# Patient Record
Sex: FEMALE | Race: WHITE | NOT HISPANIC OR LATINO | Employment: OTHER | URBAN - METROPOLITAN AREA
[De-identification: names, ages, dates, MRNs, and addresses within clinical notes are randomized per-mention and may not be internally consistent; named-entity substitution may affect disease eponyms.]

---

## 2018-01-29 ENCOUNTER — OFFICE VISIT CONVERTED (OUTPATIENT)
Dept: UROLOGY | Facility: CLINIC | Age: 78
End: 2018-01-29
Attending: NURSE PRACTITIONER

## 2018-02-02 ENCOUNTER — HOSPITAL ENCOUNTER (OUTPATIENT)
Dept: GENERAL RADIOLOGY | Facility: HOSPITAL | Age: 78
Discharge: HOME | End: 2018-02-02
Admitting: ORTHOPAEDIC SURGERY

## 2018-02-02 ENCOUNTER — HOSPITAL ENCOUNTER (OUTPATIENT)
Dept: GENERAL RADIOLOGY | Facility: HOSPITAL | Age: 78
Discharge: HOME | End: 2018-02-02

## 2018-02-02 ENCOUNTER — APPOINTMENT (OUTPATIENT)
Dept: PREADMISSION TESTING | Facility: HOSPITAL | Age: 78
End: 2018-02-02

## 2018-02-02 VITALS
BODY MASS INDEX: 34.99 KG/M2 | TEMPERATURE: 98.2 F | SYSTOLIC BLOOD PRESSURE: 121 MMHG | OXYGEN SATURATION: 95 % | RESPIRATION RATE: 24 BRPM | HEIGHT: 65 IN | HEART RATE: 69 BPM | DIASTOLIC BLOOD PRESSURE: 79 MMHG | WEIGHT: 210 LBS

## 2018-02-02 LAB
ALBUMIN SERPL-MCNC: 4.4 G/DL (ref 3.5–5.2)
ALBUMIN/GLOB SERPL: 1.2 G/DL
ALP SERPL-CCNC: 97 U/L (ref 39–117)
ALT SERPL W P-5'-P-CCNC: 13 U/L (ref 1–33)
ANION GAP SERPL CALCULATED.3IONS-SCNC: 14.5 MMOL/L
APTT PPP: 27.6 SECONDS (ref 22.7–35.4)
AST SERPL-CCNC: 13 U/L (ref 1–32)
BASOPHILS # BLD AUTO: 0.02 10*3/MM3 (ref 0–0.2)
BASOPHILS NFR BLD AUTO: 0.2 % (ref 0–1.5)
BILIRUB SERPL-MCNC: 0.3 MG/DL (ref 0.1–1.2)
BILIRUB UR QL STRIP: NEGATIVE
BUN BLD-MCNC: 30 MG/DL (ref 8–23)
BUN/CREAT SERPL: 23.3 (ref 7–25)
CALCIUM SPEC-SCNC: 10.3 MG/DL (ref 8.6–10.5)
CHLORIDE SERPL-SCNC: 100 MMOL/L (ref 98–107)
CLARITY UR: CLEAR
CO2 SERPL-SCNC: 27.5 MMOL/L (ref 22–29)
COLOR UR: YELLOW
CREAT BLD-MCNC: 1.29 MG/DL (ref 0.57–1)
DEPRECATED RDW RBC AUTO: 46.7 FL (ref 37–54)
EOSINOPHIL # BLD AUTO: 0.13 10*3/MM3 (ref 0–0.7)
EOSINOPHIL NFR BLD AUTO: 1.2 % (ref 0.3–6.2)
ERYTHROCYTE [DISTWIDTH] IN BLOOD BY AUTOMATED COUNT: 13.8 % (ref 11.7–13)
GFR SERPL CREATININE-BSD FRML MDRD: 40 ML/MIN/1.73
GLOBULIN UR ELPH-MCNC: 3.6 GM/DL
GLUCOSE BLD-MCNC: 118 MG/DL (ref 65–99)
GLUCOSE UR STRIP-MCNC: NEGATIVE MG/DL
HBA1C MFR BLD: 6.17 % (ref 4.8–5.6)
HCT VFR BLD AUTO: 37.5 % (ref 35.6–45.5)
HGB BLD-MCNC: 12 G/DL (ref 11.9–15.5)
HGB UR QL STRIP.AUTO: NEGATIVE
IMM GRANULOCYTES # BLD: 0.04 10*3/MM3 (ref 0–0.03)
IMM GRANULOCYTES NFR BLD: 0.4 % (ref 0–0.5)
INR PPP: 1.07 (ref 0.9–1.1)
KETONES UR QL STRIP: NEGATIVE
LEUKOCYTE ESTERASE UR QL STRIP.AUTO: NEGATIVE
LYMPHOCYTES # BLD AUTO: 2.93 10*3/MM3 (ref 0.9–4.8)
LYMPHOCYTES NFR BLD AUTO: 26.8 % (ref 19.6–45.3)
MCH RBC QN AUTO: 29.8 PG (ref 26.9–32)
MCHC RBC AUTO-ENTMCNC: 32 G/DL (ref 32.4–36.3)
MCV RBC AUTO: 93.1 FL (ref 80.5–98.2)
MONOCYTES # BLD AUTO: 0.46 10*3/MM3 (ref 0.2–1.2)
MONOCYTES NFR BLD AUTO: 4.2 % (ref 5–12)
NEUTROPHILS # BLD AUTO: 7.37 10*3/MM3 (ref 1.9–8.1)
NEUTROPHILS NFR BLD AUTO: 67.2 % (ref 42.7–76)
NITRITE UR QL STRIP: NEGATIVE
PH UR STRIP.AUTO: 5.5 [PH] (ref 5–8)
PLATELET # BLD AUTO: 299 10*3/MM3 (ref 140–500)
PMV BLD AUTO: 10.5 FL (ref 6–12)
POTASSIUM BLD-SCNC: 4.1 MMOL/L (ref 3.5–5.2)
PROT SERPL-MCNC: 8 G/DL (ref 6–8.5)
PROT UR QL STRIP: NEGATIVE
PROTHROMBIN TIME: 13.5 SECONDS (ref 11.7–14.2)
RBC # BLD AUTO: 4.03 10*6/MM3 (ref 3.9–5.2)
SODIUM BLD-SCNC: 142 MMOL/L (ref 136–145)
SP GR UR STRIP: 1.02 (ref 1–1.03)
UROBILINOGEN UR QL STRIP: NORMAL
WBC NRBC COR # BLD: 10.95 10*3/MM3 (ref 4.5–10.7)

## 2018-02-02 PROCEDURE — 80053 COMPREHEN METABOLIC PANEL: CPT | Performed by: ORTHOPAEDIC SURGERY

## 2018-02-02 PROCEDURE — 81003 URINALYSIS AUTO W/O SCOPE: CPT | Performed by: ORTHOPAEDIC SURGERY

## 2018-02-02 PROCEDURE — 85025 COMPLETE CBC W/AUTO DIFF WBC: CPT | Performed by: ORTHOPAEDIC SURGERY

## 2018-02-02 PROCEDURE — 93005 ELECTROCARDIOGRAM TRACING: CPT

## 2018-02-02 PROCEDURE — 71046 X-RAY EXAM CHEST 2 VIEWS: CPT

## 2018-02-02 PROCEDURE — 85730 THROMBOPLASTIN TIME PARTIAL: CPT | Performed by: ORTHOPAEDIC SURGERY

## 2018-02-02 PROCEDURE — 83036 HEMOGLOBIN GLYCOSYLATED A1C: CPT | Performed by: ORTHOPAEDIC SURGERY

## 2018-02-02 PROCEDURE — 36415 COLL VENOUS BLD VENIPUNCTURE: CPT

## 2018-02-02 PROCEDURE — 73560 X-RAY EXAM OF KNEE 1 OR 2: CPT

## 2018-02-02 PROCEDURE — 85610 PROTHROMBIN TIME: CPT | Performed by: ORTHOPAEDIC SURGERY

## 2018-02-02 PROCEDURE — 93010 ELECTROCARDIOGRAM REPORT: CPT | Performed by: INTERNAL MEDICINE

## 2018-02-02 RX ORDER — HYDROCODONE BITARTRATE AND ACETAMINOPHEN 5; 325 MG/1; MG/1
1 TABLET ORAL EVERY 6 HOURS PRN
COMMUNITY
End: 2018-02-02

## 2018-02-02 RX ORDER — OMEPRAZOLE 20 MG/1
20 CAPSULE, DELAYED RELEASE ORAL DAILY
COMMUNITY

## 2018-02-02 RX ORDER — LUBIPROSTONE 8 UG/1
8 CAPSULE ORAL 2 TIMES DAILY WITH MEALS
COMMUNITY
End: 2018-12-13

## 2018-02-02 RX ORDER — ASCORBIC ACID 500 MG
500 TABLET ORAL DAILY
COMMUNITY

## 2018-02-02 RX ORDER — LOSARTAN POTASSIUM 25 MG/1
25 TABLET ORAL DAILY
COMMUNITY
End: 2021-06-21 | Stop reason: SDUPTHER

## 2018-02-02 RX ORDER — PRAMIPEXOLE DIHYDROCHLORIDE 1.5 MG/1
1.5 TABLET ORAL NIGHTLY
COMMUNITY

## 2018-02-02 RX ORDER — CHLORHEXIDINE GLUCONATE 500 MG/1
1 CLOTH TOPICAL
Status: ON HOLD | COMMUNITY
End: 2018-02-13

## 2018-02-02 RX ORDER — OXYCODONE HYDROCHLORIDE AND ACETAMINOPHEN 5; 325 MG/1; MG/1
1 TABLET ORAL EVERY 6 HOURS PRN
COMMUNITY
End: 2018-12-13

## 2018-02-02 RX ORDER — DOCUSATE SODIUM 100 MG/1
100 CAPSULE, LIQUID FILLED ORAL NIGHTLY PRN
COMMUNITY
End: 2018-12-13

## 2018-02-02 RX ORDER — LATANOPROST 50 UG/ML
2 SOLUTION/ DROPS OPHTHALMIC 2 TIMES DAILY
Status: ON HOLD | COMMUNITY
End: 2018-12-21

## 2018-02-02 RX ORDER — LEVOTHYROXINE SODIUM 0.03 MG/1
25 TABLET ORAL DAILY
COMMUNITY

## 2018-02-02 RX ORDER — SPIRONOLACTONE AND HYDROCHLOROTHIAZIDE 25; 25 MG/1; MG/1
1 TABLET ORAL DAILY
COMMUNITY
End: 2018-12-13

## 2018-02-02 RX ORDER — ATORVASTATIN CALCIUM 10 MG/1
10 TABLET, FILM COATED ORAL NIGHTLY
COMMUNITY
End: 2021-06-21 | Stop reason: SDUPTHER

## 2018-02-02 RX ORDER — METOPROLOL SUCCINATE 25 MG/1
25 TABLET, EXTENDED RELEASE ORAL NIGHTLY
COMMUNITY
End: 2021-09-23 | Stop reason: SDUPTHER

## 2018-02-02 RX ORDER — LISINOPRIL 10 MG/1
10 TABLET ORAL DAILY
COMMUNITY
End: 2018-12-13

## 2018-02-02 RX ORDER — SENNOSIDES 8.6 MG
650 CAPSULE ORAL EVERY 8 HOURS PRN
COMMUNITY
End: 2019-03-05

## 2018-02-02 RX ORDER — SOLIFENACIN SUCCINATE 5 MG/1
5 TABLET, FILM COATED ORAL NIGHTLY
COMMUNITY
End: 2021-08-03 | Stop reason: CLARIF

## 2018-02-02 ASSESSMENT — KOOS JR
KOOS JR SCORE: 36.931
KOOS JR SCORE: 20

## 2018-02-02 NOTE — DISCHARGE INSTRUCTIONS

## 2018-02-10 RX ORDER — CHLORHEXIDINE GLUCONATE 0.12 MG/ML
15 RINSE ORAL EVERY 12 HOURS SCHEDULED
Status: CANCELLED | OUTPATIENT
Start: 2018-02-10

## 2018-02-10 NOTE — H&P
HPI  Mrs. Becerra is a 76 y/o female who has a history of L TKA revision on 2/4/14 for instability and infection. 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 She has been following with us for a painful left total knee and bone scan has ruled out infection/losening. She is here today because she twisted her left knee while going into a closet 1 week ago. She went to urgent care and her left knee was x-rayed. She is here today for follow-up.   She ambulates with a walker but this is her baseline. She states that she has medial sides knee pain which is worse then she puts weight on it.   She has a history of back surgery.  She denies any history of MRSA, DVT. She has a history of diabetes mellitus type 2.  Review of Systems:  Positive for: Depression, Eyes or Vision Problems, Shortness of Breath and Urinary Retention.    Patient denies: Abdominal Pain, Bleeding, Chest Pain, Convulsions/Seizure, Decreased Motion, Difficulty Swallowing, Easy Bruisability, Emotional Disturbances, Fecal Incontinence, Fever/Chills, Headaches, Increased Thirst, Increased Hunger, Insomnia, Joint Pain, Nausea/Vomiting, Night Sweats, Poor Balance, Persistent Cough, Rash, Shortness of Breath While Lying down, Skin Problems and Weakness.  Allergies:  Flu vaccine (critical)  * tetanus (critical)  * zoloft (critical)  * eggs (critical)  * metal - negative  Medications:  aspirin 81 mg oral tablet delayed release (aspirin) once daily  vitamin c 500 mg oral tablet (ascorbic acid) once daily  atorvastatin calcium 10 mg oral tablet (atorvastatin calcium) once daily  venlafaxine hcl er 150 mg oral tablet extended release 24 hour (venlafaxine hcl) once daily  pramipexole dihydrochloride 1.5 mg oral tablet (pramipexole dihydrochloride) once daily  spironolactone 25 mg oral tablet (spironolactone) once daily  multivitamins oral capsule (multiple vitamin) once daily  calcium 600 + d tablet (calcium carb-cholecalciferol tabs) two  daily  metformin hcl 500 mg oral tablet (metformin hcl) 2x day  lisinopril 40 mg oral tablet (lisinopril) once daily  levothyroxine sodium 25 mcg oral tablet (levothyroxine sodium) once daily  vesicare 5 mg oral tablet (solifenacin succinate) once daily  furosemide 20 mg oral tablet (furosemide) once daily  omeprazole 20 mg oral capsule delayed release (omeprazole) once daily  Patient History of:  SHORTNESS OF BREATH  [place]  DEPRESSION  BLOOD CLOTS/EMBOLISM - NEGATIVE  HYPERTENSION  DIABETES - 2  Surgical History:  BREAST BIOPSY-   BLADDER SX-   Hysterectomy-Total-[CPT-13468]   Lumbar Discectomy-[CPT-39383]   bilateral Total Knee Arthroplasty-[CPT-10386]   Known Family History of:  lung disease-father  cancer-mother  diabetes-father  Past medical, social, family histories and ROS reviewed today with the patient and changes documented in the chart (12/29/2017).  PCP Dr. MARY PEREAM, ANDR[facility]S Z    Physical Exam  Height:  66 in.    Weight:  221 lbs.     BMI:  35.80  Pulse:  70  Blood pressure:  128 / 70 mm Hg    Gait: normal                     General  Physical Exam is unchanged from the last visit.    Mental/HEENT/Cardio/Skin  The patient's general appearance is well developed, well nourished, no acute distress.  Orientation is alert and oriented x 3.  The patient's mood is normal.  A head exam reveals normocephalic/atraumatic.  An eye exam reveals pupils equal.  Pulmonary exam shows normal air exchange, no labored breathing, or shortness of breath.    Left Knee  There is a mature midline anterior scar. incision:  Clean, dry, and intact.  No signs of infection.  Neutral alignment.  There is no atrophy.  There is no effusion.  No warmth.  No erythema.  Range of motion of the knee is 0 to 120 degrees of flexion.  There is no tenderness in the knee.  Patellar tracking is normal.  Patellar crepitation is not present.  Crepitus is not present.  Posterior drawer grade 0.  The patient opens to varus and valgus stress.        Imaging/Diagnostic Studies    X-rays of the left knee were reviewed.  She has a long stemed L TKA without evidence of cement mantle fracture or component fracture.   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 The patient has mediolateral instability.  She possibly has a medial collateral ligament injury versus tear with the most recent fall.  The patient has reached a point of disability and has failed nonoperative management.  The patient is indicated for a Revision, LEFT total knee arthroplasty. At the time of surgery.  I will visualize the medial collateral ligament.  If I am able to salvage the implants, I will do an isolated liner change.  However, based on the examination and findings.  I feel that she will be a candidate for a hinged knee replacement.  Likely,  Risks and benefits of the procedure including but not limited to infection, DVT, pulmonary embolism, future loosening of the implants, possibility of injury to nerves vessels and tendons, periprosthetic fractures have been discussed in detail.  Despite the risks involved,  The patient would like to proceed.  The patient  is being scheduled for a   Revision LEFT total knee arthroplasty at [facility] on February 13, 2018.     I will request for medical clearance from her family physician Dr Leo Lomas DPM,  And cardiac clearance from her cardiologist, Dr. Tae M.D., [facility].  Patient does not use tobacco.      We discussed the benefits of surgical intervention, as well as alternative treatments.  Potential surgical risks and complications include but are not limited to DVT, infection, neurovascular injury, fracture, implant wear,  failure, possible need for revision surgery, loss of motion, dislocation, limb length changes.  Sufficient opportunity was given to discuss the condition and treatment plan with the doctor, and all questions were answered for the patient.  Nonsurgical measures such as injections, medications, or physical therapy may not offer significant relief to this patient.  The discussion lasted 30 minutes.      Addendum:    I have personally examined this patient. I agree with the above findings and treatment  plan.     Jair Fajardo MD  2/11/2018

## 2018-02-13 ENCOUNTER — ANESTHESIA (OUTPATIENT)
Dept: PERIOP | Facility: HOSPITAL | Age: 78
End: 2018-02-13

## 2018-02-13 ENCOUNTER — ANESTHESIA EVENT (OUTPATIENT)
Dept: PERIOP | Facility: HOSPITAL | Age: 78
End: 2018-02-13

## 2018-02-13 ENCOUNTER — APPOINTMENT (OUTPATIENT)
Dept: GENERAL RADIOLOGY | Facility: HOSPITAL | Age: 78
End: 2018-02-13

## 2018-02-13 ENCOUNTER — HOSPITAL ENCOUNTER (INPATIENT)
Facility: HOSPITAL | Age: 78
LOS: 3 days | Discharge: NURSING, REHAB OR LONG-TERM CARE | End: 2018-02-16
Attending: ORTHOPAEDIC SURGERY | Admitting: ORTHOPAEDIC SURGERY

## 2018-02-13 DIAGNOSIS — T84.093A FAILED TOTAL LEFT KNEE REPLACEMENT (HCC): ICD-10-CM

## 2018-02-13 DIAGNOSIS — Z74.09 IMPAIRED FUNCTIONAL MOBILITY, BALANCE, AND ENDURANCE: Primary | ICD-10-CM

## 2018-02-13 PROBLEM — M17.9 OSTEOARTHRITIS, KNEE: Status: ACTIVE | Noted: 2018-02-13

## 2018-02-13 LAB — GLUCOSE BLDC GLUCOMTR-MCNC: 115 MG/DL (ref 70–130)

## 2018-02-13 PROCEDURE — 0SUW09Z SUPPLEMENT LEFT KNEE JOINT, TIBIAL SURFACE WITH LINER, OPEN APPROACH: ICD-10-PCS | Performed by: ORTHOPAEDIC SURGERY

## 2018-02-13 PROCEDURE — 0SRD0J9 REPLACEMENT OF LEFT KNEE JOINT WITH SYNTHETIC SUBSTITUTE, CEMENTED, OPEN APPROACH: ICD-10-PCS | Performed by: ORTHOPAEDIC SURGERY

## 2018-02-13 PROCEDURE — C1776 JOINT DEVICE (IMPLANTABLE): HCPCS | Performed by: ORTHOPAEDIC SURGERY

## 2018-02-13 PROCEDURE — 73560 X-RAY EXAM OF KNEE 1 OR 2: CPT

## 2018-02-13 PROCEDURE — 25010000002 FENTANYL CITRATE (PF) 100 MCG/2ML SOLUTION: Performed by: ANESTHESIOLOGY

## 2018-02-13 PROCEDURE — 25010000002 ROPIVACAINE PER 1 MG: Performed by: ANESTHESIOLOGY

## 2018-02-13 PROCEDURE — 25010000003 CEFAZOLIN IN DEXTROSE 2-4 GM/100ML-% SOLUTION: Performed by: ORTHOPAEDIC SURGERY

## 2018-02-13 PROCEDURE — L1830 KO IMMOB CANVAS LONG PRE OTS: HCPCS | Performed by: ORTHOPAEDIC SURGERY

## 2018-02-13 PROCEDURE — 0SPD09Z REMOVAL OF LINER FROM LEFT KNEE JOINT, OPEN APPROACH: ICD-10-PCS | Performed by: ORTHOPAEDIC SURGERY

## 2018-02-13 PROCEDURE — 25010000002 DEXAMETHASONE PER 1 MG: Performed by: NURSE ANESTHETIST, CERTIFIED REGISTERED

## 2018-02-13 PROCEDURE — 25010000002 MEPIVACAINE PF 2 % SOLUTION 20 ML VIAL: Performed by: ANESTHESIOLOGY

## 2018-02-13 PROCEDURE — 25010000002 METHYLPREDNISOLONE PER 125 MG: Performed by: ANESTHESIOLOGY

## 2018-02-13 PROCEDURE — C1713 ANCHOR/SCREW BN/BN,TIS/BN: HCPCS | Performed by: ORTHOPAEDIC SURGERY

## 2018-02-13 PROCEDURE — 88305 TISSUE EXAM BY PATHOLOGIST: CPT | Performed by: ORTHOPAEDIC SURGERY

## 2018-02-13 PROCEDURE — 25010000002 PROPOFOL 10 MG/ML EMULSION: Performed by: NURSE ANESTHETIST, CERTIFIED REGISTERED

## 2018-02-13 PROCEDURE — 88331 PATH CONSLTJ SURG 1 BLK 1SPC: CPT | Performed by: ORTHOPAEDIC SURGERY

## 2018-02-13 PROCEDURE — 0SPD0JZ REMOVAL OF SYNTHETIC SUBSTITUTE FROM LEFT KNEE JOINT, OPEN APPROACH: ICD-10-PCS | Performed by: ORTHOPAEDIC SURGERY

## 2018-02-13 PROCEDURE — 82962 GLUCOSE BLOOD TEST: CPT

## 2018-02-13 PROCEDURE — 25010000002 VANCOMYCIN PER 500 MG: Performed by: ORTHOPAEDIC SURGERY

## 2018-02-13 PROCEDURE — 25010000002 MIDAZOLAM PER 1 MG: Performed by: ANESTHESIOLOGY

## 2018-02-13 DEVICE — AXLE KN DURATION MRH
Type: IMPLANTABLE DEVICE | Site: KNEE | Status: NON-FUNCTIONAL
Removed: 2019-06-13

## 2018-02-13 DEVICE — IMPLANTABLE DEVICE
Type: IMPLANTABLE DEVICE | Site: KNEE | Status: NON-FUNCTIONAL
Removed: 2019-06-13

## 2018-02-13 DEVICE — CMT BONE SIMPLEX/P 1/2DOSE 10PK: Type: IMPLANTABLE DEVICE | Site: KNEE | Status: FUNCTIONAL

## 2018-02-13 DEVICE — BUMPER TIB DURATION MRH NUTRL
Type: IMPLANTABLE DEVICE | Site: KNEE | Status: NON-FUNCTIONAL
Removed: 2019-06-13

## 2018-02-13 DEVICE — CMT BONE SIMPLEX/P TMYCIN FDOS SGL: Type: IMPLANTABLE DEVICE | Site: KNEE | Status: FUNCTIONAL

## 2018-02-13 DEVICE — COMP TIB ROTAT MRH
Type: IMPLANTABLE DEVICE | Site: KNEE | Status: NON-FUNCTIONAL
Removed: 2019-06-13

## 2018-02-13 DEVICE — BUSHING FEM DURATION MRH STD
Type: IMPLANTABLE DEVICE | Site: KNEE | Status: NON-FUNCTIONAL
Removed: 2019-06-13

## 2018-02-13 DEVICE — BASEPLT TIB DURTN MRH 2 SM
Type: IMPLANTABLE DEVICE | Site: KNEE | Status: NON-FUNCTIONAL
Removed: 2019-06-13

## 2018-02-13 DEVICE — IMPLANTABLE DEVICE: Type: IMPLANTABLE DEVICE | Site: KNEE | Status: FUNCTIONAL

## 2018-02-13 RX ORDER — HYDROMORPHONE HCL 110MG/55ML
0.5 PATIENT CONTROLLED ANALGESIA SYRINGE INTRAVENOUS
Status: DISCONTINUED | OUTPATIENT
Start: 2018-02-13 | End: 2018-02-13 | Stop reason: HOSPADM

## 2018-02-13 RX ORDER — ONDANSETRON 4 MG/1
4 TABLET, ORALLY DISINTEGRATING ORAL EVERY 6 HOURS PRN
Status: DISCONTINUED | OUTPATIENT
Start: 2018-02-13 | End: 2018-02-16 | Stop reason: HOSPADM

## 2018-02-13 RX ORDER — ROPIVACAINE HYDROCHLORIDE 2 MG/ML
INJECTION, SOLUTION EPIDURAL; INFILTRATION; PERINEURAL AS NEEDED
Status: DISCONTINUED | OUTPATIENT
Start: 2018-02-13 | End: 2018-02-13 | Stop reason: SURG

## 2018-02-13 RX ORDER — VENLAFAXINE HYDROCHLORIDE 150 MG/1
150 CAPSULE, EXTENDED RELEASE ORAL DAILY
Status: DISCONTINUED | OUTPATIENT
Start: 2018-02-13 | End: 2018-02-16 | Stop reason: HOSPADM

## 2018-02-13 RX ORDER — DOCUSATE SODIUM 100 MG/1
100 CAPSULE, LIQUID FILLED ORAL 2 TIMES DAILY
Status: DISCONTINUED | OUTPATIENT
Start: 2018-02-13 | End: 2018-02-16 | Stop reason: HOSPADM

## 2018-02-13 RX ORDER — HYDROCODONE BITARTRATE AND ACETAMINOPHEN 7.5; 325 MG/1; MG/1
1 TABLET ORAL ONCE AS NEEDED
Status: DISCONTINUED | OUTPATIENT
Start: 2018-02-13 | End: 2018-02-13 | Stop reason: HOSPADM

## 2018-02-13 RX ORDER — FLUMAZENIL 0.1 MG/ML
0.2 INJECTION INTRAVENOUS AS NEEDED
Status: DISCONTINUED | OUTPATIENT
Start: 2018-02-13 | End: 2018-02-13 | Stop reason: HOSPADM

## 2018-02-13 RX ORDER — NALOXONE HCL 0.4 MG/ML
0.2 VIAL (ML) INJECTION AS NEEDED
Status: DISCONTINUED | OUTPATIENT
Start: 2018-02-13 | End: 2018-02-13 | Stop reason: HOSPADM

## 2018-02-13 RX ORDER — ASPIRIN 325 MG
325 TABLET, DELAYED RELEASE (ENTERIC COATED) ORAL EVERY 12 HOURS SCHEDULED
Status: DISCONTINUED | OUTPATIENT
Start: 2018-02-14 | End: 2018-02-16 | Stop reason: HOSPADM

## 2018-02-13 RX ORDER — PROPOFOL 10 MG/ML
VIAL (ML) INTRAVENOUS AS NEEDED
Status: DISCONTINUED | OUTPATIENT
Start: 2018-02-13 | End: 2018-02-13 | Stop reason: SURG

## 2018-02-13 RX ORDER — SODIUM CHLORIDE 0.9 % (FLUSH) 0.9 %
1-10 SYRINGE (ML) INJECTION AS NEEDED
Status: DISCONTINUED | OUTPATIENT
Start: 2018-02-13 | End: 2018-02-13 | Stop reason: HOSPADM

## 2018-02-13 RX ORDER — ONDANSETRON 2 MG/ML
4 INJECTION INTRAMUSCULAR; INTRAVENOUS ONCE AS NEEDED
Status: DISCONTINUED | OUTPATIENT
Start: 2018-02-13 | End: 2018-02-13 | Stop reason: HOSPADM

## 2018-02-13 RX ORDER — ACETAMINOPHEN 500 MG
1000 TABLET ORAL ONCE
Status: COMPLETED | OUTPATIENT
Start: 2018-02-13 | End: 2018-02-13

## 2018-02-13 RX ORDER — PROMETHAZINE HYDROCHLORIDE 25 MG/ML
12.5 INJECTION, SOLUTION INTRAMUSCULAR; INTRAVENOUS ONCE AS NEEDED
Status: DISCONTINUED | OUTPATIENT
Start: 2018-02-13 | End: 2018-02-13 | Stop reason: HOSPADM

## 2018-02-13 RX ORDER — TRANEXAMIC ACID 100 MG/ML
INJECTION, SOLUTION INTRAVENOUS AS NEEDED
Status: DISCONTINUED | OUTPATIENT
Start: 2018-02-13 | End: 2018-02-13 | Stop reason: SURG

## 2018-02-13 RX ORDER — PANTOPRAZOLE SODIUM 40 MG/1
40 TABLET, DELAYED RELEASE ORAL EVERY MORNING
Status: DISCONTINUED | OUTPATIENT
Start: 2018-02-14 | End: 2018-02-16 | Stop reason: HOSPADM

## 2018-02-13 RX ORDER — NICOTINE POLACRILEX 4 MG
15 LOZENGE BUCCAL
Status: DISCONTINUED | OUTPATIENT
Start: 2018-02-13 | End: 2018-02-16 | Stop reason: HOSPADM

## 2018-02-13 RX ORDER — METHYLPREDNISOLONE SODIUM SUCCINATE 125 MG/2ML
INJECTION, POWDER, LYOPHILIZED, FOR SOLUTION INTRAMUSCULAR; INTRAVENOUS AS NEEDED
Status: DISCONTINUED | OUTPATIENT
Start: 2018-02-13 | End: 2018-02-13 | Stop reason: SURG

## 2018-02-13 RX ORDER — LABETALOL HYDROCHLORIDE 5 MG/ML
5 INJECTION, SOLUTION INTRAVENOUS
Status: DISCONTINUED | OUTPATIENT
Start: 2018-02-13 | End: 2018-02-13 | Stop reason: HOSPADM

## 2018-02-13 RX ORDER — LATANOPROST 50 UG/ML
1 SOLUTION/ DROPS OPHTHALMIC NIGHTLY
Status: DISCONTINUED | OUTPATIENT
Start: 2018-02-13 | End: 2018-02-16 | Stop reason: HOSPADM

## 2018-02-13 RX ORDER — PRAMIPEXOLE DIHYDROCHLORIDE 1.5 MG/1
1.5 TABLET ORAL NIGHTLY
Status: DISCONTINUED | OUTPATIENT
Start: 2018-02-13 | End: 2018-02-16 | Stop reason: HOSPADM

## 2018-02-13 RX ORDER — SODIUM CHLORIDE, SODIUM LACTATE, POTASSIUM CHLORIDE, CALCIUM CHLORIDE 600; 310; 30; 20 MG/100ML; MG/100ML; MG/100ML; MG/100ML
INJECTION, SOLUTION INTRAVENOUS CONTINUOUS PRN
Status: DISCONTINUED | OUTPATIENT
Start: 2018-02-13 | End: 2018-02-13 | Stop reason: SURG

## 2018-02-13 RX ORDER — BISACODYL 10 MG
10 SUPPOSITORY, RECTAL RECTAL DAILY PRN
Status: DISCONTINUED | OUTPATIENT
Start: 2018-02-13 | End: 2018-02-16 | Stop reason: HOSPADM

## 2018-02-13 RX ORDER — UREA 10 %
1 LOTION (ML) TOPICAL NIGHTLY PRN
Status: DISCONTINUED | OUTPATIENT
Start: 2018-02-13 | End: 2018-02-16 | Stop reason: HOSPADM

## 2018-02-13 RX ORDER — VANCOMYCIN HYDROCHLORIDE 1 G/200ML
1000 INJECTION, SOLUTION INTRAVENOUS EVERY 24 HOURS
Status: COMPLETED | OUTPATIENT
Start: 2018-02-13 | End: 2018-02-14

## 2018-02-13 RX ORDER — NALOXONE HCL 0.4 MG/ML
0.1 VIAL (ML) INJECTION
Status: DISCONTINUED | OUTPATIENT
Start: 2018-02-13 | End: 2018-02-16 | Stop reason: HOSPADM

## 2018-02-13 RX ORDER — DEXTROSE MONOHYDRATE 25 G/50ML
25 INJECTION, SOLUTION INTRAVENOUS
Status: DISCONTINUED | OUTPATIENT
Start: 2018-02-13 | End: 2018-02-16 | Stop reason: HOSPADM

## 2018-02-13 RX ORDER — FENTANYL CITRATE 50 UG/ML
50 INJECTION, SOLUTION INTRAMUSCULAR; INTRAVENOUS
Status: DISCONTINUED | OUTPATIENT
Start: 2018-02-13 | End: 2018-02-13 | Stop reason: HOSPADM

## 2018-02-13 RX ORDER — LIDOCAINE HYDROCHLORIDE 10 MG/ML
0.5 INJECTION, SOLUTION EPIDURAL; INFILTRATION; INTRACAUDAL; PERINEURAL ONCE AS NEEDED
Status: DISCONTINUED | OUTPATIENT
Start: 2018-02-13 | End: 2018-02-13 | Stop reason: HOSPADM

## 2018-02-13 RX ORDER — MIDAZOLAM HYDROCHLORIDE 1 MG/ML
2 INJECTION INTRAMUSCULAR; INTRAVENOUS
Status: DISCONTINUED | OUTPATIENT
Start: 2018-02-13 | End: 2018-02-13 | Stop reason: HOSPADM

## 2018-02-13 RX ORDER — BISACODYL 5 MG/1
10 TABLET, DELAYED RELEASE ORAL DAILY PRN
Status: DISCONTINUED | OUTPATIENT
Start: 2018-02-13 | End: 2018-02-16 | Stop reason: HOSPADM

## 2018-02-13 RX ORDER — ROPIVACAINE HYDROCHLORIDE 5 MG/ML
INJECTION, SOLUTION EPIDURAL; INFILTRATION; PERINEURAL AS NEEDED
Status: DISCONTINUED | OUTPATIENT
Start: 2018-02-13 | End: 2018-02-13 | Stop reason: SURG

## 2018-02-13 RX ORDER — ACETAMINOPHEN 325 MG/1
325 TABLET ORAL EVERY 4 HOURS PRN
Status: DISCONTINUED | OUTPATIENT
Start: 2018-02-13 | End: 2018-02-16 | Stop reason: HOSPADM

## 2018-02-13 RX ORDER — LOSARTAN POTASSIUM 25 MG/1
12.5 TABLET ORAL DAILY
Status: DISCONTINUED | OUTPATIENT
Start: 2018-02-13 | End: 2018-02-13

## 2018-02-13 RX ORDER — CEFAZOLIN SODIUM 2 G/100ML
2 INJECTION, SOLUTION INTRAVENOUS EVERY 8 HOURS
Status: COMPLETED | OUTPATIENT
Start: 2018-02-13 | End: 2018-02-14

## 2018-02-13 RX ORDER — PROMETHAZINE HYDROCHLORIDE 25 MG/1
25 TABLET ORAL ONCE AS NEEDED
Status: DISCONTINUED | OUTPATIENT
Start: 2018-02-13 | End: 2018-02-13 | Stop reason: HOSPADM

## 2018-02-13 RX ORDER — FAMOTIDINE 10 MG/ML
20 INJECTION, SOLUTION INTRAVENOUS ONCE
Status: COMPLETED | OUTPATIENT
Start: 2018-02-13 | End: 2018-02-13

## 2018-02-13 RX ORDER — ONDANSETRON 2 MG/ML
4 INJECTION INTRAMUSCULAR; INTRAVENOUS EVERY 6 HOURS PRN
Status: DISCONTINUED | OUTPATIENT
Start: 2018-02-13 | End: 2018-02-16 | Stop reason: HOSPADM

## 2018-02-13 RX ORDER — SODIUM CHLORIDE, SODIUM LACTATE, POTASSIUM CHLORIDE, CALCIUM CHLORIDE 600; 310; 30; 20 MG/100ML; MG/100ML; MG/100ML; MG/100ML
9 INJECTION, SOLUTION INTRAVENOUS CONTINUOUS
Status: DISCONTINUED | OUTPATIENT
Start: 2018-02-13 | End: 2018-02-13 | Stop reason: HOSPADM

## 2018-02-13 RX ORDER — DIPHENHYDRAMINE HYDROCHLORIDE 50 MG/ML
12.5 INJECTION INTRAMUSCULAR; INTRAVENOUS
Status: DISCONTINUED | OUTPATIENT
Start: 2018-02-13 | End: 2018-02-13 | Stop reason: HOSPADM

## 2018-02-13 RX ORDER — EPHEDRINE SULFATE 50 MG/ML
INJECTION, SOLUTION INTRAVENOUS AS NEEDED
Status: DISCONTINUED | OUTPATIENT
Start: 2018-02-13 | End: 2018-02-13 | Stop reason: SURG

## 2018-02-13 RX ORDER — DIPHENHYDRAMINE HYDROCHLORIDE 50 MG/ML
25 INJECTION INTRAMUSCULAR; INTRAVENOUS EVERY 6 HOURS PRN
Status: DISCONTINUED | OUTPATIENT
Start: 2018-02-13 | End: 2018-02-16 | Stop reason: HOSPADM

## 2018-02-13 RX ORDER — LISINOPRIL 10 MG/1
10 TABLET ORAL DAILY
Status: DISCONTINUED | OUTPATIENT
Start: 2018-02-13 | End: 2018-02-13

## 2018-02-13 RX ORDER — PROMETHAZINE HYDROCHLORIDE 25 MG/1
12.5 TABLET ORAL ONCE AS NEEDED
Status: DISCONTINUED | OUTPATIENT
Start: 2018-02-13 | End: 2018-02-13 | Stop reason: HOSPADM

## 2018-02-13 RX ORDER — OXYCODONE AND ACETAMINOPHEN 7.5; 325 MG/1; MG/1
1 TABLET ORAL ONCE AS NEEDED
Status: DISCONTINUED | OUTPATIENT
Start: 2018-02-13 | End: 2018-02-13 | Stop reason: HOSPADM

## 2018-02-13 RX ORDER — DEXAMETHASONE SODIUM PHOSPHATE 10 MG/ML
INJECTION INTRAMUSCULAR; INTRAVENOUS AS NEEDED
Status: DISCONTINUED | OUTPATIENT
Start: 2018-02-13 | End: 2018-02-13 | Stop reason: SURG

## 2018-02-13 RX ORDER — HYDROMORPHONE HYDROCHLORIDE 1 MG/ML
0.5 INJECTION, SOLUTION INTRAMUSCULAR; INTRAVENOUS; SUBCUTANEOUS
Status: DISCONTINUED | OUTPATIENT
Start: 2018-02-13 | End: 2018-02-16 | Stop reason: HOSPADM

## 2018-02-13 RX ORDER — PROMETHAZINE HYDROCHLORIDE 25 MG/1
25 SUPPOSITORY RECTAL ONCE AS NEEDED
Status: DISCONTINUED | OUTPATIENT
Start: 2018-02-13 | End: 2018-02-13 | Stop reason: HOSPADM

## 2018-02-13 RX ORDER — PREGABALIN 75 MG/1
75 CAPSULE ORAL ONCE
Status: COMPLETED | OUTPATIENT
Start: 2018-02-13 | End: 2018-02-13

## 2018-02-13 RX ORDER — LEVOTHYROXINE SODIUM 0.03 MG/1
25 TABLET ORAL DAILY
Status: DISCONTINUED | OUTPATIENT
Start: 2018-02-13 | End: 2018-02-16 | Stop reason: HOSPADM

## 2018-02-13 RX ORDER — HYDROCODONE BITARTRATE AND ACETAMINOPHEN 7.5; 325 MG/1; MG/1
1 TABLET ORAL EVERY 4 HOURS PRN
Status: DISCONTINUED | OUTPATIENT
Start: 2018-02-13 | End: 2018-02-16 | Stop reason: HOSPADM

## 2018-02-13 RX ORDER — LUBIPROSTONE 8 UG/1
8 CAPSULE ORAL 2 TIMES DAILY WITH MEALS
Status: DISCONTINUED | OUTPATIENT
Start: 2018-02-13 | End: 2018-02-16 | Stop reason: HOSPADM

## 2018-02-13 RX ORDER — HYDRALAZINE HYDROCHLORIDE 20 MG/ML
5 INJECTION INTRAMUSCULAR; INTRAVENOUS
Status: DISCONTINUED | OUTPATIENT
Start: 2018-02-13 | End: 2018-02-13 | Stop reason: HOSPADM

## 2018-02-13 RX ORDER — METOPROLOL SUCCINATE 50 MG/1
50 TABLET, EXTENDED RELEASE ORAL DAILY
Status: DISCONTINUED | OUTPATIENT
Start: 2018-02-13 | End: 2018-02-16 | Stop reason: HOSPADM

## 2018-02-13 RX ORDER — SODIUM CHLORIDE 9 MG/ML
100 INJECTION, SOLUTION INTRAVENOUS CONTINUOUS
Status: ACTIVE | OUTPATIENT
Start: 2018-02-13 | End: 2018-02-14

## 2018-02-13 RX ORDER — ONDANSETRON 4 MG/1
4 TABLET, FILM COATED ORAL EVERY 6 HOURS PRN
Status: DISCONTINUED | OUTPATIENT
Start: 2018-02-13 | End: 2018-02-16 | Stop reason: HOSPADM

## 2018-02-13 RX ORDER — HYDROCODONE BITARTRATE AND ACETAMINOPHEN 7.5; 325 MG/1; MG/1
2 TABLET ORAL EVERY 4 HOURS PRN
Status: DISCONTINUED | OUTPATIENT
Start: 2018-02-13 | End: 2018-02-16 | Stop reason: HOSPADM

## 2018-02-13 RX ORDER — EPHEDRINE SULFATE 50 MG/ML
5 INJECTION, SOLUTION INTRAVENOUS ONCE AS NEEDED
Status: DISCONTINUED | OUTPATIENT
Start: 2018-02-13 | End: 2018-02-13 | Stop reason: HOSPADM

## 2018-02-13 RX ORDER — MAGNESIUM HYDROXIDE 1200 MG/15ML
LIQUID ORAL AS NEEDED
Status: DISCONTINUED | OUTPATIENT
Start: 2018-02-13 | End: 2018-02-13 | Stop reason: HOSPADM

## 2018-02-13 RX ORDER — DIPHENHYDRAMINE HCL 25 MG
25 CAPSULE ORAL EVERY 6 HOURS PRN
Status: DISCONTINUED | OUTPATIENT
Start: 2018-02-13 | End: 2018-02-16 | Stop reason: HOSPADM

## 2018-02-13 RX ORDER — LIDOCAINE HYDROCHLORIDE 20 MG/ML
INJECTION, SOLUTION INFILTRATION; PERINEURAL AS NEEDED
Status: DISCONTINUED | OUTPATIENT
Start: 2018-02-13 | End: 2018-02-13 | Stop reason: SURG

## 2018-02-13 RX ORDER — CEFAZOLIN SODIUM 2 G/100ML
2 INJECTION, SOLUTION INTRAVENOUS ONCE
Status: DISCONTINUED | OUTPATIENT
Start: 2018-02-13 | End: 2018-02-13 | Stop reason: HOSPADM

## 2018-02-13 RX ORDER — MIDAZOLAM HYDROCHLORIDE 1 MG/ML
1 INJECTION INTRAMUSCULAR; INTRAVENOUS
Status: DISCONTINUED | OUTPATIENT
Start: 2018-02-13 | End: 2018-02-13 | Stop reason: HOSPADM

## 2018-02-13 RX ORDER — GLYCOPYRROLATE 0.2 MG/ML
INJECTION INTRAMUSCULAR; INTRAVENOUS AS NEEDED
Status: DISCONTINUED | OUTPATIENT
Start: 2018-02-13 | End: 2018-02-13 | Stop reason: SURG

## 2018-02-13 RX ORDER — CEFAZOLIN SODIUM 2 G/100ML
2 INJECTION, SOLUTION INTRAVENOUS ONCE
Status: COMPLETED | OUTPATIENT
Start: 2018-02-13 | End: 2018-02-13

## 2018-02-13 RX ADMIN — METHYLPREDNISOLONE SODIUM SUCCINATE 40 MG: 125 INJECTION, POWDER, FOR SOLUTION INTRAMUSCULAR; INTRAVENOUS at 10:58

## 2018-02-13 RX ADMIN — PROPOFOL 160 MG: 10 INJECTION, EMULSION INTRAVENOUS at 12:31

## 2018-02-13 RX ADMIN — ROPIVACAINE HYDROCHLORIDE 15 ML: 2 INJECTION, SOLUTION EPIDURAL; INFILTRATION at 10:59

## 2018-02-13 RX ADMIN — HYDROCODONE BITARTRATE AND ACETAMINOPHEN 1 TABLET: 7.5; 325 TABLET ORAL at 23:05

## 2018-02-13 RX ADMIN — MIDAZOLAM 1 MG: 1 INJECTION INTRAMUSCULAR; INTRAVENOUS at 11:15

## 2018-02-13 RX ADMIN — EPHEDRINE SULFATE 10 MG: 50 INJECTION INTRAMUSCULAR; INTRAVENOUS; SUBCUTANEOUS at 13:55

## 2018-02-13 RX ADMIN — HYDROCODONE BITARTRATE AND ACETAMINOPHEN 1 TABLET: 7.5; 325 TABLET ORAL at 18:19

## 2018-02-13 RX ADMIN — FENTANYL CITRATE 25 MCG: 50 INJECTION INTRAMUSCULAR; INTRAVENOUS at 14:44

## 2018-02-13 RX ADMIN — CEFAZOLIN SODIUM 2 G: 2 INJECTION, SOLUTION INTRAVENOUS at 12:40

## 2018-02-13 RX ADMIN — FENTANYL CITRATE 25 MCG: 50 INJECTION INTRAMUSCULAR; INTRAVENOUS at 13:15

## 2018-02-13 RX ADMIN — TRANEXAMIC ACID 1000 MG: 100 INJECTION, SOLUTION INTRAVENOUS at 14:58

## 2018-02-13 RX ADMIN — FENTANYL CITRATE 25 MCG: 50 INJECTION INTRAMUSCULAR; INTRAVENOUS at 13:48

## 2018-02-13 RX ADMIN — PREGABALIN 75 MG: 75 CAPSULE ORAL at 10:06

## 2018-02-13 RX ADMIN — FENTANYL CITRATE 25 MCG: 50 INJECTION INTRAMUSCULAR; INTRAVENOUS at 14:33

## 2018-02-13 RX ADMIN — SODIUM CHLORIDE 100 ML/HR: 9 INJECTION, SOLUTION INTRAVENOUS at 20:55

## 2018-02-13 RX ADMIN — DOCUSATE SODIUM 100 MG: 100 CAPSULE, LIQUID FILLED ORAL at 20:50

## 2018-02-13 RX ADMIN — SODIUM CHLORIDE, POTASSIUM CHLORIDE, SODIUM LACTATE AND CALCIUM CHLORIDE: 600; 310; 30; 20 INJECTION, SOLUTION INTRAVENOUS at 13:04

## 2018-02-13 RX ADMIN — EPHEDRINE SULFATE 10 MG: 50 INJECTION INTRAMUSCULAR; INTRAVENOUS; SUBCUTANEOUS at 15:30

## 2018-02-13 RX ADMIN — EPHEDRINE SULFATE 10 MG: 50 INJECTION INTRAMUSCULAR; INTRAVENOUS; SUBCUTANEOUS at 13:42

## 2018-02-13 RX ADMIN — DEXAMETHASONE SODIUM PHOSPHATE 6 MG: 10 INJECTION INTRAMUSCULAR; INTRAVENOUS at 12:53

## 2018-02-13 RX ADMIN — ACETAMINOPHEN 1000 MG: 500 TABLET ORAL at 10:06

## 2018-02-13 RX ADMIN — MEPIVACAINE HYDROCHLORIDE 15 ML: 20 INJECTION, SOLUTION EPIDURAL; INFILTRATION at 10:59

## 2018-02-13 RX ADMIN — FENTANYL CITRATE 25 MCG: 50 INJECTION INTRAMUSCULAR; INTRAVENOUS at 11:26

## 2018-02-13 RX ADMIN — ROPIVACAINE HYDROCHLORIDE 15 ML: 5 INJECTION, SOLUTION EPIDURAL; INFILTRATION; PERINEURAL at 10:59

## 2018-02-13 RX ADMIN — EPHEDRINE SULFATE 10 MG: 50 INJECTION INTRAMUSCULAR; INTRAVENOUS; SUBCUTANEOUS at 14:11

## 2018-02-13 RX ADMIN — GLYCOPYRROLATE 0.2 MG: 0.2 INJECTION INTRAMUSCULAR; INTRAVENOUS at 12:50

## 2018-02-13 RX ADMIN — VANCOMYCIN HYDROCHLORIDE 1000 MG: 1 INJECTION, SOLUTION INTRAVENOUS at 19:49

## 2018-02-13 RX ADMIN — FENTANYL CITRATE 50 MCG: 50 INJECTION INTRAMUSCULAR; INTRAVENOUS at 11:14

## 2018-02-13 RX ADMIN — CEFAZOLIN SODIUM 2 G: 2 INJECTION, SOLUTION INTRAVENOUS at 22:38

## 2018-02-13 RX ADMIN — MIDAZOLAM 1 MG: 1 INJECTION INTRAMUSCULAR; INTRAVENOUS at 11:25

## 2018-02-13 RX ADMIN — LIDOCAINE HYDROCHLORIDE 60 MG: 20 INJECTION, SOLUTION INFILTRATION; PERINEURAL at 12:31

## 2018-02-13 RX ADMIN — SODIUM CHLORIDE, POTASSIUM CHLORIDE, SODIUM LACTATE AND CALCIUM CHLORIDE: 600; 310; 30; 20 INJECTION, SOLUTION INTRAVENOUS at 11:50

## 2018-02-13 RX ADMIN — EPHEDRINE SULFATE 10 MG: 50 INJECTION INTRAMUSCULAR; INTRAVENOUS; SUBCUTANEOUS at 15:15

## 2018-02-13 RX ADMIN — FAMOTIDINE 20 MG: 10 INJECTION, SOLUTION INTRAVENOUS at 11:37

## 2018-02-13 NOTE — BRIEF OP NOTE
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]     * Instability of internal left knee prosthesis, initial encounter [T84.023A]    Procedure/CPT® Codes:      Procedure(s):  LEFT TOTAL KNEE ARTHROPLASTY REVISION    Surgeon(s):  MD Star Vergara MD    Anesthesia: General    Staff:   Circulator: Muna Bright RN; Brunilda Ward RN  Scrub Person: Jemal Ann  Vendor Representative: Chip Comer  Assistant: Eva Dela Cruz    Estimated Blood Loss: 425 mL    Urine Voided: 255 mL    Specimens:                  ID Type Source Tests Collected by Time Destination   A : left knee synovium Synovium Knee, Left TISSUE EXAM Jair Fajardo MD 2/13/2018 1324          Drains:   Drain/Device Site 02/13/18 1537 Left knee collapsible closed device (Active)       Urethral Catheter 02/13/18 1240 100% silicone 16 10 (Active)           Findings: see dict     Complications: nil      Jair Fajardo MD     Date: 2/13/2018  Time: 3:44 PM

## 2018-02-13 NOTE — ANESTHESIA PREPROCEDURE EVALUATION
Anesthesia Evaluation     Patient summary reviewed and Nursing notes reviewed   history of anesthetic complications: PONV               Airway   Mallampati: II  Neck ROM: limited  no difficulty expected  Dental      Pulmonary    (+) sleep apnea on CPAP,   Cardiovascular     ECG reviewed  Rhythm: regular  Rate: normal    (+) dysrhythmias Atrial Fib, hyperlipidemia      Neuro/Psych- negative ROS  GI/Hepatic/Renal/Endo    (+)  hiatal hernia, GERD, diabetes mellitus type 2,     Musculoskeletal     (+) back pain,   Abdominal    Substance History - negative use     OB/GYN negative ob/gyn ROS         Other   (+) arthritis                     Anesthesia Plan    ASA 3     general and regional   (ACBx/pop left PSR)  intravenous induction   Anesthetic plan and risks discussed with patient.

## 2018-02-13 NOTE — ANESTHESIA PROCEDURE NOTES
Airway  Urgency: elective    Date/Time: 2/13/2018 12:35 PM  Airway not difficult    General Information and Staff    Patient location during procedure: OR  Anesthesiologist: DIANE BECERRA  CRNA: MARINA HODGE    Indications and Patient Condition  Indications for airway management: airway protection    Preoxygenated: yes  MILS not maintained throughout  Mask difficulty assessment: 1 - vent by mask    Final Airway Details  Final airway type: supraglottic airway      Successful airway: classic  Size 4    Number of attempts at approach: 1    Additional Comments  Pre O2, SIAI

## 2018-02-13 NOTE — PLAN OF CARE
Problem: Patient Care Overview (Adult)  Goal: Plan of Care Review  Outcome: Ongoing (interventions implemented as appropriate)   02/13/18 1003   Coping/Psychosocial Response Interventions   Plan Of Care Reviewed With patient   Patient Care Overview   Progress no change     Goal: Adult Individualization and Mutuality  Outcome: Ongoing (interventions implemented as appropriate)   02/13/18 1003   Individualization   Patient Specific Preferences None     Goal: Discharge Needs Assessment  Outcome: Ongoing (interventions implemented as appropriate)   02/13/18 1003   Discharge Needs Assessment   Concerns To Be Addressed no discharge needs identified       Problem: Perioperative Period (Adult)  Goal: Signs and Symptoms of Listed Potential Problems Will be Absent or Manageable (Perioperative Period)  Outcome: Ongoing (interventions implemented as appropriate)   02/13/18 1003   Perioperative Period   Problems Assessed (Perioperative Period) pain;infection   Problems Present (Perioperative Period) none

## 2018-02-13 NOTE — PLAN OF CARE
Problem: Perioperative Period (Adult)  Goal: Signs and Symptoms of Listed Potential Problems Will be Absent or Manageable (Perioperative Period)  Outcome: Ongoing (interventions implemented as appropriate)   02/13/18 7768   Perioperative Period   Problems Assessed (Perioperative Period) all   Problems Present (Perioperative Period) pain

## 2018-02-13 NOTE — DISCHARGE INSTRUCTIONS
[facility] Total Knee Replacement Discharge Instructions     I. ACTIVITIES:  1. Exercises:  Weight bearing for transfers only on LLE  ? Complete exercise program as taught by the hospital physical therapist 2 times per day  ? Exercise program will be advanced by your home health physical therapist  ? During the day be up ambulating every 2 hours (while awake) for short distances  ? Complete the ankle pump exercises at least 10 times per hour (while awake)  ? Elevate legs most of the day the first week post operatively and thereafter elevate legs when in bed and for at least 30 minutes during the day.   ? Caution must be taken to avoid pillow placement under the bend of the knee as this can led to flexion contractures of the knee. Pillow placement under the heel is encouraged.  ? Use cold packs 20-30 minutes approximately 5 times per day. This should be done before and after completing your exercises and at any time you are experiencing pain/ stiffness in your operative extremity.      2. Activities of Daily Living:  ? No tub baths, hot tubs, or swimming pools for 4 weeks  ? May shower and let water run over the incision on post-operative day #5 if no drainage. Do not scrub or rub the incision. Simply let the water run over the incision and pat dry.    II. Restrictions  ? Do not cross legs or kneel  ? Your surgeon will discuss with you when you will be able to drive again. Usual guidelines are you are to be off pain medications prior to driving.  ? Weight bearing is as tolerated  ? First week stay inside on even terrain. May go up and down stairs one stair at a time utilizing the hand rail.  ? After one week, you may venture outside.    III. Precautions:  ? Everyone that comes near you should wash their hands  ? No elective dental, genital-urinary, or colon procedures or surgical procedures for 12 weeks after surgery unless absolutely necessary.  ?  If dental work or surgical procedure is deemed absolutely necessary,  you will need to contact your surgeon as you will need to take antibiotics 1 hour prior to any dental work (including teeth cleanings).  ? Please discuss with your surgeon prophylactic antibiotics as the length of time this intervention will be necessary for you varies with each patient’s health history and situation.  ? Avoid sick people. If you must be around someone who is ill, they should wear a mask.  ? Avoid visits to the Emergency Room or Urgent Care. If you feel you need to go to the emergency room, please notify your surgeon.    ? Stockings are to be worn for one week after surgery and are to be placed on in the morning and removed at night. Observe your skin when stocking is removed for any problems. Monitor the stockings to ensure that any swelling is not causing the stockings to become too tight. In this case, remove stockings immediately.    IV. INCISION CARE:  ? Wash your hands prior to dressing changes  ? Change the dressing as needed to keep incision clean and dry. Utilize dry gauze and paper tape. Avoid touching the side of the gauze that goes against the incision with your hands.  ? No creams or ointments to the incision  ? May remove dressing once the incision is free of drainage  ? Do not touch or pick at the incision  ? Check incision every day and notify surgeon immediately if any of the following signs or symptoms are noted:  o Increase in redness  o Increase in swelling around the incision and of the entire extremity  o Increase in pain  o Drainage oozing from the incision  o Pulling apart of the edges of the incision  o Increase in overall body temperature (greater than 100.5 degrees)  ? Your  Staples will be removed between 10-14 days postoperation.  This may be done by either the home health nurse, rehabilitation nurse or during your return visit to Dr. Fajardo's office.  You will then be instructed on how to care for the incision.  (Please call the office if your staples have not been  removed within 14 days after surgery).    V. Medications:   1. Anticoagulants: You will be discharged on an anticoagulant. This is a prophylactic medication that helps prevent blood clots during your post-operative period.  You will be on  Aspirin 325 mg Enteric Coated every 12 hours orally for  21 days. If you were on Aspirin 81 mg prior to surgery hold it and restart once your Aspirin 325 mg is completed.     ? While taking the anticoagulant, you should avoid taking any additional aspirin, ibuprofen (Advil or Motrin), Aleve (Naprosyn) or other non-steroidal anti-inflammatory medications.   ? Notify surgeon immediately if any cha bleeding is noted in the urine, stool, emesis, or from the nose or the incision. Blood in the stool will often appear as black rather than red. Blood in urine may appear as pink. Blood in emesis may appear as brown/black like coffee grounds.  ? You will need to apply pressure for longer periods of time to any cuts or abrasions to stop bleeding  ? Avoid alcohol while taking anticoagulants    2. Stool Softeners: You will be at greater risk of constipation after surgery due to being less mobile and the pain medications.   ? Take stool softeners as instructed by your surgeon while on pain medications. Over the counter Colace 100 mg 1-2 capsules twice daily.   ? If stools become too loose or too frequent, please decreases the dosage or stop the stool softener.  ? If constipation occurs despite use of stool softeners, you are to continue the stool softeners and add a laxative (Milk of Magnesia 1 ounce daily as needed).  ? Dulcolax oral tabs or suppository, or a fleets enema can also be utilized for constipation and can be obtained over the counter.   ? If above interventions are unsuccessful in inducing bowel movements, please contact your surgeon's office / family physician's office.  ? Drink plenty of fluids, and eat fruits and vegetables during your recovery time    3. Pain Medications  utilized after surgery are narcotics and the law requires that the following information be given to all patients that are prescribed narcotics:  ? CLASSIFICATION: Pain medications are called Opioids and are narcotics  ? LEGALITIES: It is illegal to share narcotics with others and to drive within 24 hours of taking narcotics  ? POTENTIAL SIDE EFFECTS: Potential side effects of opioids include: nausea, vomiting, itching, dizziness, drowsiness, dry mouth, constipation, and difficulty urinating.  ? POTENTIAL ADVERSE EFFECTS:   o Opioid tolerance can develop with use of pain medications and this simply means that it requires more and more of the medication to control pain; however, this is seen more in patients that use opioids for longer periods of time.  o Opioid dependence can develop with use of Opioids and this simply means that to stop the medication can cause withdrawal symptoms; however, this is seen with patients that use Opioids for longer periods of time.  o Opioid addiction can develop with use of Opioids and the incidence of this is very unlikely in patients who take the medications as ordered and stop the medications as instructed.  o Opioid overdose can be dangerous, but is unlikely when the medication is taken as ordered and stopped when ordered. It is important not to mix opioids with alcohol or with and type of sedative such as Benadryl as this can lead to over sedation and respiratory difficulty.  ? DOSAGE:   o Pain medications will need to be taken consistently for the first week to decrease pain and promote adequate pain relief and participation in physical therapy.  o After the initial surgical pain begins to resolve, you may begin to decrease the pain medication. By the end of 6 weeks, you should be off of pain medications.  o Refills will not be given by the office during evening hours, on weekends, or after 6 weeks post-op.  o To seek refills on pain medications during the initial 6 week  post-operative period, you must call the office 48 hours in advance to request the refill. The office will then notify you when to  the prescription. DO NOT wait until you are out of the medication to request a refill.    V. FOLLOW-UP VISITS:  ? You will need to follow up in the office with your surgeon in 3/7/18. Please call this number [phone] to schedule this appointment.  ? If you have any concerns or suspected complications prior to your follow up visit, please call your surgeons office. Do not wait until your appointment time if you suspect complications. These will need to be addressed in the office promptly.

## 2018-02-13 NOTE — ANESTHESIA PROCEDURE NOTES
Peripheral Block    Patient location during procedure: pre-op  Start time: 2/13/2018 11:06 AM  Stop time: 2/13/2018 11:14 AM  Reason for block: at surgeon's request and post-op pain management  Performed by  Anesthesiologist: MAKAYLA LOGAN  Preanesthetic Checklist  Completed: patient identified, site marked, surgical consent, pre-op evaluation, timeout performed, IV checked, risks and benefits discussed and monitors and equipment checked  Prep:  Sterile barriers:cap, gloves, gown, mask and sterile barriers  Prep: ChloraPrep  Patient monitoring: blood pressure monitoring, continuous pulse oximetry and EKG  Procedure  Sedation:yes    Guidance:ultrasound guided  ULTRASOUND INTERPRETATION.  Using ultrasound guidance a 21 G gauge needle was placed in close proximity to the sciatic nerve, at which point, under ultrasound guidance anesthetic was injected in the area of the nerve and spread of the anesthesia was seen on ultrasound in close proximity thereto.  There were no abnormalities seen on ultrasound; a digital image was taken; and the patient tolerated the procedure with no complications. Images:still images obtained    Laterality:left  Block Type:popliteal  Injection Technique:single-shot  Needle Type:echogenic  Needle Gauge:21 G    Medications  Local Injected:ropivacaine 0.5% and 2% Mepivacaine  Post Assessment  Injection Assessment: negative aspiration for heme, no paresthesia on injection and incremental injection  Patient Tolerance:comfortable throughout block  Complications:no

## 2018-02-13 NOTE — ANESTHESIA PROCEDURE NOTES
Peripheral Block    Patient location during procedure: pre-op  Start time: 2/13/2018 10:56 AM  Stop time: 2/13/2018 11:06 AM  Reason for block: at surgeon's request and post-op pain management  Performed by  Anesthesiologist: MAKAYLA LOGAN  Preanesthetic Checklist  Completed: patient identified, site marked, surgical consent, pre-op evaluation, timeout performed, IV checked, risks and benefits discussed and monitors and equipment checked  Prep:  Sterile barriers:cap, gloves, gown, mask and sterile barriers  Prep: ChloraPrep  Patient monitoring: blood pressure monitoring, continuous pulse oximetry and EKG  Procedure  Sedation:yes    Guidance:ultrasound guided  ULTRASOUND INTERPRETATION.  Using ultrasound guidance a 21 G gauge needle was placed in close proximity to the femoral nerve, at which point, under ultrasound guidance anesthetic was injected in the area of the nerve and spread of the anesthesia was seen on ultrasound in close proximity thereto.  There were no abnormalities seen on ultrasound; a digital image was taken; and the patient tolerated the procedure with no complications. Images:still images obtained    Laterality:left  Block Type:adductor canal block  Injection Technique:single-shot  Needle Type:echogenic  Needle Gauge:21 G    Medications  Depomedrol:40 mg  Local Injected:ropivacaine 0.2% and 2% Mepivacaine  Post Assessment  Injection Assessment: negative aspiration for heme, no paresthesia on injection and incremental injection  Patient Tolerance:comfortable throughout block  Complications:no

## 2018-02-13 NOTE — PLAN OF CARE
Problem: Patient Care Overview (Adult)  Goal: Plan of Care Review  Outcome: Ongoing (interventions implemented as appropriate)   02/13/18 0912   Coping/Psychosocial Response Interventions   Plan Of Care Reviewed With patient

## 2018-02-13 NOTE — ANESTHESIA POSTPROCEDURE EVALUATION
"Patient: Lilia Becerra    Procedure Summary     Date Anesthesia Start Anesthesia Stop Room / Location    02/13/18 1228 1622  [facility] / [facility]       Procedure Diagnosis Surgeon Provider    LEFT TOTAL KNEE ARTHROPLASTY REVISION (Left Knee) Failed total knee, left; Instability of internal left knee prosthesis, initial encounter  (failed total knee arthroplasty left ) MD Doreen Vergara MD          Anesthesia Type: general, regional  Last vitals  BP   125/68 (02/13/18 1700)   Temp   36.6 °C (97.9 °F) (02/13/18 1618)   Pulse   63 (02/13/18 1700)   Resp   16 (02/13/18 1700)     SpO2   96 % (02/13/18 1700)     Post Anesthesia Care and Evaluation    Patient location during evaluation: bedside  Patient participation: complete - patient participated  Level of consciousness: awake and alert  Pain management: adequate  Airway patency: patent  Anesthetic complications: No anesthetic complications    Cardiovascular status: acceptable  Respiratory status: acceptable  Hydration status: acceptable    Comments: /68  Pulse 63  Temp 36.6 °C (97.9 °F) (Oral)   Resp 16  Ht 165.1 cm (65\")  SpO2 96%      "

## 2018-02-13 NOTE — DISCHARGE SUMMARY
Orthopedic Discharge Summary      Patient: Lilia Becerra   YOB: 1940    Medical Record Number: [id number]    Attending Physician: Jair Fajardo, *  Consulting Physician(s):   Date of Admission: 2/13/2018  8:54 AM  Date of Discharge:        LT TOTAL KNEE ARTHROPLASTY REVISION    Patient Active Problem List   Diagnosis   • Osteoarthritis, knee          Allergies   Allergen Reactions   • Tetanus Toxoids Swelling        Lilia Becerra   Home Medication Instructions DELISA:[id number]    Printed on:02/15/18 0029   Medication Information                      acetaminophen (TYLENOL) 650 MG 8 hr tablet  Take 650 mg by mouth Every 8 (Eight) Hours As Needed for Mild Pain .             aspirin  MG EC tablet  Take 1 tablet by mouth Every 12 (Twelve) Hours for 40 doses.             atorvastatin (LIPITOR) 10 MG tablet  Take 10 mg by mouth Every Night.             Calcium Carb-Cholecalciferol (CALCIUM 600 + D PO)  Take 1 tablet by mouth 2 (Two) Times a Day.             docusate sodium (COLACE) 100 MG capsule  Take 100 mg by mouth At Night As Needed for Constipation.             HYDROcodone-acetaminophen (NORCO) 7.5-325 MG per tablet  Take 1 tablet by mouth Every 4 (Four) Hours As Needed for Moderate Pain .             latanoprost (XALATAN) 0.005 % ophthalmic solution  Administer 1 drop into the left eye Every Night.             levothyroxine (SYNTHROID, LEVOTHROID) 25 MCG tablet  Take 25 mcg by mouth Daily.             lisinopril (PRINIVIL,ZESTRIL) 10 MG tablet  Take 10 mg by mouth Daily.             losartan (COZAAR) 25 MG tablet  Take 12.5 mg by mouth Daily.             lubiprostone (AMITIZA) 8 MCG capsule  Take 8 mcg by mouth 2 (Two) Times a Day With Meals.             metFORMIN (GLUCOPHAGE) 500 MG tablet  Take 500 mg by mouth 2 (Two) Times a Day With Meals.             metoprolol succinate XL (TOPROL-XL) 50 MG 24 hr tablet  Take 50 mg by mouth Daily.             omeprazole (priLOSEC) 20 MG  capsule  Take 20 mg by mouth Daily.             oxyCODONE-acetaminophen (PERCOCET) 5-325 MG per tablet  Take 1 tablet by mouth Every 6 (Six) Hours As Needed.             pramipexole (MIRAPEX) 1.5 MG tablet  Take 1.5 mg by mouth Every Night.             solifenacin (VESICARE) 5 MG tablet  Take 5 mg by mouth Daily.             spironolactone-hydrochlorothiazide (ALDACTAZIDE) 25-25 MG tablet  Take 1 tablet by mouth Daily.             VENLAFAXINE HCL ER PO  Take 150 mg by mouth Daily.             vitamin C (ASCORBIC ACID) 500 MG tablet  Take 500 mg by mouth Daily.                    Past Medical History:   Diagnosis Date   • Acid reflux disease    • Arthritis    • Atrial fibrillation    • Depression    • Diabetes mellitus    • Disease of thyroid gland    • Dyslipidemia    • Glaucoma     left eye   • Hearing impaired     hearing aides   • Hiatal hernia    • High cholesterol    • Knee pain    • Osteoporosis    • PONV (postoperative nausea and vomiting)    • RLS (restless legs syndrome)    • Sleep apnea     machine    • Tailbone injury     fracture        Past Surgical History:   Procedure Laterality Date   • CATARACT EXTRACTION Bilateral    • HEMORRHOIDECTOMY     • HYSTERECTOMY     • INCISION AND DRAINAGE OF WOUND      on back    • KNEE ARTHROPLASTY Left    • LUMBAR FUSION     • TOTAL KNEE ARTHROPLASTY REVISION Left     x2   • VERTEBROPLASTY          Social History     Occupational History   • Not on file.     Social History Main Topics   • Smoking status: Never Smoker   • Smokeless tobacco: Never Used   • Alcohol use No   • Drug use: No   • Sexual activity: Not on file      Social History     Social History Narrative        Family History   Problem Relation Age of Onset   • Malig Hyperthermia Neg Hx        Physical Exam: 77 y.o. female  General Appearance:    Alert, cooperative, in no acute distress                      Vitals:    02/14/18 1052 02/14/18 1540 02/14/18 2005 02/14/18 2249   BP: 106/68 103/58 118/69 111/55    BP Location: Right arm Right arm Right arm Right arm   Patient Position: Sitting Lying Lying Lying   Pulse: 73 63 71 71   Resp: 18 18 16 16   Temp: 97.1 °F (36.2 °C) 97.6 °F (36.4 °C) 98.9 °F (37.2 °C) 99.2 °F (37.3 °C)   TempSrc: Oral Oral Oral Oral   SpO2: 97% 98% 94% 97%   Weight:       Height:            Hospital Course:  77 y.o. female admitted to [facility] to services of Salome Vergara with painful L TKA knee  on 2/13/2018 and underwent a revision left total knee arthroplasty Per Star Pack MD. Antibiotic and VTE prophylaxis were per [facility]IP protocols and included  Kefzol  every 8 hours and Aspirin 325 mg twice daily . Post-operatively the patient transferred to the post-operative floor where the patient underwent mobilization therapy that included active as well as passive ROM exercises. Opioids were titrated to achieve appropriate pain management to allow for participation in mobilization exercises. Vital signs are now stable. The incision is intact without signs or symptoms of infection. Operative extremity neurovascular status remains intact.   Appropriate education re: incision care, activity levels, medications, and follow up visits was completed and all questions were answered. The patient is now deemed stable for discharge.      DIAGNOSTIC TESTS:     Admission on 02/13/2018   Component Date Value Ref Range Status   • Glucose 02/13/2018 115  70 - 130 mg/dL Final   • Case Report 02/13/2018    Final                    Value:Surgical Pathology Report                         Case: [id number]                                  Authorizing Provider:  Jair Fajardo MD Collected:           02/13/2018 01:24 PM          Ordering Location:     [facility]  Received:            02/13/2018 01:35 PM                                 MAIN OR                                                                      Pathologist:           Delon Oseguera,                                                                             MD                                                                           Specimen:    Knee, Left, left knee synovium                                                            • Final Diagnosis 02/13/2018    Final                    Value:This result contains rich text formatting which cannot be displayed here.   • Intraoperative Consultation 02/13/2018    Final                    Value:This result contains rich text formatting which cannot be displayed here.   • Gross Description 02/13/2018    Final                    Value:This result contains rich text formatting which cannot be displayed here.   • Glucose 02/14/2018 185* 65 - 99 mg/dL Final   • BUN 02/14/2018 38* 8 - 23 mg/dL Final   • Creatinine 02/14/2018 1.37* 0.57 - 1.00 mg/dL Final   • Sodium 02/14/2018 139  136 - 145 mmol/L Final   • Potassium 02/14/2018 5.4* 3.5 - 5.2 mmol/L Final   • Chloride 02/14/2018 101  98 - 107 mmol/L Final   • CO2 02/14/2018 23.0  22.0 - 29.0 mmol/L Final   • Calcium 02/14/2018 9.1  8.6 - 10.5 mg/dL Final   • eGFR Non African Amer 02/14/2018 37* >60 mL/min/1.73 Final   • BUN/Creatinine Ratio 02/14/2018 27.7* 7.0 - 25.0 Final   • Anion Gap 02/14/2018 15.0  mmol/L Final   • WBC 02/14/2018 24.03* 4.50 - 10.70 10*3/mm3 Final   • RBC 02/14/2018 3.26* 3.90 - 5.20 10*6/mm3 Final   • Hemoglobin 02/14/2018 9.7* 11.9 - 15.5 g/dL Final   • Hematocrit 02/14/2018 31.0* 35.6 - 45.5 % Final   • MCV 02/14/2018 95.1  80.5 - 98.2 fL Final   • MCH 02/14/2018 29.8  26.9 - 32.0 pg Final   • MCHC 02/14/2018 31.3* 32.4 - 36.3 g/dL Final   • RDW 02/14/2018 14.2* 11.7 - 13.0 % Final   • RDW-SD 02/14/2018 49.2  37.0 - 54.0 fl Final   • MPV 02/14/2018 11.0  6.0 - 12.0 fL Final   • Platelets 02/14/2018 251  140 - 500 10*3/mm3 Final   • Neutrophil % 02/14/2018 87.7* 42.7 - 76.0 % Final   • Lymphocyte % 02/14/2018 6.0* 19.6 - 45.3 % Final   • Monocyte % 02/14/2018 5.9  5.0 - 12.0 % Final   • Eosinophil  % 02/14/2018 0.0* 0.3 - 6.2 % Final   • Basophil % 02/14/2018 0.0  0.0 - 1.5 % Final   • Immature Grans % 02/14/2018 0.4  0.0 - 0.5 % Final   • Neutrophils, Absolute 02/14/2018 21.06* 1.90 - 8.10 10*3/mm3 Final   • Lymphocytes, Absolute 02/14/2018 1.45  0.90 - 4.80 10*3/mm3 Final   • Monocytes, Absolute 02/14/2018 1.41* 0.20 - 1.20 10*3/mm3 Final   • Eosinophils, Absolute 02/14/2018 0.00  0.00 - 0.70 10*3/mm3 Final   • Basophils, Absolute 02/14/2018 0.01  0.00 - 0.20 10*3/mm3 Final   • Immature Grans, Absolute 02/14/2018 0.10* 0.00 - 0.03 10*3/mm3 Final   • Glucose 02/14/2018 162* 70 - 130 mg/dL Final   • Glucose 02/14/2018 185* 70 - 130 mg/dL Final   • Color, UA 02/14/2018 Yellow  Yellow, Straw Final   • Appearance, UA 02/14/2018 Hazy* Clear Final   • pH, UA 02/14/2018 5.5  5.0 - 8.0 Final   • Specific Gravity, UA 02/14/2018 1.010  1.005 - 1.030 Final   • Glucose, UA 02/14/2018 Negative  Negative Final   • Ketones, UA 02/14/2018 Negative  Negative Final   • Bilirubin, UA 02/14/2018 Negative  Negative Final   • Blood, UA 02/14/2018 Negative  Negative Final   • Protein, UA 02/14/2018 Negative  Negative Final   • Leuk Esterase, UA 02/14/2018 Trace* Negative Final   • Nitrite, UA 02/14/2018 Negative  Negative Final   • Urobilinogen, UA 02/14/2018 0.2 E.U./dL  0.2 - 1.0 E.U./dL Final   • Glucose 02/14/2018 190* 70 - 130 mg/dL Final   • RBC, UA 02/14/2018 0-2  None Seen, 0-2 /HPF Final   • WBC, UA 02/14/2018 0-2  None Seen, 0-2 /HPF Final   • Bacteria, UA 02/14/2018 None Seen  None Seen /HPF Final   • Squamous Epithelial Cells, UA 02/14/2018 0-2  None Seen, 0-2 /HPF Final   • Hyaline Casts, UA 02/14/2018 0-2  None Seen /LPF Final   • Methodology 02/14/2018 Automated Microscopy   Final   • Glucose 02/14/2018 164* 70 - 130 mg/dL Final       Xr Knee 1 Or 2 View Left    Result Date: 2/13/2018  Narrative: PORTABLE VIEWS OF THE LEFT KNEE  CLINICAL HISTORY: Postop knee arthroplasty  AP and crosstable lateral views demonstrate a  total knee arthroplasty that appears in satisfactory position. The arthroplasty has long femoral and tibial stem components.  This report was finalized on 2/13/2018 4:41 PM by Dr. Eric Gaviria MD.      Xr Knee 1 Or 2 View Left    Result Date: 2/3/2018  Narrative: XR KNEE 1 OR 2 VW LEFT-  INDICATIONS:     Preoperative evaluation  TECHNIQUE: 4 VIEWS OF LEFT KNEE  COMPARISON: None available  FINDINGS:   Left knee arthroplasty hardware is present. Lucency adjacent to the medial aspect of the proximal end of the tibial component could be evidence of loosening or infection. No acute fracture is noted. Minimal knee effusion is apparent.         Impression:  As described.  This report was finalized on 2/3/2018 5:45 AM by Dr. Rico Haywood MD.      Xr Chest Pa & Lateral    Result Date: 2/3/2018  Narrative: XR CHEST PA AND LATERAL-  HISTORY: Female who is 77 years-old,  preoperative evaluation  TECHNIQUE: Frontal and lateral views of the chest  COMPARISON: 02/24/2015  FINDINGS: Heart size is normal. Aorta is tortuous.. Linear likely atelectasis or scar is apparent in both lungs. No focal pulmonary consolidation, pleural effusion, or pneumothorax. Thoracic levoscoliosis is noted. Spinal stabilization hardware is partly included. No acute osseous process.      Impression: No focal pulmonary consolidation. Tortuous aorta. Follow-up as clinically indicated.  This report was finalized on 2/3/2018 5:42 AM by Dr. Rico Haywood MD.        Discharge and Follow up Instructions:     Total Knee Joint Replacement Discharge Instructions:    I. ACTIVITIES:  1. Exercises:  Weight bearing for transfers only   ? Complete exercise program as taught by the hospital physical therapist 2 times per day  ? Exercise program will be advanced by your home health physical therapist  ? During the day be up ambulating every 2 hours (while awake) for short distances  ? Complete the ankle pump exercises at least 10 times per hour (while  awake)  ? Elevate legs most of the day the first week post operatively and thereafter elevate legs when in bed and for at least 30 minutes during the day.   ? Caution must be taken to avoid pillow placement under the bend of the knee as this can led to flexion contractures of the knee. Pillow placement under the heel is encouraged.  ? Use cold packs 20-30 minutes approximately 5 times per day. This should be done before and after completing your exercises and at any time you are experiencing pain/ stiffness in your operative extremity.      2. Activities of Daily Living:  ? No tub baths, hot tubs, or swimming pools for 4 weeks  ? May shower and let water run over the incision on post-operative day #5 if no drainage. Do not scrub or rub the incision. Simply let the water run over the incision and pat dry.    II. Restrictions  ? Do not cross legs or kneel  ? Your surgeon will discuss with you when you will be able to drive again. Usual guidelines are you are to be off pain medications prior to driving.  ? Weight bearing is as tolerated  ? First week stay inside on even terrain. May go up and down stairs one stair at a time utilizing the hand rail.  ? After one week, you may venture outside.    III. Precautions:  ? Everyone that comes near you should wash their hands  ? No elective dental, genital-urinary, or colon procedures or surgical procedures for 12 weeks after surgery unless absolutely necessary.  ?  If dental work or surgical procedure is deemed absolutely necessary, you will need to contact your surgeon as you will need to take antibiotics 1 hour prior to any dental work (including teeth cleanings).  ? Please discuss with your surgeon prophylactic antibiotics as the length of time this intervention will be necessary for you varies with each patient’s health history and situation.  ? Avoid sick people. If you must be around someone who is ill, they should wear a mask.  ? Avoid visits to the Emergency Room or  Urgent Care. If you feel you need to go to the emergency room, please notify your surgeon.    ? Stockings are to be worn for one week after surgery and are to be placed on in the morning and removed at night. Observe your skin when stocking is removed for any problems. Monitor the stockings to ensure that any swelling is not causing the stockings to become too tight. In this case, remove stockings immediately.    IV. INCISION CARE:  ? Wash your hands prior to dressing changes  ? Change the dressing as needed to keep incision clean and dry. Utilize dry gauze and paper tape. Avoid touching the side of the gauze that goes against the incision with your hands.  ? No creams or ointments to the incision  ? May remove dressing once the incision is free of drainage  ? Do not touch or pick at the incision  ? Check incision every day and notify surgeon immediately if any of the following signs or symptoms are noted:  o Increase in redness  o Increase in swelling around the incision and of the entire extremity  o Increase in pain  o Drainage oozing from the incision  o Pulling apart of the edges of the incision  o Increase in overall body temperature (greater than 100.5 degrees)  ? Your  Staples will be removed between 10-14 days postoperation.  This may be done by either the home health nurse, rehabilitation nurse or during your return visit to Dr. Fajardo's office.  You will then be instructed on how to care for the incision.  (Please call the office if your staples have not been removed within 14 days after surgery).    V. Medications:   1. Anticoagulants: You will be discharged on an anticoagulant. This is a prophylactic medication that helps prevent blood clots during your post-operative period.  You will be on Aspirin 325 mg twice daily for 21 days. If you were on Aspirin 81 mg prior to surgery hold it and restart once your Aspirin 325 mg is completed.     ? While taking the anticoagulant, you should avoid taking any  additional aspirin, ibuprofen (Advil or Motrin), Aleve (Naprosyn) or other non-steroidal anti-inflammatory medications.   ? Notify surgeon immediately if any cha bleeding is noted in the urine, stool, emesis, or from the nose or the incision. Blood in the stool will often appear as black rather than red. Blood in urine may appear as pink. Blood in emesis may appear as brown/black like coffee grounds.  ? You will need to apply pressure for longer periods of time to any cuts or abrasions to stop bleeding  ? Avoid alcohol while taking anticoagulants    2. Stool Softeners: You will be at greater risk of constipation after surgery due to being less mobile and the pain medications.   ? Take stool softeners as instructed by your surgeon while on pain medications. Over the counter Colace 100 mg 1-2 capsules twice daily.   ? If stools become too loose or too frequent, please decreases the dosage or stop the stool softener.  ? If constipation occurs despite use of stool softeners, you are to continue the stool softeners and add a laxative (Milk of Magnesia 1 ounce daily as needed).  ? Dulcolax oral tabs or suppository, or a fleets enema can also be utilized for constipation and can be obtained over the counter.   ? If above interventions are unsuccessful in inducing bowel movements, please contact your surgeon's office / family physician's office.  ? Drink plenty of fluids, and eat fruits and vegetables during your recovery time    3. Pain Medications utilized after surgery are narcotics and the law requires that the following information be given to all patients that are prescribed narcotics:  ? CLASSIFICATION: Pain medications are called Opioids and are narcotics  ? LEGALITIES: It is illegal to share narcotics with others and to drive within 24 hours of taking narcotics  ? POTENTIAL SIDE EFFECTS: Potential side effects of opioids include: nausea, vomiting, itching, dizziness, drowsiness, dry mouth, constipation, and  difficulty urinating.  ? POTENTIAL ADVERSE EFFECTS:   o Opioid tolerance can develop with use of pain medications and this simply means that it requires more and more of the medication to control pain; however, this is seen more in patients that use opioids for longer periods of time.  o Opioid dependence can develop with use of Opioids and this simply means that to stop the medication can cause withdrawal symptoms; however, this is seen with patients that use Opioids for longer periods of time.  o Opioid addiction can develop with use of Opioids and the incidence of this is very unlikely in patients who take the medications as ordered and stop the medications as instructed.  o Opioid overdose can be dangerous, but is unlikely when the medication is taken as ordered and stopped when ordered. It is important not to mix opioids with alcohol or with and type of sedative such as Benadryl as this can lead to over sedation and respiratory difficulty.  ? DOSAGE:   o Pain medications will need to be taken consistently for the first week to decrease pain and promote adequate pain relief and participation in physical therapy.  o After the initial surgical pain begins to resolve, you may begin to decrease the pain medication. By the end of 6 weeks, you should be off of pain medications.  o Refills will not be given by the office during evening hours, on weekends, or after 6 weeks post-op.  o To seek refills on pain medications during the initial 6 week post-operative period, you must call the office 48 hours in advance to request the refill. The office will then notify you when to  the prescription. DO NOT wait until you are out of the medication to request a refill.    V. FOLLOW-UP VISITS:  ? You will need to follow up in the office with your surgeon in 3/7/18. Please call this number [phone] to schedule this appointment.  ? If you have any concerns or suspected complications prior to your follow up visit, please  call your surgeons office. Do not wait until your appointment time if you suspect complications. These will need to be addressed in the office promptly.      Date:     Star Pack MD    CC: Leo Lomas MD; MD Jair Will MD         Addendum:    I have personally examined this patient. I agree with the above findings and treatment  plan.     Jair Fajardo MD  2/15/2018

## 2018-02-14 PROBLEM — I48.91 ATRIAL FIBRILLATION: Chronic | Status: ACTIVE | Noted: 2018-02-14

## 2018-02-14 PROBLEM — D72.829 LEUKOCYTOSIS: Status: ACTIVE | Noted: 2018-02-14

## 2018-02-14 PROBLEM — T84.013A BROKEN INTERNAL LEFT KNEE PROSTHESIS: Chronic | Status: RESOLVED | Noted: 2018-02-13 | Resolved: 2018-02-14

## 2018-02-14 PROBLEM — M17.9 OSTEOARTHRITIS, KNEE: Status: RESOLVED | Noted: 2018-02-13 | Resolved: 2018-02-14

## 2018-02-14 PROBLEM — E87.5 HYPERKALEMIA: Status: ACTIVE | Noted: 2018-02-14

## 2018-02-14 PROBLEM — E11.9 DIABETES MELLITUS (HCC): Chronic | Status: ACTIVE | Noted: 2018-02-14

## 2018-02-14 PROBLEM — G47.30 SLEEP APNEA: Chronic | Status: ACTIVE | Noted: 2018-02-14

## 2018-02-14 PROBLEM — D62 POSTOPERATIVE ANEMIA DUE TO ACUTE BLOOD LOSS: Status: ACTIVE | Noted: 2018-02-14

## 2018-02-14 PROBLEM — K21.9 ACID REFLUX DISEASE: Chronic | Status: ACTIVE | Noted: 2018-02-14

## 2018-02-14 PROBLEM — N18.30 STAGE 3 CHRONIC KIDNEY DISEASE: Chronic | Status: ACTIVE | Noted: 2018-02-14

## 2018-02-14 PROBLEM — E07.9 DISEASE OF THYROID GLAND: Chronic | Status: ACTIVE | Noted: 2018-02-14

## 2018-02-14 PROBLEM — I10 ESSENTIAL HYPERTENSION: Chronic | Status: ACTIVE | Noted: 2018-02-14

## 2018-02-14 LAB
ANION GAP SERPL CALCULATED.3IONS-SCNC: 15 MMOL/L
BACTERIA UR QL AUTO: NORMAL /HPF
BASOPHILS # BLD AUTO: 0.01 10*3/MM3 (ref 0–0.2)
BASOPHILS NFR BLD AUTO: 0 % (ref 0–1.5)
BILIRUB UR QL STRIP: NEGATIVE
BUN BLD-MCNC: 38 MG/DL (ref 8–23)
BUN/CREAT SERPL: 27.7 (ref 7–25)
CALCIUM SPEC-SCNC: 9.1 MG/DL (ref 8.6–10.5)
CHLORIDE SERPL-SCNC: 101 MMOL/L (ref 98–107)
CLARITY UR: ABNORMAL
CO2 SERPL-SCNC: 23 MMOL/L (ref 22–29)
COLOR UR: YELLOW
CREAT BLD-MCNC: 1.37 MG/DL (ref 0.57–1)
DEPRECATED RDW RBC AUTO: 49.2 FL (ref 37–54)
EOSINOPHIL # BLD AUTO: 0 10*3/MM3 (ref 0–0.7)
EOSINOPHIL NFR BLD AUTO: 0 % (ref 0.3–6.2)
ERYTHROCYTE [DISTWIDTH] IN BLOOD BY AUTOMATED COUNT: 14.2 % (ref 11.7–13)
GFR SERPL CREATININE-BSD FRML MDRD: 37 ML/MIN/1.73
GLUCOSE BLD-MCNC: 185 MG/DL (ref 65–99)
GLUCOSE BLDC GLUCOMTR-MCNC: 162 MG/DL (ref 70–130)
GLUCOSE BLDC GLUCOMTR-MCNC: 164 MG/DL (ref 70–130)
GLUCOSE BLDC GLUCOMTR-MCNC: 185 MG/DL (ref 70–130)
GLUCOSE BLDC GLUCOMTR-MCNC: 190 MG/DL (ref 70–130)
GLUCOSE UR STRIP-MCNC: NEGATIVE MG/DL
HCT VFR BLD AUTO: 31 % (ref 35.6–45.5)
HGB BLD-MCNC: 9.7 G/DL (ref 11.9–15.5)
HGB UR QL STRIP.AUTO: NEGATIVE
HYALINE CASTS UR QL AUTO: NORMAL /LPF
IMM GRANULOCYTES # BLD: 0.1 10*3/MM3 (ref 0–0.03)
IMM GRANULOCYTES NFR BLD: 0.4 % (ref 0–0.5)
KETONES UR QL STRIP: NEGATIVE
LAB AP CASE REPORT: NORMAL
LEUKOCYTE ESTERASE UR QL STRIP.AUTO: ABNORMAL
LYMPHOCYTES # BLD AUTO: 1.45 10*3/MM3 (ref 0.9–4.8)
LYMPHOCYTES NFR BLD AUTO: 6 % (ref 19.6–45.3)
Lab: NORMAL
Lab: NORMAL
MCH RBC QN AUTO: 29.8 PG (ref 26.9–32)
MCHC RBC AUTO-ENTMCNC: 31.3 G/DL (ref 32.4–36.3)
MCV RBC AUTO: 95.1 FL (ref 80.5–98.2)
MONOCYTES # BLD AUTO: 1.41 10*3/MM3 (ref 0.2–1.2)
MONOCYTES NFR BLD AUTO: 5.9 % (ref 5–12)
NEUTROPHILS # BLD AUTO: 21.06 10*3/MM3 (ref 1.9–8.1)
NEUTROPHILS NFR BLD AUTO: 87.7 % (ref 42.7–76)
NITRITE UR QL STRIP: NEGATIVE
PATH REPORT.FINAL DX SPEC: NORMAL
PATH REPORT.GROSS SPEC: NORMAL
PH UR STRIP.AUTO: 5.5 [PH] (ref 5–8)
PLATELET # BLD AUTO: 251 10*3/MM3 (ref 140–500)
PMV BLD AUTO: 11 FL (ref 6–12)
POTASSIUM BLD-SCNC: 5.4 MMOL/L (ref 3.5–5.2)
PROT UR QL STRIP: NEGATIVE
RBC # BLD AUTO: 3.26 10*6/MM3 (ref 3.9–5.2)
RBC # UR: NORMAL /HPF
REF LAB TEST METHOD: NORMAL
SODIUM BLD-SCNC: 139 MMOL/L (ref 136–145)
SP GR UR STRIP: 1.01 (ref 1–1.03)
SQUAMOUS #/AREA URNS HPF: NORMAL /HPF
UROBILINOGEN UR QL STRIP: ABNORMAL
WBC NRBC COR # BLD: 24.03 10*3/MM3 (ref 4.5–10.7)
WBC UR QL AUTO: NORMAL /HPF

## 2018-02-14 PROCEDURE — 94799 UNLISTED PULMONARY SVC/PX: CPT

## 2018-02-14 PROCEDURE — 63710000001 INSULIN ASPART PER 5 UNITS: Performed by: INTERNAL MEDICINE

## 2018-02-14 PROCEDURE — 25010000003 CEFAZOLIN IN DEXTROSE 2-4 GM/100ML-% SOLUTION: Performed by: ORTHOPAEDIC SURGERY

## 2018-02-14 PROCEDURE — 81001 URINALYSIS AUTO W/SCOPE: CPT | Performed by: HOSPITALIST

## 2018-02-14 PROCEDURE — 82962 GLUCOSE BLOOD TEST: CPT

## 2018-02-14 PROCEDURE — 97110 THERAPEUTIC EXERCISES: CPT

## 2018-02-14 PROCEDURE — 80048 BASIC METABOLIC PNL TOTAL CA: CPT | Performed by: ORTHOPAEDIC SURGERY

## 2018-02-14 PROCEDURE — 85025 COMPLETE CBC W/AUTO DIFF WBC: CPT | Performed by: ORTHOPAEDIC SURGERY

## 2018-02-14 PROCEDURE — 97162 PT EVAL MOD COMPLEX 30 MIN: CPT

## 2018-02-14 PROCEDURE — 25010000002 VANCOMYCIN PER 500 MG: Performed by: ORTHOPAEDIC SURGERY

## 2018-02-14 RX ORDER — HYDROCODONE BITARTRATE AND ACETAMINOPHEN 7.5; 325 MG/1; MG/1
1 TABLET ORAL EVERY 4 HOURS PRN
Qty: 60 TABLET | Refills: 0 | Status: SHIPPED | OUTPATIENT
Start: 2018-02-14 | End: 2018-12-13

## 2018-02-14 RX ADMIN — INSULIN ASPART 2 UNITS: 100 INJECTION, SOLUTION INTRAVENOUS; SUBCUTANEOUS at 18:04

## 2018-02-14 RX ADMIN — PANTOPRAZOLE SODIUM 40 MG: 40 TABLET, DELAYED RELEASE ORAL at 04:57

## 2018-02-14 RX ADMIN — LUBIPROSTONE 8 MCG: 8 CAPSULE, GELATIN COATED ORAL at 09:05

## 2018-02-14 RX ADMIN — CEFAZOLIN SODIUM 2 G: 2 INJECTION, SOLUTION INTRAVENOUS at 04:57

## 2018-02-14 RX ADMIN — HYDROCODONE BITARTRATE AND ACETAMINOPHEN 1 TABLET: 7.5; 325 TABLET ORAL at 07:35

## 2018-02-14 RX ADMIN — PRAMIPEXOLE DIHYDROCHLORIDE 1.5 MG: 1.5 TABLET ORAL at 22:04

## 2018-02-14 RX ADMIN — HYDROCODONE BITARTRATE AND ACETAMINOPHEN 1 TABLET: 7.5; 325 TABLET ORAL at 03:12

## 2018-02-14 RX ADMIN — ASPIRIN 325 MG: 325 TABLET, COATED ORAL at 09:05

## 2018-02-14 RX ADMIN — LATANOPROST 1 DROP: 50 SOLUTION OPHTHALMIC at 22:05

## 2018-02-14 RX ADMIN — INSULIN ASPART 2 UNITS: 100 INJECTION, SOLUTION INTRAVENOUS; SUBCUTANEOUS at 22:04

## 2018-02-14 RX ADMIN — DOCUSATE SODIUM 100 MG: 100 CAPSULE, LIQUID FILLED ORAL at 09:05

## 2018-02-14 RX ADMIN — LATANOPROST 1 DROP: 50 SOLUTION OPHTHALMIC at 01:45

## 2018-02-14 RX ADMIN — INSULIN ASPART 2 UNITS: 100 INJECTION, SOLUTION INTRAVENOUS; SUBCUTANEOUS at 09:05

## 2018-02-14 RX ADMIN — VANCOMYCIN HYDROCHLORIDE 1000 MG: 1 INJECTION, SOLUTION INTRAVENOUS at 18:04

## 2018-02-14 RX ADMIN — INSULIN ASPART 2 UNITS: 100 INJECTION, SOLUTION INTRAVENOUS; SUBCUTANEOUS at 12:05

## 2018-02-14 RX ADMIN — DOCUSATE SODIUM 100 MG: 100 CAPSULE, LIQUID FILLED ORAL at 22:04

## 2018-02-14 RX ADMIN — LEVOTHYROXINE SODIUM 25 MCG: 25 TABLET ORAL at 09:05

## 2018-02-14 RX ADMIN — VENLAFAXINE HYDROCHLORIDE 150 MG: 150 CAPSULE, EXTENDED RELEASE ORAL at 09:05

## 2018-02-14 RX ADMIN — HYDROCODONE BITARTRATE AND ACETAMINOPHEN 1 TABLET: 7.5; 325 TABLET ORAL at 22:04

## 2018-02-14 RX ADMIN — ASPIRIN 325 MG: 325 TABLET, COATED ORAL at 22:03

## 2018-02-14 RX ADMIN — HYDROCODONE BITARTRATE AND ACETAMINOPHEN 1 TABLET: 7.5; 325 TABLET ORAL at 18:04

## 2018-02-14 RX ADMIN — PRAMIPEXOLE DIHYDROCHLORIDE 1.5 MG: 1.5 TABLET ORAL at 01:45

## 2018-02-14 RX ADMIN — HYDROCODONE BITARTRATE AND ACETAMINOPHEN 1 TABLET: 7.5; 325 TABLET ORAL at 12:05

## 2018-02-14 RX ADMIN — LUBIPROSTONE 8 MCG: 8 CAPSULE, GELATIN COATED ORAL at 18:04

## 2018-02-14 RX ADMIN — METOPROLOL SUCCINATE 50 MG: 50 TABLET, FILM COATED, EXTENDED RELEASE ORAL at 09:05

## 2018-02-14 NOTE — THERAPY EVALUATION
Acute Care - Physical Therapy Initial Evaluation  [facility]     Patient Name: Lilia Becerra  : 1940  MRN: [id number]  Today's Date: 2018   Onset of Illness/Injury or Date of Surgery Date: 18 (s/p L knee revision)  Date of Referral to PT: 18  Referring Physician: Tana      Admit Date: 2018     Visit Dx:    ICD-10-CM ICD-9-CM   1. Impaired functional mobility, balance, and endurance Z74.09 V49.89   2. 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 996.47     V43.65     Patient Active Problem List   Diagnosis   (none) - all problems resolved or deleted     Past Medical History:   Diagnosis Date   • Acid reflux disease    • Arthritis    • Atrial fibrillation    • Depression    • Diabetes mellitus    • Disease of thyroid gland    • Dyslipidemia    • Glaucoma     left eye   • Hearing impaired     hearing aides   • Hiatal hernia    • High cholesterol    • Knee pain    • Osteoporosis    • PONV (postoperative nausea and vomiting)    • RLS (restless legs syndrome)    • Sleep apnea     machine    • Tailbone injury     fracture     Past Surgical History:   Procedure Laterality Date   • CATARACT EXTRACTION Bilateral    • HEMORRHOIDECTOMY     • HYSTERECTOMY     • INCISION AND DRAINAGE OF WOUND      on back    • KNEE ARTHROPLASTY Left    • LUMBAR FUSION     • TOTAL KNEE ARTHROPLASTY REVISION Left     x2   • VERTEBROPLASTY            PT ASSESSMENT (last 72 hours)      PT Evaluation       18 1035 18 1000    Rehab Evaluation    Document Type  evaluation  -MA    Subjective Information  no complaints;agree to therapy  -MA    Evaluation Not Performed other (see comments)   Spoke with RN, pt planning to d/c to SNF. Will defer OT eval and sign off, please f/u with any changes.  -HC     Patient Effort, Rehab Treatment  good  -MA    Symptoms Noted During/After Treatment  dizziness  -MA    General Information    Patient Profile Review  yes  -MA    Onset of Illness/Injury or Date of Surgery  Date  02/13/18   s/p L knee revision  -MA    Referring Physician  Tana  -MA    General Observations  supine in bed with HOB elevated, ice pack applied, no acute distress noted at rest, KI on  -MA    Pertinent History Of Current Problem  Twisted her knee while entering a closet, POD1 L knee revision  -MA    Precautions/Limitations  fall precautions;brace on when up   WBAT, NO ACTIVE QUAD EXTENSION, PASSIVE EXTENSION ONLY  -MA    Prior Level of Function  independent:;all household mobility  -MA    Plans/Goals Discussed With  patient  -MA    Risks Reviewed  patient:  -MA    Benefits Reviewed  patient:  -MA    Barriers to Rehab  medically complex  -MA    Clinical Impression    Date of Referral to PT  02/14/18  -MA    PT Diagnosis  impaired funct mobility 2' to post op  -MA    Criteria for Skilled Therapeutic Interventions Met  yes;treatment indicated  -MA    Pathology/Pathophysiology Noted (Describe Specifically for Each System)  musculoskeletal  -MA    Impairments Found (describe specific impairments)  aerobic capacity/endurance;gait, locomotion, and balance;ROM  -MA    Rehab Potential  good, to achieve stated therapy goals  -MA    Vital Signs    Pre SpO2 (%)  93  -MA    O2 Delivery Pre Treatment  room air  -MA    Post SpO2 (%)  90  -MA    O2 Delivery Post Treatment  room air  -MA    Pain Assessment    Pain Assessment  0-10  -MA    Pain Score  5  -MA    Pain Type  Surgical pain  -MA    Pain Location  Knee  -MA    Pain Orientation  Left  -MA    Pain Intervention(s)  Cold applied;Repositioned;Ambulation/increased activity;Rest  -MA    Response to Interventions  tolerated  -MA    Vision Assessment/Intervention    Visual Impairment  WFL with corrective lenses  -MA    Cognitive Assessment/Intervention    Current Cognitive/Communication Assessment  functional  -MA    Orientation Status  oriented x 4  -MA    Follows Commands/Answers Questions  100% of the time;able to follow multi-step instructions  -MA    Personal Safety   WNL/WFL;fully aware of deficits  -MA    Personal Safety Interventions  fall prevention program maintained;gait belt;nonskid shoes/slippers when out of bed  -MA    ROM (Range of Motion)    General ROM Detail  L LE limited 2' to soreness  -MA    MMT (Manual Muscle Testing)    General MMT Assessment Detail  L LE post op weakness  -MA    Mobility Assessment/Training    Extremity Weight-Bearing Status  left lower extremity  -MA    Left Lower Extremity Weight-Bearing  weight-bearing as tolerated   w knee immobilizer on  -MA    Bed Mobility, Assessment/Treatment    Bed Mobility, Assistive Device  bed rails;head of bed elevated  -MA    Bed Mob, Supine to Sit, [place]  minimum assist (75% patient effort)  -MA    Bed Mob, Sit to Supine, [place]  not tested  -MA    Bed Mobility, Impairments  pain;ROM decreased  -MA    Bed Mobility, Comment  Assist with L LE when sitting EOB, cues for hand placement to promote independence  -MA    Transfer Assessment/Treatment    Transfers, Bed-Chair [place]  minimum assist (75% patient effort);moderate assist (50% patient effort)  -MA    Transfers, Chair-Bed [place]  minimum assist (75% patient effort);moderate assist (50% patient effort)  -MA    Transfers, Bed-Chair-Bed, Assist Device  rolling walker  -MA    Transfer, Safety Issues  weight-shifting ability decreased;sequencing ability decreased  -MA    Transfer, Impairments  pain;strength decreased;ROM decreased  -MA    Transfer, Comment  Bed<>chair transfer performed with RW for UE support. KI on during transfer. Sequencing cues.  -MA    Therapy Exercises    Right Lower Extremity  AROM:;supine;ankle pumps/circles;hip abduction/adduction;heel slides  -MA    Positioning and Restraints    Pre-Treatment Position  in bed  -MA    Post Treatment Position  chair  -MA    In Chair  notified nsg;sitting;call light within reach;encouraged to call for assist;legs elevated   ice pack applied, notified nsg regarding restrictions  -MA       02/13/18 0943       General Information    Equipment Currently Used at Home walker, rolling;cane, straight;crutches;glucometer;bipap/ cpap;power chair, (recliner lift);raised toilet;shower chair  -     Living Environment    Lives With other relative(s) (specify)   Granddaughter  -     Living Arrangements house  -     Home Accessibility ramps present at home;no concerns  -     Stair Railings at Home inside, present on right side;outside, present at both sides  -     Type of Financial/Environmental Concern none  -     Transportation Available car;family or friend will provide  -       User Key  (r) = Recorded By, (t) = Taken By, (c) = Cosigned By    Initials Name Provider Type     Preethi Thao, RN Registered Nurse    JONAH Peter, PT Physical Therapist    HC Jessica Lou, OTR Occupational Therapist          Physical Therapy Education     Title: PT OT SLP Therapies (Done)     Topic: Physical Therapy (Done)     Point: Mobility training (Done)    Learning Progress Summary    Learner Readiness Method Response Comment Documented by Status   Patient Acceptance E VU Mobility restrictions, transfers only, no active quad extension MA 02/14/18 1048 Done               Point: Home exercise program (Done)    Learning Progress Summary    Learner Readiness Method Response Comment Documented by Status   Patient Acceptance E VU Mobility restrictions, transfers only, no active quad extension MA 02/14/18 1048 Done               Point: Body mechanics (Done)    Learning Progress Summary    Learner Readiness Method Response Comment Documented by Status   Patient Acceptance E VU Mobility restrictions, transfers only, no active quad extension MA 02/14/18 1048 Done               Point: Precautions (Done)    Learning Progress Summary    Learner Readiness Method Response Comment Documented by Status   Patient Acceptance E VU Mobility restrictions, transfers only, no active quad extension MA 02/14/18 1048 Done                       User Key     Initials Effective Dates Name Provider Type Discipline    MA 12/13/16 -  Gillian Peter, PT Physical Therapist PT                PT Recommendation and Plan  Anticipated Discharge Disposition: skilled nursing facility  Planned Therapy Interventions: balance training, bed mobility training, home exercise program, strengthening, transfer training, ROM (Range of Motion), patient/family education, postural re-education  PT Frequency: 2 times/day  Plan of Care Review  Plan Of Care Reviewed With: patient  Outcome Summary/Follow up Plan: Patient is a pleasant 77 y.o. female who presents with impaired functional mobility and endurance 2' to POD1 L knee revision. Per Dr. Fajardo, WBAT with knee immobilizer on, transfers only, DO NOT DO ACTIVE QUAD EXTENSION, PASSIVE EXTENSION ONLY, ADVANCE ROM AS TOLERATED. All bed mobility and transfers performed with assist x 1.  Patient will benefit from skilled PT Services acutely to address deficits as able and improve level of independence. Would recommend SNU at this time for continued rehabilitation.          IP PT Goals       02/14/18 1043          Bed Mobility PT LTG    Bed Mobility PT LTG, Date Established 02/14/18  -MA      Bed Mobility PT LTG, Time to Achieve 1 wk  -MA      Bed Mobility PT LTG, Activity Type all bed mobility  -MA      Bed Mobility PT LTG, [place] Level supervision required  -MA      Transfer Training PT LTG    Transfer Training PT LTG, Date Established 02/14/18  -MA      Transfer Training PT LTG, Time to Achieve 1 wk  -MA      Transfer Training PT LTG, Activity Type all transfers  -MA      Transfer Training PT LTG, [place] supervision required  -MA      Transfer Training PT LTG, Assist Device walker, rolling  -MA      Range of Motion PT LTG    Range of Motion Goal PT LTG, Date Established 02/14/18  -MA      Range of Motion Goal PT LTG, Time to Achieve 1 wk  -MA      Range fo Motion Goal PT LTG, Joint L knee  -MA       Range of Motion Goal PT LTG, AROM Measure 5-90'   extension measured passively per MD WILSON        User Key  (r) = Recorded By, (t) = Taken By, (c) = Cosigned By    Initials Name Provider Type    JONAH Peter PT Physical Therapist                Outcome Measures       02/14/18 1000          How much help from another person do you currently need...    Turning from your back to your side while in flat bed without using bedrails? 3  -MA      Moving from lying on back to sitting on the side of a flat bed without bedrails? 3  -MA      Moving to and from a bed to a chair (including a wheelchair)? 2  -MA      Standing up from a chair using your arms (e.g., wheelchair, bedside chair)? 2  -MA      Climbing 3-5 steps with a railing? 1  -MA      To walk in hospital room? 1  -MA      AM-PAC 6 Clicks Score 12  -MA      Functional Assessment    Outcome Measure Options AM-PAC 6 Clicks Basic Mobility (PT)  -MA        User Key  (r) = Recorded By, (t) = Taken By, (c) = Cosigned By    Initials Name Provider Type    JONAH Peter PT Physical Therapist           Time Calculation:         PT Charges       02/14/18 1049          Time Calculation    Start Time 1020  -MA      Stop Time 1042  -MA      Time Calculation (min) 22 min  -MA      PT Received On 02/14/18  -MA      PT - Next Appointment 02/14/18  -MA      PT Goal Re-Cert Due Date 02/21/18  -MA        User Key  (r) = Recorded By, (t) = Taken By, (c) = Cosigned By    Initials Name Provider Type    JONAH Peter PT Physical Therapist          Therapy Charges for Today     Code Description Service Date Service Provider Modifiers Qty    18779328191  PT EVAL MOD COMPLEXITY 2 2/14/2018 Gillian Peter, PT GP 1    70355803602 HC PT THER PROC EA 15 MIN 2/14/2018 Gillian Peter, PT GP 1          PT G-Codes  Outcome Measure Options: AM-PAC 6 Clicks Basic Mobility (PT)      Gillian Peter PT  2/14/2018

## 2018-02-14 NOTE — THERAPY TREATMENT NOTE
Acute Care - Physical Therapy Treatment Note  [facility]     Patient Name: Lilia Becerra  : 1940  MRN: [id number]  Today's Date: 2018  Onset of Illness/Injury or Date of Surgery Date: 18 (s/p L knee revision)  Date of Referral to PT: 18  Referring Physician: Tana    Admit Date: 2018    Visit Dx:    ICD-10-CM ICD-9-CM   1. Impaired functional mobility, balance, and endurance Z74.09 V49.89   2. 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 996.47     V43.65     Patient Active Problem List   Diagnosis   • Acid reflux disease   • Atrial fibrillation   • Diabetes mellitus   • Disease of thyroid gland   • Sleep apnea   • Essential hypertension   • Postoperative anemia due to acute blood loss (expected)   • Stage 3 chronic kidney disease   • Leukocytosis   • Hyperkalemia               Adult Rehabilitation Note       18 1600          Rehab Assessment/Intervention    Discipline physical therapy assistant  -CW      Document Type therapy note (daily note)  -CW      Subjective Information agree to therapy;complains of;pain  -CW      Patient Effort, Rehab Treatment good  -CW      Precautions/Limitations fall precautions;brace on when up  -CW      Recorded by [CW] Jhon Hernandez PTA      Vital Signs    O2 Delivery Pre Treatment room air  -CW      Recorded by [CW] Jhon Hernandez PTA      Pain Assessment    Pain Assessment 0-10  -CW      Pain Score 9  -CW      Post Pain Score 9  -CW      Pain Type Surgical pain  -CW      Pain Location Knee  -CW      Pain Orientation Left  -CW      Pain Intervention(s) Repositioned;Ambulation/increased activity  -CW      Response to Interventions mariela  -CW      Recorded by [CW] Jhon Hernandez PTA      Cognitive Assessment/Intervention    Current Cognitive/Communication Assessment functional  -CW      Orientation Status oriented x 4  -CW      Follows Commands/Answers Questions 100% of the time  -CW      Personal Safety WNL/WFL  -CW       Personal Safety Interventions fall prevention program maintained;gait belt;muscle strengthening facilitated;nonskid shoes/slippers when out of bed  -CW      Recorded by [CW] Jhon Hernandez PTA      Bed Mobility, Assessment/Treatment    Bed Mob, Supine to Sit, [employer] minimum assist (75% patient effort)  -CW      Recorded by [CW] Jhon Hernandez PTA      Transfer Assessment/Treatment    Transfers, Sit-Stand [employer] not tested  -CW      Recorded by [CW] Jhon Hernandez PTA      Therapy Exercises    Right Lower Extremity AROM:;supine;ankle pumps/circles;hip abduction/adduction;heel slides  -CW      Left Lower Extremity AROM:;10 reps;ankle pumps/circles;hip abduction/adduction  -CW      Recorded by [CW] Jhon Hernandez PTA      Positioning and Restraints    Pre-Treatment Position in bed  -CW      Post Treatment Position bed  -CW      In Bed notified nsg;fowlers;call light within reach;encouraged to call for assist  -CW      Recorded by [CW] Jhon Hernandez PTA        User Key  (r) = Recorded By, (t) = Taken By, (c) = Cosigned By    Initials Name Effective Dates     Jhon Hernandez PTA 12/13/16 -                 IP PT Goals       02/14/18 1043          Bed Mobility PT LTG    Bed Mobility PT LTG, Date Established 02/14/18  -MA      Bed Mobility PT LTG, Time to Achieve 1 wk  -MA      Bed Mobility PT LTG, Activity Type all bed mobility  -MA      Bed Mobility PT LTG, [employer] Level supervision required  -MA      Transfer Training PT LTG    Transfer Training PT LTG, Date Established 02/14/18  -MA      Transfer Training PT LTG, Time to Achieve 1 wk  -MA      Transfer Training PT LTG, Activity Type all transfers  -MA      Transfer Training PT LTG, [employer] Level supervision required  -MA      Transfer Training PT LTG, Assist Device walker, rolling  -MA      Range of Motion PT LTG    Range of Motion Goal PT LTG, Date Established 02/14/18  -MA      Range of Motion Goal PT LTG, Time to  Achieve 1 wk  -MA      Range fo Motion Goal PT LTG, Joint L knee  -MA      Range of Motion Goal PT LTG, AROM Measure 5-90'   extension measured passively per MD WILSON        User Key  (r) = Recorded By, (t) = Taken By, (c) = Cosigned By    Initials Name Provider Type    JONAH Peter PT Physical Therapist          Physical Therapy Education     Title: PT OT SLP Therapies (Done)     Topic: Physical Therapy (Done)     Point: Mobility training (Done)    Learning Progress Summary    Learner Readiness Method Response Comment Documented by Status   Patient Acceptance E VU Mobility restrictions, transfers only, no active quad extension MA 02/14/18 1048 Done               Point: Home exercise program (Done)    Learning Progress Summary    Learner Readiness Method Response Comment Documented by Status   Patient Acceptance E VU Mobility restrictions, transfers only, no active quad extension MA 02/14/18 1048 Done               Point: Body mechanics (Done)    Learning Progress Summary    Learner Readiness Method Response Comment Documented by Status   Patient Acceptance E VU Mobility restrictions, transfers only, no active quad extension MA 02/14/18 1048 Done               Point: Precautions (Done)    Learning Progress Summary    Learner Readiness Method Response Comment Documented by Status   Patient Acceptance E VU Mobility restrictions, transfers only, no active quad extension MA 02/14/18 1048 Done                      User Key     Initials Effective Dates Name Provider Type Discipline    MA 12/13/16 -  Gillian Peter PT Physical Therapist PT                    PT Recommendation and Plan  Anticipated Discharge Disposition: skilled nursing facility  Planned Therapy Interventions: balance training, bed mobility training, home exercise program, strengthening, transfer training, ROM (Range of Motion), patient/family education, postural re-education  PT Frequency: 2 times/day             Outcome Measures        02/14/18 1000          How much help from another person do you currently need...    Turning from your back to your side while in flat bed without using bedrails? 3  -MA      Moving from lying on back to sitting on the side of a flat bed without bedrails? 3  -MA      Moving to and from a bed to a chair (including a wheelchair)? 2  -MA      Standing up from a chair using your arms (e.g., wheelchair, bedside chair)? 2  -MA      Climbing 3-5 steps with a railing? 1  -MA      To walk in hospital room? 1  -MA      AM-PAC 6 Clicks Score 12  -MA      Functional Assessment    Outcome Measure Options AM-PAC 6 Clicks Basic Mobility (PT)  -MA        User Key  (r) = Recorded By, (t) = Taken By, (c) = Cosigned By    Initials Name Provider Type    JONAH Peter, PT Physical Therapist           Time Calculation:         PT Charges       02/14/18 1647 02/14/18 1049       Time Calculation    Start Time 1633  -CW 1020  -MA     Stop Time 1647  -CW 1042  -MA     Time Calculation (min) 14 min  -CW 22 min  -MA     PT Received On 02/14/18  -CW 02/14/18  -MA     PT - Next Appointment 02/15/18  -CW 02/14/18  -MA     PT Goal Re-Cert Due Date  02/21/18  -MA       User Key  (r) = Recorded By, (t) = Taken By, (c) = Cosigned By    Initials Name Provider Type    JONAH Peter, PT Physical Therapist    CW Jhon Hernandez, ELEUTER[employer] Physical Therapy Assistant          Therapy Charges for Today     Code Description Service Date Service Provider Modifiers Qty    10697490710 HC PT THER PROC EA 15 MIN 2/14/2018 Jhon Hernandez PTA GP 1    83873983472 HC PT THER SUPP EA 15 MIN 2/14/2018 Jhon Hernandez PTA GP 1          PT G-Codes  Outcome Measure Options: AM-PAC 6 Clicks Basic Mobility (PT)    Jhon Hernandez PTA  2/14/2018

## 2018-02-14 NOTE — PROGRESS NOTES
Orthopedic Progress Note      Patient: Lilia Becerra  YOB: 1940     Date of Admission: 2/13/2018  8:54 AM Medical Record Number: [id number]     Attending Physician: Jair Fajardo, *    Status Post:  LT TOTAL KNEE ARTHROPLASTY REVISION Post Operative Day Number: 1    Subjective : No new orthopaedic complaints     Pain Relief: some relief with present medication.     Systemic Complaints: No Complaints  Vitals:    02/13/18 1915 02/13/18 2231 02/14/18 0000 02/14/18 0400   BP: 138/62 101/49  113/67   BP Location: Left arm Left arm  Right arm   Patient Position: Lying Lying  Lying   Pulse: 62 63  62   Resp: 16 16  15   Temp: 96.9 °F (36.1 °C) 97.9 °F (36.6 °C)  97.3 °F (36.3 °C)   TempSrc: Oral Oral  Oral   SpO2: 100% 99% 94% 98%   Weight:       Height:           Physical Exam: 77 y.o. female    General Appearance:       Alert, cooperative, in no acute distress                  Extremities:    Dressing Clean, Dry and Intact, drain working 75 cc         Incision healthy without signs or symptoms of infections         No clinical sign of DVT        Able to do good movements of digits    Pulses:   Pulses palpable and equal bilaterally           Diagnostic Tests:       Results from last 7 days  Lab Units 02/14/18  0340   WBC 10*3/mm3 24.03*   HEMOGLOBIN g/dL 9.7*   HEMATOCRIT % 31.0*   PLATELETS 10*3/mm3 251       Results from last 7 days  Lab Units 02/14/18  0340   SODIUM mmol/L 139   POTASSIUM mmol/L 5.4*   CHLORIDE mmol/L 101   CO2 mmol/L 23.0   BUN mg/dL 38*   CREATININE mg/dL 1.37*   GLUCOSE mg/dL 185*   CALCIUM mg/dL 9.1         No results found for: CRYSTAL]  No results found for: CULTURE]  No results found for: URICACID]  Xr Knee 1 Or 2 View Left    Result Date: 2/13/2018  Narrative: PORTABLE VIEWS OF THE LEFT KNEE  CLINICAL HISTORY: Postop knee arthroplasty  AP and crosstable lateral views demonstrate a total knee arthroplasty that appears in satisfactory position. The arthroplasty has long  femoral and tibial stem components.  This report was finalized on 2/13/2018 4:41 PM by Dr. Eric Gaviria MD.      Xr Knee 1 Or 2 View Left    Result Date: 2/3/2018  Narrative: XR KNEE 1 OR 2 VW LEFT-  INDICATIONS:     Preoperative evaluation  TECHNIQUE: 4 VIEWS OF LEFT KNEE  COMPARISON: None available  FINDINGS:   Left knee arthroplasty hardware is present. Lucency adjacent to the medial aspect of the proximal end of the tibial component could be evidence of loosening or infection. No acute fracture is noted. Minimal knee effusion is apparent.         Impression:  As described.  This report was finalized on 2/3/2018 5:45 AM by Dr. Rico Haywood MD.      Xr Chest Pa & Lateral    Result Date: 2/3/2018  Narrative: XR CHEST PA AND LATERAL-  HISTORY: Female who is 77 years-old,  preoperative evaluation  TECHNIQUE: Frontal and lateral views of the chest  COMPARISON: 02/24/2015  FINDINGS: Heart size is normal. Aorta is tortuous.. Linear likely atelectasis or scar is apparent in both lungs. No focal pulmonary consolidation, pleural effusion, or pneumothorax. Thoracic levoscoliosis is noted. Spinal stabilization hardware is partly included. No acute osseous process.      Impression: No focal pulmonary consolidation. Tortuous aorta. Follow-up as clinically indicated.  This report was finalized on 2/3/2018 5:42 AM by Dr. Rico Haywood MD.          Current Medications:  Scheduled Meds:  aspirin 325 mg Oral Q12H   docusate sodium 100 mg Oral BID   insulin aspart 0-7 Units Subcutaneous 4x Daily With Meals & Nightly   latanoprost 1 drop Left Eye Nightly   levothyroxine 25 mcg Oral Daily   lubiprostone 8 mcg Oral BID With Meals   metoprolol succinate XL 50 mg Oral Daily   pantoprazole 40 mg Oral QAM   pramipexole 1.5 mg Oral Nightly   vancomycin 1,000 mg Intravenous Q24H   venlafaxine  mg Oral Daily     Continuous Infusions:  sodium chloride 100 mL/hr Last Rate: 100 mL/hr (02/13/18 2055)     PRN Meds:.•   acetaminophen  •  bisacodyl  •  bisacodyl  •  dextrose  •  dextrose  •  diphenhydrAMINE **OR** diphenhydrAMINE  •  glucagon (human recombinant)  •  HYDROcodone-acetaminophen  •  HYDROcodone-acetaminophen  •  HYDROmorphone **AND** naloxone  •  melatonin  •  ondansetron **OR** ondansetron ODT **OR** ondansetron    Assessment: Status post  LT TOTAL KNEE ARTHROPLASTY REVISION    Patient Active Problem List   Diagnosis   • Osteoarthritis, knee       PLAN:   Continues current post-op course  Anticoagulation: Aspirin started   Hemovac Drain to be removed today  Dressing Change in am  Mobilize with PT as tolerated per protocol  Creatinine elevated, will continue IV fluids and watch,     Weight Bearing: WBAT for transfers only, do not gait train  Discharge Plan: OK to plan for discharge in  tomorrow to home / SNF  from orthopadic perspective.    Jair Fajardo MD    Date: 2/14/2018    Time: 7:12 AM

## 2018-02-14 NOTE — PROGRESS NOTES
"   LOS: 1 day   Patient Care Team:  Leo Lomas MD as PCP - General  Leo Lomas MD as PCP - Family Medicine    Chief Complaint: urinary frequency    Subjective     HPI Comments: Only complaint is of urinary frequency      Subjective:  Symptoms:  Stable.  No shortness of breath, malaise, cough, chest pain, weakness, headache, chest pressure, anorexia, diarrhea or anxiety.    Diet:  Adequate intake.  No nausea or vomiting.    Activity level: Impaired due to pain.    Pain:  She complains of pain that is moderate.  She reports pain is improving.  Pain is well controlled.        History taken from: patient chart    Objective     Vital Signs  Temp:  [96.4 °F (35.8 °C)-98.1 °F (36.7 °C)] 97.1 °F (36.2 °C)  Heart Rate:  [62-74] 73  Resp:  [15-20] 18  BP: (101-143)/(49-68) 106/68    Objective:  General Appearance:  Comfortable and in no acute distress.    Vital signs: (most recent): Blood pressure 106/68, pulse 73, temperature 97.1 °F (36.2 °C), temperature source Oral, resp. rate 18, height 165.1 cm (65\"), weight 95.3 kg (210 lb 1.6 oz), SpO2 97 %.  Vital signs are normal.  No fever.    Output: Producing urine.    HEENT: Normal HEENT exam.    Lungs:  Normal respiratory rate and normal effort.  Breath sounds clear to auscultation.    Heart: Normal rate.  Irregular rhythm.  Positive for murmur.    Abdomen: Abdomen is soft.  Bowel sounds are normal.   There is no abdominal tenderness.     Extremities: There is dependent edema (chronic, doughy).  (Dressing to LLE)  Pulses: Distal pulses are intact.    Neurological: Patient is alert and oriented to person, place and time.    Pupils:  Pupils are equal, round, and reactive to light.    Skin:  Warm and dry.              Results Review:     I reviewed the patient's new clinical results.  I reviewed the patient's other test results and agree with the interpretation  Discussed with patient    Medication Review: reviewed    Assessment/Plan     Active Problems:    " Acid reflux disease    Atrial fibrillation    Diabetes mellitus    Disease of thyroid gland    Sleep apnea    Essential hypertension    Postoperative anemia due to acute blood loss (expected)    Stage 3 chronic kidney disease    Leukocytosis    Hyperkalemia          Plan:   (POD#1 left TKA revision  Not sure why WBC has jumped up so quickly  No fever or other sign/symptom of infection other than urinary freqency  Will check UA  Will monitor closely  Monitor Cr as well  Monitor Hgb  BPs still a bit on the low side  ACE and ARB both held, would not restart given pt's hyperK+ today  Sugars acceptable  Metformin on hold, SSI in place  ).       Franky Jeffery MD  02/14/18  2:30 PM    Time: 20min

## 2018-02-14 NOTE — PLAN OF CARE
Problem: Patient Care Overview (Adult)  Goal: Plan of Care Review   02/14/18 1043   Coping/Psychosocial Response Interventions   Plan Of Care Reviewed With patient   Outcome Evaluation   Outcome Summary/Follow up Plan Patient is a pleasant 77 y.o. female who presents with impaired functional mobility and endurance 2' to POD1 L knee revision. Per Dr. Fajardo, WBAT with knee immobilizer on, transfers only, DO NOT DO ACTIVE QUAD EXTENSION, PASSIVE EXTENSION ONLY, and ADVANCE ROM AS TOLERATED. All bed mobility and transfers performed with assist x 1. Patient will benefit from skilled PT services acutely to address deficits as able and improve level of independence. Would recommend SNU at this time for continued rehabilitation.       Problem: Inpatient Physical Therapy  Goal: Bed Mobility Goal LTG- PT   02/14/18 1043   Bed Mobility PT LTG   Bed Mobility PT LTG, Date Established 02/14/18   Bed Mobility PT LTG, Time to Achieve 1 wk   Bed Mobility PT LTG, Activity Type all bed mobility   Bed Mobility PT LTG, [place] Level supervision required     Goal: Transfer Training Goal 1 LTG- PT   02/14/18 1043   Transfer Training PT LTG   Transfer Training PT LTG, Date Established 02/14/18   Transfer Training PT LTG, Time to Achieve 1 wk   Transfer Training PT LTG, Activity Type all transfers   Transfer Training PT LTG, [place] Level supervision required   Transfer Training PT LTG, Assist Device walker, rolling     Goal: Range of Motion Goal LTG- PT   02/14/18 1043   Range of Motion PT LTG   Range of Motion Goal PT LTG, Date Established 02/14/18   Range of Motion Goal PT LTG, Time to Achieve 1 wk   Range fo Motion Goal PT LTG, Joint L knee   Range of Motion Goal PT LTG, AROM Measure 5-90'  (extension measured passively per MD)

## 2018-02-14 NOTE — CONSULTS
Patient Identification:  Name: Lilia Becerra  Age/Sex: 77 y.o. female  :  1940  MRN: [id number]         Primary Care Physician: Leo Lomas MD  Room:  P894/1              Date of Consult: 18    Requesting Physician: Dr. Fajardo    Reason for Consultation: Management of hypertension and diabetes postoperatively    HPI: Mrs. Becerra is a 77-year-old woman who is unknown to our service.  She was admitted earlier today following left knee replacement.  She has known diabetes and hypertension.  She does not check her sugars at home.  No polyuria or polydipsia.  No chest pain or palpitations.  No shortness of breath.  No dizziness or lightheadedness.  No other associated symptoms or exacerbating or alleviating factors    Past medical history  Hypertension  Diabetes mellitus type 2  Hypothyroidism  Sleep apnea, on CPAP  Glaucoma  Chronic atrial fibrillation  Chronic kidney disease versus acute kidney injury    Past Surgical History:   Procedure Laterality Date   • CATARACT EXTRACTION Bilateral    • HEMORRHOIDECTOMY     • HYSTERECTOMY     • INCISION AND DRAINAGE OF WOUND      on back    • KNEE ARTHROPLASTY Left    • LUMBAR FUSION     • TOTAL KNEE ARTHROPLASTY REVISION Left     x2   • VERTEBROPLASTY         Prescriptions Prior to Admission   Medication Sig Dispense Refill Last Dose   • acetaminophen (TYLENOL) 650 MG 8 hr tablet Take 650 mg by mouth Every 8 (Eight) Hours As Needed for Mild Pain .   2018 at 2100   • aspirin 81 MG tablet Take 81 mg by mouth Daily.   2018   • atorvastatin (LIPITOR) 10 MG tablet Take 10 mg by mouth Every Night.   2018 at 2100   • Calcium Carb-Cholecalciferol (CALCIUM 600 + D PO) Take 1 tablet by mouth 2 (Two) Times a Day.   2018 at 2100   • docusate sodium (COLACE) 100 MG capsule Take 100 mg by mouth At Night As Needed for Constipation.   2018 at 2100   • latanoprost (XALATAN) 0.005 % ophthalmic solution Administer 1 drop into the left eye Every  Night.   2/12/2018 at 2100   • levothyroxine (SYNTHROID, LEVOTHROID) 25 MCG tablet Take 25 mcg by mouth Daily.   2/12/2018 at 0800   • lisinopril (PRINIVIL,ZESTRIL) 10 MG tablet Take 10 mg by mouth Daily.   2/12/2018 at 0900   • losartan (COZAAR) 25 MG tablet Take 12.5 mg by mouth Daily.   2/13/2018 at 0630   • lubiprostone (AMITIZA) 8 MCG capsule Take 8 mcg by mouth 2 (Two) Times a Day With Meals.   2/12/2018 at 0900   • metFORMIN (GLUCOPHAGE) 500 MG tablet Take 500 mg by mouth 2 (Two) Times a Day With Meals.   2/12/2018 at 2000   • metoprolol succinate XL (TOPROL-XL) 50 MG 24 hr tablet Take 50 mg by mouth Daily.   2/13/2018 at 0630   • omeprazole (priLOSEC) 20 MG capsule Take 20 mg by mouth Daily.   2/13/2018 at 0630   • oxyCODONE-acetaminophen (PERCOCET) 5-325 MG per tablet Take 1 tablet by mouth Every 6 (Six) Hours As Needed.   Past Month at Unknown time   • pramipexole (MIRAPEX) 1.5 MG tablet Take 1.5 mg by mouth Every Night.   2/12/2018 at 2100   • solifenacin (VESICARE) 5 MG tablet Take 5 mg by mouth Daily.   2/12/2018 at 0900   • spironolactone-hydrochlorothiazide (ALDACTAZIDE) 25-25 MG tablet Take 1 tablet by mouth Daily.   2/12/2018 at 0900   • VENLAFAXINE HCL ER PO Take 150 mg by mouth Daily.   2/12/2018 at 2100   • vitamin C (ASCORBIC ACID) 500 MG tablet Take 500 mg by mouth Daily.   2/12/2018 at 2100     Allergies:  Tetanus toxoids    Social History   Substance Use Topics   • Smoking status: Never Smoker   • Smokeless tobacco: Never Used   • Alcohol use No   .  Lives with and raising her 13-year-old granddaughter.  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  No heart disease.  No diabetes mellitus.  No stroke history    Review of Systems   Constitutional: Positive for activity change. Negative for appetite change, chills, fatigue, fever and unexpected weight change.        Left knee pain has limited activity    HENT: Positive for hearing loss. Negative for congestion, ear pain, sinus pain, sinus pressure and sore throat.    Eyes: Positive for visual disturbance.        Vision not as good secondary to glaucoma   Respiratory: Negative for cough, chest tightness, shortness of breath and wheezing.    Cardiovascular: Positive for leg swelling. Negative for chest pain.        Has minimal leg swelling.  It's no worse today than normal   Gastrointestinal: Positive for constipation. Negative for abdominal distention, abdominal pain, diarrhea, nausea and vomiting.        Takes something for her bowels every 3 days or so   Endocrine: Negative for polydipsia, polyphagia and polyuria.        Does not check her sugars at home   Genitourinary: Negative for difficulty urinating and dysuria.   Musculoskeletal: Positive for arthralgias. Negative for neck pain.        Fingers are achy and stiff.  Right knee is usually okay.   Allergic/Immunologic: Negative for environmental allergies.   Neurological: Negative for dizziness, numbness and headaches.   Hematological: Bruises/bleeds easily.   Psychiatric/Behavioral: Negative for agitation and behavioral problems.          Vital Signs  Temp:  [96.4 °F (35.8 °C)-98.2 °F (36.8 °C)] 97.9 °F (36.6 °C)  Heart Rate:  [51-74] 63  Resp:  [16-20] 16  BP: (101-148)/(49-79) 101/49  Body mass index is 34.96 kg/(m^2).    Physical Exam   Constitutional: She appears well-developed and well-nourished. No distress.   She looks younger than her age.  Fairly hard of hearing   HENT:   Head: Normocephalic and atraumatic.   Right Ear: External ear normal.   Left Ear: External ear normal.   Nose: Nose normal.   Mouth/Throat: Oropharynx is clear and moist. No oropharyngeal exudate.   Eyes: Conjunctivae and EOM are normal. Pupils are equal, round, and reactive to light. Right eye exhibits no discharge. Left eye exhibits no discharge. No scleral icterus.   Neck: Normal range of motion. Neck supple. No JVD present. No  tracheal deviation present. No thyromegaly present.   Cardiovascular: Normal rate and intact distal pulses.    Murmur heard.  Irregularly irregular.  Grade 3/6 systolic murmur best heard left upper sternal border   Pulmonary/Chest: No stridor. No respiratory distress. She has no wheezes. She has no rales. She exhibits no tenderness.   Clear.  Breath sounds equal.  Symmetric   Abdominal: Soft. Bowel sounds are normal. She exhibits no distension. There is no tenderness.   Obese.  No hepatosplenomegaly   Musculoskeletal: She exhibits edema.   Trace to 1+ edema right ankle.  Left leg is bandaged.  Thickening of the PIP and DIP joints   Lymphadenopathy:     She has no cervical adenopathy.   Neurological: She is alert. A cranial nerve deficit is present.   Hard of hearing.  Muscle strength intact to gravity.  Decreased to examiner   Skin: She is not diaphoretic.   Psychiatric: She has a normal mood and affect. Her behavior is normal.   Nursing note reviewed.      Results Review:    I reviewed the patient's new clinical results.    Assessment/Plan  1.  Hypertension.  She is on ACE inhibitor and an ARB.  Creatinine is elevated.  I stop both.  Blood pressure is running on the low side.  If her blood pressure goes up, just one of the agents can be used.  Continue metoprolol with parameters written for which to hold.  2.  Chronic atrial fibrillation.  Rate is controlled.  Continue metoprolol as noted.  She was not on anticoagulation prior to admission for unclear reasons.  Additional information will be needed through PCP or her cardiologist  3.  Diabetes mellitus type 2.  She was on metformin.  We'll use sliding scale for now.  Monitor renal function.  4.  Chronic kidney disease stage III versus acute kidney injury.  Creatinine was 1.3 with GFR of 40 preop.  Her family doctor told her her kidneys were not normal just recently.  One of her medicines was stopped (possibly a diuretic?)  5.  Sleep apnea, on CPAP.  I ordered.  6.   Glaucoma, continue drops  7.  Hypothyroidism, continue replacement dosage  8.  Restless leg syndrome.  I ordered her Mirapex    Thank you very much for asking A to be involved in this patient's care.  We will follow along with you.      Estelle Curran MD  [facility]  02/13/18  11:28 PM

## 2018-02-14 NOTE — PLAN OF CARE
Problem: Patient Care Overview (Adult)  Goal: Plan of Care Review  Outcome: Ongoing (interventions implemented as appropriate)   02/14/18 0039   Coping/Psychosocial Response Interventions   Plan Of Care Reviewed With patient   Outcome Evaluation   Outcome Summary/Follow up Plan adequate pain control with po meds, KI on, unsteady with ambulation, c-pap in use, , educated on importance of monitoring blood sugar at home.     Goal: Adult Individualization and Mutuality  Outcome: Ongoing (interventions implemented as appropriate)    Goal: Discharge Needs Assessment  Outcome: Ongoing (interventions implemented as appropriate)      Problem: Pain, Acute (Adult)  Goal: Identify Related Risk Factors and Signs and Symptoms  Outcome: Ongoing (interventions implemented as appropriate)    Goal: Acceptable Pain Control/Comfort Level  Outcome: Ongoing (interventions implemented as appropriate)      Problem: Fall Risk (Adult)  Goal: Identify Related Risk Factors and Signs and Symptoms  Outcome: Ongoing (interventions implemented as appropriate)    Goal: Absence of Falls  Outcome: Ongoing (interventions implemented as appropriate)

## 2018-02-14 NOTE — SIGNIFICANT NOTE
02/14/18 1035   Rehab Evaluation   Evaluation Not Performed other (see comments)  (Spoke with RN, pt planning to d/c to SNF. Will defer OT eval and sign off, please f/u with any changes.)

## 2018-02-15 PROBLEM — E87.5 HYPERKALEMIA: Status: RESOLVED | Noted: 2018-02-14 | Resolved: 2018-02-15

## 2018-02-15 PROBLEM — D72.829 LEUKOCYTOSIS: Status: RESOLVED | Noted: 2018-02-14 | Resolved: 2018-02-15

## 2018-02-15 LAB
ANION GAP SERPL CALCULATED.3IONS-SCNC: 12.9 MMOL/L
BASOPHILS # BLD AUTO: 0.02 10*3/MM3 (ref 0–0.2)
BASOPHILS NFR BLD AUTO: 0.2 % (ref 0–1.5)
BUN BLD-MCNC: 36 MG/DL (ref 8–23)
BUN/CREAT SERPL: 31 (ref 7–25)
CALCIUM SPEC-SCNC: 8.6 MG/DL (ref 8.6–10.5)
CHLORIDE SERPL-SCNC: 103 MMOL/L (ref 98–107)
CO2 SERPL-SCNC: 23.1 MMOL/L (ref 22–29)
CREAT BLD-MCNC: 1.16 MG/DL (ref 0.57–1)
DEPRECATED RDW RBC AUTO: 50.2 FL (ref 37–54)
EOSINOPHIL # BLD AUTO: 0.07 10*3/MM3 (ref 0–0.7)
EOSINOPHIL NFR BLD AUTO: 0.6 % (ref 0.3–6.2)
ERYTHROCYTE [DISTWIDTH] IN BLOOD BY AUTOMATED COUNT: 14.4 % (ref 11.7–13)
GFR SERPL CREATININE-BSD FRML MDRD: 45 ML/MIN/1.73
GLUCOSE BLD-MCNC: 123 MG/DL (ref 65–99)
GLUCOSE BLDC GLUCOMTR-MCNC: 118 MG/DL (ref 70–130)
GLUCOSE BLDC GLUCOMTR-MCNC: 122 MG/DL (ref 70–130)
GLUCOSE BLDC GLUCOMTR-MCNC: 164 MG/DL (ref 70–130)
GLUCOSE BLDC GLUCOMTR-MCNC: 194 MG/DL (ref 70–130)
HCT VFR BLD AUTO: 25.8 % (ref 35.6–45.5)
HGB BLD-MCNC: 8 G/DL (ref 11.9–15.5)
IMM GRANULOCYTES # BLD: 0.05 10*3/MM3 (ref 0–0.03)
IMM GRANULOCYTES NFR BLD: 0.4 % (ref 0–0.5)
LYMPHOCYTES # BLD AUTO: 2.26 10*3/MM3 (ref 0.9–4.8)
LYMPHOCYTES NFR BLD AUTO: 18.4 % (ref 19.6–45.3)
MCH RBC QN AUTO: 29.6 PG (ref 26.9–32)
MCHC RBC AUTO-ENTMCNC: 31 G/DL (ref 32.4–36.3)
MCV RBC AUTO: 95.6 FL (ref 80.5–98.2)
MONOCYTES # BLD AUTO: 1.1 10*3/MM3 (ref 0.2–1.2)
MONOCYTES NFR BLD AUTO: 8.9 % (ref 5–12)
NEUTROPHILS # BLD AUTO: 8.81 10*3/MM3 (ref 1.9–8.1)
NEUTROPHILS NFR BLD AUTO: 71.5 % (ref 42.7–76)
PLATELET # BLD AUTO: 174 10*3/MM3 (ref 140–500)
PMV BLD AUTO: 10.7 FL (ref 6–12)
POTASSIUM BLD-SCNC: 4.3 MMOL/L (ref 3.5–5.2)
RBC # BLD AUTO: 2.7 10*6/MM3 (ref 3.9–5.2)
SODIUM BLD-SCNC: 139 MMOL/L (ref 136–145)
WBC NRBC COR # BLD: 12.31 10*3/MM3 (ref 4.5–10.7)

## 2018-02-15 PROCEDURE — 63710000001 INSULIN ASPART PER 5 UNITS: Performed by: INTERNAL MEDICINE

## 2018-02-15 PROCEDURE — 82962 GLUCOSE BLOOD TEST: CPT

## 2018-02-15 PROCEDURE — 97110 THERAPEUTIC EXERCISES: CPT | Performed by: PHYSICAL THERAPIST

## 2018-02-15 PROCEDURE — 80048 BASIC METABOLIC PNL TOTAL CA: CPT | Performed by: ORTHOPAEDIC SURGERY

## 2018-02-15 PROCEDURE — 85025 COMPLETE CBC W/AUTO DIFF WBC: CPT | Performed by: ORTHOPAEDIC SURGERY

## 2018-02-15 RX ADMIN — PANTOPRAZOLE SODIUM 40 MG: 40 TABLET, DELAYED RELEASE ORAL at 06:22

## 2018-02-15 RX ADMIN — HYDROCODONE BITARTRATE AND ACETAMINOPHEN 1 TABLET: 7.5; 325 TABLET ORAL at 02:43

## 2018-02-15 RX ADMIN — PRAMIPEXOLE DIHYDROCHLORIDE 1.5 MG: 1.5 TABLET ORAL at 20:52

## 2018-02-15 RX ADMIN — LUBIPROSTONE 8 MCG: 8 CAPSULE, GELATIN COATED ORAL at 08:49

## 2018-02-15 RX ADMIN — HYDROCODONE BITARTRATE AND ACETAMINOPHEN 1 TABLET: 7.5; 325 TABLET ORAL at 20:52

## 2018-02-15 RX ADMIN — HYDROCODONE BITARTRATE AND ACETAMINOPHEN 1 TABLET: 7.5; 325 TABLET ORAL at 06:49

## 2018-02-15 RX ADMIN — BISACODYL 10 MG: 10 SUPPOSITORY RECTAL at 09:38

## 2018-02-15 RX ADMIN — METOPROLOL SUCCINATE 50 MG: 50 TABLET, FILM COATED, EXTENDED RELEASE ORAL at 08:49

## 2018-02-15 RX ADMIN — INSULIN ASPART 2 UNITS: 100 INJECTION, SOLUTION INTRAVENOUS; SUBCUTANEOUS at 11:42

## 2018-02-15 RX ADMIN — DOCUSATE SODIUM 100 MG: 100 CAPSULE, LIQUID FILLED ORAL at 20:52

## 2018-02-15 RX ADMIN — HYDROCODONE BITARTRATE AND ACETAMINOPHEN 2 TABLET: 7.5; 325 TABLET ORAL at 11:42

## 2018-02-15 RX ADMIN — INSULIN ASPART 2 UNITS: 100 INJECTION, SOLUTION INTRAVENOUS; SUBCUTANEOUS at 17:47

## 2018-02-15 RX ADMIN — ASPIRIN 325 MG: 325 TABLET, COATED ORAL at 08:49

## 2018-02-15 RX ADMIN — LATANOPROST 1 DROP: 50 SOLUTION OPHTHALMIC at 20:52

## 2018-02-15 RX ADMIN — DOCUSATE SODIUM 100 MG: 100 CAPSULE, LIQUID FILLED ORAL at 08:49

## 2018-02-15 RX ADMIN — ASPIRIN 325 MG: 325 TABLET, COATED ORAL at 20:52

## 2018-02-15 RX ADMIN — VENLAFAXINE HYDROCHLORIDE 150 MG: 150 CAPSULE, EXTENDED RELEASE ORAL at 08:49

## 2018-02-15 RX ADMIN — LEVOTHYROXINE SODIUM 25 MCG: 25 TABLET ORAL at 08:49

## 2018-02-15 RX ADMIN — LUBIPROSTONE 8 MCG: 8 CAPSULE, GELATIN COATED ORAL at 17:47

## 2018-02-15 NOTE — PROGRESS NOTES
Discharge Planning Assessment  [facility]     Patient Name: Lilia Becerra  MRN: [id number]  Today's Date: 2/15/2018    Admit Date: 2/13/2018          Discharge Needs Assessment       02/15/18 1013    Living Environment    Lives With other (see comments)   granddaughter    Living Arrangements house    Home Accessibility ramps present at home;no concerns    Stair Railings at Home inside, present on right side;outside, present at both sides    Type of Financial/Environmental Concern none    Transportation Available car;family or friend will provide    Living Environment    Quality Of Family Relationships supportive    Able to Return to Prior Living Arrangements yes    Discharge Needs Assessment    Concerns To Be Addressed discharge planning concerns    Equipment Currently Used at Home walker, rolling;cane, straight;glucometer;bipap/ cpap;power chair, (recliner lift);shower chair    Discharge Planning Comments Kecia Torres ( [phone])            Discharge Plan       02/15/18 1017    Case Management/Social Work Plan    Plan [facility]- anticipate dc tomorrow    Patient/Family In Agreement With Plan yes    Additional Comments Spoke with Kirti and  [facility] will be able to accept tomorrow. Dayana Black RN      02/15/18 1005    Case Management/Social Work Plan    Plan Kirti to evaluate for [place]    Patient/Family In Agreement With Plan yes    Additional Comments IMM letter signed. Facesheet verified.  Spoke with patient in room.  Introduced self and explained role.  Patient lives alone and [facility].  Spoke with patient and she would like referral sent to [facility].  Spoke with Kirti and she will evaluate.  Transfer packet started and in CCP office. Dayana Black RN        Discharge Placement     Facility/Agency Request Status Selected? Address Phone Number Fax Number    [facility] Accepted    Yes [address], [place]  [phone]        Expected Discharge Date and Time     Expected Discharge Date Expected Discharge Time    Feb 16, 2018               Demographic Summary       02/15/18 1012    Referral Information    Admission Type inpatient    Arrived From admitted as an inpatient    Referral Source admission list    Reason For Consult discharge planning    Primary Care Physician Information    Name Dr Musejalen Lomas            Functional Status       02/15/18 1013    Functional Status Current    Ambulation 3-->assistive equipment and person    Transferring 3-->assistive equipment and person    Toileting 3-->assistive equipment and person    Bathing 2-->assistive person    Dressing 2-->assistive person    Eating 0-->independent    Communication 0-->understands/communicates without difficulty    Swallowing (if score 2 or more for any item, consult Rehab Services) 0-->swallows foods/liquids without difficulty    Change in Functional Status Since Onset of Current Illness/Injury yes    Functional Status Prior    Ambulation 1-->assistive equipment    Transferring 1-->assistive equipment    Toileting 0-->independent    Bathing 0-->independent    Dressing 0-->independent    Eating 0-->independent    Communication 0-->understands/communicates without difficulty    Swallowing 0-->swallows foods/liquids without difficulty    Cognitive/Perceptual/Developmental    Current Mental Status/Cognitive Functioning no deficits noted            Psychosocial     None            Abuse/Neglect     None            Legal     None            Substance Abuse     None            Patient Forms     None          Dayana Black RN

## 2018-02-15 NOTE — PLAN OF CARE
Problem: Patient Care Overview (Adult)  Goal: Plan of Care Review   02/15/18 2134   Outcome Evaluation   Outcome Summary/Follow up Plan Agreeable to therapy. Aware of WB restrictions and able to maintain TTWB on LLE while pivoting to chiar., Tolerated BLE exercises With KI on LLE.

## 2018-02-15 NOTE — PROGRESS NOTES
Name: Lilia Becerra ADMIT: 2018   : 1940  PCP: Leo Lomas MD    MRN: [id number] LOS: 2 days   AGE/SEX: 77 y.o. female  ROOM: [facility]/   Subjective   Subjective    No CP SOA NVD overnight. Pain well controlled.    Objective   Vital Signs  Temp:  [97.1 °F (36.2 °C)-99.2 °F (37.3 °C)] 98.1 °F (36.7 °C)  Heart Rate:  [63-73] 68  Resp:  [16-18] 18  BP: (103-118)/(55-69) 107/62  SpO2:  [93 %-98 %] 97 %  on  Flow (L/min):  [1] 1;   O2 Device: room air  Body mass index is 34.96 kg/(m^2).    Physical Exam   Constitutional: She appears well-developed. No distress.   HENT:   Head: Normocephalic and atraumatic.   Eyes: EOM are normal. Pupils are equal, round, and reactive to light.   Neck: Normal range of motion. Neck supple.   Cardiovascular: Normal rate and intact distal pulses.  An irregularly irregular rhythm present.   Pulmonary/Chest: Effort normal and breath sounds normal. She has no wheezes.   Abdominal: Soft. Bowel sounds are normal. She exhibits no distension. There is no tenderness.   Musculoskeletal: She exhibits no edema.   LLE dressed   Neurological: She is alert. No cranial nerve deficit.   Skin: Skin is warm and dry. She is not diaphoretic.   Psychiatric: She has a normal mood and affect. Her behavior is normal.   Nursing note and vitals reviewed.      Results Review:       I reviewed the patient's new clinical results.     Results from last 7 days  Lab Units 02/15/18  0333 18  0340   WBC 10*3/mm3 12.31* 24.03*   HEMOGLOBIN g/dL 8.0* 9.7*   PLATELETS 10*3/mm3 174 251     Results from last 7 days  Lab Units 02/15/18  0333 18  0340   SODIUM mmol/L 139 139   POTASSIUM mmol/L 4.3 5.4*   CHLORIDE mmol/L 103 101   CO2 mmol/L 23.1 23.0   BUN mg/dL 36* 38*   CREATININE mg/dL 1.16* 1.37*   GLUCOSE mg/dL 123* 185*   Estimated Creatinine Clearance: 44.9 mL/min (by C-G formula based on Cr of 1.16).  Results from last 7 days  Lab Units 02/15/18  0333 18  0340   CALCIUM mg/dL 8.6  9.1         aspirin 325 mg Oral Q12H   docusate sodium 100 mg Oral BID   insulin aspart 0-7 Units Subcutaneous 4x Daily With Meals & Nightly   latanoprost 1 drop Left Eye Nightly   levothyroxine 25 mcg Oral Daily   lubiprostone 8 mcg Oral BID With Meals   metoprolol succinate XL 50 mg Oral Daily   pantoprazole 40 mg Oral QAM   pramipexole 1.5 mg Oral Nightly   venlafaxine  mg Oral Daily      Diet Regular      Assessment/Plan   Active Hospital Problems (** Indicates Principal Problem)    Diagnosis Date Noted   • Acid reflux disease [K21.9] 02/14/2018   • Atrial fibrillation [I48.91] 02/14/2018   • Diabetes mellitus [E11.9] 02/14/2018   • Disease of thyroid gland [E07.9] 02/14/2018   • Sleep apnea [G47.30] 02/14/2018   • Essential hypertension [I10] 02/14/2018   • Postoperative anemia due to acute blood loss (expected) [D62] 02/14/2018   • Stage 3 chronic kidney disease [N18.3] 02/14/2018      Resolved Hospital Problems    Diagnosis Date Noted Date Resolved   • Leukocytosis [D72.829] 02/14/2018 02/15/2018   • Hyperkalemia [E87.5] 02/14/2018 02/15/2018     - Leukocytosis: Post op. No sign of infection. Improving.  - Hyperkalemia: Resolved  - HTN: BP still on low side but stable. Holding HCTZ, Spironolactone, Losartan, Lisinopril. Metoprolol with hold parameters.  - GERD: PPI  - CKD3: Stable.  - DM2: A1c 6.1. SSI. Home regimen at dispo.    Will continue to follow. Please call with any questions or concerns.    Saqib Bah MD  [facility]  02/15/18  9:06 AM

## 2018-02-15 NOTE — PROGRESS NOTES
Continued Stay Note  [facility]     Patient Name: Lilia Becerra  MRN: [id number]  Today's Date: 2/15/2018    Admit Date: 2/13/2018          Discharge Plan       02/15/18 1017    Case Management/Social Work Plan    Plan [facility]- anticipate dc tomorrow    Patient/Family In Agreement With Plan yes    Additional Comments Spoke with Kirti and  [facility] will be able to accept tomorrow. Dayana Black RN      02/15/18 1005    Case Management/Social Work Plan    Plan Kirti to evaluate for [place]    Patient/Family In Agreement With Plan yes    Additional Comments IMM letter signed. Facesheet verified.  Spoke with patient in room.  Introduced self and explained role.  Patient lives alone and PT has recommended SNU at [place].  Spoke with patient and she would like referral sent to [facility].  Spoke with Kriti and she will evaluate.  Transfer packet started and in CCP office. Dayana Black RN              Discharge Codes     None        Expected Discharge Date and Time     Expected Discharge Date Expected Discharge Time    Feb 16, 2018             Dayana Black RN

## 2018-02-15 NOTE — PROGRESS NOTES
Continued Stay Note  [facility]     Patient Name: Lilia Becerra  MRN: [id number]  Today's Date: 2/15/2018    Admit Date: 2/13/2018          Discharge Plan       02/15/18 1100    Case Management/Social Work Plan    Additional Comments Patient updated and agreeable to [facility] [place]. Dayana Black RN      02/15/18 1017    Case Management/Social Work Plan    Plan [facility]- anticipate dc tomorrow    Patient/Family In Agreement With Plan yes    Additional Comments Spoke with Kirti and  [facility] will be able to accept tomorrow. Dayana Black RN      02/15/18 1005    Case Management/Social Work Plan    Plan Kirti to evaluate for [place]    Patient/Family In Agreement With Plan yes    Additional Comments IMM letter signed. Facesheet verified.  Spoke with patient in room.  Introduced self and explained role.  Patient lives alone and PT has recommended SNU at [place].  Spoke with patient and she would like referral sent to [facility].  Spoke with Kirti and she will evaluate.  Transfer packet started and in CCP office. Dayana Black RN              Discharge Codes     None        Expected Discharge Date and Time     Expected Discharge Date Expected Discharge Time    Feb 16, 2018             Dayana Black RN

## 2018-02-15 NOTE — PROGRESS NOTES
"Ortho POD 2    Patient Name:  Lilia Becerra  YOB: 1940  Medical Records Number:  [id number]    No complaints except pain    /65 (BP Location: Right arm, Patient Position: Lying)  Pulse 63  Temp 98.1 °F (36.7 °C) (Oral)   Resp 16  Ht 165.1 cm (65\")  Wt 95.3 kg (210 lb 1.6 oz)  SpO2 93%  BMI 34.96 kg/m2    LLE:  NVI, calf nontender, sensation intact  No signs of DVT    Incision: clean, no infection    Lab Results (last 24 hours)     Procedure Component Value Units Date/Time    POC Glucose Once [[id number]]  (Abnormal) Collected:  02/14/18 1028    Specimen:  Blood Updated:  02/14/18 1032     Glucose 185 (H) mg/dL     Narrative:       Meter: [id number] : [id number] Thomas Mcqueen CNA    Tissue Pathology Exam - Synovium, Knee, Left [[id number]] Collected:  02/13/18 1324    Specimen:  Synovium from Knee, Left Updated:  02/14/18 1453     Case Report --     Surgical Pathology Report                         Case: [id number]                                  Authorizing Provider:  Jair Fajardo MD Collected:           02/13/2018 01:24 PM          Ordering Location:     [facility]  Received:            02/13/2018 01:35 PM                                 MAIN OR                                                                      Pathologist:           Delon Oseguera MD                                                                           Specimen:    Knee, Left, left knee synovium                                                              Final Diagnosis --     LEFT KNEE SYNOVIUM:   FIBROFATTY TISSUE WITH EXTENSIVE FIBROSIS AND MINIMAL CHRONIC INFLAMMATION.   NO SIGNIFICANT ACUTE INFLAMMATION IDENTIFIED.     CMK/brb     CPT CODES:  1.  82006, 06448       Intraoperative Consultation --     #1FS DX: LEFT KNEE: NO SIGNIFICANT ACUTE INFLAMMATION IDENTIFIED.  CMK/brb   Called to  " "Tana at 1:48 pm on February 13, 2018       Gross Description --     Received in formalin labeled \"left knee synovium\" is a 3.3 x 1.4 x 0.2 cm white to pink tan membranous fragment of tissue that is serially sectioned and submitted all in block 1FSA for frozen section.    CC/USO/CMK/jse        Embedded Images --    POC Glucose Once [[id number]]  (Abnormal) Collected:  02/14/18 1641    Specimen:  Blood Updated:  02/14/18 1644     Glucose 190 (H) mg/dL     Narrative:       Meter: [id number] : [id number] Thomas Mcqueen CNA    Urinalysis With / Culture If Indicated - Urine, Clean Catch [[id number]]  (Abnormal) Collected:  02/14/18 2056    Specimen:  Urine from Urine, Clean Catch Updated:  02/14/18 2134     Color, UA Yellow     Appearance, UA Hazy (A)     pH, UA 5.5     Specific Gravity, UA 1.010     Glucose, UA Negative     Ketones, UA Negative     Bilirubin, UA Negative     Blood, UA Negative     Protein, UA Negative     Leuk Esterase, UA Trace (A)     Nitrite, UA Negative     Urobilinogen, UA 0.2 E.U./dL    Urinalysis, Microscopic Only - Urine, Clean Catch [[id number]] Collected:  02/14/18 2056    Specimen:  Urine from Urine, Clean Catch Updated:  02/14/18 2134     RBC, UA 0-2 /HPF      WBC, UA 0-2 /HPF      Bacteria, UA None Seen /HPF      Squamous Epithelial Cells, UA 0-2 /HPF      Hyaline Casts, UA 0-2 /LPF      Methodology Automated Microscopy    POC Glucose Once [[id number]]  (Abnormal) Collected:  02/14/18 2140    Specimen:  Blood Updated:  02/14/18 2142     Glucose 164 (H) mg/dL     Narrative:       Meter: [id number] : [id number] Brian OLIVA    CBC & Differential [[id number]] Collected:  02/15/18 0333    Specimen:  Blood Updated:  02/15/18 0408    Narrative:       The following orders were created for panel order CBC & Differential.  Procedure                               Abnormality         Status                     ---------                               -----------         ------                   "   CBC Auto Differential[[id number]]        Abnormal            Final result                 Please view results for these tests on the individual orders.    CBC Auto Differential [[id number]]  (Abnormal) Collected:  02/15/18 0333    Specimen:  Blood Updated:  02/15/18 0408     WBC 12.31 (H) 10*3/mm3      RBC 2.70 (L) 10*6/mm3      Hemoglobin 8.0 (L) g/dL      Hematocrit 25.8 (L) %      MCV 95.6 fL      MCH 29.6 pg      MCHC 31.0 (L) g/dL      RDW 14.4 (H) %      RDW-SD 50.2 fl      MPV 10.7 fL      Platelets 174 10*3/mm3      Neutrophil % 71.5 %      Lymphocyte % 18.4 (L) %      Monocyte % 8.9 %      Eosinophil % 0.6 %      Basophil % 0.2 %      Immature Grans % 0.4 %      Neutrophils, Absolute 8.81 (H) 10*3/mm3      Lymphocytes, Absolute 2.26 10*3/mm3      Monocytes, Absolute 1.10 10*3/mm3      Eosinophils, Absolute 0.07 10*3/mm3      Basophils, Absolute 0.02 10*3/mm3      Immature Grans, Absolute 0.05 (H) 10*3/mm3     Basic Metabolic Panel [[id number]]  (Abnormal) Collected:  02/15/18 0333    Specimen:  Blood Updated:  02/15/18 0426     Glucose 123 (H) mg/dL      BUN 36 (H) mg/dL      Creatinine 1.16 (H) mg/dL      Sodium 139 mmol/L      Potassium 4.3 mmol/L      Chloride 103 mmol/L      CO2 23.1 mmol/L      Calcium 8.6 mg/dL      eGFR Non African Amer 45 (L) mL/min/1.73      BUN/Creatinine Ratio 31.0 (H)     Anion Gap 12.9 mmol/L     Narrative:       The MDRD GFR formula is only valid for adults with stable renal function between ages 18 and 70.    POC Glucose Once [[id number]]  (Normal) Collected:  02/15/18 0625    Specimen:  Blood Updated:  02/15/18 0634     Glucose 118 mg/dL     Narrative:       Meter: [id number] : [id number] Brian OLIVA          S/p Left TKA Revision  TTWB with walker  ASA for DVT prophylaxis  Rehab tomorrow

## 2018-02-15 NOTE — THERAPY TREATMENT NOTE
Acute Care - Physical Therapy Treatment Note  [facility]     Patient Name: Lilia Becerra  : 1940  MRN: [id number]  Today's Date: 2/15/2018  Onset of Illness/Injury or Date of Surgery Date: 18 (s/p L knee revision)  Date of Referral to PT: 18  Referring Physician: Tana    Admit Date: 2018    Visit Dx:    ICD-10-CM ICD-9-CM   1. Impaired functional mobility, balance, and endurance Z74.09 V49.89   2. 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 996.47     V43.65     Patient Active Problem List   Diagnosis   • Acid reflux disease   • Atrial fibrillation   • Diabetes mellitus   • Disease of thyroid gland   • Sleep apnea   • Essential hypertension   • Postoperative anemia due to acute blood loss (expected)   • Stage 3 chronic kidney disease               Adult Rehabilitation Note       02/15/18 1646 18 1600       Rehab Assessment/Intervention    Discipline physical therapist  - physical therapy assistant  -     Document Type therapy note (daily note)  - therapy note (daily note)  -CW     Subjective Information agree to therapy;complains of;pain  -KH agree to therapy;complains of;pain  -CW     Patient Effort, Rehab Treatment  good  -CW     Symptoms Noted During/After Treatment none  -KH      Precautions/Limitations fall precautions   TTWB, KI On LLE  -KH fall precautions;brace on when up  -CW     Recorded by [KH] Kristina Ecnarnacion, PT [CW] Jhon Hernandez, PTA     Vital Signs    O2 Delivery Pre Treatment  room air  -CW     Recorded by  [CW] Jhon Hernandez PTA     Pain Assessment    Pain Assessment  0-10  -CW     Pain Score  9  -CW     Post Pain Score  9  -CW     Pain Type  Surgical pain  -CW     Pain Location  Knee  -CW     Pain Orientation  Left  -CW     Pain Intervention(s)  Repositioned;Ambulation/increased activity  -CW     Response to Interventions  mariela  -CW     Recorded by  [CW] Jhon Hernandez PTA     Cognitive Assessment/Intervention    Current  Cognitive/Communication Assessment  functional  -CW     Orientation Status  oriented x 4  -CW     Follows Commands/Answers Questions  100% of the time  -CW     Personal Safety  WNL/WFL  -CW     Personal Safety Interventions  fall prevention program maintained;gait belt;muscle strengthening facilitated;nonskid shoes/slippers when out of bed  -CW     Recorded by  [CW] Jhon Hernandez PTA     Bed Mobility, Assessment/Treatment    Bed Mob, Supine to Sit, Temple minimum assist (75% patient effort)  - minimum assist (75% patient effort)  -CW     Bed Mob, Sit to Supine, Temple not tested  -KH      Recorded by [KH] Kristina Encarnacion, PT [CW] Jhno Hernandez PTA     Transfer Assessment/Treatment    Transfers, Bed-Chair Temple minimum assist (75% patient effort)  -KH      Transfers, Bed-Chair-Bed, Assist Device rolling walker  -KH      Transfers, Sit-Stand Temple  not tested  -CW     Transfer, Maintain Weight Bearing Status able to maintain weight bearing status  -KH      Transfer, Safety Issues weight-shifting ability decreased  -KH      Recorded by [KH] Kristina Encarnacion, PT [CW] Jhon Hernandez PTA     Therapy Exercises    Right Lower Extremity  AROM:;supine;ankle pumps/circles;hip abduction/adduction;heel slides  -CW     Left Lower Extremity  AROM:;10 reps;ankle pumps/circles;hip abduction/adduction  -CW     Bilateral Lower Extremities AROM:;10 reps;ankle pumps/circles;hip abduction/adduction;SLR;sitting   assist with SLR  -KH      Recorded by [KH] Kristina Encarnacion, PT [CW] Jhon Hernandez PTA     Positioning and Restraints    Pre-Treatment Position in bed  -KH in bed  -CW     Post Treatment Position chair  - bed  -CW     In Bed  notified nsg;fowlers;call light within reach;encouraged to call for assist  -CW     In Chair reclined;call light within reach;encouraged to call for assist;notified nsg;legs elevated   ice reapplied  -KH      Recorded by [KH] Kristina Chacon  Shashank, PT [CW] Jhon Hernandez, PTA       User Key  (r) = Recorded By, (t) = Taken By, (c) = Cosigned By    Initials Name Effective Dates    PRINCESS Kristina Chacon Shashank, PT 05/18/15 -     CW Jhon Hernandez, PTA 12/13/16 -                 IP PT Goals       02/14/18 1043          Bed Mobility PT LTG    Bed Mobility PT LTG, Date Established 02/14/18  -MA      Bed Mobility PT LTG, Time to Achieve 1 wk  -MA      Bed Mobility PT LTG, Activity Type all bed mobility  -MA      Bed Mobility PT LTG, Sully Level supervision required  -MA      Transfer Training PT LTG    Transfer Training PT LTG, Date Established 02/14/18  -MA      Transfer Training PT LTG, Time to Achieve 1 wk  -MA      Transfer Training PT LTG, Activity Type all transfers  -MA      Transfer Training PT LTG, Sully Level supervision required  -MA      Transfer Training PT LTG, Assist Device walker, rolling  -MA      Range of Motion PT LTG    Range of Motion Goal PT LTG, Date Established 02/14/18  -MA      Range of Motion Goal PT LTG, Time to Achieve 1 wk  -MA      Range fo Motion Goal PT LTG, Joint L knee  -MA      Range of Motion Goal PT LTG, AROM Measure 5-90'   extension measured passively per MD  -MA        User Key  (r) = Recorded By, (t) = Taken By, (c) = Cosigned By    Initials Name Provider Type    JONAH Peter, PT Physical Therapist          Physical Therapy Education     Title: PT OT SLP Therapies (Done)     Topic: Physical Therapy (Done)     Point: Mobility training (Done)    Learning Progress Summary    Learner Readiness Method Response Comment Documented by Status   Patient Acceptance E ELIZABETH,NR   02/15/18 1646 Done    Acceptance E VU Mobility restrictions, transfers only, no active quad extension MA 02/14/18 1048 Done               Point: Home exercise program (Done)    Learning Progress Summary    Learner Readiness Method Response Comment Documented by Status   Patient Acceptance E ELIZABETH,DORA   02/15/18 0532 Done     Acceptance E VU Mobility restrictions, transfers only, no active quad extension MA 02/14/18 1048 Done               Point: Body mechanics (Done)    Learning Progress Summary    Learner Readiness Method Response Comment Documented by Status   Patient Acceptance E VU,NR   02/15/18 1649 Done    Acceptance E VU Mobility restrictions, transfers only, no active quad extension MA 02/14/18 1048 Done               Point: Precautions (Done)    Learning Progress Summary    Learner Readiness Method Response Comment Documented by Status   Patient Acceptance E VU,NR   02/15/18 1649 Done    Acceptance E VU Mobility restrictions, transfers only, no active quad extension MA 02/14/18 1048 Done                      User Key     Initials Effective Dates Name Provider Type Discipline     05/18/15 -  Kristina Encarnacion, PT Physical Therapist PT    MA 12/13/16 -  Gillian Peter, PT Physical Therapist PT                    PT Recommendation and Plan  Anticipated Discharge Disposition: skilled nursing facility  Planned Therapy Interventions: balance training, bed mobility training, home exercise program, strengthening, transfer training, ROM (Range of Motion), patient/family education, postural re-education  PT Frequency: 2 times/day  Plan of Care Review  Outcome Summary/Follow up Plan: Agreeable to therapy. Aware of WB restrictions and able to maintain TTWB on LLE while pivoting to chiar., Tolerated BLE exercises With KI on LLE.          Outcome Measures       02/15/18 1650 02/14/18 1000       How much help from another person do you currently need...    Turning from your back to your side while in flat bed without using bedrails? 3  -KH 3  -MA     Moving from lying on back to sitting on the side of a flat bed without bedrails? 3  -KH 3  -MA     Moving to and from a bed to a chair (including a wheelchair)? 3  -KH 2  -MA     Standing up from a chair using your arms (e.g., wheelchair, bedside chair)? 3  -KH 2  -MA     Climbing  3-5 steps with a railing? 1  -KH 1  -MA     To walk in hospital room? 1  -KH 1  -MA     AM-PAC 6 Clicks Score 14  -KH 12  -MA     Functional Assessment    Outcome Measure Options AM-PAC 6 Clicks Basic Mobility (PT)  -KH AM-PAC 6 Clicks Basic Mobility (PT)  -MA       User Key  (r) = Recorded By, (t) = Taken By, (c) = Cosigned By    Initials Name Provider Type     Kristina Encarnacion, PT Physical Therapist    JONAH Peter, PT Physical Therapist           Time Calculation:         PT Charges       02/15/18 1650 02/15/18 1131       Time Calculation    Start Time 1638  -      Stop Time 1650  -      Time Calculation (min) 12 min  -      PT Received On 02/15/18  -      PT - Next Appointment 02/16/18  - 02/15/18  -MA       User Key  (r) = Recorded By, (t) = Taken By, (c) = Cosigned By    Initials Name Provider Type     Kristina Encarnacion, PT Physical Therapist    JONAH Peter, PT Physical Therapist          Therapy Charges for Today     Code Description Service Date Service Provider Modifiers Qty    49624322390 HC PT THER PROC EA 15 MIN 2/15/2018 Kristina Encarnacion, PT GP 1          PT G-Codes  Outcome Measure Options: AM-PAC 6 Clicks Basic Mobility (PT)    Kristina Encarnacion, PT  2/15/2018

## 2018-02-15 NOTE — PLAN OF CARE
Problem: Patient Care Overview (Adult)  Goal: Plan of Care Review  Outcome: Ongoing (interventions implemented as appropriate)   02/15/18 0426   Outcome Evaluation   Outcome Summary/Follow up Plan VSS, only bed to chair transfers, no bending of left knee,KI to left leg, anticipating discharge Saturday., educated on monitoring blood sugars at home.     Goal: Adult Individualization and Mutuality  Outcome: Ongoing (interventions implemented as appropriate)    Goal: Discharge Needs Assessment  Outcome: Ongoing (interventions implemented as appropriate)      Problem: Perioperative Period (Adult)  Goal: Signs and Symptoms of Listed Potential Problems Will be Absent or Manageable (Perioperative Period)  Outcome: Ongoing (interventions implemented as appropriate)      Problem: Pain, Acute (Adult)  Goal: Acceptable Pain Control/Comfort Level  Outcome: Ongoing (interventions implemented as appropriate)      Problem: Fall Risk (Adult)  Goal: Absence of Falls  Outcome: Ongoing (interventions implemented as appropriate)      Problem: Knee Replacement, Total (Adult)  Goal: Signs and Symptoms of Listed Potential Problems Will be Absent or Manageable (Knee Replacement, Total)  Outcome: Ongoing (interventions implemented as appropriate)

## 2018-02-15 NOTE — PLAN OF CARE
Problem: Patient Care Overview (Adult)  Goal: Plan of Care Review  Outcome: Ongoing (interventions implemented as appropriate)   02/14/18 2025   Coping/Psychosocial Response Interventions   Plan Of Care Reviewed With patient   Patient Care Overview   Progress improving   Outcome Evaluation   Outcome Summary/Follow up Plan VSS, pt A & O, pt today was to only transfer w/PT-NO Gait training, knee immobilizer on all shift, pt up in chair, per Dr. Fajardo-pt is NOT to bend or lift leg, gave 2 units of insulin am, afternoon, pm, poss d/c tomorrow or Fri, unable to administer abx on time bc IV unable to flush-waiting on IV team to start new IV, educated pt on importance of monitoring blood sugars      Goal: Adult Individualization and Mutuality  Outcome: Ongoing (interventions implemented as appropriate)      Problem: Perioperative Period (Adult)  Goal: Signs and Symptoms of Listed Potential Problems Will be Absent or Manageable (Perioperative Period)  Outcome: Ongoing (interventions implemented as appropriate)      Problem: Pain, Acute (Adult)  Goal: Identify Related Risk Factors and Signs and Symptoms  Outcome: Outcome(s) achieved Date Met: 02/14/18    Goal: Acceptable Pain Control/Comfort Level  Outcome: Ongoing (interventions implemented as appropriate)      Problem: Fall Risk (Adult)  Goal: Identify Related Risk Factors and Signs and Symptoms  Outcome: Outcome(s) achieved Date Met: 02/14/18    Goal: Absence of Falls  Outcome: Ongoing (interventions implemented as appropriate)      Problem: Knee Replacement, Total (Adult)  Goal: Signs and Symptoms of Listed Potential Problems Will be Absent or Manageable (Knee Replacement, Total)  Outcome: Ongoing (interventions implemented as appropriate)

## 2018-02-15 NOTE — SIGNIFICANT NOTE
02/15/18 1131   Rehab Treatment   Discipline physical therapist   Treatment Not Performed other (see comments)  (Patient requested us to check back this PM due to undergoing a suppository. Clarified WBing status with pt as Dr. Ward's progress note stated TTWB instead of WBAT (this status was stated under PT orders). Will now plan for TTWB. RN and nsg notified.)   Recommendation   PT - Next Appointment 02/15/18

## 2018-02-16 VITALS
SYSTOLIC BLOOD PRESSURE: 100 MMHG | TEMPERATURE: 98.8 F | BODY MASS INDEX: 35 KG/M2 | HEIGHT: 65 IN | WEIGHT: 210.1 LBS | HEART RATE: 77 BPM | RESPIRATION RATE: 16 BRPM | OXYGEN SATURATION: 97 % | DIASTOLIC BLOOD PRESSURE: 57 MMHG

## 2018-02-16 LAB
ANION GAP SERPL CALCULATED.3IONS-SCNC: 11.3 MMOL/L
BUN BLD-MCNC: 32 MG/DL (ref 8–23)
BUN/CREAT SERPL: 24.8 (ref 7–25)
CALCIUM SPEC-SCNC: 8.6 MG/DL (ref 8.6–10.5)
CHLORIDE SERPL-SCNC: 101 MMOL/L (ref 98–107)
CO2 SERPL-SCNC: 23.7 MMOL/L (ref 22–29)
CREAT BLD-MCNC: 1.29 MG/DL (ref 0.57–1)
GFR SERPL CREATININE-BSD FRML MDRD: 40 ML/MIN/1.73
GLUCOSE BLD-MCNC: 134 MG/DL (ref 65–99)
GLUCOSE BLDC GLUCOMTR-MCNC: 118 MG/DL (ref 70–130)
GLUCOSE BLDC GLUCOMTR-MCNC: 154 MG/DL (ref 70–130)
POTASSIUM BLD-SCNC: 4.4 MMOL/L (ref 3.5–5.2)
SODIUM BLD-SCNC: 136 MMOL/L (ref 136–145)

## 2018-02-16 PROCEDURE — 82962 GLUCOSE BLOOD TEST: CPT

## 2018-02-16 PROCEDURE — 63710000001 INSULIN ASPART PER 5 UNITS: Performed by: INTERNAL MEDICINE

## 2018-02-16 PROCEDURE — 80048 BASIC METABOLIC PNL TOTAL CA: CPT | Performed by: INTERNAL MEDICINE

## 2018-02-16 RX ADMIN — INSULIN ASPART 2 UNITS: 100 INJECTION, SOLUTION INTRAVENOUS; SUBCUTANEOUS at 09:11

## 2018-02-16 RX ADMIN — VENLAFAXINE HYDROCHLORIDE 150 MG: 150 CAPSULE, EXTENDED RELEASE ORAL at 09:11

## 2018-02-16 RX ADMIN — ASPIRIN 325 MG: 325 TABLET, COATED ORAL at 09:11

## 2018-02-16 RX ADMIN — HYDROCODONE BITARTRATE AND ACETAMINOPHEN 1 TABLET: 7.5; 325 TABLET ORAL at 04:02

## 2018-02-16 RX ADMIN — LEVOTHYROXINE SODIUM 25 MCG: 25 TABLET ORAL at 09:11

## 2018-02-16 RX ADMIN — HYDROCODONE BITARTRATE AND ACETAMINOPHEN 2 TABLET: 7.5; 325 TABLET ORAL at 13:02

## 2018-02-16 RX ADMIN — HYDROCODONE BITARTRATE AND ACETAMINOPHEN 2 TABLET: 7.5; 325 TABLET ORAL at 09:24

## 2018-02-16 RX ADMIN — PANTOPRAZOLE SODIUM 40 MG: 40 TABLET, DELAYED RELEASE ORAL at 06:58

## 2018-02-16 RX ADMIN — LUBIPROSTONE 8 MCG: 8 CAPSULE, GELATIN COATED ORAL at 09:11

## 2018-02-16 RX ADMIN — METOPROLOL SUCCINATE 50 MG: 50 TABLET, FILM COATED, EXTENDED RELEASE ORAL at 10:50

## 2018-02-16 NOTE — PLAN OF CARE
Problem: Patient Care Overview (Adult)  Goal: Plan of Care Review  Outcome: Ongoing (interventions implemented as appropriate)   02/15/18 1923   Coping/Psychosocial Response Interventions   Plan Of Care Reviewed With patient   Patient Care Overview   Progress improving   Outcome Evaluation   Outcome Summary/Follow up Plan VSS, removed drain and changed dressing-applied island, c/o minimal pain, per Dr. Ward pt is TTWB, administered suppository early in shift, pt not having any abdominal discomfort, passing flatus, educated pt on blood sugar monitoring, plan to d/c tomorrow to rehab     Goal: Adult Individualization and Mutuality  Outcome: Ongoing (interventions implemented as appropriate)      Problem: Perioperative Period (Adult)  Goal: Signs and Symptoms of Listed Potential Problems Will be Absent or Manageable (Perioperative Period)  Outcome: Ongoing (interventions implemented as appropriate)      Problem: Pain, Acute (Adult)  Goal: Acceptable Pain Control/Comfort Level  Outcome: Ongoing (interventions implemented as appropriate)      Problem: Fall Risk (Adult)  Goal: Absence of Falls  Outcome: Ongoing (interventions implemented as appropriate)      Problem: Knee Replacement, Total (Adult)  Goal: Signs and Symptoms of Listed Potential Problems Will be Absent or Manageable (Knee Replacement, Total)  Outcome: Ongoing (interventions implemented as appropriate)

## 2018-02-16 NOTE — PLAN OF CARE
Problem: Patient Care Overview (Adult)  Goal: Plan of Care Review  Outcome: Ongoing (interventions implemented as appropriate)   02/16/18 0425   Outcome Evaluation   Outcome Summary/Follow up Plan KI left leg, only bed to chair transfers, pain controlled, anticipating discharge today, educated on blood pressure monitoring at home.     Goal: Adult Individualization and Mutuality  Outcome: Ongoing (interventions implemented as appropriate)    Goal: Discharge Needs Assessment  Outcome: Ongoing (interventions implemented as appropriate)      Problem: Perioperative Period (Adult)  Goal: Signs and Symptoms of Listed Potential Problems Will be Absent or Manageable (Perioperative Period)  Outcome: Ongoing (interventions implemented as appropriate)      Problem: Fall Risk (Adult)  Goal: Absence of Falls  Outcome: Ongoing (interventions implemented as appropriate)      Problem: Knee Replacement, Total (Adult)  Goal: Signs and Symptoms of Listed Potential Problems Will be Absent or Manageable (Knee Replacement, Total)  Outcome: Ongoing (interventions implemented as appropriate)

## 2018-02-16 NOTE — PROGRESS NOTES
Name: Lilia Becerra ADMIT: 2018   : 1940  PCP: Leo Lomas MD    MRN: [id number] LOS: 3 days   AGE/SEX: 77 y.o. female  ROOM: [facility]   Subjective   Subjective  No CP SOA NVD reported.    Objective   Vital Signs  Temp:  [98.3 °F (36.8 °C)-99.3 °F (37.4 °C)] 98.6 °F (37 °C)  Heart Rate:  [73-84] 76  Resp:  [16-20] 16  BP: ()/(54-65) 116/62  SpO2:  [95 %-100 %] 96 %  on   ;   O2 Device: room air  Body mass index is 34.96 kg/(m^2).    Physical Exam   Constitutional: She appears well-developed. No distress.   HENT:   Head: Normocephalic and atraumatic.   Eyes: EOM are normal. Pupils are equal, round, and reactive to light.   Neck: Normal range of motion. Neck supple.   Cardiovascular: Normal rate and intact distal pulses.  An irregularly irregular rhythm present.   Murmur (systolic) heard.  Pulmonary/Chest: Effort normal and breath sounds normal. She has no wheezes.   Abdominal: Soft. Bowel sounds are normal. She exhibits no distension. There is no tenderness.   Musculoskeletal: She exhibits no edema.   LLE dressed   Neurological: She is alert. No cranial nerve deficit.   Skin: Skin is warm and dry. She is not diaphoretic.   Psychiatric: She has a normal mood and affect. Her behavior is normal.   Nursing note and vitals reviewed.      Results Review:       I reviewed the patient's new clinical results.    Results from last 7 days  Lab Units 02/15/18  0333 18  0340   WBC 10*3/mm3 12.31* 24.03*   HEMOGLOBIN g/dL 8.0* 9.7*   PLATELETS 10*3/mm3 174 251     Results from last 7 days  Lab Units 18  0349 02/15/18  0333 18  0340   SODIUM mmol/L 136 139 139   POTASSIUM mmol/L 4.4 4.3 5.4*   CHLORIDE mmol/L 101 103 101   CO2 mmol/L 23.7 23.1 23.0   BUN mg/dL 32* 36* 38*   CREATININE mg/dL 1.29* 1.16* 1.37*   GLUCOSE mg/dL 134* 123* 185*   Estimated Creatinine Clearance: 40.4 mL/min (by C-G formula based on Cr of 1.29).  Results from last 7 days  Lab Units 18  9206  02/15/18  0333 02/14/18  0340   CALCIUM mg/dL 8.6 8.6 9.1         aspirin 325 mg Oral Q12H   docusate sodium 100 mg Oral BID   insulin aspart 0-7 Units Subcutaneous 4x Daily With Meals & Nightly   latanoprost 1 drop Left Eye Nightly   levothyroxine 25 mcg Oral Daily   lubiprostone 8 mcg Oral BID With Meals   metoprolol succinate XL 50 mg Oral Daily   pantoprazole 40 mg Oral QAM   pramipexole 1.5 mg Oral Nightly   venlafaxine  mg Oral Daily      Diet Regular      Assessment/Plan   Active Hospital Problems (** Indicates Principal Problem)    Diagnosis Date Noted   • Acid reflux disease [K21.9] 02/14/2018   • Atrial fibrillation [I48.91] 02/14/2018   • Diabetes mellitus [E11.9] 02/14/2018   • Disease of thyroid gland [E07.9] 02/14/2018   • Sleep apnea [G47.30] 02/14/2018   • Essential hypertension [I10] 02/14/2018   • Postoperative anemia due to acute blood loss (expected) [D62] 02/14/2018   • Stage 3 chronic kidney disease [N18.3] 02/14/2018      Resolved Hospital Problems    Diagnosis Date Noted Date Resolved   • Leukocytosis [D72.829] 02/14/2018 02/15/2018   • Hyperkalemia [E87.5] 02/14/2018 02/15/2018     - Leukocytosis: Post op. No sign of infection. Improving.  - Hyperkalemia: Resolved  - HTN: Stable on metoprolol. Holding HCTZ, Spironolactone, Losartan, Lisinopril. When ready for discharge, would discharge on metoprolol only. Follow up with primary care in 1-2 weeks about resumption of other medications in stepwise manner.  - GERD: PPI  - CKD3: Stable.  - DM2: A1c 6.1. SSI. Home regimen at dispo.    Ready for discharge when ready from Orthopedic standpoint. Will continue to follow. Please call with any questions or concerns.    Saqib Bah MD  [facility]  02/16/18  8:33 AM

## 2018-02-16 NOTE — SIGNIFICANT NOTE
02/16/18 0829   Rehab Treatment   Discipline physical therapy assistant   Treatment Not Performed (Pt is d/c today and states that she wants to save energy for getting cleaned up and leaving)

## 2018-06-13 ENCOUNTER — OFFICE VISIT CONVERTED (OUTPATIENT)
Dept: CARDIOLOGY | Facility: CLINIC | Age: 78
End: 2018-06-13
Attending: INTERNAL MEDICINE

## 2018-10-02 ENCOUNTER — OFFICE VISIT CONVERTED (OUTPATIENT)
Dept: UROLOGY | Facility: CLINIC | Age: 78
End: 2018-10-02
Attending: NURSE PRACTITIONER

## 2018-10-16 ENCOUNTER — CONVERSION ENCOUNTER (OUTPATIENT)
Dept: GASTROENTEROLOGY | Facility: CLINIC | Age: 78
End: 2018-10-16

## 2018-10-16 ENCOUNTER — OFFICE VISIT CONVERTED (OUTPATIENT)
Dept: GASTROENTEROLOGY | Facility: CLINIC | Age: 78
End: 2018-10-16
Attending: NURSE PRACTITIONER

## 2018-12-13 ENCOUNTER — HOSPITAL ENCOUNTER (OUTPATIENT)
Dept: GENERAL RADIOLOGY | Facility: HOSPITAL | Age: 78
Discharge: HOME | End: 2018-12-13
Admitting: ORTHOPAEDIC SURGERY

## 2018-12-13 ENCOUNTER — APPOINTMENT (OUTPATIENT)
Dept: PREADMISSION TESTING | Facility: HOSPITAL | Age: 78
End: 2018-12-13

## 2018-12-13 VITALS
OXYGEN SATURATION: 98 % | HEIGHT: 65 IN | RESPIRATION RATE: 20 BRPM | HEART RATE: 83 BPM | BODY MASS INDEX: 33.99 KG/M2 | TEMPERATURE: 98.3 F | WEIGHT: 204 LBS

## 2018-12-13 LAB
ALBUMIN SERPL-MCNC: 4.2 G/DL (ref 3.5–5.2)
ALBUMIN/GLOB SERPL: 1.4 G/DL
ALP SERPL-CCNC: 119 U/L (ref 39–117)
ALT SERPL W P-5'-P-CCNC: 14 U/L (ref 1–33)
ANION GAP SERPL CALCULATED.3IONS-SCNC: 13.9 MMOL/L
APTT PPP: 26.4 SECONDS (ref 22.7–35.4)
AST SERPL-CCNC: 13 U/L (ref 1–32)
BACTERIA UR QL AUTO: ABNORMAL /HPF
BASOPHILS # BLD AUTO: 0.02 10*3/MM3 (ref 0–0.2)
BASOPHILS NFR BLD AUTO: 0.2 % (ref 0–1.5)
BILIRUB SERPL-MCNC: 0.4 MG/DL (ref 0.1–1.2)
BILIRUB UR QL STRIP: NEGATIVE
BUN BLD-MCNC: 22 MG/DL (ref 8–23)
BUN/CREAT SERPL: 19.5 (ref 7–25)
CALCIUM SPEC-SCNC: 9.6 MG/DL (ref 8.6–10.5)
CHLORIDE SERPL-SCNC: 100 MMOL/L (ref 98–107)
CLARITY UR: CLEAR
CO2 SERPL-SCNC: 26.1 MMOL/L (ref 22–29)
COLOR UR: YELLOW
CREAT BLD-MCNC: 1.13 MG/DL (ref 0.57–1)
DEPRECATED RDW RBC AUTO: 44.9 FL (ref 37–54)
EOSINOPHIL # BLD AUTO: 0.09 10*3/MM3 (ref 0–0.7)
EOSINOPHIL NFR BLD AUTO: 0.9 % (ref 0.3–6.2)
ERYTHROCYTE [DISTWIDTH] IN BLOOD BY AUTOMATED COUNT: 13.4 % (ref 11.7–13)
GFR SERPL CREATININE-BSD FRML MDRD: 46 ML/MIN/1.73
GLOBULIN UR ELPH-MCNC: 3.1 GM/DL
GLUCOSE BLD-MCNC: 157 MG/DL (ref 65–99)
GLUCOSE UR STRIP-MCNC: NEGATIVE MG/DL
HCT VFR BLD AUTO: 39.1 % (ref 35.6–45.5)
HGB BLD-MCNC: 13 G/DL (ref 11.9–15.5)
HGB UR QL STRIP.AUTO: NEGATIVE
HYALINE CASTS UR QL AUTO: ABNORMAL /LPF
IMM GRANULOCYTES # BLD: 0.03 10*3/MM3 (ref 0–0.03)
IMM GRANULOCYTES NFR BLD: 0.3 % (ref 0–0.5)
INR PPP: 1.04 (ref 0.9–1.1)
KETONES UR QL STRIP: NEGATIVE
LEUKOCYTE ESTERASE UR QL STRIP.AUTO: ABNORMAL
LYMPHOCYTES # BLD AUTO: 2.54 10*3/MM3 (ref 0.9–4.8)
LYMPHOCYTES NFR BLD AUTO: 24.6 % (ref 19.6–45.3)
MCH RBC QN AUTO: 30.7 PG (ref 26.9–32)
MCHC RBC AUTO-ENTMCNC: 33.2 G/DL (ref 32.4–36.3)
MCV RBC AUTO: 92.4 FL (ref 80.5–98.2)
MONOCYTES # BLD AUTO: 0.67 10*3/MM3 (ref 0.2–1.2)
MONOCYTES NFR BLD AUTO: 6.5 % (ref 5–12)
NEUTROPHILS # BLD AUTO: 7 10*3/MM3 (ref 1.9–8.1)
NEUTROPHILS NFR BLD AUTO: 67.8 % (ref 42.7–76)
NITRITE UR QL STRIP: NEGATIVE
PH UR STRIP.AUTO: 5.5 [PH] (ref 5–8)
PLATELET # BLD AUTO: 248 10*3/MM3 (ref 140–500)
PMV BLD AUTO: 11 FL (ref 6–12)
POTASSIUM BLD-SCNC: 3.9 MMOL/L (ref 3.5–5.2)
PROT SERPL-MCNC: 7.3 G/DL (ref 6–8.5)
PROT UR QL STRIP: NEGATIVE
PROTHROMBIN TIME: 13.5 SECONDS (ref 11.7–14.2)
RBC # BLD AUTO: 4.23 10*6/MM3 (ref 3.9–5.2)
RBC # UR: ABNORMAL /HPF
REF LAB TEST METHOD: ABNORMAL
SODIUM BLD-SCNC: 140 MMOL/L (ref 136–145)
SP GR UR STRIP: 1.02 (ref 1–1.03)
SQUAMOUS #/AREA URNS HPF: ABNORMAL /HPF
UROBILINOGEN UR QL STRIP: ABNORMAL
WBC NRBC COR # BLD: 10.32 10*3/MM3 (ref 4.5–10.7)
WBC UR QL AUTO: ABNORMAL /HPF

## 2018-12-13 PROCEDURE — 80053 COMPREHEN METABOLIC PANEL: CPT | Performed by: ORTHOPAEDIC SURGERY

## 2018-12-13 PROCEDURE — 36415 COLL VENOUS BLD VENIPUNCTURE: CPT

## 2018-12-13 PROCEDURE — 85730 THROMBOPLASTIN TIME PARTIAL: CPT | Performed by: ORTHOPAEDIC SURGERY

## 2018-12-13 PROCEDURE — 81001 URINALYSIS AUTO W/SCOPE: CPT | Performed by: ORTHOPAEDIC SURGERY

## 2018-12-13 PROCEDURE — 85610 PROTHROMBIN TIME: CPT | Performed by: ORTHOPAEDIC SURGERY

## 2018-12-13 PROCEDURE — 71046 X-RAY EXAM CHEST 2 VIEWS: CPT

## 2018-12-13 PROCEDURE — 85025 COMPLETE CBC W/AUTO DIFF WBC: CPT | Performed by: ORTHOPAEDIC SURGERY

## 2018-12-13 RX ORDER — NAPROXEN 500 MG/1
500 TABLET ORAL 2 TIMES DAILY WITH MEALS
COMMUNITY
End: 2019-06-19 | Stop reason: HOSPADM

## 2018-12-13 RX ORDER — BRIMONIDINE TARTRATE/TIMOLOL 0.2%-0.5%
2 DROPS OPHTHALMIC (EYE) 2 TIMES DAILY
Refills: 2 | COMMUNITY
Start: 2018-11-04 | End: 2022-06-08

## 2018-12-13 RX ORDER — TIZANIDINE 4 MG/1
4 TABLET ORAL 2 TIMES DAILY
COMMUNITY
End: 2019-03-28

## 2018-12-13 RX ORDER — SPIRONOLACTONE 25 MG/1
25 TABLET ORAL DAILY
COMMUNITY
End: 2021-06-21

## 2018-12-13 NOTE — DISCHARGE INSTRUCTIONS
Take the following medications the morning of surgery with a small sip of water:        General Instructions:  • Do not eat solid food after midnight the night before surgery.  • You may drink clear liquids day of surgery but must stop at least one hour before your hospital arrival time.  • It is beneficial for you to have a clear drink that contains carbohydrates the day of surgery.  We suggest a 12 to 20 ounce bottle of Gatorade or Powerade for non-diabetic patients or a 12 to 20 ounce bottle of G2 or Powerade Zero for diabetic patients. (Pediatric patients, are not advised to drink a 12 to 20 ounce carbohydrate drink)    Clear liquids are liquids you can see through.  Nothing red in color.     Plain water                               Sports drinks  Sodas                                   Gelatin (Jell-O)  Fruit juices without pulp such as white grape juice and apple juice  Popsicles that contain no fruit or yogurt  Tea or coffee (no cream or milk added)  Gatorade / Powerade  G2 / Powerade Zero    • Infants may have breast milk up to four hours before surgery.  • Infants drinking formula may drink formula up to six hours before surgery.   • Patients who avoid smoking, chewing tobacco and alcohol for 4 weeks prior to surgery have a reduced risk of post-operative complications.  Quit smoking as many days before surgery as you can.  • Do not smoke, use chewing tobacco or drink alcohol the day of surgery.   • If applicable bring your C-PAP/ BI-PAP machine.  • Bring any papers given to you in the doctor’s office.  • Wear clean comfortable clothes and socks.  • Do not wear contact lenses or make-up.  Bring a case for your glasses.   • Bring crutches or walker if applicable.  • Remove all piercings.  Leave jewelry and any other valuables at home.  • Hair extensions with metal clips must be removed prior to surgery.  • The Pre-Admission Testing nurse will instruct you to bring medications if unable to obtain an accurate  list in Pre-Admission Testing.        If you were given a blood bank ID arm band remember to bring it with you the day of surgery.    Preventing a Surgical Site Infection:  • For 2 to 3 days before surgery, avoid shaving with a razor because the razor can irritate skin and make it easier to develop an infection.    • Any areas of open skin can increase the risk of a post-operative wound infection by allowing bacteria to enter and travel throughout the body.  Notify your surgeon if you have any skin wounds / rashes even if it is not near the expected surgical site.  The area will need assessed to determine if surgery should be delayed until it is healed.  • The night prior to surgery sleep in a clean bed with clean clothing.  Do not allow pets to sleep with you.  • Shower on the morning of surgery using a fresh bar of anti-bacterial soap (such as Dial) and clean washcloth.  Dry with a clean towel and dress in clean clothing.  • Ask your surgeon if you will be receiving antibiotics prior to surgery.  • Make sure you, your family, and all healthcare providers clean their hands with soap and water or an alcohol based hand  before caring for you or your wound.    Day of surgery:12/21/18   0530  Upon arrival, a Pre-op nurse and Anesthesiologist will review your health history, obtain vital signs, and answer questions you may have.  The only belongings needed at this time will be your home medications and if applicable your C-PAP/BI-PAP machine.  If you are staying overnight your family can leave the rest of your belongings in the car and bring them to your room later.  A Pre-op nurse will start an IV and you may receive medication in preparation for surgery, including something to help you relax.  Your family will be able to see you in the Pre-op area.  While you are in surgery your family should notify the waiting room  if they leave the waiting room area and provide a contact phone number.    Please  be aware that surgery does come with discomfort.  We want to make every effort to control your discomfort so please discuss any uncontrolled symptoms with your nurse.   Your doctor will most likely have prescribed pain medications.      If you are going home after surgery you will receive individualized written care instructions before being discharged.  A responsible adult must drive you to and from the hospital on the day of your surgery and stay with you for 24 hours.    If you are staying overnight following surgery, you will be transported to your hospital room following the recovery period.  [facility] has all private rooms.    You have received a list of surgical assistants for your reference.  If you have any questions please call Pre-Admission Testing at [phone].  Deductibles and co-payments are collected on the day of service. Please be prepared to pay the required co-pay, deductible or deposit on the day of service as defined by your plan.

## 2018-12-21 ENCOUNTER — APPOINTMENT (OUTPATIENT)
Dept: GENERAL RADIOLOGY | Facility: HOSPITAL | Age: 78
End: 2018-12-21

## 2018-12-21 ENCOUNTER — ANESTHESIA EVENT (OUTPATIENT)
Dept: PERIOP | Facility: HOSPITAL | Age: 78
End: 2018-12-21

## 2018-12-21 ENCOUNTER — HOSPITAL ENCOUNTER (OUTPATIENT)
Facility: HOSPITAL | Age: 78
Setting detail: HOSPITAL OUTPATIENT SURGERY
Discharge: HOME | End: 2018-12-21
Attending: ORTHOPAEDIC SURGERY | Admitting: ORTHOPAEDIC SURGERY

## 2018-12-21 ENCOUNTER — ANESTHESIA (OUTPATIENT)
Dept: PERIOP | Facility: HOSPITAL | Age: 78
End: 2018-12-21

## 2018-12-21 VITALS
WEIGHT: 204.81 LBS | BODY MASS INDEX: 34.12 KG/M2 | SYSTOLIC BLOOD PRESSURE: 131 MMHG | DIASTOLIC BLOOD PRESSURE: 73 MMHG | OXYGEN SATURATION: 99 % | HEIGHT: 65 IN | HEART RATE: 62 BPM | TEMPERATURE: 97.5 F | RESPIRATION RATE: 16 BRPM

## 2018-12-21 LAB
ABO GROUP BLD: NORMAL
BLD GP AB SCN SERPL QL: NEGATIVE
GLUCOSE BLDC GLUCOMTR-MCNC: 111 MG/DL (ref 70–130)
RH BLD: POSITIVE
T&S EXPIRATION DATE: NORMAL

## 2018-12-21 PROCEDURE — 25010000002 DEXAMETHASONE PER 1 MG: Performed by: ANESTHESIOLOGY

## 2018-12-21 PROCEDURE — 86901 BLOOD TYPING SEROLOGIC RH(D): CPT | Performed by: ORTHOPAEDIC SURGERY

## 2018-12-21 PROCEDURE — 86900 BLOOD TYPING SEROLOGIC ABO: CPT | Performed by: ORTHOPAEDIC SURGERY

## 2018-12-21 PROCEDURE — 72020 X-RAY EXAM OF SPINE 1 VIEW: CPT

## 2018-12-21 PROCEDURE — 25010000002 ONDANSETRON PER 1 MG: Performed by: ANESTHESIOLOGY

## 2018-12-21 PROCEDURE — 25010000002 SUCCINYLCHOLINE PER 20 MG: Performed by: ANESTHESIOLOGY

## 2018-12-21 PROCEDURE — 25010000002 PHENYLEPHRINE PER 1 ML: Performed by: ANESTHESIOLOGY

## 2018-12-21 PROCEDURE — 25010000002 NEOSTIGMINE PER 0.5 MG: Performed by: ANESTHESIOLOGY

## 2018-12-21 PROCEDURE — 76000 FLUOROSCOPY <1 HR PHYS/QHP: CPT

## 2018-12-21 PROCEDURE — 93005 ELECTROCARDIOGRAM TRACING: CPT | Performed by: ORTHOPAEDIC SURGERY

## 2018-12-21 PROCEDURE — 93010 ELECTROCARDIOGRAM REPORT: CPT | Performed by: INTERNAL MEDICINE

## 2018-12-21 PROCEDURE — 25010000002 PROPOFOL 10 MG/ML EMULSION: Performed by: ANESTHESIOLOGY

## 2018-12-21 PROCEDURE — 82962 GLUCOSE BLOOD TEST: CPT

## 2018-12-21 PROCEDURE — 25010000002 HYDROMORPHONE PER 4 MG: Performed by: ANESTHESIOLOGY

## 2018-12-21 PROCEDURE — 25010000002 MIDAZOLAM PER 1 MG: Performed by: ANESTHESIOLOGY

## 2018-12-21 PROCEDURE — 86850 RBC ANTIBODY SCREEN: CPT | Performed by: ORTHOPAEDIC SURGERY

## 2018-12-21 PROCEDURE — 25010000002 FENTANYL CITRATE (PF) 100 MCG/2ML SOLUTION: Performed by: ANESTHESIOLOGY

## 2018-12-21 DEVICE — KT HEMOST ABS SURGIFOAM PWDR 1GRAM: Type: IMPLANTABLE DEVICE | Site: SPINE LUMBAR | Status: FUNCTIONAL

## 2018-12-21 DEVICE — WAX BONE HEMO NAT 2.5G: Type: IMPLANTABLE DEVICE | Site: SPINE LUMBAR | Status: FUNCTIONAL

## 2018-12-21 RX ORDER — PROMETHAZINE HYDROCHLORIDE 25 MG/ML
12.5 INJECTION, SOLUTION INTRAMUSCULAR; INTRAVENOUS ONCE AS NEEDED
Status: DISCONTINUED | OUTPATIENT
Start: 2018-12-21 | End: 2018-12-21 | Stop reason: HOSPADM

## 2018-12-21 RX ORDER — HYDROMORPHONE HYDROCHLORIDE 1 MG/ML
0.5 INJECTION, SOLUTION INTRAMUSCULAR; INTRAVENOUS; SUBCUTANEOUS
Status: DISCONTINUED | OUTPATIENT
Start: 2018-12-21 | End: 2018-12-21 | Stop reason: HOSPADM

## 2018-12-21 RX ORDER — HYDRALAZINE HYDROCHLORIDE 20 MG/ML
5 INJECTION INTRAMUSCULAR; INTRAVENOUS
Status: DISCONTINUED | OUTPATIENT
Start: 2018-12-21 | End: 2018-12-21 | Stop reason: HOSPADM

## 2018-12-21 RX ORDER — SODIUM CHLORIDE, SODIUM LACTATE, POTASSIUM CHLORIDE, CALCIUM CHLORIDE 600; 310; 30; 20 MG/100ML; MG/100ML; MG/100ML; MG/100ML
9 INJECTION, SOLUTION INTRAVENOUS CONTINUOUS
Status: DISCONTINUED | OUTPATIENT
Start: 2018-12-21 | End: 2018-12-21 | Stop reason: HOSPADM

## 2018-12-21 RX ORDER — FLUMAZENIL 0.1 MG/ML
0.2 INJECTION INTRAVENOUS AS NEEDED
Status: DISCONTINUED | OUTPATIENT
Start: 2018-12-21 | End: 2018-12-21 | Stop reason: HOSPADM

## 2018-12-21 RX ORDER — LABETALOL HYDROCHLORIDE 5 MG/ML
5 INJECTION, SOLUTION INTRAVENOUS
Status: DISCONTINUED | OUTPATIENT
Start: 2018-12-21 | End: 2018-12-21 | Stop reason: HOSPADM

## 2018-12-21 RX ORDER — MIDAZOLAM HYDROCHLORIDE 1 MG/ML
1 INJECTION INTRAMUSCULAR; INTRAVENOUS
Status: DISCONTINUED | OUTPATIENT
Start: 2018-12-21 | End: 2018-12-21 | Stop reason: HOSPADM

## 2018-12-21 RX ORDER — SODIUM CHLORIDE 0.9 % (FLUSH) 0.9 %
3-10 SYRINGE (ML) INJECTION AS NEEDED
Status: DISCONTINUED | OUTPATIENT
Start: 2018-12-21 | End: 2018-12-21 | Stop reason: HOSPADM

## 2018-12-21 RX ORDER — SUCCINYLCHOLINE CHLORIDE 20 MG/ML
INJECTION INTRAMUSCULAR; INTRAVENOUS AS NEEDED
Status: DISCONTINUED | OUTPATIENT
Start: 2018-12-21 | End: 2018-12-21 | Stop reason: SURG

## 2018-12-21 RX ORDER — PROMETHAZINE HYDROCHLORIDE 25 MG/1
25 SUPPOSITORY RECTAL ONCE AS NEEDED
Status: DISCONTINUED | OUTPATIENT
Start: 2018-12-21 | End: 2018-12-21 | Stop reason: HOSPADM

## 2018-12-21 RX ORDER — PROPOFOL 10 MG/ML
VIAL (ML) INTRAVENOUS AS NEEDED
Status: DISCONTINUED | OUTPATIENT
Start: 2018-12-21 | End: 2018-12-21 | Stop reason: SURG

## 2018-12-21 RX ORDER — ACETAMINOPHEN 325 MG/1
650 TABLET ORAL ONCE AS NEEDED
Status: DISCONTINUED | OUTPATIENT
Start: 2018-12-21 | End: 2018-12-21 | Stop reason: HOSPADM

## 2018-12-21 RX ORDER — CEFAZOLIN SODIUM 2 G/100ML
2 INJECTION, SOLUTION INTRAVENOUS ONCE
Status: DISCONTINUED | OUTPATIENT
Start: 2018-12-21 | End: 2018-12-21 | Stop reason: HOSPADM

## 2018-12-21 RX ORDER — DEXAMETHASONE SODIUM PHOSPHATE 10 MG/ML
INJECTION INTRAMUSCULAR; INTRAVENOUS AS NEEDED
Status: DISCONTINUED | OUTPATIENT
Start: 2018-12-21 | End: 2018-12-21 | Stop reason: SURG

## 2018-12-21 RX ORDER — HYDROCODONE BITARTRATE AND ACETAMINOPHEN 7.5; 325 MG/1; MG/1
1 TABLET ORAL EVERY 4 HOURS PRN
Qty: 45 TABLET | Refills: 0 | Status: SHIPPED | OUTPATIENT
Start: 2018-12-21 | End: 2019-03-28

## 2018-12-21 RX ORDER — EPHEDRINE SULFATE 50 MG/ML
INJECTION, SOLUTION INTRAVENOUS AS NEEDED
Status: DISCONTINUED | OUTPATIENT
Start: 2018-12-21 | End: 2018-12-21 | Stop reason: SURG

## 2018-12-21 RX ORDER — DIPHENHYDRAMINE HCL 25 MG
25 CAPSULE ORAL
Status: DISCONTINUED | OUTPATIENT
Start: 2018-12-21 | End: 2018-12-21 | Stop reason: HOSPADM

## 2018-12-21 RX ORDER — ONDANSETRON 2 MG/ML
4 INJECTION INTRAMUSCULAR; INTRAVENOUS ONCE AS NEEDED
Status: DISCONTINUED | OUTPATIENT
Start: 2018-12-21 | End: 2018-12-21 | Stop reason: HOSPADM

## 2018-12-21 RX ORDER — NALOXONE HCL 0.4 MG/ML
0.2 VIAL (ML) INJECTION AS NEEDED
Status: DISCONTINUED | OUTPATIENT
Start: 2018-12-21 | End: 2018-12-21 | Stop reason: HOSPADM

## 2018-12-21 RX ORDER — HYDROCODONE BITARTRATE AND ACETAMINOPHEN 5; 325 MG/1; MG/1
1 TABLET ORAL EVERY 6 HOURS PRN
COMMUNITY
End: 2018-12-21 | Stop reason: HOSPADM

## 2018-12-21 RX ORDER — FENTANYL CITRATE 50 UG/ML
50 INJECTION, SOLUTION INTRAMUSCULAR; INTRAVENOUS
Status: DISCONTINUED | OUTPATIENT
Start: 2018-12-21 | End: 2018-12-21 | Stop reason: HOSPADM

## 2018-12-21 RX ORDER — DIPHENHYDRAMINE HYDROCHLORIDE 50 MG/ML
12.5 INJECTION INTRAMUSCULAR; INTRAVENOUS
Status: DISCONTINUED | OUTPATIENT
Start: 2018-12-21 | End: 2018-12-21 | Stop reason: HOSPADM

## 2018-12-21 RX ORDER — ROCURONIUM BROMIDE 10 MG/ML
INJECTION, SOLUTION INTRAVENOUS AS NEEDED
Status: DISCONTINUED | OUTPATIENT
Start: 2018-12-21 | End: 2018-12-21 | Stop reason: SURG

## 2018-12-21 RX ORDER — LIDOCAINE HYDROCHLORIDE 20 MG/ML
INJECTION, SOLUTION INFILTRATION; PERINEURAL AS NEEDED
Status: DISCONTINUED | OUTPATIENT
Start: 2018-12-21 | End: 2018-12-21 | Stop reason: SURG

## 2018-12-21 RX ORDER — BUPIVACAINE HYDROCHLORIDE AND EPINEPHRINE 5; 5 MG/ML; UG/ML
INJECTION, SOLUTION PERINEURAL AS NEEDED
Status: DISCONTINUED | OUTPATIENT
Start: 2018-12-21 | End: 2018-12-21 | Stop reason: HOSPADM

## 2018-12-21 RX ORDER — SODIUM CHLORIDE 0.9 % (FLUSH) 0.9 %
1-10 SYRINGE (ML) INJECTION AS NEEDED
Status: DISCONTINUED | OUTPATIENT
Start: 2018-12-21 | End: 2018-12-21 | Stop reason: HOSPADM

## 2018-12-21 RX ORDER — FAMOTIDINE 10 MG/ML
20 INJECTION, SOLUTION INTRAVENOUS ONCE
Status: COMPLETED | OUTPATIENT
Start: 2018-12-21 | End: 2018-12-21

## 2018-12-21 RX ORDER — SODIUM CHLORIDE 0.9 % (FLUSH) 0.9 %
3 SYRINGE (ML) INJECTION EVERY 12 HOURS SCHEDULED
Status: DISCONTINUED | OUTPATIENT
Start: 2018-12-21 | End: 2018-12-21 | Stop reason: HOSPADM

## 2018-12-21 RX ORDER — HYDROCODONE BITARTRATE AND ACETAMINOPHEN 7.5; 325 MG/1; MG/1
1 TABLET ORAL ONCE AS NEEDED
Status: COMPLETED | OUTPATIENT
Start: 2018-12-21 | End: 2018-12-21

## 2018-12-21 RX ORDER — OXYCODONE AND ACETAMINOPHEN 7.5; 325 MG/1; MG/1
1 TABLET ORAL ONCE AS NEEDED
Status: DISCONTINUED | OUTPATIENT
Start: 2018-12-21 | End: 2018-12-21 | Stop reason: HOSPADM

## 2018-12-21 RX ORDER — GLYCOPYRROLATE 0.2 MG/ML
INJECTION INTRAMUSCULAR; INTRAVENOUS AS NEEDED
Status: DISCONTINUED | OUTPATIENT
Start: 2018-12-21 | End: 2018-12-21 | Stop reason: SURG

## 2018-12-21 RX ORDER — ONDANSETRON 2 MG/ML
INJECTION INTRAMUSCULAR; INTRAVENOUS AS NEEDED
Status: DISCONTINUED | OUTPATIENT
Start: 2018-12-21 | End: 2018-12-21 | Stop reason: SURG

## 2018-12-21 RX ORDER — EPHEDRINE SULFATE 50 MG/ML
5 INJECTION, SOLUTION INTRAVENOUS ONCE AS NEEDED
Status: DISCONTINUED | OUTPATIENT
Start: 2018-12-21 | End: 2018-12-21 | Stop reason: HOSPADM

## 2018-12-21 RX ORDER — MIDAZOLAM HYDROCHLORIDE 1 MG/ML
2 INJECTION INTRAMUSCULAR; INTRAVENOUS
Status: DISCONTINUED | OUTPATIENT
Start: 2018-12-21 | End: 2018-12-21 | Stop reason: HOSPADM

## 2018-12-21 RX ORDER — PROMETHAZINE HYDROCHLORIDE 25 MG/1
25 TABLET ORAL ONCE AS NEEDED
Status: DISCONTINUED | OUTPATIENT
Start: 2018-12-21 | End: 2018-12-21 | Stop reason: HOSPADM

## 2018-12-21 RX ORDER — LIDOCAINE HYDROCHLORIDE 10 MG/ML
0.5 INJECTION, SOLUTION EPIDURAL; INFILTRATION; INTRACAUDAL; PERINEURAL ONCE AS NEEDED
Status: DISCONTINUED | OUTPATIENT
Start: 2018-12-21 | End: 2018-12-21 | Stop reason: HOSPADM

## 2018-12-21 RX ADMIN — FENTANYL CITRATE 100 MCG: 50 INJECTION INTRAMUSCULAR; INTRAVENOUS at 07:09

## 2018-12-21 RX ADMIN — GLYCOPYRROLATE 0.6 MG: 0.2 INJECTION INTRAMUSCULAR; INTRAVENOUS at 08:30

## 2018-12-21 RX ADMIN — DEXAMETHASONE SODIUM PHOSPHATE 8 MG: 10 INJECTION INTRAMUSCULAR; INTRAVENOUS at 07:09

## 2018-12-21 RX ADMIN — MIDAZOLAM HYDROCHLORIDE 1 MG: 2 INJECTION, SOLUTION INTRAMUSCULAR; INTRAVENOUS at 06:55

## 2018-12-21 RX ADMIN — NEOSTIGMINE METHYLSULFATE 3 MG: 1 INJECTION INTRAMUSCULAR; INTRAVENOUS; SUBCUTANEOUS at 08:30

## 2018-12-21 RX ADMIN — PROPOFOL 200 MG: 10 INJECTION, EMULSION INTRAVENOUS at 07:09

## 2018-12-21 RX ADMIN — FENTANYL CITRATE 50 MCG: 50 INJECTION, SOLUTION INTRAMUSCULAR; INTRAVENOUS at 09:38

## 2018-12-21 RX ADMIN — ROCURONIUM BROMIDE 20 MG: 10 INJECTION INTRAVENOUS at 07:25

## 2018-12-21 RX ADMIN — SUCCINYLCHOLINE CHLORIDE 100 MG: 20 INJECTION, SOLUTION INTRAMUSCULAR; INTRAVENOUS; PARENTERAL at 07:09

## 2018-12-21 RX ADMIN — SODIUM CHLORIDE, POTASSIUM CHLORIDE, SODIUM LACTATE AND CALCIUM CHLORIDE 9 ML/HR: 600; 310; 30; 20 INJECTION, SOLUTION INTRAVENOUS at 06:55

## 2018-12-21 RX ADMIN — EPHEDRINE SULFATE 10 MG: 50 INJECTION INTRAMUSCULAR; INTRAVENOUS; SUBCUTANEOUS at 07:33

## 2018-12-21 RX ADMIN — PHENYLEPHRINE HYDROCHLORIDE 200 MCG: 10 INJECTION INTRAVENOUS at 07:37

## 2018-12-21 RX ADMIN — FENTANYL CITRATE 50 MCG: 50 INJECTION, SOLUTION INTRAMUSCULAR; INTRAVENOUS at 09:53

## 2018-12-21 RX ADMIN — FAMOTIDINE 20 MG: 10 INJECTION, SOLUTION INTRAVENOUS at 06:55

## 2018-12-21 RX ADMIN — HYDROCODONE BITARTRATE AND ACETAMINOPHEN 1 TABLET: 7.5; 325 TABLET ORAL at 10:19

## 2018-12-21 RX ADMIN — LIDOCAINE HYDROCHLORIDE 50 MG: 20 INJECTION, SOLUTION INFILTRATION; PERINEURAL at 07:09

## 2018-12-21 RX ADMIN — ONDANSETRON 4 MG: 2 INJECTION INTRAMUSCULAR; INTRAVENOUS at 07:49

## 2018-12-21 RX ADMIN — HYDROMORPHONE HYDROCHLORIDE 0.5 MG: 1 INJECTION, SOLUTION INTRAMUSCULAR; INTRAVENOUS; SUBCUTANEOUS at 10:59

## 2018-12-21 RX ADMIN — SODIUM CHLORIDE, POTASSIUM CHLORIDE, SODIUM LACTATE AND CALCIUM CHLORIDE: 600; 310; 30; 20 INJECTION, SOLUTION INTRAVENOUS at 07:09

## 2018-12-21 NOTE — ANESTHESIA POSTPROCEDURE EVALUATION
"Patient: Lilia Becerra    Procedure Summary     Date:  12/21/18 Room / Location:  [facility] OR  / [facility] MAIN OR    Anesthesia Start:  0702 Anesthesia Stop:  0845    Procedure:  LEFT L4-5 MICRO DISCECTOMY (Left Spine Lumbar) Diagnosis:      Surgeon:  Adam Coleman DO Provider:  Franky Moore MD    Anesthesia Type:  general ASA Status:  3          Anesthesia Type: general  Last vitals  BP   148/85 (12/21/18 1135)   Temp   36.4 °C (97.5 °F) (12/21/18 0841)   Pulse   57 (12/21/18 1135)   Resp   16 (12/21/18 1135)     SpO2   99 % (12/21/18 1135)     Post Anesthesia Care and Evaluation    Patient location during evaluation: bedside  Patient participation: complete - patient participated  Level of consciousness: sleepy but conscious  Pain score: 0  Pain management: adequate  Airway patency: patent  Anesthetic complications: No anesthetic complications    Cardiovascular status: acceptable  Respiratory status: acceptable  Hydration status: acceptable    Comments: /85 (BP Location: Left arm, Patient Position: Lying)   Pulse 57   Temp 36.4 °C (97.5 °F) (Oral)   Resp 16   Ht 165.1 cm (65\")   Wt 92.9 kg (204 lb 12.9 oz)   SpO2 99%   BMI 34.08 kg/m²         "

## 2018-12-21 NOTE — ANESTHESIA PROCEDURE NOTES
Airway  Urgency: elective    Date/Time: 12/21/2018 7:16 AM  Airway not difficult    General Information and Staff    Patient location during procedure: OR  Anesthesiologist: Franky Moore MD    Indications and Patient Condition  Indications for airway management: airway protection    Preoxygenated: yes      Final Airway Details  Final airway type: endotracheal airway      Successful airway: ETT and reinforced tube  Cuffed: yes   Successful intubation technique: direct laryngoscopy  Endotracheal tube insertion site: oral  Blade: Robinson  Blade size: 3  ETT size (mm): 7.0  Cormack-Lehane Classification: grade I - full view of glottis  Placement verified by: chest auscultation and capnometry   Measured from: lips  ETT to lips (cm): 21  Number of attempts at approach: 1    Additional Comments  Atraumatic rafael # 3mac 7.0 baron x1 w ease ebe ebs tube sec vent cont eyes taped

## 2018-12-21 NOTE — H&P
Patient Care Team:  Leo Lomas MD as PCP - General  Leo Lomas MD as PCP - Family Medicine  Reagan Strong MD (Cardiology)    Chief complaint back and left leg pain    Subjective     Patient is a 77 y.o. female presents with left leg pain s/p multiple lumbar surgeries. Onset of symptoms was several months ago and her pain has been refractory to conservative care.       Review of Systems   Pertinent items are noted in HPI    History  Past Medical History:   Diagnosis Date   • Acid reflux disease    • Arthritis    • Atrial fibrillation (CMS/HCC)    • Back pain    • Depression    • Diabetes mellitus (CMS/HCC)    • Disease of thyroid gland    • Dyslipidemia    • Family history of atrial fibrillation    • Glaucoma     left eye   • Hearing impaired     hearing aides   • Hiatal hernia    • High cholesterol    • History of transfusion    • Hypertension    • Kienbock's disease    • Knee pain    • Osteoporosis    • PONV (postoperative nausea and vomiting)    • RLS (restless legs syndrome)    • Sleep apnea     machine    • Tailbone injury     fracture   • Urinary incontinence     wears pads     Past Surgical History:   Procedure Laterality Date   • CATARACT EXTRACTION Bilateral    • COLONOSCOPY     • HEMORRHOIDECTOMY     • HYSTERECTOMY     • INCISION AND DRAINAGE OF WOUND      on back    • KNEE ARTHROPLASTY Right    • LUMBAR FUSION     • TOTAL KNEE ARTHROPLASTY REVISION Left     x2   • TOTAL KNEE ARTHROPLASTY REVISION Left 2/13/2018    Procedure: LEFT TOTAL KNEE ARTHROPLASTY REVISION;  Surgeon: Jair Fajardo MD;  [facility];  Service:    • VERTEBROPLASTY       Family History   Problem Relation Age of Onset   • Malig Hyperthermia Neg Hx      Social History     Tobacco Use   • Smoking status: Never Smoker   • Smokeless tobacco: Never Used   Substance Use Topics   • Alcohol use: No   • Drug use: Yes     Types: Hydrocodone     Medications Prior to Admission   Medication Sig  Dispense Refill Last Dose   • atorvastatin (LIPITOR) 10 MG tablet Take 10 mg by mouth Every Night.   12/20/2018 at 2000   • Calcium Carb-Cholecalciferol (CALCIUM 600 + D PO) Take 1 tablet by mouth 2 (Two) Times a Day.   12/20/2018 at 2000   • COMBIGAN 0.2-0.5 % ophthalmic solution Administer 2 drops to both eyes 2 (Two) Times a Day.  2 12/21/2018 at 0330   • Ferrous Gluconate (IRON 27 PO) Take 1 tablet by mouth 2 (Two) Times a Day.   Past Week at Unknown time   • HYDROcodone-acetaminophen (NORCO) 5-325 MG per tablet Take 1 tablet by mouth Every 6 (Six) Hours As Needed.   12/20/2018 at 1500   • levothyroxine (SYNTHROID, LEVOTHROID) 25 MCG tablet Take 25 mcg by mouth Daily.   12/20/2018 at 0800   • losartan (COZAAR) 25 MG tablet Take 25 mg by mouth Daily.   12/21/2018 at 0330   • metFORMIN (GLUCOPHAGE) 500 MG tablet Take 500 mg by mouth 2 (Two) Times a Day With Meals.   12/20/2018 at 2000   • metoprolol succinate XL (TOPROL-XL) 50 MG 24 hr tablet Take 50 mg by mouth Every Night.   12/20/2018 at 2000   • Mirabegron ER (MYRBETRIQ) 25 MG tablet sustained-release 24 hour 24 hr tablet Take 50 mg by mouth Every Morning.   12/20/2018 at 0800   • naproxen (NAPROSYN) 500 MG tablet Take 500 mg by mouth 2 (Two) Times a Day With Meals.   Past Week at Unknown time   • omeprazole (priLOSEC) 20 MG capsule Take 20 mg by mouth Daily.   12/21/2018 at 0330   • pramipexole (MIRAPEX) 1.5 MG tablet Take 1.5 mg by mouth Every Night.   12/20/2018 at 2000   • solifenacin (VESICARE) 5 MG tablet Take 5 mg by mouth Every Night.   12/20/2018 at 2000   • spironolactone (ALDACTONE) 25 MG tablet Take 25 mg by mouth Daily. mon - sat   12/20/2018 at 0800   • tiZANidine (ZANAFLEX) 4 MG tablet Take 4 mg by mouth 2 (Two) Times a Day.   12/20/2018 at 1500   • VENLAFAXINE HCL ER PO Take 150 mg by mouth Every Night.   12/20/2018 at 2000   • vitamin C (ASCORBIC ACID) 500 MG tablet Take 500 mg by mouth Daily.   Past Week at Unknown time   • acetaminophen  (TYLENOL) 650 MG 8 hr tablet Take 650 mg by mouth Every 8 (Eight) Hours As Needed for Mild Pain .   Unknown at Unknown time   • aspirin 81 MG tablet Take 81 mg by mouth Daily.   12/1/2018     Allergies:  Tetanus toxoids    Objective     Vital Signs  Temp:  [98.6 °F (37 °C)] 98.6 °F (37 °C)  Heart Rate:  [66] 66  Resp:  [18] 18  BP: (116)/(61) 116/61    Physical Exam:    General Appearance: alert, appears stated age and cooperative  Head: normocephalic, without obvious abnormality and atraumatic  Back: decreased ROM, nontender to palpation, multiple mature incisional scars  Lungs: respirations regular, respirations even and respirations unlabored  Extremities: moves extremities well, no edema, no cyanosis and no redness  Pulses: Pulses palpable and equal bilaterally  Skin: no bleeding, bruising or rash  Neurologic: Mental Status orientated to person, place, time and situation, Sensory intact, Motor strength is equal in upper and lower extremities  Psych: normal                                                            Results Review:    None    Assessment/Plan       * No active hospital problems. *      Left L4-L5 microdiscectomy    I discussed the patients findings and my recommendations with patient and nursing staff.     Adam Coleman DO  12/21/18  6:42 AM

## 2018-12-21 NOTE — ANESTHESIA PREPROCEDURE EVALUATION
Anesthesia Evaluation                  Airway   Mallampati: II  TM distance: >3 FB  Neck ROM: full  No difficulty expected  Dental    (+) implants    Pulmonary     breath sounds clear to auscultation  Cardiovascular   Exercise tolerance: good (4-7 METS)    Rhythm: regular  Rate: normal        Neuro/Psych  GI/Hepatic/Renal/Endo      Musculoskeletal     Abdominal    Substance History      OB/GYN          Other                        Anesthesia Plan    ASA 3     general     intravenous induction   Anesthetic plan, all risks, benefits, and alternatives have been provided, discussed and informed consent has been obtained with: patient.

## 2018-12-21 NOTE — DISCHARGE INSTRUCTIONS
"Dr. Adam Coleman   [phone]       POST-OP INSTRUCTIONS: Microdiscectomy and Lumbar Laminectomy     YOU MAY RESUME ASPIRIN AND NAPROXEN (ALEVE) ON 12/24/18.     What are my limitations after surgery?   \" No lifting over 10lbs, pushing, pulling, or twisting for the first 6 weeks. It is ok to put on your socks and shoes.   \" Walking after surgery helps speed recovery and also helps to prevent post-operative blood clots.  You are encouraged to walk daily and increase distance as tolerated.  Activity will be gradually increased after 6 weeks. This will be discussed in detail at your follow up appointment.  Avoid running, jogging, or bicycle riding until instructed to do so. It is not unusual to have intermittent pain or numbness in the affected leg after surgery and with increased activity. Do not be alarmed.   \" Wear your back brace during waking hours during the first 6 weeks. You may take it off to shower and sleep.     \" You may drive after 1 week. Do not drive while on narcotic pain medication.       Wound care   \" You may remove your dressing after 48 hours. If you still have drainage after 48 hours, apply a clean dressing as needed. Keep the incision site clean and dry.   \" Your incision is closed with medical glue and internal sutures.  Unless you are told otherwise, there are no stitches to be removed.  Do not scrub the incision site or pick at the glue.  The glue helps to hold your incision closed as your body heals. It also helps to prevent infection.   \" Showering is permitted after the first 48 hours as long as the wound is covered and kept dry.  Do not get the incision site wet until it is completely scabbed over and no longer draining.   \" Do not apply any lotions or ointments to the wound until it is completely healed.   \" Do not submerge the incision site in a bathtub or pool until it is completely healed and free of scab.       Pain Control, Medication and Refills   \" Reduce pain and swelling by " "applying ice to your back for 15 minutes at a time. You can do this several times a day for the first few days after surgery.   \" You will be given prescriptions for pain medication at the time of discharge.   \" As the pain improves, you may try taking over the counter acetaminophen (Tylenol) or anti-inflammatory drugs (ibuprofen/Advil or naproxen/Aleve) for pain instead of narcotics. Most narcotics also contain Tylenol, so be careful if you are combining these drugs with other Tylenol containing products. Do not exceed the recommended daily dose of Tylenol.   \" Call the office for prescription refills. PLEASE GIVE 48 HOURS NOTICE FOR ALL REFILL REQUESTS. ALSO NOTE WE DO NOT TAKE PRESCRIPTION CALLS ON WEEKENDS OR HOLIDAYS.   \" Narcotics and inactivity can cause constipation. Drink plenty of fluids and eat a high fiber diet. You may need to take an over-the-counter stool softener or laxative.         When is my next office appointment?   \" You should have your first post-op visit in 2 weeks.   \" Your office visit will be at the [facility] at [address] [place] [address]       Who do I call if I have problems after surgery?   Please call our office at [phone] if you develop any of the following:     o Fever (over 101.5°), chills, or sweats   o New pain or weakness that began after you left the hospital   o New bowel or bladder difficulty   o Excessive swelling around your incision, excessive drainage or pus coming from the incision site   o Difficulty breathing or swallowing.   o New pain or swelling in the calves.     Office hours: Monday - Friday 8:00 a.m. - 4:30 p.m.     In case of an emergency after business hours, please contact me at the phone number given to you at the time of discharge or go to the nearest emergency room.  "

## 2018-12-21 NOTE — BRIEF OP NOTE
LUMBAR DISCECTOMY POSTERIOR WITH METRIX  Progress Note    Lilia FERMIN Becerra  12/21/2018    Pre-op Diagnosis:   Lumbar disc herniation left L4-5       Post-Op Diagnosis Codes:   same    Procedure/CPT® Codes:      Procedure(s):  LEFT L4-5 MICRO DISCECTOMY    Surgeon(s):  Adam Coleman DO    Anesthesia: General    Staff:   Circulator: Ivy Robison RN  Radiology Technologist: Yadira Stevenson RRT  Scrub Person: Jorge Martins  Assistant: Ban Toro PA    Estimated Blood Loss: minimal    Urine Voided: * No values recorded between 12/21/2018  7:02 AM and 12/21/2018  8:28 AM *    Specimens:                None      Drains:      Findings: large herniation    Complications: None apparent        Adam Coleman DO     Date: 12/21/2018  Time: 8:32 AM

## 2018-12-21 NOTE — OP NOTE
PREOPERATIVE DIAGNOSIS:  1.  Lumbar disc herniation, left L4-L5   POSTOPERATIVE DIAGNOSIS: Same  PROCEDURES PERFORMED:   1. Microdiskectomy, left L4-L5 80515  SURGEON: Adam Coleman DO   ASSISTANT: MERRICK Bruner    Please note as part of the procedure I utilized services and assistant, specifically, Ban Toro PA-C.  Ban Toro participated in crucial portions the operation.  The use of Ban Toro greatly reduced overall operative time thereby reducing overall morbidity for the patient.  ANESTHESIA: General.   ESTIMATED BLOOD LOSS: Less than 25 mL.   SPECIMENS:   Order Name Source Comment Collection Info Order Time   TYPE AND SCREEN   Collected By: Miryam Rae RN 12/21/2018  6:50 AM     COMPLICATIONS: None apparent.   DISPOSITION: Patient to recovery room in stable condition.   INDICATIONS: The patient is a 77 y.o.   who has been having left leg and back  pain.  The patient had tried conservative treatment with  continued pain. Based on failure of conservative treatment, I  recommended microdiskectomy. The risks of surgery were explained to the patient  including, but not limited to bleeding, infection, risk of anesthesia, blood  clots, pulmonary embolus, myocardial infarction, stroke, nerve root damage  causing complete or partial paralysis, dural tear causing spinal headache, risk  of recurrent disk herniation, need for further surgery, lack of guarantee,  continued pain, and continued numbness.  The patient had many questions about these  risks, all of which were answered to the best of my ability in a language which  he could understand. Informed consent obtained. The patient presents today for  Microdiskectomy left L4-L5   .   DESCRIPTION OF PROCEDURE: After identifying the patient in the preoperative  holding area, all labs and consents were found to be in order. HA hose and  SCDs were applied for thromboembolic prophylaxis. The back was marked with an  indelible marker and he  was transferred to the operative suite. In the OR, the patient  was given the benefit of general anesthesia and carefully positioned prone on  the Joe table. All bony prominences padded.  The patient's back was then prepped and  draped in the usual sterile fashion. Weight-based Kefzol was given for preoperative antibiotic prophylaxis.  Next, time-out was performed. This was  followed by needle localization of the L4-L5 level. This was marked on the Ioban. I then  infiltrated the skin with 0.5% Marcaine with epinephrine and then performed an  incision with a #10 blade. I then dissected with the Bovie to the fascia which  I incised in line with the incision. I then placed METRx dilators and placed a METRx tube over the lamina, affixed it to the table. I then placed a Morgan probe under the lamina, confirmed level at L4-L5 then performed a  small laminotomy with a Kerrison punch. I released the yellow ligament and  excised this with a Kerrison punch. I then dissected around the L5 nerve root  by performing partial facetectomy. I then retracted the L5   nerve root medially,  exposing the disk herniation. I had to coagulate some epidural bleeders in the  area. I then incised the annulus of the disk with a #11 blade and then removed  a piece of the annulus with the Pituitary. I then placed a ball-tipped probe  into the disk space to express the disk herniation. A large piece measuring  approximately 2 cm cm was removed.  I made several passes into the disk space to make sure there  were no further disk fragments. I could not appreciate any. The nerve root  looked completely decompressed. I then irrigated out the wound and achieved  hemostasis. I removed the retractor and removed the operative microscope from  the field. Please note that the operative microscope was used from the time of  laminotomy to the time of closure for microsurgical technique and dissection.  The wound was closed in layered fashion. Sterile  dressings were applied. The  patient was awakened from general anesthesia, transferred to the recovery room  in stable condition.   DISPOSITION: The patient will be discharged home when she meets criteria.  The patient  will be given pain medication, discharge instructions, and a follow up in 2  weeks.

## 2019-01-07 ENCOUNTER — OFFICE VISIT CONVERTED (OUTPATIENT)
Dept: UROLOGY | Facility: CLINIC | Age: 79
End: 2019-01-07
Attending: NURSE PRACTITIONER

## 2019-01-07 ENCOUNTER — HOSPITAL ENCOUNTER (OUTPATIENT)
Dept: OTHER | Facility: HOSPITAL | Age: 79
Discharge: HOME | End: 2019-01-07
Attending: NURSE PRACTITIONER

## 2019-01-09 LAB
BACTERIA SPEC AEROBE CULT: NORMAL
ELASTASE PANC STL-MCNT: 386 UG ELAST./G

## 2019-01-10 LAB
CONV NEUTRAL FATS: NORMAL
FAT STL QL: NORMAL

## 2019-02-28 ENCOUNTER — TRANSCRIBE ORDERS (OUTPATIENT)
Dept: ADMINISTRATIVE | Facility: HOSPITAL | Age: 79
End: 2019-02-28

## 2019-02-28 DIAGNOSIS — G89.29 CHRONIC RADICULAR LUMBAR PAIN: Primary | ICD-10-CM

## 2019-02-28 DIAGNOSIS — M54.16 CHRONIC RADICULAR LUMBAR PAIN: Primary | ICD-10-CM

## 2019-03-11 ENCOUNTER — HOSPITAL ENCOUNTER (OUTPATIENT)
Dept: GENERAL RADIOLOGY | Facility: HOSPITAL | Age: 79
Discharge: HOME | End: 2019-03-11

## 2019-03-11 ENCOUNTER — HOSPITAL ENCOUNTER (OUTPATIENT)
Dept: CT IMAGING | Facility: HOSPITAL | Age: 79
Discharge: HOME | End: 2019-03-11
Admitting: ORTHOPAEDIC SURGERY

## 2019-03-11 VITALS
SYSTOLIC BLOOD PRESSURE: 114 MMHG | WEIGHT: 198 LBS | HEIGHT: 65 IN | DIASTOLIC BLOOD PRESSURE: 64 MMHG | RESPIRATION RATE: 16 BRPM | TEMPERATURE: 97.2 F | OXYGEN SATURATION: 98 % | HEART RATE: 64 BPM | BODY MASS INDEX: 32.99 KG/M2

## 2019-03-11 DIAGNOSIS — G89.29 CHRONIC RADICULAR LUMBAR PAIN: ICD-10-CM

## 2019-03-11 DIAGNOSIS — M54.16 CHRONIC RADICULAR LUMBAR PAIN: ICD-10-CM

## 2019-03-11 PROCEDURE — 0 IOPAMIDOL 41 % SOLUTION: Performed by: RADIOLOGY

## 2019-03-11 PROCEDURE — 62304 MYELOGRAPHY LUMBAR INJECTION: CPT

## 2019-03-11 PROCEDURE — 72240 MYELOGRAPHY NECK SPINE: CPT

## 2019-03-11 PROCEDURE — 72131 CT LUMBAR SPINE W/O DYE: CPT

## 2019-03-11 RX ORDER — ACETAMINOPHEN 500 MG
500 TABLET ORAL EVERY 12 HOURS PRN
COMMUNITY
End: 2019-06-19 | Stop reason: HOSPADM

## 2019-03-11 RX ORDER — LIDOCAINE HYDROCHLORIDE 10 MG/ML
10 INJECTION, SOLUTION INFILTRATION; PERINEURAL ONCE
Status: COMPLETED | OUTPATIENT
Start: 2019-03-11 | End: 2019-03-11

## 2019-03-11 RX ORDER — SPIRONOLACTONE AND HYDROCHLOROTHIAZIDE 25; 25 MG/1; MG/1
1 TABLET ORAL DAILY
COMMUNITY
End: 2019-03-28

## 2019-03-11 RX ORDER — HYDROCODONE BITARTRATE AND ACETAMINOPHEN 10; 325 MG/1; MG/1
1 TABLET ORAL EVERY 6 HOURS PRN
COMMUNITY
End: 2019-03-29 | Stop reason: HOSPADM

## 2019-03-11 RX ADMIN — LIDOCAINE HYDROCHLORIDE 8 ML: 10 INJECTION, SOLUTION INFILTRATION; PERINEURAL at 10:44

## 2019-03-11 RX ADMIN — IOPAMIDOL 20 ML: 408 INJECTION, SOLUTION INTRATHECAL at 10:51

## 2019-03-11 NOTE — DISCHARGE INSTRUCTIONS
EDUCATION /DISCHARGE INSTRUCTIONS:  A myelogram is a special radiology procedure of the spinal cord, spinal nerves and other related structures.  You will be awake during the examination.  An area of your lower back will be cleansed with an antiseptic solution.  The physician will inject a numbing medication in your lower back.  While your back is numb, a needle will be placed in the lower back area.  A small amount of spinal fluid may be withdrawn and sent to the lab if ordered by your physician. While the needle is in the back, an injection of a contrast material (xray dye) will be given through the needle.  The contrast material will allow the physician to see the spinal cord and spinal nerves.  Once injected, the needle will be removed and a band aid will be placed over the injection site.  The table will be tilted during the process to allow the contrast material to flow to particular areas in the spine.  Following the injection and xrays, you will be taken to the CT scan where more pictures will be taken. After the procedure is finished, the contrast material will be absorbed by your body and eliminated through your kidneys.  The radiologist will study and interpret your myelogram and send the results to your physician.    Procedure risks of a myelogram include, but are not limited to:  *  Bleeding   *  seizure  *  Infection   *  Headache, possibly severe requiring  *  Contrast reaction      a blood patch  *  Nerve or cord injury  *  Paralysis and death  Benefits of the procedure:  *  Best examination for delineating pathology related to spinal cord compression from a disc and/or nerve root compression  Alternatives to the procedure:  MRI - a non invasive procedure requiring intravenous contrast injection.  Cannot be done on patients with certain pacemakers or metal in the body.  MRI risks include possible reaction to the contrast material, movement of metal located in the body.  Benefit to MRI:  Non-invasive  and usually painless procedure.  THIS EDUCATION INFORMATION WAS REVIEWED PRIOR TO THE PROCEDURE AND CONSENT. Patient initials __________________Time___0919______________  Important information following your myelogram:  *  Lie down with your head elevated no more than 2 pillows for the next 24 hours.   *  Sit up in the car going home.  Sit up to eat and use the restroom only,  for 24 hours.  *  24 HOURS COMPLETE AT _____1200___________________   *  Tomorrow, after 24 hours complete, take it easy and rest.  *  Do not drive for 48 hours following a myelogram  *  You may remove the bandage and shower in the morning  *  Increase your fluids for the next 24 hours.  Caffeinated drinks are encouraged.   Resume taking your blood thinner or Aspirin on _3/12/19 after 1200____________________________  Resume taking your diabetic oral medication on__Metformin 3/13/19 the evening dose___________________________  Resume taking antipsychotics/antidepressant on ____Venlafaxine 3/12/19 after 1200________________________  CALL YOUR PHYSICIAN FOR THE FOLLOWING:  * Pain at the injection site  * Reddness, swelling, bruising or drainage at the injection site  * A fever by mouth of 101.0  * Any new symptoms  If you have problems with a headache that is not relieved with rest and medication, please call the Radiology Triage Nurse desk  [phone]

## 2019-03-11 NOTE — H&P
Name: Lilia Becerra ADMIT: 3/11/2019   : 1940  PCP: Leo Lomas MD    MRN: [id number] LOS: 0 days   AGE/SEX: 78 y.o. female  ROOM: Room/bed info not found       Chief complaint   Patient is a 78 y.o. female presents with back pain and lower extremity pain.     Past Surgical History:  Past Surgical History:   Procedure Laterality Date   • CATARACT EXTRACTION Bilateral    • COLONOSCOPY     • HEMORRHOIDECTOMY     • HYSTERECTOMY     • INCISION AND DRAINAGE OF WOUND      on back    • KNEE ARTHROPLASTY Right    • LUMBAR DISCECTOMY Left 2018    Procedure: LEFT L4-5 MICRO DISCECTOMY;  Surgeon: Adam Coleman DO;  Location: [facility] OR;  Service: Orthopedic Spine   • LUMBAR FUSION     • TOTAL KNEE ARTHROPLASTY REVISION Left     x2   • TOTAL KNEE ARTHROPLASTY REVISION Left 2018    Procedure: LEFT TOTAL KNEE ARTHROPLASTY REVISION;  Surgeon: Jair Fajardo MD;  Location: [facility];  Service:    • VERTEBROPLASTY         Past Medical History:  Past Medical History:   Diagnosis Date   • Acid reflux disease    • Arthritis    • Atrial fibrillation (CMS/HCC)    • Back pain    • Depression    • Diabetes mellitus (CMS/HCC)    • Disease of thyroid gland    • Dyslipidemia    • Family history of atrial fibrillation    • Glaucoma     left eye   • Hearing impaired     hearing aides   • Hiatal hernia    • High cholesterol    • History of transfusion    • Hypertension    • Kienbock's disease    • Knee pain    • Osteoporosis    • PONV (postoperative nausea and vomiting)    • RLS (restless legs syndrome)    • Sleep apnea     machine    • Tailbone injury     fracture   • Urinary incontinence     wears pads       Home Medications:    (Not in a hospital admission)    Allergies:  Tetanus toxoids    Family History:  Family History   Problem Relation Age of Onset   • Malig Hyperthermia Neg Hx        Social History:  Social History     Tobacco Use   • Smoking status: Never Smoker   • Smokeless  tobacco: Never Used   Substance Use Topics   • Alcohol use: No   • Drug use: Yes     Types: Hydrocodone        Objective     Physical Exam:   NAD/    Symmetric chest rise, no labored breathing   Ab s-nd, nttp   2+ peripheral pulses    Vital Signs  Temp:  [97.2 °F (36.2 °C)] 97.2 °F (36.2 °C)  Heart Rate:  [60-79] 79  Resp:  [16] 16  BP: (108-114)/(67-73) 108/67    Anticipated procedure :Fluoro guided lumbar puncture with intrathecal injection of contrast for CT myelogram    The risks, benefits and alternatives of this procedure have been discussed with the patient or responsible party: Yes Risks discussed included but not limited to pain, bleeding (including epidural hemorrhage causing neurological compromise), infection, damaging surrounding structures, headache, intractable headache from dural CSF leak requiring a blood patch and need for further procedures. Denied anticoagultion         Aleksandr Castillo MD  03/11/19  10:23 AM

## 2019-03-12 ENCOUNTER — TELEPHONE (OUTPATIENT)
Dept: INTERVENTIONAL RADIOLOGY/VASCULAR | Facility: HOSPITAL | Age: 79
End: 2019-03-12

## 2019-03-13 ENCOUNTER — HOSPITAL ENCOUNTER (OUTPATIENT)
Dept: GASTROENTEROLOGY | Facility: HOSPITAL | Age: 79
Setting detail: HOSPITAL OUTPATIENT SURGERY
Discharge: HOME | End: 2019-03-13
Attending: INTERNAL MEDICINE

## 2019-03-13 LAB — GLUCOSE BLD-MCNC: 105 MG/DL (ref 65–99)

## 2019-03-14 ENCOUNTER — HOSPITAL ENCOUNTER (OUTPATIENT)
Dept: GASTROENTEROLOGY | Facility: HOSPITAL | Age: 79
Setting detail: HOSPITAL OUTPATIENT SURGERY
Discharge: HOME | End: 2019-03-14
Attending: INTERNAL MEDICINE

## 2019-03-14 LAB — GLUCOSE BLD-MCNC: 84 MG/DL (ref 65–99)

## 2019-03-28 ENCOUNTER — HOSPITAL ENCOUNTER (OUTPATIENT)
Dept: GENERAL RADIOLOGY | Facility: HOSPITAL | Age: 79
Discharge: HOME | End: 2019-03-28
Admitting: ORTHOPAEDIC SURGERY

## 2019-03-28 ENCOUNTER — APPOINTMENT (OUTPATIENT)
Dept: PREADMISSION TESTING | Facility: HOSPITAL | Age: 79
End: 2019-03-28

## 2019-03-28 VITALS
HEART RATE: 64 BPM | RESPIRATION RATE: 18 BRPM | TEMPERATURE: 98.3 F | BODY MASS INDEX: 32.65 KG/M2 | WEIGHT: 196 LBS | HEIGHT: 65 IN | OXYGEN SATURATION: 98 %

## 2019-03-28 LAB
ALBUMIN SERPL-MCNC: 4.2 G/DL (ref 3.5–5.2)
ALBUMIN/GLOB SERPL: 1.8 G/DL
ALP SERPL-CCNC: 63 U/L (ref 39–117)
ALT SERPL W P-5'-P-CCNC: 9 U/L (ref 1–33)
ANION GAP SERPL CALCULATED.3IONS-SCNC: 13.7 MMOL/L
APTT PPP: 26.2 SECONDS (ref 22.7–35.4)
AST SERPL-CCNC: 13 U/L (ref 1–32)
BASOPHILS # BLD AUTO: 0.03 10*3/MM3 (ref 0–0.2)
BASOPHILS NFR BLD AUTO: 0.3 % (ref 0–1.5)
BILIRUB SERPL-MCNC: 0.3 MG/DL (ref 0.2–1.2)
BUN BLD-MCNC: 25 MG/DL (ref 8–23)
BUN/CREAT SERPL: 20.8 (ref 7–25)
CALCIUM SPEC-SCNC: 9.7 MG/DL (ref 8.6–10.5)
CHLORIDE SERPL-SCNC: 102 MMOL/L (ref 98–107)
CO2 SERPL-SCNC: 23.3 MMOL/L (ref 22–29)
CREAT BLD-MCNC: 1.2 MG/DL (ref 0.57–1)
DEPRECATED RDW RBC AUTO: 45.2 FL (ref 37–54)
EOSINOPHIL # BLD AUTO: 0.16 10*3/MM3 (ref 0–0.4)
EOSINOPHIL NFR BLD AUTO: 1.8 % (ref 0.3–6.2)
ERYTHROCYTE [DISTWIDTH] IN BLOOD BY AUTOMATED COUNT: 12.6 % (ref 12.3–15.4)
GFR SERPL CREATININE-BSD FRML MDRD: 43 ML/MIN/1.73
GLOBULIN UR ELPH-MCNC: 2.3 GM/DL
GLUCOSE BLD-MCNC: 115 MG/DL (ref 65–99)
HCT VFR BLD AUTO: 38.6 % (ref 34–46.6)
HGB BLD-MCNC: 12.1 G/DL (ref 12–15.9)
IMM GRANULOCYTES # BLD AUTO: 0.02 10*3/MM3 (ref 0–0.05)
IMM GRANULOCYTES NFR BLD AUTO: 0.2 % (ref 0–0.5)
INR PPP: 1.07 (ref 0.9–1.1)
LYMPHOCYTES # BLD AUTO: 2.46 10*3/MM3 (ref 0.7–3.1)
LYMPHOCYTES NFR BLD AUTO: 28 % (ref 19.6–45.3)
MCH RBC QN AUTO: 30.7 PG (ref 26.6–33)
MCHC RBC AUTO-ENTMCNC: 31.3 G/DL (ref 31.5–35.7)
MCV RBC AUTO: 98 FL (ref 79–97)
MONOCYTES # BLD AUTO: 0.47 10*3/MM3 (ref 0.1–0.9)
MONOCYTES NFR BLD AUTO: 5.4 % (ref 5–12)
NEUTROPHILS # BLD AUTO: 5.64 10*3/MM3 (ref 1.4–7)
NEUTROPHILS NFR BLD AUTO: 64.3 % (ref 42.7–76)
NRBC BLD AUTO-RTO: 0 /100 WBC (ref 0–0)
PLATELET # BLD AUTO: 216 10*3/MM3 (ref 140–450)
PMV BLD AUTO: 11.2 FL (ref 6–12)
POTASSIUM BLD-SCNC: 4.6 MMOL/L (ref 3.5–5.2)
PROT SERPL-MCNC: 6.5 G/DL (ref 6–8.5)
PROTHROMBIN TIME: 13.7 SECONDS (ref 11.7–14.2)
RBC # BLD AUTO: 3.94 10*6/MM3 (ref 3.77–5.28)
SODIUM BLD-SCNC: 139 MMOL/L (ref 136–145)
WBC NRBC COR # BLD: 8.78 10*3/MM3 (ref 3.4–10.8)

## 2019-03-28 PROCEDURE — 85025 COMPLETE CBC W/AUTO DIFF WBC: CPT | Performed by: ORTHOPAEDIC SURGERY

## 2019-03-28 PROCEDURE — 85610 PROTHROMBIN TIME: CPT | Performed by: ORTHOPAEDIC SURGERY

## 2019-03-28 PROCEDURE — 80053 COMPREHEN METABOLIC PANEL: CPT | Performed by: ORTHOPAEDIC SURGERY

## 2019-03-28 PROCEDURE — 71046 X-RAY EXAM CHEST 2 VIEWS: CPT

## 2019-03-28 PROCEDURE — 36415 COLL VENOUS BLD VENIPUNCTURE: CPT

## 2019-03-28 PROCEDURE — 85730 THROMBOPLASTIN TIME PARTIAL: CPT | Performed by: ORTHOPAEDIC SURGERY

## 2019-03-29 ENCOUNTER — ANESTHESIA (OUTPATIENT)
Dept: PERIOP | Facility: HOSPITAL | Age: 79
End: 2019-03-29

## 2019-03-29 ENCOUNTER — ANESTHESIA EVENT (OUTPATIENT)
Dept: PERIOP | Facility: HOSPITAL | Age: 79
End: 2019-03-29

## 2019-03-29 ENCOUNTER — HOSPITAL ENCOUNTER (OUTPATIENT)
Facility: HOSPITAL | Age: 79
Setting detail: HOSPITAL OUTPATIENT SURGERY
Discharge: HOME | End: 2019-03-29
Attending: ORTHOPAEDIC SURGERY | Admitting: ORTHOPAEDIC SURGERY

## 2019-03-29 ENCOUNTER — APPOINTMENT (OUTPATIENT)
Dept: GENERAL RADIOLOGY | Facility: HOSPITAL | Age: 79
End: 2019-03-29

## 2019-03-29 VITALS
WEIGHT: 196 LBS | HEART RATE: 66 BPM | DIASTOLIC BLOOD PRESSURE: 84 MMHG | TEMPERATURE: 97.9 F | OXYGEN SATURATION: 96 % | SYSTOLIC BLOOD PRESSURE: 136 MMHG | RESPIRATION RATE: 16 BRPM | HEIGHT: 65 IN | BODY MASS INDEX: 32.65 KG/M2

## 2019-03-29 PROBLEM — G47.30 SLEEP APNEA: Status: ACTIVE | Noted: 2018-02-14

## 2019-03-29 LAB
GLUCOSE BLDC GLUCOMTR-MCNC: 80 MG/DL (ref 70–130)
GLUCOSE BLDC GLUCOMTR-MCNC: 93 MG/DL (ref 70–130)

## 2019-03-29 PROCEDURE — 82962 GLUCOSE BLOOD TEST: CPT

## 2019-03-29 PROCEDURE — 72100 X-RAY EXAM L-S SPINE 2/3 VWS: CPT

## 2019-03-29 PROCEDURE — 25010000002 FENTANYL CITRATE (PF) 100 MCG/2ML SOLUTION: Performed by: NURSE ANESTHETIST, CERTIFIED REGISTERED

## 2019-03-29 PROCEDURE — 25010000002 NEOSTIGMINE PER 0.5 MG: Performed by: NURSE ANESTHETIST, CERTIFIED REGISTERED

## 2019-03-29 PROCEDURE — 25010000003 CEFAZOLIN IN DEXTROSE 2-4 GM/100ML-% SOLUTION: Performed by: ORTHOPAEDIC SURGERY

## 2019-03-29 PROCEDURE — 25010000002 PROPOFOL 10 MG/ML EMULSION: Performed by: NURSE ANESTHETIST, CERTIFIED REGISTERED

## 2019-03-29 PROCEDURE — 76000 FLUOROSCOPY <1 HR PHYS/QHP: CPT

## 2019-03-29 PROCEDURE — 25010000002 PHENYLEPHRINE PER 1 ML: Performed by: NURSE ANESTHETIST, CERTIFIED REGISTERED

## 2019-03-29 PROCEDURE — 25010000002 ONDANSETRON PER 1 MG: Performed by: NURSE ANESTHETIST, CERTIFIED REGISTERED

## 2019-03-29 DEVICE — WAX BONE HEMO NAT 2.5G: Type: IMPLANTABLE DEVICE | Site: EPIDURAL SPACE | Status: FUNCTIONAL

## 2019-03-29 DEVICE — KT HEMOST ABS SURGIFOAM PWDR 1GRAM: Type: IMPLANTABLE DEVICE | Site: EPIDURAL SPACE | Status: FUNCTIONAL

## 2019-03-29 DEVICE — SEALANT FIBRIN TISSEEL FZ 4ML: Type: IMPLANTABLE DEVICE | Site: EPIDURAL SPACE | Status: FUNCTIONAL

## 2019-03-29 RX ORDER — DIPHENHYDRAMINE HYDROCHLORIDE 50 MG/ML
12.5 INJECTION INTRAMUSCULAR; INTRAVENOUS
Status: DISCONTINUED | OUTPATIENT
Start: 2019-03-29 | End: 2019-03-29 | Stop reason: HOSPADM

## 2019-03-29 RX ORDER — OXYCODONE AND ACETAMINOPHEN 7.5; 325 MG/1; MG/1
TABLET ORAL
Qty: 40 TABLET | Refills: 0 | Status: SHIPPED | OUTPATIENT
Start: 2019-03-29 | End: 2019-06-19 | Stop reason: HOSPADM

## 2019-03-29 RX ORDER — EPHEDRINE SULFATE 50 MG/ML
5 INJECTION, SOLUTION INTRAVENOUS ONCE AS NEEDED
Status: DISCONTINUED | OUTPATIENT
Start: 2019-03-29 | End: 2019-03-29 | Stop reason: HOSPADM

## 2019-03-29 RX ORDER — PROMETHAZINE HYDROCHLORIDE 25 MG/ML
12.5 INJECTION, SOLUTION INTRAMUSCULAR; INTRAVENOUS ONCE AS NEEDED
Status: DISCONTINUED | OUTPATIENT
Start: 2019-03-29 | End: 2019-03-29 | Stop reason: HOSPADM

## 2019-03-29 RX ORDER — HYDRALAZINE HYDROCHLORIDE 20 MG/ML
5 INJECTION INTRAMUSCULAR; INTRAVENOUS
Status: DISCONTINUED | OUTPATIENT
Start: 2019-03-29 | End: 2019-03-29 | Stop reason: HOSPADM

## 2019-03-29 RX ORDER — MIDAZOLAM HYDROCHLORIDE 1 MG/ML
2 INJECTION INTRAMUSCULAR; INTRAVENOUS
Status: DISCONTINUED | OUTPATIENT
Start: 2019-03-29 | End: 2019-03-29 | Stop reason: HOSPADM

## 2019-03-29 RX ORDER — MIDAZOLAM HYDROCHLORIDE 1 MG/ML
1 INJECTION INTRAMUSCULAR; INTRAVENOUS
Status: DISCONTINUED | OUTPATIENT
Start: 2019-03-29 | End: 2019-03-29 | Stop reason: HOSPADM

## 2019-03-29 RX ORDER — SODIUM CHLORIDE, SODIUM LACTATE, POTASSIUM CHLORIDE, CALCIUM CHLORIDE 600; 310; 30; 20 MG/100ML; MG/100ML; MG/100ML; MG/100ML
100 INJECTION, SOLUTION INTRAVENOUS CONTINUOUS
Status: DISCONTINUED | OUTPATIENT
Start: 2019-03-29 | End: 2019-03-29 | Stop reason: HOSPADM

## 2019-03-29 RX ORDER — PROPOFOL 10 MG/ML
VIAL (ML) INTRAVENOUS AS NEEDED
Status: DISCONTINUED | OUTPATIENT
Start: 2019-03-29 | End: 2019-03-29 | Stop reason: SURG

## 2019-03-29 RX ORDER — NALOXONE HCL 0.4 MG/ML
0.2 VIAL (ML) INJECTION AS NEEDED
Status: DISCONTINUED | OUTPATIENT
Start: 2019-03-29 | End: 2019-03-29 | Stop reason: HOSPADM

## 2019-03-29 RX ORDER — LIDOCAINE HYDROCHLORIDE 20 MG/ML
INJECTION, SOLUTION INFILTRATION; PERINEURAL AS NEEDED
Status: DISCONTINUED | OUTPATIENT
Start: 2019-03-29 | End: 2019-03-29 | Stop reason: SURG

## 2019-03-29 RX ORDER — PROMETHAZINE HYDROCHLORIDE 25 MG/1
25 TABLET ORAL ONCE AS NEEDED
Status: DISCONTINUED | OUTPATIENT
Start: 2019-03-29 | End: 2019-03-29 | Stop reason: HOSPADM

## 2019-03-29 RX ORDER — SODIUM CHLORIDE 0.9 % (FLUSH) 0.9 %
3 SYRINGE (ML) INJECTION EVERY 12 HOURS SCHEDULED
Status: DISCONTINUED | OUTPATIENT
Start: 2019-03-29 | End: 2019-03-29 | Stop reason: HOSPADM

## 2019-03-29 RX ORDER — FLUMAZENIL 0.1 MG/ML
0.2 INJECTION INTRAVENOUS AS NEEDED
Status: DISCONTINUED | OUTPATIENT
Start: 2019-03-29 | End: 2019-03-29 | Stop reason: HOSPADM

## 2019-03-29 RX ORDER — ONDANSETRON 2 MG/ML
4 INJECTION INTRAMUSCULAR; INTRAVENOUS ONCE AS NEEDED
Status: DISCONTINUED | OUTPATIENT
Start: 2019-03-29 | End: 2019-03-29 | Stop reason: HOSPADM

## 2019-03-29 RX ORDER — CEFAZOLIN SODIUM 2 G/100ML
2 INJECTION, SOLUTION INTRAVENOUS ONCE
Status: COMPLETED | OUTPATIENT
Start: 2019-03-29 | End: 2019-03-29

## 2019-03-29 RX ORDER — LABETALOL HYDROCHLORIDE 5 MG/ML
5 INJECTION, SOLUTION INTRAVENOUS
Status: DISCONTINUED | OUTPATIENT
Start: 2019-03-29 | End: 2019-03-29 | Stop reason: HOSPADM

## 2019-03-29 RX ORDER — EPHEDRINE SULFATE 50 MG/ML
INJECTION, SOLUTION INTRAVENOUS AS NEEDED
Status: DISCONTINUED | OUTPATIENT
Start: 2019-03-29 | End: 2019-03-29 | Stop reason: SURG

## 2019-03-29 RX ORDER — HYDROMORPHONE HYDROCHLORIDE 1 MG/ML
0.25 INJECTION, SOLUTION INTRAMUSCULAR; INTRAVENOUS; SUBCUTANEOUS
Status: DISCONTINUED | OUTPATIENT
Start: 2019-03-29 | End: 2019-03-29 | Stop reason: HOSPADM

## 2019-03-29 RX ORDER — ACETAMINOPHEN 325 MG/1
650 TABLET ORAL ONCE AS NEEDED
Status: DISCONTINUED | OUTPATIENT
Start: 2019-03-29 | End: 2019-03-29 | Stop reason: HOSPADM

## 2019-03-29 RX ORDER — ONDANSETRON 2 MG/ML
INJECTION INTRAMUSCULAR; INTRAVENOUS AS NEEDED
Status: DISCONTINUED | OUTPATIENT
Start: 2019-03-29 | End: 2019-03-29 | Stop reason: SURG

## 2019-03-29 RX ORDER — FAMOTIDINE 10 MG/ML
20 INJECTION, SOLUTION INTRAVENOUS ONCE
Status: COMPLETED | OUTPATIENT
Start: 2019-03-29 | End: 2019-03-29

## 2019-03-29 RX ORDER — FENTANYL CITRATE 50 UG/ML
INJECTION, SOLUTION INTRAMUSCULAR; INTRAVENOUS AS NEEDED
Status: DISCONTINUED | OUTPATIENT
Start: 2019-03-29 | End: 2019-03-29 | Stop reason: SURG

## 2019-03-29 RX ORDER — SODIUM CHLORIDE 0.9 % (FLUSH) 0.9 %
3-10 SYRINGE (ML) INJECTION AS NEEDED
Status: DISCONTINUED | OUTPATIENT
Start: 2019-03-29 | End: 2019-03-29 | Stop reason: HOSPADM

## 2019-03-29 RX ORDER — OXYCODONE AND ACETAMINOPHEN 7.5; 325 MG/1; MG/1
1 TABLET ORAL ONCE AS NEEDED
Status: COMPLETED | OUTPATIENT
Start: 2019-03-29 | End: 2019-03-29

## 2019-03-29 RX ORDER — GLYCOPYRROLATE 0.2 MG/ML
INJECTION INTRAMUSCULAR; INTRAVENOUS AS NEEDED
Status: DISCONTINUED | OUTPATIENT
Start: 2019-03-29 | End: 2019-03-29 | Stop reason: SURG

## 2019-03-29 RX ORDER — BUPIVACAINE HYDROCHLORIDE AND EPINEPHRINE 5; 5 MG/ML; UG/ML
INJECTION, SOLUTION PERINEURAL AS NEEDED
Status: DISCONTINUED | OUTPATIENT
Start: 2019-03-29 | End: 2019-03-29 | Stop reason: HOSPADM

## 2019-03-29 RX ORDER — SODIUM CHLORIDE 0.9 % (FLUSH) 0.9 %
1-10 SYRINGE (ML) INJECTION AS NEEDED
Status: DISCONTINUED | OUTPATIENT
Start: 2019-03-29 | End: 2019-03-29 | Stop reason: HOSPADM

## 2019-03-29 RX ORDER — MAGNESIUM HYDROXIDE 1200 MG/15ML
LIQUID ORAL AS NEEDED
Status: DISCONTINUED | OUTPATIENT
Start: 2019-03-29 | End: 2019-03-29 | Stop reason: HOSPADM

## 2019-03-29 RX ORDER — HYDROCODONE BITARTRATE AND ACETAMINOPHEN 7.5; 325 MG/1; MG/1
1 TABLET ORAL ONCE AS NEEDED
Status: DISCONTINUED | OUTPATIENT
Start: 2019-03-29 | End: 2019-03-29 | Stop reason: HOSPADM

## 2019-03-29 RX ORDER — FENTANYL CITRATE 50 UG/ML
100 INJECTION, SOLUTION INTRAMUSCULAR; INTRAVENOUS
Status: DISCONTINUED | OUTPATIENT
Start: 2019-03-29 | End: 2019-03-29 | Stop reason: HOSPADM

## 2019-03-29 RX ORDER — SODIUM CHLORIDE, SODIUM LACTATE, POTASSIUM CHLORIDE, CALCIUM CHLORIDE 600; 310; 30; 20 MG/100ML; MG/100ML; MG/100ML; MG/100ML
9 INJECTION, SOLUTION INTRAVENOUS CONTINUOUS
Status: DISCONTINUED | OUTPATIENT
Start: 2019-03-29 | End: 2019-03-29 | Stop reason: HOSPADM

## 2019-03-29 RX ORDER — ROCURONIUM BROMIDE 10 MG/ML
INJECTION, SOLUTION INTRAVENOUS AS NEEDED
Status: DISCONTINUED | OUTPATIENT
Start: 2019-03-29 | End: 2019-03-29 | Stop reason: SURG

## 2019-03-29 RX ORDER — FENTANYL CITRATE 50 UG/ML
50 INJECTION, SOLUTION INTRAMUSCULAR; INTRAVENOUS
Status: DISCONTINUED | OUTPATIENT
Start: 2019-03-29 | End: 2019-03-29 | Stop reason: HOSPADM

## 2019-03-29 RX ORDER — DIPHENHYDRAMINE HCL 25 MG
25 CAPSULE ORAL
Status: DISCONTINUED | OUTPATIENT
Start: 2019-03-29 | End: 2019-03-29 | Stop reason: HOSPADM

## 2019-03-29 RX ORDER — PROMETHAZINE HYDROCHLORIDE 25 MG/1
25 SUPPOSITORY RECTAL ONCE AS NEEDED
Status: DISCONTINUED | OUTPATIENT
Start: 2019-03-29 | End: 2019-03-29 | Stop reason: HOSPADM

## 2019-03-29 RX ORDER — LIDOCAINE HYDROCHLORIDE 10 MG/ML
0.5 INJECTION, SOLUTION EPIDURAL; INFILTRATION; INTRACAUDAL; PERINEURAL ONCE AS NEEDED
Status: DISCONTINUED | OUTPATIENT
Start: 2019-03-29 | End: 2019-03-29 | Stop reason: HOSPADM

## 2019-03-29 RX ORDER — ALBUTEROL SULFATE 2.5 MG/3ML
2.5 SOLUTION RESPIRATORY (INHALATION) ONCE AS NEEDED
Status: DISCONTINUED | OUTPATIENT
Start: 2019-03-29 | End: 2019-03-29 | Stop reason: HOSPADM

## 2019-03-29 RX ADMIN — NEOSTIGMINE METHYLSULFATE 3 MG: 1 INJECTION INTRAMUSCULAR; INTRAVENOUS; SUBCUTANEOUS at 11:30

## 2019-03-29 RX ADMIN — LIDOCAINE HYDROCHLORIDE 60 MG: 20 INJECTION, SOLUTION INFILTRATION; PERINEURAL at 09:35

## 2019-03-29 RX ADMIN — FENTANYL CITRATE 50 MCG: 50 INJECTION INTRAMUSCULAR; INTRAVENOUS at 11:15

## 2019-03-29 RX ADMIN — EPHEDRINE SULFATE 5 MG: 50 INJECTION INTRAMUSCULAR; INTRAVENOUS; SUBCUTANEOUS at 09:51

## 2019-03-29 RX ADMIN — SODIUM CHLORIDE, POTASSIUM CHLORIDE, SODIUM LACTATE AND CALCIUM CHLORIDE: 600; 310; 30; 20 INJECTION, SOLUTION INTRAVENOUS at 11:46

## 2019-03-29 RX ADMIN — SODIUM CHLORIDE, POTASSIUM CHLORIDE, SODIUM LACTATE AND CALCIUM CHLORIDE 9 ML/HR: 600; 310; 30; 20 INJECTION, SOLUTION INTRAVENOUS at 09:20

## 2019-03-29 RX ADMIN — FAMOTIDINE 20 MG: 10 INJECTION INTRAVENOUS at 09:20

## 2019-03-29 RX ADMIN — EPHEDRINE SULFATE 5 MG: 50 INJECTION INTRAMUSCULAR; INTRAVENOUS; SUBCUTANEOUS at 09:41

## 2019-03-29 RX ADMIN — ONDANSETRON 4 MG: 2 INJECTION INTRAMUSCULAR; INTRAVENOUS at 11:30

## 2019-03-29 RX ADMIN — FENTANYL CITRATE 50 MCG: 50 INJECTION INTRAMUSCULAR; INTRAVENOUS at 09:35

## 2019-03-29 RX ADMIN — GLYCOPYRROLATE 0.4 MG: 0.2 INJECTION INTRAMUSCULAR; INTRAVENOUS at 11:30

## 2019-03-29 RX ADMIN — ROCURONIUM BROMIDE 50 MG: 10 INJECTION INTRAVENOUS at 09:35

## 2019-03-29 RX ADMIN — CEFAZOLIN SODIUM 2 G: 2 INJECTION, SOLUTION INTRAVENOUS at 09:28

## 2019-03-29 RX ADMIN — PROPOFOL 100 MG: 10 INJECTION, EMULSION INTRAVENOUS at 09:35

## 2019-03-29 RX ADMIN — OXYCODONE HYDROCHLORIDE AND ACETAMINOPHEN 1 TABLET: 7.5; 325 TABLET ORAL at 12:59

## 2019-03-29 RX ADMIN — PHENYLEPHRINE HYDROCHLORIDE 100 MCG: 10 INJECTION INTRAVENOUS at 10:21

## 2019-03-29 RX ADMIN — PHENYLEPHRINE HYDROCHLORIDE 100 MCG: 10 INJECTION INTRAVENOUS at 09:57

## 2019-03-29 NOTE — ANESTHESIA PROCEDURE NOTES
Airway  Urgency: elective    Airway not difficult    General Information and Staff    Patient location during procedure: OR  Anesthesiologist: Kingston Burnett MD  CRNA: Madonna Carrasquillo CRNA    Indications and Patient Condition  Indications for airway management: airway protection    Preoxygenated: yes  MILS not maintained throughout  Mask difficulty assessment: 1 - vent by mask    Final Airway Details  Final airway type: endotracheal airway      Successful airway: ETT  Cuffed: yes   Successful intubation technique: direct laryngoscopy  Facilitating devices/methods: intubating stylet  Endotracheal tube insertion site: oral  Blade: Robinson  Blade size: 3  ETT size (mm): 7.0  Cormack-Lehane Classification: grade I - full view of glottis  Placement verified by: chest auscultation   Cuff volume (mL): 8  Measured from: lips  ETT to lips (cm): 21  Number of attempts at approach: 1    Additional Comments  PreO2 100% face mask, IV induction, easy mask, DVL x1, cords noted, tube through, cuff up, EBBSH, +etCO2, = chest movement, tube secured in place, atraumatic, teeth and lips intact as preop.

## 2019-03-29 NOTE — ANESTHESIA POSTPROCEDURE EVALUATION
Patient: Lilia Becerra    Procedure Summary     Date:  03/29/19 Room / Location:  [facility] OR [address] [place] [address] MAIN OR    Anesthesia Start:  0928 Anesthesia Stop:  1154    Procedure:  LEFT L4-5 REVISION OF MICRODISCECTOMY (Left Spine Lumbar) Diagnosis:      Surgeon:  Adam Coleman DO Provider:  Kingston Burnett MD    Anesthesia Type:  general ASA Status:  3          Anesthesia Type: general  Last vitals  BP   134/66 (03/29/19 1445)   Temp   36.6 °C (97.9 °F) (03/29/19 1410)   Pulse   62 (03/29/19 1445)   Resp   16 (03/29/19 1445)     SpO2   95 % (03/29/19 1445)     Post Anesthesia Care and Evaluation    Patient location during evaluation: bedside  Pain management: adequate  Airway patency: patent  Anesthetic complications: No anesthetic complications    Cardiovascular status: acceptable  Respiratory status: acceptable  Hydration status: acceptable

## 2019-03-29 NOTE — ANESTHESIA PREPROCEDURE EVALUATION
Anesthesia Evaluation     Patient summary reviewed and Nursing notes reviewed   history of anesthetic complications: PONV  NPO Solid Status: > 8 hours             Airway   Mallampati: II  TM distance: >3 FB  Neck ROM: full  no difficulty expected  Dental - normal exam     Pulmonary - normal exam   (+) sleep apnea,   Cardiovascular - normal exam    (+) hypertension, dysrhythmias Atrial Fib,       Neuro/Psych- negative ROS  GI/Hepatic/Renal/Endo    (+)   renal disease CRI, diabetes mellitus,     Musculoskeletal (-) negative ROS    Abdominal  - normal exam   Substance History - negative use     OB/GYN negative ob/gyn ROS         Other                        Anesthesia Plan    ASA 3     general     intravenous induction   Anesthetic plan, all risks, benefits, and alternatives have been provided, discussed and informed consent has been obtained with: patient.    Plan discussed with CRNA.

## 2019-06-13 ENCOUNTER — APPOINTMENT (OUTPATIENT)
Dept: GENERAL RADIOLOGY | Facility: HOSPITAL | Age: 79
End: 2019-06-13

## 2019-06-13 ENCOUNTER — HOSPITAL ENCOUNTER (INPATIENT)
Facility: HOSPITAL | Age: 79
LOS: 6 days | Discharge: NURSING, REHAB OR LONG-TERM CARE | End: 2019-06-19
Attending: ORTHOPAEDIC SURGERY | Admitting: ORTHOPAEDIC SURGERY

## 2019-06-13 ENCOUNTER — ANESTHESIA (OUTPATIENT)
Dept: PERIOP | Facility: HOSPITAL | Age: 79
End: 2019-06-13

## 2019-06-13 ENCOUNTER — ANESTHESIA EVENT (OUTPATIENT)
Dept: PERIOP | Facility: HOSPITAL | Age: 79
End: 2019-06-13

## 2019-06-13 DIAGNOSIS — R26.2 DIFFICULTY WALKING: Primary | ICD-10-CM

## 2019-06-13 PROBLEM — S72.90XA FEMUR FRACTURE: Status: ACTIVE | Noted: 2019-06-13

## 2019-06-13 LAB
ABO GROUP BLD: NORMAL
ALBUMIN SERPL-MCNC: 3.6 G/DL (ref 3.5–5.2)
ALBUMIN/GLOB SERPL: 1.2 G/DL
ALP SERPL-CCNC: 93 U/L (ref 39–117)
ALT SERPL W P-5'-P-CCNC: 10 U/L (ref 1–33)
ANION GAP SERPL CALCULATED.3IONS-SCNC: 12.9 MMOL/L
ANION GAP SERPL CALCULATED.3IONS-SCNC: 12.9 MMOL/L
AST SERPL-CCNC: 16 U/L (ref 1–32)
BASOPHILS # BLD AUTO: 0.02 10*3/MM3 (ref 0–0.2)
BASOPHILS NFR BLD AUTO: 0.2 % (ref 0–1.5)
BILIRUB SERPL-MCNC: 0.4 MG/DL (ref 0.2–1.2)
BLD GP AB SCN SERPL QL: NEGATIVE
BUN BLD-MCNC: 13 MG/DL (ref 8–23)
BUN BLD-MCNC: 13 MG/DL (ref 8–23)
BUN/CREAT SERPL: 12.9 (ref 7–25)
BUN/CREAT SERPL: 12.9 (ref 7–25)
CALCIUM SPEC-SCNC: 9 MG/DL (ref 8.6–10.5)
CALCIUM SPEC-SCNC: 9 MG/DL (ref 8.6–10.5)
CHLORIDE SERPL-SCNC: 108 MMOL/L (ref 98–107)
CHLORIDE SERPL-SCNC: 108 MMOL/L (ref 98–107)
CO2 SERPL-SCNC: 23.1 MMOL/L (ref 22–29)
CO2 SERPL-SCNC: 23.1 MMOL/L (ref 22–29)
CREAT BLD-MCNC: 1.01 MG/DL (ref 0.57–1)
CREAT BLD-MCNC: 1.01 MG/DL (ref 0.57–1)
DEPRECATED RDW RBC AUTO: 44.8 FL (ref 37–54)
EOSINOPHIL # BLD AUTO: 0.19 10*3/MM3 (ref 0–0.4)
EOSINOPHIL NFR BLD AUTO: 1.9 % (ref 0.3–6.2)
ERYTHROCYTE [DISTWIDTH] IN BLOOD BY AUTOMATED COUNT: 12.9 % (ref 12.3–15.4)
GFR SERPL CREATININE-BSD FRML MDRD: 53 ML/MIN/1.73
GFR SERPL CREATININE-BSD FRML MDRD: 53 ML/MIN/1.73
GLOBULIN UR ELPH-MCNC: 2.9 GM/DL
GLUCOSE BLD-MCNC: 109 MG/DL (ref 65–99)
GLUCOSE BLD-MCNC: 109 MG/DL (ref 65–99)
GLUCOSE BLDC GLUCOMTR-MCNC: 140 MG/DL (ref 70–130)
HBA1C MFR BLD: 5.81 % (ref 4.8–5.6)
HCT VFR BLD AUTO: 34.3 % (ref 34–46.6)
HGB BLD-MCNC: 11 G/DL (ref 12–15.9)
IMM GRANULOCYTES # BLD AUTO: 0.04 10*3/MM3 (ref 0–0.05)
IMM GRANULOCYTES NFR BLD AUTO: 0.4 % (ref 0–0.5)
INR PPP: 1.11 (ref 0.9–1.1)
LYMPHOCYTES # BLD AUTO: 3.1 10*3/MM3 (ref 0.7–3.1)
LYMPHOCYTES NFR BLD AUTO: 30.5 % (ref 19.6–45.3)
MCH RBC QN AUTO: 30.7 PG (ref 26.6–33)
MCHC RBC AUTO-ENTMCNC: 32.1 G/DL (ref 31.5–35.7)
MCV RBC AUTO: 95.8 FL (ref 79–97)
MONOCYTES # BLD AUTO: 0.78 10*3/MM3 (ref 0.1–0.9)
MONOCYTES NFR BLD AUTO: 7.7 % (ref 5–12)
NEUTROPHILS # BLD AUTO: 6.04 10*3/MM3 (ref 1.7–7)
NEUTROPHILS NFR BLD AUTO: 59.3 % (ref 42.7–76)
PLATELET # BLD AUTO: 264 10*3/MM3 (ref 140–450)
PMV BLD AUTO: 10.8 FL (ref 6–12)
POTASSIUM BLD-SCNC: 3.2 MMOL/L (ref 3.5–5.2)
POTASSIUM BLD-SCNC: 3.2 MMOL/L (ref 3.5–5.2)
PROT SERPL-MCNC: 6.5 G/DL (ref 6–8.5)
PROTHROMBIN TIME: 14 SECONDS (ref 11.7–14.2)
RBC # BLD AUTO: 3.58 10*6/MM3 (ref 3.77–5.28)
RH BLD: POSITIVE
SODIUM BLD-SCNC: 144 MMOL/L (ref 136–145)
SODIUM BLD-SCNC: 144 MMOL/L (ref 136–145)
T&S EXPIRATION DATE: NORMAL
TROPONIN T SERPL-MCNC: <0.01 NG/ML (ref 0–0.03)
WBC NRBC COR # BLD: 10.17 10*3/MM3 (ref 3.4–10.8)

## 2019-06-13 PROCEDURE — 83036 HEMOGLOBIN GLYCOSYLATED A1C: CPT | Performed by: HOSPITALIST

## 2019-06-13 PROCEDURE — 0SPU0JZ REMOVAL OF SYNTHETIC SUBSTITUTE FROM LEFT KNEE JOINT, FEMORAL SURFACE, OPEN APPROACH: ICD-10-PCS | Performed by: ORTHOPAEDIC SURGERY

## 2019-06-13 PROCEDURE — 85610 PROTHROMBIN TIME: CPT | Performed by: ORTHOPAEDIC SURGERY

## 2019-06-13 PROCEDURE — C1713 ANCHOR/SCREW BN/BN,TIS/BN: HCPCS | Performed by: ORTHOPAEDIC SURGERY

## 2019-06-13 PROCEDURE — 25010000002 FENTANYL CITRATE (PF) 100 MCG/2ML SOLUTION: Performed by: ANESTHESIOLOGY

## 2019-06-13 PROCEDURE — 73560 X-RAY EXAM OF KNEE 1 OR 2: CPT

## 2019-06-13 PROCEDURE — 80053 COMPREHEN METABOLIC PANEL: CPT | Performed by: HOSPITALIST

## 2019-06-13 PROCEDURE — 25010000002 NEOSTIGMINE PER 0.5 MG: Performed by: ANESTHESIOLOGY

## 2019-06-13 PROCEDURE — C1776 JOINT DEVICE (IMPLANTABLE): HCPCS | Performed by: ORTHOPAEDIC SURGERY

## 2019-06-13 PROCEDURE — 73552 X-RAY EXAM OF FEMUR 2/>: CPT

## 2019-06-13 PROCEDURE — 93005 ELECTROCARDIOGRAM TRACING: CPT | Performed by: ORTHOPAEDIC SURGERY

## 2019-06-13 PROCEDURE — 82962 GLUCOSE BLOOD TEST: CPT

## 2019-06-13 PROCEDURE — 71045 X-RAY EXAM CHEST 1 VIEW: CPT

## 2019-06-13 PROCEDURE — 25010000002 HYDROMORPHONE 1 MG/ML SOLUTION: Performed by: ORTHOPAEDIC SURGERY

## 2019-06-13 PROCEDURE — 25010000002 PROPOFOL 10 MG/ML EMULSION: Performed by: ANESTHESIOLOGY

## 2019-06-13 PROCEDURE — 25010000002 PHENYLEPHRINE PER 1 ML: Performed by: NURSE ANESTHETIST, CERTIFIED REGISTERED

## 2019-06-13 PROCEDURE — 85025 COMPLETE CBC W/AUTO DIFF WBC: CPT | Performed by: ORTHOPAEDIC SURGERY

## 2019-06-13 PROCEDURE — 93010 ELECTROCARDIOGRAM REPORT: CPT | Performed by: INTERNAL MEDICINE

## 2019-06-13 PROCEDURE — 86850 RBC ANTIBODY SCREEN: CPT | Performed by: ORTHOPAEDIC SURGERY

## 2019-06-13 PROCEDURE — 25010000002 ONDANSETRON PER 1 MG: Performed by: NURSE ANESTHETIST, CERTIFIED REGISTERED

## 2019-06-13 PROCEDURE — 25010000002 FENTANYL CITRATE (PF) 100 MCG/2ML SOLUTION: Performed by: NURSE ANESTHETIST, CERTIFIED REGISTERED

## 2019-06-13 PROCEDURE — 0SRU0J9 REPLACEMENT OF LEFT KNEE JOINT, FEMORAL SURFACE WITH SYNTHETIC SUBSTITUTE, CEMENTED, OPEN APPROACH: ICD-10-PCS | Performed by: ORTHOPAEDIC SURGERY

## 2019-06-13 PROCEDURE — 84484 ASSAY OF TROPONIN QUANT: CPT | Performed by: HOSPITALIST

## 2019-06-13 PROCEDURE — 86900 BLOOD TYPING SEROLOGIC ABO: CPT | Performed by: ORTHOPAEDIC SURGERY

## 2019-06-13 PROCEDURE — 86901 BLOOD TYPING SEROLOGIC RH(D): CPT | Performed by: ORTHOPAEDIC SURGERY

## 2019-06-13 DEVICE — IMPLANTABLE DEVICE: Type: IMPLANTABLE DEVICE | Site: KNEE | Status: FUNCTIONAL

## 2019-06-13 DEVICE — IMPLANTABLE DEVICE
Type: IMPLANTABLE DEVICE | Site: KNEE | Status: FUNCTIONAL
Brand: CERAMENT™BONE VOID FILLER

## 2019-06-13 DEVICE — BUMPER TIB DURATION MRH NUTRL: Type: IMPLANTABLE DEVICE | Site: KNEE | Status: FUNCTIONAL

## 2019-06-13 DEVICE — K-WIRE: Type: IMPLANTABLE DEVICE | Site: KNEE | Status: FUNCTIONAL

## 2019-06-13 DEVICE — SLV TIB DURATION MRH ROTAT: Type: IMPLANTABLE DEVICE | Site: KNEE | Status: FUNCTIONAL

## 2019-06-13 DEVICE — COMP TIB ROTAT MRH: Type: IMPLANTABLE DEVICE | Site: KNEE | Status: FUNCTIONAL

## 2019-06-13 DEVICE — CMT BONE SIMPLEX/P TMYCIN FDOS 10PK: Type: IMPLANTABLE DEVICE | Site: KNEE | Status: FUNCTIONAL

## 2019-06-13 RX ORDER — SCOLOPAMINE TRANSDERMAL SYSTEM 1 MG/1
1 PATCH, EXTENDED RELEASE TRANSDERMAL
Status: DISCONTINUED | OUTPATIENT
Start: 2019-06-14 | End: 2019-06-13

## 2019-06-13 RX ORDER — MIDAZOLAM HYDROCHLORIDE 1 MG/ML
2 INJECTION INTRAMUSCULAR; INTRAVENOUS
Status: DISCONTINUED | OUTPATIENT
Start: 2019-06-13 | End: 2019-06-13

## 2019-06-13 RX ORDER — LABETALOL HYDROCHLORIDE 5 MG/ML
5 INJECTION, SOLUTION INTRAVENOUS
Status: DISCONTINUED | OUTPATIENT
Start: 2019-06-13 | End: 2019-06-13 | Stop reason: HOSPADM

## 2019-06-13 RX ORDER — HYDROCODONE BITARTRATE AND ACETAMINOPHEN 5; 325 MG/1; MG/1
1 TABLET ORAL EVERY 4 HOURS PRN
Status: DISCONTINUED | OUTPATIENT
Start: 2019-06-13 | End: 2019-06-14

## 2019-06-13 RX ORDER — PROMETHAZINE HYDROCHLORIDE 25 MG/ML
6.25 INJECTION, SOLUTION INTRAMUSCULAR; INTRAVENOUS
Status: DISCONTINUED | OUTPATIENT
Start: 2019-06-13 | End: 2019-06-13 | Stop reason: HOSPADM

## 2019-06-13 RX ORDER — GLYCOPYRROLATE 0.2 MG/ML
INJECTION INTRAMUSCULAR; INTRAVENOUS AS NEEDED
Status: DISCONTINUED | OUTPATIENT
Start: 2019-06-13 | End: 2019-06-13 | Stop reason: SURG

## 2019-06-13 RX ORDER — METOPROLOL SUCCINATE 25 MG/1
25 TABLET, EXTENDED RELEASE ORAL NIGHTLY
Status: DISCONTINUED | OUTPATIENT
Start: 2019-06-14 | End: 2019-06-19 | Stop reason: HOSPADM

## 2019-06-13 RX ORDER — PROMETHAZINE HYDROCHLORIDE 25 MG/1
25 TABLET ORAL ONCE AS NEEDED
Status: DISCONTINUED | OUTPATIENT
Start: 2019-06-13 | End: 2019-06-13 | Stop reason: HOSPADM

## 2019-06-13 RX ORDER — FLUMAZENIL 0.1 MG/ML
0.2 INJECTION INTRAVENOUS AS NEEDED
Status: DISCONTINUED | OUTPATIENT
Start: 2019-06-13 | End: 2019-06-13 | Stop reason: HOSPADM

## 2019-06-13 RX ORDER — OXYBUTYNIN CHLORIDE 5 MG/1
5 TABLET, EXTENDED RELEASE ORAL DAILY
Status: DISCONTINUED | OUTPATIENT
Start: 2019-06-14 | End: 2019-06-19 | Stop reason: HOSPADM

## 2019-06-13 RX ORDER — FAMOTIDINE 40 MG/1
40 TABLET, FILM COATED ORAL DAILY
Status: DISCONTINUED | OUTPATIENT
Start: 2019-06-13 | End: 2019-06-19 | Stop reason: HOSPADM

## 2019-06-13 RX ORDER — NALOXONE HCL 0.4 MG/ML
0.2 VIAL (ML) INJECTION AS NEEDED
Status: DISCONTINUED | OUTPATIENT
Start: 2019-06-13 | End: 2019-06-13 | Stop reason: HOSPADM

## 2019-06-13 RX ORDER — TRANEXAMIC ACID 100 MG/ML
INJECTION, SOLUTION INTRAVENOUS AS NEEDED
Status: DISCONTINUED | OUTPATIENT
Start: 2019-06-13 | End: 2019-06-13 | Stop reason: HOSPADM

## 2019-06-13 RX ORDER — EPHEDRINE SULFATE 50 MG/ML
INJECTION, SOLUTION INTRAVENOUS AS NEEDED
Status: DISCONTINUED | OUTPATIENT
Start: 2019-06-13 | End: 2019-06-13 | Stop reason: SURG

## 2019-06-13 RX ORDER — HYDROCODONE BITARTRATE AND ACETAMINOPHEN 5; 325 MG/1; MG/1
2 TABLET ORAL EVERY 4 HOURS PRN
Status: DISCONTINUED | OUTPATIENT
Start: 2019-06-13 | End: 2019-06-14

## 2019-06-13 RX ORDER — FENTANYL CITRATE 50 UG/ML
50 INJECTION, SOLUTION INTRAMUSCULAR; INTRAVENOUS
Status: DISCONTINUED | OUTPATIENT
Start: 2019-06-13 | End: 2019-06-13

## 2019-06-13 RX ORDER — CEFAZOLIN SODIUM 2 G/100ML
2 INJECTION, SOLUTION INTRAVENOUS ONCE
Status: DISCONTINUED | OUTPATIENT
Start: 2019-06-13 | End: 2019-06-13 | Stop reason: HOSPADM

## 2019-06-13 RX ORDER — HYDROCODONE BITARTRATE AND ACETAMINOPHEN 5; 325 MG/1; MG/1
2 TABLET ORAL EVERY 4 HOURS PRN
Status: CANCELLED | OUTPATIENT
Start: 2019-06-13 | End: 2019-06-23

## 2019-06-13 RX ORDER — ONDANSETRON 4 MG/1
4 TABLET, FILM COATED ORAL EVERY 6 HOURS PRN
Status: DISCONTINUED | OUTPATIENT
Start: 2019-06-13 | End: 2019-06-19 | Stop reason: HOSPADM

## 2019-06-13 RX ORDER — FENTANYL CITRATE 50 UG/ML
INJECTION, SOLUTION INTRAMUSCULAR; INTRAVENOUS
Status: COMPLETED
Start: 2019-06-13 | End: 2019-06-13

## 2019-06-13 RX ORDER — HYDROMORPHONE HYDROCHLORIDE 1 MG/ML
0.5 INJECTION, SOLUTION INTRAMUSCULAR; INTRAVENOUS; SUBCUTANEOUS
Status: DISCONTINUED | OUTPATIENT
Start: 2019-06-13 | End: 2019-06-13 | Stop reason: HOSPADM

## 2019-06-13 RX ORDER — ROCURONIUM BROMIDE 10 MG/ML
INJECTION, SOLUTION INTRAVENOUS AS NEEDED
Status: DISCONTINUED | OUTPATIENT
Start: 2019-06-13 | End: 2019-06-13 | Stop reason: SURG

## 2019-06-13 RX ORDER — VENLAFAXINE HYDROCHLORIDE 150 MG/1
150 CAPSULE, EXTENDED RELEASE ORAL
Status: DISCONTINUED | OUTPATIENT
Start: 2019-06-14 | End: 2019-06-19 | Stop reason: HOSPADM

## 2019-06-13 RX ORDER — HYDRALAZINE HYDROCHLORIDE 20 MG/ML
5 INJECTION INTRAMUSCULAR; INTRAVENOUS
Status: DISCONTINUED | OUTPATIENT
Start: 2019-06-13 | End: 2019-06-13 | Stop reason: HOSPADM

## 2019-06-13 RX ORDER — FENTANYL CITRATE 50 UG/ML
50 INJECTION, SOLUTION INTRAMUSCULAR; INTRAVENOUS
Status: DISCONTINUED | OUTPATIENT
Start: 2019-06-13 | End: 2019-06-13 | Stop reason: HOSPADM

## 2019-06-13 RX ORDER — SODIUM CHLORIDE 0.9 % (FLUSH) 0.9 %
1-10 SYRINGE (ML) INJECTION AS NEEDED
Status: DISCONTINUED | OUTPATIENT
Start: 2019-06-13 | End: 2019-06-19 | Stop reason: HOSPADM

## 2019-06-13 RX ORDER — DIPHENHYDRAMINE HCL 25 MG
25 CAPSULE ORAL
Status: DISCONTINUED | OUTPATIENT
Start: 2019-06-13 | End: 2019-06-13 | Stop reason: HOSPADM

## 2019-06-13 RX ORDER — DOCUSATE SODIUM 100 MG/1
100 CAPSULE, LIQUID FILLED ORAL 2 TIMES DAILY
Status: DISCONTINUED | OUTPATIENT
Start: 2019-06-13 | End: 2019-06-19 | Stop reason: HOSPADM

## 2019-06-13 RX ORDER — EPHEDRINE SULFATE 50 MG/ML
5 INJECTION, SOLUTION INTRAVENOUS ONCE AS NEEDED
Status: DISCONTINUED | OUTPATIENT
Start: 2019-06-13 | End: 2019-06-13 | Stop reason: HOSPADM

## 2019-06-13 RX ORDER — LEVOTHYROXINE SODIUM 0.03 MG/1
25 TABLET ORAL DAILY
Status: DISCONTINUED | OUTPATIENT
Start: 2019-06-14 | End: 2019-06-14 | Stop reason: SDUPTHER

## 2019-06-13 RX ORDER — PROMETHAZINE HYDROCHLORIDE 25 MG/1
25 SUPPOSITORY RECTAL ONCE AS NEEDED
Status: DISCONTINUED | OUTPATIENT
Start: 2019-06-13 | End: 2019-06-13 | Stop reason: HOSPADM

## 2019-06-13 RX ORDER — PROMETHAZINE HYDROCHLORIDE 25 MG/ML
12.5 INJECTION, SOLUTION INTRAMUSCULAR; INTRAVENOUS ONCE AS NEEDED
Status: DISCONTINUED | OUTPATIENT
Start: 2019-06-13 | End: 2019-06-13 | Stop reason: HOSPADM

## 2019-06-13 RX ORDER — ONDANSETRON 2 MG/ML
4 INJECTION INTRAMUSCULAR; INTRAVENOUS ONCE AS NEEDED
Status: COMPLETED | OUTPATIENT
Start: 2019-06-13 | End: 2019-06-13

## 2019-06-13 RX ORDER — DIPHENHYDRAMINE HYDROCHLORIDE 50 MG/ML
12.5 INJECTION INTRAMUSCULAR; INTRAVENOUS
Status: DISCONTINUED | OUTPATIENT
Start: 2019-06-13 | End: 2019-06-13 | Stop reason: HOSPADM

## 2019-06-13 RX ORDER — METOPROLOL SUCCINATE 25 MG/1
25 TABLET, EXTENDED RELEASE ORAL NIGHTLY
Status: DISCONTINUED | OUTPATIENT
Start: 2019-06-14 | End: 2019-06-14 | Stop reason: SDUPTHER

## 2019-06-13 RX ORDER — SODIUM CHLORIDE 0.9 % (FLUSH) 0.9 %
3 SYRINGE (ML) INJECTION EVERY 12 HOURS SCHEDULED
Status: DISCONTINUED | OUTPATIENT
Start: 2019-06-13 | End: 2019-06-19 | Stop reason: HOSPADM

## 2019-06-13 RX ORDER — OXYCODONE AND ACETAMINOPHEN 7.5; 325 MG/1; MG/1
1 TABLET ORAL ONCE AS NEEDED
Status: DISCONTINUED | OUTPATIENT
Start: 2019-06-13 | End: 2019-06-13 | Stop reason: HOSPADM

## 2019-06-13 RX ORDER — LOSARTAN POTASSIUM 25 MG/1
25 TABLET ORAL
Status: DISCONTINUED | OUTPATIENT
Start: 2019-06-14 | End: 2019-06-19 | Stop reason: HOSPADM

## 2019-06-13 RX ORDER — FAMOTIDINE 10 MG/ML
20 INJECTION, SOLUTION INTRAVENOUS ONCE
Status: COMPLETED | OUTPATIENT
Start: 2019-06-13 | End: 2019-06-13

## 2019-06-13 RX ORDER — PRAMIPEXOLE DIHYDROCHLORIDE 1.5 MG/1
1.5 TABLET ORAL NIGHTLY
Status: DISCONTINUED | OUTPATIENT
Start: 2019-06-14 | End: 2019-06-19 | Stop reason: HOSPADM

## 2019-06-13 RX ORDER — HYDROCODONE BITARTRATE AND ACETAMINOPHEN 7.5; 325 MG/1; MG/1
1 TABLET ORAL ONCE AS NEEDED
Status: DISCONTINUED | OUTPATIENT
Start: 2019-06-13 | End: 2019-06-13 | Stop reason: HOSPADM

## 2019-06-13 RX ORDER — LIDOCAINE HYDROCHLORIDE 20 MG/ML
INJECTION, SOLUTION INFILTRATION; PERINEURAL AS NEEDED
Status: DISCONTINUED | OUTPATIENT
Start: 2019-06-13 | End: 2019-06-13 | Stop reason: SURG

## 2019-06-13 RX ORDER — ATORVASTATIN CALCIUM 10 MG/1
10 TABLET, FILM COATED ORAL NIGHTLY
Status: DISCONTINUED | OUTPATIENT
Start: 2019-06-14 | End: 2019-06-19 | Stop reason: HOSPADM

## 2019-06-13 RX ORDER — NALOXONE HCL 0.4 MG/ML
0.4 VIAL (ML) INJECTION
Status: DISCONTINUED | OUTPATIENT
Start: 2019-06-13 | End: 2019-06-19 | Stop reason: HOSPADM

## 2019-06-13 RX ORDER — OXYBUTYNIN CHLORIDE 5 MG/1
5 TABLET, EXTENDED RELEASE ORAL DAILY
Status: DISCONTINUED | OUTPATIENT
Start: 2019-06-14 | End: 2019-06-14 | Stop reason: SDUPTHER

## 2019-06-13 RX ORDER — PROPOFOL 10 MG/ML
VIAL (ML) INTRAVENOUS AS NEEDED
Status: DISCONTINUED | OUTPATIENT
Start: 2019-06-13 | End: 2019-06-13 | Stop reason: SURG

## 2019-06-13 RX ORDER — SODIUM CHLORIDE, SODIUM LACTATE, POTASSIUM CHLORIDE, CALCIUM CHLORIDE 600; 310; 30; 20 MG/100ML; MG/100ML; MG/100ML; MG/100ML
9 INJECTION, SOLUTION INTRAVENOUS CONTINUOUS
Status: DISCONTINUED | OUTPATIENT
Start: 2019-06-13 | End: 2019-06-19 | Stop reason: HOSPADM

## 2019-06-13 RX ORDER — LIDOCAINE HYDROCHLORIDE 10 MG/ML
0.5 INJECTION, SOLUTION EPIDURAL; INFILTRATION; INTRACAUDAL; PERINEURAL ONCE AS NEEDED
Status: DISCONTINUED | OUTPATIENT
Start: 2019-06-13 | End: 2019-06-13

## 2019-06-13 RX ORDER — SODIUM CHLORIDE 9 MG/ML
INJECTION, SOLUTION INTRAVENOUS AS NEEDED
Status: DISCONTINUED | OUTPATIENT
Start: 2019-06-13 | End: 2019-06-13 | Stop reason: HOSPADM

## 2019-06-13 RX ORDER — MIDAZOLAM HYDROCHLORIDE 1 MG/ML
1 INJECTION INTRAMUSCULAR; INTRAVENOUS
Status: DISCONTINUED | OUTPATIENT
Start: 2019-06-13 | End: 2019-06-13

## 2019-06-13 RX ORDER — LOSARTAN POTASSIUM 25 MG/1
25 TABLET ORAL DAILY
Status: DISCONTINUED | OUTPATIENT
Start: 2019-06-14 | End: 2019-06-14 | Stop reason: SDUPTHER

## 2019-06-13 RX ORDER — ACETAMINOPHEN 325 MG/1
650 TABLET ORAL ONCE AS NEEDED
Status: DISCONTINUED | OUTPATIENT
Start: 2019-06-13 | End: 2019-06-13 | Stop reason: HOSPADM

## 2019-06-13 RX ORDER — LEVOTHYROXINE SODIUM 0.03 MG/1
25 TABLET ORAL
Status: DISCONTINUED | OUTPATIENT
Start: 2019-06-14 | End: 2019-06-19 | Stop reason: HOSPADM

## 2019-06-13 RX ORDER — CEFAZOLIN SODIUM 2 G/100ML
2 INJECTION, SOLUTION INTRAVENOUS EVERY 8 HOURS
Status: COMPLETED | OUTPATIENT
Start: 2019-06-14 | End: 2019-06-14

## 2019-06-13 RX ADMIN — FENTANYL CITRATE 50 MCG: 50 INJECTION INTRAMUSCULAR; INTRAVENOUS at 22:18

## 2019-06-13 RX ADMIN — FENTANYL CITRATE 50 MCG: 50 INJECTION INTRAMUSCULAR; INTRAVENOUS at 19:04

## 2019-06-13 RX ADMIN — ROCURONIUM BROMIDE 50 MG: 10 INJECTION INTRAVENOUS at 19:04

## 2019-06-13 RX ADMIN — SODIUM CHLORIDE, POTASSIUM CHLORIDE, SODIUM LACTATE AND CALCIUM CHLORIDE 9 ML/HR: 600; 310; 30; 20 INJECTION, SOLUTION INTRAVENOUS at 15:22

## 2019-06-13 RX ADMIN — FENTANYL CITRATE 50 MCG: 50 INJECTION INTRAMUSCULAR; INTRAVENOUS at 19:21

## 2019-06-13 RX ADMIN — PROPOFOL 100 MG: 10 INJECTION, EMULSION INTRAVENOUS at 19:04

## 2019-06-13 RX ADMIN — PHENYLEPHRINE HYDROCHLORIDE 100 MCG: 10 INJECTION INTRAVENOUS at 19:15

## 2019-06-13 RX ADMIN — FENTANYL CITRATE 25 MCG: 50 INJECTION INTRAMUSCULAR; INTRAVENOUS at 20:47

## 2019-06-13 RX ADMIN — FENTANYL CITRATE 50 MCG: 50 INJECTION INTRAMUSCULAR; INTRAVENOUS at 20:32

## 2019-06-13 RX ADMIN — FAMOTIDINE 20 MG: 10 INJECTION INTRAVENOUS at 15:21

## 2019-06-13 RX ADMIN — EPHEDRINE SULFATE 5 MG: 50 INJECTION INTRAMUSCULAR; INTRAVENOUS; SUBCUTANEOUS at 19:42

## 2019-06-13 RX ADMIN — FENTANYL CITRATE 25 MCG: 50 INJECTION INTRAMUSCULAR; INTRAVENOUS at 21:05

## 2019-06-13 RX ADMIN — EPHEDRINE SULFATE 5 MG: 50 INJECTION INTRAMUSCULAR; INTRAVENOUS; SUBCUTANEOUS at 19:39

## 2019-06-13 RX ADMIN — FENTANYL CITRATE 25 MCG: 50 INJECTION INTRAMUSCULAR; INTRAVENOUS at 18:47

## 2019-06-13 RX ADMIN — PHENYLEPHRINE HYDROCHLORIDE 100 MCG: 10 INJECTION INTRAVENOUS at 19:26

## 2019-06-13 RX ADMIN — ONDANSETRON 4 MG: 2 INJECTION INTRAMUSCULAR; INTRAVENOUS at 21:57

## 2019-06-13 RX ADMIN — HYDROMORPHONE HYDROCHLORIDE 1 MG: 1 INJECTION, SOLUTION INTRAMUSCULAR; INTRAVENOUS; SUBCUTANEOUS at 09:49

## 2019-06-13 RX ADMIN — FENTANYL CITRATE 25 MCG: 50 INJECTION INTRAMUSCULAR; INTRAVENOUS at 20:39

## 2019-06-13 RX ADMIN — NEOSTIGMINE METHYLSULFATE 2 MG: 1 INJECTION INTRAMUSCULAR; INTRAVENOUS; SUBCUTANEOUS at 21:17

## 2019-06-13 RX ADMIN — EPHEDRINE SULFATE 5 MG: 50 INJECTION INTRAMUSCULAR; INTRAVENOUS; SUBCUTANEOUS at 20:02

## 2019-06-13 RX ADMIN — FENTANYL CITRATE 25 MCG: 50 INJECTION INTRAMUSCULAR; INTRAVENOUS at 21:15

## 2019-06-13 RX ADMIN — HYDROMORPHONE HYDROCHLORIDE 1 MG: 1 INJECTION, SOLUTION INTRAMUSCULAR; INTRAVENOUS; SUBCUTANEOUS at 13:09

## 2019-06-13 RX ADMIN — SODIUM CHLORIDE, POTASSIUM CHLORIDE, SODIUM LACTATE AND CALCIUM CHLORIDE: 600; 310; 30; 20 INJECTION, SOLUTION INTRAVENOUS at 18:59

## 2019-06-13 RX ADMIN — HYDROCODONE BITARTRATE AND ACETAMINOPHEN 1 TABLET: 5; 325 TABLET ORAL at 10:41

## 2019-06-13 RX ADMIN — LIDOCAINE HYDROCHLORIDE 80 MG: 20 INJECTION, SOLUTION INFILTRATION; PERINEURAL at 19:04

## 2019-06-13 RX ADMIN — GLYCOPYRROLATE 0.4 MG: 0.2 INJECTION INTRAMUSCULAR; INTRAVENOUS at 21:17

## 2019-06-13 RX ADMIN — PROPOFOL 100 MG: 10 INJECTION, EMULSION INTRAVENOUS at 19:10

## 2019-06-13 RX ADMIN — FENTANYL CITRATE 25 MCG: 50 INJECTION INTRAMUSCULAR; INTRAVENOUS at 15:20

## 2019-06-13 NOTE — H&P
Orthopaedic Surgery  History & Physical  Dr. SCOTT Matthew Julissa II  [phone]    HPI:  Patient is a 78 y.o. Not  or  female who presents with left knee pain after a fall yesterday.  She presented to an outside hospital with a left femoral periprosthetic fracture.  She has a history of multiple knee arthroplasty surgeries, the most recent of which was a little over a year ago.  She was doing well until this fall.  She was transferred to our facility because this is where she has had her previous care.    MEDICAL HISTORY  Past Medical History:   Diagnosis Date   • Acid reflux disease    • Arthritis    • Atrial fibrillation (CMS/HCC)    • Back pain    • Depression    • Diabetes mellitus (CMS/HCC)    • Disease of thyroid gland    • Dyslipidemia    • Glaucoma     left eye   • Hearing impaired     hearing aides   • Hiatal hernia    • High cholesterol    • History of transfusion    • History of tuberculosis     IN 1980'S WAS TREATED   • Hypertension    • Kienbock's disease    • Left leg pain    • Osteoporosis    • PONV (postoperative nausea and vomiting)    • RLS (restless legs syndrome)    • Sleep apnea     NO MACHINE   • Stage 3 chronic kidney disease (CMS/HCC)    • Tailbone injury     fracture   • Urinary incontinence     wears pads   ·   Past Surgical History:   Procedure Laterality Date   • CATARACT EXTRACTION Bilateral    • COLONOSCOPY     • HEMORRHOIDECTOMY     • HYSTERECTOMY     • INCISION AND DRAINAGE OF WOUND      on back    • KNEE ARTHROPLASTY Right    • LUMBAR DISCECTOMY Left 12/21/2018    Procedure: LEFT L4-5 MICRO DISCECTOMY;  Surgeon: Adam Coleman DO;  Location: [facility] OR;  Service: Orthopedic Spine   • LUMBAR DISCECTOMY Left 3/29/2019    Procedure: LEFT L4-5 REVISION OF MICRODISCECTOMY;  Surgeon: Adam Coleman DO;  Location: [facility] OR;  Service: Orthopedic Spine   • LUMBAR FUSION     • TOTAL KNEE ARTHROPLASTY REVISION Left     x2   • TOTAL KNEE ARTHROPLASTY REVISION Left 2/13/2018     Procedure: LEFT TOTAL KNEE ARTHROPLASTY REVISION;  Surgeon: Jair Fajardo MD;  Location: [facility] OR;  Service:    • VERTEBROPLASTY     ·   Prior to Admission medications    Medication Sig Start Date End Date Taking? Authorizing Provider   acetaminophen (TYLENOL) 500 MG tablet Take 500 mg by mouth Every 12 (Twelve) Hours As Needed for Mild Pain .    Hayder Nunez MD   atorvastatin (LIPITOR) 10 MG tablet Take 10 mg by mouth Every Night.    Hayder Nunez MD   Calcium Carb-Cholecalciferol (CALCIUM 600 + D PO) Take 1 tablet by mouth 2 (Two) Times a Day.    Hayder Nunez MD   COMBIGAN 0.2-0.5 % ophthalmic solution Administer 2 drops to both eyes 2 (Two) Times a Day. 11/4/18   Hayder Nunez MD   Ferrous Gluconate (IRON 27 PO) Take 1 tablet by mouth 2 (Two) Times a Day.    Hayder Nunez MD   levothyroxine (SYNTHROID, LEVOTHROID) 25 MCG tablet Take 25 mcg by mouth Daily.    Hayder Nunez MD   losartan (COZAAR) 25 MG tablet Take 25 mg by mouth Daily.    Hayder Nunez MD   metFORMIN (GLUCOPHAGE) 500 MG tablet Take 500 mg by mouth 2 (Two) Times a Day With Meals.    Hayder Nunez MD   metoprolol succinate XL (TOPROL-XL) 50 MG 24 hr tablet Take 25 mg by mouth Every Night.    Hayder Nunez MD   Mirabegron ER (MYRBETRIQ) 25 MG tablet sustained-release 24 hour 24 hr tablet Take 50 mg by mouth Every Morning.    Hayder Nunez MD   Multiple Vitamins-Minerals (MULTIVITAMIN ADULTS PO) Take 1 capsule by mouth Daily.    Hayder Nunez MD   naproxen (NAPROSYN) 500 MG tablet Take 500 mg by mouth 2 (Two) Times a Day With Meals. HOLDING FOR SURGERY    Hayder Nunez MD   omeprazole (priLOSEC) 20 MG capsule Take 20 mg by mouth Daily.    Hayder Nunez MD   oxyCODONE-acetaminophen (PERCOCET) 7.5-325 MG per tablet One tablet every 4 to 6 hours as needed for pain 3/29/19   Adam Coleman DO   pramipexole (MIRAPEX) 1.5 MG tablet Take  1.5 mg by mouth Every Night.    Hayder Nunez MD   solifenacin (VESICARE) 5 MG tablet Take 5 mg by mouth Every Night.    Hayder Nunez MD   spironolactone (ALDACTONE) 25 MG tablet Take 25 mg by mouth Daily. mon - sat    Hayder Nunez MD   VENLAFAXINE HCL ER PO Take 150 mg by mouth Daily.    Hayder Nunez MD   vitamin C (ASCORBIC ACID) 500 MG tablet Take 500 mg by mouth Daily.    Hayder Nunez MD   ·   Allergies   Allergen Reactions   • Tetanus Toxoids Swelling   ·   ·   · There is no immunization history on file for this patient.  Social History     Tobacco Use   • Smoking status: Never Smoker   • Smokeless tobacco: Never Used   Substance Use Topics   • Alcohol use: No   ·    Social History     Substance and Sexual Activity   Drug Use No   ·     REVIEW OF SYSTEMS:  · Head: negative for headache  · Respiratory: negative for shortness of breath.   · Cardiovascular: negative for chest pain.   · Gastrointestinal: negative abdominal pain.   · Neurological: negative for LOC  · Psychiatric/Behavioral: negative for memory loss.   · All other systems reviewed and are negative    VITALS: /69 (BP Location: Left arm, Patient Position: Lying)   Pulse 63   Temp 97.4 °F (36.3 °C) (Oral)   Resp 16   SpO2 95%  There is no height or weight on file to calculate BMI.    PHYSICAL EXAM:   · CONSTITUTIONAL: A&Ox3, No acute distress  · LUNGS: Equal chest rise, no shortness of air  · CARDIOVASCULAR: palpable peripheral pulses  · SKIN: no skin lesions in the area examined  · LYMPH: no lymphadenopathy in the area examined  · EXTREMITY: Left Lower Extremity  · Tenderness to Palpation: Tenderness to palpation at the Knee  · Gross Deformity: The knee is grossly swollen  · Pulses:  Brisk Capillary Refill  · Sensation: Intact to Saphenous, Sural, Deep Peroneal, Superficial Peroneal, and Tibial Nerves and grossly throughout extremity  · Motor: 5/5 EHL/FHL/TA/GS motor complexes    RADIOLOGY REVIEW:    Xr Femur 2 View Left    Result Date: 6/13/2019  Oblique transverse periprosthetic femoral fracture just distal to the proximal margin of the femoral stem with anterolateral angulation in a patient with a cemented constrained left total knee arthroplasty.  This report was finalized on 6/13/2019 9:45 AM by Dr. Gordon Song M.D.      Xr Knee 1 Or 2 View Left    Result Date: 6/13/2019  Oblique transverse periprosthetic femoral fracture just distal to the proximal margin of the femoral stem with anterolateral angulation in a patient with a cemented constrained left total knee arthroplasty.  This report was finalized on 6/13/2019 9:45 AM by Dr. Gordon Song M.D.      Xr Chest 1 View    Result Date: 6/13/2019  Subsegmental atelectasis in the right midlung and left lung base. No other evidence for active disease in the chest.  This report was finalized on 6/13/2019 11:50 AM by Dr. Gordon Song M.D.        LABS:   Results for the past 24 hours:   Recent Results (from the past 24 hour(s))   Type & Screen    Collection Time: 06/13/19 10:08 AM   Result Value Ref Range    ABO Type A     RH type Positive     Antibody Screen Negative     T&S Expiration Date 6/16/2019 11:59:59 PM    Protime-INR    Collection Time: 06/13/19 10:08 AM   Result Value Ref Range    Protime 14.0 11.7 - 14.2 Seconds    INR 1.11 (H) 0.90 - 1.10   CBC Auto Differential    Collection Time: 06/13/19 10:08 AM   Result Value Ref Range    WBC 10.17 3.40 - 10.80 10*3/mm3    RBC 3.58 (L) 3.77 - 5.28 10*6/mm3    Hemoglobin 11.0 (L) 12.0 - 15.9 g/dL    Hematocrit 34.3 34.0 - 46.6 %    MCV 95.8 79.0 - 97.0 fL    MCH 30.7 26.6 - 33.0 pg    MCHC 32.1 31.5 - 35.7 g/dL    RDW 12.9 12.3 - 15.4 %    RDW-SD 44.8 37.0 - 54.0 fl    MPV 10.8 6.0 - 12.0 fL    Platelets 264 140 - 450 10*3/mm3    Neutrophil % 59.3 42.7 - 76.0 %    Lymphocyte % 30.5 19.6 - 45.3 %    Monocyte % 7.7 5.0 - 12.0 %    Eosinophil % 1.9 0.3 - 6.2 %    Basophil % 0.2 0.0 - 1.5 %    Immature  Grans % 0.4 0.0 - 0.5 %    Neutrophils, Absolute 6.04 1.70 - 7.00 10*3/mm3    Lymphocytes, Absolute 3.10 0.70 - 3.10 10*3/mm3    Monocytes, Absolute 0.78 0.10 - 0.90 10*3/mm3    Eosinophils, Absolute 0.19 0.00 - 0.40 10*3/mm3    Basophils, Absolute 0.02 0.00 - 0.20 10*3/mm3    Immature Grans, Absolute 0.04 0.00 - 0.05 10*3/mm3   Comprehensive Metabolic Panel    Collection Time: 06/13/19 10:08 AM   Result Value Ref Range    Glucose 109 (H) 65 - 99 mg/dL    BUN 13 8 - 23 mg/dL    Creatinine 1.01 (H) 0.57 - 1.00 mg/dL    Sodium 144 136 - 145 mmol/L    Potassium 3.2 (L) 3.5 - 5.2 mmol/L    Chloride 108 (H) 98 - 107 mmol/L    CO2 23.1 22.0 - 29.0 mmol/L    Calcium 9.0 8.6 - 10.5 mg/dL    Total Protein 6.5 6.0 - 8.5 g/dL    Albumin 3.60 3.50 - 5.20 g/dL    ALT (SGPT) 10 1 - 33 U/L    AST (SGOT) 16 1 - 32 U/L    Alkaline Phosphatase 93 39 - 117 U/L    Total Bilirubin 0.4 0.2 - 1.2 mg/dL    eGFR Non African Amer 53 (L) >60 mL/min/1.73    Globulin 2.9 gm/dL    A/G Ratio 1.2 g/dL    BUN/Creatinine Ratio 12.9 7.0 - 25.0    Anion Gap 12.9 mmol/L   Troponin    Collection Time: 06/13/19 10:08 AM   Result Value Ref Range    Troponin T <0.010 0.000 - 0.030 ng/mL       IMPRESSION:  Patient is a 78 y.o. Not  or  female with left total knee arthroplasty, revision with hinged, with fracture proximal to the cemented stem    PLAN:   · Admited to: Malick Costa II, MD  · Diet: NPO Now  · Weight Bearing:Left Lower Extremity Non Weight Bearing  · Labs: None additional needed  · Imaging: None additional needed  · Surgery: Plan distal femoral replacement  · Consent: The risks and benefits of operative versus nonoperative treatment were discussed.  The patient elected to undergo operative treatment of their injury.  The risks discussed included but were not limited to blood clots, MI, stroke, other medical complications, infection, damage to neurovascular structures,, fracture, , loss of range of motion, stiffness,,  infection, need for further procedures, and and risk of anesthesia..  No guarantees were made   · Disposition: I had a thorough discussion with the patient about plate ORIF versus revision knee arthroplasty with a distal femoral replacement.  She adamantly wishes to have a distal femoral replacement, which I said is a very big surgery and does come with significant risks including significant wrist to neurovascular structures as well as infection.  She understands this but does not think that she will live through 3 months of nonweightbearing with the known possibility that the femur may not heal given the cement mantle within the femur.  I also offered to get her previous surgeon involved in this case, and she says she was fine with whoever can take care of her as long as it can be done soon because she is in excruciating pain.  I have made an attempt to contact the operating surgeon, but as of yet have not heard back.  I will plan to proceed with surgery today and less the original operating surgeon wishes to take over care.     Malick Costa II, MD  Orthopaedic Surgery  [facility]

## 2019-06-13 NOTE — ANESTHESIA PROCEDURE NOTES
Airway  Urgency: elective    Airway not difficult    General Information and Staff    Patient location during procedure: OR  CRNA: Irving Sow CRNA    Indications and Patient Condition  Indications for airway management: airway protection    Preoxygenated: yes  MILS maintained throughout  Mask difficulty assessment: 1 - vent by mask    Final Airway Details  Final airway type: endotracheal airway      Successful airway: ETT  Cuffed: yes   Successful intubation technique: direct laryngoscopy  Blade: Robinson  Blade size: 3  ETT size (mm): 7.0  Cormack-Lehane Classification: grade I - full view of glottis  Placement verified by: chest auscultation   Measured from: teeth  ETT to teeth (cm): 21  Number of attempts at approach: 1

## 2019-06-13 NOTE — CONSULTS
CONSULT NOTE    INTERNAL MEDICINE   [facility]       Patient Identification:  Name: Lilia Becerra  Age: 78 y.o.  Sex: female  :  1940  MRN: [id number]             Date of Consultation: Left knee pain after fall with resulting periprosthetic fracture      Primary Care Physician: Leo Lomas MD                               Requesting Physician: Malick Costa  Reason for Consultation: Preoperative clearance    Chief Complaint: Left knee pain status post fall    History of Present Illness:   Mrs Becerra is a wonderfully pleasant 78-year-old female ultimately suffered a mechanical fall onto her left knee the other day.  Ultimately she had previous surgical intervention on that knee and she resulted in a femoral periprosthetic fracture.  [facility] was consulted for OR clearance.  I evaluated the patient earlier in the morning and ordered CBC CMP coag panel A1c chest x-ray and EKG.  I was waiting on the CMP to come back and now it has resulted.  Patient is followed by cardiologist in which she sees annually for a past history of atrial fibrillation that does not appear to be on any previous anticoagulation.  She states she has been doing just fine in her usual health preceding this mechanical fall.  She denies any previous issues with chest pain shortness of breath syncope fever chills night sweats or any recent type of infection.  She admits to left knee pain status post that fall.      Past Medical History:  Past Medical History:   Diagnosis Date   • Acid reflux disease    • Arthritis    • Atrial fibrillation (CMS/HCC)    • Back pain    • Depression    • Diabetes mellitus (CMS/HCC)    • Disease of thyroid gland    • Dyslipidemia    • Glaucoma     left eye   • Hearing impaired     hearing aides   • Hiatal hernia    • High cholesterol    • History of transfusion    • History of tuberculosis     IN  WAS TREATED   • Hypertension    • Kienbock's disease    • Left leg pain    • Osteoporosis     • PONV (postoperative nausea and vomiting)    • RLS (restless legs syndrome)    • Sleep apnea     NO MACHINE   • Stage 3 chronic kidney disease (CMS/HCC)    • Tailbone injury     fracture   • Urinary incontinence     wears pads     Past Surgical History:  Past Surgical History:   Procedure Laterality Date   • CATARACT EXTRACTION Bilateral    • COLONOSCOPY     • HEMORRHOIDECTOMY     • HYSTERECTOMY     • INCISION AND DRAINAGE OF WOUND      on back    • KNEE ARTHROPLASTY Right    • LUMBAR DISCECTOMY Left 12/21/2018    Procedure: LEFT L4-5 MICRO DISCECTOMY;  Surgeon: Adam Coleman DO;  Location: [facility] OR;  Service: Orthopedic Spine   • LUMBAR DISCECTOMY Left 3/29/2019    Procedure: LEFT L4-5 REVISION OF MICRODISCECTOMY;  Surgeon: Adam Coleman DO;  Location: [facility] OR;  Service: Orthopedic Spine   • LUMBAR FUSION     • TOTAL KNEE ARTHROPLASTY REVISION Left     x2   • TOTAL KNEE ARTHROPLASTY REVISION Left 2/13/2018    Procedure: LEFT TOTAL KNEE ARTHROPLASTY REVISION;  Surgeon: Jair Fajardo MD;  Location: [facility] OR;  Service:    • VERTEBROPLASTY        Home Meds:  Medications Prior to Admission   Medication Sig Dispense Refill Last Dose   • acetaminophen (TYLENOL) 500 MG tablet Take 500 mg by mouth Every 12 (Twelve) Hours As Needed for Mild Pain .   More than a month at Unknown time   • atorvastatin (LIPITOR) 10 MG tablet Take 10 mg by mouth Every Night.   3/27/2019 at Unknown time   • Calcium Carb-Cholecalciferol (CALCIUM 600 + D PO) Take 1 tablet by mouth 2 (Two) Times a Day.   3/27/2019 at Unknown time   • COMBIGAN 0.2-0.5 % ophthalmic solution Administer 2 drops to both eyes 2 (Two) Times a Day.  2 3/29/2019 at Unknown time   • Ferrous Gluconate (IRON 27 PO) Take 1 tablet by mouth 2 (Two) Times a Day.   3/28/2019 at Unknown time   • levothyroxine (SYNTHROID, LEVOTHROID) 25 MCG tablet Take 25 mcg by mouth Daily.   3/27/2019 at Unknown time   • losartan (COZAAR) 25 MG tablet Take 25 mg by  mouth Daily.   3/27/2019   • metFORMIN (GLUCOPHAGE) 500 MG tablet Take 500 mg by mouth 2 (Two) Times a Day With Meals.   3/27/2019 at Unknown time   • metoprolol succinate XL (TOPROL-XL) 50 MG 24 hr tablet Take 25 mg by mouth Every Night.   3/29/2019 at 0400   • Mirabegron ER (MYRBETRIQ) 25 MG tablet sustained-release 24 hour 24 hr tablet Take 50 mg by mouth Every Morning.   3/29/2019 at Unknown time   • Multiple Vitamins-Minerals (MULTIVITAMIN ADULTS PO) Take 1 capsule by mouth Daily.   3/27/2019   • naproxen (NAPROSYN) 500 MG tablet Take 500 mg by mouth 2 (Two) Times a Day With Meals. HOLDING FOR SURGERY   3/27/2019 at Unknown time   • omeprazole (priLOSEC) 20 MG capsule Take 20 mg by mouth Daily.   3/29/2019 at Unknown time   • oxyCODONE-acetaminophen (PERCOCET) 7.5-325 MG per tablet One tablet every 4 to 6 hours as needed for pain 40 tablet 0    • pramipexole (MIRAPEX) 1.5 MG tablet Take 1.5 mg by mouth Every Night.   3/27/2019 at Unknown time   • solifenacin (VESICARE) 5 MG tablet Take 5 mg by mouth Every Night.   3/27/2019 at Unknown time   • spironolactone (ALDACTONE) 25 MG tablet Take 25 mg by mouth Daily. mon - sat   3/27/2019   • VENLAFAXINE HCL ER PO Take 150 mg by mouth Daily.   3/27/2019   • vitamin C (ASCORBIC ACID) 500 MG tablet Take 500 mg by mouth Daily.   3/27/2019     Current Meds:     Current Facility-Administered Medications:   •  [MAR Hold] docusate sodium (COLACE) capsule 100 mg, 100 mg, Oral, BID, Malick Costa II, MD  •  famotidine (PEPCID) tablet 40 mg, 40 mg, Oral, Daily, Malick Costa II, MD  •  [MAR Hold] HYDROcodone-acetaminophen (NORCO) 5-325 MG per tablet 1 tablet, 1 tablet, Oral, Q4H PRN, Malick Costa II, MD, 1 tablet at 06/13/19 1041  •  [MAR Hold] HYDROmorphone (DILAUDID) injection 1 mg, 1 mg, Intramuscular, Q2H PRN, 1 mg at 06/13/19 1309 **AND** [MAR Hold] naloxone (NARCAN) injection 0.4 mg, 0.4 mg, Intravenous, Q5 Min PRN, Malick Costa  Isaiah STOUT MD  •  [MAR Hold] ondansetron (ZOFRAN) tablet 4 mg, 4 mg, Oral, Q6H PRN, Malick Costa II, MD  •  [MAR Hold] Scopolamine (TRANSDERM-SCOP) 1.5 MG/3DAYS patch 1 patch, 1 patch, Transdermal, Q72H, Gerber Clayton MD  •  [MAR Hold] sodium chloride 0.9 % flush 1-10 mL, 1-10 mL, Intravenous, PRN, Malick Costa II, MD  •  [MAR Hold] sodium chloride 0.9 % flush 3 mL, 3 mL, Intravenous, Q12H, Malick Costa II, MD  Allergies:  Allergies   Allergen Reactions   • Tetanus Toxoids Swelling     Social History:   Social History     Socioeconomic History   • Marital status:      Spouse name: Not on file   • Number of children: Not on file   • Years of education: Not on file   • Highest education level: Not on file   Tobacco Use   • Smoking status: Never Smoker   • Smokeless tobacco: Never Used   Substance and Sexual Activity   • Alcohol use: No   • Drug use: No   • Sexual activity: Defer     Family History:  Family History   Problem Relation Age of Onset   • Malig Hyperthermia Neg Hx           Review of Systems  See history of present illness and past medical history.  Patient denies fever chills night sweats headache head trauma loss of consciousness or function.  Denies any focal changes to her vision smell taste or sound nausea vomiting.  Denies problems swallowing chest pain palpitations cough shortness of breath abdominal pain dysuria any recent bleeding issues admits to left knee pain status post fall.  Denies missing any routine medications. Remainder of ROS is negative.      Vitals:   /98 (BP Location: Left arm, Patient Position: Lying)   Pulse 65   Temp 97.5 °F (36.4 °C) (Oral)   Resp 18   SpO2 97%   I/O: No intake or output data in the 24 hours ending 06/13/19 1502  Exam:  General Appearance:    Alert, cooperative, no distress, AOx3, little sedation from pain meds but otherwise interactive and pleasant and conversational though hard of hearing   Head:     Normocephalic, without obvious abnormality, atraumatic   Eyes:    PERRL, conjunctiva/corneas clear, EOM's intact, both eyes   Ears:    Normal external ear canals, both ears   Nose:   Nares normal, septum midline, mucosa normal, no drainage    or sinus tenderness   Throat:   Lips, tongue, gums normal; oral mucosa pink and moist   Neck:   Supple, no LAD or point tenderness or JVD       Lungs:     Clear to auscultation bilaterally, respirations unlabored   Chest Wall:    No tenderness or deformity    Heart:    Regular rate and rhythm, S1 and S2 normal, + murmur   Abdomen:     Soft, non-tender, bowel sounds active all four quadrants,     no masses   Extremities:  Left knee tender to touch and swollen, no cyanosis but appears to have chronic edematous changes   Pulses:   Pulses palpable in all extremities; symmetric all extremities       Neurologic:   CNII-XII intact, no focal deficits noted       Data Review:  Labs in chart were reviewed.      Results from last 7 days   Lab Units 06/13/19  1008   HEMOGLOBIN A1C % 5.81*       Imaging Results (last 7 days)     Procedure Component Value Units Date/Time    XR Chest 1 View [[id number]] Collected:  06/13/19 1142     Updated:  06/13/19 1153    Narrative:       AP CHEST     HISTORY: 78-year-old female preoperative exam.     COMPARISON: PA and lateral chest 03/28/2019, 12/13/2018.     FINDINGS: Heart size is within normal limits. Aortic vascular  calcification is present and the thoracic aorta is tortuous. There is  linear subsegmental atelectasis within the right mid lung along the  minor fissure. Where it appears to be a skinfold overlies the right lung  base laterally. There are right suprahilar/paratracheal calcified  granulomas. There is minor subsegmental atelectasis in the left lung  base. There is no evidence for pulmonary edema or pleural effusion.  Fusion rods and pedicle screws are present bridging the thoracolumbar  junction.       Impression:       Subsegmental  atelectasis in the right midlung and left lung  base. No other evidence for active disease in the chest.     This report was finalized on 6/13/2019 11:50 AM by Dr. Gordon Song M.D.       XR Knee 1 or 2 View Left [[id number]] Collected:  06/13/19 0941     Updated:  06/13/19 0948    Narrative:       LEFT FEMUR, LEFT KNEE     HISTORY: 78-year-old with trauma and left femoral fracture.      AP AND CROSSTABLE LATERAL VIEWS OF THE LEFT FEMUR AND LEFT KNEE: There  is a constrained left total knee arthroplasty. Periprosthetic fracture  is present adjacent to the proximal stem of the left femoral component.  Fracture is just distal to the proximal aspect of the femoral stem and  is associated with anterolateral angulation. Cemented femoral and tibial  components are present and there are wires within the proximal medial  tibial diametaphysis for an old healed proximal tibial diametaphyseal  fracture. Chronic ossification projects medial to the prosthetic medial  femoral condyle.       Impression:       Oblique transverse periprosthetic femoral fracture just  distal to the proximal margin of the femoral stem with anterolateral  angulation in a patient with a cemented constrained left total knee  arthroplasty.     This report was finalized on 6/13/2019 9:45 AM by Dr. Gordon Song M.D.       XR Femur 2 View Left [[id number]] Collected:  06/13/19 0941     Updated:  06/13/19 0948    Narrative:       LEFT FEMUR, LEFT KNEE     HISTORY: 78-year-old with trauma and left femoral fracture.      AP AND CROSSTABLE LATERAL VIEWS OF THE LEFT FEMUR AND LEFT KNEE: There  is a constrained left total knee arthroplasty. Periprosthetic fracture  is present adjacent to the proximal stem of the left femoral component.  Fracture is just distal to the proximal aspect of the femoral stem and  is associated with anterolateral angulation. Cemented femoral and tibial  components are present and there are wires within the proximal medial  tibial  diametaphysis for an old healed proximal tibial diametaphyseal  fracture. Chronic ossification projects medial to the prosthetic medial  femoral condyle.       Impression:       Oblique transverse periprosthetic femoral fracture just  distal to the proximal margin of the femoral stem with anterolateral  angulation in a patient with a cemented constrained left total knee  arthroplasty.     This report was finalized on 6/13/2019 9:45 AM by Dr. Gordon Song M.D.               Assessment:  Active Hospital Problems    Diagnosis  POA   • Femur fracture (CMS/[facility]) [S72.90XA]  Yes      Resolved Hospital Problems   No resolved problems to display.       Plan:    Patient medically cleared for the OR   -Scopolamine written to be placed postoperatively since she has had issues with postoperative nausea and vomiting in the past   -Postop DVT prophylaxis per orthopedics     Hypokalemia -replace with IV fluids and recheck in a.m.    CKD 3 stable    DM2 with an A1c of 5.81 -hold metformin and use sliding scale low-dose    PHx AF - NO AC PTA - needs Metoprolol tonight    CAD/HTN -hold spironolactone for now -Chris was written on remaining BP meds    Patient medically cleared for the OR       Thank you much for the consult -[facility] to follow      Gerber Clayton MD   6/13/2019  3:02 PM

## 2019-06-13 NOTE — ANESTHESIA PREPROCEDURE EVALUATION
Anesthesia Evaluation     history of anesthetic complications:  NPO Solid Status: > 8 hours             Airway   Mallampati: II  TM distance: >3 FB  Neck ROM: full  No difficulty expected  Dental      Comment: Lose right front upper tooth    Pulmonary - normal exam   (+) sleep apnea,   Cardiovascular - normal exam    ECG reviewed  Patient on routine beta blocker and Beta blocker given within 24 hours of surgery    (+) hypertension 2 medications or greater, dysrhythmias Atrial Fib, hyperlipidemia,       Neuro/Psych  (+) psychiatric history,     GI/Hepatic/Renal/Endo    (+)  hiatal hernia, GERD,  renal disease, diabetes mellitus,     Musculoskeletal     (+) back pain,   Abdominal    Substance History      OB/GYN          Other                        Anesthesia Plan    ASA 3     general     intravenous induction   Anesthetic plan, all risks, benefits, and alternatives have been provided, discussed and informed consent has been obtained with: patient.

## 2019-06-14 LAB
ANION GAP SERPL CALCULATED.3IONS-SCNC: 13 MMOL/L
BUN BLD-MCNC: 9 MG/DL (ref 8–23)
BUN/CREAT SERPL: 9.5 (ref 7–25)
CALCIUM SPEC-SCNC: 8.6 MG/DL (ref 8.6–10.5)
CHLORIDE SERPL-SCNC: 104 MMOL/L (ref 98–107)
CO2 SERPL-SCNC: 23 MMOL/L (ref 22–29)
CREAT BLD-MCNC: 0.95 MG/DL (ref 0.57–1)
GFR SERPL CREATININE-BSD FRML MDRD: 57 ML/MIN/1.73
GLUCOSE BLD-MCNC: 143 MG/DL (ref 65–99)
HCT VFR BLD AUTO: 31 % (ref 34–46.6)
HGB BLD-MCNC: 9.9 G/DL (ref 12–15.9)
POTASSIUM BLD-SCNC: 3.5 MMOL/L (ref 3.5–5.2)
SODIUM BLD-SCNC: 140 MMOL/L (ref 136–145)

## 2019-06-14 PROCEDURE — 97110 THERAPEUTIC EXERCISES: CPT

## 2019-06-14 PROCEDURE — 25010000003 CEFAZOLIN IN DEXTROSE 2-4 GM/100ML-% SOLUTION: Performed by: NURSE PRACTITIONER

## 2019-06-14 PROCEDURE — 85014 HEMATOCRIT: CPT | Performed by: ORTHOPAEDIC SURGERY

## 2019-06-14 PROCEDURE — 85018 HEMOGLOBIN: CPT | Performed by: ORTHOPAEDIC SURGERY

## 2019-06-14 PROCEDURE — 80048 BASIC METABOLIC PNL TOTAL CA: CPT | Performed by: ORTHOPAEDIC SURGERY

## 2019-06-14 PROCEDURE — 97161 PT EVAL LOW COMPLEX 20 MIN: CPT

## 2019-06-14 RX ORDER — OXYCODONE HYDROCHLORIDE AND ACETAMINOPHEN 5; 325 MG/1; MG/1
2 TABLET ORAL EVERY 4 HOURS PRN
Status: DISCONTINUED | OUTPATIENT
Start: 2019-06-14 | End: 2019-06-19 | Stop reason: HOSPADM

## 2019-06-14 RX ORDER — POTASSIUM CHLORIDE 750 MG/1
40 CAPSULE, EXTENDED RELEASE ORAL ONCE
Status: COMPLETED | OUTPATIENT
Start: 2019-06-14 | End: 2019-06-14

## 2019-06-14 RX ORDER — SPIRONOLACTONE 25 MG/1
25 TABLET ORAL DAILY
Status: DISCONTINUED | OUTPATIENT
Start: 2019-06-15 | End: 2019-06-19 | Stop reason: HOSPADM

## 2019-06-14 RX ORDER — OXYCODONE HYDROCHLORIDE AND ACETAMINOPHEN 5; 325 MG/1; MG/1
1 TABLET ORAL EVERY 4 HOURS PRN
Status: DISCONTINUED | OUTPATIENT
Start: 2019-06-14 | End: 2019-06-19 | Stop reason: HOSPADM

## 2019-06-14 RX ADMIN — LOSARTAN POTASSIUM 25 MG: 25 TABLET, FILM COATED ORAL at 03:26

## 2019-06-14 RX ADMIN — APIXABAN 2.5 MG: 2.5 TABLET, FILM COATED ORAL at 08:40

## 2019-06-14 RX ADMIN — CEFAZOLIN SODIUM 2 G: 2 INJECTION, SOLUTION INTRAVENOUS at 11:54

## 2019-06-14 RX ADMIN — PRAMIPEXOLE DIHYDROCHLORIDE 1.5 MG: 1.5 TABLET ORAL at 03:26

## 2019-06-14 RX ADMIN — SODIUM CHLORIDE, PRESERVATIVE FREE 3 ML: 5 INJECTION INTRAVENOUS at 20:04

## 2019-06-14 RX ADMIN — VENLAFAXINE HYDROCHLORIDE 150 MG: 150 CAPSULE, EXTENDED RELEASE ORAL at 08:42

## 2019-06-14 RX ADMIN — OXYCODONE HYDROCHLORIDE AND ACETAMINOPHEN 2 TABLET: 5; 325 TABLET ORAL at 07:13

## 2019-06-14 RX ADMIN — METFORMIN HYDROCHLORIDE 500 MG: 500 TABLET ORAL at 08:41

## 2019-06-14 RX ADMIN — METOPROLOL SUCCINATE 25 MG: 25 TABLET, FILM COATED, EXTENDED RELEASE ORAL at 03:26

## 2019-06-14 RX ADMIN — ATORVASTATIN CALCIUM 10 MG: 10 TABLET, FILM COATED ORAL at 03:26

## 2019-06-14 RX ADMIN — DOCUSATE SODIUM 100 MG: 100 CAPSULE, LIQUID FILLED ORAL at 20:04

## 2019-06-14 RX ADMIN — CEFAZOLIN SODIUM 2 G: 2 INJECTION, SOLUTION INTRAVENOUS at 18:59

## 2019-06-14 RX ADMIN — PRAMIPEXOLE DIHYDROCHLORIDE 1.5 MG: 1.5 TABLET ORAL at 20:04

## 2019-06-14 RX ADMIN — HYDROCODONE BITARTRATE AND ACETAMINOPHEN 2 TABLET: 5; 325 TABLET ORAL at 00:14

## 2019-06-14 RX ADMIN — DOCUSATE SODIUM 100 MG: 100 CAPSULE, LIQUID FILLED ORAL at 08:44

## 2019-06-14 RX ADMIN — LEVOTHYROXINE SODIUM 25 MCG: 25 TABLET ORAL at 04:40

## 2019-06-14 RX ADMIN — HYDROCODONE BITARTRATE AND ACETAMINOPHEN 2 TABLET: 5; 325 TABLET ORAL at 03:51

## 2019-06-14 RX ADMIN — POTASSIUM CHLORIDE 40 MEQ: 750 CAPSULE, EXTENDED RELEASE ORAL at 11:54

## 2019-06-14 RX ADMIN — METFORMIN HYDROCHLORIDE 500 MG: 500 TABLET ORAL at 18:19

## 2019-06-14 RX ADMIN — FAMOTIDINE 40 MG: 40 TABLET, FILM COATED ORAL at 08:40

## 2019-06-14 RX ADMIN — ATORVASTATIN CALCIUM 10 MG: 10 TABLET, FILM COATED ORAL at 20:04

## 2019-06-14 RX ADMIN — OXYBUTYNIN CHLORIDE 5 MG: 5 TABLET, EXTENDED RELEASE ORAL at 08:42

## 2019-06-14 RX ADMIN — CEFAZOLIN SODIUM 2 G: 2 INJECTION, SOLUTION INTRAVENOUS at 03:33

## 2019-06-14 RX ADMIN — ONDANSETRON 4 MG: 4 TABLET, FILM COATED ORAL at 09:54

## 2019-06-14 RX ADMIN — SODIUM CHLORIDE, POTASSIUM CHLORIDE, SODIUM LACTATE AND CALCIUM CHLORIDE 100 ML/HR: 600; 310; 30; 20 INJECTION, SOLUTION INTRAVENOUS at 03:26

## 2019-06-14 RX ADMIN — APIXABAN 2.5 MG: 2.5 TABLET, FILM COATED ORAL at 23:09

## 2019-06-14 NOTE — THERAPY EVALUATION
Acute Care - Physical Therapy Initial Evaluation  [facility]     Patient Name: Lilia Becerra  : 1940  MRN: [id number]  Today's Date: 2019   Onset of Illness/Injury or Date of Surgery: 19  Date of Referral to PT: 19         Admit Date: 2019    Visit Dx:     ICD-10-CM ICD-9-CM   1. Difficulty walking R26.2 719.7     Patient Active Problem List   Diagnosis   • Acid reflux disease   • Atrial fibrillation (CMS/HCC)   • Diabetes mellitus (CMS/HCC)   • Disease of thyroid gland   • Sleep apnea   • Essential hypertension   • Postoperative anemia due to acute blood loss (expected)   • Stage 3 chronic kidney disease (CMS/HCC)   • Femur fracture (CMS/HCC)     Past Medical History:   Diagnosis Date   • Acid reflux disease    • Arthritis    • Atrial fibrillation (CMS/HCC)    • Back pain    • Depression    • Diabetes mellitus (CMS/HCC)    • Disease of thyroid gland    • Dyslipidemia    • Glaucoma     left eye   • Hearing impaired     hearing aides   • Hiatal hernia    • High cholesterol    • History of transfusion    • History of tuberculosis     IN  WAS TREATED   • Hypertension    • Kienbock's disease    • Left leg pain    • Osteoporosis    • PONV (postoperative nausea and vomiting)    • RLS (restless legs syndrome)    • Sleep apnea     NO MACHINE   • Stage 3 chronic kidney disease (CMS/HCC)    • Tailbone injury     fracture   • Urinary incontinence     wears pads     Past Surgical History:   Procedure Laterality Date   • CATARACT EXTRACTION Bilateral    • COLONOSCOPY     • HEMORRHOIDECTOMY     • HYSTERECTOMY     • INCISION AND DRAINAGE OF WOUND      on back    • KNEE ARTHROPLASTY Right    • LUMBAR DISCECTOMY Left 2018    Procedure: LEFT L4-5 MICRO DISCECTOMY;  Surgeon: Adam Coleman DO;  Location: [facility] MAIN OR;  Service: Orthopedic Spine   • LUMBAR DISCECTOMY Left 3/29/2019    Procedure: LEFT L4-5 REVISION OF MICRODISCECTOMY;  Surgeon: Adam Coleman DO;  Location: [facility] MAIN OR;   Service: Orthopedic Spine   • LUMBAR FUSION     • TOTAL KNEE ARTHROPLASTY REVISION Left     x2   • TOTAL KNEE ARTHROPLASTY REVISION Left 2/13/2018    Procedure: LEFT TOTAL KNEE ARTHROPLASTY REVISION;  Surgeon: Jair Fajardo MD;  Location: [facility] OR;  Service:    • VERTEBROPLASTY          PT ASSESSMENT (last 12 hours)      Physical Therapy Evaluation     Row Name 06/14/19 8012          PT Evaluation Time/Intention    Subjective Information  complains of;weakness;fatigue;pain  -EF     Document Type  evaluation  -EF     Mode of Treatment  physical therapy  -EF     Patient Effort  good  -EF     Symptoms Noted During/After Treatment  fatigue  -EF     Row Name 06/14/19 1359          General Information    Patient Profile Reviewed?  yes  -EF     Onset of Illness/Injury or Date of Surgery  06/13/19  -EF     Patient Observations  -- sleeping but able to wake  -EF     Patient/Family Observations  supine in bed, IV, O2  -EF     Prior Level of Function  independent: with wx per pt  -EF     Equipment Currently Used at Home  walker, rolling  -EF     Pertinent History of Current Functional Problem  Pt adm after a fall, L distal femur fx above previous TKA, s/p distal femoral compoment replacement.  -EF     Existing Precautions/Restrictions  fall;other (see comments) WBAT L LE  -EF     Row Name 06/14/19 3508          Cognitive Assessment/Intervention- PT/OT    Orientation Status (Cognition)  oriented to;person;place;situation  -EF     Follows Commands (Cognition)  follows one step commands;50-74% accuracy;repetition of directions required  -EF     Row Name 06/14/19 4662          Bed Mobility Assessment/Treatment    Bed Mobility Assessment/Treatment  supine-sit;sit-supine  -EF     Supine-Sit [place] (Bed Mobility)  moderate assist (50% patient effort);verbal cues  -EF     Sit-Supine [place] (Bed Mobility)  moderate assist (50% patient effort);maximum assist (25% patient effort);verbal cues  -EF     Bed  Mobility, Safety Issues  decreased use of arms for pushing/pulling;decreased use of legs for bridging/pushing  -EF     Assistive Device (Bed Mobility)  bed rails;draw sheet;head of bed elevated  -EF     Row Name 06/14/19 [facility]          Transfer Assessment/Treatment    Transfer Assessment/Treatment  sit-stand transfer;stand-sit transfer  -EF     Sit-Stand [place] (Transfers)  maximum assist (25% patient effort);1 person assist  -EF     Stand-Sit [place] (Transfers)  moderate assist (50% patient effort);1 person to manage equipment  -EF     Row Name 06/14/19 [facility]          Sit-Stand Transfer    Assistive Device (Sit-Stand Transfers)  walker, front-wheeled  -EF     Row Name 06/14/19 [facility]          Stand-Sit Transfer    Assistive Device (Stand-Sit Transfers)  walker, front-wheeled  -EF     Row Name 06/14/19 [facility]          Gait/Stairs Assessment/Training    [place] Level (Gait)  unable to assess unable to take steps  -EF     Row Name 06/14/19 1353          General ROM    GENERAL ROM COMMENTS  grossly WFL except R knee ~1/2  -EF     Row Name 06/14/19 1353          Motor Assessment/Intervention    Additional Documentation  Therapeutic Exercise (Group);Therapeutic Exercise Interventions (Group)  -EF     Row Name 06/14/19 [facility]          Therapeutic Exercise    Comment (Therapeutic Exercise)  AAROM L LE AP, QS, HS, hip abd x 10 reps with assist  -EF     Row Name 06/14/19 1353          Pain Assessment    Additional Documentation  Pain Scale: Word Pre/Post-Treatment (Group)  -EF     [facility] Name 06/14/19 2533          Pain Scale: Numbers Pre/Post-Treatment    Pain Location - Side  Left  -EF     Pain Location  knee  -EF     Pain Intervention(s)  Cold applied;Repositioned;Ambulation/increased activity  -EF     Row Name 06/14/19 9373          Pain Scale: Word Pre/Post-Treatment    Pain: Word Scale, Pretreatment  2 - mild pain  -EF     Pain: Word Scale, Post-Treatment  6 - moderate-severe pain  -EF     Pre/Post Treatment Pain  Comment  increased pt with wt bearing  -EF     Row Name             Wound 06/13/19 2125 Left knee incision    Wound - Properties Group Date first assessed: 06/13/19  -VB Time first assessed: 2125  -VB Side: Left  -VB Location: knee  -VB Type: incision  -VB    Row Name 06/14/19 1353          Physical Therapy Clinical Impression    Date of Referral to PT  06/14/19  -EF     Criteria for Skilled Interventions Met (PT Clinical Impression)  yes;treatment indicated  -EF     Rehab Potential (PT Clinical Summary)  good, to achieve stated therapy goals  -EF     Row Name 06/14/19 0833          Physical Therapy Goals    Bed Mobility Goal Selection (PT)  bed mobility, PT goal 1  -EF     Transfer Goal Selection (PT)  transfer, PT goal 2  -EF     Gait Training Goal Selection (PT)  gait training, PT goal 1  -EF     Row Name 06/14/19 1353          Bed Mobility Goal 1 (PT)    Activity/Assistive Device (Bed Mobility Goal 1, PT)  bed mobility activities, all  -EF     [place] Level/Cues Needed (Bed Mobility Goal 1, PT)  minimum assist (75% or more patient effort)  -EF     Time Frame (Bed Mobility Goal 1, PT)  1 week  -EF     Row Name 06/14/19 2763          Transfer Goal 2 (PT)    Activity/Assistive Device (Transfer Goal 2, PT)  transfers, all;walker, rolling  -EF     [place] Level/Cues Needed (Transfer Goal 2, PT)  minimum assist (75% or more patient effort);2 person assist  -EF     Time Frame (Transfer Goal 2, PT)  1 week  -EF     Row Name 06/14/19 4753          Gait Training Goal 1 (PT)    Activity/Assistive Device (Gait Training Goal 1, PT)  gait (walking locomotion);assistive device use;walker, rolling  -EF     [place] Level (Gait Training Goal 1, PT)  minimum assist (75% or more patient effort);2 person assist  -EF     Distance (Gait Goal 1, PT)  30  -EF     Time Frame (Gait Training Goal 1, PT)  1 week  -EF     Row Name 06/14/19 2293          Positioning and Restraints    Pre-Treatment Position  in bed  -EF     Post  Treatment Position  bed  -EF     In Bed  supine;call light within reach;encouraged to call for assist;exit alarm on;heels elevated ice reapplied  -EF       User Key  (r) = Recorded By, (t) = Taken By, (c) = Cosigned By    Initials Name Provider Type    EF Brunilda Tracy PT Physical Therapist    Ivy Bentley, RN Registered Nurse        Physical Therapy Education     Title: PT OT SLP Therapies (Done)     Topic: Physical Therapy (Done)     Point: Mobility training (Done)     Learning Progress Summary           Patient Acceptance, E, VU,NR by  at 6/14/2019  2:00 PM                   Point: Home exercise program (Done)     Learning Progress Summary           Patient Acceptance, E, VU,NR by  at 6/14/2019  2:00 PM                   Point: Body mechanics (Done)     Learning Progress Summary           Patient Acceptance, E, VU,NR by  at 6/14/2019  2:00 PM                   Point: Precautions (Done)     Learning Progress Summary           Patient Acceptance, E, VU,NR by EF at 6/14/2019  2:00 PM                               User Key     Initials Effective Dates Name Provider Type Discipline     06/08/18 -  Brunilda Tracy PT Physical Therapist PT              PT Recommendation and Plan  Anticipated Discharge Disposition (PT): skilled nursing facility  Therapy Frequency (PT Clinical Impression): daily  Outcome Summary/Treatment Plan (PT)  Anticipated Discharge Disposition (PT): skilled nursing facility  Plan of Care Reviewed With: patient  Outcome Summary: Pt is 77 yo female adm after fall with distal femur fx above previous L TKA. s/p distal femoral component replacement. Pt presents with decreased bed mob, txfs, gait. She will benefit from skilled PT to address deficits to facilitate recovery & improved function.  Outcome Measures     Row Name 06/14/19 1400             How much help from another person do you currently need...    Turning from your back to your side while in flat bed without using  bedrails?  3  -EF      Moving from lying on back to sitting on the side of a flat bed without bedrails?  2  -EF      Moving to and from a bed to a chair (including a wheelchair)?  2  -EF      Standing up from a chair using your arms (e.g., wheelchair, bedside chair)?  2  -EF      Climbing 3-5 steps with a railing?  1  -EF      To walk in hospital room?  1  -EF      AM-PAC 6 Clicks Score  11  -EF         Functional Assessment    Outcome Measure Options  AM-PAC 6 Clicks Basic Mobility (PT)  -EF        User Key  (r) = Recorded By, (t) = Taken By, (c) = Cosigned By    Initials Name Provider Type    Brunilda Monteiro PT Physical Therapist         Time Calculation:   PT Charges     Row Name 06/14/19 1403             Time Calculation    Start Time  1329  -EF      Stop Time  1350  -EF      Time Calculation (min)  21 min  -EF      PT Received On  06/14/19  -EF      PT - Next Appointment  06/15/19  -EF      PT Goal Re-Cert Due Date  06/21/19  -EF        User Key  (r) = Recorded By, (t) = Taken By, (c) = Cosigned By    Initials Name Provider Type    Brunilda Monteiro, PT Physical Therapist        Therapy Charges for Today     Code Description Service Date Service Provider Modifiers Qty    55027606837 HC PT EVAL LOW COMPLEXITY 2 6/14/2019 Brunilda Tracy, PT GP 1    94655675270 HC PT THER PROC EA 15 MIN 6/14/2019 Brunilda Tracy, PT GP 1          PT G-Codes  Outcome Measure Options: AM-PAC 6 Clicks Basic Mobility (PT)  AM-PAC 6 Clicks Score: 11      Brunilda Tracy PT  6/14/2019

## 2019-06-14 NOTE — DISCHARGE PLACEMENT REQUEST
"Lilia Becerra (78 y.o. Female)     Date of Birth Social Security Number Address Home Phone MRN    1940  [address] [facility] [address] [phone] [id number]    [employer] Marital Status          [employer]        Admission Date Admission Type Admitting Provider Attending Provider Department, Room/Bed    6/13/19 Urgent Malick Costa II, MD Sweet, Richard Alexander II, MD [address], P880/1    Discharge Date Discharge Disposition Discharge Destination                       Attending Provider:  Malick Costa II, MD    Allergies:  Tetanus Toxoids    Isolation:  None   Infection:  None   Code Status:  CPR    Ht:  165.1 cm (65\")   Wt:  85.7 kg (189 lb)    Admission Cmt:  None   Principal Problem:  None                Active Insurance as of 6/13/2019     Primary Coverage     Payor Plan Insurance Group Employer/Plan Group    MEDICARE MEDICARE A & B      Payor Plan Address Payor Plan Phone Number Payor Plan Fax Number Effective Dates    [address] [phone]  12/1/2003 - None Entered    [facility] [address]       Subscriber Name Subscriber Birth Date Member ID       LILIA BECERRA 1940 [id number]           Secondary Coverage     Payor Plan Insurance Group Employer/Plan Group    [place]      Payor Plan Address Payor Plan Phone Number Payor Plan Fax Number Effective Dates    [address] [phone]  11/9/2016 - None Entered    [place] [address]       Subscriber Name Subscriber Birth Date Member ID       LILIA BECERRA 1940 [id number]                 Emergency Contacts      (Rel.) Home Phone Work Phone Mobile Phone    Kecia Torres (Sister) -- -- [phone]    JAQUELINE BECERRA (Son) -- -- [phone]              "

## 2019-06-14 NOTE — PLAN OF CARE
Problem: Patient Care Overview  Goal: Plan of Care Review  Outcome: Ongoing (interventions implemented as appropriate)   06/14/19 0311   Coping/Psychosocial   Plan of Care Reviewed With patient   Plan of Care Review   Progress improving   OTHER   Outcome Summary Diabetes well controlled on PO med. HTN controlled on PO meds.pain well controlled.       Problem: Fall Risk (Adult)  Goal: Absence of Fall  Outcome: Ongoing (interventions implemented as appropriate)   06/14/19 0311   Fall Risk (Adult)   Absence of Fall making progress toward outcome

## 2019-06-14 NOTE — PROGRESS NOTES
Discharge Planning Assessment  [facility]     Patient Name: Lilia Becerra  MRN: [id number]  Today's Date: 6/14/2019    Admit Date: 6/13/2019    Discharge Needs Assessment     Row Name 06/14/19 1736       Living Environment    Lives With  grandchild(sri)    Current Living Arrangements  home/apartment/condo    Family Caregiver if Needed  child(sri), adult    Quality of Family Relationships  involved       Resource/Environmental Concerns    Resource/Environmental Concerns  none       Transition Planning    Patient/Family Anticipates Transition to  inpatient rehabilitation facility    Patient/Family Anticipated Services at Transition  rehabilitation services    Transportation Anticipated  family or friend will provide       Discharge Needs Assessment    Readmission Within the Last 30 Days  no previous admission in last 30 days    Equipment Currently Used at Home  walker, rolling    Outpatient/Agency/Support Group Needs  skilled nursing facility        Discharge Plan     Row Name 06/14/19 1737       Plan    Plan  SNF     Patient/Family in Agreement with Plan  yes    Plan Comments  Met w/ pt at the bedside, verified facesheet and discussed d/c plan. Pt lives w/ her granddaughter she would like [facility] before returning home. She could not recall the name of the rehab, she asked that I call her son.  S/w pt's sonDarnell [phone]. The family would like a referral to [facility].  for [facility], Epic referral sent to [facility] as [facility] does not have Epic in basket. Abbi roe for [facility], she will follow up over the weekend.         Destination      Service Provider Request Status Selected Services Address Phone Number Fax Number    [facility] Pending - Request Sent N/A [address] [facility] [address] [phone]      Durable Medical Equipment      No service coordination in this encounter.      Dialysis/Infusion      No service coordination in this  encounter.      Home Medical Care      No service coordination in this encounter.      Therapy      No service coordination in this encounter.      Community Resources      No service coordination in this encounter.          Demographic Summary    No documentation.       Functional Status    No documentation.       Psychosocial    No documentation.       Abuse/Neglect    No documentation.       Legal    No documentation.       Substance Abuse    No documentation.       Patient Forms     Row Name 06/14/19 1730       Patient Forms    Provider Choice List  Delivered    Delivered to  Support person    Method of delivery  Telephone            Tiffanie Diez RN

## 2019-06-14 NOTE — PLAN OF CARE
Problem: Patient Care Overview  Goal: Plan of Care Review   06/14/19 1401   Coping/Psychosocial   Plan of Care Reviewed With patient   OTHER   Outcome Summary Pt is 77 yo female adm after fall with distal femur fx above previous L TKA. s/p distal femoral component replacement. Pt presents with decreased bed mob, txfs, gait. She will benefit from skilled PT to address deficits to facilitate recovery & improved function.

## 2019-06-14 NOTE — PROGRESS NOTES
"  Orthopaedic Surgery   Daily Progress Note  Dr. CHAVEZ “Fabio” Julissa STOUT  [phone]  DEMOGRAPHICS:   · Lilia Becerra   · Age:78 y.o.   · MRN:[id number]  · Admitted: 6/13/2019    PROCEDURE: 1 Day Post-Op s/p Procedure(s):  LEFT KNEE REVISION     HOSPITAL PROGRESS  · Patient Issues: Other than pain this patient has done well since surgery, she was able to sleep last night  · Ambulation/Activity: Minimal activity since surgery     VITALS:  Vitals:    06/14/19 0100 06/14/19 0200 06/14/19 0305 06/14/19 0654   BP: 162/82 144/80 139/78 131/77   BP Location: Left arm Left arm Left arm Left arm   Patient Position: Lying Lying Lying Lying   Pulse: 82 78 89 83   Resp: 16 16 16 16   Temp: 97.6 °F (36.4 °C) 97.3 °F (36.3 °C) 96.6 °F (35.9 °C) 98.3 °F (36.8 °C)   TempSrc: Oral Oral Oral Oral   SpO2: 98% 98% 97% 100%   Weight: 85.7 kg (189 lb)      Height: 165.1 cm (65\")          PHYSICAL EXAM:  · CONSTITUTIONAL: A&Ox3, No acute distress  · LUNGS: Equal chest rise, no shortness of air  · CARDIOVASCULAR: palpable peripheral pulses  · WOUND: Johnny wound VAC in place  · EXTREMITY: Left Lower Extremity  · Pulses: brisk capillary refill intact  · Sensation: Sensation intact to light touch to the saphenous/sural/tibial/deep peroneal/superficial peroneal nerves, and grossly throughout the extremity.  · Motor: 5/5 EHL/FHL/TA/GS motor complexes    LABS:   Lab Results   Component Value Date    HGB 9.9 (L) 06/14/2019     Lab Results   Component Value Date    WBC 10.17 06/13/2019     Lab Results   Component Value Date    GLUCOSE 143 (H) 06/14/2019    CALCIUM 8.6 06/14/2019     06/14/2019    K 3.5 06/14/2019    CO2 23.0 06/14/2019     06/14/2019    BUN 9 06/14/2019    CREATININE 0.95 06/14/2019    EGFRIFNONA 57 (L) 06/14/2019    BCR 9.5 06/14/2019    ANIONGAP 13.0 06/14/2019       ASSESSMENT: Patient is a 78 y.o. female who is 1 Day Post-Op s/p Procedure(s):  LEFT KNEE REVISION     PLAN:   · Weight Bearing: Left Lower Extremity " "Weight Bearing As Tolerated  · Labs: Above lab values review. Plan: No intervention necessary at this time.   · PT/OT: To Mobilize  · DVT PPX: Eliquis 2.5mg BID  · Post-Op Xray: TKA implants in good alignment.   · Dispo: Needs further mobilization and eventual SNF placement    R \"Fabio\" Julissa STOUT MD  Orthopaedic Surgery  [facility]  [phone]    "

## 2019-06-14 NOTE — ANESTHESIA POSTPROCEDURE EVALUATION
Patient: Lilia Becerra    Procedure Summary     Date:  06/13/19 Room / Location:  [facility] OR [address] [place] [address] MAIN OR    Anesthesia Start:  1859 Anesthesia Stop:  2146    Procedure:  LEFT KNEE REVISION (Left Knee) Diagnosis:      Surgeon:  Malick Costa II, MD Provider:  Monty Duenas MD    Anesthesia Type:  general ASA Status:  3          Anesthesia Type: general  Last vitals  BP   170/89 (06/13/19 2200)   Temp   36.7 °C (98 °F) (06/13/19 2142)   Pulse   89 (06/13/19 2200)   Resp   20 (06/13/19 2200)     SpO2   98 % (06/13/19 2200)     Post Anesthesia Care and Evaluation    Patient location during evaluation: PACU  Patient participation: complete - patient participated  Level of consciousness: awake and alert  Pain score: 0  Pain management: adequate  Airway patency: patent  Anesthetic complications: No anesthetic complications    Cardiovascular status: acceptable  Respiratory status: acceptable  Hydration status: acceptable    Comments: /89   Pulse 89   Temp 36.7 °C (98 °F) (Oral)   Resp 20   SpO2 98%

## 2019-06-14 NOTE — PLAN OF CARE
Problem: Patient Care Overview  Goal: Plan of Care Review  Outcome: Ongoing (interventions implemented as appropriate)   06/14/19 3526   Coping/Psychosocial   Plan of Care Reviewed With patient   Plan of Care Review   Progress improving   OTHER   Outcome Summary good pain control but very sleepy today, still needs to void, did not get up today d/t drowsiness, NVI, leah dressing c/d/i, educated on o2 use and monitoring     Goal: Individualization and Mutuality  Outcome: Ongoing (interventions implemented as appropriate)      Problem: Fall Risk (Adult)  Goal: Identify Related Risk Factors and Signs and Symptoms  Outcome: Outcome(s) achieved Date Met: 06/14/19    Goal: Absence of Fall  Outcome: Ongoing (interventions implemented as appropriate)      Problem: Skin Injury Risk (Adult)  Goal: Identify Related Risk Factors and Signs and Symptoms  Outcome: Outcome(s) achieved Date Met: 06/14/19    Goal: Skin Health and Integrity  Outcome: Ongoing (interventions implemented as appropriate)      Problem: Knee Arthroplasty (Total, Partial) (Adult)  Goal: Signs and Symptoms of Listed Potential Problems Will be Absent, Minimized or Managed (Knee Arthroplasty)  Outcome: Ongoing (interventions implemented as appropriate)    Goal: Anesthesia/Sedation Recovery  Outcome: Ongoing (interventions implemented as appropriate)

## 2019-06-14 NOTE — PERIOPERATIVE NURSING NOTE
SPOKE BRIEFLY WITH DOMINIQUE VALENTINE FOR DR YEH, REVIEWED POST OP MED REC FORM WITH HER. ORDERS AS NOTED

## 2019-06-14 NOTE — PROGRESS NOTES
"    DAILY PROGRESS NOTE  [facility]    Patient Identification:  Name: Lilia Becerra  Age: 78 y.o.  Sex: female  :  1940  MRN: [id number]         Primary Care Physician: Leo Lomas MD    Subjective:  Interval History: Nausea is improving and patient had emesis x1.  Pain control was an issue overnight and that was addressed per orthopedics.  Today denies chest pain palpitations cough shortness of breath    Objective: No family at bedside though discussed case with RN    Scheduled Meds:  apixaban 2.5 mg Oral Q12H   atorvastatin 10 mg Oral Nightly   ceFAZolin 2 g Intravenous Q8H   docusate sodium 100 mg Oral BID   famotidine 40 mg Oral Daily   levothyroxine 25 mcg Oral Q AM   losartan 25 mg Oral Q24H   metFORMIN 500 mg Oral BID With Meals   metoprolol succinate XL 25 mg Oral Nightly   oxybutynin XL 5 mg Oral Daily   pramipexole 1.5 mg Oral Nightly   sodium chloride 3 mL Intravenous Q12H   venlafaxine  mg Oral Daily With Breakfast     Continuous Infusions:  lactated ringers 9 mL/hr Last Rate: 100 mL/hr (19 0541)       Vital signs in last 24 hours:  Temp:  [96.6 °F (35.9 °C)-98.3 °F (36.8 °C)] 98.2 °F (36.8 °C)  Heart Rate:  [63-99] 76  Resp:  [16-20] 16  BP: (123-178)/(69-98) 123/75    Intake/Output:    Intake/Output Summary (Last 24 hours) at 2019 1038  Last data filed at 2019 0700  Gross per 24 hour   Intake 2505 ml   Output 2350 ml   Net 155 ml       Exam:  /75 (BP Location: Left arm, Patient Position: Lying)   Pulse 76   Temp 98.2 °F (36.8 °C) (Oral)   Resp 16   Ht 165.1 cm (65\")   Wt 85.7 kg (189 lb)   SpO2 100%   BMI 31.45 kg/m²     General Appearance:    Alert, cooperative, no distress, AAOx3 but a little bit sedated                          Head:    Normocephalic, without obvious abnormality, atraumatic                         Throat:   Oral mucosa pink and moist                           Neck:   No JVD                         Lungs:    Clear " to auscultation bilaterally, respirations unlabored                          Heart:    Regular rate and rhythm, S1 and S2 normal                  Abdomen:     Soft, non-tender, bowel sounds active                 Extremities: Grossly moving all, surgical changes to left hip noted no cyanosis with chronic edema                        Pulses:   Pulses palpable in lower extremities                                Data Review:  Labs in chart were reviewed.    Assessment:  Active Hospital Problems    Diagnosis  POA   • Femur fracture (CMS/HCC) [S72.90XA]  Yes      Resolved Hospital Problems   No resolved problems to display.       Plan:  Tolerated OR with no significant issues.  I wrote for scopolamine TD which apparently was discontinued to be placed postoperatively given her issues with postoperative nausea and vomiting.  She has had nausea as well as some emesis but it appears to be resolving at this point with PRN antiemetics    Replace hypokalemia - SLIVF and give additional p.o. x1    CKD 3 stable    DM2 with an A1c of 5.81 -sliding scale while inpatient resume metformin at discharge    Past history of A. fib with no prior anticoagulation -rate controlled on metoprolol    Agree with Eliquis for DVT prophylaxis -acute postoperative blood loss anemia with hemoglobin trending down from 11-9.9    CAD/HTN -parameters written on BP meds -resume spironolactone in a.m.    Gerber Clayton MD  6/14/2019  10:38 AM

## 2019-06-15 LAB
GLUCOSE BLDC GLUCOMTR-MCNC: 135 MG/DL (ref 70–130)
GLUCOSE BLDC GLUCOMTR-MCNC: 143 MG/DL (ref 70–130)
GLUCOSE BLDC GLUCOMTR-MCNC: 144 MG/DL (ref 70–130)
GLUCOSE BLDC GLUCOMTR-MCNC: 89 MG/DL (ref 70–130)
HCT VFR BLD AUTO: 30.6 % (ref 34–46.6)
HGB BLD-MCNC: 9 G/DL (ref 12–15.9)

## 2019-06-15 PROCEDURE — 97116 GAIT TRAINING THERAPY: CPT | Performed by: PHYSICAL THERAPIST

## 2019-06-15 PROCEDURE — 85018 HEMOGLOBIN: CPT | Performed by: ORTHOPAEDIC SURGERY

## 2019-06-15 PROCEDURE — 97530 THERAPEUTIC ACTIVITIES: CPT | Performed by: PHYSICAL THERAPIST

## 2019-06-15 PROCEDURE — 82962 GLUCOSE BLOOD TEST: CPT

## 2019-06-15 PROCEDURE — 85014 HEMATOCRIT: CPT | Performed by: ORTHOPAEDIC SURGERY

## 2019-06-15 RX ORDER — NICOTINE POLACRILEX 4 MG
15 LOZENGE BUCCAL
Status: DISCONTINUED | OUTPATIENT
Start: 2019-06-15 | End: 2019-06-19 | Stop reason: HOSPADM

## 2019-06-15 RX ORDER — DEXTROSE MONOHYDRATE 25 G/50ML
25 INJECTION, SOLUTION INTRAVENOUS
Status: DISCONTINUED | OUTPATIENT
Start: 2019-06-15 | End: 2019-06-19 | Stop reason: HOSPADM

## 2019-06-15 RX ADMIN — PRAMIPEXOLE DIHYDROCHLORIDE 1.5 MG: 1.5 TABLET ORAL at 21:23

## 2019-06-15 RX ADMIN — OXYBUTYNIN CHLORIDE 5 MG: 5 TABLET, EXTENDED RELEASE ORAL at 08:36

## 2019-06-15 RX ADMIN — SPIRONOLACTONE 25 MG: 25 TABLET, FILM COATED ORAL at 08:36

## 2019-06-15 RX ADMIN — DOCUSATE SODIUM 100 MG: 100 CAPSULE, LIQUID FILLED ORAL at 08:36

## 2019-06-15 RX ADMIN — LEVOTHYROXINE SODIUM 25 MCG: 25 TABLET ORAL at 05:25

## 2019-06-15 RX ADMIN — SODIUM CHLORIDE, PRESERVATIVE FREE 3 ML: 5 INJECTION INTRAVENOUS at 21:23

## 2019-06-15 RX ADMIN — METOPROLOL SUCCINATE 25 MG: 25 TABLET, FILM COATED, EXTENDED RELEASE ORAL at 21:23

## 2019-06-15 RX ADMIN — VENLAFAXINE HYDROCHLORIDE 150 MG: 150 CAPSULE, EXTENDED RELEASE ORAL at 08:36

## 2019-06-15 RX ADMIN — APIXABAN 2.5 MG: 2.5 TABLET, FILM COATED ORAL at 21:23

## 2019-06-15 RX ADMIN — FAMOTIDINE 40 MG: 40 TABLET, FILM COATED ORAL at 08:36

## 2019-06-15 RX ADMIN — ATORVASTATIN CALCIUM 10 MG: 10 TABLET, FILM COATED ORAL at 21:23

## 2019-06-15 RX ADMIN — METFORMIN HYDROCHLORIDE 500 MG: 500 TABLET ORAL at 08:36

## 2019-06-15 RX ADMIN — APIXABAN 2.5 MG: 2.5 TABLET, FILM COATED ORAL at 08:36

## 2019-06-15 RX ADMIN — OXYCODONE HYDROCHLORIDE AND ACETAMINOPHEN 1 TABLET: 5; 325 TABLET ORAL at 06:08

## 2019-06-15 NOTE — PLAN OF CARE
Problem: Patient Care Overview  Goal: Plan of Care Review  Outcome: Ongoing (interventions implemented as appropriate)   06/15/19 1030   Coping/Psychosocial   Plan of Care Reviewed With patient   Plan of Care Review   Progress improving   OTHER   Outcome Summary Pt transferred supine to sit with min x 1 and use of bed rails. Pt transferred sit to stand with min x 1 and RW. She ambulated 20 ft with RW and CGA x 1 performing WBAT on LLE. Pt sat on toilet and performed sit to stand from toilet with mod x 1 and RW. Pt returned to sit in recliner.

## 2019-06-15 NOTE — PROGRESS NOTES
"    DAILY PROGRESS NOTE  [facility]    Patient Identification:  Name: Lilia Becerra  Age: 78 y.o.  Sex: female  :  1940  MRN: [id number]         Primary Care Physician: Leo Lomas MD    Subjective:  Interval History: Patient currently not voicing any new complaints but she definitely seems a little more confused at times.  Hardness of hearing definitely complicates the above.  She denies chest pain shortness of breath nausea or vomiting    Objective: Sitting up getting ready to eat breakfast in bed.  No family at bedside.  Discussed with RN    Scheduled Meds:  apixaban 2.5 mg Oral Q12H   atorvastatin 10 mg Oral Nightly   docusate sodium 100 mg Oral BID   famotidine 40 mg Oral Daily   levothyroxine 25 mcg Oral Q AM   losartan 25 mg Oral Q24H   metFORMIN 500 mg Oral BID With Meals   metoprolol succinate XL 25 mg Oral Nightly   oxybutynin XL 5 mg Oral Daily   pramipexole 1.5 mg Oral Nightly   sodium chloride 3 mL Intravenous Q12H   spironolactone 25 mg Oral Daily   venlafaxine  mg Oral Daily With Breakfast     Continuous Infusions:  lactated ringers 9 mL/hr Last Rate: Stopped (19 1417)       Vital signs in last 24 hours:  Temp:  [98.2 °F (36.8 °C)-100.6 °F (38.1 °C)] 99.7 °F (37.6 °C)  Heart Rate:  [66-85] 70  Resp:  [16] 16  BP: ()/(50-75) 99/53    Intake/Output:    Intake/Output Summary (Last 24 hours) at 6/15/2019 0928  Last data filed at 6/15/2019 0900  Gross per 24 hour   Intake 1039 ml   Output 300 ml   Net 739 ml       Exam:  BP 99/53 (BP Location: Left arm, Patient Position: Lying)   Pulse 70   Temp 99.7 °F (37.6 °C) (Oral)   Resp 16   Ht 165.1 cm (65\")   Wt 85.7 kg (189 lb)   SpO2 97%   BMI 31.45 kg/m²     General Appearance:    Alert, cooperative, no distress, AAOx3 but some sedation noted                          Head:    Normocephalic, without obvious abnormality, atraumatic                           Eyes:    PERRL, conjunctiva/corneas clear, " EOM's intact, both eyes                         Throat:   Oral mucosa pink and moist                           Neck:   Supple, symmetrical, trachea midline, no JVD                         Lungs:    Decreased bases otherwise clear to auscultation bilaterally, respirations unlabored                          Heart:    Regular rate and rhythm, S1 and S2 normal                 Abdomen:     Soft, non-tender, bowel sounds active                 Extremities:   No cyanosis baseline nonpitting edema                        Pulses:   Pulses palpable in lower extremities                              Data Review:  Labs in chart were reviewed.    Assessment:  Active Hospital Problems    Diagnosis  POA   • Femur fracture (CMS/[facility]) [S72.90XA]  Yes      Resolved Hospital Problems   No resolved problems to display.       Plan:  HTN with current postoperative hypotension   -Parameters written -was hoping to resume Spironolactone today and pending her blood pressures throughout the day we will see if this is administered but I asked to hold if less than 120   -Also suggest decreasing narcotics which influences the pressure but also seems to be causing a little bit of increased sedation    CKD 3 stable -K replaced     DM2 with an A1c of 5.81 -sliding scale while inpatient resume metformin at discharge (somehow metformin was on her med rec today and sliding scale was not?? so I reinstated)     Past history of A. fib with no prior anticoagulation - rate controlled on metoprolol     Agree with Eliquis for DVT prophylaxis -acute postoperative blood loss anemia with hemoglobin trending down from 11-9.9-9.0     Fever secondary to atelectasis -spirometry and increase activity suggested      Disposition -ultimately up to orthopedics but would be okay if patient remain in house another day or so pending blood pressure trends    Gerber Clayton MD  6/15/2019  9:28 AM

## 2019-06-15 NOTE — PLAN OF CARE
Problem: Patient Care Overview  Goal: Plan of Care Review  Outcome: Ongoing (interventions implemented as appropriate)   06/15/19 0422   Coping/Psychosocial   Plan of Care Reviewed With patient   Plan of Care Review   Progress improving   OTHER   Outcome Summary Pt has been stable through the night. Was up in chair today and worked with PT, WBAT. Up with 2 assist and walker use, gait unsteady. Pt is still drowsy/sleepy but easy to arouse. No pain meds given. No issues with n/v. Plans to d/c to SNF pending placement. Education provided on HTN management with meds and BP monitoring. Will continue to monitor.      Goal: Individualization and Mutuality  Outcome: Ongoing (interventions implemented as appropriate)    Goal: Discharge Needs Assessment  Outcome: Ongoing (interventions implemented as appropriate)   06/14/19 0340 06/14/19 1736   Disability   Equipment Currently Used at Home --  walker, rolling   Discharge Needs Assessment   Readmission Within the Last 30 Days --  no previous admission in last 30 days   Patient/Family Anticipates Transition to --  inpatient rehabilitation facility   Patient/Family Anticipated Services at Transition --  rehabilitation services   Transportation Concerns car, none --    Transportation Anticipated --  family or friend will provide   Outpatient/Agency/Support Group Needs --  skilled nursing facility     Goal: Interprofessional Rounds/Family Conf  Outcome: Ongoing (interventions implemented as appropriate)   06/15/19 0422   Interdisciplinary Rounds/Family Conf   Participants nursing;patient;family;       Problem: Fall Risk (Adult)  Goal: Absence of Fall  Outcome: Ongoing (interventions implemented as appropriate)   06/15/19 0422   Fall Risk (Adult)   Absence of Fall achieves outcome       Problem: Fracture Orthopaedic (Adult)  Goal: Signs and Symptoms of Listed Potential Problems Will be Absent, Minimized or Managed (Fracture Orthopaedic)  Outcome: Ongoing (interventions  implemented as appropriate)   06/15/19 0421   Goal/Outcome Evaluation   Problems Assessed (Orthopaedic Fracture) all   Problems Present (Orthopaedic Fracture) pain;functional deficit/self-care deficit

## 2019-06-15 NOTE — PLAN OF CARE
Problem: Patient Care Overview  Goal: Plan of Care Review  Outcome: Ongoing (interventions implemented as appropriate)   06/15/19 9089   Coping/Psychosocial   Plan of Care Reviewed With patient   Plan of Care Review   Progress improving   OTHER   Outcome Summary Pt rested most of the day. Pain controlled well with pain meds. Plans to go to rehab facility when d/c. VSS. Pt educated on BP monitoring.     Goal: Individualization and Mutuality  Outcome: Ongoing (interventions implemented as appropriate)    Goal: Discharge Needs Assessment  Outcome: Ongoing (interventions implemented as appropriate)    Goal: Interprofessional Rounds/Family Conf  Outcome: Ongoing (interventions implemented as appropriate)      Problem: Fall Risk (Adult)  Goal: Absence of Fall  Outcome: Ongoing (interventions implemented as appropriate)      Problem: Fracture Orthopaedic (Adult)  Goal: Signs and Symptoms of Listed Potential Problems Will be Absent, Minimized or Managed (Fracture Orthopaedic)  Outcome: Ongoing (interventions implemented as appropriate)

## 2019-06-15 NOTE — THERAPY TREATMENT NOTE
Acute Care - Physical Therapy Treatment Note  [facility]     Patient Name: Lilia Becerra  : 1940  MRN: [id number]  Today's Date: 6/15/2019  Onset of Illness/Injury or Date of Surgery: 19  Date of Referral to PT: 19       Admit Date: 2019    Visit Dx:    ICD-10-CM ICD-9-CM   1. Difficulty walking R26.2 719.7     Patient Active Problem List   Diagnosis   • Acid reflux disease   • Atrial fibrillation (CMS/HCC)   • Diabetes mellitus (CMS/HCC)   • Disease of thyroid gland   • Sleep apnea   • Essential hypertension   • Postoperative anemia due to acute blood loss (expected)   • Stage 3 chronic kidney disease (CMS/HCC)   • Femur fracture (CMS/HCC)       Therapy Treatment    Rehabilitation Treatment Summary     Row Name 06/15/19 1000             Treatment Time/Intention    Discipline  physical therapist  -LC      Document Type  therapy note (daily note)  -LC      Subjective Information  complains of;weakness;fatigue;pain  -LC      Mode of Treatment  physical therapy  -LC      Patient/Family Observations  awake, alert, fowlers, 2 L O2/NC  -LC      Patient Effort  good  -LC      Existing Precautions/Restrictions  fall;other (see comments)  -LC      Recorded by [LC] Fitz Thompson, PT DPT 06/15/19 1030      Row Name 06/15/19 1000             Vital Signs    O2 Delivery Intra Treatment  room air  -LC      Recorded by [LC] Fitz Thompson, PT DPT 06/15/19 1030      Row Name 06/15/19 1000             Cognitive Assessment/Intervention- PT/OT    Orientation Status (Cognition)  oriented x 4  -LC      Follows Commands (Cognition)  WFL  -LC      Recorded by [LC] Fitz Thompson, PT DPT 06/15/19 1030      Row Name 06/15/19 1000             Bed Mobility Assessment/Treatment    Supine-Sit [place] (Bed Mobility)  minimum assist (75% patient effort)  -LC      Sit-Supine [place] (Bed Mobility)  minimum assist (75% patient effort)  -LC      Bed Mobility, Safety Issues  decreased use of arms for  pushing/pulling;decreased use of legs for bridging/pushing  -      Assistive Device (Bed Mobility)  bed rails;draw sheet;head of bed elevated  -LC      Recorded by [LC] Fitz Thompson, PT DPT 06/15/19 1030      Row Name 06/15/19 1000             Transfer Assessment/Treatment    Transfer Assessment/Treatment  toilet transfer  -LC      Recorded by [LC] Fitz Thompson, PT DPT 06/15/19 1030      Row Name 06/15/19 1000             Sit-Stand Transfer    Sit-Stand [place] (Transfers)  minimum assist (75% patient effort)  -      Assistive Device (Sit-Stand Transfers)  walker, front-wheeled  -LC      Recorded by [LC] Fitz Thompson, PT DPT 06/15/19 1030      Row Name 06/15/19 1000             Stand-Sit Transfer    Stand-Sit [place] (Transfers)  minimum assist (75% patient effort)  -      Assistive Device (Stand-Sit Transfers)  walker, front-wheeled  -LC      Recorded by [LC] Fitz Thompson, PT DPT 06/15/19 1030      Row Name 06/15/19 1000             Toilet Transfer    Type (Toilet Transfer)  sit-stand;stand-sit  -      [place] Level (Toilet Transfer)  moderate assist (50% patient effort)  -      Assistive Device (Toilet Transfer)  walker, front-wheeled  -LC      Recorded by [LC] Fitz Thompson, PT DPT 06/15/19 1030      Row Name 06/15/19 1000             Gait/Stairs Assessment/Training    15180 - Gait Training Minutes   10  -LC      [place] Level (Gait)  contact guard  -      Assistive Device (Gait)  walker, front-wheeled  -LC      Distance in Feet (Gait)  20  -LC      Pattern (Gait)  step-to  -LC      Deviations/Abnormal Patterns (Gait)  antalgic;julio decreased;stride length decreased  -LC      Recorded by [LC] Fitz Thompson, PT DPT 06/15/19 1030      Row Name 06/15/19 1000             Positioning and Restraints    Pre-Treatment Position  in bed  -LC      Post Treatment Position  chair  -LC      In Chair  reclined;call light within reach;encouraged to call for assist;with other  staff  -LC      Recorded by [LC] Fitz Thompson, PT DPT 06/15/19 1030      Row Name 06/15/19 1000             Pain Scale: Numbers Pre/Post-Treatment    Pain Location - Side  Left  -LC      Pain Location  knee  -LC      Pain Intervention(s)  Medication (See MAR);Repositioned  -LC      Recorded by [LC] Fitz Thompson, PT DPT 06/15/19 1030      Row Name                Wound 06/13/19 2125 Left knee incision    Wound - Properties Group Date first assessed: 06/13/19 [VB] Time first assessed: 2125 [VB] Side: Left [VB] Location: knee [VB] Type: incision [VB] Recorded by:  [VB] Ivy Robison RN 06/13/19 2125      User Key  (r) = Recorded By, (t) = Taken By, (c) = Cosigned By    Initials Name Effective Dates Discipline    VB Ivy Robison RN 06/16/16 -  Nurse    Fitz Pretty, PT DPT 08/02/16 -  PT          Wound 06/13/19 2125 Left knee incision (Active)   Dressing Appearance dry;intact;no drainage 6/15/2019  8:00 AM   Closure TRISTON 6/15/2019  8:00 AM   Base dressing in place, unable to visualize 6/15/2019  8:00 AM   Drainage Amount none 6/15/2019  8:00 AM           Physical Therapy Education     Title: PT OT SLP Therapies (Done)     Topic: Physical Therapy (Done)     Point: Mobility training (Done)     Learning Progress Summary           Patient Acceptance, E,D, VU,DU by  at 6/15/2019 10:30 AM    Comment:  safe use of RW, ambulation safety, benefits of activity    Acceptance, E, VU,NR by  at 6/14/2019  2:00 PM                   Point: Home exercise program (Done)     Learning Progress Summary           Patient Acceptance, E,D, VU,DU by  at 6/15/2019 10:30 AM    Comment:  safe use of RW, ambulation safety, benefits of activity    Acceptance, E, VU,NR by  at 6/14/2019  2:00 PM                   Point: Body mechanics (Done)     Learning Progress Summary           Patient Acceptance, E,D, VU,DU by  at 6/15/2019 10:30 AM    Comment:  safe use of RW, ambulation safety, benefits of activity    Acceptance, E,  ELIZABETH,NR by  at 6/14/2019  2:00 PM                   Point: Precautions (Done)     Learning Progress Summary           Patient Acceptance, CATHY,VALERIANO, ELIZABETH,DU by  at 6/15/2019 10:30 AM    Comment:  safe use of RW, ambulation safety, benefits of activity    Acceptance, ELIZABETH MORGAN,DORA by  at 6/14/2019  2:00 PM                               User Key     Initials Effective Dates Name Provider Type Discipline     06/08/18 -  Brunilda Tracy, PT Physical Therapist PT     08/02/16 -  Fitz Thompson, PT DPT Physical Therapist PT                PT Recommendation and Plan     Plan of Care Reviewed With: patient  Progress: improving  Outcome Summary: Pt transferred supine to sit with min x 1 and use of bed rails. Pt transferred sit to stand with min x 1 and RW. She ambulated 20 ft with RW and CGA x 1 performing WBAT on LLE. Pt sat on toilet and performed sit to stand from toilet with mod x 1 and RW. Pt returned to sit in recliner.  Outcome Measures     Row Name 06/15/19 1000 06/14/19 1400          How much help from another person do you currently need...    Turning from your back to your side while in flat bed without using bedrails?  3  -LC  3  -EF     Moving from lying on back to sitting on the side of a flat bed without bedrails?  3  -LC  2  -EF     Moving to and from a bed to a chair (including a wheelchair)?  3  -  2  -EF     Standing up from a chair using your arms (e.g., wheelchair, bedside chair)?  3  -  2  -EF     Climbing 3-5 steps with a railing?  1  -  1  -EF     To walk in hospital room?  3  -LC  1  -EF     AM-PAC 6 Clicks Score  16  -  11  -EF        Functional Assessment    Outcome Measure Options  AM-PAC 6 Clicks Basic Mobility (PT)  -  AM-PAC 6 Clicks Basic Mobility (PT)  -EF       User Key  (r) = Recorded By, (t) = Taken By, (c) = Cosigned By    Initials Name Provider Type    EF Brunilda Tracy, PT Physical Therapist    Fitz Pretty, PT DPT Physical Therapist         Time Calculation:   PT  Charges     Row Name 06/15/19 1000             Time Calculation    Start Time  1010  -LC      Stop Time  1034  -      Time Calculation (min)  24 min  -      PT Received On  06/15/19  -      PT - Next Appointment  06/16/19  -         Time Calculation- PT    Total Timed Code Minutes- PT  24 minute(s)  -         Timed Charges    02080 - Gait Training Minutes   10  -LC      14826 - PT Therapeutic Activity Minutes  14  -        User Key  (r) = Recorded By, (t) = Taken By, (c) = Cosigned By    Initials Name Provider Type    Fitz Pretty, PT DPT Physical Therapist        Therapy Charges for Today     Code Description Service Date Service Provider Modifiers Qty    25434044922 HC GAIT TRAINING EA 15 MIN 6/15/2019 Fitz Thompson, PT DPT GP 1    17335435693 HC PT THERAPEUTIC ACT EA 15 MIN 6/15/2019 Fitz Thompson, PT DPT GP 1          PT G-Codes  Outcome Measure Options: AM-PAC 6 Clicks Basic Mobility (PT)  AM-PAC 6 Clicks Score: 16    Fitz Thompson PT DPT  6/15/2019

## 2019-06-15 NOTE — PROGRESS NOTES
Orthopaedic Surgery   Daily Progress Note  Dr. SCOTT Costa II  [phone]  DEMOGRAPHICS:   · Lilia Becerra   · Age:78 y.o.   · MRN:[id number]  · Admitted: 6/13/2019    PROCEDURE: 2 Days Post-Op s/p Procedure(s):  LEFT KNEE REVISION     HOSPITAL PROGRESS  · Patient Issues: No acute overnight events, overall doing fairly well  · Ambulation/Activity: Working with therapy but very slow to mobilize    VITALS:  Vitals:    06/14/19 2252 06/15/19 0312 06/15/19 0500 06/15/19 0717   BP: 106/60 100/60  99/53   BP Location: Left arm Left arm  Left arm   Patient Position: Lying Lying  Lying   Pulse: 72 85  70   Resp: 16 16  16   Temp: 98.2 °F (36.8 °C) (!) 100.6 °F (38.1 °C) 99.3 °F (37.4 °C) 99.7 °F (37.6 °C)   TempSrc:  Oral Oral Oral   SpO2: 97% 95%  97%   Weight:       Height:           PHYSICAL EXAM:  · CONSTITUTIONAL: A&Ox3, No acute distress  · LUNGS: Equal chest rise, no shortness of air  · CARDIOVASCULAR: palpable peripheral pulses  · WOUND: Johnny wound VAC in place  · EXTREMITY: Left Lower Extremity  · Pulses: brisk capillary refill intact  · Sensation: Sensation intact to light touch to the saphenous/sural/tibial/deep peroneal/superficial peroneal nerves, and grossly throughout the extremity.  · Motor: 5/5 EHL/FHL/TA/GS motor complexes    LABS:   Lab Results   Component Value Date    HGB 9.0 (L) 06/15/2019     Lab Results   Component Value Date    WBC 10.17 06/13/2019     Lab Results   Component Value Date    GLUCOSE 143 (H) 06/14/2019    CALCIUM 8.6 06/14/2019     06/14/2019    K 3.5 06/14/2019    CO2 23.0 06/14/2019     06/14/2019    BUN 9 06/14/2019    CREATININE 0.95 06/14/2019    EGFRIFNONA 57 (L) 06/14/2019    BCR 9.5 06/14/2019    ANIONGAP 13.0 06/14/2019       ASSESSMENT: Patient is a 78 y.o. female who is 2 Days Post-Op s/p Procedure(s):  LEFT KNEE REVISION     PLAN:   · Weight Bearing: Left Lower Extremity Weight Bearing As Tolerated  · Labs: Above lab values review. Plan: No intervention  "necessary at this time. I will recheck hemoglobin and BMP tomorrow  · PT/OT: To Mobilize  · DVT PPX: Eliquis 2.5mg BID  · Post-Op Xray: TKA implants in good alignment.   · Dispo: I think SNF on Monday would be a good goal    R \"Fabio\" Julissa STOUT MD  Orthopaedic Surgery  [facility]  [phone]    "

## 2019-06-16 LAB
ANION GAP SERPL CALCULATED.3IONS-SCNC: 9.9 MMOL/L
BUN BLD-MCNC: 16 MG/DL (ref 8–23)
BUN/CREAT SERPL: 17.4 (ref 7–25)
CALCIUM SPEC-SCNC: 9.4 MG/DL (ref 8.6–10.5)
CHLORIDE SERPL-SCNC: 102 MMOL/L (ref 98–107)
CO2 SERPL-SCNC: 26.1 MMOL/L (ref 22–29)
CREAT BLD-MCNC: 0.92 MG/DL (ref 0.57–1)
GFR SERPL CREATININE-BSD FRML MDRD: 59 ML/MIN/1.73
GLUCOSE BLD-MCNC: 109 MG/DL (ref 65–99)
GLUCOSE BLDC GLUCOMTR-MCNC: 103 MG/DL (ref 70–130)
GLUCOSE BLDC GLUCOMTR-MCNC: 130 MG/DL (ref 70–130)
GLUCOSE BLDC GLUCOMTR-MCNC: 151 MG/DL (ref 70–130)
GLUCOSE BLDC GLUCOMTR-MCNC: 96 MG/DL (ref 70–130)
HCT VFR BLD AUTO: 25.8 % (ref 34–46.6)
HGB BLD-MCNC: 8.3 G/DL (ref 12–15.9)
POTASSIUM BLD-SCNC: 3.6 MMOL/L (ref 3.5–5.2)
SODIUM BLD-SCNC: 138 MMOL/L (ref 136–145)

## 2019-06-16 PROCEDURE — 63710000001 INSULIN LISPRO (HUMAN) PER 5 UNITS: Performed by: HOSPITALIST

## 2019-06-16 PROCEDURE — 85018 HEMOGLOBIN: CPT | Performed by: ORTHOPAEDIC SURGERY

## 2019-06-16 PROCEDURE — 82962 GLUCOSE BLOOD TEST: CPT

## 2019-06-16 PROCEDURE — 85014 HEMATOCRIT: CPT | Performed by: ORTHOPAEDIC SURGERY

## 2019-06-16 PROCEDURE — 97116 GAIT TRAINING THERAPY: CPT | Performed by: PHYSICAL THERAPIST

## 2019-06-16 PROCEDURE — 80048 BASIC METABOLIC PNL TOTAL CA: CPT | Performed by: ORTHOPAEDIC SURGERY

## 2019-06-16 RX ORDER — POLYETHYLENE GLYCOL 3350 17 G/17G
17 POWDER, FOR SOLUTION ORAL DAILY PRN
Status: DISCONTINUED | OUTPATIENT
Start: 2019-06-16 | End: 2019-06-18

## 2019-06-16 RX ORDER — OXYCODONE HYDROCHLORIDE AND ACETAMINOPHEN 5; 325 MG/1; MG/1
1 TABLET ORAL EVERY 4 HOURS PRN
Qty: 50 TABLET | Refills: 0 | Status: SHIPPED | OUTPATIENT
Start: 2019-06-16 | End: 2019-06-24

## 2019-06-16 RX ADMIN — ATORVASTATIN CALCIUM 10 MG: 10 TABLET, FILM COATED ORAL at 21:22

## 2019-06-16 RX ADMIN — DOCUSATE SODIUM 100 MG: 100 CAPSULE, LIQUID FILLED ORAL at 21:22

## 2019-06-16 RX ADMIN — SPIRONOLACTONE 25 MG: 25 TABLET, FILM COATED ORAL at 09:45

## 2019-06-16 RX ADMIN — VENLAFAXINE HYDROCHLORIDE 150 MG: 150 CAPSULE, EXTENDED RELEASE ORAL at 09:45

## 2019-06-16 RX ADMIN — APIXABAN 2.5 MG: 2.5 TABLET, FILM COATED ORAL at 09:45

## 2019-06-16 RX ADMIN — LOSARTAN POTASSIUM 25 MG: 25 TABLET, FILM COATED ORAL at 09:45

## 2019-06-16 RX ADMIN — OXYCODONE HYDROCHLORIDE AND ACETAMINOPHEN 1 TABLET: 5; 325 TABLET ORAL at 01:10

## 2019-06-16 RX ADMIN — SODIUM CHLORIDE, PRESERVATIVE FREE 3 ML: 5 INJECTION INTRAVENOUS at 21:23

## 2019-06-16 RX ADMIN — PRAMIPEXOLE DIHYDROCHLORIDE 1.5 MG: 1.5 TABLET ORAL at 21:22

## 2019-06-16 RX ADMIN — OXYCODONE HYDROCHLORIDE AND ACETAMINOPHEN 1 TABLET: 5; 325 TABLET ORAL at 09:45

## 2019-06-16 RX ADMIN — SODIUM CHLORIDE, PRESERVATIVE FREE 3 ML: 5 INJECTION INTRAVENOUS at 09:47

## 2019-06-16 RX ADMIN — INSULIN LISPRO 2 UNITS: 100 INJECTION, SOLUTION INTRAVENOUS; SUBCUTANEOUS at 21:22

## 2019-06-16 RX ADMIN — LEVOTHYROXINE SODIUM 25 MCG: 25 TABLET ORAL at 05:45

## 2019-06-16 RX ADMIN — OXYBUTYNIN CHLORIDE 5 MG: 5 TABLET, EXTENDED RELEASE ORAL at 09:45

## 2019-06-16 RX ADMIN — POLYETHYLENE GLYCOL 3350 17 G: 17 POWDER, FOR SOLUTION ORAL at 13:10

## 2019-06-16 RX ADMIN — FAMOTIDINE 40 MG: 40 TABLET, FILM COATED ORAL at 09:45

## 2019-06-16 RX ADMIN — APIXABAN 2.5 MG: 2.5 TABLET, FILM COATED ORAL at 21:22

## 2019-06-16 RX ADMIN — DOCUSATE SODIUM 100 MG: 100 CAPSULE, LIQUID FILLED ORAL at 09:45

## 2019-06-16 NOTE — PROGRESS NOTES
LOS: 3 days     Name: Lilia Becerra  Age/Sex: 78 y.o. female  :  1940        PCP: Leo Lomas MD    Subjective   Resting comfortably currently.  Pain is under control.  Has been up with physical therapy.  She will  General: No Fever or Chills, Cardiac: No Chest Pain or Palpitations, Resp: No Cough or SOA, GI: No Nausea, Vomiting, or Diarrhea and Other: No bleeding      apixaban 2.5 mg Oral Q12H   atorvastatin 10 mg Oral Nightly   docusate sodium 100 mg Oral BID   famotidine 40 mg Oral Daily   insulin lispro 0-7 Units Subcutaneous 4x Daily With Meals & Nightly   levothyroxine 25 mcg Oral Q AM   losartan 25 mg Oral Q24H   metoprolol succinate XL 25 mg Oral Nightly   oxybutynin XL 5 mg Oral Daily   pramipexole 1.5 mg Oral Nightly   sodium chloride 3 mL Intravenous Q12H   spironolactone 25 mg Oral Daily   venlafaxine  mg Oral Daily With Breakfast       lactated ringers 9 mL/hr Last Rate: Stopped (19 1417)       Objective   Vital Signs  Temp:  [97.1 °F (36.2 °C)-99.8 °F (37.7 °C)] 98.6 °F (37 °C)  Heart Rate:  [74-88] 74  Resp:  [16] 16  BP: ()/(57-74) 131/74  Body mass index is 31.45 kg/m².    Intake/Output Summary (Last 24 hours) at 2019 0956  Last data filed at 6/15/2019 1700  Gross per 24 hour   Intake 360 ml   Output --   Net 360 ml       Physical Exam   Constitutional: She is oriented to person, place, and time. She appears well-developed and well-nourished.   Eyes: EOM are normal.   Neck: Neck supple.   Cardiovascular: Normal rate and regular rhythm.   Pulmonary/Chest: Effort normal and breath sounds normal.   Abdominal: Soft. Bowel sounds are normal.   Neurological: She is alert and oriented to person, place, and time.   Skin: Skin is warm and dry. Capillary refill takes less than 2 seconds.   Psychiatric: She has a normal mood and affect. Her behavior is normal.   Nursing note and vitals reviewed.    Results Review:       I reviewed the patient's new clinical  results.  Results from last 7 days   Lab Units 06/16/19  0434 06/15/19  0313 06/14/19  0453 06/13/19  1008   WBC 10*3/mm3  --   --   --  10.17   HEMOGLOBIN g/dL 8.3* 9.0* 9.9* 11.0*   PLATELETS 10*3/mm3  --   --   --  264     Results from last 7 days   Lab Units 06/16/19  0434 06/14/19  0453 06/13/19  1008   SODIUM mmol/L 138 140 144  144   POTASSIUM mmol/L 3.6 3.5 3.2*  3.2*   CHLORIDE mmol/L 102 104 108*  108*   CO2 mmol/L 26.1 23.0 23.1  23.1   BUN mg/dL 16 9 13  13   CREATININE mg/dL 0.92 0.95 1.01*  1.01*   CALCIUM mg/dL 9.4 8.6 9.0  9.0   Estimated Creatinine Clearance: 52.7 mL/min (by C-G formula based on SCr of 0.92 mg/dL).      Assessment/Plan     Atrial fibrillation (CMS/HCC)    Diabetes mellitus (CMS/HCC)    Essential hypertension    Stage 3 chronic kidney disease (CMS/HCC)    Femur fracture (CMS/HCC)      PLAN  -Blood pressure remained stable postoperatively  -Hemoglobin has dropped about 3 g consistent with acute blood loss anemia following surgery.  -Creatinine is stabilized and electrolytes are within normal limits  -Blood sugars remain stable      Disposition  Per primary team      Hussein Rudolph MD  [facility]  06/16/19  9:56 AM

## 2019-06-16 NOTE — PLAN OF CARE
Problem: Patient Care Overview  Goal: Plan of Care Review  Outcome: Ongoing (interventions implemented as appropriate)   06/16/19 0449   Coping/Psychosocial   Plan of Care Reviewed With patient   Plan of Care Review   Progress improving   OTHER   Outcome Summary Pt has been stable through the night. Up with 1 assist and walker use to BSC. Voiding adequately. Percocet given x1 for pain. Sharmlia dressing is CDI. PT working with patient to improve strength and mobility. Plans to d/c to rehab pending placement. Will continue to monitor.      Goal: Individualization and Mutuality  Outcome: Ongoing (interventions implemented as appropriate)    Goal: Discharge Needs Assessment  Outcome: Ongoing (interventions implemented as appropriate)   06/14/19 0340 06/14/19 1736 06/15/19 1713   Discharge Needs Assessment   Readmission Within the Last 30 Days --  no previous admission in last 30 days --    Concerns to be Addressed --  --  no discharge needs identified   Patient/Family Anticipates Transition to --  inpatient rehabilitation facility --    Patient/Family Anticipated Services at Transition --  rehabilitation services --    Transportation Concerns car, none --  --    Transportation Anticipated --  family or friend will provide --    Anticipated Changes Related to Illness --  --  none   Equipment Needed After Discharge --  --  none   Outpatient/Agency/Support Group Needs --  --  inpatient rehabilitation facility   Discharge Facility/Level of Care Needs --  --  acute rehab   Disability   Equipment Currently Used at Home --  walker, rolling --      Goal: Interprofessional Rounds/Family Conf  Outcome: Ongoing (interventions implemented as appropriate)   06/16/19 0449   Interdisciplinary Rounds/Family Conf   Participants nursing;patient;family;       Problem: Fall Risk (Adult)  Goal: Absence of Fall  Outcome: Ongoing (interventions implemented as appropriate)   06/16/19 0449   Fall Risk (Adult)   Absence of Fall achieves  outcome       Problem: Fracture Orthopaedic (Adult)  Goal: Signs and Symptoms of Listed Potential Problems Will be Absent, Minimized or Managed (Fracture Orthopaedic)  Outcome: Ongoing (interventions implemented as appropriate)   06/16/19 5614   Goal/Outcome Evaluation   Problems Assessed (Orthopaedic Fracture) all   Problems Present (Orthopaedic Fracture) functional deficit/self-care deficit;pain

## 2019-06-16 NOTE — PROGRESS NOTES
Continued Stay Note  [facility]     Patient Name: Lilia Becerra  MRN: [id number]  Today's Date: 6/16/2019    Admit Date: 6/13/2019    Discharge Plan     Row Name 06/16/19 1726       Plan    Plan  [facility] has not yet reviewed, will follow-up Monday.................Scott BLACK     Patient/Family in Agreement with Plan  yes    Plan Comments  Call from Abbi/Eduardo stating the referral was received late Friday afternoon after admissions staff was gone for the day at [facility] so they have not reviewed. Abbi states they will review tomorrow AM and she will contact [facility] once patient case reviewed..............Scott BLACK         Discharge Codes    No documentation.             Wilma Farrar, RN

## 2019-06-16 NOTE — PLAN OF CARE
Problem: Patient Care Overview  Goal: Plan of Care Review  Outcome: Ongoing (interventions implemented as appropriate)   06/16/19 1140   Coping/Psychosocial   Plan of Care Reviewed With patient   Plan of Care Review   Progress improving   OTHER   Outcome Summary Pt transferred supine to sit wtih CGA x 1 and use of bed rails. Pt performed sit to stand with min x 1 and RW. Pt ambulated 75 ft with CGA x 1 and RW. Requires frequent VC's to keep RW close for safe ambulation. Pt transferred sit to stand from toilet with min x 1 and RW.

## 2019-06-16 NOTE — PROGRESS NOTES
Orthopaedic Surgery   Daily Progress Note  Dr. SCOTT Costa II  [phone]  DEMOGRAPHICS:   · Lilia Becerra   · Age:78 y.o.   · MRN:[id number]  · Admitted: 6/13/2019    PROCEDURE: 3 Days Post-Op s/p Procedure(s):  LEFT KNEE REVISION     HOSPITAL PROGRESS  · Patient Issues: Pain improving, mobility improving, overall patient is doing excellently after this complicated surgery  · Ambulation/Activity: Advancing very well with therapy, able to ambulate over 20 feet yesterday    VITALS:  Vitals:    06/15/19 2026 06/15/19 2330 06/16/19 0345 06/16/19 0719   BP: 114/57 109/61 106/68 131/74   BP Location: Left arm Left arm Left arm Left arm   Patient Position: Sitting Sitting Lying Lying   Pulse: 88 80 75 74   Resp: 16 16 16 16   Temp: 99.3 °F (37.4 °C) 99.8 °F (37.7 °C) 98.7 °F (37.1 °C) 98.6 °F (37 °C)   TempSrc: Oral Oral Oral Oral   SpO2: 92% 93% 90% 96%   Weight:       Height:           PHYSICAL EXAM:  · CONSTITUTIONAL: A&Ox3, No acute distress  · LUNGS: Equal chest rise, no shortness of air  · CARDIOVASCULAR: palpable peripheral pulses  · WOUND: Johnny wound VAC in place  · EXTREMITY: Left Lower Extremity  · Pulses: brisk capillary refill intact  · Sensation: Sensation intact to light touch to the saphenous/sural/tibial/deep peroneal/superficial peroneal nerves, and grossly throughout the extremity.  · Motor: 5/5 EHL/FHL/TA/GS motor complexes    LABS:   Lab Results   Component Value Date    HGB 8.3 (L) 06/16/2019     Lab Results   Component Value Date    WBC 10.17 06/13/2019     Lab Results   Component Value Date    GLUCOSE 109 (H) 06/16/2019    CALCIUM 9.4 06/16/2019     06/16/2019    K 3.6 06/16/2019    CO2 26.1 06/16/2019     06/16/2019    BUN 16 06/16/2019    CREATININE 0.92 06/16/2019    EGFRIFNONA 59 (L) 06/16/2019    BCR 17.4 06/16/2019    ANIONGAP 9.9 06/16/2019       ASSESSMENT: Patient is a 78 y.o. female who is 3 Days Post-Op s/p Procedure(s):  LEFT KNEE REVISION     PLAN:   · Weight  "Bearing: Left Lower Extremity Weight Bearing As Tolerated  · Labs: Above lab values review. Plan: Hemoglobin 8.3, will continue to monitor daily.  BMP looks okay  · PT/OT: To Mobilize  · DVT PPX: Eliquis 2.5mg BID  · Post-Op Xray: TKA implants in good alignment.   · Dispo: I think SNF on Monday would be a good goal if bed accepted    R \"Fabio\" Julissa STOUT MD  Orthopaedic Surgery  [facility]  [phone]    "

## 2019-06-16 NOTE — THERAPY TREATMENT NOTE
Acute Care - Physical Therapy Treatment Note  [facility]     Patient Name: Lilia Becerra  : 1940  MRN: [id number]  Today's Date: 2019  Onset of Illness/Injury or Date of Surgery: 19  Date of Referral to PT: 19       Admit Date: 2019    Visit Dx:    ICD-10-CM ICD-9-CM   1. Difficulty walking R26.2 719.7     Patient Active Problem List   Diagnosis   • Acid reflux disease   • Atrial fibrillation (CMS/HCC)   • Diabetes mellitus (CMS/HCC)   • Disease of thyroid gland   • Sleep apnea   • Essential hypertension   • Postoperative anemia due to acute blood loss (expected)   • Stage 3 chronic kidney disease (CMS/HCC)   • Femur fracture (CMS/HCC)       Therapy Treatment    Rehabilitation Treatment Summary     Row Name 19 1100             Treatment Time/Intention    Discipline  physical therapist  -LC      Document Type  therapy note (daily note)  -      Subjective Information  complains of;weakness;fatigue;pain  -      Mode of Treatment  physical therapy  -      Patient/Family Observations  awake, alert, fowlers  -      Patient Effort  good  -      Existing Precautions/Restrictions  fall;other (see comments)  -      Treatment Considerations/Comments  patient pushes walker too far in front  -LC      Patient Response to Treatment  fatigue, pain  -LC      Recorded by [LC] Fitz Thompson, PT DPT 19 1139      Row Name 19 1100             Cognitive Assessment/Intervention- PT/OT    Orientation Status (Cognition)  oriented x 4  -LC      Follows Commands (Cognition)  WFL  -LC      Recorded by [LC] Fitz Thompson, PT DPT 19 1139      Row Name 19 1100             Bed Mobility Assessment/Treatment    Supine-Sit [place] (Bed Mobility)  contact guard  -      Bed Mobility, Safety Issues  decreased use of arms for pushing/pulling;decreased use of legs for bridging/pushing  -      Assistive Device (Bed Mobility)  bed rails;draw sheet;head of bed elevated   -LC      Recorded by [LC] Fitz Thompson, PT DPT 06/16/19 1139      Row Name 06/16/19 1100             Sit-Stand Transfer    Sit-Stand [place] (Transfers)  minimum assist (75% patient effort)  -LC      Assistive Device (Sit-Stand Transfers)  walker, front-wheeled  -LC      Recorded by [LC] Fitz Thompson, PT DPT 06/16/19 1139      Row Name 06/16/19 1100             Stand-Sit Transfer    Stand-Sit [place] (Transfers)  minimum assist (75% patient effort)  -LC      Assistive Device (Stand-Sit Transfers)  walker, front-wheeled  -LC      Recorded by [LC] Fitz Thompson, PT DPT 06/16/19 1139      Row Name 06/16/19 1100             Toilet Transfer    Type (Toilet Transfer)  sit-stand;stand-sit  -LC      [place] Level (Toilet Transfer)  minimum assist (75% patient effort)  -LC      Assistive Device (Toilet Transfer)  walker, front-wheeled  -LC      Recorded by [LC] Fitz Thompson, PT DPT 06/16/19 1139      Row Name 06/16/19 1100             Gait/Stairs Assessment/Training    88961 - Gait Training Minutes   15  -LC      Gait/Stairs Assessment/Training  gait/ambulation assistive device  -      [place] Level (Gait)  contact guard  -LC      Assistive Device (Gait)  walker, front-wheeled  -LC      Distance in Feet (Gait)  75  -LC      Pattern (Gait)  step-to  -LC      Deviations/Abnormal Patterns (Gait)  antalgic;julio decreased;stride length decreased  -LC      Comment (Gait/Stairs)  constant VCs to keep walker close to body, forward flexion  -LC      Recorded by [LC] Fitz Thompson, PT DPT 06/16/19 1139      Row Name 06/16/19 1100             Positioning and Restraints    Pre-Treatment Position  in bed  -LC      Post Treatment Position  chair  -LC      In Chair  sitting;call light within reach;encouraged to call for assist;with other staff  -LC      Recorded by [LC] Fitz Thompson, PT DPT 06/16/19 1139      Row Name 06/16/19 1100             Pain Scale: Numbers Pre/Post-Treatment    Pain Location  - Side  Left  -LC      Pain Location  knee  -LC      Pain Intervention(s)  Medication (See MAR);Repositioned  -LC      Recorded by [LC] Fitz Thompson, PT DPT 06/16/19 1139      Row Name                Wound 06/13/19 2125 Left knee incision    Wound - Properties Group Date first assessed: 06/13/19 [VB] Time first assessed: 2125 [VB] Side: Left [VB] Location: knee [VB] Type: incision [VB] Recorded by:  [VB] Ivy Robison RN 06/13/19 2125      User Key  (r) = Recorded By, (t) = Taken By, (c) = Cosigned By    Initials Name Effective Dates Discipline    VB Ivy Robison RN 06/16/16 -  Nurse    Fitz Pretty, PT DPT 08/02/16 -  PT          Wound 06/13/19 2125 Left knee incision (Active)   Dressing Appearance intact;dry;no drainage 6/16/2019  9:45 AM   Closure [place] 6/16/2019  9:45 AM   Base dressing in place, unable to visualize 6/16/2019  9:45 AM   Drainage Amount none 6/16/2019  9:45 AM           Physical Therapy Education     Title: PT OT SLP Therapies (Done)     Topic: Physical Therapy (Done)     Point: Mobility training (Done)     Learning Progress Summary           Patient Acceptance, E,D, DU,VU by  at 6/16/2019 11:39 AM    Comment:  safety during ambulation, sit to stand and gait mechanics to promote safety with transfers and ambulation    Acceptance, E,D, VU,DU by  at 6/15/2019 10:30 AM    Comment:  safe use of RW, ambulation safety, benefits of activity    Acceptance, E, VU,NR by  at 6/14/2019  2:00 PM                   Point: Home exercise program (Done)     Learning Progress Summary           Patient Acceptance, E,D, DU,VU by  at 6/16/2019 11:39 AM    Comment:  safety during ambulation, sit to stand and gait mechanics to promote safety with transfers and ambulation    Acceptance, E,D, VU,DU by  at 6/15/2019 10:30 AM    Comment:  safe use of RW, ambulation safety, benefits of activity    Acceptance, E, VU,NR by  at 6/14/2019  2:00 PM                   Point: Body mechanics (Done)      Learning Progress Summary           Patient Acceptance, E,D, DU,VU by  at 6/16/2019 11:39 AM    Comment:  safety during ambulation, sit to stand and gait mechanics to promote safety with transfers and ambulation    Acceptance, E,D, VU,DU by  at 6/15/2019 10:30 AM    Comment:  safe use of RW, ambulation safety, benefits of activity    Acceptance, E, VU,NR by  at 6/14/2019  2:00 PM                   Point: Precautions (Done)     Learning Progress Summary           Patient Acceptance, E,D, DU,VU by  at 6/16/2019 11:39 AM    Comment:  safety during ambulation, sit to stand and gait mechanics to promote safety with transfers and ambulation    Acceptance, E,D, VU,DU by  at 6/15/2019 10:30 AM    Comment:  safe use of RW, ambulation safety, benefits of activity    Acceptance, E, VU,NR by  at 6/14/2019  2:00 PM                               User Key     Initials Effective Dates Name Provider Type Discipline     06/08/18 -  Brunilda Tracy, PT Physical Therapist PT     08/02/16 -  Fitz Thompson, PT DPT Physical Therapist PT                PT Recommendation and Plan     Plan of Care Reviewed With: patient  Progress: improving  Outcome Summary: Pt transferred supine to sit wtih CGA x 1 and use of bed rails. Pt performed sit to stand with min x 1 and RW. Pt ambulated 75 ft with CGA x 1 and RW. Requires frequent VC's to keep RW close for safe ambulation. Pt transferred sit to stand from toilet with min x 1 and RW.  Outcome Measures     Row Name 06/16/19 1100 06/15/19 1000 06/14/19 1400       How much help from another person do you currently need...    Turning from your back to your side while in flat bed without using bedrails?  3  -  3  -LC  3  -EF    Moving from lying on back to sitting on the side of a flat bed without bedrails?  3  -  3  -  2  -EF    Moving to and from a bed to a chair (including a wheelchair)?  3  -  3  -LC  2  -EF    Standing up from a chair using your arms (e.g., wheelchair,  bedside chair)?  3  -LC  3  -LC  2  -EF    Climbing 3-5 steps with a railing?  1  -LC  1  -LC  1  -EF    To walk in hospital room?  3  -LC  3  -LC  1  -EF    AM-PAC 6 Clicks Score  16  -LC  16  -LC  11  -EF       Functional Assessment    Outcome Measure Options  AM-PAC 6 Clicks Basic Mobility (PT)  -LC  AM-PAC 6 Clicks Basic Mobility (PT)  -LC  AM-PAC 6 Clicks Basic Mobility (PT)  -EF      User Key  (r) = Recorded By, (t) = Taken By, (c) = Cosigned By    Initials Name Provider Type    EF Brunilda Tracy, PT Physical Therapist    LC Fitz Thompson, PT DPT Physical Therapist         Time Calculation:   PT Charges     Row Name 06/16/19 1100             Time Calculation    Start Time  1115  -      Stop Time  1130  -      Time Calculation (min)  15 min  -      PT Received On  06/16/19  -      PT - Next Appointment  06/17/19  -         Time Calculation- PT    Total Timed Code Minutes- PT  15 minute(s)  -         Timed Charges    32076 - Gait Training Minutes   15  -LC        User Key  (r) = Recorded By, (t) = Taken By, (c) = Cosigned By    Initials Name Provider Type     Fitz Thompson, PT DPT Physical Therapist        Therapy Charges for Today     Code Description Service Date Service Provider Modifiers Qty    89289169526 HC GAIT TRAINING EA 15 MIN 6/15/2019 Fitz Thompson, PT DPT GP 1    58622424652 HC PT THERAPEUTIC ACT EA 15 MIN 6/15/2019 Fitz Thompson, PT DPT GP 1    31601337316 HC GAIT TRAINING EA 15 MIN 6/16/2019 Fitz Thompson, PT DPT GP 1          PT G-Codes  Outcome Measure Options: AM-PAC 6 Clicks Basic Mobility (PT)  AM-PAC 6 Clicks Score: 16    Fitz Thompson PT DPT  6/16/2019

## 2019-06-16 NOTE — PROGRESS NOTES
Continued Stay Note  [facility]     Patient Name: Lilia Becerra  MRN: [id number]  Today's Date: 6/16/2019    Admit Date: 6/13/2019    Discharge Plan     Row Name 06/16/19 1008       Plan    Plan  Accepted [facility] bed availability--CCP to follow up Monday    Patient/Family in Agreement with Plan  yes    Plan Comments  CCP consult noted: Per CCP handoff, [facility] will accept pt Monday pending bed availability and CCP will follow up with [facility]  on Monday. Left Abbi/Eduardo a [facility] requesting and updates she may have today. Otherwise, CCP will follow up on bed status on Monday...............JW        Discharge Codes    No documentation.             Jaz Kaplan RN

## 2019-06-17 LAB
ALBUMIN SERPL-MCNC: 2.8 G/DL (ref 3.5–5.2)
ANION GAP SERPL CALCULATED.3IONS-SCNC: 13.7 MMOL/L
BUN BLD-MCNC: 16 MG/DL (ref 8–23)
BUN/CREAT SERPL: 18.2 (ref 7–25)
CALCIUM SPEC-SCNC: 9.6 MG/DL (ref 8.6–10.5)
CHLORIDE SERPL-SCNC: 98 MMOL/L (ref 98–107)
CO2 SERPL-SCNC: 25.3 MMOL/L (ref 22–29)
CREAT BLD-MCNC: 0.88 MG/DL (ref 0.57–1)
DEPRECATED RDW RBC AUTO: 42.3 FL (ref 37–54)
ERYTHROCYTE [DISTWIDTH] IN BLOOD BY AUTOMATED COUNT: 12 % (ref 12.3–15.4)
GFR SERPL CREATININE-BSD FRML MDRD: 62 ML/MIN/1.73
GLUCOSE BLD-MCNC: 86 MG/DL (ref 65–99)
GLUCOSE BLDC GLUCOMTR-MCNC: 119 MG/DL (ref 70–130)
GLUCOSE BLDC GLUCOMTR-MCNC: 124 MG/DL (ref 70–130)
GLUCOSE BLDC GLUCOMTR-MCNC: 204 MG/DL (ref 70–130)
GLUCOSE BLDC GLUCOMTR-MCNC: 96 MG/DL (ref 70–130)
HBV SURFACE AG SERPL QL IA: NORMAL
HCT VFR BLD AUTO: 27.1 % (ref 34–46.6)
HCV AB SER DONR QL: NORMAL
HGB BLD-MCNC: 8.8 G/DL (ref 12–15.9)
HIV1+2 AB SER QL: NORMAL
MAGNESIUM SERPL-MCNC: 1.2 MG/DL (ref 1.6–2.4)
MCH RBC QN AUTO: 30.8 PG (ref 26.6–33)
MCHC RBC AUTO-ENTMCNC: 32.5 G/DL (ref 31.5–35.7)
MCV RBC AUTO: 94.8 FL (ref 79–97)
PHOSPHATE SERPL-MCNC: 2.7 MG/DL (ref 2.5–4.5)
PLATELET # BLD AUTO: 234 10*3/MM3 (ref 140–450)
PMV BLD AUTO: 11.7 FL (ref 6–12)
POTASSIUM BLD-SCNC: 3.3 MMOL/L (ref 3.5–5.2)
RBC # BLD AUTO: 2.86 10*6/MM3 (ref 3.77–5.28)
SODIUM BLD-SCNC: 137 MMOL/L (ref 136–145)
WBC NRBC COR # BLD: 12.5 10*3/MM3 (ref 3.4–10.8)

## 2019-06-17 PROCEDURE — 86803 HEPATITIS C AB TEST: CPT | Performed by: ORTHOPAEDIC SURGERY

## 2019-06-17 PROCEDURE — 87340 HEPATITIS B SURFACE AG IA: CPT | Performed by: ORTHOPAEDIC SURGERY

## 2019-06-17 PROCEDURE — 80069 RENAL FUNCTION PANEL: CPT | Performed by: HOSPITALIST

## 2019-06-17 PROCEDURE — G0432 EIA HIV-1/HIV-2 SCREEN: HCPCS | Performed by: ORTHOPAEDIC SURGERY

## 2019-06-17 PROCEDURE — 63710000001 INSULIN LISPRO (HUMAN) PER 5 UNITS: Performed by: HOSPITALIST

## 2019-06-17 PROCEDURE — 82962 GLUCOSE BLOOD TEST: CPT

## 2019-06-17 PROCEDURE — 87521 HEPATITIS C PROBE&RVRS TRNSC: CPT | Performed by: ORTHOPAEDIC SURGERY

## 2019-06-17 PROCEDURE — 85027 COMPLETE CBC AUTOMATED: CPT | Performed by: HOSPITALIST

## 2019-06-17 PROCEDURE — 83735 ASSAY OF MAGNESIUM: CPT | Performed by: HOSPITALIST

## 2019-06-17 RX ORDER — BISACODYL 10 MG
10 SUPPOSITORY, RECTAL RECTAL DAILY
Status: DISCONTINUED | OUTPATIENT
Start: 2019-06-17 | End: 2019-06-19 | Stop reason: HOSPADM

## 2019-06-17 RX ORDER — BISACODYL 10 MG
10 SUPPOSITORY, RECTAL RECTAL DAILY PRN
Status: DISCONTINUED | OUTPATIENT
Start: 2019-06-17 | End: 2019-06-19 | Stop reason: HOSPADM

## 2019-06-17 RX ADMIN — SODIUM CHLORIDE, PRESERVATIVE FREE 3 ML: 5 INJECTION INTRAVENOUS at 08:41

## 2019-06-17 RX ADMIN — APIXABAN 2.5 MG: 2.5 TABLET, FILM COATED ORAL at 08:41

## 2019-06-17 RX ADMIN — SODIUM CHLORIDE, PRESERVATIVE FREE 3 ML: 5 INJECTION INTRAVENOUS at 21:29

## 2019-06-17 RX ADMIN — OXYCODONE HYDROCHLORIDE AND ACETAMINOPHEN 1 TABLET: 5; 325 TABLET ORAL at 14:54

## 2019-06-17 RX ADMIN — METOPROLOL SUCCINATE 25 MG: 25 TABLET, FILM COATED, EXTENDED RELEASE ORAL at 21:27

## 2019-06-17 RX ADMIN — POLYETHYLENE GLYCOL 3350 17 G: 17 POWDER, FOR SOLUTION ORAL at 10:18

## 2019-06-17 RX ADMIN — PRAMIPEXOLE DIHYDROCHLORIDE 1.5 MG: 1.5 TABLET ORAL at 21:27

## 2019-06-17 RX ADMIN — OXYCODONE HYDROCHLORIDE AND ACETAMINOPHEN 1 TABLET: 5; 325 TABLET ORAL at 10:17

## 2019-06-17 RX ADMIN — ATORVASTATIN CALCIUM 10 MG: 10 TABLET, FILM COATED ORAL at 21:28

## 2019-06-17 RX ADMIN — OXYBUTYNIN CHLORIDE 5 MG: 5 TABLET, EXTENDED RELEASE ORAL at 08:41

## 2019-06-17 RX ADMIN — OXYCODONE HYDROCHLORIDE AND ACETAMINOPHEN 1 TABLET: 5; 325 TABLET ORAL at 21:28

## 2019-06-17 RX ADMIN — BISACODYL 10 MG RECTAL SUPPOSITORY 10 MG: at 16:04

## 2019-06-17 RX ADMIN — OXYCODONE HYDROCHLORIDE AND ACETAMINOPHEN 1 TABLET: 5; 325 TABLET ORAL at 06:01

## 2019-06-17 RX ADMIN — APIXABAN 2.5 MG: 2.5 TABLET, FILM COATED ORAL at 21:28

## 2019-06-17 RX ADMIN — SPIRONOLACTONE 25 MG: 25 TABLET, FILM COATED ORAL at 08:41

## 2019-06-17 RX ADMIN — DOCUSATE SODIUM 100 MG: 100 CAPSULE, LIQUID FILLED ORAL at 21:28

## 2019-06-17 RX ADMIN — LEVOTHYROXINE SODIUM 25 MCG: 25 TABLET ORAL at 05:54

## 2019-06-17 RX ADMIN — VENLAFAXINE HYDROCHLORIDE 150 MG: 150 CAPSULE, EXTENDED RELEASE ORAL at 08:41

## 2019-06-17 RX ADMIN — DOCUSATE SODIUM 100 MG: 100 CAPSULE, LIQUID FILLED ORAL at 08:41

## 2019-06-17 RX ADMIN — FAMOTIDINE 40 MG: 40 TABLET, FILM COATED ORAL at 08:41

## 2019-06-17 RX ADMIN — INSULIN LISPRO 3 UNITS: 100 INJECTION, SOLUTION INTRAVENOUS; SUBCUTANEOUS at 11:48

## 2019-06-17 NOTE — PLAN OF CARE
Problem: Patient Care Overview  Goal: Plan of Care Review  Outcome: Ongoing (interventions implemented as appropriate)   06/17/19 9388   Coping/Psychosocial   Plan of Care Reviewed With patient   Plan of Care Review   Progress improving   OTHER   Outcome Summary Pain controlled with PO pain medication. Ambulates with assist x1 and walker. VSS. DC to rehab when bed ready. Patient has not had BM in several days, stool softeners, laxatives, and suppository given. Educated about BP monitoring.      Goal: Individualization and Mutuality  Outcome: Ongoing (interventions implemented as appropriate)    Goal: Discharge Needs Assessment  Outcome: Ongoing (interventions implemented as appropriate)    Goal: Interprofessional Rounds/Family Conf  Outcome: Ongoing (interventions implemented as appropriate)      Problem: Fall Risk (Adult)  Goal: Absence of Fall  Outcome: Ongoing (interventions implemented as appropriate)      Problem: Fracture Orthopaedic (Adult)  Goal: Signs and Symptoms of Listed Potential Problems Will be Absent, Minimized or Managed (Fracture Orthopaedic)  Outcome: Ongoing (interventions implemented as appropriate)

## 2019-06-17 NOTE — PLAN OF CARE
Problem: Patient Care Overview  Goal: Plan of Care Review  Outcome: Ongoing (interventions implemented as appropriate)   06/16/19 1140 06/16/19 2028   Coping/Psychosocial   Plan of Care Reviewed With patient --    Plan of Care Review   Progress improving --    OTHER   Outcome Summary --  Pt pod 3 left knee revision. Dressing CDI, vss, n/v intact. Pain controlled with po meds. Will continue to monitor. Possible d/c tomorrow to rehab.

## 2019-06-17 NOTE — SIGNIFICANT NOTE
06/17/19 1156   PT Discharge Summary   Reason for Discharge Discharge from facility   Discharge Destination SNF

## 2019-06-17 NOTE — PROGRESS NOTES
Orthopaedic Surgery   Daily Progress Note  Dr. SCOTT Costa II  [phone]  DEMOGRAPHICS:   · Lilia Becerra   · Age:78 y.o.   · MRN:[id number]  · Admitted: 6/13/2019    PROCEDURE: 4 Days Post-Op s/p Procedure(s):  LEFT KNEE REVISION     HOSPITAL PROGRESS  · Patient Issues: Knee not nearly as painful today, overall she thinks she is improving greatly  · Ambulation/Activity: She was able to ambulate 75 feet yesterday    VITALS:  Vitals:    06/16/19 2124 06/16/19 2306 06/17/19 0340 06/17/19 0700   BP: 98/65 110/60 114/60 114/67   BP Location:  Right arm Left arm Right arm   Patient Position:  Lying Lying Lying   Pulse: 78 73 76 69   Resp:  16 16 16   Temp:  100.2 °F (37.9 °C) 99.2 °F (37.3 °C) 98.4 °F (36.9 °C)   TempSrc:  Oral Oral Oral   SpO2:  94% 94% 93%   Weight:       Height:           PHYSICAL EXAM:  · CONSTITUTIONAL: A&Ox3, No acute distress  · LUNGS: Equal chest rise, no shortness of air  · CARDIOVASCULAR: palpable peripheral pulses  · WOUND: Johnny wound VAC in place  · EXTREMITY: Left Lower Extremity  · Pulses: brisk capillary refill intact  · Sensation: Sensation intact to light touch to the saphenous/sural/tibial/deep peroneal/superficial peroneal nerves, and grossly throughout the extremity.  · Motor: 5/5 EHL/FHL/TA/GS motor complexes    LABS:   Lab Results   Component Value Date    HGB 8.8 (L) 06/17/2019     Lab Results   Component Value Date    WBC 12.50 (H) 06/17/2019     Lab Results   Component Value Date    GLUCOSE 86 06/17/2019    CALCIUM 9.6 06/17/2019     06/17/2019    K 3.3 (L) 06/17/2019    CO2 25.3 06/17/2019    CL 98 06/17/2019    BUN 16 06/17/2019    CREATININE 0.88 06/17/2019    EGFRIFNONA 62 06/17/2019    BCR 18.2 06/17/2019    ANIONGAP 13.7 06/17/2019       ASSESSMENT: Patient is a 78 y.o. female who is 4 Days Post-Op s/p Procedure(s):  LEFT KNEE REVISION     PLAN:   · Weight Bearing: Left Lower Extremity Weight Bearing As Tolerated  · Labs: Above lab values review. Plan:  "Hemoglobin 8.3, will continue to monitor daily.  BMP looks okay  · PT/OT: To Mobilize  · DVT PPX: Eliquis 2.5mg BID  · Post-Op Xray: TKA implants in good alignment.   · Dispo: SNF when bed available    R \"Fabio\" Julissa STOUT MD  Orthopaedic Surgery  [facility]  [phone]    "

## 2019-06-17 NOTE — PLAN OF CARE
Problem: Patient Care Overview  Goal: Plan of Care Review  Outcome: Ongoing (interventions implemented as appropriate)   06/17/19 0300   Coping/Psychosocial   Plan of Care Reviewed With patient   Plan of Care Review   Progress improving   OTHER   Outcome Summary VSS. Neurovascular assessment WNL. ALEKSANDR CDI with flashing green light. Transferring with walker and assist X1. Has rested well this night with no c/o pain and CPOT=0. Sliding scale insulin given this shift for glucose 151. Pt understood education regarding blood glucose monitoring and SSI. Plans for d/c to rehab possibly today       Problem: Fall Risk (Adult)  Goal: Absence of Fall  Outcome: Ongoing (interventions implemented as appropriate)      Problem: Fracture Orthopaedic (Adult)  Goal: Signs and Symptoms of Listed Potential Problems Will be Absent, Minimized or Managed (Fracture Orthopaedic)  Outcome: Ongoing (interventions implemented as appropriate)

## 2019-06-17 NOTE — DISCHARGE SUMMARY
Orthopaedic Discharge Summary  Dr. SCOTT Alcala” Julissa II  [phone]    NAME: Lilia Becerra PCP: Leo Lomas MD   :  MRN: [id number]  [id number] LOS:  ADMIT: 4 days  2019   AGE/SEX: 78 y.o. female DC:  today             · Admitting Diagnosis: Femur fracture (CMS/[facility]) [S72.90XA]    · Surgery Performed: No admission procedures for hospital encounter.    · Discharge Medications:         Discharge Medications      New Medications      Instructions Start Date   apixaban 2.5 MG tablet tablet  Commonly known as:  ELIQUIS   2.5 mg, Oral, Every 12 Hours Scheduled      oxyCODONE-acetaminophen 5-325 MG per tablet  Commonly known as:  PERCOCET  Replaces:  oxyCODONE-acetaminophen 7.5-325 MG per tablet   1 tablet, Oral, Every 4 Hours PRN         Continue These Medications      Instructions Start Date   atorvastatin 10 MG tablet  Commonly known as:  LIPITOR   10 mg, Oral, Nightly      CALCIUM 600 + D PO   1 tablet, Oral, 2 Times Daily      COMBIGAN 0.2-0.5 % ophthalmic solution  Generic drug:  brimonidine-timolol   2 drops, Both Eyes, 2 Times Daily      IRON 27 PO   1 tablet, Oral, 2 Times Daily      levothyroxine 25 MCG tablet  Commonly known as:  SYNTHROID, LEVOTHROID   25 mcg, Oral, Daily      losartan 25 MG tablet  Commonly known as:  COZAAR   25 mg, Oral, Daily      metFORMIN 500 MG tablet  Commonly known as:  GLUCOPHAGE   500 mg, Oral, 2 Times Daily With Meals      metoprolol succinate XL 50 MG 24 hr tablet  Commonly known as:  TOPROL-XL   25 mg, Oral, Nightly      MULTIVITAMIN ADULTS PO   1 capsule, Oral, Daily      MYRBETRIQ 25 MG tablet sustained-release 24 hour 24 hr tablet  Generic drug:  Mirabegron ER   50 mg, Oral, Every Morning      omeprazole 20 MG capsule  Commonly known as:  priLOSEC   20 mg, Oral, Daily      pramipexole 1.5 MG tablet  Commonly known as:  MIRAPEX   1.5 mg, Oral, Nightly      spironolactone 25 MG tablet  Commonly known as:  ALDACTONE   25 mg, Oral, Daily, mon - sat        VENLAFAXINE HCL ER PO   150 mg, Oral, Daily      VESICARE 5 MG tablet  Generic drug:  solifenacin   5 mg, Oral, Nightly      vitamin C 500 MG tablet  Commonly known as:  ASCORBIC ACID   500 mg, Oral, Daily         Stop These Medications    acetaminophen 500 MG tablet  Commonly known as:  TYLENOL     naproxen 500 MG tablet  Commonly known as:  NAPROSYN     oxyCODONE-acetaminophen 7.5-325 MG per tablet  Commonly known as:  PERCOCET  Replaced by:  oxyCODONE-acetaminophen 5-325 MG per tablet            · Vitals:     Vitals:    06/16/19 2124 06/16/19 2306 06/17/19 0340 06/17/19 0700   BP: 98/65 110/60 114/60 114/67   BP Location:  Right arm Left arm Right arm   Patient Position:  Lying Lying Lying   Pulse: 78 73 76 69   Resp:  16 16 16   Temp:  100.2 °F (37.9 °C) 99.2 °F (37.3 °C) 98.4 °F (36.9 °C)   TempSrc:  Oral Oral Oral   SpO2:  94% 94% 93%   Weight:       Height:           · Labs:      Admission on 06/13/2019   Component Date Value Ref Range Status   • ABO Type 06/13/2019 A   Final   • RH type 06/13/2019 Positive   Final   • Antibody Screen 06/13/2019 Negative   Final   • T&S Expiration Date 06/13/2019 6/16/2019 11:59:59 PM   Final   • Glucose 06/13/2019 109* 65 - 99 mg/dL Final   • BUN 06/13/2019 13  8 - 23 mg/dL Final   • Creatinine 06/13/2019 1.01* 0.57 - 1.00 mg/dL Final   • Sodium 06/13/2019 144  136 - 145 mmol/L Final   • Potassium 06/13/2019 3.2* 3.5 - 5.2 mmol/L Final   • Chloride 06/13/2019 108* 98 - 107 mmol/L Final   • CO2 06/13/2019 23.1  22.0 - 29.0 mmol/L Final   • Calcium 06/13/2019 9.0  8.6 - 10.5 mg/dL Final   • eGFR Non African Amer 06/13/2019 53* >60 mL/min/1.73 Final   • BUN/Creatinine Ratio 06/13/2019 12.9  7.0 - 25.0 Final   • Anion Gap 06/13/2019 12.9  mmol/L Final   • Protime 06/13/2019 14.0  11.7 - 14.2 Seconds Final   • INR 06/13/2019 1.11* 0.90 - 1.10 Final   • WBC 06/13/2019 10.17  3.40 - 10.80 10*3/mm3 Final   • RBC 06/13/2019 3.58* 3.77 - 5.28 10*6/mm3 Final   • Hemoglobin 06/13/2019  11.0* 12.0 - 15.9 g/dL Final   • Hematocrit 06/13/2019 34.3  34.0 - 46.6 % Final   • MCV 06/13/2019 95.8  79.0 - 97.0 fL Final   • MCH 06/13/2019 30.7  26.6 - 33.0 pg Final   • MCHC 06/13/2019 32.1  31.5 - 35.7 g/dL Final   • RDW 06/13/2019 12.9  12.3 - 15.4 % Final   • RDW-SD 06/13/2019 44.8  37.0 - 54.0 fl Final   • MPV 06/13/2019 10.8  6.0 - 12.0 fL Final   • Platelets 06/13/2019 264  140 - 450 10*3/mm3 Final   • Neutrophil % 06/13/2019 59.3  42.7 - 76.0 % Final   • Lymphocyte % 06/13/2019 30.5  19.6 - 45.3 % Final   • Monocyte % 06/13/2019 7.7  5.0 - 12.0 % Final   • Eosinophil % 06/13/2019 1.9  0.3 - 6.2 % Final   • Basophil % 06/13/2019 0.2  0.0 - 1.5 % Final   • Immature Grans % 06/13/2019 0.4  0.0 - 0.5 % Final   • Neutrophils, Absolute 06/13/2019 6.04  1.70 - 7.00 10*3/mm3 Final   • Lymphocytes, Absolute 06/13/2019 3.10  0.70 - 3.10 10*3/mm3 Final   • Monocytes, Absolute 06/13/2019 0.78  0.10 - 0.90 10*3/mm3 Final   • Eosinophils, Absolute 06/13/2019 0.19  0.00 - 0.40 10*3/mm3 Final   • Basophils, Absolute 06/13/2019 0.02  0.00 - 0.20 10*3/mm3 Final   • Immature Grans, Absolute 06/13/2019 0.04  0.00 - 0.05 10*3/mm3 Final   • Glucose 06/13/2019 109* 65 - 99 mg/dL Final   • BUN 06/13/2019 13  8 - 23 mg/dL Final   • Creatinine 06/13/2019 1.01* 0.57 - 1.00 mg/dL Final   • Sodium 06/13/2019 144  136 - 145 mmol/L Final   • Potassium 06/13/2019 3.2* 3.5 - 5.2 mmol/L Final   • Chloride 06/13/2019 108* 98 - 107 mmol/L Final   • CO2 06/13/2019 23.1  22.0 - 29.0 mmol/L Final   • Calcium 06/13/2019 9.0  8.6 - 10.5 mg/dL Final   • Total Protein 06/13/2019 6.5  6.0 - 8.5 g/dL Final   • Albumin 06/13/2019 3.60  3.50 - 5.20 g/dL Final   • ALT (SGPT) 06/13/2019 10  1 - 33 U/L Final   • AST (SGOT) 06/13/2019 16  1 - 32 U/L Final   • Alkaline Phosphatase 06/13/2019 93  39 - 117 U/L Final   • Total Bilirubin 06/13/2019 0.4  0.2 - 1.2 mg/dL Final   • eGFR Non African Amer 06/13/2019 53* >60 mL/min/1.73 Final   • Globulin  06/13/2019 2.9  gm/dL Final   • A/G Ratio 06/13/2019 1.2  g/dL Final   • BUN/Creatinine Ratio 06/13/2019 12.9  7.0 - 25.0 Final   • Anion Gap 06/13/2019 12.9  mmol/L Final   • Hemoglobin A1C 06/13/2019 5.81* 4.80 - 5.60 % Final   • Troponin T 06/13/2019 <0.010  0.000 - 0.030 ng/mL Final   • Glucose 06/13/2019 140* 70 - 130 mg/dL Final   • Glucose 06/14/2019 143* 65 - 99 mg/dL Final   • BUN 06/14/2019 9  8 - 23 mg/dL Final   • Creatinine 06/14/2019 0.95  0.57 - 1.00 mg/dL Final   • Sodium 06/14/2019 140  136 - 145 mmol/L Final   • Potassium 06/14/2019 3.5  3.5 - 5.2 mmol/L Final   • Chloride 06/14/2019 104  98 - 107 mmol/L Final   • CO2 06/14/2019 23.0  22.0 - 29.0 mmol/L Final   • Calcium 06/14/2019 8.6  8.6 - 10.5 mg/dL Final   • eGFR Non African Amer 06/14/2019 57* >60 mL/min/1.73 Final   • BUN/Creatinine Ratio 06/14/2019 9.5  7.0 - 25.0 Final   • Anion Gap 06/14/2019 13.0  mmol/L Final   • Hemoglobin 06/14/2019 9.9* 12.0 - 15.9 g/dL Final   • Hematocrit 06/14/2019 31.0* 34.0 - 46.6 % Final   • Hemoglobin 06/15/2019 9.0* 12.0 - 15.9 g/dL Final   • Hematocrit 06/15/2019 30.6* 34.0 - 46.6 % Final   • Glucose 06/15/2019 135* 70 - 130 mg/dL Final   • Glucose 06/15/2019 143* 70 - 130 mg/dL Final   • Glucose 06/15/2019 144* 70 - 130 mg/dL Final   • Glucose 06/15/2019 89  70 - 130 mg/dL Final   • Hemoglobin 06/16/2019 8.3* 12.0 - 15.9 g/dL Final   • Hematocrit 06/16/2019 25.8* 34.0 - 46.6 % Final   • Glucose 06/16/2019 109* 65 - 99 mg/dL Final   • BUN 06/16/2019 16  8 - 23 mg/dL Final   • Creatinine 06/16/2019 0.92  0.57 - 1.00 mg/dL Final   • Sodium 06/16/2019 138  136 - 145 mmol/L Final   • Potassium 06/16/2019 3.6  3.5 - 5.2 mmol/L Final   • Chloride 06/16/2019 102  98 - 107 mmol/L Final   • CO2 06/16/2019 26.1  22.0 - 29.0 mmol/L Final   • Calcium 06/16/2019 9.4  8.6 - 10.5 mg/dL Final   • eGFR Non African Amer 06/16/2019 59* >60 mL/min/1.73 Final   • BUN/Creatinine Ratio 06/16/2019 17.4  7.0 - 25.0 Final   • Anion  Gap 06/16/2019 9.9  mmol/L Final   • Glucose 06/16/2019 103  70 - 130 mg/dL Final   • Glucose 06/16/2019 130  70 - 130 mg/dL Final   • Glucose 06/16/2019 96  70 - 130 mg/dL Final   • Glucose 06/16/2019 151* 70 - 130 mg/dL Final   • WBC 06/17/2019 12.50* 3.40 - 10.80 10*3/mm3 Final   • RBC 06/17/2019 2.86* 3.77 - 5.28 10*6/mm3 Final   • Hemoglobin 06/17/2019 8.8* 12.0 - 15.9 g/dL Final   • Hematocrit 06/17/2019 27.1* 34.0 - 46.6 % Final   • MCV 06/17/2019 94.8  79.0 - 97.0 fL Final   • MCH 06/17/2019 30.8  26.6 - 33.0 pg Final   • MCHC 06/17/2019 32.5  31.5 - 35.7 g/dL Final   • RDW 06/17/2019 12.0* 12.3 - 15.4 % Final   • RDW-SD 06/17/2019 42.3  37.0 - 54.0 fl Final   • MPV 06/17/2019 11.7  6.0 - 12.0 fL Final   • Platelets 06/17/2019 234  140 - 450 10*3/mm3 Final   • Magnesium 06/17/2019 1.2* 1.6 - 2.4 mg/dL Final   • Glucose 06/17/2019 86  65 - 99 mg/dL Final   • BUN 06/17/2019 16  8 - 23 mg/dL Final   • Creatinine 06/17/2019 0.88  0.57 - 1.00 mg/dL Final   • Sodium 06/17/2019 137  136 - 145 mmol/L Final   • Potassium 06/17/2019 3.3* 3.5 - 5.2 mmol/L Final   • Chloride 06/17/2019 98  98 - 107 mmol/L Final   • CO2 06/17/2019 25.3  22.0 - 29.0 mmol/L Final   • Calcium 06/17/2019 9.6  8.6 - 10.5 mg/dL Final   • Albumin 06/17/2019 2.80* 3.50 - 5.20 g/dL Final   • Phosphorus 06/17/2019 2.7  2.5 - 4.5 mg/dL Final   • Anion Gap 06/17/2019 13.7  mmol/L Final   • BUN/Creatinine Ratio 06/17/2019 18.2  7.0 - 25.0 Final   • eGFR Non African Amer 06/17/2019 62  >60 mL/min/1.73 Final   • Glucose 06/17/2019 96  70 - 130 mg/dL Final        No results found.    · Hospital Course:   78 y.o. female was admitted to [facility] to services of Malick Costa II, MD with Femur fracture (CMS/[facility]) [S72.90XA] on 6/13/2019 and underwent No admission procedures for hospital encounter.. Postoperative DVT ppx was accomplished with Eliquis 2.5mg BID starting on POD#1 and will be continued for 30 days. Post-operatively the patient  "transferred to the floor where the patient underwent mobilization therapy. Opioids were titrated to achieve appropriate pain management to allow for participation in mobilization exercises. Vital signs and laboratory values are now within safe parameters for discharge. The dressings and/or incision is intact without signs or symptoms of acute infection. Operative extremity neurovascular status remains intact as compared to the preoperative exam. Appropriate education re: incision care, activity levels, medications, and follow up visits was completed and all questions were answered. The patient is now deemed stable for discharge.    SNF: The patient had a difficult time mobilizing sufficiently with physical therapy. Further rehabilitation was recommended in a SNF facility. Once a bed was available, and the patient was cleared, there were discharged to the SNF in good condition}.       R \"Fabio\" Julissa STOUT MD  Orthopaedic Surgery  [facility]  [phone]                                               "

## 2019-06-17 NOTE — PROGRESS NOTES
LOS: 4 days     Name: Lilia Becerra  Age/Sex: 78 y.o. female  :  1940        PCP: Leo Lomas MD    Subjective   Resting comfortably currently.  Pain is under control.  Has been up with physical therapy.  She will  General: No Fever or Chills, Cardiac: No Chest Pain or Palpitations, Resp: No Cough or SOA, GI: No Nausea, Vomiting, or Diarrhea and Other: No bleeding      apixaban 2.5 mg Oral Q12H   atorvastatin 10 mg Oral Nightly   docusate sodium 100 mg Oral BID   famotidine 40 mg Oral Daily   insulin lispro 0-7 Units Subcutaneous 4x Daily With Meals & Nightly   levothyroxine 25 mcg Oral Q AM   losartan 25 mg Oral Q24H   metoprolol succinate XL 25 mg Oral Nightly   oxybutynin XL 5 mg Oral Daily   pramipexole 1.5 mg Oral Nightly   sodium chloride 3 mL Intravenous Q12H   spironolactone 25 mg Oral Daily   venlafaxine  mg Oral Daily With Breakfast       lactated ringers 9 mL/hr Last Rate: Stopped (19 1417)       Objective   Vital Signs  Temp:  [97.1 °F (36.2 °C)-100.4 °F (38 °C)] 97.1 °F (36.2 °C)  Heart Rate:  [66-78] 74  Resp:  [16] 16  BP: ()/(58-70) 129/70  Body mass index is 31.45 kg/m².    Intake/Output Summary (Last 24 hours) at 2019 1259  Last data filed at 2019 1100  Gross per 24 hour   Intake 1440 ml   Output 500 ml   Net 940 ml       Physical Exam   Constitutional: She is oriented to person, place, and time. She appears well-developed and well-nourished.   Eyes: EOM are normal.   Neck: Neck supple.   Cardiovascular: Normal rate and regular rhythm.   Pulmonary/Chest: Effort normal and breath sounds normal.   Abdominal: Soft. Bowel sounds are normal.   Neurological: She is alert and oriented to person, place, and time.   Skin: Skin is warm and dry. Capillary refill takes less than 2 seconds.   Psychiatric: She has a normal mood and affect. Her behavior is normal.   Nursing note and vitals reviewed.    Results Review:       I reviewed the patient's new clinical  results.  Results from last 7 days   Lab Units 06/17/19  0351 06/16/19  0434 06/15/19  0313 06/14/19  0453 06/13/19  1008   WBC 10*3/mm3 12.50*  --   --   --  10.17   HEMOGLOBIN g/dL 8.8* 8.3* 9.0* 9.9* 11.0*   PLATELETS 10*3/mm3 234  --   --   --  264     Results from last 7 days   Lab Units 06/17/19  0351 06/16/19  0434 06/14/19  0453 06/13/19  1008   SODIUM mmol/L 137 138 140 144  144   POTASSIUM mmol/L 3.3* 3.6 3.5 3.2*  3.2*   CHLORIDE mmol/L 98 102 104 108*  108*   CO2 mmol/L 25.3 26.1 23.0 23.1  23.1   BUN mg/dL 16 16 9 13  13   CREATININE mg/dL 0.88 0.92 0.95 1.01*  1.01*   CALCIUM mg/dL 9.6 9.4 8.6 9.0  9.0   MAGNESIUM mg/dL 1.2*  --   --   --    PHOSPHORUS mg/dL 2.7  --   --   --    Estimated Creatinine Clearance: 55.1 mL/min (by C-G formula based on SCr of 0.88 mg/dL).      Assessment/Plan     Atrial fibrillation (CMS/HCC)    Diabetes mellitus (CMS/HCC)    Essential hypertension    Stage 3 chronic kidney disease (CMS/HCC)    Femur fracture (CMS/HCC)      PLAN  -Blood pressure remained stable postoperatively  -Hemoglobin has dropped about 3 g consistent with acute blood loss anemia following surgery. But has stabilized  -Creatinine is stabilized and electrolytes are within normal limits  -Blood sugars remain stable      Disposition  Per primary team no medical contraindication with Dc today      Hussein Rudolph MD  [facility]  06/17/19  12:59 PM

## 2019-06-17 NOTE — DISCHARGE INSTRUCTIONS
Total Hip & Knee  Discharge Instructions  Dr. SCOTT Alcala” Julissa II  [phone]    • INCISION CARE  o Wash your hands prior to dressing changes  o ALEKSANDR Wound VAC: Postoperatively you had a ALEKSANDR Wound Vac placed on the incision. This was placed under sterile conditions in the operating room. It remains in place for 7 days postoperatively. After 7 days, the entire dressing must be removed, including all of the sticky adhesive. The dressing and battery pack provide gentle suction to the incision and provide several benefits over a traditional dressing:  - It maintains the sterile environment of the OR and reduces the risk of infection  - The suction removes unwanted buildup of blood/hematoma under the skin to reduce swelling  - The suction also promotes fresh blood supply to the skin and soft tissue to speed up healing  - The postoperative scar is reduced in size  - Showering is permitted immediately after surgery, but the battery pack must be protected or removed during the shower.   o After 7 days the ALEKSANDR Wound Vac is removed. If there is no drainage, no dressing is required. If there is some scant drainage a dry bandage can be applied and changed daily until seen in the office or until the drainage stops.   o No creams or ointments to the incisions until 4 weeks post op.  o Do not touch or pick at the incision  o Check incision every day and notify surgeon immediately if any of the following signs or symptoms are seen:  - Increase in redness  - Increase in swelling around the incision and of the entire extremity  - Increase in pain  - NEW drainage or oozing from the incision  - Pulling apart of the edges of the incision  - Increase in overall body temperature (greater than 100.4 degrees)  o Your sutures are all underneath of the skin. No staples or sutures ever have to be removed.     • ACTIVITIES  o Exercises:  - Physical therapy will begin immediately while in the hospital. Patients going to a nursing home  will get therapy as part of their care at the SNU/SNF facility. Patients going home may also have a therapist come to the house to help them mobilize until they can safely get to an outpatient therapy facility.  - Elevate the affected leg most of the day during the first week post operatively. Caution must be taken to avoid pillow placement directly under the heel of the leg, as this can cause pressure ulcers even with a soft pillow. All pillows and blankets should be placed underneath of the thigh and calf so that the heel is free-floating.  - Use cold packs for 20-30 minutes approximately 5 times per day.  - You should perform the daily stretching and strengthening exercises as taught by the therapist as often as possible. This can be done many times a day.  - Full weight bearing is allowed after surgery. It will be sore/painful to put weight on the leg, but this will help the bone to heal and prevent complications such as pneumonia, bed sores and blood clots. Mobilization is vital to the recovery process.  o Activities of Daily Living:  - No tub baths, hot tubs, or swimming pools for 4 weeks.  - May shower and let water run over the incision immediately after surgery. The battery pack of the ALEKSANDR Wound Vac must be protected or removed while in the shower. After the ALEKSANDR is removed 7 days after surgery showering is permitted as long as there is no drainage from the wound.     • Restrictions  o Weight: It is ok to allow full weight bearing after surgery. Weight on the leg actually quickens the recovery process. While it will be sore/painful to put weight on the leg, it is safe to do so. Hip replacement after hip fracture has a much slower recover process. It can take months to heal fully from a hip fracture and patients even make some slow benefits up to a year afterwards.   o Driving: Many patients have questions about when it is safe to return to driving. The answer is that this is extremely variable. It depends  on the extent of the surgery, as well as how quickly you heal. Certainly left leg surgeries make returning to driving easier while right leg surgeries require more extensive rehabilitation before driving can be safe. Until you can press down on the brake hard, and are off of all narcotics, driving is not permitted. Your surgeon cannot “clear” you to return to driving, only you can make the decision when you feel it is safe.    • Medications  o Anticoagulants: After upper extremity surgery most patients do not require an anticoagulant unless you have another injury that will be keeping you from mobilizing. Lower extremity surgery typically does require use of an anticoagulation medicine.   - IF YOU HAD LOWER EXTREMITY SURGERY AND ARE NOT DISCHARGED HOME WITH ANY ANTICOAGULANT MEDICINE YOU SHOULD TAKE ASPIRIN 325mg DAILY FOR 30 DAYS POSTOPERATIVELY.  - If you are discharged home with an anticoagulant such as Aspirin, Xarelto, Eliquis, Coumadin, or Lovenox, follow these simple instructions:   - Notify surgeon immediately if any cha bleeding is noted in the urine, stool, emesis, or from the nose or the incision. Blood in the stool will often appear as black rather than red. Blood in urine may appear as pink. Blood in emesis may appear as brown/black like coffee grounds.  - You will need to apply pressure for longer periods of time to any cuts or abrasions to stop bleeding  - Avoid alcohol while taking anticoagulants  - Most anticoagulants are to be taken for 30 days postoperatively. After this time, you may stop using them unless instructed otherwise.   - If you were already taking an anticoagulant (commonly Aspirin, Coumadin, or Plavix) you will likely be resuming your normal dose postoperatively and will be continuing that medication at the discretion of the prescribing physician.  o Stool Softeners: You will be at greater risk of constipation after surgery due to being less mobile and the pain medications.  - Take  stool softeners as needed. Over the counter Colace 100 mg 1-2 capsules twice daily can be taken.  - If stools become too loose or too frequent, please decreases the dosage or stop the stool softener.  - If constipation occurs despite use of stool softeners, you are to continue the stool softeners and add a laxative (Milk of Magnesia 1 ounce daily as needed)  - Drink plenty of fluids, and eat fruits and vegetables during your recovery time. Getting up and mobilizing will help the bowels to recover their regular function, as will weaning off of all narcotics when the pain becomes tolerable.  o Pain Medications: Utilized after surgery are narcotics. This is some general information about these medications.  - CLASSIFICATION: Pain medications are called Opioids and are narcotics  - LEGALITIES: It is illegal to share narcotics with others  - DRIVING: it is illegal to drive while under the influence of narcotics. Doing so is a DUI.  - POTENTIAL SIDE EFFECTS: nausea, vomiting, itching, dizziness, drowsiness, dry mouth, constipation, and difficulty urinating.  - POTENTIAL ADVERSE EFFECTS:  - Opioid tolerance can develop with use of pain medications and this simply means that it requires more and more of the medication to control pain. However, this is seen more in patients that use opioids for longer periods of time.  - Opioid dependence can develop with use of Opioids. People with opioid dependence will experience withdrawal symptoms upon cessation of the medication.  - Opioid addiction can develop with use of Opioids. The incidence of this is very unlikely in patients who take the medications as ordered and stop the medications as instructed.  - Opioid overdose can be dangerous, but is unlikely when the medication is taken as ordered and stopped when ordered. It is important not to mix opioids with alcohol as this can lead to over sedation and respiratory difficulty.  - DOSAGE:  - After the initial surgical pain begins to  resolve, you may begin to decrease the pain medication. By the end of a few weeks, you should be off of pain medications.  - Refills will not be given by the office during evening hours, on weekends, or after 6 weeks post-op. You are responsible for weaning off of pain medication. You can increase the time between narcotic pills, taking one every 4 then 6 then 8 hours and so on.  - To seek refills on pain medications during the initial 6-week post-operative period, you must call the office to request the refill. The office will then notify you when to  the prescription. DO NOT wait until you are out of the medication to request a refill. Prescriptions will not be filled over the weekend and depending on the schedule, it may take a couple days for the prescription to be available. Someone will have to pick the prescription in person at the office.    • FOLLOW-UP VISITS  o You will need to follow up in the office with your surgeon in 3 weeks, or as instructed elsewhere in your discharge paperwork. Please call this number [phone] to schedule this appointment. If you are going to an SNF/SNU facility, they will arrange for you to follow up in the office.  o If you have any concerns or suspected complications prior to your follow up visit, please call the office. Do not wait until your appointment time if you suspect complications. These will need to be addressed in the office promptly.      Malick Costa II, MD  Orthopaedic Surgery  [facility]

## 2019-06-18 LAB
GLUCOSE BLDC GLUCOMTR-MCNC: 103 MG/DL (ref 70–130)
GLUCOSE BLDC GLUCOMTR-MCNC: 109 MG/DL (ref 70–130)
GLUCOSE BLDC GLUCOMTR-MCNC: 138 MG/DL (ref 70–130)
GLUCOSE BLDC GLUCOMTR-MCNC: 236 MG/DL (ref 70–130)
HCT VFR BLD AUTO: 25.7 % (ref 34–46.6)
HGB BLD-MCNC: 8 G/DL (ref 12–15.9)

## 2019-06-18 PROCEDURE — 85014 HEMATOCRIT: CPT | Performed by: ORTHOPAEDIC SURGERY

## 2019-06-18 PROCEDURE — 85018 HEMOGLOBIN: CPT | Performed by: ORTHOPAEDIC SURGERY

## 2019-06-18 PROCEDURE — 63710000001 INSULIN LISPRO (HUMAN) PER 5 UNITS: Performed by: HOSPITALIST

## 2019-06-18 PROCEDURE — 97110 THERAPEUTIC EXERCISES: CPT

## 2019-06-18 PROCEDURE — 82962 GLUCOSE BLOOD TEST: CPT

## 2019-06-18 RX ORDER — POLYETHYLENE GLYCOL 3350 17 G/17G
34 POWDER, FOR SOLUTION ORAL DAILY
Status: DISCONTINUED | OUTPATIENT
Start: 2019-06-18 | End: 2019-06-19 | Stop reason: HOSPADM

## 2019-06-18 RX ADMIN — ATORVASTATIN CALCIUM 10 MG: 10 TABLET, FILM COATED ORAL at 20:57

## 2019-06-18 RX ADMIN — OXYCODONE HYDROCHLORIDE AND ACETAMINOPHEN 1 TABLET: 5; 325 TABLET ORAL at 11:36

## 2019-06-18 RX ADMIN — DOCUSATE SODIUM 100 MG: 100 CAPSULE, LIQUID FILLED ORAL at 20:57

## 2019-06-18 RX ADMIN — APIXABAN 2.5 MG: 2.5 TABLET, FILM COATED ORAL at 20:57

## 2019-06-18 RX ADMIN — METOPROLOL SUCCINATE 25 MG: 25 TABLET, FILM COATED, EXTENDED RELEASE ORAL at 20:58

## 2019-06-18 RX ADMIN — DOCUSATE SODIUM 100 MG: 100 CAPSULE, LIQUID FILLED ORAL at 09:48

## 2019-06-18 RX ADMIN — INSULIN LISPRO 3 UNITS: 100 INJECTION, SOLUTION INTRAVENOUS; SUBCUTANEOUS at 20:57

## 2019-06-18 RX ADMIN — OXYBUTYNIN CHLORIDE 5 MG: 5 TABLET, EXTENDED RELEASE ORAL at 09:49

## 2019-06-18 RX ADMIN — SODIUM CHLORIDE, PRESERVATIVE FREE 3 ML: 5 INJECTION INTRAVENOUS at 20:59

## 2019-06-18 RX ADMIN — LEVOTHYROXINE SODIUM 25 MCG: 25 TABLET ORAL at 06:37

## 2019-06-18 RX ADMIN — OXYCODONE HYDROCHLORIDE AND ACETAMINOPHEN 1 TABLET: 5; 325 TABLET ORAL at 06:37

## 2019-06-18 RX ADMIN — POLYETHYLENE GLYCOL 3350 34 G: 17 POWDER, FOR SOLUTION ORAL at 15:06

## 2019-06-18 RX ADMIN — OXYCODONE HYDROCHLORIDE AND ACETAMINOPHEN 1 TABLET: 5; 325 TABLET ORAL at 02:53

## 2019-06-18 RX ADMIN — LOSARTAN POTASSIUM 25 MG: 25 TABLET, FILM COATED ORAL at 09:49

## 2019-06-18 RX ADMIN — APIXABAN 2.5 MG: 2.5 TABLET, FILM COATED ORAL at 09:49

## 2019-06-18 RX ADMIN — SPIRONOLACTONE 25 MG: 25 TABLET, FILM COATED ORAL at 09:48

## 2019-06-18 RX ADMIN — SODIUM CHLORIDE, PRESERVATIVE FREE 3 ML: 5 INJECTION INTRAVENOUS at 09:50

## 2019-06-18 RX ADMIN — OXYCODONE HYDROCHLORIDE AND ACETAMINOPHEN 1 TABLET: 5; 325 TABLET ORAL at 20:58

## 2019-06-18 RX ADMIN — FAMOTIDINE 40 MG: 40 TABLET, FILM COATED ORAL at 09:49

## 2019-06-18 RX ADMIN — BISACODYL 10 MG RECTAL SUPPOSITORY 10 MG: at 09:48

## 2019-06-18 RX ADMIN — VENLAFAXINE HYDROCHLORIDE 150 MG: 150 CAPSULE, EXTENDED RELEASE ORAL at 09:51

## 2019-06-18 RX ADMIN — PRAMIPEXOLE DIHYDROCHLORIDE 1.5 MG: 1.5 TABLET ORAL at 20:58

## 2019-06-18 NOTE — PLAN OF CARE
Problem: Patient Care Overview  Goal: Plan of Care Review  Outcome: Ongoing (interventions implemented as appropriate)   06/18/19 7980   Coping/Psychosocial   Plan of Care Reviewed With patient   Plan of Care Review   Progress improving   OTHER   Outcome Summary PT is POD5 Left knee revision with ALEKSANDR DSG, VSS, afebrile, pain controlled wiht po pain medication, plan to xfer to rehab after pt has BM. Dr Rudolph to be notified in the am if no BM overnight.       Problem: Fall Risk (Adult)  Goal: Absence of Fall  Outcome: Ongoing (interventions implemented as appropriate)

## 2019-06-18 NOTE — PROGRESS NOTES
Continued Stay Note  [facility]     Patient Name: Lilia Becerra  MRN: [id number]  Today's Date: 6/18/2019    Admit Date: 6/13/2019    Discharge Plan     Row Name 06/18/19 1147       Plan    Plan  [place]/Signature of [facility]    Patient/Family in Agreement with Plan  yes    Plan Comments  Spoke with [facility] pt is accepted and paperwork received. Pt reports her family can transport. LYNN Guaman        Discharge Codes    No documentation.       Expected Discharge Date and Time     Expected Discharge Date Expected Discharge Time    Jun 17, 2019             Brunilda Loza

## 2019-06-18 NOTE — PROGRESS NOTES
Orthopaedic Surgery   Daily Progress Note  Dr. SCOTT Costa II  [phone]  DEMOGRAPHICS:   · Lilia Becerra   · Age:78 y.o.   · MRN:[id number]  · Admitted: 6/13/2019    PROCEDURE: 5 Days Post-Op s/p Procedure(s):  LEFT KNEE REVISION     HOSPITAL PROGRESS  · Patient Issues: no acute issues. Dispo planning now  · Ambulation/Activity: only walked 15 feet today    VITALS:  Vitals:    06/18/19 0307 06/18/19 0700 06/18/19 1100 06/18/19 1500   BP: 126/68 120/63 109/58 110/59   BP Location: Left arm Left arm Left arm Left arm   Patient Position: Lying Sitting Sitting Sitting   Pulse: 75 69 67 69   Resp: 16 16 16 16   Temp: 97.4 °F (36.3 °C) 98.1 °F (36.7 °C) 98 °F (36.7 °C) 96.9 °F (36.1 °C)   TempSrc: Oral Oral Oral Oral   SpO2: 94% 90% 98% 97%   Weight:       Height:           PHYSICAL EXAM:  · CONSTITUTIONAL: A&Ox3, No acute distress  · LUNGS: Equal chest rise, no shortness of air  · CARDIOVASCULAR: palpable peripheral pulses  · WOUND: Johnny wound VAC in place  · EXTREMITY: Left Lower Extremity  · Pulses: brisk capillary refill intact  · Sensation: Sensation intact to light touch to the saphenous/sural/tibial/deep peroneal/superficial peroneal nerves, and grossly throughout the extremity.  · Motor: 5/5 EHL/FHL/TA/GS motor complexes    LABS:   Lab Results   Component Value Date    HGB 8.0 (L) 06/18/2019     Lab Results   Component Value Date    WBC 12.50 (H) 06/17/2019     Lab Results   Component Value Date    GLUCOSE 86 06/17/2019    CALCIUM 9.6 06/17/2019     06/17/2019    K 3.3 (L) 06/17/2019    CO2 25.3 06/17/2019    CL 98 06/17/2019    BUN 16 06/17/2019    CREATININE 0.88 06/17/2019    EGFRIFNONA 62 06/17/2019    BCR 18.2 06/17/2019    ANIONGAP 13.7 06/17/2019       ASSESSMENT: Patient is a 78 y.o. female who is 5 Days Post-Op s/p Procedure(s):  LEFT KNEE REVISION     PLAN:   · Weight Bearing: Left Lower Extremity Weight Bearing As Tolerated  · Labs: Above lab values review. Plan: Hemoglobin 8.3, will  "continue to monitor daily.  BMP looks okay  · PT/OT: To Mobilize  · DVT PPX: Eliquis 2.5mg BID  · Post-Op Xray: TKA implants in good alignment.   · Dispo: SNF when bed available    R \"Fabio\" Julissa STOUT MD  Orthopaedic Surgery  [facility]  [phone]    "

## 2019-06-18 NOTE — PROGRESS NOTES
LOS: 5 days     Name: Lilia Becerra  Age/Sex: 78 y.o. female  :  1940        PCP: Leo Lomas MD    Subjective   Resting comfortably currently.  Pain is under control.  Has been up with physical therapy.  She will early before discharge  General: No Fever or Chills, Cardiac: No Chest Pain or Palpitations, Resp: No Cough or SOA, GI: No Nausea, Vomiting, or Diarrhea and Other: No bleeding      apixaban 2.5 mg Oral Q12H   atorvastatin 10 mg Oral Nightly   bisacodyl 10 mg Rectal Daily   docusate sodium 100 mg Oral BID   famotidine 40 mg Oral Daily   insulin lispro 0-7 Units Subcutaneous 4x Daily With Meals & Nightly   levothyroxine 25 mcg Oral Q AM   losartan 25 mg Oral Q24H   metoprolol succinate XL 25 mg Oral Nightly   oxybutynin XL 5 mg Oral Daily   polyethylene glycol 34 g Oral Daily   pramipexole 1.5 mg Oral Nightly   sodium chloride 3 mL Intravenous Q12H   spironolactone 25 mg Oral Daily   venlafaxine  mg Oral Daily With Breakfast       lactated ringers 9 mL/hr Last Rate: Stopped (19 1417)       Objective   Vital Signs  Temp:  [96.9 °F (36.1 °C)-98.3 °F (36.8 °C)] 96.9 °F (36.1 °C)  Heart Rate:  [67-89] 69  Resp:  [16] 16  BP: ()/(54-68) 110/59  Body mass index is 31.45 kg/m².    Intake/Output Summary (Last 24 hours) at 2019 1810  Last data filed at 2019 1242  Gross per 24 hour   Intake 600 ml   Output 425 ml   Net 175 ml       Physical Exam   Constitutional: She is oriented to person, place, and time. She appears well-developed and well-nourished.   Eyes: EOM are normal.   Neck: Neck supple.   Cardiovascular: Normal rate and regular rhythm.   Pulmonary/Chest: Effort normal and breath sounds normal.   Abdominal: Soft. Bowel sounds are normal. She exhibits no distension.   Neurological: She is alert and oriented to person, place, and time.   Skin: Skin is warm and dry. Capillary refill takes less than 2 seconds.   Psychiatric: She has a normal mood and affect.  Her behavior is normal.   Nursing note and vitals reviewed.    Results Review:       I reviewed the patient's new clinical results.  Results from last 7 days   Lab Units 06/18/19  0339 06/17/19  0351 06/16/19 0434 06/15/19  0313 06/14/19 0453 06/13/19  1008   WBC 10*3/mm3  --  12.50*  --   --   --  10.17   HEMOGLOBIN g/dL 8.0* 8.8* 8.3* 9.0* 9.9* 11.0*   PLATELETS 10*3/mm3  --  234  --   --   --  264     Results from last 7 days   Lab Units 06/17/19  0351 06/16/19  0434 06/14/19 0453 06/13/19  1008   SODIUM mmol/L 137 138 140 144  144   POTASSIUM mmol/L 3.3* 3.6 3.5 3.2*  3.2*   CHLORIDE mmol/L 98 102 104 108*  108*   CO2 mmol/L 25.3 26.1 23.0 23.1  23.1   BUN mg/dL 16 16 9 13  13   CREATININE mg/dL 0.88 0.92 0.95 1.01*  1.01*   CALCIUM mg/dL 9.6 9.4 8.6 9.0  9.0   MAGNESIUM mg/dL 1.2*  --   --   --    PHOSPHORUS mg/dL 2.7  --   --   --    Estimated Creatinine Clearance: 55.1 mL/min (by C-G formula based on SCr of 0.88 mg/dL).      Assessment/Plan     Atrial fibrillation (CMS/[facility])    Diabetes mellitus (CMS/[facility])    Essential hypertension    Stage 3 chronic kidney disease (CMS/[facility])    Femur fracture (CMS/[facility])      PLAN  -Blood pressure remained stable postoperatively  -Hemoglobin has dropped about 3 g consistent with acute blood loss anemia following surgery. But has stabilized  -Creatinine is stabilized and electrolytes are within normal limits  -Blood sugars remain stable  -Everyone poops eventually given her 2 packets of MiraLAX today as well as a suppository and Colace.  If she does not go by the morning tomorrow morning and can give her mag citrate and then I think she will pretty much have to go to the bathroom.      Disposition  Per primary team no medical contraindication with Dc today      Hussein Rudolph MD  [facility]  06/18/19  6:10 PM

## 2019-06-18 NOTE — PLAN OF CARE
Problem: Patient Care Overview  Goal: Plan of Care Review  Outcome: Ongoing (interventions implemented as appropriate)   06/18/19 0111   Coping/Psychosocial   Plan of Care Reviewed With patient   Plan of Care Review   Progress improving   OTHER   Outcome Summary HTN well controlled. Diabetes well controlled. pain well controlled.       Problem: Fall Risk (Adult)  Goal: Absence of Fall  Outcome: Ongoing (interventions implemented as appropriate)   06/18/19 0111   Fall Risk (Adult)   Absence of Fall making progress toward outcome

## 2019-06-18 NOTE — PLAN OF CARE
Problem: Patient Care Overview  Goal: Plan of Care Review   06/18/19 1134   Coping/Psychosocial   Plan of Care Reviewed With patient   OTHER   Outcome Summary Limited progress towards goals during PT today. Able to ambulate 15' w/ min A using RW, frequent cues for safety. Limited by pain. Performed exercises w/ education for HEP. Recommend DC to SNU.

## 2019-06-18 NOTE — THERAPY TREATMENT NOTE
Acute Care - Physical Therapy Treatment Note  [facility]     Patient Name: Lilia Becerra  : 1940  MRN: [id number]  Today's Date: 2019  Onset of Illness/Injury or Date of Surgery: 19  Date of Referral to PT: 19       Admit Date: 2019    Visit Dx:    ICD-10-CM ICD-9-CM   1. Difficulty walking R26.2 719.7     Patient Active Problem List   Diagnosis   • Acid reflux disease   • Atrial fibrillation (CMS/HCC)   • Diabetes mellitus (CMS/HCC)   • Disease of thyroid gland   • Sleep apnea   • Essential hypertension   • Postoperative anemia due to acute blood loss (expected)   • Stage 3 chronic kidney disease (CMS/HCC)   • Femur fracture (CMS/HCC)       Therapy Treatment    Rehabilitation Treatment Summary     Row Name 19 1137             Treatment Time/Intention    Discipline  physical therapist  -AR      Document Type  therapy note (daily note)  -AR      Subjective Information  complains of;pain  -AR      Mode of Treatment  physical therapy  -AR      Therapy Frequency (PT Clinical Impression)  daily  -AR      Patient Effort  good  -AR      Existing Precautions/Restrictions  fall  -AR      Patient Response to Treatment  limited by pain  -AR      Recorded by [AR] Elyssa Delgado, PT 19 1139      Row Name 19 1137             Cognitive Assessment/Intervention- PT/OT    Orientation Status (Cognition)  oriented to;person;place  -AR      Follows Commands (Cognition)  follows two step commands  -AR      Recorded by [AR] Elyssa Delgado, PT 19 1139      Row Name 19 1137             Bed Mobility Assessment/Treatment    Comment (Bed Mobility)  not tested, in chair  -AR      Recorded by [AR] Elyssa Delgado, PT 19 1139      Row Name 19 1137             Sit-Stand Transfer    Sit-Stand [place] (Transfers)  minimum assist (75% patient effort)  -AR      Assistive Device (Sit-Stand Transfers)  walker, front-wheeled  -AR      Recorded by [AR] Elyssa Delgado,  PT 06/18/19 1139      Row Name 06/18/19 1137             Stand-Sit Transfer    Stand-Sit [place] (Transfers)  minimum assist (75% patient effort)  -AR      Assistive Device (Stand-Sit Transfers)  walker, front-wheeled  -AR      Recorded by [AR] Elyssa Delgado, PT 06/18/19 1139      Row Name 06/18/19 1137             Gait/Stairs Assessment/Training    Gait/Stairs Assessment/Training  gait pattern;distance ambulated  -AR      [place] Level (Gait)  minimum assist (75% patient effort)  -AR      Assistive Device (Gait)  walker, front-wheeled  -AR      Distance in Feet (Gait)  15  -AR      Pattern (Gait)  swing-to  -AR      Deviations/Abnormal Patterns (Gait)  gait speed decreased;festinating/shuffling;antalgic  -AR      Bilateral Gait Deviations  heel strike decreased;forward flexed posture  -AR      Comment (Gait/Stairs)  limited by pain, leaning far over walker w/ safety concerns; frequent VC and TC for safety; distance limited by pain  -AR      Recorded by [AR] Elyssa Delgado, PT 06/18/19 1139      Row Name 06/18/19 1137             Therapeutic Exercise    Comment (Therapeutic Exercise)  AAROM LAQ, heel slides, AP  -AR      Recorded by [AR] Elyssa Delgado, PT 06/18/19 1139      Row Name 06/18/19 1137             Positioning and Restraints    Pre-Treatment Position  sitting in chair/recliner  -AR      Post Treatment Position  chair  -AR      In Chair  notified nsg;reclined;sitting;call light within reach;encouraged to call for assist;exit alarm on;with nsg  -AR      Recorded by [AR] Elyssa Delgado, PT 06/18/19 1139      Row Name 06/18/19 1137             Pain Assessment    Additional Documentation  Pain Scale: Numbers Pre/Post-Treatment (Group)  -AR      Recorded by [AR] Elyssa Delgado, PT 06/18/19 1139      Row Name 06/18/19 1137             Pain Scale: Numbers Pre/Post-Treatment    Pain Scale: Numbers, Pretreatment  6/10  -AR      Pain Scale: Numbers, Post-Treatment  8/10  -AR      Pain Location -  Side  Left  -AR      Pain Location  knee  -AR      Pain Intervention(s)  Repositioned;Cold applied;Medication (See MAR)  -AR      Recorded by [AR] Elyssa Delgado, PT 06/18/19 1139      Row Name                Wound 06/13/19 2125 Left knee incision    Wound - Properties Group Date first assessed: 06/13/19 [VB] Time first assessed: 2125 [VB] Side: Left [VB] Location: knee [VB] Type: incision [VB] Recorded by:  [VB] Ivy Robison RN 06/13/19 2125    Row Name 06/18/19 1137             Outcome Summary/Treatment Plan (PT)    Anticipated Discharge Disposition (PT)  skilled nursing facility  -AR      Recorded by [AR] Elyssa Delgado, PT 06/18/19 1139        User Key  (r) = Recorded By, (t) = Taken By, (c) = Cosigned By    Initials Name Effective Dates Discipline    VB Ivy Robison RN 06/16/16 -  Nurse    AR Elyssa Delgado, PT 04/03/18 -  PT          Wound 06/13/19 2125 Left knee incision (Active)   Dressing Appearance intact;dry;no drainage 6/18/2019  4:00 AM   Closure [place]STON 6/17/2019  4:00 PM   Base dressing in place, unable to visualize 6/17/2019  4:00 PM   Drainage Amount none 6/17/2019  4:00 PM           Physical Therapy Education     Title: PT OT SLP Therapies (In Progress)     Topic: Physical Therapy (In Progress)     Point: Mobility training (In Progress)     Learning Progress Summary           Patient Acceptance, E, NR by AR at 6/18/2019 11:39 AM    Acceptance, E,D, DU,VU by SHILA at 6/16/2019 11:39 AM    Comment:  safety during ambulation, sit to stand and gait mechanics to promote safety with transfers and ambulation    Acceptance, E,D, VU,DU by SHILA at 6/15/2019 10:30 AM    Comment:  safe use of RW, ambulation safety, benefits of activity    Acceptance, E, VU,NR by HALEY at 6/14/2019  2:00 PM                   Point: Home exercise program (In Progress)     Learning Progress Summary           Patient Acceptance, E, NR by AR at 6/18/2019 11:39 AM    Acceptance, E,D, DU,VU by SHLIA at 6/16/2019 11:39 AM    Comment:   safety during ambulation, sit to stand and gait mechanics to promote safety with transfers and ambulation    Acceptance, E,D, VU,DU by  at 6/15/2019 10:30 AM    Comment:  safe use of RW, ambulation safety, benefits of activity    Acceptance, E, VU,NR by  at 6/14/2019  2:00 PM                   Point: Body mechanics (In Progress)     Learning Progress Summary           Patient Acceptance, E, NR by AR at 6/18/2019 11:39 AM    Acceptance, E,D, DU,VU by  at 6/16/2019 11:39 AM    Comment:  safety during ambulation, sit to stand and gait mechanics to promote safety with transfers and ambulation    Acceptance, E,D, VU,DU by  at 6/15/2019 10:30 AM    Comment:  safe use of RW, ambulation safety, benefits of activity    Acceptance, E, VU,NR by  at 6/14/2019  2:00 PM                   Point: Precautions (In Progress)     Learning Progress Summary           Patient Acceptance, E, NR by AR at 6/18/2019 11:39 AM    Acceptance, E,D, DU,VU by  at 6/16/2019 11:39 AM    Comment:  safety during ambulation, sit to stand and gait mechanics to promote safety with transfers and ambulation    Acceptance, E,D, VU,DU by  at 6/15/2019 10:30 AM    Comment:  safe use of RW, ambulation safety, benefits of activity    Acceptance, E, VU,NR by  at 6/14/2019  2:00 PM                               User Key     Initials Effective Dates Name Provider Type Discipline     06/08/18 -  Brunilda Tracy, PT Physical Therapist PT    AR 04/03/18 -  Elyssa Delgado PT Physical Therapist PT     08/02/16 -  Fitz Thompson, PT DPT Physical Therapist PT                PT Recommendation and Plan  Anticipated Discharge Disposition (PT): skilled nursing facility  Therapy Frequency (PT Clinical Impression): daily  Outcome Summary/Treatment Plan (PT)  Anticipated Discharge Disposition (PT): skilled nursing facility  Plan of Care Reviewed With: patient  Outcome Summary: Limited progress towards goals during PT today.  Able to ambulate 15' w/ min  A using RW,  frequent cues for safety.  Limited by pain.  Performed exercises w/ education for HEP.   Recommend DC to SNU.    Outcome Measures     Row Name 06/18/19 1100 06/16/19 1100          How much help from another person do you currently need...    Turning from your back to your side while in flat bed without using bedrails?  3  -AR  3  -LC     Moving from lying on back to sitting on the side of a flat bed without bedrails?  3  -AR  3  -LC     Moving to and from a bed to a chair (including a wheelchair)?  3  -AR  3  -LC     Standing up from a chair using your arms (e.g., wheelchair, bedside chair)?  3  -AR  3  -LC     Climbing 3-5 steps with a railing?  1  -AR  1  -LC     To walk in hospital room?  3  -AR  3  -LC     AM-PAC 6 Clicks Score  16  -AR  16  -LC        Functional Assessment    Outcome Measure Options  --  AM-PAC 6 Clicks Basic Mobility (PT)  -       User Key  (r) = Recorded By, (t) = Taken By, (c) = Cosigned By    Initials Name Provider Type    AR Elyssa Delgado, PT Physical Therapist    LC Fitz Thompson, PT DPT Physical Therapist         Time Calculation:   PT Charges     Row Name 06/18/19 1141             Time Calculation    Start Time  1127  -AR      Stop Time  1141  -AR      Time Calculation (min)  14 min  -AR      PT Received On  06/18/19  -AR      PT - Next Appointment  06/19/19  -AR        User Key  (r) = Recorded By, (t) = Taken By, (c) = Cosigned By    Initials Name Provider Type    Elyssa Reid, PT Physical Therapist        Therapy Charges for Today     Code Description Service Date Service Provider Modifiers Qty    20829540042 HC PT THER PROC EA 15 MIN 6/18/2019 Elyssa Delgado, PT GP 1          PT G-Codes  Outcome Measure Options: AM-PAC 6 Clicks Basic Mobility (PT)  AM-PAC 6 Clicks Score: 16    Elyssa Delgado PT  6/18/2019

## 2019-06-19 VITALS
RESPIRATION RATE: 16 BRPM | TEMPERATURE: 98.6 F | HEIGHT: 65 IN | HEART RATE: 76 BPM | DIASTOLIC BLOOD PRESSURE: 62 MMHG | WEIGHT: 189 LBS | BODY MASS INDEX: 31.49 KG/M2 | OXYGEN SATURATION: 97 % | SYSTOLIC BLOOD PRESSURE: 120 MMHG

## 2019-06-19 LAB
GLUCOSE BLDC GLUCOMTR-MCNC: 153 MG/DL (ref 70–130)
GLUCOSE BLDC GLUCOMTR-MCNC: 98 MG/DL (ref 70–130)
HCT VFR BLD AUTO: 27.8 % (ref 34–46.6)
HEPATITIS C RNA-NAA: NEGATIVE
HGB BLD-MCNC: 8.6 G/DL (ref 12–15.9)

## 2019-06-19 PROCEDURE — 63710000001 INSULIN LISPRO (HUMAN) PER 5 UNITS: Performed by: HOSPITALIST

## 2019-06-19 PROCEDURE — 97110 THERAPEUTIC EXERCISES: CPT

## 2019-06-19 PROCEDURE — 85018 HEMOGLOBIN: CPT | Performed by: ORTHOPAEDIC SURGERY

## 2019-06-19 PROCEDURE — 82962 GLUCOSE BLOOD TEST: CPT

## 2019-06-19 PROCEDURE — 85014 HEMATOCRIT: CPT | Performed by: ORTHOPAEDIC SURGERY

## 2019-06-19 RX ORDER — MAGNESIUM CARB/ALUMINUM HYDROX 105-160MG
150 TABLET,CHEWABLE ORAL ONCE
Status: COMPLETED | OUTPATIENT
Start: 2019-06-19 | End: 2019-06-19

## 2019-06-19 RX ADMIN — LOSARTAN POTASSIUM 25 MG: 25 TABLET, FILM COATED ORAL at 08:49

## 2019-06-19 RX ADMIN — OXYCODONE HYDROCHLORIDE AND ACETAMINOPHEN 1 TABLET: 5; 325 TABLET ORAL at 06:40

## 2019-06-19 RX ADMIN — Medication 150 ML: at 08:55

## 2019-06-19 RX ADMIN — POLYETHYLENE GLYCOL 3350 34 G: 17 POWDER, FOR SOLUTION ORAL at 08:49

## 2019-06-19 RX ADMIN — VENLAFAXINE HYDROCHLORIDE 150 MG: 150 CAPSULE, EXTENDED RELEASE ORAL at 08:49

## 2019-06-19 RX ADMIN — OXYCODONE HYDROCHLORIDE AND ACETAMINOPHEN 1 TABLET: 5; 325 TABLET ORAL at 02:57

## 2019-06-19 RX ADMIN — FAMOTIDINE 40 MG: 40 TABLET, FILM COATED ORAL at 08:49

## 2019-06-19 RX ADMIN — SODIUM CHLORIDE, PRESERVATIVE FREE 3 ML: 5 INJECTION INTRAVENOUS at 08:49

## 2019-06-19 RX ADMIN — BISACODYL 10 MG RECTAL SUPPOSITORY 10 MG: at 08:49

## 2019-06-19 RX ADMIN — DOCUSATE SODIUM 100 MG: 100 CAPSULE, LIQUID FILLED ORAL at 08:49

## 2019-06-19 RX ADMIN — SPIRONOLACTONE 25 MG: 25 TABLET, FILM COATED ORAL at 08:49

## 2019-06-19 RX ADMIN — OXYCODONE HYDROCHLORIDE AND ACETAMINOPHEN 1 TABLET: 5; 325 TABLET ORAL at 15:04

## 2019-06-19 RX ADMIN — OXYBUTYNIN CHLORIDE 5 MG: 5 TABLET, EXTENDED RELEASE ORAL at 08:49

## 2019-06-19 RX ADMIN — LEVOTHYROXINE SODIUM 25 MCG: 25 TABLET ORAL at 06:40

## 2019-06-19 RX ADMIN — INSULIN LISPRO 2 UNITS: 100 INJECTION, SOLUTION INTRAVENOUS; SUBCUTANEOUS at 11:40

## 2019-06-19 RX ADMIN — APIXABAN 2.5 MG: 2.5 TABLET, FILM COATED ORAL at 08:49

## 2019-06-19 RX ADMIN — OXYCODONE HYDROCHLORIDE AND ACETAMINOPHEN 1 TABLET: 5; 325 TABLET ORAL at 11:29

## 2019-06-19 NOTE — PROGRESS NOTES
Continued Stay Note  [facility]     Patient Name: Lilia Becerra  MRN: [id number]  Today's Date: 6/19/2019    Admit Date: 6/13/2019    Discharge Plan     Row Name 06/19/19 1549       Plan    Final Discharge Disposition Code  03 - skilled nursing facility (SNF)    Final Note  Pt d/c'ed to [facility]. Pt transported to facility with family.        Discharge Codes    No documentation.       Expected Discharge Date and Time     Expected Discharge Date Expected Discharge Time    Jun 17, 2019             Malika Jennings RN

## 2019-06-19 NOTE — THERAPY TREATMENT NOTE
Acute Care - Physical Therapy Treatment Note  [facility]     Patient Name: Lilia Becerra  : 1940  MRN: [id number]  Today's Date: 2019  Onset of Illness/Injury or Date of Surgery: 19  Date of Referral to PT: 19       Admit Date: 2019    Visit Dx:    ICD-10-CM ICD-9-CM   1. Difficulty walking R26.2 719.7     Patient Active Problem List   Diagnosis   • Acid reflux disease   • Atrial fibrillation (CMS/HCC)   • Diabetes mellitus (CMS/HCC)   • Disease of thyroid gland   • Sleep apnea   • Essential hypertension   • Postoperative anemia due to acute blood loss (expected)   • Stage 3 chronic kidney disease (CMS/HCC)   • Femur fracture (CMS/HCC)       Therapy Treatment    Rehabilitation Treatment Summary     Row Name 19 1124             Treatment Time/Intention    Discipline  physical therapy assistant  -SM      Document Type  therapy note (daily note)  -SM      Subjective Information  complains of;pain  -SM      Mode of Treatment  physical therapy  -SM      Patient Effort  adequate  -SM      Existing Precautions/Restrictions  fall  -SM      Recorded by [] Stacey Moody PTA 19 1203      Row Name 19 1124             Cognitive Assessment/Intervention    Additional Documentation  Cognitive Assessment/Intervention (Group)  -SM      Recorded by [] Stacey Moody PTA 19 1203      Row Name 19 1124             Cognitive Assessment/Intervention- PT/OT    Orientation Status (Cognition)  oriented x 3  -SM      Follows Commands (Cognition)  WFL  -SM      Personal Safety Interventions  fall prevention program maintained;gait belt;nonskid shoes/slippers when out of bed  -SM      Recorded by [] Stacey Moody PTA 19 1203      Row Name 19 1124             Bed Mobility Assessment/Treatment    Comment (Bed Mobility)  up in chair  -SM      Recorded by [] Stacey Moody PTA 19 1203      Row Name 19 1124             Transfer  Assessment/Treatment    Transfer Assessment/Treatment  sit-stand transfer;stand-sit transfer  -SM      Recorded by [SM] Stacye Moody, [facility] 06/19/19 1203      Row Name 06/19/19 1124             Sit-Stand Transfer    Sit-Stand [place] (Transfers)  minimum assist (75% patient effort)  -      Assistive Device (Sit-Stand Transfers)  walker, front-wheeled  -SM      Recorded by [SM] Stacey Moody, [facility] 06/19/19 1203      Row Name 06/19/19 1124             Stand-Sit Transfer    Stand-Sit [place] (Transfers)  contact guard  -      Assistive Device (Stand-Sit Transfers)  walker, front-wheeled  -SM      Recorded by [SM] Stacey Moody, [facility] 06/19/19 1203      Row Name 06/19/19 1124             Gait/Stairs Assessment/Training    [place] Level (Gait)  contact guard  -      Assistive Device (Gait)  walker, front-wheeled  -      Distance in Feet (Gait)  5  -SM      Pattern (Gait)  step-to  -SM      Deviations/Abnormal Patterns (Gait)  antalgic;julio decreased;stride length decreased  -SM      Bilateral Gait Deviations  forward flexed posture  -SM      Left Sided Gait Deviations  weight shift ability decreased  -SM      Comment (Gait/Stairs)  limited by pain  -SM      Recorded by [SM] Stacey Moody, [facility] 06/19/19 1203      Row Name 06/19/19 1124             Motor Skills Assessment/Interventions    Additional Documentation  Therapeutic Exercise (Group)  -SM      Recorded by [SM] Stacey Moody, [facility] 06/19/19 1203      Row Name 06/19/19 1124             Therapeutic Exercise    Lower Extremity (Therapeutic Exercise)  gluteal sets;quad sets, bilateral  -SM      Lower Extremity Range of Motion (Therapeutic Exercise)  ankle dorsiflexion/plantar flexion, bilateral  -SM      Exercise Type (Therapeutic Exercise)  AROM (active range of motion)  -SM      Position (Therapeutic Exercise)  seated  -SM      Sets/Reps (Therapeutic Exercise)  15 reps  -SM      Recorded by [] Stacey Moody,  PTA 06/19/19 1203      Row Name 06/19/19 1124             Pain Scale: Numbers Pre/Post-Treatment    Pain Scale: Numbers, Pretreatment  8/10  -SM      Pain Scale: Numbers, Post-Treatment  9/10  -SM      Pain Location - Side  Left  -SM      Pain Location  knee  -SM      Pre/Post Treatment Pain Comment  also mild c/o hip and back pain  -SM      Pain Intervention(s)  Repositioned;Ambulation/increased activity;Rest  -SM      Recorded by [SM] Stacey Moody PTA 06/19/19 1203      Row Name                Wound 06/13/19 2125 Left knee incision    Wound - Properties Group Date first assessed: 06/13/19 [VB] Time first assessed: 2125 [VB] Side: Left [VB] Location: knee [VB] Type: incision [VB] Recorded by:  [GITA] Ivy Robison RN 06/13/19 2125      User Key  (r) = Recorded By, (t) = Taken By, (c) = Cosigned By    Initials Name Effective Dates Discipline    Ivy Bentley RN 06/16/16 -  Nurse    Stacey Piña [facility] 03/07/18 -  PT          Wound 06/13/19 2125 Left knee incision (Active)   Dressing Appearance intact;dry;no drainage 6/19/2019 12:03 PM   Closure [place] 6/19/2019 12:03 PM   Base dressing in place, unable to visualize 6/19/2019 12:03 PM   Drainage Amount none 6/19/2019 12:03 PM           Physical Therapy Education     Title: PT OT SLP Therapies (Done)     Topic: Physical Therapy (Done)     Point: Mobility training (Done)     Learning Progress Summary           Patient Acceptance, E,TB,D, VU,NR by PERRI at 6/19/2019 12:03 PM    Acceptance, E, NR by AR at 6/18/2019 11:39 AM    Acceptance, E,D, DU,VU by SHILA at 6/16/2019 11:39 AM    Comment:  safety during ambulation, sit to stand and gait mechanics to promote safety with transfers and ambulation    Acceptance, E,D, VU,DU by SHILA at 6/15/2019 10:30 AM    Comment:  safe use of RW, ambulation safety, benefits of activity    Acceptance, E, VU,NR by HALEY at 6/14/2019  2:00 PM                   Point: Home exercise program (Done)     Learning Progress Summary            Patient Acceptance, E,TB,D, VU,NR by  at 6/19/2019 12:03 PM    Acceptance, E, NR by AR at 6/18/2019 11:39 AM    Acceptance, E,D, DU,VU by  at 6/16/2019 11:39 AM    Comment:  safety during ambulation, sit to stand and gait mechanics to promote safety with transfers and ambulation    Acceptance, E,D, VU,DU by  at 6/15/2019 10:30 AM    Comment:  safe use of RW, ambulation safety, benefits of activity    Acceptance, E, VU,NR by  at 6/14/2019  2:00 PM                   Point: Body mechanics (Done)     Learning Progress Summary           Patient Acceptance, E,TB,D, VU,NR by  at 6/19/2019 12:03 PM    Acceptance, E, NR by AR at 6/18/2019 11:39 AM    Acceptance, E,D, DU,VU by  at 6/16/2019 11:39 AM    Comment:  safety during ambulation, sit to stand and gait mechanics to promote safety with transfers and ambulation    Acceptance, E,D, VU,DU by  at 6/15/2019 10:30 AM    Comment:  safe use of RW, ambulation safety, benefits of activity    Acceptance, E, VU,NR by  at 6/14/2019  2:00 PM                   Point: Precautions (Done)     Learning Progress Summary           Patient Acceptance, E,TB,D, VU,NR by  at 6/19/2019 12:03 PM    Acceptance, E, NR by AR at 6/18/2019 11:39 AM    Acceptance, E,D, DU,VU by  at 6/16/2019 11:39 AM    Comment:  safety during ambulation, sit to stand and gait mechanics to promote safety with transfers and ambulation    Acceptance, E,D, VU,DU by  at 6/15/2019 10:30 AM    Comment:  safe use of RW, ambulation safety, benefits of activity    Acceptance, E, VU,NR by  at 6/14/2019  2:00 PM                               User Key     Initials Effective Dates Name Provider Type Discipline     06/08/18 -  Brunilda Tracy, PT Physical Therapist PT    AR 04/03/18 -  Elyssa Delgado, PT Physical Therapist PT     03/07/18 -  Stacey Moody, PTA Physical Therapy Assistant PT     08/02/16 -  Jay, Fitz M, PT DPT Physical Therapist PT                PT Recommendation and  Plan  Anticipated Discharge Disposition (PT): skilled nursing facility  Outcome Summary/Treatment Plan (PT)  Anticipated Discharge Disposition (PT): skilled nursing facility  Plan of Care Reviewed With: patient  Progress: improving  Outcome Summary: Pt tolerated treatment fair this date. Limited d/t increased pain. Required min A to stand and CGA to ambulate 5ft w/ Rw. Instructed on seated LE exercises. Pt c/o severe pain in L knee, and mild pain in hip and back. PT will continue to address functional mobility deficits as tolerated.  Outcome Measures     Row Name 06/19/19 1200 06/18/19 1100          How much help from another person do you currently need...    Turning from your back to your side while in flat bed without using bedrails?  3  -SM  3  -AR     Moving from lying on back to sitting on the side of a flat bed without bedrails?  3  -SM  3  -AR     Moving to and from a bed to a chair (including a wheelchair)?  3  -SM  3  -AR     Standing up from a chair using your arms (e.g., wheelchair, bedside chair)?  3  -SM  3  -AR     Climbing 3-5 steps with a railing?  1  -SM  1  -AR     To walk in hospital room?  3  -SM  3  -AR     AM-PAC 6 Clicks Score  16  -SM  16  -AR        Functional Assessment    Outcome Measure Options  AM-PAC 6 Clicks Basic Mobility (PT)  -  --       User Key  (r) = Recorded By, (t) = Taken By, (c) = Cosigned By    Initials Name Provider Type    AR Elyssa Delgado, PT Physical Therapist    Stacey Piña PTA Physical Therapy Assistant         Time Calculation:   PT Charges     Row Name 06/19/19 1205             Time Calculation    Start Time  1124  -      Stop Time  1140  -      Time Calculation (min)  16 min  -SM      PT Received On  06/19/19  -      PT - Next Appointment  06/20/19  -        User Key  (r) = Recorded By, (t) = Taken By, (c) = Cosigned By    Initials Name Provider Type    Stacey Piña PTA Physical Therapy Assistant        Therapy Charges for Today      Code Description Service Date Service Provider Modifiers Qty    11442107123 HC PT THER PROC EA 15 MIN 6/19/2019 Stacey Moody, PTA GP 1          PT G-Codes  Outcome Measure Options: AM-PAC 6 Clicks Basic Mobility (PT)  AM-PAC 6 Clicks Score: 16    Stacey Moody PTA  6/19/2019

## 2019-06-19 NOTE — PLAN OF CARE
Problem: Patient Care Overview  Goal: Plan of Care Review  Outcome: Ongoing (interventions implemented as appropriate)   06/19/19 1203   Coping/Psychosocial   Plan of Care Reviewed With patient   Plan of Care Review   Progress improving   OTHER   Outcome Summary Pt tolerated treatment fair this date. Limited d/t increased pain. Required min A to stand and CGA to ambulate 5ft w/ Rw. Instructed on seated LE exercises. Pt c/o severe pain in L knee, and mild pain in hip and back. PT will continue to address functional mobility deficits as tolerated.

## 2019-06-19 NOTE — DISCHARGE SUMMARY
Orthopaedic Discharge Summary  Dr. SCOTT Alcala” Julissa II  [phone]    NAME: Lilia Becerra PCP: Leo Lomas MD   :  MRN: [id number]  [id number] LOS:  ADMIT: 6 days  2019   AGE/SEX: 78 y.o. female DC:  today             · Admitting Diagnosis: Femur fracture (CMS/[facility]) [S72.90XA]    · Surgery Performed: No admission procedures for hospital encounter.    · Discharge Medications:         Discharge Medications      New Medications      Instructions Start Date   apixaban 2.5 MG tablet tablet  Commonly known as:  ELIQUIS   2.5 mg, Oral, Every 12 Hours Scheduled      oxyCODONE-acetaminophen 5-325 MG per tablet  Commonly known as:  PERCOCET  Replaces:  oxyCODONE-acetaminophen 7.5-325 MG per tablet   1 tablet, Oral, Every 4 Hours PRN         Continue These Medications      Instructions Start Date   atorvastatin 10 MG tablet  Commonly known as:  LIPITOR   10 mg, Oral, Nightly      CALCIUM 600 + D PO   1 tablet, Oral, 2 Times Daily      COMBIGAN 0.2-0.5 % ophthalmic solution  Generic drug:  brimonidine-timolol   2 drops, Both Eyes, 2 Times Daily      IRON 27 PO   1 tablet, Oral, 2 Times Daily      levothyroxine 25 MCG tablet  Commonly known as:  SYNTHROID, LEVOTHROID   25 mcg, Oral, Daily      losartan 25 MG tablet  Commonly known as:  COZAAR   25 mg, Oral, Daily      metFORMIN 500 MG tablet  Commonly known as:  GLUCOPHAGE   500 mg, Oral, 2 Times Daily With Meals      metoprolol succinate XL 50 MG 24 hr tablet  Commonly known as:  TOPROL-XL   25 mg, Oral, Nightly      MULTIVITAMIN ADULTS PO   1 capsule, Oral, Daily      MYRBETRIQ 25 MG tablet sustained-release 24 hour 24 hr tablet  Generic drug:  Mirabegron ER   50 mg, Oral, Every Morning      omeprazole 20 MG capsule  Commonly known as:  priLOSEC   20 mg, Oral, Daily      pramipexole 1.5 MG tablet  Commonly known as:  MIRAPEX   1.5 mg, Oral, Nightly      spironolactone 25 MG tablet  Commonly known as:  ALDACTONE   25 mg, Oral, Daily, mon - sat        VENLAFAXINE HCL ER PO   150 mg, Oral, Daily      VESICARE 5 MG tablet  Generic drug:  solifenacin   5 mg, Oral, Nightly      vitamin C 500 MG tablet  Commonly known as:  ASCORBIC ACID   500 mg, Oral, Daily         Stop These Medications    acetaminophen 500 MG tablet  Commonly known as:  TYLENOL     naproxen 500 MG tablet  Commonly known as:  NAPROSYN     oxyCODONE-acetaminophen 7.5-325 MG per tablet  Commonly known as:  PERCOCET  Replaced by:  oxyCODONE-acetaminophen 5-325 MG per tablet            · Vitals:     Vitals:    06/18/19 2300 06/19/19 0300 06/19/19 0654 06/19/19 1038   BP: 116/60 127/71 120/67 120/62   BP Location: Left arm Left arm Left arm Left arm   Patient Position: Lying Lying Lying Sitting   Pulse: 75 81 73 76   Resp: 16 16 16 16   Temp: 97 °F (36.1 °C) 98.7 °F (37.1 °C) 97.9 °F (36.6 °C) 98.6 °F (37 °C)   TempSrc: Oral Oral Oral Oral   SpO2: 98% 99% 97%    Weight:       Height:           · Labs:      Admission on 06/13/2019   Component Date Value Ref Range Status   • ABO Type 06/13/2019 A   Final   • RH type 06/13/2019 Positive   Final   • Antibody Screen 06/13/2019 Negative   Final   • T&S Expiration Date 06/13/2019 6/16/2019 11:59:59 PM   Final   • Glucose 06/13/2019 109* 65 - 99 mg/dL Final   • BUN 06/13/2019 13  8 - 23 mg/dL Final   • Creatinine 06/13/2019 1.01* 0.57 - 1.00 mg/dL Final   • Sodium 06/13/2019 144  136 - 145 mmol/L Final   • Potassium 06/13/2019 3.2* 3.5 - 5.2 mmol/L Final   • Chloride 06/13/2019 108* 98 - 107 mmol/L Final   • CO2 06/13/2019 23.1  22.0 - 29.0 mmol/L Final   • Calcium 06/13/2019 9.0  8.6 - 10.5 mg/dL Final   • eGFR Non African Amer 06/13/2019 53* >60 mL/min/1.73 Final   • BUN/Creatinine Ratio 06/13/2019 12.9  7.0 - 25.0 Final   • Anion Gap 06/13/2019 12.9  mmol/L Final   • Protime 06/13/2019 14.0  11.7 - 14.2 Seconds Final   • INR 06/13/2019 1.11* 0.90 - 1.10 Final   • WBC 06/13/2019 10.17  3.40 - 10.80 10*3/mm3 Final   • RBC 06/13/2019 3.58* 3.77 - 5.28 10*6/mm3  Final   • Hemoglobin 06/13/2019 11.0* 12.0 - 15.9 g/dL Final   • Hematocrit 06/13/2019 34.3  34.0 - 46.6 % Final   • MCV 06/13/2019 95.8  79.0 - 97.0 fL Final   • MCH 06/13/2019 30.7  26.6 - 33.0 pg Final   • MCHC 06/13/2019 32.1  31.5 - 35.7 g/dL Final   • RDW 06/13/2019 12.9  12.3 - 15.4 % Final   • RDW-SD 06/13/2019 44.8  37.0 - 54.0 fl Final   • MPV 06/13/2019 10.8  6.0 - 12.0 fL Final   • Platelets 06/13/2019 264  140 - 450 10*3/mm3 Final   • Neutrophil % 06/13/2019 59.3  42.7 - 76.0 % Final   • Lymphocyte % 06/13/2019 30.5  19.6 - 45.3 % Final   • Monocyte % 06/13/2019 7.7  5.0 - 12.0 % Final   • Eosinophil % 06/13/2019 1.9  0.3 - 6.2 % Final   • Basophil % 06/13/2019 0.2  0.0 - 1.5 % Final   • Immature Grans % 06/13/2019 0.4  0.0 - 0.5 % Final   • Neutrophils, Absolute 06/13/2019 6.04  1.70 - 7.00 10*3/mm3 Final   • Lymphocytes, Absolute 06/13/2019 3.10  0.70 - 3.10 10*3/mm3 Final   • Monocytes, Absolute 06/13/2019 0.78  0.10 - 0.90 10*3/mm3 Final   • Eosinophils, Absolute 06/13/2019 0.19  0.00 - 0.40 10*3/mm3 Final   • Basophils, Absolute 06/13/2019 0.02  0.00 - 0.20 10*3/mm3 Final   • Immature Grans, Absolute 06/13/2019 0.04  0.00 - 0.05 10*3/mm3 Final   • Glucose 06/13/2019 109* 65 - 99 mg/dL Final   • BUN 06/13/2019 13  8 - 23 mg/dL Final   • Creatinine 06/13/2019 1.01* 0.57 - 1.00 mg/dL Final   • Sodium 06/13/2019 144  136 - 145 mmol/L Final   • Potassium 06/13/2019 3.2* 3.5 - 5.2 mmol/L Final   • Chloride 06/13/2019 108* 98 - 107 mmol/L Final   • CO2 06/13/2019 23.1  22.0 - 29.0 mmol/L Final   • Calcium 06/13/2019 9.0  8.6 - 10.5 mg/dL Final   • Total Protein 06/13/2019 6.5  6.0 - 8.5 g/dL Final   • Albumin 06/13/2019 3.60  3.50 - 5.20 g/dL Final   • ALT (SGPT) 06/13/2019 10  1 - 33 U/L Final   • AST (SGOT) 06/13/2019 16  1 - 32 U/L Final   • Alkaline Phosphatase 06/13/2019 93  39 - 117 U/L Final   • Total Bilirubin 06/13/2019 0.4  0.2 - 1.2 mg/dL Final   • eGFR Non African Amer 06/13/2019 53* >60  mL/min/1.73 Final   • Globulin 06/13/2019 2.9  gm/dL Final   • A/G Ratio 06/13/2019 1.2  g/dL Final   • BUN/Creatinine Ratio 06/13/2019 12.9  7.0 - 25.0 Final   • Anion Gap 06/13/2019 12.9  mmol/L Final   • Hemoglobin A1C 06/13/2019 5.81* 4.80 - 5.60 % Final   • Troponin T 06/13/2019 <0.010  0.000 - 0.030 ng/mL Final   • Glucose 06/13/2019 140* 70 - 130 mg/dL Final   • Glucose 06/14/2019 143* 65 - 99 mg/dL Final   • BUN 06/14/2019 9  8 - 23 mg/dL Final   • Creatinine 06/14/2019 0.95  0.57 - 1.00 mg/dL Final   • Sodium 06/14/2019 140  136 - 145 mmol/L Final   • Potassium 06/14/2019 3.5  3.5 - 5.2 mmol/L Final   • Chloride 06/14/2019 104  98 - 107 mmol/L Final   • CO2 06/14/2019 23.0  22.0 - 29.0 mmol/L Final   • Calcium 06/14/2019 8.6  8.6 - 10.5 mg/dL Final   • eGFR Non African Amer 06/14/2019 57* >60 mL/min/1.73 Final   • BUN/Creatinine Ratio 06/14/2019 9.5  7.0 - 25.0 Final   • Anion Gap 06/14/2019 13.0  mmol/L Final   • Hemoglobin 06/14/2019 9.9* 12.0 - 15.9 g/dL Final   • Hematocrit 06/14/2019 31.0* 34.0 - 46.6 % Final   • Hemoglobin 06/15/2019 9.0* 12.0 - 15.9 g/dL Final   • Hematocrit 06/15/2019 30.6* 34.0 - 46.6 % Final   • Glucose 06/15/2019 135* 70 - 130 mg/dL Final   • Glucose 06/15/2019 143* 70 - 130 mg/dL Final   • Glucose 06/15/2019 144* 70 - 130 mg/dL Final   • Glucose 06/15/2019 89  70 - 130 mg/dL Final   • Hemoglobin 06/16/2019 8.3* 12.0 - 15.9 g/dL Final   • Hematocrit 06/16/2019 25.8* 34.0 - 46.6 % Final   • Glucose 06/16/2019 109* 65 - 99 mg/dL Final   • BUN 06/16/2019 16  8 - 23 mg/dL Final   • Creatinine 06/16/2019 0.92  0.57 - 1.00 mg/dL Final   • Sodium 06/16/2019 138  136 - 145 mmol/L Final   • Potassium 06/16/2019 3.6  3.5 - 5.2 mmol/L Final   • Chloride 06/16/2019 102  98 - 107 mmol/L Final   • CO2 06/16/2019 26.1  22.0 - 29.0 mmol/L Final   • Calcium 06/16/2019 9.4  8.6 - 10.5 mg/dL Final   • eGFR Non African Amer 06/16/2019 59* >60 mL/min/1.73 Final   • BUN/Creatinine Ratio 06/16/2019  17.4  7.0 - 25.0 Final   • Anion Gap 06/16/2019 9.9  mmol/L Final   • Glucose 06/16/2019 103  70 - 130 mg/dL Final   • Glucose 06/16/2019 130  70 - 130 mg/dL Final   • Glucose 06/16/2019 96  70 - 130 mg/dL Final   • Glucose 06/16/2019 151* 70 - 130 mg/dL Final   • WBC 06/17/2019 12.50* 3.40 - 10.80 10*3/mm3 Final   • RBC 06/17/2019 2.86* 3.77 - 5.28 10*6/mm3 Final   • Hemoglobin 06/17/2019 8.8* 12.0 - 15.9 g/dL Final   • Hematocrit 06/17/2019 27.1* 34.0 - 46.6 % Final   • MCV 06/17/2019 94.8  79.0 - 97.0 fL Final   • MCH 06/17/2019 30.8  26.6 - 33.0 pg Final   • MCHC 06/17/2019 32.5  31.5 - 35.7 g/dL Final   • RDW 06/17/2019 12.0* 12.3 - 15.4 % Final   • RDW-SD 06/17/2019 42.3  37.0 - 54.0 fl Final   • MPV 06/17/2019 11.7  6.0 - 12.0 fL Final   • Platelets 06/17/2019 234  140 - 450 10*3/mm3 Final   • Magnesium 06/17/2019 1.2* 1.6 - 2.4 mg/dL Final   • Glucose 06/17/2019 86  65 - 99 mg/dL Final   • BUN 06/17/2019 16  8 - 23 mg/dL Final   • Creatinine 06/17/2019 0.88  0.57 - 1.00 mg/dL Final   • Sodium 06/17/2019 137  136 - 145 mmol/L Final   • Potassium 06/17/2019 3.3* 3.5 - 5.2 mmol/L Final   • Chloride 06/17/2019 98  98 - 107 mmol/L Final   • CO2 06/17/2019 25.3  22.0 - 29.0 mmol/L Final   • Calcium 06/17/2019 9.6  8.6 - 10.5 mg/dL Final   • Albumin 06/17/2019 2.80* 3.50 - 5.20 g/dL Final   • Phosphorus 06/17/2019 2.7  2.5 - 4.5 mg/dL Final   • Anion Gap 06/17/2019 13.7  mmol/L Final   • BUN/Creatinine Ratio 06/17/2019 18.2  7.0 - 25.0 Final   • eGFR Non African Amer 06/17/2019 62  >60 mL/min/1.73 Final   • Glucose 06/17/2019 96  70 - 130 mg/dL Final   • Glucose 06/17/2019 204* 70 - 130 mg/dL Final   • Hepatitis B Surface Ag 06/17/2019 Non-Reactive  Non-Reactive Final   • HIV-1/ HIV-2 06/17/2019 Non-Reactive  Non-Reactive Final    A non-reactive test result does not preclude the possibility of exposure to HIV or infection with HIV. An antibody response to recent exposure may take several months to reach detectable  levels.   • Hepatitis C Ab 06/17/2019 Non-Reactive  Non-Reactive Final   • Glucose 06/17/2019 119  70 - 130 mg/dL Final   • Glucose 06/17/2019 124  70 - 130 mg/dL Final   • Hemoglobin 06/18/2019 8.0* 12.0 - 15.9 g/dL Final   • Hematocrit 06/18/2019 25.7* 34.0 - 46.6 % Final   • Glucose 06/18/2019 103  70 - 130 mg/dL Final   • Glucose 06/18/2019 109  70 - 130 mg/dL Final   • Glucose 06/18/2019 138* 70 - 130 mg/dL Final   • Glucose 06/18/2019 236* 70 - 130 mg/dL Final   • Hemoglobin 06/19/2019 8.6* 12.0 - 15.9 g/dL Final   • Hematocrit 06/19/2019 27.8* 34.0 - 46.6 % Final   • Glucose 06/19/2019 98  70 - 130 mg/dL Final   • Glucose 06/19/2019 153* 70 - 130 mg/dL Final        No results found.    · Hospital Course:   78 y.o. female was admitted to [facility] to services of Malick Costa II, MD with Femur fracture (CMS/[facility]) [S72.90XA] on 6/13/2019 and underwent No admission procedures for hospital encounter.. Postoperative DVT ppx was accomplished with Eliquis 2.5mg BID starting on POD#1 and will be continued for 30 days. Post-operatively the patient transferred to the floor where the patient underwent mobilization therapy. Opioids were titrated to achieve appropriate pain management to allow for participation in mobilization exercises. Vital signs and laboratory values are now within safe parameters for discharge. The dressings and/or incision is intact without signs or symptoms of acute infection. Operative extremity neurovascular status remains intact as compared to the preoperative exam.  The patient's discharge was held up for a couple of days due to lack of bowel movement, this is now resolved.  Appropriate education re: incision care, activity levels, medications, and follow up visits was completed and all questions were answered. The patient is now deemed stable for discharge.    SNF: The patient had a difficult time mobilizing sufficiently with physical therapy. Further rehabilitation was  "recommended in a SNF facility. Once a bed was available, and the patient was cleared, there were discharged to the SNF in good condition}.       R \"Fabio\" Julissa STOUT MD  Orthopaedic Surgery  [facility]  [phone]                                               "

## 2019-06-19 NOTE — PLAN OF CARE
Problem: Patient Care Overview  Goal: Plan of Care Review  Outcome: Ongoing (interventions implemented as appropriate)   06/19/19 1203 06/19/19 9471   Coping/Psychosocial   Plan of Care Reviewed With patient --    Plan of Care Review   Progress improving --    OTHER   Outcome Summary --  pt POD 6 Left Knee Revision. ALEKSANDR dressing in place. CDI. VSS. NVI. pt pivots to BSC with assist x1. BM today after medication. Worked with therapy. pt to D/C to SNU today. Educated on the importance of BP monitoring and the use of medications as ordered for HO HTN,. verbalized understanding. will cont to monitor.     Goal: Individualization and Mutuality  Outcome: Ongoing (interventions implemented as appropriate)    Goal: Discharge Needs Assessment  Outcome: Ongoing (interventions implemented as appropriate)    Goal: Interprofessional Rounds/Family Conf  Outcome: Ongoing (interventions implemented as appropriate)      Problem: Fall Risk (Adult)  Goal: Identify Related Risk Factors and Signs and Symptoms  Outcome: Ongoing (interventions implemented as appropriate)    Goal: Absence of Fall  Outcome: Ongoing (interventions implemented as appropriate)      Problem: Fracture Orthopaedic (Adult)  Goal: Signs and Symptoms of Listed Potential Problems Will be Absent, Minimized or Managed (Fracture Orthopaedic)  Outcome: Ongoing (interventions implemented as appropriate)

## 2019-06-19 NOTE — PLAN OF CARE
Problem: Patient Care Overview  Goal: Plan of Care Review  Outcome: Ongoing (interventions implemented as appropriate)   06/19/19 0143   Coping/Psychosocial   Plan of Care Reviewed With patient   Plan of Care Review   Progress improving   OTHER   Outcome Summary vss, dsg c/d/i, neurovascular status wnl, voiding well, so far no BM, will cont to monitor, reports adequate pain control, educated on importance of moniitoring b/p due to hx hypertension, possible discharge today pending BM, will continue to monitor     Goal: Individualization and Mutuality  Outcome: Ongoing (interventions implemented as appropriate)    Goal: Discharge Needs Assessment  Outcome: Ongoing (interventions implemented as appropriate)    Goal: Interprofessional Rounds/Family Conf  Outcome: Ongoing (interventions implemented as appropriate)      Problem: Fall Risk (Adult)  Goal: Absence of Fall  Outcome: Ongoing (interventions implemented as appropriate)      Problem: Fracture Orthopaedic (Adult)  Goal: Signs and Symptoms of Listed Potential Problems Will be Absent, Minimized or Managed (Fracture Orthopaedic)  Outcome: Ongoing (interventions implemented as appropriate)

## 2019-06-19 NOTE — PROGRESS NOTES
Orthopaedic Surgery   Daily Progress Note  Dr. SCOTT Costa II  [phone]  DEMOGRAPHICS:   · Lilia Becerra   · Age:78 y.o.   · MRN:[id number]  · Admitted: 6/13/2019    PROCEDURE: 6 Days Post-Op s/p Procedure(s):  LEFT KNEE REVISION     HOSPITAL PROGRESS  · Patient Issues: Had a large bowel movement today, which was keeping her from being discharged  · Ambulation/Activity: Worked well with therapy today and had less pain    VITALS:  Vitals:    06/18/19 2300 06/19/19 0300 06/19/19 0654 06/19/19 1038   BP: 116/60 127/71 120/67 120/62   BP Location: Left arm Left arm Left arm Left arm   Patient Position: Lying Lying Lying Sitting   Pulse: 75 81 73 76   Resp: 16 16 16 16   Temp: 97 °F (36.1 °C) 98.7 °F (37.1 °C) 97.9 °F (36.6 °C) 98.6 °F (37 °C)   TempSrc: Oral Oral Oral Oral   SpO2: 98% 99% 97%    Weight:       Height:           PHYSICAL EXAM:  · CONSTITUTIONAL: A&Ox3, No acute distress  · LUNGS: Equal chest rise, no shortness of air  · CARDIOVASCULAR: palpable peripheral pulses  · WOUND: Johnny wound VAC in place  · EXTREMITY: Left Lower Extremity  · Pulses: brisk capillary refill intact  · Sensation: Sensation intact to light touch to the saphenous/sural/tibial/deep peroneal/superficial peroneal nerves, and grossly throughout the extremity.  · Motor: 5/5 EHL/FHL/TA/GS motor complexes    LABS:   Lab Results   Component Value Date    HGB 8.6 (L) 06/19/2019     Lab Results   Component Value Date    WBC 12.50 (H) 06/17/2019     Lab Results   Component Value Date    GLUCOSE 86 06/17/2019    CALCIUM 9.6 06/17/2019     06/17/2019    K 3.3 (L) 06/17/2019    CO2 25.3 06/17/2019    CL 98 06/17/2019    BUN 16 06/17/2019    CREATININE 0.88 06/17/2019    EGFRIFNONA 62 06/17/2019    BCR 18.2 06/17/2019    ANIONGAP 13.7 06/17/2019       ASSESSMENT: Patient is a 78 y.o. female who is 6 Days Post-Op s/p Procedure(s):  LEFT KNEE REVISION     PLAN:   · Weight Bearing: Left Lower Extremity Weight Bearing As  "Tolerated  · Labs: Above lab values review. Plan: Hemoglobin stable at 8.6  · PT/OT: To Mobilize  · DVT PPX: Eliquis 2.5mg BID  · Post-Op Xray: TKA implants in good alignment.   · Dispo: Should be good for SNF today    R \"Fabio\" Julissa STOUT MD  Orthopaedic Surgery  [facility]  [phone]    "

## 2019-06-19 NOTE — PROGRESS NOTES
LOS: 6 days     Name: Lilia Becerra  Age/Sex: 78 y.o. female  :  1940        PCP: Leo Lomas MD    Subjective   Resting comfortably currently.  Pain is under control.  Has been up with physical therapy.  She will early before discharge  General: No Fever or Chills, Cardiac: No Chest Pain or Palpitations, Resp: No Cough or SOA, GI: No Nausea, Vomiting, or Diarrhea and Other: No bleeding      apixaban 2.5 mg Oral Q12H   atorvastatin 10 mg Oral Nightly   bisacodyl 10 mg Rectal Daily   docusate sodium 100 mg Oral BID   famotidine 40 mg Oral Daily   insulin lispro 0-7 Units Subcutaneous 4x Daily With Meals & Nightly   levothyroxine 25 mcg Oral Q AM   losartan 25 mg Oral Q24H   magnesium citrate 150 mL Oral Once   metoprolol succinate XL 25 mg Oral Nightly   oxybutynin XL 5 mg Oral Daily   polyethylene glycol 34 g Oral Daily   pramipexole 1.5 mg Oral Nightly   sodium chloride 3 mL Intravenous Q12H   spironolactone 25 mg Oral Daily   venlafaxine  mg Oral Daily With Breakfast       lactated ringers 9 mL/hr Last Rate: Stopped (19 1417)       Objective   Vital Signs  Temp:  [96.9 °F (36.1 °C)-99.1 °F (37.3 °C)] 97.9 °F (36.6 °C)  Heart Rate:  [67-81] 73  Resp:  [16] 16  BP: (109-127)/(58-71) 120/67  Body mass index is 31.45 kg/m².    Intake/Output Summary (Last 24 hours) at 2019 0749  Last data filed at 2019 0528  Gross per 24 hour   Intake 1080 ml   Output 1050 ml   Net 30 ml       Physical Exam   Constitutional: She is oriented to person, place, and time. She appears well-developed and well-nourished.   Eyes: EOM are normal.   Neck: Neck supple.   Cardiovascular: Normal rate and regular rhythm.   Pulmonary/Chest: Effort normal and breath sounds normal.   Abdominal: Soft. Bowel sounds are normal. She exhibits no distension.   Neurological: She is alert and oriented to person, place, and time.   Skin: Skin is warm and dry. Capillary refill takes less than 2 seconds.    Psychiatric: She has a normal mood and affect. Her behavior is normal.   Nursing note and vitals reviewed.    Results Review:       I reviewed the patient's new clinical results.  Results from last 7 days   Lab Units 06/19/19  0354 06/18/19  0339 06/17/19 0351 06/16/19 0434 06/15/19  0313 06/14/19 0453 06/13/19  1008   WBC 10*3/mm3  --   --  12.50*  --   --   --  10.17   HEMOGLOBIN g/dL 8.6* 8.0* 8.8* 8.3* 9.0* 9.9* 11.0*   PLATELETS 10*3/mm3  --   --  234  --   --   --  264     Results from last 7 days   Lab Units 06/17/19  0351 06/16/19 0434 06/14/19 0453 06/13/19  1008   SODIUM mmol/L 137 138 140 144  144   POTASSIUM mmol/L 3.3* 3.6 3.5 3.2*  3.2*   CHLORIDE mmol/L 98 102 104 108*  108*   CO2 mmol/L 25.3 26.1 23.0 23.1  23.1   BUN mg/dL 16 16 9 13  13   CREATININE mg/dL 0.88 0.92 0.95 1.01*  1.01*   CALCIUM mg/dL 9.6 9.4 8.6 9.0  9.0   MAGNESIUM mg/dL 1.2*  --   --   --    PHOSPHORUS mg/dL 2.7  --   --   --    Estimated Creatinine Clearance: 55.1 mL/min (by C-G formula based on SCr of 0.88 mg/dL).      Assessment/Plan     Atrial fibrillation (CMS/[facility])    Diabetes mellitus (CMS/[facility])    Essential hypertension    Stage 3 chronic kidney disease (CMS/[facility])    Femur fracture (CMS/[facility])      PLAN  -Blood pressure remained stable postoperatively  -Hemoglobin has dropped about 3 g consistent with acute blood loss anemia following surgery. But has stabilized  -Creatinine is stabilized and electrolytes are within normal limits  -Blood sugars remain stable  -Give a dose of mag citrate today  -Eliquis has stabilized    Disposition  Per primary team no medical contraindication with Dc today      Hussein Rudolph MD  [facility]  06/19/19  7:49 AM

## 2019-07-08 ENCOUNTER — OFFICE VISIT CONVERTED (OUTPATIENT)
Dept: UROLOGY | Facility: CLINIC | Age: 79
End: 2019-07-08
Attending: NURSE PRACTITIONER

## 2019-08-12 NOTE — OP NOTE
ORTHOPAEDIC OPERATIVE NOTE    Patient: Lilia Becerra       YOB: 1940    Medical Record Number: [id number]    Attending Physician: Jair Fajardo, *    Primary Care Physician: Leo Lomas MD    Date of Service: 2/13/2018    Surgeon: Jair Fajardo MD        DATE OF PROCEDURE: 2/13/2018    PREOPERATIVE DIAGNOSIS: 1. 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   2. Instability of left knee prosthesis    POSTOPERATIVE DIAGNOSIS: Failed total knee, left [T84.093A]      Instability of internal left knee prosthesis, initial encounter [T84.023A]    PROCEDURE PERFORMED:    Revision LEFT total knee arthroplasty by utilizing the [place], hinged knee prosthesis: [facility] knee femoral component, small LEFT, titanium fluted stem, 23 mm diameter 80 mm length for the femur, [facility] knee tibial baseplate, size small-2, a Duracon full flat tibial wedge 10 mm thick, Triathlon Tritanium Tibial symmetric cone augment size c, tibial insert 13 mm thickness, 0° bumper.    SURGEON: Jair Fajardo MD     ASSISTANT:     Star Pack MD, Fellow  Ashli Jain, Certified Surgical First assist.    The services of a skilled  first assist were necessary for performing the procedure safely and expeditiously.  The first assist was present for the entire duration of the case and helped with positioning, retraction and closure of the incision.      ANESTHESIA: General anaesthesia with a regional block femoral-sciatic and intraoperative periarticular  Exparel injection.    ESTIMATED BLOOD LOSS: 425 mL    SPECIMENS: Tissue from  knee for frozen section for acute inflammation.      COMPLICATIONS: Nil.     DRAINS: A 10 Occitan Hemovac drain.     INDICATIONS: The patient is a 77 y.o. female  who Is known to me from  presented to my office with complaints of progressively worsening pain in her left knee. She has reached a point of disability. She is known to me from  Subjective   he states she is been having withdrawals from narcotics was on narcotics for many many years and not been taking them for a while now        Withdrawal   Location:  Narcotic  Severity:  Moderate  Onset quality:  Gradual  Timing:  Constant  Chronicity:  New  Associated symptoms: nausea    Associated symptoms: no abdominal pain, no chest pain, no congestion, no cough, no diarrhea, no ear pain, no fatigue, no fever, no headaches, no loss of consciousness, no myalgias, no rhinorrhea, no shortness of breath, no sore throat, no vomiting and no wheezing        Review of Systems   Constitutional: Negative.  Negative for chills, fatigue and fever.   HENT: Negative.  Negative for congestion, ear pain, rhinorrhea and sore throat.    Respiratory: Negative.  Negative for cough, chest tightness, shortness of breath, wheezing and stridor.    Cardiovascular: Negative.  Negative for chest pain.   Gastrointestinal: Positive for nausea. Negative for abdominal distention, abdominal pain, diarrhea and vomiting.   Endocrine: Negative.    Genitourinary: Negative.  Negative for difficulty urinating and flank pain.   Musculoskeletal: Negative.  Negative for myalgias.   Skin: Negative.  Negative for color change.   Neurological: Negative.  Negative for dizziness, loss of consciousness and headaches.   All other systems reviewed and are negative.      Past Medical History:   Diagnosis Date   • Anxiety    • Chronic kidney disease    • Chronic pain syndrome    • GI bleeding    • History of transfusion    • Hyperlipidemia    • Hypertension    • Injury of back    • Insomnia    • Neck injury    • PAF (paroxysmal atrial fibrillation) (CMS/HCC)    • Therapeutic opioid induced constipation        Allergies   Allergen Reactions   • Eliquis [Apixaban] Other (See Comments)     Internal bleeding      • Codeine Itching   • Declomycin [Demeclocycline] Rash   • Tylenol [Acetaminophen] Itching     Patient tolerated Percocet inpatient and reports  her revision total knee arthroplasty on the LEFT with cemented femur and tibia implants several years ago.  Subsequently she has done well.  She has been having pain for the past 6 months.  It was progressively increasing instability to her LEFT knee. The patient has been evaluated for infection. Inflammatory Markers were negative. Knee aspiration fluid was negative for suspicion of infection. 3-phase bone scan was obtained  But there was no obvious increase in uptake.     Treatment options and alternatives were discussed in detail with the patient who is indicated for a revision total knee arthroplasty.     Likely risks and benefits of the procedure, including but not limited to infection, DVT, pulmonary embolism, future loosening of the implants, possibility of injury to tendons, ligaments, nerves or vessels and periprosthetic fractures, loss of extensor mechanism, stiffness, future above-the-knee amputation have been discussed in detail. Despite the risks involved, the patient elected to proceed and informed consent was obtained and the patient was scheduled for surgery. The patient was seen in the preoperative holding area and the operative site was marked.       DESCRIPTION OF PROCEDURE:   The patient was transferred to [facility] operating room.     Preoperative antibiotics in the form of   Kefzol  intravenously was infused prior to the incision and prior to the tourniquet placement according to the SCIP protocol.     A surgical time out was done with the team and the correct patient, surgical side and site were identified.     After achieving adequate general anesthesia, a well-padded tourniquet was placed over the proximal aspect of the operative thigh. The operative leg was prepped and draped in the usual sterile fashion. Tourniquet was elevated to a pressure of 250 mm Hg.     A skin incision was made vertically oriented centering over the patella anteriorly incorporating previous surgical  tolerating Percocet prior to admission.       Past Surgical History:   Procedure Laterality Date   • BACK SURGERY     • CERVICAL LAMINECTOMY DECOMPRESSION POSTERIOR N/A 2/27/2018    Procedure: CERVICAL  POSTERIOR INSTRUMENTED FUSION C1-4;  Surgeon: Bandar Donis MD;  Location: [facility];  Service:    • ODONTOID FRACTURE SURGERY         Family History   Problem Relation Age of Onset   • Cancer Mother    • No Known Problems Father        Social History     Socioeconomic History   • Marital status:      Spouse name: Not on file   • Number of children: Not on file   • Years of education: Not on file   • Highest education level: Not on file   Tobacco Use   • Smoking status: Never Smoker   • Smokeless tobacco: Never Used   Substance and Sexual Activity   • Alcohol use: No   • Drug use: No   • Sexual activity: Defer           Objective   Physical Exam   Constitutional: He is oriented to person, place, and time. He appears well-developed and well-nourished.   HENT:   Head: Normocephalic and atraumatic.   Eyes: Conjunctivae and EOM are normal. Pupils are equal, round, and reactive to light.   Neck: Normal range of motion. Neck supple.   Cardiovascular: Normal rate, regular rhythm, normal heart sounds and intact distal pulses.   Pulmonary/Chest: Effort normal and breath sounds normal.   Abdominal: Soft. Bowel sounds are normal.   Musculoskeletal: Normal range of motion.   Neurological: He is alert and oriented to person, place, and time. He has normal reflexes.   Skin: Skin is warm and dry.   Psychiatric: He has a normal mood and affect.   Nursing note and vitals reviewed.      Procedures           ED Course  ED Course as of Aug 12 1716   Mon Aug 12, 2019   1713 Patient initially came in stating that he is having withdrawal from narcotics and needs help with that.  The work-up is negative although he is having a withdrawal from narcotics but he is still using marijuana and amphetamines as evident by his urine drug  scar. Skin and subcutaneous tissue were incised, full thickness flaps were raised and a medial parapatellar approach was developed. There was a large effusion, extensive synovitis and metallosis. The patella was subluxed and the knee was inspected.       Tissue from distal femur proximal tibia and patella was sent for frozen section .  The frozen section returned negative for acute inflammation  .        I did a partial synovectomy and the knee was subluxed.  The previous implant was a Aly TS system.  The liner was removed with the help of osteotomes.  The femur was inspected.  The femur implant was loose from the stem.  This was easily removed.  Subsequently, 80 move the cement mantle in the distal femur and was able to remove the stem without any difficulty.  This was a titanium 100 mm size 19 cemented femur stem.  Of note, the threads in the implant on the stem are completely damaged.     There was also significant laxity of the medial collateral ligament.  With the fracture fragment of the lateral epicondyle carrying the femur insertion of the medial collateral ligament. I elected to proceed with a hinged prosthesis.  The tibia implant was a well fixed, fully cemented stem with a proximal tibial Braeden trabecular metal cone.  This was fully ingrown.  I was unable to remove the tibial implant by tapping on it.  I elected to proceed with a tibial tubercle osteotomy.  A 10 cm osteotomy was fashioned involving the anteromedial cortex of the tibia and the osteotomy was completed by using drill bits and osteotomes and an oscillating saw.  This gave me exposure to the proximal tibia and the metaphyseal sleeve was removed by utilizing osteotomes and breaking the sleeve in multiple areas. There was some minor fracture lines onto the posterior proximal tibia, but the overall tube of  The proximal  tibia  remained intact. Also, the cement mantle from the proximal tibia was removed for a large extent.  The distal  screen.  I went back and discussed this case at length with the patient I told him what his abnormal lab findings i.e. elevated alkaline phosphatase that needs further evaluation and assessment by primary MD but I was going to go ahead and discharge him home at that time he got upset and wanted to see as to IV not controlling his pain is chronic lower back pain that he is suffering from I have advised him the ER is not a place for assessment and evaluation and treatment of chronic pain syndromes he needs to follow-up with his primary MD we will provide him with numbers for rehabitation knee centers.  I will discharge him home  [TS]      ED Course User Index  [TS] Amos Sandy MD                  [facility]      Final diagnoses:   Narcotic dependence (CMS/HCC)            Amos Sandy MD  08/12/19 1394     portion of the stem was still well fixed into the tibial medullary canal.  I decided to un thread the tibial baseplate from its well fixed stem and utilized wrenches to remove the proximal tibial baseplate.    The knee joint was thoroughly irrigated with saline.  All traces of previous cement was removed.  Debridement was completed.    The trial tibial implant was seated onto the stem and a trial femur was seated into the distal femur and a trial was performed with a hinge implant.  It was found to be satisfactory.  The range of motion was satisfactory with the patella tracking well.  I removed the trial implants.  At this stage, I obtained the [facility] tibial baseplate and a 10 mm full flat tibial wedge was cemented to the baseplate with half a batch of Simplex cement.  This was allowed to set.  I used a triathlon titanium tibial symmetric cone Augment size C.  The tibial baseplate along with the augment was threaded   to the retained tibial stem.  This was tightened.  This gave me adequate external rotation of the tibial baseplate. I repaired the tibial tubercle osteotomy with sternal wires.  The colon was impacted down deeper into the bone and Simplex tobramycin cement.  2 batches was utilized to augment the tibial baseplate and fixation between the baseplate and the cone. The cement was allowed to set.  Excess cement was removed.    I utilized 2 more batches of Simplex tobramycin cement and the femur implant was cemented in maintaining the correct external rotation.  This was a 80 mm stem.  I did not remove the previous cement plug.  Again cement was allowed to set and excess cement was removed.  I seated a 13 mm tibial insert, followed by completion of the [facility] tibial rotating component, bushings, knee axle.  A 0° bumper insert was seated.    Again, range of motion was checked and the range was satisfactory from 0° to 120° with excellent stability throughout the range of motion.  The patella was tracking well. Having  been satisfied with this, the joint was thoroughly irrigated with saline. Soft tissue hemostasis was secured. A 10-[place] Hemovac drain was placed.      Trannexamic acid was given intravenously prior to release of the tourniquet. The sponge and needle count was found to be correct.  Arthrotomy was closed with Ethibond sutures followed by closure of the incision in layers with Vicryl sutures and staples. Sterile dressings were placed and the patient was transferred to the recovery room in stable condition. The patient was given a knee immobilizer. The patient tolerated the procedure well and is being admitted for postoperative antibiotics according to the SCIP protocol for 2 more doses /  until I see culture result.     DVT Prophylaxis -  Mechanical - TEDS and venous foot plexipulses and  Aspirin twice daily will be started daily on postoperative day #1.     The patient will be mobilized in am with physical therapy. WBAT for transfers and will avoid range of motion till the tibial tubercle osteotomy heals.     I discussed the satisfactory performance of the procedure with the patient's sister and discussed with them The postoperative management.      Jair Fajardo M.D.    2/13/2018    CC: Leo Lomas MD; MD Jair Nielsen, *

## 2019-09-26 ENCOUNTER — CONVERSION ENCOUNTER (OUTPATIENT)
Dept: OTHER | Facility: HOSPITAL | Age: 79
End: 2019-09-26

## 2019-09-26 ENCOUNTER — OFFICE VISIT CONVERTED (OUTPATIENT)
Dept: CARDIOLOGY | Facility: CLINIC | Age: 79
End: 2019-09-26
Attending: INTERNAL MEDICINE

## 2019-10-25 ENCOUNTER — CONVERSION ENCOUNTER (OUTPATIENT)
Dept: CARDIOLOGY | Facility: CLINIC | Age: 79
End: 2019-10-25
Attending: INTERNAL MEDICINE

## 2019-11-05 ENCOUNTER — OFFICE VISIT CONVERTED (OUTPATIENT)
Dept: GASTROENTEROLOGY | Facility: CLINIC | Age: 79
End: 2019-11-05
Attending: NURSE PRACTITIONER

## 2020-01-27 ENCOUNTER — OFFICE VISIT CONVERTED (OUTPATIENT)
Dept: UROLOGY | Facility: CLINIC | Age: 80
End: 2020-01-27
Attending: NURSE PRACTITIONER

## 2020-05-29 ENCOUNTER — OFFICE VISIT CONVERTED (OUTPATIENT)
Dept: CARDIOLOGY | Facility: CLINIC | Age: 80
End: 2020-05-29
Attending: NURSE PRACTITIONER

## 2020-07-02 ENCOUNTER — HOSPITAL ENCOUNTER (OUTPATIENT)
Dept: CARDIOLOGY | Facility: HOSPITAL | Age: 80
Discharge: HOME | End: 2020-07-02
Attending: NURSE PRACTITIONER

## 2020-07-27 ENCOUNTER — OFFICE VISIT CONVERTED (OUTPATIENT)
Dept: UROLOGY | Facility: CLINIC | Age: 80
End: 2020-07-27
Attending: NURSE PRACTITIONER

## 2020-07-27 ENCOUNTER — CONVERSION ENCOUNTER (OUTPATIENT)
Dept: UROLOGY | Facility: CLINIC | Age: 80
End: 2020-07-27

## 2020-08-03 ENCOUNTER — OFFICE VISIT CONVERTED (OUTPATIENT)
Dept: CARDIOLOGY | Facility: CLINIC | Age: 80
End: 2020-08-03
Attending: INTERNAL MEDICINE

## 2020-08-27 ENCOUNTER — CONVERSION ENCOUNTER (OUTPATIENT)
Dept: UROLOGY | Facility: CLINIC | Age: 80
End: 2020-08-27

## 2020-08-27 ENCOUNTER — OFFICE VISIT CONVERTED (OUTPATIENT)
Dept: UROLOGY | Facility: CLINIC | Age: 80
End: 2020-08-27
Attending: NURSE PRACTITIONER

## 2020-11-05 ENCOUNTER — OFFICE VISIT CONVERTED (OUTPATIENT)
Dept: CARDIOLOGY | Facility: CLINIC | Age: 80
End: 2020-11-05
Attending: INTERNAL MEDICINE

## 2020-11-05 ENCOUNTER — CONVERSION ENCOUNTER (OUTPATIENT)
Dept: CARDIOLOGY | Facility: CLINIC | Age: 80
End: 2020-11-05

## 2021-01-06 ENCOUNTER — HOSPITAL ENCOUNTER (OUTPATIENT)
Dept: GASTROENTEROLOGY | Facility: HOSPITAL | Age: 81
Setting detail: HOSPITAL OUTPATIENT SURGERY
Discharge: HOME | End: 2021-01-06
Attending: INTERNAL MEDICINE

## 2021-01-07 ENCOUNTER — HOSPITAL ENCOUNTER (OUTPATIENT)
Dept: GASTROENTEROLOGY | Facility: HOSPITAL | Age: 81
Setting detail: HOSPITAL OUTPATIENT SURGERY
Discharge: HOME | End: 2021-01-07
Attending: INTERNAL MEDICINE

## 2021-01-07 LAB — GLUCOSE BLD-MCNC: 76 MG/DL (ref 65–99)

## 2021-03-08 ENCOUNTER — CONVERSION ENCOUNTER (OUTPATIENT)
Dept: UROLOGY | Facility: CLINIC | Age: 81
End: 2021-03-08

## 2021-03-08 LAB
BILIRUB UR QL STRIP: NEGATIVE
COLOR UR: YELLOW
CONV BACTERIA IN URINE MICRO: 0
CONV CALCIUM OXALATE CRYSTALS /HPF IN URINE SEDIMENT BY MICROSCOPY: 0
CONV CLARITY OF URINE: CLEAR
CONV PROTEIN IN URINE BY AUTOMATED TEST STRIP: NEGATIVE
CONV UROBILINOGEN IN URINE BY AUTOMATED TEST STRIP: NORMAL
GLUCOSE UR QL: NEGATIVE
HGB UR QL STRIP: NEGATIVE
KETONES UR QL STRIP: NEGATIVE
LEUKOCYTE ESTERASE UR QL STRIP: NORMAL
NITRITE UR QL STRIP: NEGATIVE
PH UR STRIP.AUTO: 5 [PH]
RBC #/AREA URNS HPF: 0 /[HPF]
RENAL EPI CELLS #/AREA URNS HPF: 0 /[HPF]
SP GR UR: 1.02
SQUAMOUS SPT QL MICRO: 0
WBC #/AREA URNS HPF: 0 /[HPF]

## 2021-05-10 NOTE — PROCEDURES
"   Procedure Note      Patient Name: Lilia Becerra   Patient ID: [id number]   Sex: Female   YOB: 1940    Primary Care Provider: Leo Lomas MD   Referring Provider: Ronit HAGAN    Visit Date: November 5, 2020    Provider: Reagan Strong MD   Location: [facility]   Location Address: [address] [place] [address], [address]   [place]  [id number]   Location Phone: [phone]          FINAL REPORT   TRANSTHORACIC ECHOCARDIOGRAM REPORT    Diagnosis: Aortic Stenosis   Height: 5'4\" Weight: 200 B/P: 116/52 BSA: 1.96   Tech: [facility]   MEASUREMENTS:  RVID (Diastole) : RVID. (NORMAL: 0.7 to 2.4 cm max)   LVID (Systole): 3 cm (Diastole): 4.5 cm . (NORMAL: 3.7 - 5.4 cm)   Posterior Wall Thickness (Diastole): 1 cm. (NORMAL: 0.8 - 1.1 cm)   Septal Thickness (Diastole): 1.1 cm. (NORMAL: 0.7 - 1.2 cm)   LAID (Systole): 4.2 cm. (NORMAL: 1.9 - 3.8 cm)   Aortic Root Diameter (Diastole): 2.7 cm. (NORMAL: 2.0 - 3.7 cm)   COMMENTS:  The patient underwent 2-D, M-Mode, and Doppler examination, including pulse-wave, continuous-wave, and color-flow analysis; the study is technically adequate.   FINDINGS:  MITRAL VALVE: Mild fibrocalcific changes noted of the posterior mitral leaflet with adequate opening.   AORTIC VALVE: Moderate fibrocalcific changes noted of a trileaflet aortic valve with limited opening of the aortic valve leaflets.   TRICUSPID VALVE: Normal.   PULMONIC VALVE: Normal.   LEFT ATRIUM: Normal; no masses seen. LA volume is 36 mL/m2.   AORTIC ROOT: Normal in size and motion.   LEFT VENTRICLE: The left ventricular chamber size is normal. The left ventricular wall thickness is normal. The left ventricular systolic function is normal with an estimated EF of 60-65%. No significant regional wall motion abnormalities are identified.   RIGHT ATRIUM: Normal.   RIGHT VENTRICLE: Normal size and function.   PERICARDIUM: No effusion.   INFERIOR VENA CAVA: Diameter is 1.6 cm with greater than 50% reduction with " inspiration.   DOPPLER: Pulse-wave, continuous wave, and color-flow Doppler evaluation was performed. E/A ratio is 1.2. DT= 238 msec. IVRT is 90 msec. E/E' is 15. The peak gradient across the aortic valve is 30 mmHg with a V-max of 2.7 m/sec and a mean gradient of 15-17 mmHg with an estimated aortic valve area of 4 cm2. There is tricuspid regurgitation with estimated pulmonary artery systolic pressure of 40-45 mmHg. There is mild mitral valve regurgitation present.   Faxed: 11/06/2020      CONCLUSION:  1.  Left ventricular chamber size is normal. The left ventricular wall thickness is normal. The left ventricular        systolic function is normal with an estimated EF of 60-65%. No significant regional wall motion        abnormalities are identified.   2.  Grade 2 left ventricular diastolic dysfunction with pseudonormalization.   3.  Fibrocalcific changes of the aortic valve with moderate aortic valve stenosis.   4.  Fibrocalcific changes of the mitral valve with mild mitral valve regurgitation.   5.  Tricuspid regurgitation with estimated pulmonary artery systolic pressure of 40-45 mmHg.       Reagan Strong MD, [facility]  PM/pap    This note was transcribed by Lakia Mohan.  pap/pm  The above service was transcribed by Lakia Mohan, and I attest to the accuracy of the note.  PM                 Electronically Signed by: Josie Mohan-, Other -Author on November 6, 2020 09:34:13 AM  Electronically Co-signed by: Reagan Strong MD -Reviewer on November 8, 2020 08:53:14 PM

## 2021-05-13 NOTE — PROGRESS NOTES
Progress Note      Patient Name: Lilia Becerra   Patient ID: [id number]   Sex: Female   YOB: 1940    Primary Care Provider: Leo Lomas MD   Referring Provider: Ronit HAGAN    Visit Date: May 29, 2020    Provider: DANYA Mcdonald   Location: [facility]   Location Address: [address] [place] [address]   [place]  [id number]   Location Phone: [phone]          Chief Complaint     Shortness of breath.  Pedal edema.       History Of Present Illness  REFERRING PROVIDER: Ronit HAGAN   Lilia Becerra is an 79 year old /White female who has been complaining of increasing pedal edema, so was started on furosemide on approximately the 22nd of May, but it has not seemed to help it any. She continues to be short of breath, mild with moderate exertion, although she says that is unchanged. Denies any chest pain. No palpitations, dizziness, syncope, PND, or orthopnea. Her meds from last office visit are gone now. She is not sure why and that includes Aldactazide and metoprolol.   PAST MEDICAL HISTORY: Aortic stenosis; Chronic kidney disease; Diabetes mellitus; Hyperlipidemia; Hypertension; Hypothyroidism; Previous atrial arrhythmia.   FAMILY HISTORY: Positive for diabetes mellitus and hypertension.   PSYCHOSOCIAL HISTORY: Denies alcohol or tobacco use. Admits mood changes and depression.   CURRENT MEDICATIONS: Diclofenac 50 mg daily; pramipexole 1.5 mg daily; losartan 25 mg 1/2 daily; tizanidine 4 mg t.i.d.; oxybutynin 5 mg daily; levothyroxine 25 mcg daily; Myrbetriq 50 mg daily; furosemide 20 mg daily; omeprazole 20 mg daily; acetaminophen-codeine daily; metformin 500 mg 2 daily; alendronate 35 mg weekly; venlafaxine 37.5 mg daily. The dosage and frequency of the medications were reviewed with the patient.       Review of Systems  · Cardiovascular  o Admits  o : swelling (feet, ankles, hands), shortness of breath while walking or lying  "flat  o Denies  o : palpitations (fast, fluttering, or skipping beats), chest pain or angina pectoris   · Respiratory  o Denies  o : chronic or frequent cough, asthma or wheezing      Vitals  Date Time BP Position Site L\R Cuff Size HR RR TEMP (F) WT  HT  BMI kg/m2 BSA m2 O2 Sat        05/29/2020 12:17 /78 Sitting    77 - R   213lbs 16oz 5'  6\" 34.54 2.13     05/29/2020 12:17 /76 Sitting                     Physical Examination  · Constitutional  o Appearance  o : Awake, alert, in no acute distress, ambulates with a rolling walker.  · Eyes  o Conjunctivae  o : Conjunctivae normal.  · Ears, Nose, Mouth and Throat  o Oral Cavity  o :   § Oral Mucosa  § : Normal.  · Neck  o Jugular Veins  o : No JVD. Good carotid upstroke. No bruits noted.  · Respiratory  o Respiratory  o : Good respiratory effort. Clear to percussion and auscultation.  · Cardiovascular  o Heart  o : PMI not well felt. S1, S2 regular. No S3. No S4. 2/6 systolic murmur at the base. Negative diastolic murmur.   o Peripheral Vascular System  o :   § Extremities  § : Trace to +1 pedal edema. Good femoral and pedal pulses.   · Gastrointestinal  o Abdominal Examination  o : Soft. No tenderness or masses felt. No hepatosplenomegaly. Abdominal aorta is not palpable.  · Labs  o Labs  o : Glucose 103, BUN 26, creatinine 1.38, total cholesterol 184, triglycerides 121, , HDL 49. She is used to be on atorvastatin.          Assessment     1.  Pedal edema.  2.  Hypertension, needs tighter control.  3.  Hyperlipidemia, worsening without a statin.  4.  Hypothyroidism.  5.  Chronic kidney disease, stage 3, improving.  6.  Aortic stenosis.  7.  Previous atrial arrhythmia, converted to sinus rhythm.       Plan     1.  Increase Lasix to 40 mg a day.  2.  Increase losartan to 25 mg once a day.  3.  Start atorvastatin 10 mg once at night.  4.  Do a blood pressure log, and we will adjust hypertensive medications further if needed, and try to restart       "  her metoprolol.    5.  Check a BMP in 2-3 weeks to see if she needs any potassium.  6.  Follow up in 4 months with a lipid panel and CMP or earlier if needed.      DANYA Holt/devin                     Electronically Signed by: Candace Moody-, Other -Author on June 8, 2020 07:42:28 AM  Electronically Co-signed by: DANYA Mcdonald -Reviewer on June 11, 2020 07:48:00 AM

## 2021-05-13 NOTE — PROGRESS NOTES
Progress Note      Patient Name: Lilia Becerra   Patient ID: [id number]   Sex: Female   YOB: 1940    Primary Care Provider: Leo Lomas MD   Referring Provider: Ronit HAGAN    Visit Date: August 3, 2020    Provider: Reagan Strong MD   Location: [facility]   Location Address: [address] [place] [address], [facility]   EDWIN Mackenzie  [id number]   Location Phone: [phone]          Chief Complaint  · Recent atrial flutter episodes       History Of Present Illness  REFERRING PROVIDER: Ronit HAGAN   Lilia Becerra is an 79-year-old female with aortic valve stenosis, hypertension, hyperlipidemia, who had developed atrial flutter last month and was treated at [facility]. She was put on beta blocker and Eliquis, which she stopped taking because she is afraid of having a side effect from the anti-coagulation. She states she is feeling a lot better, and she has had no chest pain, palpitations, shortness of breath, dizziness or syncope over the last two weeks.   PAST MEDICAL HISTORY: Aortic stenosis; chronic kidney disease; diabetes mellitus; hyperlipidemia; hypertension; hypothyroidism; previous atrial arrhythmia.   FAMILY HISTORY: Positive for diabetes. Negative for hypertension or heart disease.   PSYCHOSOCIAL HISTORY: No history of mood changes or depression. She never used alcohol or tobacco.   CURRENT MEDICATIONS: include magnesium 400 mg daily; Oxybutynin 10 mg daily; Losartan 25 mg daily; Levothyroxine 25 mcg daily; Omeprazole 20 mg daily; Alendronate 35 mg once a week; Furosemide 40 mg daily; Metformin 500 mg 2 tablets b.i.d.; Tizanidine 4 mg b.i.d; Diclofenac 50 mg daily; Atorvastatin 10 mg daily; Pramipexole 1.5 mg daily; Venlafaxine 37.5 mg daily; Myrbetriq 50 mg daily; calcium; multivitamin; Duloxetine 60 mg daily. The dosage and frequency of the medications were reviewed with the patient.       Review of Systems  · Cardiovascular  o Admits  o :  "palpitations (fast, fluttering, or skipping beats), chest pain or angina pectoris   o Denies  o : swelling (feet, ankles, hands), shortness of breath while walking or lying flat  · Respiratory  o Denies  o : chronic or frequent cough, asthma or wheezing      Vitals  Date Time BP Position Site L\R Cuff Size HR RR TEMP (F) WT  HT  BMI kg/m2 BSA m2 O2 Sat        08/03/2020 10:55 /46 Sitting    70 - I   208lbs 0oz 5'  6\" 33.57 2.1           Physical Examination  · Constitutional  o Appearance  o : Awake, alert, in no acute distress, ambulates with a rolling walker.  · Eyes  o Conjunctivae  o : Conjunctivae normal.  · Ears, Nose, Mouth and Throat  o Oral Cavity  o :   § Oral Mucosa  § : Normal.  · Neck  o Jugular Veins  o : No JVD. Good carotid upstroke. No bruits noted.  · Respiratory  o Respiratory  o : Good respiratory effort. Clear to percussion and auscultation.  · Cardiovascular  o Heart  o : PMI not well felt. S1, S2 regular. No S3. No S4. 2/6 systolic murmur at the base. Negative diastolic murmur.   o Peripheral Vascular System  o :   § Extremities  § : Trace to +1 pedal edema. Good femoral and pedal pulses.   · Gastrointestinal  o Abdominal Examination  o : Soft. No tenderness or masses felt. No hepatosplenomegaly. Abdominal aorta is not palpable.     EKG was performed in the office today.  Indication:       atrial flutter.  Results:           sinus rhythm, normal axis.  Comparison:    The rhythm is significantly improved compared to her last EKG done at [facility].    Chemistry panel is normal.  HDL 59, , TRG 95.  She has just recently started taking the Atorvastatin that was prescribed to her in May.           Assessment     1.  Paroxysmal atrial flutter with a rapid ventricular response, now resolved.  2.  Hypertension controlled.  3.  Hyperlipidemia.  4.  Chronic kidney disease, Stage 3.  5.  Aortic valve stenosis.       Plan     1.  Discussed with her need for anti-coagulation " therapy, but she declines.  2.  Will start her on Metoprolol Extended Release 25 mg 1/2 tablet at bedtime for 6 days followed by 1 tablet at       bedtime.  3.  Discussed with her low-cholesterol diet and will repeat her lipids in 2 to 3 months and increase the dose of       Atorvastatin if lipids are not at goal.  4.  Follow up in 4 months.    Reagan Strong M.D., [facility]  pmm/dmd           This note was transcribed by Elissa Koo.  dmd/pmm  The above service was transcribed by Elissa Koo, and I attest to the accuracy of the note.  PMM                 Electronically Signed by: Elissa Koo-, -Author on August 5, 2020 10:08:14 AM  Electronically Co-signed by: Reagan Strong MD -Reviewer on August 6, 2020 01:39:51 PM

## 2021-05-13 NOTE — PROGRESS NOTES
"   Progress Note      Patient Name: Lilia Becerra   Patient ID: [id number]   Sex: Female   YOB: 1940    Primary Care Provider: Leo Lomas MD   Referring Provider: Ronit HAGAN    Visit Date: November 5, 2020    Provider: Reagan Strong MD   Location: [facility]   Location Address: [address] [place] [address], [address]   EDWIN Mackenzie  [id number]   Location Phone: [phone]          Chief Complaint  · Aortic stenosis  · Atrial arrhythmias       History Of Present Illness  REFERRING PROVIDER: Leo Lomas MD   Lilia Becerra is an 79-year-old female with hypertension, and aortic stenosis who is doing well. She is denies any chest pain, PND, orthopnea, palpitations, dizziness, or syncope. Her activities are not limited at all.   PAST MEDICAL HISTORY: Aortic stenosis; chronic kidney disease; diabetes mellitus; hyperlipidemia; hypertension; hypothyroidism; previous atrial arrhythmia.   PSYCHOSOCIAL HISTORY: Denies alcohol or tobacco use.   CURRENT MEDICATIONS: Magnesium 400 mg daily; Oxybutynin 10 mg daily; Losartan 25 mg daily; Levothyroxine 25 mcg daily; Omeprazole 20 mg daily; Alendronate 35 mg once a week; Furosemide 40 mg daily; Metformin 500 mg b.i.d.; Tizanidine 4 mg b.i.d. Diclofenac 50 mg daily; Atorvastatin 10 mg daily; Pramipexole 1.5 mg daily; Venlafaxine 37.5 mg daily; Myrbetriq 50 n=mg daily; calcium 600 mg daily; vitamin D; vitamin C; multivitamin; Duloxetine 60 mg daily; Metoprolol 25 mg daily; Hydrocodone b.i.d.       Review of Systems  · Cardiovascular  o Denies  o : palpitations (fast, fluttering, or skipping beats), swelling (feet, ankles, hands), shortness of breath while walking or lying flat, chest pain or angina pectoris   · Respiratory  o Denies  o : chronic or frequent cough      Vitals  Date Time BP Position Site L\R Cuff Size HR RR TEMP (F) WT  HT  BMI kg/m2 BSA m2 O2 Sat FR L/min FiO2        11/05/2020 11:39 /58 Sitting    54 - R   216lbs 0oz 5'  6\" " 34.86 2.14             Physical Examination  · Constitutional  o Appearance  o : Awake, alert, in no acute distress.  · Eyes  o Conjunctivae  o : Conjunctivae normal.  · Ears, Nose, Mouth and Throat  o Oral Cavity  o :   § Oral Mucosa  § : Normal.  · Neck  o Inspection/Palpation/Auscultation  o : No lymphadenopathy. No JVD. No bruit. Good carotid upstroke.  · Respiratory  o Respiratory  o : Clear to percussion and auscultation. Good respiratory effort.  · Cardiovascular  o Heart  o : PMI not well felt. S1, S2 regular. No S3. No S4. 2/6 systolic murmur at the base. Negative diastolic murmur.  o Peripheral Vascular System  o :   § Extremities  § : Trace to +1 pedal edema. Good femoral and pedal pulses.  · Gastrointestinal  o Abdominal Examination  o : Soft. No masses or tenderness felt. No hepatosplenomegaly. Abdominal aorta is not palpable.  · EKG  o Indications  o : Atrial flutter.   o Results  o : Sinus rhythm, PVCs.  o Comparison  o : No change compared to prior EKG.   · Labs  o Labs  o : CBC is normal except for hemoglobin of 11.5, normal platelet, normal chemistry panel. Triglyceride 152, LDL 67, HDL 49.      Echocardiogram shows moderate aortic stenosis.           Assessment     1.  Aortic valve stenosis, moderate, asymptomatic.   2.  Moderate to moderately severe, asymptomatic.   3.  Hypertension, controlled.   4.  Hyperlipidemia, at goal.   5.  Prior history of atrial flutter, currently sinus rhythm. Has refused anticoagulation.         Plan     1.  Strongly urged her to limit the use of Diclofenac.   2.  Reviewed with her symptoms that would indicate rapid progression of aortic valve disease.   3.  Followup with us in six months.      Reagan Strong MD, [facility]  PM/pap    This note was transcribed by Lakia Mohan.  pap/pm  The above service was transcribed by Lakia Mohan, and I attest to the accuracy of the note.  PM             Electronically Signed by: Josie Mohan-, Other -Author on  November 6, 2020 10:47:32 AM  Electronically Co-signed by: Reagan Strong MD -Reviewer on November 8, 2020 05:49:12 PM

## 2021-05-13 NOTE — PROGRESS NOTES
Progress Note      Patient Name: Lilia Becerra   Patient ID: [id number]   Sex: Female   YOB: 1940    Primary Care Provider: Leo Lomas MD   Referring Provider: Ronit HAGAN    Visit Date: July 27, 2020    Provider: DANYA Potter   Location[facility]   Location Address: [address] [place] [address], [address] [place] [address]  [id number]   Location Phone: [phone]          Chief Complaint  · OAB follow up      History Of Present Illness  The patient is a 79 year old /White female , who is here for a follow up for OAB. She is on 40mg diuretic and can't hold the urine when she has to urinate. She is voiding at least 4 times a day. Denies having uti past 6 months.          10/24/19 BMP  9/29/14 cysto and bladder sling       Past Medical History  Arthritis; Atrial Fibrillation; Bladder Disorder; Bowel Obstruction; Broken leg; Carpal tunnel syndrome; Constipation; Depression; Depressive Disorder; Diabetes; Diarrhea; DM (diabetes mellitus); Essential hypertension; Fecal incontinence; Fecal urgency; GERD (gastroesophageal reflux disease); Glaucoma; HBP (high blood pressure); High cholesterol; Kienbock's avascular necrosis of lunate, adult, Right; OAB (overactive bladder); Positive PPD; Rapid heart rate; Thyroid disease; Urinary Incontinence; Urinary Retention; Urine Retention         Past Surgical History  Ankle repair; Back Surgery, Lumbar; Bladder Surgery; Carpal Tunnel Release; Colonoscopy; EGD; Hemorrhoidectomy; Hysterectomy; Knee replacement, left; Knee replacement, right; Leg Surgery; Tendon release         Medication List  Calcium 600 + D(3) 600 mg(1,500mg) -400 unit oral tablet; Ditropan XL 5 mg oral tablet extended release 24hr; Eliquis 2.5 mg oral tablet; Eye Drop; furosemide 40 mg oral tablet; iron 325 mg (65 mg iron) oral tablet; levothyroxine 25 mcg oral tablet; Lipitor 10 mg oral tablet; losartan 25 mg oral tablet; metformin 500 mg oral tablet; metoprolol  succinate 25 mg oral tablet extended release 24 hr; multivitamin Oral tablet; Myrbetriq 50 mg oral tablet extended release 24 hr; omeprazole 20 mg Oral capsule,delayed release(DR/EC); oxycodone-acetaminophen 5-325 mg oral tablet; pramipexole 1.5 mg oral tablet extended release 24 hr; spironolactone 25 mg oral tablet; venlafaxine 150 mg Oral tablet extended release 24hr; Vitamin C 500 mg oral tablet         Allergy List  flu vaccine ts 2011-12(18 yr+); NO KNOWN DRUG ALLERGIES; TETANUS; Zoloft       Allergies Reconciled  Family Medical History  Breast Neoplasm, Malignant; Tuberculosis; Family history of breast cancer; Diabetes; No family history of colorectal cancer         Social History  Alcohol (Never); Retired; Single; Tobacco (Never);          Review of Systems  · Constitutional  o Denies  o : fever, headache, chills  · Eyes  o Denies  o : eye pain, double vision, blurred vision  · HENT  o Denies  o : sinus problems, sore throat, ear infection  · Cardiovascular  o Admits  o : rapid heart rate  o Denies  o : chest pain, high blood pressure, varicosities  · Respiratory  o Denies  o : shortness of breath, wheezing, frequent cough  · Gastrointestinal  o Denies  o : nausea, vomiting, heartburn, indigestion, abdominal pain  · Genitourinary  o Admits  o : urgency, incontinence  o Denies  o : painful urination  · Integument  o Denies  o : rash, itching, boils  · Neurologic  o Denies  o : tingling or numbness, tremors, dizzy spells  · Musculoskeletal  o Admits  o : joint pain, back pain  o Denies  o : neck pain  · Endocrine  o Denies  o : cold intolerance, heat intolerance, tired, excessive thirst, sluggish  · Psychiatric  o Admits  o : feels satisfied with life  o Denies  o : severe depression, concerns with hurting themselves  · Heme-Lymph  o Admits  o : blood clotting problems  o Denies  o : swollen glands  · Allergic-Immunologic  o Denies  o : sinus allergy symptoms, hay fever      Vitals  Date Time BP Position  "Site L\R Cuff Size HR RR TEMP (F) WT  HT  BMI kg/m2 BSA m2 O2 Sat        07/27/2020 11:05 /57 Sitting    71 - R  97.3 213lbs 16oz 5'  6\" 34.54 2.13           Physical Examination  · Constitutional  o Appearance  o : Well nourished, well developed patient in no acute distress. Ambulating with difficulty.seated walker  · Respiratory  o Respiratory Effort  o : Breathing is unlabored without accessory muscle use  o Inspection of Chest  o : normal appearance, no retractions  o Auscultation of Lungs  o : normal breath sounds throughout  · Cardiovascular  o Heart  o :   § Auscultation of Heart  § : regular rate and rhythm, faint murmurs, no gallops or rubs  o Peripheral Vascular System  o : No abnormalities  · Gastrointestinal  o Abdominal Examination  o : Scaphoid abdomen which is non-tender to palpation with normal tone and without rigidity or guarding. Normal bowel sounds. No masses present.  · Lymphatic  o Groin  o : No lymphadenopathy present  · Skin and Subcutaneous Tissue  o General Inspection  o : No rashes, lesions or areas of discoloration present. Skin turgor is normal.  · Neurologic  o Mental Status Examination  o : grossly oriented to person, place and time  o Gait and Station  o : normal gait, able to stand without difficulty  · Psychiatric  o Mood and Affect  o : mood normal, affect appropriate      Figure 1.0: Pain Rating Scale-[place]         Results  · In-Office Procedures  o Lab procedure  § Automated dipstick urinalysis with microscopy (18092)   § Color Ur: Yellow   § Clarity Ur: Clear   § Glucose Ur Ql Strip: Negative   § Bilirub Ur Ql Strip: Negative   § Ketones Ur Ql Strip: Negative   § Sp Gr Ur Qn: 1.015   § Hgb Ur Ql Strip: Negative   § pH Ur-LsCnc: 5.0   § Prot Ur Ql Strip: Negative   § Urobilinogen Ur Strip-mCnc: 0.2 E.U./dL   § Nitrite Ur Ql Strip: Negative   § WBC Est Ur Ql Strip: Negative   § RBC UrnS Qn HPF: 0   § WBC UrnS Qn HPF: 0   § Bacteria UrnS Qn HPF: 0   § Crystals UrnS Qn HPF: " 0   § Epithelial Cells (non renal): 0 /HPF  § Epithelial Cells (renal): 0       Assessment  · Urge Incontinence     788.31/N39.41  · OAB (overactive bladder)     596.51/N32.81  · DM (diabetes mellitus)     250.00/E11.9    Problems Reconciled  Plan  · Medications  o Ditropan XL 10 mg oral tablet extended release 24hr   SIG: take 1 tablet (10 mg) by oral route once daily   DISP: (30) tablets with 1 refills  Adjusted on 07/27/2020     o Myrbetriq 50 mg oral tablet extended release 24 hr   SIG: TAKE 1 TABLET BY MOUTH EVERY DAY. SWALLOW WHOLE WITH WATER   DISP: (30) Tablet with 6 refills  Adjusted on 07/27/2020     o Medications have been Reconciled  o Transition of Care or Provider Policy     increase Ditropan xl to 10mg and continue myrbetriq 50mg. follow up 1 month to see how her symptoms are doing. History of bladder sling and denies any cristian. she has had problems with incomplete bladder emptying in the past. will also check usr next visit.             Electronically Signed by: DANYA Potter -Author on July 27, 2020 11:39:52 AM

## 2021-05-13 NOTE — PROGRESS NOTES
"   Progress Note      Patient Name: Lilia Becerra   Patient ID: [id number]   Sex: Female   YOB: 1940    Primary Care Provider: Leo Lomas MD   Referring Provider: Ronit HAGAN    Visit Date: August 27, 2020    Provider: DANYA Potter   Location[facility]   Location Address: [address] [place] [address], [address]  [place], [place]  [id number]   Location Phone: [phone]          Chief Complaint  · follow up   · increase in Ditropan follow up      History Of Present Illness  The patient is a 79 year old /White female , who presents to follow up on oab and urinary incontinence. We increased her Ditropan to 10mg xl daily and myrbetriq 50 mg and she is doing \"great\" happy and not having incontinence and able to hold her urine. She feels happy and now not as worried about going out in public. Denies any problems with medication or significant side effects. Blood pressure good.      bmp 10/24/19       Past Medical History  Arthritis; Atrial Fibrillation; Bladder Disorder; Bowel Obstruction; Broken leg; Carpal tunnel syndrome; Constipation; Depression; Depressive Disorder; Diabetes; Diarrhea; DM (diabetes mellitus); Essential hypertension; Fecal incontinence; Fecal urgency; GERD (gastroesophageal reflux disease); Glaucoma; HBP (high blood pressure); High cholesterol; Kienbock's avascular necrosis of lunate, adult, Right; OAB (overactive bladder); Positive PPD; Rapid heart rate; Thyroid disease; Urinary Incontinence; Urinary Retention; Urine Retention         Past Surgical History  Ankle repair; Back Surgery, Lumbar; Bladder Surgery; Carpal Tunnel Release; Colonoscopy; EGD; Hemorrhoidectomy; Hysterectomy; Knee replacement, left; Knee replacement, right; Leg Surgery; Tendon release         Medication List  Calcium 600 + D(3) 600 mg(1,500mg) -400 unit oral tablet; Ditropan XL 10 mg oral tablet extended release 24hr; Eliquis 2.5 mg oral tablet; Eye Drop; furosemide 40 mg oral " tablet; iron 325 mg (65 mg iron) oral tablet; levothyroxine 25 mcg oral tablet; Lipitor 10 mg oral tablet; losartan 25 mg oral tablet; metformin 500 mg oral tablet; metoprolol succinate 25 mg oral tablet extended release 24 hr; multivitamin Oral tablet; Myrbetriq 50 mg oral tablet extended release 24 hr; omeprazole 20 mg Oral capsule,delayed release(DR/EC); oxycodone-acetaminophen 5-325 mg oral tablet; pramipexole 1.5 mg oral tablet extended release 24 hr; spironolactone 25 mg oral tablet; venlafaxine 150 mg Oral tablet extended release 24hr; Vitamin C 500 mg oral tablet         Allergy List  flu vaccine ts 2011-12(18 yr+); NO KNOWN DRUG ALLERGIES; TETANUS; Zoloft       Allergies Reconciled  Family Medical History  Breast Neoplasm, Malignant; Tuberculosis; Family history of breast cancer; Diabetes; No family history of colorectal cancer         Social History  Alcohol (Never); Retired; Single; Tobacco (Never);          Review of Systems  · Constitutional  o Denies  o : fever, headache, chills  · Eyes  o Denies  o : eye pain, double vision, blurred vision  · HENT  o Denies  o : sinus problems, sore throat, ear infection  · Cardiovascular  o Denies  o : chest pain, high blood pressure, varicosities  · Respiratory  o Denies  o : shortness of breath, wheezing, frequent cough  · Gastrointestinal  o Denies  o : nausea, vomiting, heartburn, indigestion, abdominal pain  · Genitourinary  o Denies  o : urgency, frequency, urinary retention, painful urination  · Integument  o Denies  o : rash, itching, boils  · Neurologic  o Denies  o : tingling or numbness, tremors, dizzy spells  · Musculoskeletal  o Denies  o : joint pain, neck pain, back pain  · Endocrine  o Denies  o : cold intolerance, heat intolerance, tired, excessive thirst, sluggish  · Psychiatric  o Admits  o : feels satisfied with life  o Denies  o : severe depression, concerns with hurting themselves  · Heme-Lymph  o Denies  o : swollen glands, blood  "clotting problems  · Allergic-Immunologic  o Denies  o : sinus allergy symptoms, hay fever      Vitals  Date Time BP Position Site L\R Cuff Size HR RR TEMP (F) WT  HT  BMI kg/m2 BSA m2 O2 Sat HC       08/27/2020 01:10 /52 Sitting    64 - R   200lbs 0oz 5'  4\" 34.33 2.02           Physical Examination  · Constitutional  o Appearance  o : Well nourished, well developed patient in no acute distress. Ambulating with minimal difficulty.rolling chair walker.  · Respiratory  o Respiratory Effort  o : Breathing is unlabored without accessory muscle use  o Inspection of Chest  o : normal appearance, no retractions  o Auscultation of Lungs  o : Normal breath sounds  · Cardiovascular  o Heart  o :   § Auscultation of Heart  § : regular rate and rhythm, no murmurs, gallops or rubs  · Gastrointestinal  o Abdominal Examination  o : Scaphoid abdomen which is non-tender to palpation with normal tone and without rigidity or guarding. Normal bowel sounds. No masses present.  · Skin and Subcutaneous Tissue  o General Inspection  o : No rashes, lesions or areas of discoloration present. Skin turgor is normal.  o General Palpation  o : No abnormalities, masses or tenderness on palpation.  · Neurologic  o Mental Status Examination  o : grossly oriented to person, place and time  o Gait and Station  o : , able to stand with mild difficulty  · Psychiatric  o Mood and Affect  o : mood normal, affect appropriate      Figure 1.0: Pain Rating Scale-[place]         Results  · In-Office Procedures  o Lab procedure  § Automated dipstick urinalysis with microscopy (38832)   § Color Ur: Yellow   § Clarity Ur: Slightly cloudy   § Glucose Ur Ql Strip: Negative   § Bilirub Ur Ql Strip: Negative   § Ketones Ur Ql Strip: Negative   § Sp Gr Ur Qn: 1.020   § Hgb Ur Ql Strip: Negative   § pH Ur-LsCnc: 5.5   § Prot Ur Ql Strip: Negative   § Urobilinogen Ur Strip-mCnc: 0.2 E.U./dL   § Nitrite Ur Ql Strip: Negative   § WBC Est Ur Ql Strip: Trace   § RBC " UrnS Qn HPF: 0   § WBC UrnS Qn HPF: 0   § Bacteria UrnS Qn HPF: 0   § Crystals UrnS Qn HPF: 0   § Epithelial Cells (non renal): 0 /HPF  § Epithelial Cells (renal): 0       Assessment  · OAB (overactive bladder)     596.51/N32.81    Problems Reconciled  Plan  · Medications  o Medications have been Reconciled  o Transition of Care or Provider Policy     urge incontinence is now in remission with increase Ditropan. follow up 6 months and continue Ditropan xl 10 mg and myrbetriq 50mg             Electronically Signed by: DANYA Potter -Author on August 27, 2020 01:31:59 PM

## 2021-05-14 VITALS
WEIGHT: 212 LBS | HEIGHT: 66 IN | HEART RATE: 68 BPM | TEMPERATURE: 97.6 F | BODY MASS INDEX: 34.07 KG/M2 | DIASTOLIC BLOOD PRESSURE: 65 MMHG | SYSTOLIC BLOOD PRESSURE: 137 MMHG

## 2021-05-14 VITALS
BODY MASS INDEX: 34.72 KG/M2 | HEIGHT: 66 IN | HEART RATE: 54 BPM | SYSTOLIC BLOOD PRESSURE: 134 MMHG | WEIGHT: 216 LBS | DIASTOLIC BLOOD PRESSURE: 58 MMHG

## 2021-05-14 VITALS
SYSTOLIC BLOOD PRESSURE: 116 MMHG | WEIGHT: 200 LBS | BODY MASS INDEX: 34.15 KG/M2 | DIASTOLIC BLOOD PRESSURE: 52 MMHG | HEIGHT: 64 IN | HEART RATE: 64 BPM

## 2021-05-15 VITALS
HEIGHT: 66 IN | HEART RATE: 70 BPM | DIASTOLIC BLOOD PRESSURE: 60 MMHG | SYSTOLIC BLOOD PRESSURE: 130 MMHG | BODY MASS INDEX: 28.93 KG/M2 | WEIGHT: 180 LBS

## 2021-05-15 VITALS
BODY MASS INDEX: 32.53 KG/M2 | HEART RATE: 62 BPM | SYSTOLIC BLOOD PRESSURE: 132 MMHG | HEIGHT: 65 IN | WEIGHT: 195.25 LBS | DIASTOLIC BLOOD PRESSURE: 54 MMHG

## 2021-05-15 VITALS
HEART RATE: 70 BPM | SYSTOLIC BLOOD PRESSURE: 120 MMHG | DIASTOLIC BLOOD PRESSURE: 46 MMHG | BODY MASS INDEX: 33.43 KG/M2 | WEIGHT: 208 LBS | HEIGHT: 66 IN

## 2021-05-15 VITALS
HEART RATE: 77 BPM | HEIGHT: 66 IN | DIASTOLIC BLOOD PRESSURE: 78 MMHG | BODY MASS INDEX: 34.39 KG/M2 | WEIGHT: 214 LBS | SYSTOLIC BLOOD PRESSURE: 164 MMHG

## 2021-05-15 VITALS
HEART RATE: 71 BPM | TEMPERATURE: 97.3 F | SYSTOLIC BLOOD PRESSURE: 117 MMHG | HEIGHT: 66 IN | BODY MASS INDEX: 34.39 KG/M2 | DIASTOLIC BLOOD PRESSURE: 57 MMHG | WEIGHT: 214 LBS

## 2021-05-15 VITALS
BODY MASS INDEX: 31.45 KG/M2 | HEIGHT: 65 IN | DIASTOLIC BLOOD PRESSURE: 65 MMHG | TEMPERATURE: 98.7 F | SYSTOLIC BLOOD PRESSURE: 112 MMHG | HEART RATE: 82 BPM

## 2021-05-15 VITALS
WEIGHT: 195 LBS | SYSTOLIC BLOOD PRESSURE: 149 MMHG | TEMPERATURE: 98.5 F | BODY MASS INDEX: 32.49 KG/M2 | DIASTOLIC BLOOD PRESSURE: 75 MMHG | HEIGHT: 65 IN | HEART RATE: 63 BPM

## 2021-05-16 VITALS
WEIGHT: 218 LBS | HEIGHT: 65 IN | SYSTOLIC BLOOD PRESSURE: 126 MMHG | DIASTOLIC BLOOD PRESSURE: 44 MMHG | HEART RATE: 78 BPM | BODY MASS INDEX: 36.32 KG/M2 | TEMPERATURE: 98.8 F

## 2021-05-16 VITALS
HEART RATE: 60 BPM | BODY MASS INDEX: 35.82 KG/M2 | WEIGHT: 215 LBS | SYSTOLIC BLOOD PRESSURE: 140 MMHG | TEMPERATURE: 98.5 F | HEIGHT: 65 IN | DIASTOLIC BLOOD PRESSURE: 55 MMHG

## 2021-05-16 VITALS
BODY MASS INDEX: 33.99 KG/M2 | DIASTOLIC BLOOD PRESSURE: 64 MMHG | TEMPERATURE: 98.7 F | HEART RATE: 77 BPM | WEIGHT: 204 LBS | HEIGHT: 65 IN | SYSTOLIC BLOOD PRESSURE: 125 MMHG

## 2021-05-16 VITALS
SYSTOLIC BLOOD PRESSURE: 116 MMHG | HEART RATE: 72 BPM | BODY MASS INDEX: 33.91 KG/M2 | DIASTOLIC BLOOD PRESSURE: 68 MMHG | WEIGHT: 211 LBS | HEIGHT: 66 IN

## 2021-05-16 VITALS
DIASTOLIC BLOOD PRESSURE: 60 MMHG | WEIGHT: 222 LBS | BODY MASS INDEX: 36.99 KG/M2 | HEIGHT: 65 IN | SYSTOLIC BLOOD PRESSURE: 129 MMHG | HEART RATE: 63 BPM

## 2021-06-15 ENCOUNTER — TELEPHONE (OUTPATIENT)
Dept: CARDIOLOGY | Facility: CLINIC | Age: 81
End: 2021-06-15

## 2021-06-15 NOTE — TELEPHONE ENCOUNTER
Cherie, please review Adrienne's note ([facility] will not cover lipids- has future appt).    LM for pt to return call.   normal for race

## 2021-06-15 NOTE — TELEPHONE ENCOUNTER
Received VM from patient stating that she went to get her labs drawn yesterday prior to her appt scheduled for 6/21/2021 and Medicare will not cover her lipids. Will forward to Cherie's MA to address.

## 2021-06-20 PROBLEM — I35.0 NONRHEUMATIC AORTIC VALVE STENOSIS: Status: ACTIVE | Noted: 2021-06-20

## 2021-06-21 ENCOUNTER — OFFICE VISIT (OUTPATIENT)
Dept: CARDIOLOGY | Facility: CLINIC | Age: 81
End: 2021-06-21

## 2021-06-21 VITALS
HEIGHT: 65 IN | DIASTOLIC BLOOD PRESSURE: 64 MMHG | SYSTOLIC BLOOD PRESSURE: 154 MMHG | HEART RATE: 70 BPM | BODY MASS INDEX: 37.82 KG/M2 | WEIGHT: 227 LBS

## 2021-06-21 DIAGNOSIS — I10 ESSENTIAL HYPERTENSION: Primary | Chronic | ICD-10-CM

## 2021-06-21 DIAGNOSIS — I35.0 NONRHEUMATIC AORTIC VALVE STENOSIS: ICD-10-CM

## 2021-06-21 DIAGNOSIS — N18.30 STAGE 3 CHRONIC KIDNEY DISEASE, UNSPECIFIED WHETHER STAGE 3A OR 3B CKD (HCC): Chronic | ICD-10-CM

## 2021-06-21 DIAGNOSIS — E78.5 HYPERLIPIDEMIA LDL GOAL <100: ICD-10-CM

## 2021-06-21 DIAGNOSIS — I48.91 ATRIAL FIBRILLATION, UNSPECIFIED TYPE (HCC): Chronic | ICD-10-CM

## 2021-06-21 PROCEDURE — 99214 OFFICE O/P EST MOD 30 MIN: CPT | Performed by: NURSE PRACTITIONER

## 2021-06-21 PROCEDURE — 93000 ELECTROCARDIOGRAM COMPLETE: CPT | Performed by: INTERNAL MEDICINE

## 2021-06-21 RX ORDER — ATORVASTATIN CALCIUM 10 MG/1
10 TABLET, FILM COATED ORAL NIGHTLY
Qty: 90 TABLET | Refills: 3 | Status: SHIPPED | OUTPATIENT
Start: 2021-06-21 | End: 2021-09-23 | Stop reason: SDUPTHER

## 2021-06-21 RX ORDER — MECLIZINE HCL 25MG 25 MG/1
25 TABLET, CHEWABLE ORAL 3 TIMES DAILY PRN
COMMUNITY
End: 2021-09-23

## 2021-06-21 RX ORDER — OXYBUTYNIN CHLORIDE 10 MG/1
10 TABLET, EXTENDED RELEASE ORAL DAILY
COMMUNITY
End: 2021-08-03 | Stop reason: SDUPTHER

## 2021-06-21 RX ORDER — FUROSEMIDE 40 MG/1
40 TABLET ORAL DAILY
Qty: 90 TABLET | Refills: 3 | Status: SHIPPED | OUTPATIENT
Start: 2021-06-21 | End: 2021-09-23

## 2021-06-21 RX ORDER — LOSARTAN POTASSIUM 25 MG/1
25 TABLET ORAL DAILY
Qty: 90 TABLET | Refills: 3 | Status: SHIPPED | OUTPATIENT
Start: 2021-06-21 | End: 2021-09-23 | Stop reason: SDUPTHER

## 2021-06-21 RX ORDER — DULOXETIN HYDROCHLORIDE 60 MG/1
60 CAPSULE, DELAYED RELEASE ORAL DAILY
COMMUNITY
End: 2022-06-08

## 2021-06-21 RX ORDER — FUROSEMIDE 40 MG/1
40 TABLET ORAL DAILY
COMMUNITY
End: 2021-06-21 | Stop reason: SDUPTHER

## 2021-06-21 NOTE — PROGRESS NOTES
ECG 12 Lead    Date/Time: 6/21/2021 10:38 AM  Performed by: Reagan Strong MD  Authorized by: Reagan Strong MD   Comparison: compared with previous ECG from 11/5/2020  Comments: Sinus rhythm borderline T abnormalities anterior leads no change compared to previous EKG

## 2021-06-21 NOTE — ASSESSMENT & PLAN NOTE
Blood pressure slightly high today.  She is willing ry to do a blood pressure log for the 2 weeks will adjust meds if needed.  Continue furosemide, losartan, and metoprolol.

## 2021-06-21 NOTE — PROGRESS NOTES
Chief Complaint  Follow-up (6 mos with EKG), AVS, Hypertension, Hyperlipidemia, and Atrial Flutter    Subjective            Lilia Becerra presents to [facility] CARDIOLOGY  She is an 80-year-old white female Denies any chest pains, shortness of breath, palpitations, dizziness, syncope, PND, or orthopnea.  Cardiac wise she has no complaints.  Denies any recent palpitations or irregular heartbeats.  She has gained 11 pound since her last visit.  Has had both of her Covid vaccines.              Past History:    Past Medical History:   Diagnosis Date   • Acid reflux disease    • Arthritis    • Atrial fibrillation (CMS/HCC)    • Back pain    • Bladder disorder    • Bowel obstruction (CMS/HCC)    • Broken leg    • Carpal tunnel syndrome    • Constipation    • Depression    • Diabetes mellitus (CMS/HCC)    • Diarrhea    • Disease of thyroid gland    • Dyslipidemia    • Fecal incontinence    • Fecal urgency    • GERD (gastroesophageal reflux disease)    • Glaucoma     left eye   • HBP (high blood pressure)    • Hearing impaired     hearing aides   • Hiatal hernia    • High cholesterol    • History of transfusion    • History of tuberculosis     IN 1980'S WAS TREATED   • Hypertension    • Kienbock's disease    • Kienböck's disease, right     KIENBOCK'S AVASCULAR NECROSIS OF LUNATE, ADULT RIGHT   • Left leg pain    • Nonrheumatic aortic valve stenosis 11/5/2020   • OAB (overactive bladder)    • Osteoporosis    • PONV (postoperative nausea and vomiting)    • Positive PPD    • Rapid heart rate    • RLS (restless legs syndrome)    • Sleep apnea     NO MACHINE   • Stage 3 chronic kidney disease (CMS/HCC)    • Tailbone injury     fracture   • Thyroid disease    • Urinary incontinence     wears pads   • Urinary retention         Family History: family history includes Breast cancer in her mother; Diabetes in her father and sister; Tuberculosis in her mother.     Social History: reports that she has never smoked.  She has never used smokeless tobacco. She reports that she does not drink alcohol and does not use drugs.    Allergies: Tetanus toxoids and Eggs or egg-derived products      Past Surgical History:   Procedure Laterality Date   • ANKLE TENDON REPAIR     • BACK SURGERY  12/21/2018-3/2019    LUMBAR--BROKEN DISC, CLEANED OUT--HAS 2ND PROCEDURE TO FINISH REMOVING   • CARPAL TUNNEL RELEASE     • CATARACT EXTRACTION Bilateral    • COLONOSCOPY     • ENDOSCOPY     • HEMORRHOIDECTOMY     • HYSTERECTOMY     • INCISION AND DRAINAGE OF WOUND      on back    • KNEE ARTHROPLASTY Right    • LEG SURGERY  06/13/2019   • LUMBAR DISCECTOMY Left 12/21/2018    Procedure: LEFT L4-5 MICRO DISCECTOMY;  Surgeon: Adam Coleman DO;  Location: [facility] OR;  Service: Orthopedic Spine   • LUMBAR DISCECTOMY Left 3/29/2019    Procedure: LEFT L4-5 REVISION OF MICRODISCECTOMY;  Surgeon: Adam Coleman DO;  Location: [facility] OR;  Service: Orthopedic Spine   • LUMBAR FUSION     • TENDON RELEASE  09/2018   • TOTAL KNEE ARTHROPLASTY Left 6/13/2019    Procedure: LEFT KNEE REVISION;  Surgeon: Malick Costa II, MD;  Location: [facility] OR;  Service: Orthopedics   • TOTAL KNEE ARTHROPLASTY REVISION Left     x2   • TOTAL KNEE ARTHROPLASTY REVISION Left 2/13/2018    Procedure: LEFT TOTAL KNEE ARTHROPLASTY REVISION;  Surgeon: Jair Fajardo MD;  Location: [facility] OR;  Service:    • VERTEBROPLASTY          Prior to Admission medications    Medication Sig Start Date End Date Taking? Authorizing Provider   atorvastatin (LIPITOR) 10 MG tablet Take 10 mg by mouth Every Night.   Yes Hayder Nunez MD   Calcium Carb-Cholecalciferol (CALCIUM 600 + D PO) Take 1 tablet by mouth 2 (Two) Times a Day.   Yes Hayder Nunez MD   COMBIGAN 0.2-0.5 % ophthalmic solution Administer 2 drops to both eyes 2 (Two) Times a Day. 11/4/18  Yes Hayder Nunez MD   DULoxetine (CYMBALTA) 60 MG capsule Take 60 mg by mouth Daily.   Yes  Hayder Nunez MD   furosemide (LASIX) 40 MG tablet Take 40 mg by mouth Daily.   Yes Hayder Nunez MD   levothyroxine (SYNTHROID, LEVOTHROID) 25 MCG tablet Take 25 mcg by mouth Daily.   Yes Hayder Nunez MD   losartan (COZAAR) 25 MG tablet Take 25 mg by mouth Daily.   Yes Hayder Nunez MD   meclizine 25 MG chewable tablet chewable tablet Chew 25 mg 3 (Three) Times a Day As Needed.   Yes Hayder Nunez MD   metoprolol succinate XL (TOPROL-XL) 25 MG 24 hr tablet Take 25 mg by mouth Every Night.   Yes Hayder Nunez MD   Mirabegron ER (MYRBETRIQ) 25 MG tablet sustained-release 24 hour 24 hr tablet Take 50 mg by mouth Every Morning.   Yes Hayder Nunez MD   Multiple Vitamins-Minerals (MULTIVITAMIN ADULTS PO) Take 1 capsule by mouth Daily.   Yes Hayder Nunez MD   omeprazole (priLOSEC) 20 MG capsule Take 20 mg by mouth Daily.   Yes Hayder Nunez MD   oxybutynin XL (DITROPAN-XL) 10 MG 24 hr tablet Take 10 mg by mouth Daily.   Yes Hayder Nunez MD   pramipexole (MIRAPEX) 1.5 MG tablet Take 1.5 mg by mouth Every Night.   Yes Hayder Nunez MD   SITagliptin (JANUVIA) 50 MG tablet Take 50 mg by mouth Daily.   Yes Hayder Nunez MD   vitamin C (ASCORBIC ACID) 500 MG tablet Take 500 mg by mouth Daily.   Yes Hayder Nunez MD   apixaban (ELIQUIS) 2.5 MG tablet tablet Take 1 tablet by mouth Every 12 (Twelve) Hours. 6/16/19   Malick Costa II, MD   Ferrous Gluconate (IRON 27 PO) Take 1 tablet by mouth 2 (Two) Times a Day.    Hayder Nunez MD   metFORMIN (GLUCOPHAGE) 500 MG tablet Take 500 mg by mouth 2 (Two) Times a Day With Meals.    Hayder Nunez MD   solifenacin (VESICARE) 5 MG tablet Take 5 mg by mouth Every Night.    Hayder Nunez MD   spironolactone (ALDACTONE) 25 MG tablet Take 25 mg by mouth Daily. mon - sat    Hayder Nunez MD   VENLAFAXINE HCL ER PO Take 150 mg by mouth Daily.     "Provider, Historical, MD   Patient says she has not been taking spironolactone for a long time now.    Review of Systems   Constitutional: Negative.    HENT: Negative.    Eyes: Negative.    Respiratory: Negative for shortness of breath.    Cardiovascular: Negative.  Negative for chest pain, palpitations and leg swelling.        Objective     /64   Pulse 70   Ht 165.1 cm (65\")   Wt 103 kg (227 lb)   BMI 37.77 kg/m²       Physical Exam  Vitals reviewed.   Constitutional:       Appearance: Normal appearance. She is obese.      Comments: Ambulates with a rolling walker   Neck:      Vascular: No carotid bruit.   Cardiovascular:      Rate and Rhythm: Normal rate and regular rhythm.      Chest Wall: PMI is displaced.      Heart sounds: Heart sounds not distant. Murmur (At the base) heard.   Systolic murmur is present with a grade of 2/6.   No diastolic murmur is present.   No S3 or S4 sounds.    Musculoskeletal:      Cervical back: Normal range of motion.      Right lower leg: No edema.      Left lower leg: No edema.   Neurological:      Mental Status: She is alert.           Result Review :         The following data was reviewed by: DANYA Combs on 06/21/2021:      No results found for: PROBNP    CBC w/diff    CBC w/Diff 10/13/20   WBC 11.34 (A)   RBC 4.18   Hemoglobin 12.4   Hematocrit 39.1   MCV 93.5   MCH 29.7   MCHC 31.7   RDW 12.6   Platelets 250   Neutrophil Rel % 71.3   Immature Granulocyte Rel % 0.4   Lymphocyte Rel % 21.3   Monocyte Rel % 4.9   Eosinophil Rel % 1.7   Basophil Rel % 0.4   (A) Abnormal value                No results found for: TSH   No results found for: FREET4   No results found for: DDIMERQUANT  Magnesium   Date Value Ref Range Status   06/17/2019 1.2 (L) 1.6 - 2.4 mg/dL Final      No results found for: DIGOXIN                             Assessment and Plan        Diagnoses and all orders for this visit:    1. Essential hypertension (Primary)  Assessment & Plan:  Blood " pressure slightly high today.  She is willing willin to do a blood pressure log for the 2 weeks will adjust meds if needed.  Continue furosemide, losartan, and metoprolol.    Orders:  -     losartan (COZAAR) 25 MG tablet; Take 1 tablet by mouth Daily.  Dispense: 90 tablet; Refill: 3  -     furosemide (LASIX) 40 MG tablet; Take 1 tablet by mouth Daily.  Dispense: 90 tablet; Refill: 3    2. Stage 3 chronic kidney disease, unspecified whether stage 3a or 3b CKD (CMS/[facility])  Assessment & Plan:  Unknown control.  We are trying to obtain the labs and will adjust meds as needed.      3. Atrial fibrillation, unspecified type (CMS/[facility])  Assessment & Plan:  Last episode is June 2020.  She has declined use of continued Eliquis.      4. Nonrheumatic aortic valve stenosis  Assessment & Plan:  Stable.  Echo from November 2020 was reviewed.      5. Hyperlipidemia LDL goal <100  Assessment & Plan:  Labs were done 3 months ago by PCP.  Insurance would not let her repeat them now.  So we will try to obtain her labs and adjust meds if needed.    Orders:  -     atorvastatin (LIPITOR) 10 MG tablet; Take 1 tablet by mouth Every Night.  Dispense: 90 tablet; Refill: 3    Other orders  -     ECG 12 Lead            Follow Up     Return in about 6 months (around 12/21/2021) for with Dr. Strong.    Patient was given instructions and counseling regarding her condition or for health maintenance advice. Please see specific information pulled into the AVS if appropriate.       DANYA Holt  06/21/21 10:04 EDT

## 2021-06-21 NOTE — ASSESSMENT & PLAN NOTE
Labs were done 3 months ago by PCP.  Insurance would not let her repeat them now.  So we will try to obtain her labs and adjust meds if needed.

## 2021-06-22 ENCOUNTER — TELEPHONE (OUTPATIENT)
Dept: CARDIOLOGY | Facility: CLINIC | Age: 81
End: 2021-06-22

## 2021-06-22 DIAGNOSIS — E78.5 HYPERLIPIDEMIA LDL GOAL <100: Primary | ICD-10-CM

## 2021-06-22 NOTE — TELEPHONE ENCOUNTER
At office visit patient stated Medicare will not pay for lipid labs because it been recently checked by PCP.  However after calling the primary care has not been any lab lipid labs in the last year.  Please try again to get a lipid panel done

## 2021-06-23 NOTE — TELEPHONE ENCOUNTER
Relayed msg to pt. She stated that [employer] said they wouldn't pay for the lipid panel, that it wasn't necessary right now and that it would cost her $90 out of pocket. She did say she would call [facility] again to discuss covering the test. She will let us know the outcome and have us fax the order if she is able to have the test done.

## 2021-08-03 DIAGNOSIS — N32.81 OAB (OVERACTIVE BLADDER): Primary | ICD-10-CM

## 2021-08-03 RX ORDER — OXYBUTYNIN CHLORIDE 10 MG/1
10 TABLET, EXTENDED RELEASE ORAL DAILY
Qty: 30 TABLET | Refills: 2 | Status: SHIPPED | OUTPATIENT
Start: 2021-08-03 | End: 2021-12-07 | Stop reason: SDUPTHER

## 2021-09-08 ENCOUNTER — OFFICE VISIT (OUTPATIENT)
Dept: UROLOGY | Facility: CLINIC | Age: 81
End: 2021-09-08

## 2021-09-08 VITALS
HEIGHT: 65 IN | TEMPERATURE: 97.8 F | SYSTOLIC BLOOD PRESSURE: 102 MMHG | BODY MASS INDEX: 37.82 KG/M2 | DIASTOLIC BLOOD PRESSURE: 49 MMHG | WEIGHT: 227 LBS | HEART RATE: 68 BPM

## 2021-09-08 DIAGNOSIS — Z53.21 PATIENT LEFT WITHOUT BEING SEEN: ICD-10-CM

## 2021-09-08 DIAGNOSIS — N32.81 OAB (OVERACTIVE BLADDER): Primary | ICD-10-CM

## 2021-09-08 LAB
BILIRUB BLD-MCNC: NEGATIVE MG/DL
CLARITY, POC: CLEAR
COLOR UR: YELLOW
GLUCOSE UR STRIP-MCNC: NEGATIVE MG/DL
KETONES UR QL: NEGATIVE
LEUKOCYTE EST, POC: NEGATIVE
NITRITE UR-MCNC: NEGATIVE MG/ML
PH UR: 5.5 [PH] (ref 5–8)
PROT UR STRIP-MCNC: NEGATIVE MG/DL
RBC # UR STRIP: NEGATIVE /UL
SP GR UR: 1.02 (ref 1–1.03)
UROBILINOGEN UR QL: NORMAL

## 2021-09-08 PROCEDURE — 81003 URINALYSIS AUTO W/O SCOPE: CPT | Performed by: NURSE PRACTITIONER

## 2021-09-08 NOTE — PROGRESS NOTES
The patient left the office before care was provided and did not complete the visit recent covid positive. Patient diagnosed with Covid within the past 2 weeks and was not established during check-in. routine 6 month appointment not necessary medically and to avoid exposure to other patients in office Will reschedule for the future since no new urological issues. Medication continue. Patient agree    .

## 2021-09-23 ENCOUNTER — OFFICE VISIT (OUTPATIENT)
Dept: CARDIOLOGY | Facility: CLINIC | Age: 81
End: 2021-09-23

## 2021-09-23 VITALS
WEIGHT: 223 LBS | HEART RATE: 72 BPM | BODY MASS INDEX: 37.15 KG/M2 | HEIGHT: 65 IN | DIASTOLIC BLOOD PRESSURE: 50 MMHG | SYSTOLIC BLOOD PRESSURE: 144 MMHG

## 2021-09-23 DIAGNOSIS — I48.0 PAROXYSMAL ATRIAL FIBRILLATION (HCC): Primary | Chronic | ICD-10-CM

## 2021-09-23 DIAGNOSIS — I10 ESSENTIAL HYPERTENSION: Chronic | ICD-10-CM

## 2021-09-23 DIAGNOSIS — I35.0 NONRHEUMATIC AORTIC VALVE STENOSIS: Chronic | ICD-10-CM

## 2021-09-23 DIAGNOSIS — E78.5 HYPERLIPIDEMIA LDL GOAL <100: ICD-10-CM

## 2021-09-23 PROCEDURE — 99214 OFFICE O/P EST MOD 30 MIN: CPT | Performed by: NURSE PRACTITIONER

## 2021-09-23 RX ORDER — LOSARTAN POTASSIUM 25 MG/1
25 TABLET ORAL DAILY
Qty: 90 TABLET | Refills: 3 | Status: SHIPPED | OUTPATIENT
Start: 2021-09-23 | End: 2021-12-03 | Stop reason: SDUPTHER

## 2021-09-23 RX ORDER — ALENDRONATE SODIUM 35 MG/1
35 TABLET ORAL
COMMUNITY

## 2021-09-23 RX ORDER — METOPROLOL SUCCINATE 25 MG/1
TABLET, EXTENDED RELEASE ORAL
Qty: 45 TABLET | Refills: 3 | Status: SHIPPED | OUTPATIENT
Start: 2021-09-23 | End: 2021-12-03 | Stop reason: SDUPTHER

## 2021-09-23 RX ORDER — DILTIAZEM HYDROCHLORIDE 120 MG/1
120 CAPSULE, COATED, EXTENDED RELEASE ORAL DAILY
Qty: 90 CAPSULE | Refills: 3 | Status: SHIPPED | OUTPATIENT
Start: 2021-09-23 | End: 2022-11-29 | Stop reason: SDUPTHER

## 2021-09-23 RX ORDER — ATORVASTATIN CALCIUM 10 MG/1
10 TABLET, FILM COATED ORAL NIGHTLY
Qty: 90 TABLET | Refills: 3 | Status: SHIPPED | OUTPATIENT
Start: 2021-09-23 | End: 2021-12-03 | Stop reason: SDUPTHER

## 2021-09-23 NOTE — PATIENT INSTRUCTIONS
Stop diltiazem 30 mg.    Start Cardizem  mg in the morning.    Toprol ER 25 mg per half a tab at night.    Start Eliquis 5 mg twice daily

## 2021-09-23 NOTE — ASSESSMENT & PLAN NOTE
Proximal atrial fibrillation with 2 recent atrial fib episodes.  Currently in sinus.  Agrees to start the Eliquis 5 mg twice daily.  Stop diltiazem 30 mg.  Start diltiazem 120 mg once a day in the morning.  Restart metoprolol 25 mg 1/2 a tab at night.

## 2021-09-23 NOTE — ASSESSMENT & PLAN NOTE
Fair control.  Restart metoprolol 20 5/2 a tab at night.  Change diltiazem 30 to diltiazem  mg a day in the morning.  Continue losartan 25 mg

## 2021-09-23 NOTE — PROGRESS NOTES
Chief Complaint  Hospital Follow Up Visit ([place]), Atrial Fibrillation, and Hypomagnesemia    Subjective            Lilia Becerra presents to [facility] CARDIOLOGY  He is an 80-year-old white female who has been in the hospital on 2 different occasions for atrial fib as well as other health issues.  The last one was on September 13 in [facility] when she had bronchitis and atrial fibrillation with rapid ventricular response.  She had med changes there and she states she takes the diltiazem as directed.  She is still not on an anticoagulant.  Is occasional dizziness which is unchanged.  Denies any chest pain, shortness of breath, any more palpitations, swelling, syncope, PND orthopnea.             Past History:    Past Medical History:   Diagnosis Date   • Acid reflux disease    • Arthritis    • Atrial fibrillation (CMS/HCC)    • Back pain    • Bladder disorder    • Bowel obstruction (CMS/HCC)    • Broken leg    • Carpal tunnel syndrome    • Constipation    • Depression    • Diabetes mellitus (CMS/HCC)    • Diarrhea    • Disease of thyroid gland    • Dyslipidemia    • Fecal incontinence    • Fecal urgency    • GERD (gastroesophageal reflux disease)    • Glaucoma     left eye   • HBP (high blood pressure)    • Hearing impaired     hearing aides   • Hiatal hernia    • High cholesterol    • History of transfusion    • History of tuberculosis     IN 1980'S WAS TREATED   • Hypertension    • Hypomagnesemia    • Kienbock's disease    • Kienböck's disease, right     KIENBOCK'S AVASCULAR NECROSIS OF LUNATE, ADULT RIGHT   • Left leg pain    • Nonrheumatic aortic valve stenosis 11/5/2020   • OAB (overactive bladder)    • Osteoporosis    • Pneumonia    • PONV (postoperative nausea and vomiting)    • Positive PPD    • Rapid heart rate    • RLS (restless legs syndrome)    • Sleep apnea     NO MACHINE   • Stage 3 chronic kidney disease (CMS/HCC)    • Tailbone injury     fracture   • Thyroid disease     • Urinary incontinence     wears pads   • Urinary retention         Family History: family history includes Breast cancer in her mother; Diabetes in her father and sister; Tuberculosis in her mother.     Social History: reports that she has never smoked. She has never used smokeless tobacco. She reports that she does not drink alcohol and does not use drugs.    Allergies: Eggs or egg-derived products, Sertraline, Tetanus toxoid, Tetanus toxoids, and Flu virus vaccine      Past Surgical History:   Procedure Laterality Date   • ANKLE TENDON REPAIR     • BACK SURGERY  12/21/2018-3/2019    LUMBAR--BROKEN DISC, CLEANED OUT--HAS 2ND PROCEDURE TO FINISH REMOVING   • CARPAL TUNNEL RELEASE     • CATARACT EXTRACTION Bilateral    • COLONOSCOPY     • ENDOSCOPY     • HEMORRHOIDECTOMY     • HYSTERECTOMY     • INCISION AND DRAINAGE OF WOUND      on back    • KNEE ARTHROPLASTY Right    • LEG SURGERY  06/13/2019   • LUMBAR DISCECTOMY Left 12/21/2018    Procedure: LEFT L4-5 MICRO DISCECTOMY;  Surgeon: Adam Coleman DO;  Location: [facility];  Service: Orthopedic Spine   • LUMBAR DISCECTOMY Left 3/29/2019    Procedure: LEFT L4-5 REVISION OF MICRODISCECTOMY;  Surgeon: Adam Coleman DO;  Location: [facility] OR;  Service: Orthopedic Spine   • LUMBAR FUSION     • TENDON RELEASE  09/2018   • TOTAL KNEE ARTHROPLASTY Left 6/13/2019    Procedure: LEFT KNEE REVISION;  Surgeon: Malick Costa II, MD;  Location: [facility] OR;  Service: Orthopedics   • TOTAL KNEE ARTHROPLASTY REVISION Left     x2   • TOTAL KNEE ARTHROPLASTY REVISION Left 2/13/2018    Procedure: LEFT TOTAL KNEE ARTHROPLASTY REVISION;  Surgeon: Jair Fajardo MD;  Location: [facility];  Service:    • VERTEBROPLASTY          Prior to Admission medications    Medication Sig Start Date End Date Taking? Authorizing Provider   alendronate (FOSAMAX) 35 MG tablet Take 35 mg by mouth Every 7 (Seven) Days.   Yes Provider, Historical, MD   atorvastatin  (LIPITOR) 10 MG tablet Take 1 tablet by mouth Every Night. 6/21/21  Yes Denise Arguelles APRN   Calcium Carb-Cholecalciferol (CALCIUM 600 + D PO) Take 1 tablet by mouth 2 (Two) Times a Day.   Yes Hayder Nunez MD   COMBIGAN 0.2-0.5 % ophthalmic solution Administer 2 drops to both eyes 2 (Two) Times a Day. 11/4/18  Yes Hayder Nunez MD   dilTIAZem (CARDIZEM) 30 MG tablet Take 30 mg by mouth 4 (Four) Times a Day.   Yes Hayder Nunez MD   DULoxetine (CYMBALTA) 60 MG capsule Take 60 mg by mouth Daily.   Yes Hayder Nunez MD   levothyroxine (SYNTHROID, LEVOTHROID) 25 MCG tablet Take 25 mcg by mouth Daily.   Yes Hayder Nunez MD   losartan (COZAAR) 25 MG tablet Take 1 tablet by mouth Daily. 6/21/21  Yes Denise Arguelles APRN   METFORMIN HCL PO Take  by mouth.   Yes Hayder Nunez MD   Mirabegron ER (MYRBETRIQ) 25 MG tablet sustained-release 24 hour 24 hr tablet Take 50 mg by mouth Every Morning.   Yes ProviderHayder MD   Multiple Vitamins-Minerals (MULTIVITAMIN ADULTS PO) Take 1 capsule by mouth Daily.   Yes Hayder Nunez MD   omeprazole (priLOSEC) 20 MG capsule Take 20 mg by mouth Daily.   Yes Hayder Nunez MD   oxybutynin XL (DITROPAN-XL) 10 MG 24 hr tablet Take 1 tablet by mouth Daily. 8/3/21  Yes Juana Lenz APRN   pramipexole (MIRAPEX) 1.5 MG tablet Take 1.5 mg by mouth Every Night.   Yes ProviderHayder MD   VENLAFAXINE HCL ER PO Take 150 mg by mouth Daily.   Yes Hayder Nunez MD   vitamin C (ASCORBIC ACID) 500 MG tablet Take 500 mg by mouth Daily.   Yes Hayder Nunez MD   Ferrous Gluconate (IRON 27 PO) Take 1 tablet by mouth 2 (Two) Times a Day.    ProviderHayder MD   furosemide (LASIX) 40 MG tablet Take 1 tablet by mouth Daily. 6/21/21   Denise Arguelles APRN   meclizine 25 MG chewable tablet chewable tablet Chew 25 mg 3 (Three) Times a Day As Needed.    Hayder Nunez MD   metoprolol succinate  "XL (TOPROL-XL) 25 MG 24 hr tablet Take 25 mg by mouth Every Night.    Provider, Historical, MD   SITagliptin (JANUVIA) 50 MG tablet Take 50 mg by mouth Daily.    Provider, Historical, MD        Review of Systems   Respiratory: Negative for shortness of breath.    Cardiovascular: Negative for chest pain and palpitations.   Neurological: Positive for dizziness.   All other systems reviewed and are negative.       Objective     Physical Exam  Constitutional:       General: She is not in acute distress.     Appearance: She is obese.      Comments: Ambulates with a rolling walker   Neck:      Vascular: No carotid bruit.   Cardiovascular:      Rate and Rhythm: Normal rate and regular rhythm.      Chest Wall: PMI is displaced.      Heart sounds: Murmur (At the base) heard.   Systolic murmur is present with a grade of 2/6.     Musculoskeletal:      Right lower leg: No edema.      Left lower leg: No edema.   Neurological:      Mental Status: She is alert.       /50   Pulse 72   Ht 165.1 cm (65\")   Wt 101 kg (223 lb)   BMI 37.11 kg/m²       Vitals:    09/23/21 1426   BP: 144/50   Pulse: 72       Result Review :         The following data was reviewed by: DANYA Combs on 09/23/2021:      No results found for: PROBNP, BNP  CMP    CMP 9/14/21 9/14/21 9/15/21    0957 [id number]    Glucose 131 (A)  192 (A)   BUN 12  14   Creatinine 1.1  1.2   eGFR African Am 58  52   Sodium 141  140   Potassium 3.4 (A) 3.3 (A) 4.2   Chloride 109 (A)  108 (A)   Calcium 8.7  9.5   (A) Abnormal value       Comments are available for some flowsheets but are not being displayed.           CBC w/diff    CBC w/Diff 10/13/20   WBC 11.34 (A)   RBC 4.18   Hemoglobin 12.4   Hematocrit 39.1   MCV 93.5   MCH 29.7   MCHC 31.7   RDW 12.6   Platelets 250   Neutrophil Rel % 71.3   Immature Granulocyte Rel % 0.4   Lymphocyte Rel % 21.3   Monocyte Rel % 4.9   Eosinophil Rel % 1.7   Basophil Rel % 0.4   (A) Abnormal value                No results found " for: TSH   No results found for: FREET4   No results found for: DDIMERQUANT  Magnesium   Date Value Ref Range Status   09/15/2021 1.7 (L) 1.9 - 2.7 mg/dL Final      No results found for: DIGOXIN                          Assessment and Plan        Diagnoses and all orders for this visit:    1. Paroxysmal atrial fibrillation (CMS/HCC) (Primary)  Assessment & Plan:  Proximal atrial fibrillation with 2 recent atrial fib episodes.  Currently in sinus.  Agrees to start the Eliquis 5 mg twice daily.  Stop diltiazem 30 mg.  Start diltiazem 120 mg once a day in the morning.  Restart metoprolol 25 mg 1/2 a tab at night.     Orders:  -     dilTIAZem CD (CARDIZEM CD) 120 MG 24 hr capsule; Take 1 capsule by mouth Daily.  Dispense: 90 capsule; Refill: 3  -     apixaban (ELIQUIS) 5 MG tablet tablet; Take 1 tablet by mouth Every 12 (Twelve) Hours.  Dispense: 180 tablet; Refill: 3  -     CBC & Differential; Future  -     apixaban (ELIQUIS) 5 MG tablet tablet; Take 1 tablet by mouth Every 12 (Twelve) Hours.  Dispense: 56 tablet; Refill: 0  -     metoprolol succinate XL (TOPROL-XL) 25 MG 24 hr tablet; Take half a tab at night  Dispense: 45 tablet; Refill: 3    2. Essential hypertension  Assessment & Plan:  Fair control.  Restart metoprolol 20 5/2 a tab at night.  Change diltiazem 30 to diltiazem  mg a day in the morning.  Continue losartan 25 mg    Orders:  -     losartan (COZAAR) 25 MG tablet; Take 1 tablet by mouth Daily.  Dispense: 90 tablet; Refill: 3  -     Comprehensive Metabolic Panel; Future  -     Magnesium; Future    3. Nonrheumatic aortic valve stenosis  Assessment & Plan:  Stable without shortness of breath.      4. Hyperlipidemia LDL goal <100  Assessment & Plan:  Unknown control.  Repeat lipids at next office visit.  Continue atorvastatin 10 mg a day    Orders:  -     atorvastatin (LIPITOR) 10 MG tablet; Take 1 tablet by mouth Every Night.  Dispense: 90 tablet; Refill: 3  -     Lipid Panel; Future  -      Comprehensive Metabolic Panel; Future            Follow Up     Return in about 3 months (around 12/13/2021) for EKG on next visit, with Dr. Strong.    Patient was given instructions and counseling regarding her condition or for health maintenance advice. Please see specific information pulled into the AVS if appropriate.       DANYA Holt  09/23/21 14:39 EDT

## 2021-10-22 DIAGNOSIS — N32.81 OAB (OVERACTIVE BLADDER): Primary | ICD-10-CM

## 2021-10-27 ENCOUNTER — TRANSCRIBE ORDERS (OUTPATIENT)
Dept: ADMINISTRATIVE | Facility: HOSPITAL | Age: 81
End: 2021-10-27

## 2021-10-27 DIAGNOSIS — N64.4 MASTODYNIA: Primary | ICD-10-CM

## 2021-10-29 ENCOUNTER — TELEPHONE (OUTPATIENT)
Dept: CARDIOLOGY | Facility: CLINIC | Age: 81
End: 2021-10-29

## 2021-10-29 DIAGNOSIS — I10 ESSENTIAL HYPERTENSION: Primary | ICD-10-CM

## 2021-10-29 DIAGNOSIS — I50.9 CONGESTIVE HEART FAILURE, UNSPECIFIED HF CHRONICITY, UNSPECIFIED HEART FAILURE TYPE (HCC): ICD-10-CM

## 2021-11-04 RX ORDER — SPIRONOLACTONE 25 MG/1
25 TABLET ORAL EVERY OTHER DAY
Qty: 45 TABLET | Refills: 3 | Status: SHIPPED | OUTPATIENT
Start: 2021-11-04 | End: 2022-02-09 | Stop reason: ALTCHOICE

## 2021-11-10 DIAGNOSIS — I50.9 CONGESTIVE HEART FAILURE, UNSPECIFIED HF CHRONICITY, UNSPECIFIED HEART FAILURE TYPE (HCC): ICD-10-CM

## 2021-11-10 DIAGNOSIS — E78.5 HYPERLIPIDEMIA LDL GOAL <100: ICD-10-CM

## 2021-11-10 DIAGNOSIS — I10 ESSENTIAL HYPERTENSION: Chronic | ICD-10-CM

## 2021-11-16 ENCOUNTER — TELEPHONE (OUTPATIENT)
Dept: CARDIOLOGY | Facility: CLINIC | Age: 81
End: 2021-11-16

## 2021-11-18 ENCOUNTER — TELEPHONE (OUTPATIENT)
Dept: CARDIOLOGY | Facility: CLINIC | Age: 81
End: 2021-11-18

## 2021-11-18 NOTE — TELEPHONE ENCOUNTER
----- Message from DANYA Combs sent at 11/16/2021 10:08 AM EST -----  Lipids at goal.  Continue current medications, we will discuss everything else at your office visit next month with Dr. Strong

## 2021-11-29 ENCOUNTER — TRANSCRIBE ORDERS (OUTPATIENT)
Dept: ADMINISTRATIVE | Facility: HOSPITAL | Age: 81
End: 2021-11-29

## 2021-11-29 ENCOUNTER — HOSPITAL ENCOUNTER (OUTPATIENT)
Dept: MAMMOGRAPHY | Facility: HOSPITAL | Age: 81
Discharge: HOME | End: 2021-11-29

## 2021-11-29 ENCOUNTER — HOSPITAL ENCOUNTER (OUTPATIENT)
Dept: ULTRASOUND IMAGING | Facility: HOSPITAL | Age: 81
Discharge: HOME | End: 2021-11-29

## 2021-11-29 DIAGNOSIS — N64.4 MASTODYNIA: ICD-10-CM

## 2021-11-29 DIAGNOSIS — N63.0 BREAST MASS: Primary | ICD-10-CM

## 2021-11-29 PROCEDURE — 77066 DX MAMMO INCL CAD BI: CPT

## 2021-11-29 PROCEDURE — 76642 ULTRASOUND BREAST LIMITED: CPT

## 2021-11-29 PROCEDURE — G0279 TOMOSYNTHESIS, MAMMO: HCPCS

## 2021-12-03 DIAGNOSIS — E78.5 HYPERLIPIDEMIA LDL GOAL <100: ICD-10-CM

## 2021-12-03 DIAGNOSIS — I48.0 PAROXYSMAL ATRIAL FIBRILLATION (HCC): Chronic | ICD-10-CM

## 2021-12-03 DIAGNOSIS — I50.9 CONGESTIVE HEART FAILURE, UNSPECIFIED HF CHRONICITY, UNSPECIFIED HEART FAILURE TYPE (HCC): ICD-10-CM

## 2021-12-03 DIAGNOSIS — I10 ESSENTIAL HYPERTENSION: Chronic | ICD-10-CM

## 2021-12-03 DIAGNOSIS — I10 ESSENTIAL HYPERTENSION: ICD-10-CM

## 2021-12-03 RX ORDER — METOPROLOL SUCCINATE 25 MG/1
TABLET, EXTENDED RELEASE ORAL
Qty: 45 TABLET | Refills: 3 | Status: SHIPPED | OUTPATIENT
Start: 2021-12-03 | End: 2021-12-16 | Stop reason: SDUPTHER

## 2021-12-03 RX ORDER — ATORVASTATIN CALCIUM 10 MG/1
10 TABLET, FILM COATED ORAL NIGHTLY
Qty: 90 TABLET | Refills: 3 | Status: SHIPPED | OUTPATIENT
Start: 2021-12-03 | End: 2022-11-15 | Stop reason: SDUPTHER

## 2021-12-03 RX ORDER — LOSARTAN POTASSIUM 25 MG/1
25 TABLET ORAL DAILY
Qty: 90 TABLET | Refills: 3 | Status: SHIPPED | OUTPATIENT
Start: 2021-12-03 | End: 2022-11-15 | Stop reason: SDUPTHER

## 2021-12-07 DIAGNOSIS — N32.81 OAB (OVERACTIVE BLADDER): ICD-10-CM

## 2021-12-07 RX ORDER — OXYBUTYNIN CHLORIDE 10 MG/1
10 TABLET, EXTENDED RELEASE ORAL DAILY
Qty: 90 TABLET | Refills: 1 | Status: SHIPPED | OUTPATIENT
Start: 2021-12-07 | End: 2022-05-23

## 2021-12-08 ENCOUNTER — OFFICE VISIT (OUTPATIENT)
Dept: UROLOGY | Facility: CLINIC | Age: 81
End: 2021-12-08

## 2021-12-08 VITALS
TEMPERATURE: 97.3 F | DIASTOLIC BLOOD PRESSURE: 57 MMHG | SYSTOLIC BLOOD PRESSURE: 147 MMHG | HEIGHT: 65 IN | WEIGHT: 223 LBS | BODY MASS INDEX: 37.15 KG/M2 | HEART RATE: 79 BPM

## 2021-12-08 DIAGNOSIS — N32.81 OAB (OVERACTIVE BLADDER): Primary | ICD-10-CM

## 2021-12-08 DIAGNOSIS — Z86.79 HISTORY OF ATRIAL FIBRILLATION: ICD-10-CM

## 2021-12-08 PROBLEM — N31.8 HYPERTONICITY OF BLADDER: Status: ACTIVE | Noted: 2021-12-08

## 2021-12-08 LAB
BACTERIA UR QL AUTO: NORMAL /HPF
BILIRUB BLD-MCNC: NEGATIVE MG/DL
CLARITY, POC: ABNORMAL
COLOR UR: YELLOW
EPI CELLS #/AREA URNS HPF: PRESENT /[HPF]
EXPIRATION DATE: ABNORMAL
GLUCOSE UR STRIP-MCNC: NEGATIVE MG/DL
KETONES UR QL: NEGATIVE
LEUKOCYTE EST, POC: NEGATIVE
Lab: ABNORMAL
NITRITE UR-MCNC: NEGATIVE MG/ML
PH UR: 5.5 [PH] (ref 5–8)
PROT UR STRIP-MCNC: NEGATIVE MG/DL
RBC # UR STRIP: NEGATIVE /UL
RBC # UR STRIP: NORMAL /HPF
RENAL EPITHELIAL, POC: 0
SP GR UR: 1.02 (ref 1–1.03)
UNIDENT CRYS URNS QL MICRO: NORMAL /HPF
UROBILINOGEN UR QL: NORMAL
WBC # UR STRIP: NORMAL /HPF

## 2021-12-08 PROCEDURE — 81003 URINALYSIS AUTO W/O SCOPE: CPT | Performed by: NURSE PRACTITIONER

## 2021-12-08 PROCEDURE — 99213 OFFICE O/P EST LOW 20 MIN: CPT | Performed by: NURSE PRACTITIONER

## 2021-12-08 RX ORDER — CEFDINIR 300 MG/1
CAPSULE ORAL
COMMUNITY
Start: 2021-09-15 | End: 2021-12-16

## 2021-12-08 RX ORDER — AZITHROMYCIN 250 MG/1
TABLET, FILM COATED ORAL
COMMUNITY
Start: 2021-09-15 | End: 2021-12-16

## 2021-12-08 RX ORDER — FUROSEMIDE 40 MG/1
40 TABLET ORAL DAILY
COMMUNITY
Start: 2021-11-19 | End: 2021-12-16 | Stop reason: DRUGHIGH

## 2021-12-08 RX ORDER — BRIMONIDINE TARTRATE 2 MG/ML
1 SOLUTION/ DROPS OPHTHALMIC NIGHTLY
COMMUNITY
Start: 2021-10-16 | End: 2022-06-08

## 2021-12-08 RX ORDER — DIGOXIN 125 MCG
125 TABLET ORAL
COMMUNITY
Start: 2021-10-20 | End: 2022-09-14

## 2021-12-08 NOTE — PROGRESS NOTES
"Chief Complaint  Follow-up (oab)    Subjective          Lilia Becerra 80 y.o. female presents to [facility] UROLOGY  Follow up for history of oab. Taking oxybutynin and myrbetriq which has controlled her urge incontinence and frequency. Denies any incontinence and doing well urologically. Denies dysuria or gross hematuria. No problems with uti. Reports having significant edema bilateral lower extremities and ow urine output recent and had been provided with lasix im per acute care center. She did discuss with her pcp Dr Lomas who she states instructed her to be sure and contact her cardiologist if occurs vs acute care. History of afib.  Blood pressure is under control. Recovered from Covid.    Review of Systems   Constitutional: Negative for chills and fever.   Respiratory: Negative for shortness of breath.    Cardiovascular: Negative for chest pain.   Gastrointestinal: Negative.    Genitourinary: Negative for difficulty urinating, dysuria, flank pain, frequency, hematuria, urgency and vaginal pain.      Objective   Vital Signs:   /57   Pulse 79   Temp 97.3 °F (36.3 °C)   Ht 165.1 cm (65\")   Wt 101 kg (223 lb)   BMI 37.11 kg/m²      Past Surgical History:   Procedure Laterality Date   • ANKLE TENDON REPAIR     • BACK SURGERY  12/21/2018-3/2019    LUMBAR--BROKEN DISC, CLEANED OUT--HAS 2ND PROCEDURE TO FINISH REMOVING   • CARPAL TUNNEL RELEASE     • CATARACT EXTRACTION Bilateral    • COLONOSCOPY     • ENDOSCOPY     • HEMORRHOIDECTOMY     • HYSTERECTOMY     • INCISION AND DRAINAGE OF WOUND      on back    • KNEE ARTHROPLASTY Right    • LEG SURGERY  06/13/2019   • LUMBAR DISCECTOMY Left 12/21/2018    Procedure: LEFT L4-5 MICRO DISCECTOMY;  Surgeon: Adam Coleman DO;  Location: [facility] OR;  Service: Orthopedic Spine   • LUMBAR DISCECTOMY Left 3/29/2019    Procedure: LEFT L4-5 REVISION OF MICRODISCECTOMY;  Surgeon: Adam Coleman DO;  Location: [facility] OR;  Service: Orthopedic Spine "   • LUMBAR FUSION     • TENDON RELEASE  09/2018   • TOTAL KNEE ARTHROPLASTY Left 6/13/2019    Procedure: LEFT KNEE REVISION;  Surgeon: Malick Costa II, MD;  Location: [facility];  Service: Orthopedics   • TOTAL KNEE ARTHROPLASTY REVISION Left     x2   • TOTAL KNEE ARTHROPLASTY REVISION Left 2/13/2018    Procedure: LEFT TOTAL KNEE ARTHROPLASTY REVISION;  Surgeon: Jair Fajardo MD;  Location: [facility];  Service:    • VERTEBROPLASTY          Physical Exam  Vitals and nursing note reviewed.   Constitutional:       General: She is not in acute distress.     Appearance: She is well-developed and well-groomed. She is obese. She is not ill-appearing.      Comments: Ambulates with rolling chair walker    Cardiovascular:      Rate and Rhythm: Normal rate and regular rhythm.      Heart sounds: Murmur heard.       Pulmonary:      Effort: Pulmonary effort is normal. No respiratory distress.      Breath sounds: No wheezing, rhonchi or rales.      Comments: Bibasilar fine crackles diminished   Abdominal:      Palpations: Abdomen is soft.   Musculoskeletal:      Right lower leg: Edema present.      Left lower leg: Edema present.   Skin:     General: Skin is warm and dry.   Neurological:      Mental Status: She is alert and oriented to person, place, and time.   Psychiatric:         Mood and Affect: Mood normal.         Behavior: Behavior normal. Behavior is cooperative.         Thought Content: Thought content normal.         Judgment: Judgment normal.        Result Review :     Office Visit Converted with Juana Lenz (08/27/2020)  Note past medical and surgical history per converted office note  POC Urinalysis Dipstick, Automated (12/08/2021 13:01)                   Assessment and Plan    Diagnoses and all orders for this visit:    1. OAB (overactive bladder) (Primary)  -     POC Urinalysis Dipstick, Automated  -     POC Urine Microscopic Only    2. History of atrial  fibrillation    3.diabetes    Urinary incontinence resolved with medication.   continue myrbetriq 50mg and oxybutynin er 10mg.refilled mail order yesterday.  Previous labs reviewed. Discussed creatinine level and renal function may also be affected by medications given such a lasix. Instructed to always relay to physician if medication received prior to lab work.. Also diabetes monitor. Patient to follow up 6 months.     Follow Up   No follow-ups on file.  Patient was given instructions and counseling regarding her condition or for health maintenance advice. Please see specific information pulled into the AVS if appropriate.     Juana Lenz, DANYA

## 2021-12-14 ENCOUNTER — TELEPHONE (OUTPATIENT)
Dept: CARDIOLOGY | Facility: CLINIC | Age: 81
End: 2021-12-14

## 2021-12-14 NOTE — TELEPHONE ENCOUNTER
Procedure: RFA    Med Directive: Eliquis (Requesting 3 days)    PMH: Afib; DM; Stage 3 CKD    Last Seen: 09/23/2021    Labs (12/2/2021): cr 1.2

## 2021-12-16 ENCOUNTER — OFFICE VISIT (OUTPATIENT)
Dept: CARDIOLOGY | Facility: CLINIC | Age: 81
End: 2021-12-16

## 2021-12-16 VITALS
HEIGHT: 65 IN | DIASTOLIC BLOOD PRESSURE: 56 MMHG | BODY MASS INDEX: 33.66 KG/M2 | HEART RATE: 62 BPM | SYSTOLIC BLOOD PRESSURE: 148 MMHG | WEIGHT: 202 LBS

## 2021-12-16 DIAGNOSIS — R60.0 PEDAL EDEMA: Primary | ICD-10-CM

## 2021-12-16 DIAGNOSIS — I35.0 NONRHEUMATIC AORTIC VALVE STENOSIS: Chronic | ICD-10-CM

## 2021-12-16 DIAGNOSIS — I48.0 PAROXYSMAL ATRIAL FIBRILLATION (HCC): Chronic | ICD-10-CM

## 2021-12-16 DIAGNOSIS — I10 ESSENTIAL HYPERTENSION: Chronic | ICD-10-CM

## 2021-12-16 PROCEDURE — 99214 OFFICE O/P EST MOD 30 MIN: CPT | Performed by: NURSE PRACTITIONER

## 2021-12-16 RX ORDER — METOPROLOL SUCCINATE 25 MG/1
TABLET, EXTENDED RELEASE ORAL
Qty: 45 TABLET | Refills: 3 | Status: SHIPPED | OUTPATIENT
Start: 2021-12-16 | End: 2022-02-09 | Stop reason: SDUPTHER

## 2021-12-16 RX ORDER — FUROSEMIDE 40 MG/1
TABLET ORAL
Qty: 145 TABLET | Refills: 3 | Status: SHIPPED | OUTPATIENT
Start: 2021-12-16 | End: 2023-01-11 | Stop reason: SDUPTHER

## 2021-12-16 NOTE — ASSESSMENT & PLAN NOTE
We will increase furosemide to 40 mg 1-1/2 tabs every morning.  If if her swelling decreases she can go back to the 1 tab a day.  Check BMP in 2 weeks

## 2021-12-16 NOTE — PROGRESS NOTES
Chief Complaint  Leg Swelling, Atrial Fibrillation, Hypertension, and Hyperlipidemia    Subjective            Lilia Becerra presents to [facility] CARDIOLOGY  She is an 80-year-old white female comes in complains of pedal edema.  States she does not seem to diminish.  She has been to the emergency room and had IV diuretic with some relief.  But then increases after she goes home.  Denies any shortness of breath. Denies any chest pains, palpitations, dizziness, syncope, PND, or orthopnea.  She is down 20 pounds after the IV diuretic.               Past History:    Past Medical History:   Diagnosis Date   • Acid reflux disease    • Arthritis    • Atrial fibrillation (HCC)    • Back pain    • Bladder disorder    • Bowel obstruction (HCC)    • Broken leg    • Carpal tunnel syndrome    • Constipation    • Depression    • Diabetes mellitus (HCC)    • Diarrhea    • Disease of thyroid gland    • Dyslipidemia    • Fecal incontinence    • Fecal urgency    • GERD (gastroesophageal reflux disease)    • Glaucoma     left eye   • HBP (high blood pressure)    • Hearing impaired     hearing aides   • Hiatal hernia    • High cholesterol    • History of transfusion    • History of tuberculosis     IN 1980'S WAS TREATED   • Hypertension    • Hypomagnesemia    • Kienbock's disease    • Kienböck's disease, right     KIENBOCK'S AVASCULAR NECROSIS OF LUNATE, ADULT RIGHT   • Left leg pain    • Nonrheumatic aortic valve stenosis 11/5/2020   • OAB (overactive bladder)    • Osteoporosis    • Pneumonia    • PONV (postoperative nausea and vomiting)    • Positive PPD    • Rapid heart rate    • RLS (restless legs syndrome)    • Sleep apnea     NO MACHINE   • Stage 3 chronic kidney disease (HCC)    • Tailbone injury     fracture   • Thyroid disease    • Urinary incontinence     wears pads   • Urinary retention         Family History: family history includes Breast cancer in her mother; Diabetes in her father and sister;  Tuberculosis in her mother.     Social History: reports that she has never smoked. She has never used smokeless tobacco. She reports that she does not drink alcohol and does not use drugs.    Allergies: Eggs or egg-derived products, Sertraline, Tetanus toxoid, Tetanus toxoids, and Nsaids      Past Surgical History:   Procedure Laterality Date   • ANKLE TENDON REPAIR     • BACK SURGERY  12/21/2018-3/2019    LUMBAR--BROKEN DISC, CLEANED OUT--HAS 2ND PROCEDURE TO FINISH REMOVING   • CARPAL TUNNEL RELEASE     • CATARACT EXTRACTION Bilateral    • COLONOSCOPY     • ENDOSCOPY     • HEMORRHOIDECTOMY     • HYSTERECTOMY     • INCISION AND DRAINAGE OF WOUND      on back    • KNEE ARTHROPLASTY Right    • LEG SURGERY  06/13/2019   • LUMBAR DISCECTOMY Left 12/21/2018    Procedure: LEFT L4-5 MICRO DISCECTOMY;  Surgeon: Adam Coleman DO;  Location: [facility] OR;  Service: Orthopedic Spine   • LUMBAR DISCECTOMY Left 3/29/2019    Procedure: LEFT L4-5 REVISION OF MICRODISCECTOMY;  Surgeon: Adam Coleman DO;  Location: [facility] OR;  Service: Orthopedic Spine   • LUMBAR FUSION     • TENDON RELEASE  09/2018   • TOTAL KNEE ARTHROPLASTY Left 6/13/2019    Procedure: LEFT KNEE REVISION;  Surgeon: Malick Costa II, MD;  Location: [facility] OR;  Service: Orthopedics   • TOTAL KNEE ARTHROPLASTY REVISION Left     x2   • TOTAL KNEE ARTHROPLASTY REVISION Left 2/13/2018    Procedure: LEFT TOTAL KNEE ARTHROPLASTY REVISION;  Surgeon: Jair Fajardo MD;  Location: [facility] OR;  Service:    • VERTEBROPLASTY            Current Outpatient Medications:   •  alendronate (FOSAMAX) 35 MG tablet, Take 35 mg by mouth Every 7 (Seven) Days., Disp: , Rfl:   •  apixaban (ELIQUIS) 5 MG tablet tablet, Take 1 tablet by mouth Every 12 (Twelve) Hours., Disp: 180 tablet, Rfl: 3  •  atorvastatin (LIPITOR) 10 MG tablet, Take 1 tablet by mouth Every Night., Disp: 90 tablet, Rfl: 3  •  brimonidine (ALPHAGAN) 0.2 % ophthalmic solution, , Disp: ,  Rfl:   •  Calcium Carb-Cholecalciferol (CALCIUM 600 + D PO), Take 1 tablet by mouth 2 (Two) Times a Day., Disp: , Rfl:   •  COMBIGAN 0.2-0.5 % ophthalmic solution, Administer 2 drops to both eyes 2 (Two) Times a Day., Disp: , Rfl: 2  •  digoxin (LANOXIN) 125 MCG tablet, Take 125 mcg by mouth Daily., Disp: , Rfl:   •  dilTIAZem CD (CARDIZEM CD) 120 MG 24 hr capsule, Take 1 capsule by mouth Daily., Disp: 90 capsule, Rfl: 3  •  DULoxetine (CYMBALTA) 60 MG capsule, Take 60 mg by mouth Daily., Disp: , Rfl:   •  levothyroxine (SYNTHROID, LEVOTHROID) 25 MCG tablet, Take 25 mcg by mouth Daily., Disp: , Rfl:   •  losartan (COZAAR) 25 MG tablet, Take 1 tablet by mouth Daily., Disp: 90 tablet, Rfl: 3  •  METFORMIN HCL PO, Take  by mouth., Disp: , Rfl:   •  Mirabegron ER (Myrbetriq) 25 MG tablet sustained-release 24 hour 24 hr tablet, Take 2 tablets by mouth Every Morning for 90 days., Disp: 90 tablet, Rfl: 1  •  Multiple Vitamins-Minerals (MULTIVITAMIN ADULTS PO), Take 1 capsule by mouth Daily., Disp: , Rfl:   •  omeprazole (priLOSEC) 20 MG capsule, Take 20 mg by mouth Daily., Disp: , Rfl:   •  oxybutynin XL (DITROPAN-XL) 10 MG 24 hr tablet, Take 1 tablet by mouth Daily., Disp: 90 tablet, Rfl: 1  •  pramipexole (MIRAPEX) 1.5 MG tablet, Take 1.5 mg by mouth Every Night., Disp: , Rfl:   •  spironolactone (ALDACTONE) 25 MG tablet, Take 1 tablet by mouth Every Other Day., Disp: 45 tablet, Rfl: 3  •  VENLAFAXINE HCL ER PO, Take 150 mg by mouth Daily., Disp: , Rfl:   •  vitamin C (ASCORBIC ACID) 500 MG tablet, Take 500 mg by mouth Daily., Disp: , Rfl:   •  furosemide (LASIX) 40 MG tablet, Take 1 1/2 tablets every morning. Can decrease to one tablet a day if swelling improves, Disp: 145 tablet, Rfl: 3  •  metoprolol succinate XL (TOPROL-XL) 25 MG 24 hr tablet, Take half a tab at night, Disp: 45 tablet, Rfl: 3     Review of Systems   Constitutional: Positive for fatigue.   Respiratory: Negative for cough and shortness of breath.   "  Cardiovascular: Positive for leg swelling. Negative for chest pain and palpitations.   Neurological: Negative for dizziness.   All other systems reviewed and are negative.       Objective     Physical Exam  Constitutional:       General: She is not in acute distress.     Appearance: She is obese.      Comments: Ambulates with a rolling walker   Neck:      Vascular: No carotid bruit.   Cardiovascular:      Rate and Rhythm: Normal rate and regular rhythm.      Chest Wall: PMI is displaced.      Heart sounds: Murmur (2/6 systolic murmur the base) heard.    Systolic murmur is present with a grade of 2/6.      Pulmonary:      Effort: Pulmonary effort is normal.      Breath sounds: Normal breath sounds.   Musculoskeletal:      Right lower leg: Edema (Trace) present.      Left lower leg: Edema (Trace) present.   Neurological:      Mental Status: She is alert.       /56   Pulse 62   Ht 165.1 cm (65\")   Wt 91.6 kg (202 lb)   BMI 33.61 kg/m²       Vitals:    12/16/21 1110   BP: 148/56   Pulse: 62       Result Review :         The following data was reviewed by: DANYA Combs on 12/16/2021:       No results found for: PROBNP, BNP  CMP    CMP 9/14/21 9/14/21 9/15/21    0957 2347    Glucose 131 (A)  192 (A)   BUN 12  14   Creatinine 1.1  1.2   eGFR African Am 58  52   Sodium 141  140   Potassium 3.4 (A) 3.3 (A) 4.2   Chloride 109 (A)  108 (A)   Calcium 8.7  9.5   (A) Abnormal value       Comments are available for some flowsheets but are not being displayed.           Magnesium   Date Value Ref Range Status   09/15/2021 1.7 (L) 1.9 - 2.7 mg/dL Final          CMP    CMP 9/14/21 9/14/21 9/15/21    0957 2347    Glucose 131 (A)  192 (A)   BUN 12  14   Creatinine 1.1  1.2   eGFR African Am 58  52   Sodium 141  140   Potassium 3.4 (A) 3.3 (A) 4.2   Chloride 109 (A)  108 (A)   Calcium 8.7  9.5   (A) Abnormal value       Comments are available for some flowsheets but are not being displayed.                Magnesium "   Date Value Ref Range Status   09/15/2021 1.7 (L) 1.9 - 2.7 mg/dL Final                      Assessment and Plan        Diagnoses and all orders for this visit:    1. Pedal edema (Primary)  Assessment & Plan:  We will increase furosemide to 40 mg 1-1/2 tabs every morning.  If if her swelling decreases she can go back to the 1 tab a day.  Check BMP in 2 weeks    Orders:  -     furosemide (LASIX) 40 MG tablet; Take 1 1/2 tablets every morning. Can decrease to one tablet a day if swelling improves  Dispense: 145 tablet; Refill: 3  -     Basic Metabolic Panel; Future    2. Paroxysmal atrial fibrillation (HCC)  Assessment & Plan:  She never restarted him metoprolol.  So instructed to start metoprolol 25 mg half a tab at night.  Continue diltiazem 120, digoxin 125 mcg, and Eliquis 5 mg twice daily    Orders:  -     metoprolol succinate XL (TOPROL-XL) 25 MG 24 hr tablet; Take half a tab at night  Dispense: 45 tablet; Refill: 3    3. Essential hypertension  Assessment & Plan:  Needs tighter control.  She will start the metoprolol ER 25 mg half a tab as directed.  Continue losartan 25 mg, spironolactone 25 mg every other day, diltiazem 120 mg, and Lasix 40 mg 1 and half tabs a day.  She will do blood pressure log will adjust hypertensive meds further if needed.      4. Nonrheumatic aortic valve stenosis  Assessment & Plan:  Without shortness of breath.  We will continue to monitor.              Follow Up     Return in about 8 weeks (around 2/9/2022) for with Dr. Strong.    Patient was given instructions and counseling regarding her condition or for health maintenance advice. Please see specific information pulled into the AVS if appropriate.       Cherie Arguelles, APRN  12/16/21 07:34 EST

## 2021-12-16 NOTE — PATIENT INSTRUCTIONS
Increase furosemide 40 mg to 1-1/2 tabs in the morning.  Can decrease it back to 1 tablet a day if swelling goes away.

## 2021-12-16 NOTE — ASSESSMENT & PLAN NOTE
Needs tighter control.  She will start the metoprolol ER 25 mg half a tab as directed.  Continue losartan 25 mg, spironolactone 25 mg every other day, diltiazem 120 mg, and Lasix 40 mg 1 and half tabs a day.  She will do blood pressure log will adjust hypertensive meds further if needed.

## 2021-12-16 NOTE — ASSESSMENT & PLAN NOTE
She never restarted him metoprolol.  So instructed to start metoprolol 25 mg half a tab at night.  Continue diltiazem 120, digoxin 125 mcg, and Eliquis 5 mg twice daily

## 2021-12-20 ENCOUNTER — HOSPITAL ENCOUNTER (OUTPATIENT)
Dept: MAMMOGRAPHY | Facility: HOSPITAL | Age: 81
Discharge: HOME | End: 2021-12-20

## 2021-12-20 ENCOUNTER — DOCUMENTATION (OUTPATIENT)
Dept: MAMMOGRAPHY | Facility: HOSPITAL | Age: 81
End: 2021-12-20

## 2021-12-20 DIAGNOSIS — N63.0 BREAST MASS: ICD-10-CM

## 2021-12-20 PROCEDURE — 88342 IMHCHEM/IMCYTCHM 1ST ANTB: CPT | Performed by: INTERNAL MEDICINE

## 2021-12-20 PROCEDURE — 0 LIDOCAINE 1 % SOLUTION: Performed by: INTERNAL MEDICINE

## 2021-12-20 PROCEDURE — 88360 TUMOR IMMUNOHISTOCHEM/MANUAL: CPT | Performed by: INTERNAL MEDICINE

## 2021-12-20 PROCEDURE — 88305 TISSUE EXAM BY PATHOLOGIST: CPT | Performed by: INTERNAL MEDICINE

## 2021-12-20 RX ORDER — LIDOCAINE HYDROCHLORIDE 10 MG/ML
20 INJECTION, SOLUTION INFILTRATION; PERINEURAL ONCE
Status: COMPLETED | OUTPATIENT
Start: 2021-12-20 | End: 2021-12-20

## 2021-12-20 RX ORDER — LIDOCAINE HYDROCHLORIDE AND EPINEPHRINE 10; 10 MG/ML; UG/ML
20 INJECTION, SOLUTION INFILTRATION; PERINEURAL ONCE
Status: COMPLETED | OUTPATIENT
Start: 2021-12-20 | End: 2021-12-20

## 2021-12-20 RX ADMIN — LIDOCAINE HYDROCHLORIDE 20 ML: 10 INJECTION, SOLUTION INFILTRATION; PERINEURAL at 09:56

## 2021-12-20 RX ADMIN — LIDOCAINE HYDROCHLORIDE AND EPINEPHRINE 20 ML: 10; 10 INJECTION, SOLUTION INFILTRATION; PERINEURAL at 09:57

## 2021-12-21 NOTE — PROGRESS NOTES
S/W PATIENT AFTER HER BIOPSY AND DISCUSSED DISCHARGE INSTRUCTIONS AND PT V/U. PT REPORTED THAT IT HAD BEEN 2 YEARS SINCE LAST MAMMOGRAM. PT KNEW THERE WAS SOMETHING GOING ON WITH HER BREAST AND C/O ITCHING. PT REPORTED BEING 17 WITH FIRST CHILD AND 13 WITH FIRST PERIOD AND AROUND 48 WITH MENOPAUSE. PT DID HAVE A BENIGN COMPLETE HYSTERECTOMY IN 1990. SHE DID TAKE HORMONE THERAPY FOR A COUPLE OF YEARS. PT REPORTED THAT HER MOTHER HAD BREAST CANCER IN HER 70s. I GAVE PT A CARD WITH MY CONTACT INFORMATION AND ENCOURAGED HER TO CALL WITH ANY QUESTIONS OR CONCERNS AND PT V/U

## 2021-12-22 ENCOUNTER — DOCUMENTATION (OUTPATIENT)
Dept: MAMMOGRAPHY | Facility: HOSPITAL | Age: 81
End: 2021-12-22

## 2021-12-22 NOTE — PROGRESS NOTES
S/W DR. STYLES AND SHE HAS AGREED TO SEE THIS PATIENT TOMORROW WITH DR. LOCKETT AT 11:30 AND SHE AGREED TO SEE HER PRIOR TO THE 1:00 TIME OF BREAST CLINIC. I CALL AND S/W PATIENT AND GAVE HER THE DATE, TIME AND LOCATION OF HER APPT WITH DR. LOCKETT AND DR. REEDER TOMORROW AT [address] AND SHE V/U. CALLED AND GOT BOTH APPTS SCHEDULED.

## 2021-12-23 ENCOUNTER — CONSULT (OUTPATIENT)
Dept: ONCOLOGY | Facility: HOSPITAL | Age: 81
End: 2021-12-23

## 2021-12-23 ENCOUNTER — OFFICE VISIT (OUTPATIENT)
Dept: OTHER | Facility: HOSPITAL | Age: 81
End: 2021-12-23

## 2021-12-23 ENCOUNTER — DOCUMENTATION (OUTPATIENT)
Dept: ONCOLOGY | Facility: HOSPITAL | Age: 81
End: 2021-12-23

## 2021-12-23 VITALS
OXYGEN SATURATION: 100 % | HEIGHT: 64 IN | SYSTOLIC BLOOD PRESSURE: 133 MMHG | TEMPERATURE: 97.1 F | DIASTOLIC BLOOD PRESSURE: 64 MMHG | HEART RATE: 76 BPM | WEIGHT: 202.82 LBS | BODY MASS INDEX: 34.63 KG/M2 | RESPIRATION RATE: 18 BRPM

## 2021-12-23 DIAGNOSIS — C50.811 MALIGNANT NEOPLASM OF OVERLAPPING SITES OF RIGHT BREAST IN FEMALE, ESTROGEN RECEPTOR POSITIVE (HCC): Primary | ICD-10-CM

## 2021-12-23 DIAGNOSIS — Z17.0 MALIGNANT NEOPLASM OF OVERLAPPING SITES OF RIGHT BREAST IN FEMALE, ESTROGEN RECEPTOR POSITIVE (HCC): Primary | ICD-10-CM

## 2021-12-23 PROBLEM — E66.812 CLASS 2 OBESITY: Status: ACTIVE | Noted: 2021-12-23

## 2021-12-23 PROBLEM — M19.90 ARTHRITIS: Status: ACTIVE | Noted: 2021-12-23

## 2021-12-23 PROBLEM — I10 HBP (HIGH BLOOD PRESSURE): Status: ACTIVE | Noted: 2018-02-14

## 2021-12-23 PROBLEM — M54.42 CHRONIC BILATERAL LOW BACK PAIN WITH LEFT-SIDED SCIATICA: Status: ACTIVE | Noted: 2021-08-12

## 2021-12-23 PROBLEM — K21.9 GASTROESOPHAGEAL REFLUX DISEASE: Status: ACTIVE | Noted: 2018-02-14

## 2021-12-23 PROBLEM — H40.9 GLAUCOMA: Status: ACTIVE | Noted: 2021-12-23

## 2021-12-23 PROBLEM — R00.0 RAPID HEART RATE: Status: ACTIVE | Noted: 2021-12-23

## 2021-12-23 PROBLEM — M51.36 DEGENERATION OF INTERVERTEBRAL DISC OF LUMBAR REGION: Status: ACTIVE | Noted: 2021-08-12

## 2021-12-23 PROBLEM — G56.00 CARPAL TUNNEL SYNDROME: Status: ACTIVE | Noted: 2021-12-23

## 2021-12-23 PROBLEM — R19.7 DIARRHEA: Status: ACTIVE | Noted: 2021-12-23

## 2021-12-23 PROBLEM — E83.42 HYPOMAGNESEMIA: Status: ACTIVE | Noted: 2021-12-23

## 2021-12-23 PROBLEM — R15.2 FECAL URGENCY: Status: ACTIVE | Noted: 2021-12-23

## 2021-12-23 PROBLEM — E11.9 DIABETES MELLITUS (HCC): Status: ACTIVE | Noted: 2021-12-23

## 2021-12-23 PROBLEM — M54.16 LUMBAR RADICULOPATHY: Status: ACTIVE | Noted: 2021-08-12

## 2021-12-23 PROBLEM — G25.81 RESTLESS LEGS SYNDROME: Status: ACTIVE | Noted: 2021-12-23

## 2021-12-23 PROBLEM — I11.0 HYPERTENSIVE HEART FAILURE (HCC): Status: ACTIVE | Noted: 2021-12-23

## 2021-12-23 PROBLEM — E07.9 THYROID DISEASE: Status: ACTIVE | Noted: 2018-02-14

## 2021-12-23 PROBLEM — M81.0 AGE RELATED OSTEOPOROSIS: Status: ACTIVE | Noted: 2021-12-23

## 2021-12-23 PROBLEM — I48.91 ATRIAL FIBRILLATION WITH RVR: Status: ACTIVE | Noted: 2018-02-14

## 2021-12-23 PROBLEM — F32.A DEPRESSIVE DISORDER: Status: ACTIVE | Noted: 2021-12-23

## 2021-12-23 PROBLEM — R42 DISEQUILIBRIUM: Status: ACTIVE | Noted: 2021-12-23

## 2021-12-23 PROBLEM — T81.9XXA COMPLICATION OF SURGICAL PROCEDURE: Status: ACTIVE | Noted: 2021-08-12

## 2021-12-23 PROBLEM — R76.11 POSITIVE PPD: Status: ACTIVE | Noted: 2021-12-23

## 2021-12-23 PROBLEM — G89.4 CHRONIC PAIN DISORDER: Status: ACTIVE | Noted: 2021-12-23

## 2021-12-23 PROBLEM — E11.21 DIABETIC RENAL DISEASE: Status: ACTIVE | Noted: 2021-12-23

## 2021-12-23 PROBLEM — G89.29 CHRONIC BILATERAL LOW BACK PAIN WITH LEFT-SIDED SCIATICA: Status: ACTIVE | Noted: 2021-08-12

## 2021-12-23 PROBLEM — R15.9 FECAL INCONTINENCE: Status: ACTIVE | Noted: 2021-12-23

## 2021-12-23 PROBLEM — F33.9 RECURRENT MAJOR DEPRESSION: Status: ACTIVE | Noted: 2021-12-23

## 2021-12-23 PROBLEM — M19.90 IDIOPATHIC OSTEOARTHRITIS: Status: ACTIVE | Noted: 2021-12-23

## 2021-12-23 PROBLEM — S82.90XA BROKEN LEG: Status: ACTIVE | Noted: 2019-06-13

## 2021-12-23 PROBLEM — K56.609 BOWEL OBSTRUCTION: Status: ACTIVE | Noted: 2021-12-23

## 2021-12-23 PROBLEM — E03.9 HYPOTHYROIDISM: Status: ACTIVE | Noted: 2018-02-14

## 2021-12-23 PROBLEM — R92.8 ABNORMAL MAMMOGRAM: Status: ACTIVE | Noted: 2021-12-23

## 2021-12-23 PROBLEM — M93.1 KIENBOECK DISEASE OF ADULTS: Status: ACTIVE | Noted: 2021-12-23

## 2021-12-23 PROBLEM — M51.369 DEGENERATION OF INTERVERTEBRAL DISC OF LUMBAR REGION: Status: ACTIVE | Noted: 2021-08-12

## 2021-12-23 PROBLEM — E66.9 CLASS 2 OBESITY: Status: ACTIVE | Noted: 2021-12-23

## 2021-12-23 PROBLEM — J18.9 PNEUMONIA: Status: ACTIVE | Noted: 2021-09-13

## 2021-12-23 PROBLEM — M43.06 SPONDYLOLYSIS OF LUMBAR REGION: Status: ACTIVE | Noted: 2021-11-10

## 2021-12-23 PROBLEM — Z79.84 LONG TERM (CURRENT) USE OF ORAL HYPOGLYCEMIC DRUGS: Status: ACTIVE | Noted: 2021-12-23

## 2021-12-23 PROBLEM — I87.2 PERIPHERAL VENOUS INSUFFICIENCY: Status: ACTIVE | Noted: 2021-12-23

## 2021-12-23 PROCEDURE — 99204 OFFICE O/P NEW MOD 45 MIN: CPT | Performed by: INTERNAL MEDICINE

## 2021-12-23 PROCEDURE — 99204 OFFICE O/P NEW MOD 45 MIN: CPT | Performed by: SURGERY

## 2021-12-23 PROCEDURE — G0463 HOSPITAL OUTPT CLINIC VISIT: HCPCS | Performed by: INTERNAL MEDICINE

## 2021-12-23 RX ORDER — CLINDAMYCIN PHOSPHATE 900 MG/50ML
900 INJECTION INTRAVENOUS ONCE
Status: CANCELLED | OUTPATIENT
Start: 2021-12-23 | End: 2021-12-23

## 2021-12-23 NOTE — PROGRESS NOTES
Chief Complaint: No chief complaint on file.    Subjective         History of Present Illness  Lilia Becerra is a 80 y.o. female presents to [facility] to be seen for 2 masses in right breast that were biopsied and returned as:      Clinical Information    Breast mass   Comment:         Final Diagnosis   1. 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)  - Histologic grade (Ramon Histologic Score):    - Glandular (Acinar)/Tubular Differentiation:  Score 3  - Nuclear Pleomorphism:  Score 2  - Mitotic Rate:  Score 2  - Overall Grade:  Grade 2               - Size of largest focus of invasion: 9 mm               - Breast biomarker testing:                            - Estrogen Receptor (ER):  Positive (100%, strong)                            - Progesterone Receptor (PgR):  Positive (95%, strong)                            - HER2 (by immunohistochemistry):  Negative (Score 1+)  - Ki-67: 10%               - Other findings:                            - 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    Focused right breast ultrasound was also performed.     Mammogram:  Left breast:  the mammographic appearance of her left breast is stable.  The breast   pattern is diffusely nodular.  There are scattered large round lucent centered calcifications as   well as vascular calcifications and large aristides secretory type calcifications throughout her breast.    No new findings.  Her left nipple is chronically inverted.     Right breast:  The breast pattern is stable and diffusely nodular.  There are large round lucent   centered calcifications as well as vascular calcifications and large aristides secretory type   calcifications.  Within the inferior slightly medial right breast posterior depth there are   heterogeneous regional microcalcifications which appear more numerous than on previous studies.  No   suspicious mass or architectural distortions.  Her right nipple is chronically inverted.          Her symptoms her symptoms were I discussed in order to try and talar the imaging workup further.    She does report some itching in her right breast which is more diffuse and she states that her   right breast appears thacker and feels different than her left and she believes this is a change.    When questioned about her nipple discharge she states that she has had small amount of milky   discharge from each nipple for many years.  Her nipples are inverted bilaterally which she says has   always been that way for many years as well.  She denies any nipple flanking or nipple itching.     Bilateral breast ultrasound:  Ultrasound of her right breast was performed in order to further   evaluate her symptoms and fullness.  The right breast was scanned by the technologist as well as   myself.  The left breast was scanned for comparison.   Within the lower inner right breast at   approximately the 4 o'clock position there is a hypoechoic shadowing lobular approximate 3.3 x 2.2   x 1.3 cm mass with mixed echogenicity.  There are some echogenic foci which are likely   calcifications.  This is an asymmetric sonographic appearance when compared with the overall   sonographic breast pattern in each breast.  Tissue sampling of this is recommended.  There is also   a 2.2 x1.1 x 1.3 cm anechoic lobular mass with some internal echoes and subtle through transmission   close to the chest wall at the 9 o'clock position in the right breast 6 cm from the nipple.  This   may be a complicated cyst, neoplasm is not excluded.  Tissue sampling of this is recommended.     There are couple areas of shadowing calcifications within the left breast but no discrete mass or   areas of tissue distortion.        IMPRESSION:  1. Within the lower inner right breast middle and posterior depth at approximately the 4 o'clock   position , there is a 3.3 x 2.2 x 1.3 cm mixed echogenicity lobular shadowing mass on ultrasound.    Some of the internal echoes is most likely due to micro calcifications in this area on mammogram.    Ultrasound-guided core biopsy of this is recommended.    2. There is also a 2.2 x 1.1 x 1.3 cm lobular hypoechoic mass near the chest wall at the 9 o'clock   position in the right breast is also present.  This may be a complicated cysts but neoplastic   disease is not excluded.  Ultrasound-guided core biopsy of this is also recommended.  Post biopsy   mammogram is recommended since she has a complicated breast pattern mammographically.    2. Her nipple discharge sound physiologic it is bilateral and milky.  Further management for this   can be made on a clinical basis.  Management of her right breast itching can also be managed on a   clinical basis.  She denies any actual nipple flanking or nipple itching and has very dry skin.    3. Stable appearance of the  left breast.  Annual screening mammography of the left breast can   continue.     I discussed these findings and recommendations with the patient.     RECOMMENDATION(S):               ULTRASOUND-GUIDED CORE BIOPSY: RIGHT BREAST       BIRADS:               DIAGNOSTIC CATEGORY 4--SUSPICIOUS         Objective     Past Medical History:   Diagnosis Date   • Acid reflux disease    • Arthritis    • Atrial fibrillation (HCC)    • Back pain    • Bladder disorder    • Bowel obstruction (HCC)    • Breast cancer (HCC)    • Broken leg    • Carpal tunnel syndrome    • Constipation    • Depression    • Diabetes mellitus (HCC)    • Diarrhea    • Disease of thyroid gland    • Dyslipidemia    • Fecal incontinence    • Fecal urgency    • GERD (gastroesophageal reflux disease)    • Glaucoma     left eye   • HBP (high blood pressure)    • Hearing impaired     hearing aides   • Hiatal hernia    • High cholesterol    • History of transfusion    • History of tuberculosis     IN 1980'S WAS TREATED   • Hypertension    • Hypomagnesemia    • Kienbock's disease    • Kienböck's disease, right     KIENBOCK'S AVASCULAR NECROSIS OF LUNATE, ADULT RIGHT   • Left leg pain    • Nonrheumatic aortic valve stenosis 11/5/2020   • OAB (overactive bladder)    • Osteoporosis    • Pneumonia    • PONV (postoperative nausea and vomiting)    • Positive PPD    • Rapid heart rate    • RLS (restless legs syndrome)    • Sleep apnea     NO MACHINE   • Stage 3 chronic kidney disease (HCC)    • Tailbone injury     fracture   • Thyroid disease    • Urinary incontinence     wears pads   • Urinary retention        Past Surgical History:   Procedure Laterality Date   • ANKLE TENDON REPAIR     • BACK SURGERY  12/21/2018-3/2019    LUMBAR--BROKEN DISC, CLEANED OUT--HAS 2ND PROCEDURE TO FINISH REMOVING   • CARPAL TUNNEL RELEASE     • CATARACT EXTRACTION Bilateral    • COLONOSCOPY     • ENDOSCOPY     • HEMORRHOIDECTOMY     • HYSTERECTOMY     • INCISION AND DRAINAGE OF WOUND       on back    • KNEE ARTHROPLASTY Right    • LEG SURGERY  06/13/2019   • LUMBAR DISCECTOMY Left 12/21/2018    Procedure: LEFT L4-5 MICRO DISCECTOMY;  Surgeon: Adam Coleman DO;  Location: [facility] MAIN OR;  Service: Orthopedic Spine   • LUMBAR DISCECTOMY Left 3/29/2019    Procedure: LEFT L4-5 REVISION OF MICRODISCECTOMY;  Surgeon: Adam Coleman DO;  Location: [facility] MAIN OR;  Service: Orthopedic Spine   • LUMBAR FUSION     • TENDON RELEASE  09/2018   • TOTAL KNEE ARTHROPLASTY Left 6/13/2019    Procedure: LEFT KNEE REVISION;  Surgeon: Malick Costa II, MD;  Location: [facility] MAIN OR;  Service: Orthopedics   • TOTAL KNEE ARTHROPLASTY REVISION Left     x2   • TOTAL KNEE ARTHROPLASTY REVISION Left 2/13/2018    Procedure: LEFT TOTAL KNEE ARTHROPLASTY REVISION;  Surgeon: Jair Fajardo MD;  Location: [facility] MAIN OR;  Service:    • VERTEBROPLASTY           Current Outpatient Medications:   •  alendronate (FOSAMAX) 35 MG tablet, Take 35 mg by mouth Every 7 (Seven) Days., Disp: , Rfl:   •  apixaban (ELIQUIS) 5 MG tablet tablet, Take 1 tablet by mouth Every 12 (Twelve) Hours., Disp: 180 tablet, Rfl: 3  •  atorvastatin (LIPITOR) 10 MG tablet, Take 1 tablet by mouth Every Night., Disp: 90 tablet, Rfl: 3  •  brimonidine (ALPHAGAN) 0.2 % ophthalmic solution, , Disp: , Rfl:   •  Calcium Carb-Cholecalciferol (CALCIUM 600 + D PO), Take 1 tablet by mouth 2 (Two) Times a Day., Disp: , Rfl:   •  COMBIGAN 0.2-0.5 % ophthalmic solution, Administer 2 drops to both eyes 2 (Two) Times a Day., Disp: , Rfl: 2  •  digoxin (LANOXIN) 125 MCG tablet, Take 125 mcg by mouth Daily., Disp: , Rfl:   •  dilTIAZem CD (CARDIZEM CD) 120 MG 24 hr capsule, Take 1 capsule by mouth Daily., Disp: 90 capsule, Rfl: 3  •  DULoxetine (CYMBALTA) 60 MG capsule, Take 60 mg by mouth Daily., Disp: , Rfl:   •  furosemide (LASIX) 40 MG tablet, Take 1 1/2 tablets every morning. Can decrease to one tablet a day if swelling improves, Disp: 145 tablet, Rfl:  3  •  levothyroxine (SYNTHROID, LEVOTHROID) 25 MCG tablet, Take 25 mcg by mouth Daily., Disp: , Rfl:   •  losartan (COZAAR) 25 MG tablet, Take 1 tablet by mouth Daily., Disp: 90 tablet, Rfl: 3  •  METFORMIN HCL PO, Take  by mouth., Disp: , Rfl:   •  metoprolol succinate XL (TOPROL-XL) 25 MG 24 hr tablet, Take half a tab at night, Disp: 45 tablet, Rfl: 3  •  Mirabegron ER (Myrbetriq) 25 MG tablet sustained-release 24 hour 24 hr tablet, Take 2 tablets by mouth Every Morning for 90 days., Disp: 90 tablet, Rfl: 1  •  Multiple Vitamins-Minerals (MULTIVITAMIN ADULTS PO), Take 1 capsule by mouth Daily., Disp: , Rfl:   •  omeprazole (priLOSEC) 20 MG capsule, Take 20 mg by mouth Daily., Disp: , Rfl:   •  oxybutynin XL (DITROPAN-XL) 10 MG 24 hr tablet, Take 1 tablet by mouth Daily., Disp: 90 tablet, Rfl: 1  •  pramipexole (MIRAPEX) 1.5 MG tablet, Take 1.5 mg by mouth Every Night., Disp: , Rfl:   •  spironolactone (ALDACTONE) 25 MG tablet, Take 1 tablet by mouth Every Other Day., Disp: 45 tablet, Rfl: 3  •  VENLAFAXINE HCL ER PO, Take 150 mg by mouth Daily., Disp: , Rfl:   •  vitamin C (ASCORBIC ACID) 500 MG tablet, Take 500 mg by mouth Daily., Disp: , Rfl:     Allergies   Allergen Reactions   • Eggs Or Egg-Derived Products Swelling   • Sertraline Unknown - High Severity and Unknown - Low Severity   • Tetanus Toxoid Swelling   • Tetanus Toxoids Swelling   • Nsaids Unknown - High Severity        Family History   Problem Relation Age of Onset   • Breast cancer Mother    • Tuberculosis Mother    • Diabetes Father    • Diabetes Sister    • Malig Hyperthermia Neg Hx        Social History     Socioeconomic History   • Marital status:    Tobacco Use   • Smoking status: Never Smoker   • Smokeless tobacco: Never Used   Vaping Use   • Vaping Use: Never used   Substance and Sexual Activity   • Alcohol use: No   • Drug use: No   • Sexual activity: Defer       Vital Signs:   There were no vitals taken for this visit.   Review of  Systems    Physical Exam  Vitals and nursing note reviewed.   Constitutional:       Appearance: Normal appearance.   HENT:      Head: Normocephalic and atraumatic.   Eyes:      Extraocular Movements: Extraocular movements intact.      Pupils: Pupils are equal, round, and reactive to light.   Cardiovascular:      Pulses: Normal pulses.   Pulmonary:      Effort: Pulmonary effort is normal. No accessory muscle usage or respiratory distress.   Chest:   Breasts:      Right: Inverted nipple and mass present. No nipple discharge, skin change, axillary adenopathy or supraclavicular adenopathy.      Left: Normal. No inverted nipple, nipple discharge, skin change, axillary adenopathy or supraclavicular adenopathy.        Comments: Palpable large masses at 0900 and 0400 with nipple inversion  Abdominal:      General: Abdomen is flat.      Palpations: Abdomen is soft.      Tenderness: There is no abdominal tenderness. There is no guarding.   Musculoskeletal:         General: No swelling, tenderness or deformity.      Cervical back: Neck supple.   Lymphadenopathy:      Upper Body:      Right upper body: No supraclavicular or axillary adenopathy.      Left upper body: No supraclavicular or axillary adenopathy.   Skin:     General: Skin is warm and dry.   Neurological:      General: No focal deficit present.      Mental Status: She is alert and oriented to person, place, and time.   Psychiatric:         Mood and Affect: Mood normal.         Thought Content: Thought content normal.          Result Review :               Assessment and Plan    Diagnoses and all orders for this visit:    1. Malignant neoplasm of overlapping sites of right breast in female, estrogen receptor positive (HCC) (Primary)  -     Case Request; Standing  -     Case Request  -     NM sentinel node injection only; Future    Several family members have had ovarian cancer possibly as well as breast cancer so genetic testing is going to be done.  They are awaiting  a HER-2/deirdre pathology.  We will plan for bilateral mastectomy with right sentinel node identification and biopsy possible axillary dissection on January 11, 2022.  They are hoping to have both her genetics and her HER-2 receptor status back by that point in time.  If the HER-2 status returned as positive then we will change the surgery to port placement as Dr. Villalobos would like to do chemotherapy first given the size of the tumors.  If the genetics are negative we will also change the surgery to a right mastectomy with sentinel node and leave the left breast is the patient does not want to remove it unless she is genetically positive.    Follow Up {Instructions Charge Capture  Follow-up Communications :23}  Return for Next scheduled followup after surgery.  Patient was given instructions and counseling regarding her condition or for health maintenance advice. Please see specific information pulled into the AVS if appropriate.         This document has been electronically signed by Lety Tena MD  December 23, 2021 12:20 EST

## 2021-12-23 NOTE — PROGRESS NOTES
Distress Screening Follow-Up    Diagnosis: Breast cancer    Date of Distress Screenin21  Location of Distress Screening: [facility]  Distress Level: 10  Problems Indicated: , dealing with children, nervousness, sadness, worry, pain    Comments: OSW met with pt in multi d breast clinic to f/u in regards to identified distress. Pt presented in a pleasant mood. Pt utilizing upright rollator today. Introduced myself and discussed OSW role/psychosocial services available. Pt to undergo mastectomy and possibly adjuvant radiation therapy and/or chemotherapy. Pt inquired how long she'll have to recover before she can drive, do dishes/household work, etc. OSW consulted with Dr. Tena, who reports a week or two depending on how she feels.   Pt inquiring if she needs a port or chemotherapy, where this will be completed at. Informed pt that the port would be placed at the hospital and chemotherapy would occur here in this building.  Pt is independent with her ADLs and resides in [facility] with her 20 year old grandson and 17 year old granddaughter, both of which she had custody of since they were infant/toddler. Pt reports her 17 year old granddaughter works at [employer]. Grandson is currently unemployed, was previously working at [employer]. Pt discloses her grandson has hx of bipolar disorder and has been in residential and mental health facilities throughout his life. Pt reports her grandchildren are supportive sometimes around the house, but sometimes need extra motivation to do things. Pt's grandchildren's biological mother (pt's daughter) resides in [facility] and is involved with their lives now. Pt reports her daughter also has hx of bipolar disorder. Pt reports daughter is somewhat supportive and she or granddaughter are available to provide transportation if pt is unable to herself. Pt plans for either daughter or granddaughter to provide transportation for her surgery. Pt also has three adult  sons in the area that are supportive, one of which has helped her with finances in the past, the other helps with her lawnmower, etc. Pt reports her son purchased her a riding lawnmower and she enjoys cutting her grass still.   Pt reports experiencing substantial consumer debt. Pt reports she recently purchased a vehicle for granddaughter with the agreement that her daughter would help pay for this, but has not received any assistance with this expense. Pt also has some credit card debt, etc. Pt draws $865 a month, but reports this will increase by about $50 at the beginning of the year. Pt reports her household receives SNAP benefits. Pt has [place] Medicare and Medicaid. Consulted with oncology financial navigator and she does not believe pt will have medical expenses with her insurance coverage. Discussed opportunity to apply pt for financial assistance through [employer] and [employer]. Pt expressed interest in doing so. Assisted pt in completing these application today. Submitted [employer] application via fax. Fax confirmed. Awaiting copy of pt's car payment to finalize and submit [employer] application. Pt plans to mail this to OSW next week.   Provided emotional support throughout, utilizing empathy and validating and normalizing identified emotions. Discussed opportunity for mental health services (counseling,psych) and/or support services ([id number] support, support groups). PHQ-9 completed today - score 2. Pt denies SI. Pt discloses to OSW that she previously experienced depression and suicide attempt when her adult children were very young and her spouse was still alive, however, has not experienced depression or SI since. Pt reports she has very strong spiritual/Mu-ism vel and actively attends [employer] weekly. Pt has good support from her . Pt reports after her previous suicide attempt, she promised God she would never try to harm herself again. Pt reports her spouse has been   since . Pt reports she is not currently depressed and continues to deny SI. Per EMR, pt is prescribed venlafaxine and duloxetine. Pt is interested in being referred to [employer] [facility] for [id number] support. Submitted referral today.  No other needs identified at this time. Provided my business card, encouraging OSW support remains available. Pt expressed gratitude.    Services/Referrals Provided: Emotional support, financial assistance - Dign[facility] Borja          : Received pt's car payment information. Faxed in finalized application to [address]. Fax confirmed.    : [place] [facility] reports they paid $397.97 to [place] for car payment. Contacted pt to relay this information. Pt expressed gratitude.

## 2021-12-23 NOTE — ASSESSMENT & PLAN NOTE
"This is a new diagnosis to me and 1 that is life-threatening if left untreated.  Patient appears to have 2 distinct right breast cancers at the 9:00 and 4 o'clock position respectively as well as underlying intermediate grade DCIS.  Both cancers are ER and NE positive.  One is HER-2 equivocal and FISH profile has been sent.  The other is HER-2 negative.  Because her disease involves multiple quadrants of the breast, she will need mastectomy with sentinel node biopsy.  I discussed the case with Dr. Tena, surgery who is also seeing her today.  Patient is interested in bilateral mastectomies.  From adjuvant standpoint, we discussed the possibility of postmastectomy radiation for tumors >4 cm in size or for lymph node positivity.  I will await the HER-2 FISH profile before making final decisions regarding chemotherapy.  If she is HER-2 positive, I would recommend adjuvant HER-2 based chemotherapy.  If she is HER-2 negative and node negative (or less than 3 lymph nodes), I would recommend Oncotype to elucidate the role of chemotherapy for this patient.    Both cancers are strongly hormone receptor positive and she would benefit from adjuvant hormone therapy upon completion of other modalities.  She is postmenopausal and we discussed aromatase inhibitor therapy such as letrozole 2.5 mg given daily for minimum 5 years.  Patient's family history is significant for breast and \"female cancer\" which could suggest a hereditary component to her malignancy.  We discussed genetic testing to which the patient is agreeable.  I will be drawn today.  She will be referred to genetic counselor for follow-up.  I will plan to see her back in the post operative setting to review her pathology and finalize adjuvant treatment planning.   "

## 2021-12-23 NOTE — PROGRESS NOTES
"Chief Complaint  Breast Cancer and Appointment    Leo Lomas MD Ahmed, Syed Zulfiqar, MD    Subjective          Lilia Becerra presents to [facility] for evaluation of newly diagnosed right breast cancer.  Patient reports that her breast felt \"itchy\" for the last several months.  She had a mammogram in April which did not demonstrate any obvious abnormalities.  She continued to have symptoms and felt like her nipple was more inverted (she has chronic bilateral nipple inversion).  This prompted a diagnostic mammogram and ultrasound that she had on 11/29/2021.  Ultrasound on the right breast showed a 3.3 cm mass at the 4 o'clock position and a 2.2 cm mass at the 9 o'clock position both suspicious and biopsy recommended.  She underwent biopsy of those lesions on 12/20/2021.  The lesion at the 9 o'clock position demonstrates invasive carcinoma grade 2 ER positive, CT positive, HER-2 equivocal-FISH probe for HER-2 sent, Ki-67 5%.  At the 4 o'clock position demonstrates invasive carcinoma, grade 2, ER positive, CT positive, HER-2 negative, Ki-67 10%.  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  She denies any other obvious masses or adenopathy.  She states she is in good health.  She reports normal appetite and energy level.  She has chronic back pain from prior surgeries which is unchanged for many years.  Patient reports that her mother had breast cancer in her 70s, her grandmother and great aunts all had \"female cancer\".  She presents today for discussion of treatment options.    Oncology/Hematology History    No history exists.       Review of Systems   Constitutional: Negative for appetite change, diaphoresis, fatigue, fever, unexpected weight gain and unexpected weight loss.   HENT: Positive for hearing loss. Negative for mouth sores, sore throat, swollen glands, trouble swallowing and voice change.    Eyes: Negative for blurred vision. "   Respiratory: Negative for cough, shortness of breath and wheezing.    Cardiovascular: Negative for chest pain and palpitations.   Gastrointestinal: Negative for abdominal pain, blood in stool, constipation, diarrhea, nausea and vomiting.   Endocrine: Negative for cold intolerance and heat intolerance.   Genitourinary: Negative for difficulty urinating, dysuria, frequency, hematuria and urinary incontinence.   Musculoskeletal: Positive for back pain. Negative for arthralgias and myalgias.   Skin: Negative for rash, skin lesions and bruise.   Neurological: Negative for dizziness, seizures, weakness, numbness and headache.   Hematological: Does not bruise/bleed easily.   Psychiatric/Behavioral: Negative for depressed mood. The patient is not nervous/anxious.      Current Outpatient Medications on File Prior to Visit   Medication Sig Dispense Refill   • alendronate (FOSAMAX) 35 MG tablet Take 35 mg by mouth Every 7 (Seven) Days.     • apixaban (ELIQUIS) 5 MG tablet tablet Take 1 tablet by mouth Every 12 (Twelve) Hours. 180 tablet 3   • atorvastatin (LIPITOR) 10 MG tablet Take 1 tablet by mouth Every Night. 90 tablet 3   • brimonidine (ALPHAGAN) 0.2 % ophthalmic solution      • Calcium Carb-Cholecalciferol (CALCIUM 600 + D PO) Take 1 tablet by mouth 2 (Two) Times a Day.     • COMBIGAN 0.2-0.5 % ophthalmic solution Administer 2 drops to both eyes 2 (Two) Times a Day.  2   • digoxin (LANOXIN) 125 MCG tablet Take 125 mcg by mouth Daily.     • dilTIAZem CD (CARDIZEM CD) 120 MG 24 hr capsule Take 1 capsule by mouth Daily. 90 capsule 3   • DULoxetine (CYMBALTA) 60 MG capsule Take 60 mg by mouth Daily.     • furosemide (LASIX) 40 MG tablet Take 1 1/2 tablets every morning. Can decrease to one tablet a day if swelling improves 145 tablet 3   • levothyroxine (SYNTHROID, LEVOTHROID) 25 MCG tablet Take 25 mcg by mouth Daily.     • losartan (COZAAR) 25 MG tablet Take 1 tablet by mouth Daily. 90 tablet 3   • METFORMIN HCL PO Take   by mouth.     • metoprolol succinate XL (TOPROL-XL) 25 MG 24 hr tablet Take half a tab at night 45 tablet 3   • Mirabegron ER (Myrbetriq) 25 MG tablet sustained-release 24 hour 24 hr tablet Take 2 tablets by mouth Every Morning for 90 days. 90 tablet 1   • Multiple Vitamins-Minerals (MULTIVITAMIN ADULTS PO) Take 1 capsule by mouth Daily.     • omeprazole (priLOSEC) 20 MG capsule Take 20 mg by mouth Daily.     • oxybutynin XL (DITROPAN-XL) 10 MG 24 hr tablet Take 1 tablet by mouth Daily. 90 tablet 1   • pramipexole (MIRAPEX) 1.5 MG tablet Take 1.5 mg by mouth Every Night.     • spironolactone (ALDACTONE) 25 MG tablet Take 1 tablet by mouth Every Other Day. 45 tablet 3   • VENLAFAXINE HCL ER PO Take 150 mg by mouth Daily.     • vitamin C (ASCORBIC ACID) 500 MG tablet Take 500 mg by mouth Daily.       No current facility-administered medications on file prior to visit.       Allergies   Allergen Reactions   • Eggs Or Egg-Derived Products Swelling   • Sertraline Unknown - High Severity and Unknown - Low Severity   • Tetanus Toxoid Swelling   • Tetanus Toxoids Swelling   • Nsaids Unknown - High Severity     Past Medical History:   Diagnosis Date   • Acid reflux disease    • Arthritis    • Atrial fibrillation (HCC)    • Back pain    • Bladder disorder    • Bowel obstruction (HCC)    • Breast cancer (HCC)    • Broken leg    • Carpal tunnel syndrome    • Constipation    • Depression    • Diabetes mellitus (HCC)    • Diarrhea    • Disease of thyroid gland    • Dyslipidemia    • Fecal incontinence    • Fecal urgency    • GERD (gastroesophageal reflux disease)    • Glaucoma     left eye   • HBP (high blood pressure)    • Hearing impaired     hearing aides   • Hiatal hernia    • High cholesterol    • History of transfusion    • History of tuberculosis     IN 1980'S WAS TREATED   • Hypertension    • Hypomagnesemia    • Kienbock's disease    • Kienböck's disease, right     KIENBOCK'S AVASCULAR NECROSIS OF LUNATE, ADULT RIGHT   •  Left leg pain    • Nonrheumatic aortic valve stenosis 11/5/2020   • OAB (overactive bladder)    • Osteoporosis    • Pneumonia    • PONV (postoperative nausea and vomiting)    • Positive PPD    • Rapid heart rate    • RLS (restless legs syndrome)    • Sleep apnea     NO MACHINE   • Stage 3 chronic kidney disease (HCC)    • Tailbone injury     fracture   • Thyroid disease    • Urinary incontinence     wears pads   • Urinary retention      Past Surgical History:   Procedure Laterality Date   • ANKLE TENDON REPAIR     • BACK SURGERY  12/21/2018-3/2019    LUMBAR--BROKEN DISC, CLEANED OUT--HAS 2ND PROCEDURE TO FINISH REMOVING   • CARPAL TUNNEL RELEASE     • CATARACT EXTRACTION Bilateral    • COLONOSCOPY     • ENDOSCOPY     • HEMORRHOIDECTOMY     • HYSTERECTOMY     • INCISION AND DRAINAGE OF WOUND      on back    • KNEE ARTHROPLASTY Right    • LEG SURGERY  06/13/2019   • LUMBAR DISCECTOMY Left 12/21/2018    Procedure: LEFT L4-5 MICRO DISCECTOMY;  Surgeon: Adam Coleman DO;  Location: [facility] OR;  Service: Orthopedic Spine   • LUMBAR DISCECTOMY Left 3/29/2019    Procedure: LEFT L4-5 REVISION OF MICRODISCECTOMY;  Surgeon: Adam Coleman DO;  Location: [facility] OR;  Service: Orthopedic Spine   • LUMBAR FUSION     • TENDON RELEASE  09/2018   • TOTAL KNEE ARTHROPLASTY Left 6/13/2019    Procedure: LEFT KNEE REVISION;  Surgeon: Malick Costa II, MD;  Location: [facility] OR;  Service: Orthopedics   • TOTAL KNEE ARTHROPLASTY REVISION Left     x2   • TOTAL KNEE ARTHROPLASTY REVISION Left 2/13/2018    Procedure: LEFT TOTAL KNEE ARTHROPLASTY REVISION;  Surgeon: Jair Fajardo MD;  Location: [facility] OR;  Service:    • VERTEBROPLASTY       Social History     Socioeconomic History   • Marital status:    Tobacco Use   • Smoking status: Never Smoker   • Smokeless tobacco: Never Used   Vaping Use   • Vaping Use: Never used   Substance and Sexual Activity   • Alcohol use: No   • Drug use: No   • Sexual  activity: Defer     Family History   Problem Relation Age of Onset   • Breast cancer Mother    • Tuberculosis Mother    • Diabetes Father    • Diabetes Sister    • Malig Hyperthermia Neg Hx        Objective   Physical Exam  Vitals reviewed. Exam conducted with a chaperone present.   Constitutional:       General: She is not in acute distress.     Appearance: Normal appearance.   HENT:      Head: Normocephalic and atraumatic.   Eyes:      Conjunctiva/sclera: Conjunctivae normal.      Pupils: Pupils are equal, round, and reactive to light.   Cardiovascular:      Rate and Rhythm: Normal rate and regular rhythm.      Heart sounds: Murmur (3/6 systolic ejection murmur) heard.   No gallop.    Pulmonary:      Effort: Pulmonary effort is normal.      Breath sounds: Normal breath sounds.   Chest:   Breasts:      Right: Inverted nipple (Chronic), mass (See diagram) and skin change (See diagram) present. No swelling, bleeding, nipple discharge, tenderness, axillary adenopathy or supraclavicular adenopathy.      Left: Inverted nipple (Chronic and unchanged from previous) present. No swelling, bleeding, mass, nipple discharge, skin change, tenderness, axillary adenopathy or supraclavicular adenopathy.         Abdominal:      General: Abdomen is flat. Bowel sounds are normal. There is no distension.      Palpations: Abdomen is soft.      Tenderness: There is no abdominal tenderness.   Musculoskeletal:      Cervical back: Neck supple.      Right lower leg: Edema (Trace) present.      Left lower leg: Edema (Trace) present.   Lymphadenopathy:      Cervical: No cervical adenopathy.      Upper Body:      Right upper body: No supraclavicular or axillary adenopathy.      Left upper body: No supraclavicular or axillary adenopathy.   Neurological:      Mental Status: She is alert and oriented to person, place, and time.   Psychiatric:         Mood and Affect: Mood normal.         Behavior: Behavior normal.         Vitals:    12/23/21 1123  "  BP: 133/64   Pulse: 76   Resp: 18   Temp: 97.1 °F (36.2 °C)   SpO2: 100%   Weight: 92 kg (202 lb 13.2 oz)   Height: 162.6 cm (64\")   PainSc: 0-No pain               PHQ-9 Total Score: 2                  Result Review :   The following data was reviewed by: Sean Ward MD on 12/23/2021:  Lab Results   Component Value Date    HGB 12.4 10/13/2020    HCT 39.1 10/13/2020    MCV 93.5 10/13/2020     10/13/2020    WBC 11.34 (H) 10/13/2020    NEUTROABS 8.09 10/13/2020    LYMPHSABS 2.41 10/13/2020    MONOSABS 0.56 10/13/2020    EOSABS 0.19 10/13/2020    BASOSABS 0.05 10/13/2020     Lab Results   Component Value Date    GLUCOSE 86 06/17/2019    BUN 14 09/15/2021    CREATININE 1.2 09/15/2021     09/15/2021    K 4.2 09/15/2021     (H) 09/15/2021    CO2 21 09/15/2021    CALCIUM 9.5 09/15/2021    PROTEINTOT 6.5 06/13/2019    ALBUMIN 2.80 (L) 06/17/2019    BILITOT 0.4 06/13/2019    ALKPHOS 93 06/13/2019    AST 16 06/13/2019    ALT 10 06/13/2019           Assessment and Plan    Diagnoses and all orders for this visit:    1. Malignant neoplasm of overlapping sites of right breast in female, estrogen receptor positive (HCC) (Primary)  Assessment & Plan:  This is a new diagnosis to me and 1 that is life-threatening if left untreated.  Patient appears to have 2 distinct right breast cancers at the 9:00 and 4 o'clock position respectively as well as underlying intermediate grade DCIS.  Both cancers are ER and PA positive.  One is HER-2 equivocal and FISH profile has been sent.  The other is HER-2 negative.  Because her disease involves multiple quadrants of the breast, she will need mastectomy with sentinel node biopsy.  I discussed the case with Dr. Tena, surgery who is also seeing her today.  Patient is interested in bilateral mastectomies.  From adjuvant standpoint, we discussed the possibility of postmastectomy radiation for tumors >4 cm in size or for lymph node positivity.  I will await the HER-2 " "FISH profile before making final decisions regarding chemotherapy.  If she is HER-2 positive, I would recommend adjuvant HER-2 based chemotherapy.  If she is HER-2 negative and node negative (or less than 3 lymph nodes), I would recommend Oncotype to elucidate the role of chemotherapy for this patient.    Both cancers are strongly hormone receptor positive and she would benefit from adjuvant hormone therapy upon completion of other modalities.  She is postmenopausal and we discussed aromatase inhibitor therapy such as letrozole 2.5 mg given daily for minimum 5 years.  Patient's family history is significant for breast and \"female cancer\" which could suggest a hereditary component to her malignancy.  We discussed genetic testing to which the patient is agreeable.  I will be drawn today.  She will be referred to genetic counselor for follow-up.  I will plan to see her back in the post operative setting to review her pathology and finalize adjuvant treatment planning.     Orders:  -     Ambulatory Referral to Genetic Counseling/Testing - [facility]          Patient Follow Up: After surgery    Patient was given instructions and counseling regarding her condition or for health maintenance advice. Please see specific information pulled into the AVS if appropriate.     Sean Ward MD    12/23/2021          "

## 2021-12-23 NOTE — PROGRESS NOTES
Chief Complaint: No chief complaint on file.    Subjective         History of Present Illness  Lilia Becerra is a 80 y.o. female presents to [facility] to be seen for 2 masses in right breast that were biopsied and returned as:      Clinical Information    Breast mass   Comment:         Final Diagnosis   1. 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)  - Histologic grade (Ramon Histologic Score):    - Glandular (Acinar)/Tubular Differentiation:  Score 3  - Nuclear Pleomorphism:  Score 2  - Mitotic Rate:  Score 2  - Overall Grade:  Grade 2               - Size of largest focus of invasion: 9 mm               - Breast biomarker testing:                            - Estrogen Receptor (ER):  Positive (100%, strong)                            - Progesterone Receptor (PgR):  Positive (95%, strong)                            - HER2 (by immunohistochemistry):  Negative (Score 1+)  - Ki-67: 10%               - Other findings:                            - 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    Focused right breast ultrasound was also performed.     Mammogram:  Left breast:  the mammographic appearance of her left breast is stable.  The breast   pattern is diffusely nodular.  There are scattered large round lucent centered calcifications as   well as vascular calcifications and large aristides secretory type calcifications throughout her breast.    No new findings.  Her left nipple is chronically inverted.     Right breast:  The breast pattern is stable and diffusely nodular.  There are large round lucent   centered calcifications as well as vascular calcifications and large aristides secretory type   calcifications.  Within the inferior slightly medial right breast posterior depth there are   heterogeneous regional microcalcifications which appear more numerous than on previous studies.  No   suspicious mass or architectural distortions.  Her right nipple is chronically inverted.          Her symptoms her symptoms were I discussed in order to try and talar the imaging workup further.    She does report some itching in her right breast which is more diffuse and she states that her   right breast appears thacker and feels different than her left and she believes this is a change.    When questioned about her nipple discharge she states that she has had small amount of milky   discharge from each nipple for many years.  Her nipples are inverted bilaterally which she says has   always been that way for many years as well.  She denies any nipple flanking or nipple itching.     Bilateral breast ultrasound:  Ultrasound of her right breast was performed in order to further   evaluate her symptoms and fullness.  The right breast was scanned by the technologist as well as   myself.  The left breast was scanned for comparison.   Within the lower inner right breast at   approximately the 4 o'clock position there is a hypoechoic shadowing lobular approximate 3.3 x 2.2   x 1.3 cm mass with mixed echogenicity.  There are some echogenic foci which are likely   calcifications.  This is an asymmetric sonographic appearance when compared with the overall   sonographic breast pattern in each breast.  Tissue sampling of this is recommended.  There is also   a 2.2 x1.1 x 1.3 cm anechoic lobular mass with some internal echoes and subtle through transmission   close to the chest wall at the 9 o'clock position in the right breast 6 cm from the nipple.  This   may be a complicated cyst, neoplasm is not excluded.  Tissue sampling of this is recommended.     There are couple areas of shadowing calcifications within the left breast but no discrete mass or   areas of tissue distortion.        IMPRESSION:  1. Within the lower inner right breast middle and posterior depth at approximately the 4 o'clock   position , there is a 3.3 x 2.2 x 1.3 cm mixed echogenicity lobular shadowing mass on ultrasound.    Some of the internal echoes is most likely due to micro calcifications in this area on mammogram.    Ultrasound-guided core biopsy of this is recommended.    2. There is also a 2.2 x 1.1 x 1.3 cm lobular hypoechoic mass near the chest wall at the 9 o'clock   position in the right breast is also present.  This may be a complicated cysts but neoplastic   disease is not excluded.  Ultrasound-guided core biopsy of this is also recommended.  Post biopsy   mammogram is recommended since she has a complicated breast pattern mammographically.    2. Her nipple discharge sound physiologic it is bilateral and milky.  Further management for this   can be made on a clinical basis.  Management of her right breast itching can also be managed on a   clinical basis.  She denies any actual nipple flanking or nipple itching and has very dry skin.    3. Stable appearance of the  left breast.  Annual screening mammography of the left breast can   continue.     I discussed these findings and recommendations with the patient.     RECOMMENDATION(S):               ULTRASOUND-GUIDED CORE BIOPSY: RIGHT BREAST       BIRADS:               DIAGNOSTIC CATEGORY 4--SUSPICIOUS         Objective     Past Medical History:   Diagnosis Date   • Acid reflux disease    • Arthritis    • Atrial fibrillation (HCC)    • Back pain    • Bladder disorder    • Bowel obstruction (HCC)    • Broken leg    • Carpal tunnel syndrome    • Constipation    • Depression    • Diabetes mellitus (HCC)    • Diarrhea    • Disease of thyroid gland    • Dyslipidemia    • Fecal incontinence    • Fecal urgency    • GERD (gastroesophageal reflux disease)    • Glaucoma     left eye   • HBP (high blood pressure)    • Hearing impaired     hearing aides   • Hiatal hernia    • High cholesterol    • History of transfusion    • History of tuberculosis     IN 1980'S WAS TREATED   • Hypertension    • Hypomagnesemia    • Kienbock's disease    • Kienböck's disease, right     KIENBOCK'S AVASCULAR NECROSIS OF LUNATE, ADULT RIGHT   • Left leg pain    • Nonrheumatic aortic valve stenosis 11/5/2020   • OAB (overactive bladder)    • Osteoporosis    • Pneumonia    • PONV (postoperative nausea and vomiting)    • Positive PPD    • Rapid heart rate    • RLS (restless legs syndrome)    • Sleep apnea     NO MACHINE   • Stage 3 chronic kidney disease (HCC)    • Tailbone injury     fracture   • Thyroid disease    • Urinary incontinence     wears pads   • Urinary retention        Past Surgical History:   Procedure Laterality Date   • ANKLE TENDON REPAIR     • BACK SURGERY  12/21/2018-3/2019    LUMBAR--BROKEN DISC, CLEANED OUT--HAS 2ND PROCEDURE TO FINISH REMOVING   • CARPAL TUNNEL RELEASE     • CATARACT EXTRACTION Bilateral    • COLONOSCOPY     • ENDOSCOPY     • HEMORRHOIDECTOMY     • HYSTERECTOMY     • INCISION AND DRAINAGE OF WOUND      on back    • KNEE  ARTHROPLASTY Right    • LEG SURGERY  06/13/2019   • LUMBAR DISCECTOMY Left 12/21/2018    Procedure: LEFT L4-5 MICRO DISCECTOMY;  Surgeon: Adam Coleman DO;  Location: [facility] MAIN OR;  Service: Orthopedic Spine   • LUMBAR DISCECTOMY Left 3/29/2019    Procedure: LEFT L4-5 REVISION OF MICRODISCECTOMY;  Surgeon: Adam Coleman DO;  Location: [facility] MAIN OR;  Service: Orthopedic Spine   • LUMBAR FUSION     • TENDON RELEASE  09/2018   • TOTAL KNEE ARTHROPLASTY Left 6/13/2019    Procedure: LEFT KNEE REVISION;  Surgeon: Malick Costa II, MD;  Location: [facility] OR;  Service: Orthopedics   • TOTAL KNEE ARTHROPLASTY REVISION Left     x2   • TOTAL KNEE ARTHROPLASTY REVISION Left 2/13/2018    Procedure: LEFT TOTAL KNEE ARTHROPLASTY REVISION;  Surgeon: Jair Fajardo MD;  Location: [facility] OR;  Service:    • VERTEBROPLASTY           Current Outpatient Medications:   •  alendronate (FOSAMAX) 35 MG tablet, Take 35 mg by mouth Every 7 (Seven) Days., Disp: , Rfl:   •  apixaban (ELIQUIS) 5 MG tablet tablet, Take 1 tablet by mouth Every 12 (Twelve) Hours., Disp: 180 tablet, Rfl: 3  •  atorvastatin (LIPITOR) 10 MG tablet, Take 1 tablet by mouth Every Night., Disp: 90 tablet, Rfl: 3  •  brimonidine (ALPHAGAN) 0.2 % ophthalmic solution, , Disp: , Rfl:   •  Calcium Carb-Cholecalciferol (CALCIUM 600 + D PO), Take 1 tablet by mouth 2 (Two) Times a Day., Disp: , Rfl:   •  COMBIGAN 0.2-0.5 % ophthalmic solution, Administer 2 drops to both eyes 2 (Two) Times a Day., Disp: , Rfl: 2  •  digoxin (LANOXIN) 125 MCG tablet, Take 125 mcg by mouth Daily., Disp: , Rfl:   •  dilTIAZem CD (CARDIZEM CD) 120 MG 24 hr capsule, Take 1 capsule by mouth Daily., Disp: 90 capsule, Rfl: 3  •  DULoxetine (CYMBALTA) 60 MG capsule, Take 60 mg by mouth Daily., Disp: , Rfl:   •  furosemide (LASIX) 40 MG tablet, Take 1 1/2 tablets every morning. Can decrease to one tablet a day if swelling improves, Disp: 145 tablet, Rfl: 3  •  levothyroxine  (SYNTHROID, LEVOTHROID) 25 MCG tablet, Take 25 mcg by mouth Daily., Disp: , Rfl:   •  losartan (COZAAR) 25 MG tablet, Take 1 tablet by mouth Daily., Disp: 90 tablet, Rfl: 3  •  METFORMIN HCL PO, Take  by mouth., Disp: , Rfl:   •  metoprolol succinate XL (TOPROL-XL) 25 MG 24 hr tablet, Take half a tab at night, Disp: 45 tablet, Rfl: 3  •  Mirabegron ER (Myrbetriq) 25 MG tablet sustained-release 24 hour 24 hr tablet, Take 2 tablets by mouth Every Morning for 90 days., Disp: 90 tablet, Rfl: 1  •  Multiple Vitamins-Minerals (MULTIVITAMIN ADULTS PO), Take 1 capsule by mouth Daily., Disp: , Rfl:   •  omeprazole (priLOSEC) 20 MG capsule, Take 20 mg by mouth Daily., Disp: , Rfl:   •  oxybutynin XL (DITROPAN-XL) 10 MG 24 hr tablet, Take 1 tablet by mouth Daily., Disp: 90 tablet, Rfl: 1  •  pramipexole (MIRAPEX) 1.5 MG tablet, Take 1.5 mg by mouth Every Night., Disp: , Rfl:   •  spironolactone (ALDACTONE) 25 MG tablet, Take 1 tablet by mouth Every Other Day., Disp: 45 tablet, Rfl: 3  •  VENLAFAXINE HCL ER PO, Take 150 mg by mouth Daily., Disp: , Rfl:   •  vitamin C (ASCORBIC ACID) 500 MG tablet, Take 500 mg by mouth Daily., Disp: , Rfl:     Allergies   Allergen Reactions   • Eggs Or Egg-Derived Products Swelling   • Sertraline Unknown - High Severity and Unknown - Low Severity   • Tetanus Toxoid Swelling   • Tetanus Toxoids Swelling   • Nsaids Unknown - High Severity        Family History   Problem Relation Age of Onset   • Breast cancer Mother    • Tuberculosis Mother    • Diabetes Father    • Diabetes Sister    • Malig Hyperthermia Neg Hx        Social History     Socioeconomic History   • Marital status:    Tobacco Use   • Smoking status: Never Smoker   • Smokeless tobacco: Never Used   Vaping Use   • Vaping Use: Never used   Substance and Sexual Activity   • Alcohol use: No   • Drug use: No   • Sexual activity: Defer       Vital Signs:   There were no vitals taken for this visit.   Review of Systems    Physical  Exam     Result Review :               Assessment and Plan    Diagnoses and all orders for this visit:    1. Malignant neoplasm of overlapping sites of right breast in female, estrogen receptor positive (HCC) (Primary)    Several family members have had ovarian cancer possibly as well as breast cancer so genetic testing is going to be done.  They are awaiting a HER-2/deirdre pathology.      Follow Up   No follow-ups on file.  Patient was given instructions and counseling regarding her condition or for health maintenance advice. Please see specific information pulled into the AVS if appropriate.         This document has been electronically signed by Lety Tena MD  December 23, 2021 09:52 EST

## 2021-12-28 ENCOUNTER — TELEPHONE (OUTPATIENT)
Dept: UROLOGY | Facility: CLINIC | Age: 81
End: 2021-12-28

## 2021-12-28 LAB
CYTO UR: NORMAL
LAB AP CASE REPORT: NORMAL
LAB AP CLINICAL INFORMATION: NORMAL
LAB AP DIAGNOSIS COMMENT: NORMAL
LAB AP SPECIAL STAINS: NORMAL
PATH REPORT.ADDENDUM SPEC: NORMAL
PATH REPORT.FINAL DX SPEC: NORMAL
PATH REPORT.GROSS SPEC: NORMAL

## 2021-12-28 NOTE — TELEPHONE ENCOUNTER
Lety called from PAT.  Pt is a little confused.  Lety is asking for you to call the pt for more understanding.  She says pt didn't realize both breast would be removed.  And questions of bx results.  She says no one went of the bx with her.

## 2021-12-28 NOTE — TELEPHONE ENCOUNTER
Per Dr. Davidson: Several family members have had ovarian cancer possibly as well as breast cancer so genetic testing is going to be done.   They are awaiting a HER-2/deirdre pathology.   We will plan for bilateral mastectomy with right sentinel node identification and biopsy possible axillary dissection on January 11, 2022.  They are hoping to have both her genetics and her HER-2 receptor status back by that point in time.  If the HER-2 status returned as positive then we will change the surgery to port placement as Dr. Villalobos would like to do chemotherapy first given the size of the tumors.  If the genetics are negative we will also change the surgery to a right mastectomy with sentinel node and leave the left breast is the patient does not want to remove it unless she is genetically positive.     She called patient back .            Previous Messages

## 2021-12-29 ENCOUNTER — DOCUMENTATION (OUTPATIENT)
Dept: GENETICS | Facility: HOSPITAL | Age: 81
End: 2021-12-29

## 2021-12-29 ENCOUNTER — CLINICAL SUPPORT (OUTPATIENT)
Dept: GENETICS | Facility: HOSPITAL | Age: 81
End: 2021-12-29

## 2021-12-29 DIAGNOSIS — Z17.0 MALIGNANT NEOPLASM OF RIGHT BREAST IN FEMALE, ESTROGEN RECEPTOR POSITIVE, UNSPECIFIED SITE OF BREAST (HCC): Primary | ICD-10-CM

## 2021-12-29 DIAGNOSIS — C50.911 MALIGNANT NEOPLASM OF RIGHT BREAST IN FEMALE, ESTROGEN RECEPTOR POSITIVE, UNSPECIFIED SITE OF BREAST (HCC): Primary | ICD-10-CM

## 2021-12-29 NOTE — PROGRESS NOTES
Lilia Becerra is an 81-year-old female who was referred for genetic counseling due to a personal diagnosis of breast cancer.  Genetic counseling was provided via telephone.  Ms. Becerra was diagnosed with an ER/UT+ invasive ductal breast cancer of the 81.  Ms. Becerra was interested in pursuing genetic testing, and a sample was previously sent by Dr. Ward to [facility].  The [facility] Cancer Panel was ordered which analyzes 47 genes associated with an increased cancer risk.  Results from this testing are expected in approximately 10-14 days, and I will call Ms. Becerra as soon as results are available.  I have notified [facility] that results may impact surgical decision making and requested a STAT order.    PERTINENT FAMILY HISTORY: (See attached pedigree)   Mother:  Breast cancer, 70s  Mat. Grandmother: Ovarian cancer, 70s-80s  Mat. Great-Aunts (x2): GYN cancer, specific type not known    We do not have medical records to confirm the diagnoses in Ms. Becerra’s family.    RISK ASSESSMENT:  Ms. Becerra’s personal diagnosis of breast cancer in addition to her family history of breast and ovarian cancer raises the question of a hereditary breast cancer syndrome.  Ms. Becerra meets NCCN guidelines criteria for BRCA1/2 testing based on her age at diagnosis.  We discussed that genetic testing is typically ordered through a multigene panel evaluating BRCA1/2 as well as other genes associated with a hereditary risk for breast cancer.     GENETIC COUNSELING: We reviewed the family history information in detail.  Cases of cancer follow three general patterns: sporadic, familial, and hereditary.  While most cancer is sporadic, some cases appear to occur in family clusters.  These cases are said to be familial and account for 10-20% of breast cancer cases.  Familial cases may be due to a combination of shared genes and environmental factors.  In even fewer cases (5-10%), the risk for cancer is inherited, and the genes responsible for the  increased cancer risk are known.    Family histories typical of hereditary cancer syndromes usually include multiple first- and second-degree relatives diagnosed with cancer types that define a syndrome.  These cases tend to be diagnosed at younger-than-expected ages and can be bilateral or multifocal.  The cancer in these families follows an autosomal dominant inheritance pattern, which indicates the likely presence of a mutation in a cancer   susceptibility gene.  Children and siblings of an individual believed to carry this mutation have a 50% chance of inheriting that mutation, thereby inheriting the increased risk to develop cancer.  These mutations can be passed down from the maternal or the paternal lineage.    Hereditary breast cancer accounts for 5-10% of all cases of breast cancer.  A significant proportion of hereditary breast cancer can be attributed to mutations in the BRCA1 and BRCA2 genes.  Mutations in these genes confer an increased risk for breast cancer, ovarian cancer, male breast cancer, prostate cancer and pancreatic cancer.  Women with a BRCA1 or BRCA2 mutation who have already been diagnosed with breast cancer have a 40-60% lifetime risk of a second breast cancer. Recent studies have shown that mutations in the PALB2 gene lead to increased cancer risks similar to those of BRCA1/2 mutations.  There are additional genes associated with high to moderate breast cancer risk, including MELINA, CHEK2, CDH1, PALB2, PTEN, and TP53 genes. For some of these genes, there may be other cancers associated.  We discussed that understanding of risk and management varies depending on the specific gene identified to have a mutation, if any.  Multigene panel testing is pending for Ms. Becerra.    GENETIC TESTING:  The risks, benefits and limitations of gene testing and implications for clinical management following testing were reviewed.  DNA test results can influence decisions regarding screening, prevention and  surgical management.  Genetic testing can have significant psychological implications for both individuals and families.  Also discussed was the possibility of employment and insurance discrimination based on genetic test results and the laws in place to prevent this.  The implications of a positive or negative test result were discussed.  We also discussed the possibility that, in some cases, genetic test results may be ambiguous due to the identification of a genetic variant of uncertain significance (VUS).  These variants may or may not be associated with an increased cancer risk.  VUSs are frequently reported through multigene panel testing, and the vast majority are ultimately reclassified as benign. The limited value of negative test results was emphasized, particularly given the significant population risk for breast cancer and the existence of other cancer susceptibility genes.  Given her personal history, a negative test result would not eliminate all breast cancer risk to her family members, although the risk would not be as high as it would with positive genetic testing.      Plan:  A sample for genetic testing was previously sent to [facility] by Dr. Ward’s office.  I placed the accompanying order today.  I will call patient with results once they are available.        Sonja Diaz MS, [facility], [facility]  Licensed Certified Genetic Counselor

## 2021-12-29 NOTE — PROGRESS NOTES
Lilia Becerra is an 81-year-old female who was referred for genetic counseling due to a personal diagnosis of breast cancer.  Genetic counseling was provided via telephone.  Ms. Becerra was diagnosed with an ER/KY+ invasive ductal breast cancer of the 81.  Ms. Becerra was interested in pursuing genetic testing, and a sample was previously sent by Dr. Ward to [email or web address].  The [facility] Cancer Panel was ordered which analyzes 47 genes associated with an increased cancer risk.  Results from this testing are expected in approximately 10-14 days, and I will call Ms. Becerra as soon as results are available.  I have notified [facility] that results may impact surgical decision making and requested a STAT order.    PERTINENT FAMILY HISTORY: (See attached pedigree)   Mother:   Breast cancer, 70s  Mat. Grandmother: Ovarian cancer, 70s-80s  Mat. Great-Aunts (x2): GYN cancer, specific type not known    We do not have medical records to confirm the diagnoses in Ms. Becerra’s family.    RISK ASSESSMENT:  Ms. Becerra’s personal diagnosis of breast cancer in addition to her family history of breast and ovarian cancer raises the question of a hereditary breast cancer syndrome.  Ms. Becerra meets NCCN guidelines criteria for BRCA1/2 testing based on her age at diagnosis.  We discussed that genetic testing is typically ordered through a multigene panel evaluating BRCA1/2 as well as other genes associated with a hereditary risk for breast cancer.     GENETIC COUNSELING: We reviewed the family history information in detail.  Cases of cancer follow three general patterns: sporadic, familial, and hereditary.  While most cancer is sporadic, some cases appear to occur in family clusters.  These cases are said to be familial and account for 10-20% of breast cancer cases.  Familial cases may be due to a combination of shared genes and environmental factors.  In even fewer cases (5-10%), the risk for cancer is inherited, and the genes responsible for the  increased cancer risk are known.    Family histories typical of hereditary cancer syndromes usually include multiple first- and second-degree relatives diagnosed with cancer types that define a syndrome.  These cases tend to be diagnosed at younger-than-expected ages and can be bilateral or multifocal.  The cancer in these families follows an autosomal dominant inheritance pattern, which indicates the likely presence of a mutation in a cancer   susceptibility gene.  Children and siblings of an individual believed to carry this mutation have a 50% chance of inheriting that mutation, thereby inheriting the increased risk to develop cancer.  These mutations can be passed down from the maternal or the paternal lineage.    Hereditary breast cancer accounts for 5-10% of all cases of breast cancer.  A significant proportion of hereditary breast cancer can be attributed to mutations in the BRCA1 and BRCA2 genes.  Mutations in these genes confer an increased risk for breast cancer, ovarian cancer, male breast cancer, prostate cancer and pancreatic cancer.  Women with a BRCA1 or BRCA2 mutation who have already been diagnosed with breast cancer have a 40-60% lifetime risk of a second breast cancer. Recent studies have shown that mutations in the PALB2 gene lead to increased cancer risks similar to those of BRCA1/2 mutations.  There are additional genes associated with high to moderate breast cancer risk, including MELINA, CHEK2, CDH1, PALB2, PTEN, and TP53 genes. For some of these genes, there may be other cancers associated.  We discussed that understanding of risk and management varies depending on the specific gene identified to have a mutation, if any.  Multigene panel testing is pending for Ms. Becerra.    GENETIC TESTING:  The risks, benefits and limitations of gene testing and implications for clinical management following testing were reviewed.  DNA test results can influence decisions regarding screening, prevention and  surgical management.  Genetic testing can have significant psychological implications for both individuals and families.  Also discussed was the possibility of employment and insurance discrimination based on genetic test results and the laws in place to prevent this.  The implications of a positive or negative test result were discussed.  We also discussed the possibility that, in some cases, genetic test results may be ambiguous due to the identification of a genetic variant of uncertain significance (VUS).  These variants may or may not be associated with an increased cancer risk.  VUSs are frequently reported through multigene panel testing, and the vast majority are ultimately reclassified as benign. The limited value of negative test results was emphasized, particularly given the significant population risk for breast cancer and the existence of other cancer susceptibility genes.  Given her personal history, a negative test result would not eliminate all breast cancer risk to her family members, although the risk would not be as high as it would with positive genetic testing.      Plan:  A sample for genetic testing was previously sent to [facility] by Dr. Ward’s office.  I placed the accompanying order today.  I will call patient with results once they are available.        Sonja Diaz MS, [facility] [place], [facility]  Licensed Certified Genetic Counselor

## 2021-12-30 ENCOUNTER — TELEPHONE (OUTPATIENT)
Dept: CARDIOLOGY | Facility: CLINIC | Age: 81
End: 2021-12-30

## 2021-12-30 DIAGNOSIS — R60.0 PEDAL EDEMA: Primary | ICD-10-CM

## 2022-01-03 ENCOUNTER — TELEPHONE (OUTPATIENT)
Dept: ONCOLOGY | Facility: HOSPITAL | Age: 82
End: 2022-01-03

## 2022-01-03 ENCOUNTER — TELEPHONE (OUTPATIENT)
Dept: OCCUPATIONAL THERAPY | Facility: HOSPITAL | Age: 82
End: 2022-01-03

## 2022-01-03 ENCOUNTER — ANESTHESIA EVENT (OUTPATIENT)
Dept: PERIOP | Facility: HOSPITAL | Age: 82
End: 2022-01-03

## 2022-01-03 DIAGNOSIS — C50.811 MALIGNANT NEOPLASM OF OVERLAPPING SITES OF RIGHT BREAST IN FEMALE, ESTROGEN RECEPTOR POSITIVE: Primary | ICD-10-CM

## 2022-01-03 DIAGNOSIS — Z17.0 MALIGNANT NEOPLASM OF OVERLAPPING SITES OF RIGHT BREAST IN FEMALE, ESTROGEN RECEPTOR POSITIVE: Primary | ICD-10-CM

## 2022-01-03 NOTE — TELEPHONE ENCOUNTER
Instructed patient to call dr butterfield office. She is performing the surgery. Verbalized understanding

## 2022-01-03 NOTE — TELEPHONE ENCOUNTER
Caller: Lilia Becerra    Relationship: Self    Best call back number: [phone]    What is the best time to reach you: ANY    Who are you requesting to speak with (clinical staff, provider,  specific staff member): CLINICAL      What was the call regarding: PATIENT CALLED STATED SHE HAS QUESTIONS ABOUT PROCEDURE COMING UP AND THEY WERE ASKING HER WHAT HER NUMBERS WERE. SHE DOESN'T UNDERSTAND WHAT THEY ARE NEEDING     Do you require a callback: YES

## 2022-01-06 ENCOUNTER — DOCUMENTATION (OUTPATIENT)
Dept: GENETICS | Facility: HOSPITAL | Age: 82
End: 2022-01-06

## 2022-01-06 NOTE — PROGRESS NOTES
Lilia Becerra is an 81-year-old female who was referred for genetic counseling due to a personal diagnosis of breast cancer.  Genetic counseling was provided via telephone.  Ms. Becerra was diagnosed with an ER/NM+ invasive ductal breast cancer of the 81.  Ms. Becerra was interested in pursuing genetic testing, and a sample was previously sent by Dr. Ward to [facility].  The [facility] Cancer Panel was ordered which analyzes 47 genes associated with an increased cancer risk.  Results from this testing are expected in approximately 10-14 days, and I will call Ms. Becerra as soon as results are available.  Genetic testing was negative by DNA sequencing and rearrangement testing for deleterious mutations in the BRCA1/2 genes and 45 additional genes included on the [facility] Panel (see attached results). Several attempts were made to contact Ms. Becerra with these normal results and her number did not allow a voicemail to be left.     PERTINENT FAMILY HISTORY: (See attached pedigree)   Mother:   Breast cancer, 70s  Mat. Grandmother:  Ovarian cancer, 70s-80s  Mat. Great-Aunts (x2):  GYN cancer, specific type not known    We do not have medical records to confirm the diagnoses in Ms. Becerra’s family.    RISK ASSESSMENT:  Ms. Becerra’s personal diagnosis of breast cancer in addition to her family history of breast and ovarian cancer raises the question of a hereditary breast cancer syndrome.  Ms. Becerra meets NCCN guidelines criteria for BRCA1/2 testing based on her age at diagnosis.  We discussed that genetic testing is typically ordered through a multigene panel evaluating BRCA1/2 as well as other genes associated with a hereditary risk for breast cancer.     GENETIC COUNSELING: We reviewed the family history information in detail.  Cases of cancer follow three general patterns: sporadic, familial, and hereditary.  While most cancer is sporadic, some cases appear to occur in family clusters.  These cases are said to be familial and account  for 10-20% of breast cancer cases.  Familial cases may be due to a combination of shared genes and environmental factors.  In even fewer cases (5-10%), the risk for cancer is inherited, and the genes responsible for the increased cancer risk are known.    Family histories typical of hereditary cancer syndromes usually include multiple first- and second-degree relatives diagnosed with cancer types that define a syndrome.  These cases tend to be diagnosed at younger-than-expected ages and can be bilateral or multifocal.  The cancer in these families follows an autosomal dominant inheritance pattern, which indicates the likely presence of a mutation in a cancer susceptibility gene.  Children and siblings of an individual believed to carry this mutation have a 50% chance of inheriting that mutation, thereby inheriting the increased risk to develop cancer.  These mutations can be passed down from the maternal or the paternal lineage.    Hereditary breast cancer accounts for 5-10% of all cases of breast cancer.  A significant proportion of hereditary breast cancer can be attributed to mutations in the BRCA1 and BRCA2 genes.  Mutations in these genes confer an increased risk for breast cancer, ovarian cancer, male breast cancer, prostate cancer and pancreatic cancer.  Women with a BRCA1 or BRCA2 mutation who have already been diagnosed with breast cancer have a 40-60% lifetime risk of a second breast cancer. Recent studies have shown that mutations in the PALB2 gene lead to increased cancer risks similar to those of BRCA1/2 mutations.  There are additional genes associated with high to moderate breast cancer risk, including MELINA, CHEK2, CDH1, PALB2, PTEN, and TP53 genes. For some of these genes, there may be other cancers associated.  We discussed that understanding of risk and management varies depending on the specific gene identified to have a mutation, if any.  Multigene panel testing is pending for Ms.  Hernan.    GENETIC TESTING:  The risks, benefits and limitations of gene testing and implications for clinical management following testing were reviewed.  DNA test results can influence decisions regarding screening, prevention and surgical management.  Genetic testing can have significant psychological implications for both individuals and families.  Also discussed was the possibility of employment and insurance discrimination based on genetic test results and the laws in place to prevent this.  The implications of a positive or negative test result were discussed.  We also discussed the possibility that, in some cases, genetic test results may be ambiguous due to the identification of a genetic variant of uncertain significance (VUS).  These variants may or may not be associated with an increased cancer risk.  VUSs are frequently reported through multigene panel testing, and the vast majority are ultimately reclassified as benign. The limited value of negative test results was emphasized, particularly given the significant population risk for breast cancer and the existence of other cancer susceptibility genes.  Given her personal history, a negative test result would not eliminate all breast cancer risk to her family members, although the risk would not be as high as it would with positive genetic testing.      TEST RESULTS:  Genetic testing was negative by sequencing, deletion/duplication testing for mutations in BRCA1/2 and the additional 45 genes on the [facility] panel.  The negative result greatly lowers the risk of a hereditary cancer syndrome for Ms. Becerra.  Ms. Becerra’s unaffected female relatives may still be considered to have a somewhat increased risk for breast cancer based on the family history. This assessment is based on the information provided at the time of the consultation.    Cancer Prevention:  Despite the negative genetic test results, Ms. Becerra’s female relatives may have a somewhat increased  lifetime risk for breast cancer based on family history.  Female relatives could have a risk assessment performed using a family history-based model, such as the Tyrer-Cuzick model, to determine their individual risks.   Given the increased risk, options available to individuals with an elevated lifetime risk for breast cancer were briefly discussed.   Surveillance for individuals with an elevated lifetime risk of breast cancer (>20%, versus the average risk of 12%), based on NCCN guidelines, would consist of semi-annual clinical breast exams and monthly self-breast exams starting by age 18 and annual mammography starting 10 years younger than the earliest diagnosis in a close relative, or by age 40.  According to an American Cancer Society expert panel and NCCN guidelines, annual breast MRI should be offered to women whose lifetime risk of breast cancer is 20-25 percent or more.      PLAN:  Genetic counseling remains available for Ms. Becerra.  If Ms. Becerra has any questions or concerns she is welcome to call us at [phone].      Sonja Diaz MS, [facility] [place], [facility]  Licensed Certified Genetic Counselor      Cc: MD Lety Sawnn MD

## 2022-01-10 ENCOUNTER — HOSPITAL ENCOUNTER (OUTPATIENT)
Dept: OCCUPATIONAL THERAPY | Facility: HOSPITAL | Age: 82
Setting detail: THERAPIES SERIES
Discharge: HOME | End: 2022-01-10

## 2022-01-10 DIAGNOSIS — C50.811 MALIGNANT NEOPLASM OF OVERLAPPING SITES OF RIGHT BREAST IN FEMALE, ESTROGEN RECEPTOR POSITIVE: Primary | ICD-10-CM

## 2022-01-10 DIAGNOSIS — Z17.0 MALIGNANT NEOPLASM OF OVERLAPPING SITES OF RIGHT BREAST IN FEMALE, ESTROGEN RECEPTOR POSITIVE: Primary | ICD-10-CM

## 2022-01-10 DIAGNOSIS — Z91.89 AT RISK FOR LYMPHEDEMA: ICD-10-CM

## 2022-01-10 PROCEDURE — 93702 BIS XTRACELL FLUID ANALYSIS: CPT

## 2022-01-10 PROCEDURE — 97165 OT EVAL LOW COMPLEX 30 MIN: CPT

## 2022-01-10 PROCEDURE — 97535 SELF CARE MNGMENT TRAINING: CPT

## 2022-01-10 NOTE — THERAPY EVALUATION
Outpatient Occupational Therapy Lymphedema Initial Evaluation   Edu     Patient Name: Lilia Becerra  : 1940  MRN: [id number]  Today's Date: 1/10/2022      Visit Date: 01/10/2022    Patient Active Problem List   Diagnosis   • Gastroesophageal reflux disease   • Atrial fibrillation with RVR (HCC)   • Hypothyroidism   • Thyroid disease   • Sleep apnea   • HBP (high blood pressure)   • Postoperative anemia due to acute blood loss (expected)   • Stage 3 chronic kidney disease (CMS/HCC)   • Broken leg   • Nonrheumatic aortic valve stenosis   • Hyperlipidemia   • Hypertonicity of bladder   • Retention of urine   • Pedal edema   • Abnormal mammogram   • Age related osteoporosis   • Arthritis   • Bowel obstruction (HCC)   • Carpal tunnel syndrome   • Chronic bilateral low back pain with left-sided sciatica   • Chronic pain disorder   • Class 2 obesity   • Complication of surgical procedure   • Diarrhea   • Degeneration of intervertebral disc of lumbar region   • Depressive disorder   • Diabetic renal disease (HCC)   • Disequilibrium   • Fecal urgency   • Glaucoma   • Hypertensive heart failure (HCC)   • Hypomagnesemia   • Idiopathic osteoarthritis   • Kienboeck disease of adults   • Long term (current) use of oral hypoglycemic drugs   • Lumbar radiculopathy   • Peripheral venous insufficiency   • Pneumonia   • Positive PPD   • Rapid heart rate   • Recurrent major depression (HCC)   • Restless legs syndrome   • Spondylolysis of lumbar region   • Fecal incontinence   • Diabetes mellitus (HCC)   • Malignant neoplasm of overlapping sites of right breast in female, estrogen receptor positive (HCC)        Past Medical History:   Diagnosis Date   • Acid reflux disease    • Arthritis    • Atrial fibrillation (HCC)    • Back pain    • Bowel obstruction (HCC)    • Breast cancer (HCC)    • Breast cancer (HCC)    • Carpal tunnel syndrome    • Constipation    • Depression    • Diabetes mellitus (HCC)    • Disease of  thyroid gland    • Dyslipidemia    • Fecal incontinence    • GERD (gastroesophageal reflux disease)    • Glaucoma     left eye   • HBP (high blood pressure)    • Hearing impaired     hearing aides   • Hiatal hernia    • High cholesterol    • History of transfusion    • History of tuberculosis     IN 1980'S WAS TREATED   • Hypertension    • Hypomagnesemia    • Kienbock's disease    • Kienböck's disease, right     KIENBOCK'S AVASCULAR NECROSIS OF LUNATE, ADULT RIGHT   • Nonrheumatic aortic valve stenosis 11/5/2020   • OAB (overactive bladder)    • Osteoporosis    • Pneumonia    • PONV (postoperative nausea and vomiting)    • Positive PPD    • RLS (restless legs syndrome)    • Sleep apnea     NO MACHINE   • Stage 3 chronic kidney disease (HCC)    • Tailbone injury     fracture   • Thyroid disease    • Urinary incontinence     wears pads   • Urinary retention         Past Surgical History:   Procedure Laterality Date   • ANKLE TENDON REPAIR     • BACK SURGERY  12/21/2018-3/2019    LUMBAR--BROKEN DISC, CLEANED OUT--HAS 2ND PROCEDURE TO FINISH REMOVING   • CARPAL TUNNEL RELEASE     • CATARACT EXTRACTION Bilateral    • COLONOSCOPY     • ENDOSCOPY     • HEMORRHOIDECTOMY     • HYSTERECTOMY     • INCISION AND DRAINAGE OF WOUND      on back    • JOINT REPLACEMENT     • KNEE ARTHROPLASTY Right    • LEG SURGERY  06/13/2019   • LUMBAR DISCECTOMY Left 12/21/2018    Procedure: LEFT L4-5 MICRO DISCECTOMY;  Surgeon: Adam Coleman DO;  Location: [facility] OR;  Service: Orthopedic Spine   • LUMBAR DISCECTOMY Left 3/29/2019    Procedure: LEFT L4-5 REVISION OF MICRODISCECTOMY;  Surgeon: Adam Coleman DO;  Location: [facility] MAIN OR;  Service: Orthopedic Spine   • LUMBAR FUSION     • TENDON RELEASE  09/2018   • TOTAL KNEE ARTHROPLASTY Left 6/13/2019    Procedure: LEFT KNEE REVISION;  Surgeon: Malick Costa II, MD;  Location: [facility] OR;  Service: Orthopedics   • TOTAL KNEE ARTHROPLASTY REVISION Left     x2   • TOTAL KNEE  ARTHROPLASTY REVISION Left 2/13/2018    Procedure: LEFT TOTAL KNEE ARTHROPLASTY REVISION;  Surgeon: Jair Fajardo MD;  Location: [facility] OR;  Service:    • VERTEBROPLASTY           Visit Dx:     ICD-10-CM ICD-9-CM   1. Malignant neoplasm of overlapping sites of right breast in female, estrogen receptor positive (HCC)  C50.811 174.8    Z17.0 V86.0   2. 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  -CH      Hand Dominance right-handed  -CH              Fall Risk Assessment    Any falls in the past year: No  -CH      Does patient have a fear of falling No  -CH      Previous Functional Level Ambulation  does use a forarm supported 4 WW  -CH              Services    Are you currently receiving Home Health services No  -CH      Do you plan to receive Home Health services in the near future No  -CH              Daily Activities    Primary Language English  -CH      Are you able to read Yes  -CH      Are you able to write Yes  -CH      How does patient learn best? Demonstration  -CH      Barriers to learning Hearing  -CH              Safety    Are you being hurt, hit, or frightened by anyone at home or in your life? No  -CH      Are you being neglected by a caregiver No  -CH      Have you had any of the following issues with Depression  on medication  -CH            User Key  (r) = Recorded By, (t) = Taken By, (c) = Cosigned By    Initials Name Provider Type    CH Christal Garcia OT Occupational Therapist                 Lymphedema     Row Name 01/10/22 1600             Subjective Pain    Able to rate subjective pain? yes  -CH      Pre-Treatment Pain Level 0  -CH      Post-Treatment Pain Level 0  -CH              Lymphedema Assessment    Lymphedema Classification RUE:; at risk/stage 0  -CH      Lymphedema  "Assessment Comments Patient is scheduled for a bilateral mastectomy with sentinel node biopsy on the right possible axillary dissection.  -CH              Physical Concerns    The amount of pain associated with my lymphedema is: 0  -CH      The amount of limb heaviness associated with my lymphedema is: 0  -CH      The amount of skin tightness associated with my lymphedema is: 0  -CH      The size of my swollen limb(s) seems: 0  -CH      Lymphedema affects the movement of my swollen limb(s): 0  -CH      The strength in my swollen limb(s) is: 0  -CH              Psychosocial Concerns    Lymphedema affects my body image (i.e., \"how I think I look\"). 0  -CH      Lymphedema affects my socializing with others. 0  -CH      Lymphedema affects my intimate relations with spouse or partner (rate 0 if not applicable 0  -CH      Lymphedema \"gets me down\" (i.e., depression, frustration, or anger) 0  -CH      I must rely on others for help due to my lymphedema. 0  -CH      I know what to do to manage my lymphedema 0  -CH              Functional Concerns    Lymphedema affects my ability to perform self-care activities (i.e. eating, dressing, hygiene) 0  -CH      Lymphedema affects my ability to perform routine home or work-related activities. 0  -CH      Lymphedema affects my performance of preferred leisure activities. 0  -CH      Lymphedema affects proper fit of clothing/shoes 0  -CH      Lymphedema affects my sleep 0  -CH              Posture/Observations    Posture- WNL Posture is WNL  -CH              L-Dex Bioimpedence Screening    L-Dex Measurement Extremity RUE  -CH      L-Dex Patient Position Standing  -CH      L-Dex UE Dominate Side Right  -CH      L-Dex UE At Risk Side Right  -CH      L-Dex UE Pre Surgical Value Yes  -CH      L-Dex UE Score 4.5  -CH      L-Dex UE Baseline Score 4.5  -CH      L-Dex UE Value Change 0  -CH      $ L-Dex Charge yes  -CH              Lymphedema Life Impact Scale Totals    A.  Total Q1 - Q17 " (Do not include Q18) 0  -CH      B.  Total number of questions answered (Q1-Q17) 17  -CH      C. Divide A by B 0  -CH      D. Multiple C by 25 0  -CH            User Key  (r) = Recorded By, (t) = Taken By, (c) = Cosigned By    Initials Name Provider Type    Christal Beck, OT Occupational Therapist                Therapy Education  Education Details: Patient provided with education on lymphatic pathology and the risk for developing lymphedema after lymph node removal surgery.  Patient also provided with postsurgical activity guidelines and weightbearing restrictions after her surgery.  Patient also provided with simple exercise program to utilize once drain tubes have been removed.  Given: HEP, Symptoms/condition management  Program: New  How Provided: Verbal  Provided to: Patient  Level of Understanding: Verbalized, Demonstrated, Teach back education performed  24575 - OT Self Care/Mgmt Minutes: 15     Patient provided education on orthopedic and lymph fluid dynamic changes from mastectomy and sentinel node removal surgery, post-surgical compression education and guidance, activity guidelines and weight bearing restrictions and education on a stretching HEP.  Patient educated on the benefit and use of the post-surgical camisole provided.  Patient pre-fitted for the compression camisole and estimated size is large.       OT Goals     Row Name 01/10/22 1656          Time Calculation    OT Goal Re-Cert Due Date 02/09/22  -           User Key  (r) = Recorded By, (t) = Taken By, (c) = Cosigned By    Initials Name Provider Type    Christal Beck, OT Occupational Therapist              1. Post Breast Surgery Care/at risk for Lymphedema  LTG 1: 90 days:  As an indicator of no exacerbation of lymphedema staging, the patient will present with an L-Dex score less than [10] points from preoperative baseline.   STATUS: New  STG 1a:   30 days: To prevent exacerbation of mixed edema to lymphedema, patient will utilize  the 2 postsurgical compression garments daily.      STATUS: New  STG 1b: 30 days: Patient will be independent with self-manual lymphatic massage.    STATUS: New  STG 1c: 30 days:  Patient will be independent with identification of signs and symptoms of lymphedema exasperation per stoplight to recovery education handout.   STATUS: New  STG 1 d: 30 days: Patient will be independent with HEP to prevent advancement in lymphedema staging.   STATUS: New  TREATMENT:  Self Care/ADL retraining, Therapeutic Activity, Neuromuscular Re-education, Therapeutic Exercise, Bioimpedence Fluid Analysis, Post-Surgical compression garement [id number] [facility]-ST-High/ Jessica Camisole Kit 2860K, Orthotic Management and training,  and Manual Therapy.     OT Assessment/Plan     Row Name 01/10/22 1654          OT Assessment    Functional Limitations Performance in self-care ADL  At risk for developing lymphedema  -     Impairments Impaired lymphatic circulation  -CH     Assessment Comments Patient is a pleasant 81-year-old female who was diagnosed with right breast cancer x3 and is scheduled to undergo a bilateral mastectomy with sentinel node biopsy on 1/11/2021.  Patient will benefit from ongoing skilled occupational therapy services to continue to evaluate lymphatic functioning to prevent advancement in lymphedema staging.  -CH     OT Rehab Potential Excellent  -CH     Patient/caregiver participated in establishment of treatment plan and goals Yes  -CH     Patient would benefit from skilled therapy intervention Yes  -CH            OT Plan    OT Frequency Other (comment)  See duration  -CH     Predicted Duration of Therapy Intervention (OT) Pt. to re-evaluated 3 weeks post-surgery, 3 weeks post XRT, every 3 months from baseline for years 1-3 and every 6 months years 4 and 5  -           User Key  (r) = Recorded By, (t) = Taken By, (c) = Cosigned By    Initials Name Provider Type    Christal Beck, OT Occupational Therapist                           Time Calculation:   Timed Charges  18387 - OT Self Care/Mgmt Minutes: 15  Untimed Charges  OT Eval/Re-eval Minutes: 45  Total Minutes  Timed Charges Total Minutes: 15  Untimed Charges Total Minutes: 45   Total Minutes: 60     Therapy Charges for Today     Code Description Service Date Service Provider Modifiers Qty    43590920672 HC PT BIS XTRACELL FLUID ANALYSIS 1/10/2022 Christal Garcia OT  1    93930010096 HC OT SELF CARE/MGMT/TRAIN EA 15 MIN 1/10/2022 Christal Garcia OT GO 1    29865633319 HC OT EVAL LOW COMPLEXITY 4 1/10/2022 Christal Garcia OT GO 1                    Christal Garcia OT  1/10/2022

## 2022-01-11 ENCOUNTER — ANESTHESIA (OUTPATIENT)
Dept: PERIOP | Facility: HOSPITAL | Age: 82
End: 2022-01-11

## 2022-01-11 ENCOUNTER — HOSPITAL ENCOUNTER (OUTPATIENT)
Facility: HOSPITAL | Age: 82
Discharge: HOME | End: 2022-01-12
Attending: SURGERY | Admitting: SURGERY

## 2022-01-11 ENCOUNTER — HOSPITAL ENCOUNTER (OUTPATIENT)
Dept: NUCLEAR MEDICINE | Facility: HOSPITAL | Age: 82
Discharge: HOME | End: 2022-01-11

## 2022-01-11 DIAGNOSIS — Z17.0 MALIGNANT NEOPLASM OF OVERLAPPING SITES OF RIGHT BREAST IN FEMALE, ESTROGEN RECEPTOR POSITIVE: ICD-10-CM

## 2022-01-11 DIAGNOSIS — C50.811 MALIGNANT NEOPLASM OF OVERLAPPING SITES OF RIGHT BREAST IN FEMALE, ESTROGEN RECEPTOR POSITIVE: ICD-10-CM

## 2022-01-11 DIAGNOSIS — Z90.13 STATUS POST BILATERAL MASTECTOMY: Primary | ICD-10-CM

## 2022-01-11 DIAGNOSIS — Z78.9 DECREASED ACTIVITIES OF DAILY LIVING (ADL): ICD-10-CM

## 2022-01-11 LAB
ANION GAP SERPL CALCULATED.3IONS-SCNC: 13.5 MMOL/L (ref 5–15)
BUN SERPL-MCNC: 24 MG/DL (ref 8–23)
BUN/CREAT SERPL: 17.8 (ref 7–25)
CALCIUM SPEC-SCNC: 8.3 MG/DL (ref 8.6–10.5)
CHLORIDE SERPL-SCNC: 98 MMOL/L (ref 98–107)
CO2 SERPL-SCNC: 28.5 MMOL/L (ref 22–29)
CREAT SERPL-MCNC: 1.35 MG/DL (ref 0.57–1)
GFR SERPL CREATININE-BSD FRML MDRD: 37 ML/MIN/1.73
GLUCOSE BLDC GLUCOMTR-MCNC: 135 MG/DL (ref 70–99)
GLUCOSE SERPL-MCNC: 167 MG/DL (ref 65–99)
POTASSIUM SERPL-SCNC: 3.2 MMOL/L (ref 3.5–5.2)
SODIUM SERPL-SCNC: 140 MMOL/L (ref 136–145)

## 2022-01-11 PROCEDURE — 63710000001 HYDROCODONE-ACETAMINOPHEN 5-325 MG TABLET: Performed by: SURGERY

## 2022-01-11 PROCEDURE — 88307 TISSUE EXAM BY PATHOLOGIST: CPT | Performed by: SURGERY

## 2022-01-11 PROCEDURE — 0 TECHETIUM TC99M TILMANOCEPT: Performed by: SURGERY

## 2022-01-11 PROCEDURE — A9270 NON-COVERED ITEM OR SERVICE: HCPCS | Performed by: SURGERY

## 2022-01-11 PROCEDURE — 19307 MAST MOD RAD: CPT | Performed by: SURGERY

## 2022-01-11 PROCEDURE — 25010000002 MIDAZOLAM PER 1 MG: Performed by: ANESTHESIOLOGY

## 2022-01-11 PROCEDURE — 63710000001 BRIMONIDINE 0.2 % SOLUTION 5 ML BOTTLE: Performed by: SURGERY

## 2022-01-11 PROCEDURE — 25010000002 HYDROMORPHONE 1 MG/ML SOLUTION: Performed by: NURSE ANESTHETIST, CERTIFIED REGISTERED

## 2022-01-11 PROCEDURE — 38792 RA TRACER ID OF SENTINL NODE: CPT

## 2022-01-11 PROCEDURE — 88331 PATH CONSLTJ SURG 1 BLK 1SPC: CPT | Performed by: SURGERY

## 2022-01-11 PROCEDURE — 19307 MAST MOD RAD: CPT | Performed by: SPECIALIST/TECHNOLOGIST, OTHER

## 2022-01-11 PROCEDURE — 63710000001 PRAMIPEXOLE 0.5 MG TABLET: Performed by: SURGERY

## 2022-01-11 PROCEDURE — A9520 TC99 TILMANOCEPT DIAG 0.5MCI: HCPCS | Performed by: SURGERY

## 2022-01-11 PROCEDURE — 25010000002 HYDROMORPHONE PER 4 MG: Performed by: NURSE ANESTHETIST, CERTIFIED REGISTERED

## 2022-01-11 PROCEDURE — 25010000002 GENTAMICIN PER 80 MG: Performed by: SURGERY

## 2022-01-11 PROCEDURE — 82962 GLUCOSE BLOOD TEST: CPT

## 2022-01-11 PROCEDURE — 63710000001 ATORVASTATIN 10 MG TABLET: Performed by: SURGERY

## 2022-01-11 PROCEDURE — 25010000002 DEXAMETHASONE PER 1 MG: Performed by: NURSE ANESTHETIST, CERTIFIED REGISTERED

## 2022-01-11 PROCEDURE — 94799 UNLISTED PULMONARY SVC/PX: CPT

## 2022-01-11 PROCEDURE — 63710000001 PRAMIPEXOLE 1 MG TABLET: Performed by: SURGERY

## 2022-01-11 PROCEDURE — 63710000001 METOPROLOL SUCCINATE XL 25 MG TABLET SUSTAINED-RELEASE 24 HOUR: Performed by: SURGERY

## 2022-01-11 PROCEDURE — 25010000002 NEOSTIGMINE 10 MG/10ML SOLUTION: Performed by: NURSE ANESTHETIST, CERTIFIED REGISTERED

## 2022-01-11 PROCEDURE — 25010000002 ONDANSETRON PER 1 MG: Performed by: NURSE ANESTHETIST, CERTIFIED REGISTERED

## 2022-01-11 PROCEDURE — 25010000002 PROPOFOL 10 MG/ML EMULSION: Performed by: NURSE ANESTHETIST, CERTIFIED REGISTERED

## 2022-01-11 PROCEDURE — 25010000002 FENTANYL CITRATE (PF) 50 MCG/ML SOLUTION: Performed by: NURSE ANESTHETIST, CERTIFIED REGISTERED

## 2022-01-11 PROCEDURE — 80048 BASIC METABOLIC PNL TOTAL CA: CPT | Performed by: SURGERY

## 2022-01-11 PROCEDURE — C1889 IMPLANT/INSERT DEVICE, NOC: HCPCS | Performed by: SURGERY

## 2022-01-11 DEVICE — LIGACLIP MCA MULTIPLE CLIP APPLIERS, 20 MEDIUM CLIPS
Type: IMPLANTABLE DEVICE | Site: BREAST | Status: FUNCTIONAL
Brand: LIGACLIP

## 2022-01-11 RX ORDER — LIDOCAINE HYDROCHLORIDE 20 MG/ML
INJECTION, SOLUTION INFILTRATION; PERINEURAL AS NEEDED
Status: DISCONTINUED | OUTPATIENT
Start: 2022-01-11 | End: 2022-01-11 | Stop reason: SURG

## 2022-01-11 RX ORDER — DEXAMETHASONE SODIUM PHOSPHATE 4 MG/ML
INJECTION, SOLUTION INTRA-ARTICULAR; INTRALESIONAL; INTRAMUSCULAR; INTRAVENOUS; SOFT TISSUE AS NEEDED
Status: DISCONTINUED | OUTPATIENT
Start: 2022-01-11 | End: 2022-01-11 | Stop reason: SURG

## 2022-01-11 RX ORDER — EPHEDRINE SULFATE 50 MG/ML
INJECTION, SOLUTION INTRAVENOUS AS NEEDED
Status: DISCONTINUED | OUTPATIENT
Start: 2022-01-11 | End: 2022-01-11 | Stop reason: SURG

## 2022-01-11 RX ORDER — FENTANYL CITRATE 50 UG/ML
INJECTION, SOLUTION INTRAMUSCULAR; INTRAVENOUS AS NEEDED
Status: DISCONTINUED | OUTPATIENT
Start: 2022-01-11 | End: 2022-01-11 | Stop reason: SURG

## 2022-01-11 RX ORDER — NEOSTIGMINE METHYLSULFATE 1 MG/ML
INJECTION, SOLUTION INTRAVENOUS AS NEEDED
Status: DISCONTINUED | OUTPATIENT
Start: 2022-01-11 | End: 2022-01-11 | Stop reason: SURG

## 2022-01-11 RX ORDER — CLINDAMYCIN PHOSPHATE 600 MG/50ML
600 INJECTION INTRAVENOUS EVERY 8 HOURS
Status: COMPLETED | OUTPATIENT
Start: 2022-01-11 | End: 2022-01-12

## 2022-01-11 RX ORDER — LEVOTHYROXINE SODIUM 0.03 MG/1
25 TABLET ORAL EVERY MORNING
Status: DISCONTINUED | OUTPATIENT
Start: 2022-01-11 | End: 2022-01-12 | Stop reason: HOSPADM

## 2022-01-11 RX ORDER — DILTIAZEM HYDROCHLORIDE 120 MG/1
120 CAPSULE, COATED, EXTENDED RELEASE ORAL DAILY
Status: DISCONTINUED | OUTPATIENT
Start: 2022-01-11 | End: 2022-01-12 | Stop reason: HOSPADM

## 2022-01-11 RX ORDER — PROMETHAZINE HYDROCHLORIDE 25 MG/1
25 SUPPOSITORY RECTAL ONCE AS NEEDED
Status: DISCONTINUED | OUTPATIENT
Start: 2022-01-11 | End: 2022-01-11 | Stop reason: HOSPADM

## 2022-01-11 RX ORDER — LOSARTAN POTASSIUM 25 MG/1
25 TABLET ORAL DAILY
Status: DISCONTINUED | OUTPATIENT
Start: 2022-01-11 | End: 2022-01-12 | Stop reason: HOSPADM

## 2022-01-11 RX ORDER — HYDROCODONE BITARTRATE AND ACETAMINOPHEN 5; 325 MG/1; MG/1
1 TABLET ORAL EVERY 4 HOURS PRN
Status: DISCONTINUED | OUTPATIENT
Start: 2022-01-11 | End: 2022-01-12 | Stop reason: HOSPADM

## 2022-01-11 RX ORDER — PROMETHAZINE HYDROCHLORIDE 12.5 MG/1
25 TABLET ORAL ONCE AS NEEDED
Status: DISCONTINUED | OUTPATIENT
Start: 2022-01-11 | End: 2022-01-11 | Stop reason: HOSPADM

## 2022-01-11 RX ORDER — MEPERIDINE HYDROCHLORIDE 25 MG/ML
12.5 INJECTION INTRAMUSCULAR; INTRAVENOUS; SUBCUTANEOUS
Status: DISCONTINUED | OUTPATIENT
Start: 2022-01-11 | End: 2022-01-11 | Stop reason: HOSPADM

## 2022-01-11 RX ORDER — ONDANSETRON 4 MG/1
4 TABLET, FILM COATED ORAL EVERY 6 HOURS PRN
Status: DISCONTINUED | OUTPATIENT
Start: 2022-01-11 | End: 2022-01-12 | Stop reason: HOSPADM

## 2022-01-11 RX ORDER — SUCCINYLCHOLINE/SOD CL,ISO/PF 100 MG/5ML
SYRINGE (ML) INTRAVENOUS AS NEEDED
Status: DISCONTINUED | OUTPATIENT
Start: 2022-01-11 | End: 2022-01-11 | Stop reason: SURG

## 2022-01-11 RX ORDER — DULOXETIN HYDROCHLORIDE 30 MG/1
60 CAPSULE, DELAYED RELEASE ORAL DAILY
Status: DISCONTINUED | OUTPATIENT
Start: 2022-01-11 | End: 2022-01-12 | Stop reason: HOSPADM

## 2022-01-11 RX ORDER — ROCURONIUM BROMIDE 10 MG/ML
INJECTION, SOLUTION INTRAVENOUS AS NEEDED
Status: DISCONTINUED | OUTPATIENT
Start: 2022-01-11 | End: 2022-01-11 | Stop reason: SURG

## 2022-01-11 RX ORDER — ATORVASTATIN CALCIUM 10 MG/1
10 TABLET, FILM COATED ORAL NIGHTLY
Status: DISCONTINUED | OUTPATIENT
Start: 2022-01-11 | End: 2022-01-12 | Stop reason: HOSPADM

## 2022-01-11 RX ORDER — DIGOXIN 125 MCG
125 TABLET ORAL
Status: DISCONTINUED | OUTPATIENT
Start: 2022-01-12 | End: 2022-01-12 | Stop reason: HOSPADM

## 2022-01-11 RX ORDER — ONDANSETRON 2 MG/ML
INJECTION INTRAMUSCULAR; INTRAVENOUS AS NEEDED
Status: DISCONTINUED | OUTPATIENT
Start: 2022-01-11 | End: 2022-01-11 | Stop reason: SURG

## 2022-01-11 RX ORDER — VENLAFAXINE HYDROCHLORIDE 150 MG/1
150 CAPSULE, EXTENDED RELEASE ORAL DAILY
Status: DISCONTINUED | OUTPATIENT
Start: 2022-01-11 | End: 2022-01-12 | Stop reason: HOSPADM

## 2022-01-11 RX ORDER — GLYCOPYRROLATE 0.2 MG/ML
INJECTION INTRAMUSCULAR; INTRAVENOUS AS NEEDED
Status: DISCONTINUED | OUTPATIENT
Start: 2022-01-11 | End: 2022-01-11 | Stop reason: SURG

## 2022-01-11 RX ORDER — ACETAMINOPHEN 500 MG
1000 TABLET ORAL ONCE
Status: COMPLETED | OUTPATIENT
Start: 2022-01-11 | End: 2022-01-11

## 2022-01-11 RX ORDER — DEXTROSE AND SODIUM CHLORIDE 5; .45 G/100ML; G/100ML
75 INJECTION, SOLUTION INTRAVENOUS CONTINUOUS
Status: DISCONTINUED | OUTPATIENT
Start: 2022-01-11 | End: 2022-01-12 | Stop reason: HOSPADM

## 2022-01-11 RX ORDER — PANTOPRAZOLE SODIUM 40 MG/1
40 TABLET, DELAYED RELEASE ORAL
Status: DISCONTINUED | OUTPATIENT
Start: 2022-01-12 | End: 2022-01-12 | Stop reason: HOSPADM

## 2022-01-11 RX ORDER — PHENYLEPHRINE HCL IN 0.9% NACL 1 MG/10 ML
SYRINGE (ML) INTRAVENOUS AS NEEDED
Status: DISCONTINUED | OUTPATIENT
Start: 2022-01-11 | End: 2022-01-11 | Stop reason: SURG

## 2022-01-11 RX ORDER — FUROSEMIDE 40 MG/1
40 TABLET ORAL DAILY
Status: DISCONTINUED | OUTPATIENT
Start: 2022-01-11 | End: 2022-01-12 | Stop reason: HOSPADM

## 2022-01-11 RX ORDER — HYDROMORPHONE HCL 110MG/55ML
PATIENT CONTROLLED ANALGESIA SYRINGE INTRAVENOUS AS NEEDED
Status: DISCONTINUED | OUTPATIENT
Start: 2022-01-11 | End: 2022-01-11 | Stop reason: SURG

## 2022-01-11 RX ORDER — CLINDAMYCIN PHOSPHATE 900 MG/50ML
900 INJECTION INTRAVENOUS ONCE
Status: COMPLETED | OUTPATIENT
Start: 2022-01-11 | End: 2022-01-11

## 2022-01-11 RX ORDER — MIDAZOLAM HYDROCHLORIDE 1 MG/ML
1 INJECTION INTRAMUSCULAR; INTRAVENOUS ONCE
Status: COMPLETED | OUTPATIENT
Start: 2022-01-11 | End: 2022-01-11

## 2022-01-11 RX ORDER — MORPHINE SULFATE 2 MG/ML
2 INJECTION, SOLUTION INTRAMUSCULAR; INTRAVENOUS
Status: DISCONTINUED | OUTPATIENT
Start: 2022-01-11 | End: 2022-01-12 | Stop reason: HOSPADM

## 2022-01-11 RX ORDER — ONDANSETRON 2 MG/ML
4 INJECTION INTRAMUSCULAR; INTRAVENOUS EVERY 6 HOURS PRN
Status: DISCONTINUED | OUTPATIENT
Start: 2022-01-11 | End: 2022-01-12 | Stop reason: HOSPADM

## 2022-01-11 RX ORDER — PROPOFOL 10 MG/ML
VIAL (ML) INTRAVENOUS AS NEEDED
Status: DISCONTINUED | OUTPATIENT
Start: 2022-01-11 | End: 2022-01-11 | Stop reason: SURG

## 2022-01-11 RX ORDER — DOCUSATE SODIUM 100 MG/1
100 CAPSULE, LIQUID FILLED ORAL 2 TIMES DAILY PRN
Status: DISCONTINUED | OUTPATIENT
Start: 2022-01-11 | End: 2022-01-12 | Stop reason: HOSPADM

## 2022-01-11 RX ORDER — SPIRONOLACTONE 25 MG/1
25 TABLET ORAL EVERY OTHER DAY
Status: DISCONTINUED | OUTPATIENT
Start: 2022-01-11 | End: 2022-01-12 | Stop reason: HOSPADM

## 2022-01-11 RX ORDER — OXYCODONE HYDROCHLORIDE 5 MG/1
5 TABLET ORAL
Status: DISCONTINUED | OUTPATIENT
Start: 2022-01-11 | End: 2022-01-11 | Stop reason: HOSPADM

## 2022-01-11 RX ORDER — METOPROLOL SUCCINATE 25 MG/1
12.5 TABLET, EXTENDED RELEASE ORAL NIGHTLY
Status: DISCONTINUED | OUTPATIENT
Start: 2022-01-11 | End: 2022-01-12 | Stop reason: HOSPADM

## 2022-01-11 RX ORDER — OXYBUTYNIN CHLORIDE 5 MG/1
10 TABLET, EXTENDED RELEASE ORAL DAILY
Status: DISCONTINUED | OUTPATIENT
Start: 2022-01-11 | End: 2022-01-12 | Stop reason: HOSPADM

## 2022-01-11 RX ORDER — NALOXONE HCL 0.4 MG/ML
0.4 VIAL (ML) INJECTION
Status: DISCONTINUED | OUTPATIENT
Start: 2022-01-11 | End: 2022-01-12 | Stop reason: HOSPADM

## 2022-01-11 RX ORDER — BRIMONIDINE TARTRATE 2 MG/ML
1 SOLUTION/ DROPS OPHTHALMIC NIGHTLY
Status: DISCONTINUED | OUTPATIENT
Start: 2022-01-11 | End: 2022-01-12 | Stop reason: HOSPADM

## 2022-01-11 RX ORDER — ONDANSETRON 2 MG/ML
4 INJECTION INTRAMUSCULAR; INTRAVENOUS ONCE AS NEEDED
Status: DISCONTINUED | OUTPATIENT
Start: 2022-01-11 | End: 2022-01-11 | Stop reason: HOSPADM

## 2022-01-11 RX ORDER — SODIUM CHLORIDE, SODIUM LACTATE, POTASSIUM CHLORIDE, CALCIUM CHLORIDE 600; 310; 30; 20 MG/100ML; MG/100ML; MG/100ML; MG/100ML
9 INJECTION, SOLUTION INTRAVENOUS CONTINUOUS PRN
Status: DISCONTINUED | OUTPATIENT
Start: 2022-01-11 | End: 2022-01-12 | Stop reason: HOSPADM

## 2022-01-11 RX ADMIN — HYDROCODONE BITARTRATE AND ACETAMINOPHEN 1 TABLET: 5; 325 TABLET ORAL at 19:59

## 2022-01-11 RX ADMIN — EPHEDRINE SULFATE 10 MG: 50 INJECTION INTRAVENOUS at 14:08

## 2022-01-11 RX ADMIN — Medication 100 MG: at 13:30

## 2022-01-11 RX ADMIN — METOPROLOL SUCCINATE 12.5 MG: 25 TABLET, FILM COATED, EXTENDED RELEASE ORAL at 20:00

## 2022-01-11 RX ADMIN — CLINDAMYCIN PHOSPHATE 900 MG: 900 INJECTION, SOLUTION INTRAVENOUS at 13:27

## 2022-01-11 RX ADMIN — SODIUM CHLORIDE, POTASSIUM CHLORIDE, SODIUM LACTATE AND CALCIUM CHLORIDE 9 ML/HR: 600; 310; 30; 20 INJECTION, SOLUTION INTRAVENOUS at 12:49

## 2022-01-11 RX ADMIN — Medication 100 MCG: at 13:34

## 2022-01-11 RX ADMIN — ACETAMINOPHEN 1000 MG: 500 TABLET ORAL at 11:53

## 2022-01-11 RX ADMIN — DEXTROSE AND SODIUM CHLORIDE 75 ML/HR: 5; 450 INJECTION, SOLUTION INTRAVENOUS at 18:35

## 2022-01-11 RX ADMIN — EPHEDRINE SULFATE 10 MG: 50 INJECTION INTRAVENOUS at 14:04

## 2022-01-11 RX ADMIN — Medication 100 MCG: at 14:00

## 2022-01-11 RX ADMIN — GLYCOPYRROLATE 0.4 MG: 0.2 INJECTION INTRAMUSCULAR; INTRAVENOUS at 15:05

## 2022-01-11 RX ADMIN — CLINDAMYCIN PHOSPHATE 600 MG: 600 INJECTION, SOLUTION INTRAVENOUS at 20:37

## 2022-01-11 RX ADMIN — OXYCODONE HYDROCHLORIDE 5 MG: 5 TABLET ORAL at 15:45

## 2022-01-11 RX ADMIN — HYDROMORPHONE HYDROCHLORIDE 0.5 MG: 2 INJECTION, SOLUTION INTRAMUSCULAR; INTRAVENOUS; SUBCUTANEOUS at 14:20

## 2022-01-11 RX ADMIN — ATORVASTATIN CALCIUM 10 MG: 10 TABLET, FILM COATED ORAL at 20:01

## 2022-01-11 RX ADMIN — BRIMONIDINE TARTRATE 1 DROP: 2 SOLUTION/ DROPS OPHTHALMIC at 20:02

## 2022-01-11 RX ADMIN — ONDANSETRON 4 MG: 2 INJECTION INTRAMUSCULAR; INTRAVENOUS at 15:00

## 2022-01-11 RX ADMIN — HYDROMORPHONE HYDROCHLORIDE 0.5 MG: 1 INJECTION, SOLUTION INTRAMUSCULAR; INTRAVENOUS; SUBCUTANEOUS at 16:05

## 2022-01-11 RX ADMIN — MIDAZOLAM 1 MG: 1 INJECTION INTRAMUSCULAR; INTRAVENOUS at 13:01

## 2022-01-11 RX ADMIN — FENTANYL CITRATE 50 MCG: 50 INJECTION, SOLUTION INTRAMUSCULAR; INTRAVENOUS at 13:38

## 2022-01-11 RX ADMIN — Medication 100 MCG: at 13:31

## 2022-01-11 RX ADMIN — ROCURONIUM BROMIDE 10 MG: 10 INJECTION INTRAVENOUS at 13:30

## 2022-01-11 RX ADMIN — PROPOFOL 100 MG: 10 INJECTION, EMULSION INTRAVENOUS at 13:30

## 2022-01-11 RX ADMIN — SODIUM CHLORIDE, POTASSIUM CHLORIDE, SODIUM LACTATE AND CALCIUM CHLORIDE: 600; 310; 30; 20 INJECTION, SOLUTION INTRAVENOUS at 14:38

## 2022-01-11 RX ADMIN — PRAMIPEXOLE DIHYDROCHLORIDE 1.5 MG: 1 TABLET ORAL at 20:01

## 2022-01-11 RX ADMIN — FENTANYL CITRATE 50 MCG: 50 INJECTION, SOLUTION INTRAMUSCULAR; INTRAVENOUS at 13:30

## 2022-01-11 RX ADMIN — TILMANOCEPT 1 DOSE: KIT at 12:29

## 2022-01-11 RX ADMIN — LIDOCAINE HYDROCHLORIDE 60 MG: 20 INJECTION, SOLUTION INFILTRATION; PERINEURAL at 13:30

## 2022-01-11 RX ADMIN — NEOSTIGMINE METHYLSULFATE 3 MG: 1 INJECTION INTRAVENOUS at 15:05

## 2022-01-11 RX ADMIN — DEXAMETHASONE SODIUM PHOSPHATE 4 MG: 4 INJECTION INTRA-ARTICULAR; INTRALESIONAL; INTRAMUSCULAR; INTRAVENOUS; SOFT TISSUE at 13:40

## 2022-01-11 NOTE — ANESTHESIA PREPROCEDURE EVALUATION
Anesthesia Evaluation     Patient summary reviewed and Nursing notes reviewed   history of anesthetic complications: PONV  NPO Solid Status: > 8 hours  NPO Liquid Status: > 2 hours           Airway   Mallampati: I  TM distance: >3 FB  Neck ROM: full  No difficulty expected  Dental    (+) upper dentures        Pulmonary - normal exam    breath sounds clear to auscultation  (+) sleep apnea,   Cardiovascular   Exercise tolerance: poor (<4 METS)    Rhythm: regular    (+) hypertension, valvular problems/murmurs AS, dysrhythmias Atrial Fib, murmur,     ROS comment:  ECHO 11/21 EF 60-65%, MOD. AORTIC VALVE STENOSIS    Neuro/Psych  (+) weakness,     GI/Hepatic/Renal/Endo    (+)   renal disease CRI, diabetes mellitus,     Musculoskeletal     (+) back pain,   Abdominal    Substance History - negative use     OB/GYN          Other   arthritis,                      Anesthesia Plan    ASA 3     general       Anesthetic plan, all risks, benefits, and alternatives have been provided, discussed and informed consent has been obtained with: patient.

## 2022-01-11 NOTE — ANESTHESIA POSTPROCEDURE EVALUATION
Patient: Lilia Becerra    Procedure Summary     Date: 01/11/22 Room / Location: [facility] OR 1 / [facility] OR [facility]    Anesthesia Start: 1327 Anesthesia Stop: 1526    Procedure: BREAST MASTECTOMY WITH SENTINEL NODE BIOPSY AND possible  AXILLARY NODE DISSECTION (Bilateral Breast) Diagnosis:       Malignant neoplasm of overlapping sites of right breast in female, estrogen receptor positive (HCC)      (Malignant neoplasm of overlapping sites of right breast in female, estrogen receptor positive (HCC) [C50.811, Z17.0])    Surgeons: Lety Tena MD Provider: Cristian Saunders MD    Anesthesia Type: general ASA Status: 3          Anesthesia Type: general    Vitals  Vitals Value Taken Time   /55 01/11/22 1537   Temp 36.1 °C (96.9 °F) 01/11/22 1520   Pulse 64 01/11/22 1544   Resp 16 01/11/22 1535   SpO2 100 % 01/11/22 1544   Vitals shown include unvalidated device data.        Post Anesthesia Care and Evaluation    Patient location during evaluation: bedside  Patient participation: complete - patient participated  Level of consciousness: awake  Pain management: adequate  Airway patency: patent  Anesthetic complications: No anesthetic complications  PONV Status: none  Cardiovascular status: acceptable and stable  Respiratory status: acceptable  Hydration status: acceptable    Comments: An Anesthesiologist personally participated in the most demanding procedures (including induction and emergence if applicable) in the anesthesia plan, monitored the course of anesthesia administration at frequent intervals and remained physically present and available for immediate diagnosis and treatment of emergencies.

## 2022-01-11 NOTE — OP NOTE
BREAST MASTECTOMY  Procedure Report    Patient Name:  Lilia Becerra  YOB: 1940    Date of Surgery:  1/11/2022     Indications:  Breast cancer    Pre-op Diagnosis:   Malignant neoplasm of overlapping sites of right breast in female, estrogen receptor positive (HCC) [C50.811, Z17.0]       Post-Op Diagnosis Codes:     * Malignant neoplasm of overlapping sites of right breast in female, estrogen receptor positive (HCC) [C50.811, Z17.0]    Procedure/CPT® Codes:      Procedure(s):  BREAST MASTECTOMY WITH SENTINEL NODE BIOPSY AND Right AXILLARY NODE DISSECTION    Staff:  Surgeon(s):  Lety Tena MD    Assistant: Cha Ashby CSA    Anesthesia: General    Estimated Blood Loss: 100ml    Implants:    Implant Name Type Inv. Item Serial No.  Lot No. LRB No. Used Action   CLIPAPPLR M/ ENDO LIGACLIP 20CLP 11IN MD - [id number] Implant CLIPAPPLR M/ ENDO LIGACLIP 20CLP 11IN MD  [employer]  DIV OF J AND J [id number] Left 1 Implanted   CLIPAPPLR M/ ENDO LIGACLIP 20CLP 11IN MD - [id number] Implant CLIPAPPLR M/ ENDO LIGACLIP 20CLP 11IN MD  [employer]  DIV OF J AND J [id number] Right 1 Implanted   CLIPAPPLR M/ ENDO LIGACLIP 20CLP 11IN MD - [id number] Implant CLIPAPPLR M/ ENDO LIGACLIP 20CLP 11IN MD  [employer]  DIV OF J AND J [id number] Left 1 Implanted   CLIPAPPLR M/ ENDO LIGACLIP 20CLP 11IN MD - [id number] Implant CLIPAPPLR M/ ENDO LIGACLIP 20CLP 11IN MD  [employer]  DIV OF J AND J [id number] Right 1 Implanted   CLIPAPPLR M/ ENDO LIGACLIP 20CLP 11IN MD - [id number] Implant CLIPAPPLR M/ ENDO LIGACLIP 20CLP 11IN MD  [employer]  DIV OF J AND J [id number] Right 1 Implanted       Specimen:          Specimens     ID Source Type Tests Collected By Collected At Frozen?    A Breast, Left Tissue · TISSUE PATHOLOGY EXAM   Lety Tena MD 1/11/22 1350 No    Description: Left Breast Tissue, short stitch superior, long stitch lateral    Comment: Fresh for  Permanent  [facility]  Phone [phone]    B Breast, Right Tissue · TISSUE PATHOLOGY EXAM   Lety Tena MD 1/11/22 1426 No    Description: Right Breast Tissue, short stitch Superior, long stitch Lateral    Comment: Fresh for Permanent  [facility], phone [phone]  Single stitch at the mass/ new margin from single stitch    C [place] Lymph Node Tissue · TISSUE PATHOLOGY EXAM   Lety Tena MD 1/11/22 1429 Yes    Description: Right [place] Node #1, count=300    Comment: Fresh for Frozen  [facility], Phone ext. [phone]    D Breast, Right Tissue · TISSUE PATHOLOGY EXAM   Lety Tena MD 1/11/22 1430 No    Description: right breast new margin with clip    Comment: Sent fresh for permanent    E Axilla, Right Tissue · TISSUE PATHOLOGY EXAM   Lety Tena MD 1/11/22 1502 No    Description: Right axillary contents, fresh for permanent    Comment: Fresh for permanent              Findings: None    Complications: None    Description of Procedure:   The morning of the procedure she was taken down to radiology where she was injected for a sentinel node. She was then brought back to the OR and placed supine on the table. We then began with the left breast portion of the procedure. An elliptical incision was made around the nipple-areolar complex and a mastectomy was completed by removing the breast by raising skin flaps superiorly to the clavicle, medially to the sternum, out laterally to the muscle border, and inferiorly to the inframammary crease. The breast was taken off the chest wall, including the prepectoral fascia, and it was marked short stitch superior and long stitch lateral and sent down to Pathology. The wound was copiously irrigated with sterile water and bacitracin. Two JAYSON drains were placed and the wound was carefully inspected. Electrocautery and clips were used to achieve hemostasis. The deeper layers were closed with 2-0 Vicryl and the skin was closed with running sutures.  We then moved to the  right breast portion of the procedure. An elliptical incision was made around the nipple-areolar complex and a mastectomy was completed by removing the breast by raising skin flaps superiorly to the clavicle, medially to the sternum, out laterally to the muscle border, and inferiorly to the inframammary crease. The breast was taken off the chest wall, including the prepectoral fascia, and it was marked short stitch superior and long stitch lateral and sent down to Pathology.  The inferior anterior mass was near the anterior portion of the breast and a stitch was placed to shelly the area where the new inferior anterior margin that was sent would have been over. The wound was copiously irrigated with sterile water and bacitracin.    We were able to identify the nodes using a probe through the mastectomy incision. We dissected down to the level of the clavipectoral fascia. The fascia was opened and incised and the nodes were identified and removed. The first had a count over 300 and was grossly abnormal.   The remainder of the bed was less than 10% of the lowest number. This was sent to Pathology and was negative but all of her nodes were enlarged and abnormal feeling so they were removed and sent as axillary contents. The negative node was a larger fatty replaced node that was not as abnormally palpable as the other nodes that were taken.  The wound was copiously irrigated. Clips and electrocautery were used to achieve hemostasis. Two JAYSON drains were placed in the mastectomy bed and the wound was carefully inspected. The deeper layers were closed with 2-0 Vicryl and the skin was closed with running sutures. The patient tolerated the procedure well.  Assistant: Cha Ashby CSA  was responsible for performing the following activities: Retraction and Closing and their skilled assistance was necessary for the success of this case.    Lety Tena MD     Date: 1/11/2022  Time: 15:11 EST

## 2022-01-11 NOTE — INTERVAL H&P NOTE
H&P updated. The patient was examined and the following changes are noted:  Risks and benefits discussed with patient and allergies reviewed.  The patient has decided that she wants to have both breasts removed regardless of genetic results and again does not want any reconstruction.  I verified all of this with her today prior to going to OR.

## 2022-01-12 VITALS
HEIGHT: 65 IN | SYSTOLIC BLOOD PRESSURE: 114 MMHG | HEART RATE: 68 BPM | DIASTOLIC BLOOD PRESSURE: 45 MMHG | BODY MASS INDEX: 33.17 KG/M2 | RESPIRATION RATE: 16 BRPM | WEIGHT: 199.08 LBS | TEMPERATURE: 98.6 F | OXYGEN SATURATION: 98 %

## 2022-01-12 PROBLEM — Z90.13 STATUS POST BILATERAL MASTECTOMY: Status: ACTIVE | Noted: 2022-01-12

## 2022-01-12 LAB
GLUCOSE BLDC GLUCOMTR-MCNC: 179 MG/DL (ref 70–99)
GLUCOSE BLDC GLUCOMTR-MCNC: 277 MG/DL (ref 70–99)

## 2022-01-12 PROCEDURE — A9270 NON-COVERED ITEM OR SERVICE: HCPCS | Performed by: SURGERY

## 2022-01-12 PROCEDURE — 63710000001 FUROSEMIDE 40 MG TABLET: Performed by: SURGERY

## 2022-01-12 PROCEDURE — 63710000001 DIGOXIN 125 MCG TABLET: Performed by: SURGERY

## 2022-01-12 PROCEDURE — 63710000001 DULOXETINE 30 MG CAPSULE DELAYED-RELEASE PARTICLES: Performed by: SURGERY

## 2022-01-12 PROCEDURE — L8015 EXT BREASTPROSTHESIS GARMENT: HCPCS

## 2022-01-12 PROCEDURE — 63710000001 VENLAFAXINE XR 150 MG CAPSULE SUSTAINED-RELEASE 24 HR: Performed by: SURGERY

## 2022-01-12 PROCEDURE — 63710000001 PANTOPRAZOLE 40 MG TABLET DELAYED-RELEASE: Performed by: SURGERY

## 2022-01-12 PROCEDURE — 63710000001 DILTIAZEM CD 120 MG CAPSULE SUSTAINED-RELEASE 24 HR: Performed by: SURGERY

## 2022-01-12 PROCEDURE — 63710000001 MIRABEGRON ER 25 MG TABLET SUSTAINED-RELEASE 24 HOUR: Performed by: SURGERY

## 2022-01-12 PROCEDURE — 63710000001 LOSARTAN 25 MG TABLET: Performed by: SURGERY

## 2022-01-12 PROCEDURE — 63710000001 OXYBUTYNIN XL 5 MG TABLET SUSTAINED-RELEASE 24 HOUR: Performed by: SURGERY

## 2022-01-12 PROCEDURE — 99024 POSTOP FOLLOW-UP VISIT: CPT | Performed by: NURSE PRACTITIONER

## 2022-01-12 PROCEDURE — 97760 ORTHOTIC MGMT&TRAING 1ST ENC: CPT

## 2022-01-12 PROCEDURE — 97535 SELF CARE MNGMENT TRAINING: CPT

## 2022-01-12 PROCEDURE — 97165 OT EVAL LOW COMPLEX 30 MIN: CPT

## 2022-01-12 PROCEDURE — 94799 UNLISTED PULMONARY SVC/PX: CPT

## 2022-01-12 PROCEDURE — 63710000001 HYDROCODONE-ACETAMINOPHEN 5-325 MG TABLET: Performed by: SURGERY

## 2022-01-12 PROCEDURE — 82962 GLUCOSE BLOOD TEST: CPT

## 2022-01-12 PROCEDURE — 63710000001 LEVOTHYROXINE 25 MCG TABLET: Performed by: SURGERY

## 2022-01-12 PROCEDURE — 94761 N-INVAS EAR/PLS OXIMETRY MLT: CPT

## 2022-01-12 RX ORDER — HYDROCODONE BITARTRATE AND ACETAMINOPHEN 5; 325 MG/1; MG/1
1 TABLET ORAL EVERY 4 HOURS PRN
Qty: 18 TABLET | Refills: 0 | Status: SHIPPED | OUTPATIENT
Start: 2022-01-12 | End: 2022-01-15

## 2022-01-12 RX ORDER — CEPHALEXIN 500 MG/1
500 CAPSULE ORAL 3 TIMES DAILY
Qty: 21 CAPSULE | Refills: 0 | Status: SHIPPED | OUTPATIENT
Start: 2022-01-12 | End: 2022-01-19

## 2022-01-12 RX ADMIN — FUROSEMIDE 40 MG: 40 TABLET ORAL at 09:15

## 2022-01-12 RX ADMIN — DULOXETINE HYDROCHLORIDE 60 MG: 30 CAPSULE, DELAYED RELEASE ORAL at 09:15

## 2022-01-12 RX ADMIN — HYDROCODONE BITARTRATE AND ACETAMINOPHEN 1 TABLET: 5; 325 TABLET ORAL at 09:16

## 2022-01-12 RX ADMIN — OXYBUTYNIN CHLORIDE 10 MG: 5 TABLET, EXTENDED RELEASE ORAL at 09:18

## 2022-01-12 RX ADMIN — PANTOPRAZOLE SODIUM 40 MG: 40 TABLET, DELAYED RELEASE ORAL at 06:13

## 2022-01-12 RX ADMIN — DILTIAZEM HYDROCHLORIDE 120 MG: 120 CAPSULE, COATED, EXTENDED RELEASE ORAL at 09:16

## 2022-01-12 RX ADMIN — LOSARTAN POTASSIUM 25 MG: 25 TABLET, FILM COATED ORAL at 09:16

## 2022-01-12 RX ADMIN — MIRABEGRON 50 MG: 25 TABLET, FILM COATED, EXTENDED RELEASE ORAL at 06:14

## 2022-01-12 RX ADMIN — LEVOTHYROXINE SODIUM 25 MCG: 25 TABLET ORAL at 06:13

## 2022-01-12 RX ADMIN — VENLAFAXINE HYDROCHLORIDE 150 MG: 150 CAPSULE, EXTENDED RELEASE ORAL at 09:15

## 2022-01-12 RX ADMIN — CLINDAMYCIN PHOSPHATE 600 MG: 600 INJECTION, SOLUTION INTRAVENOUS at 06:13

## 2022-01-12 RX ADMIN — HYDROCODONE BITARTRATE AND ACETAMINOPHEN 1 TABLET: 5; 325 TABLET ORAL at 13:22

## 2022-01-12 RX ADMIN — DIGOXIN 125 MCG: 125 TABLET ORAL at 12:56

## 2022-01-12 NOTE — PLAN OF CARE
Goal Outcome Evaluation:  Plan of Care Reviewed With: patient        Progress: improving  Outcome Summary: patient to be discharged today

## 2022-01-12 NOTE — PLAN OF CARE
Goal Outcome Evaluation:  Plan of Care Reviewed With: patient        Progress: no change  Outcome Summary: pt. was fitted for Rubin Lopez post surgical compression camisole style 2860. Pt. was provided 2 additional drainage pouches to accomodate all 4 reno drains. Pt. was educated and training in donning/doffing of camisole, wear schedule and post surgical precautions. pt. is pending hospital discharge today. She was provided with educational handouts ans follow up contact number for Christal Rose, lymphedema specialist for additional camisole and treatment.

## 2022-01-12 NOTE — DISCHARGE SUMMARY
Date of Discharge:  1/12/2022    Discharge Diagnosis:   Malignant neoplasm of overlapping sites of right breast in female, estrogen receptor positive (HCC) [C50.811, Z17.0]  Post-Op Diagnosis Codes:     * Malignant neoplasm of overlapping sites of right breast in female, estrogen receptor positive (HCC) [C50.811, Z17.0]     Problem List:  Active Hospital Problems    Diagnosis  POA   • **Malignant neoplasm of overlapping sites of right breast in female, estrogen receptor positive (HCC) [C50.811, Z17.0]  Not Applicable   • Status post bilateral mastectomy with sentinel node biopsy and right axillary node dissection [Z90.13]  Not Applicable      Resolved Hospital Problems   No resolved problems to display.       Presenting Problem/History of Present Illness          Hospital Course  Patient is a 81 y.o. female [facility] for bilateral mastectomy.  She was admitted after the procedure for routine postoperative care.  She is tolerating regular diet her pain is controlled and she is ambulating without difficulty.    Procedures Performed    Procedure(s):  BREAST MASTECTOMY WITH SENTINEL NODE BIOPSY AND possible  AXILLARY NODE DISSECTION  -------------------       Consults:   Consults     No orders found for last 30 day(s).          Pertinent Test Results:       Vital Signs  Temp:  [96.9 °F (36.1 °C)-98.2 °F (36.8 °C)] 97.9 °F (36.6 °C)  Heart Rate:  [56-81] 70  Resp:  [14-20] 16  BP: ()/(41-91) 108/45  FiO2 (%):  [0.5 %] 0.5 %    Discharge Disposition  Home or Self Care    Discharge Medications     Discharge Medications      New Medications      Instructions Start Date   cephalexin 500 MG capsule  Commonly known as: KEFLEX   500 mg, Oral, 3 Times Daily      HYDROcodone-acetaminophen 5-325 MG per tablet  Commonly known as: NORCO   1 tablet, Oral, Every 4 Hours PRN         Changes to Medications      Instructions Start Date   apixaban 5 MG tablet tablet  Commonly known as:  ELIQUIS  What changed: additional instructions   5 mg, Oral, Every 12 Hours Scheduled      metoprolol succinate XL 25 MG 24 hr tablet  Commonly known as: TOPROL-XL  What changed:   · how much to take  · how to take this  · when to take this   Take half a tab at night      Mirabegron ER 25 MG tablet sustained-release 24 hour 24 hr tablet  Commonly known as: Myrbetriq  What changed:   · how much to take  · when to take this   50 mg, Oral, Every Morning         Continue These Medications      Instructions Start Date   alendronate 35 MG tablet  Commonly known as: FOSAMAX   35 mg, Oral, Every 7 Days      atorvastatin 10 MG tablet  Commonly known as: LIPITOR   10 mg, Oral, Nightly      brimonidine 0.2 % ophthalmic solution  Commonly known as: ALPHAGAN   1 drop, Left Eye, Nightly      CALCIUM 600 + D PO   1 tablet, Oral, 2 Times Daily      Combigan 0.2-0.5 % ophthalmic solution  Generic drug: brimonidine-timolol   2 drops, Both Eyes, 2 Times Daily      digoxin 125 MCG tablet  Commonly known as: LANOXIN   125 mcg, Oral, Daily Digoxin      dilTIAZem  MG 24 hr capsule  Commonly known as: CARDIZEM CD   120 mg, Oral, Daily      DULoxetine 60 MG capsule  Commonly known as: CYMBALTA   60 mg, Oral, Daily      furosemide 40 MG tablet  Commonly known as: LASIX   Take 1 1/2 tablets every morning. Can decrease to one tablet a day if swelling improves      levothyroxine 25 MCG tablet  Commonly known as: SYNTHROID, LEVOTHROID   25 mcg, Oral, Daily, Currently doesn't have .      losartan 25 MG tablet  Commonly known as: COZAAR   25 mg, Oral, Daily      metFORMIN 500 MG tablet  Commonly known as: GLUCOPHAGE   500 mg, Oral, 2 Times Daily      multivitamin with minerals tablet tablet   1 capsule, Oral, Daily      omeprazole 20 MG capsule  Commonly known as: priLOSEC   20 mg, Oral, Daily      oxybutynin XL 10 MG 24 hr tablet  Commonly known as: DITROPAN-XL   10 mg, Oral, Daily      pramipexole 1.5 MG tablet  Commonly known as:  MIRAPEX   1.5 mg, Oral, Nightly      spironolactone 25 MG tablet  Commonly known as: ALDACTONE   25 mg, Oral, Every Other Day      VENLAFAXINE HCL ER PO   150 mg, Oral, Daily      vitamin C 500 MG tablet  Commonly known as: ASCORBIC ACID   500 mg, Oral, Daily             Discharge Diet   Diet Instructions     Diet: Regular      Discharge Diet: Regular          Activity at Discharge  Activity Instructions     Bathing Restrictions      Type of Restriction: Bathing    Bathing Restrictions: No Tub Bath    Okay to shower use soap and water on incisions pat dry with a towel    Other Activity Restrictions      Type of Restriction: Other    Explain Other Restrictions: No lifting or driving until after follow-up visit          Follow-up Appointments  Future Appointments   Date Time Provider Department Center   2/1/2022 10:00 AM Christal Garcia OT  [facility]   2/3/2022 10:30 AM Sean Ward MD [facility] ONC E521 [facility]   2/9/2022  1:45 PM Reagan Strong MD [facility] CD ETOWN [facility]   6/8/2022 11:15 AM Juana Lenz APRN [facility] U ETWOOD [facility]     Additional Instructions for the Follow-ups that You Need to Schedule     Discharge Follow-up with Specified Provider: Dr. Tena next Monday   As directed      To: Dr. Tena next Monday               Test Results Pending at Discharge  Pending Labs     Order Current Status    Tissue Pathology Exam In process

## 2022-01-12 NOTE — PLAN OF CARE
Goal Outcome Evaluation:  Plan of Care Reviewed With: patient           Outcome Summary: Pt has had minimal pain. Minimal output from drains. Pt couldn't void, straight cath was done and 400mls urine returned. Pt hasn't voided since the straight cath. Milton Kelly RN

## 2022-01-12 NOTE — THERAPY EVALUATION
Patient Name: Lilia Becerra  : 1940    MRN: [id number]                              Today's Date: 2022       Admit Date: 2022    Visit Dx:     ICD-10-CM ICD-9-CM   1. Status post bilateral mastectomy with sentinel node biopsy and right axillary node dissection  Z90.13 V45.71   2. Malignant neoplasm of overlapping sites of right breast in female, estrogen receptor positive (HCC)  C50.811 174.8    Z17.0 V86.0   3. Decreased activities of daily living (ADL)  Z78.9 V49.89     Patient Active Problem List   Diagnosis   • Gastroesophageal reflux disease   • Atrial fibrillation with RVR (HCC)   • Hypothyroidism   • Thyroid disease   • Sleep apnea   • HBP (high blood pressure)   • Postoperative anemia due to acute blood loss (expected)   • Stage 3 chronic kidney disease (CMS/HCC)   • Broken leg   • Nonrheumatic aortic valve stenosis   • Hyperlipidemia   • Hypertonicity of bladder   • Retention of urine   • Pedal edema   • Abnormal mammogram   • Age related osteoporosis   • Arthritis   • Bowel obstruction (HCC)   • Carpal tunnel syndrome   • Chronic bilateral low back pain with left-sided sciatica   • Chronic pain disorder   • Class 2 obesity   • Complication of surgical procedure   • Diarrhea   • Degeneration of intervertebral disc of lumbar region   • Depressive disorder   • Diabetic renal disease (HCC)   • Disequilibrium   • Fecal urgency   • Glaucoma   • Hypertensive heart failure (HCC)   • Hypomagnesemia   • Idiopathic osteoarthritis   • Kienboeck disease of adults   • Long term (current) use of oral hypoglycemic drugs   • Lumbar radiculopathy   • Peripheral venous insufficiency   • Pneumonia   • Positive PPD   • Rapid heart rate   • Recurrent major depression (HCC)   • Restless legs syndrome   • Spondylolysis of lumbar region   • Fecal incontinence   • Diabetes mellitus (HCC)   • Malignant neoplasm of overlapping sites of right breast in female, estrogen receptor positive (HCC)   • Status post  bilateral mastectomy with sentinel node biopsy and right axillary node dissection     Past Medical History:   Diagnosis Date   • Acid reflux disease    • Arthritis    • Atrial fibrillation (HCC)    • Back pain    • Bowel obstruction (HCC)    • Breast cancer (HCC)    • Breast cancer (HCC)    • Carpal tunnel syndrome    • Constipation    • Depression    • Diabetes mellitus (HCC)    • Disease of thyroid gland    • Dyslipidemia    • Fecal incontinence    • GERD (gastroesophageal reflux disease)    • Glaucoma     left eye   • HBP (high blood pressure)    • Hearing impaired     hearing aides   • Hiatal hernia    • High cholesterol    • History of transfusion    • History of tuberculosis     IN 1980'S WAS TREATED   • Hypertension    • Hypomagnesemia    • Kienbock's disease    • Kienböck's disease, right     KIENBOCK'S AVASCULAR NECROSIS OF LUNATE, ADULT RIGHT   • Nonrheumatic aortic valve stenosis 11/5/2020   • OAB (overactive bladder)    • Osteoporosis    • Pneumonia    • PONV (postoperative nausea and vomiting)    • Positive PPD    • RLS (restless legs syndrome)    • Sleep apnea     NO MACHINE   • Stage 3 chronic kidney disease (HCC)    • Status post bilateral mastectomy with sentinel node biopsy and right axillary node dissection 1/12/2022   • Tailbone injury     fracture   • Thyroid disease    • Urinary incontinence     wears pads   • Urinary retention      Past Surgical History:   Procedure Laterality Date   • ANKLE TENDON REPAIR     • BACK SURGERY  12/21/2018-3/2019    LUMBAR--BROKEN DISC, CLEANED OUT--HAS 2ND PROCEDURE TO FINISH REMOVING   • CARPAL TUNNEL RELEASE     • CATARACT EXTRACTION Bilateral    • COLONOSCOPY     • ENDOSCOPY     • HEMORRHOIDECTOMY     • HYSTERECTOMY     • INCISION AND DRAINAGE OF WOUND      on back    • JOINT REPLACEMENT     • KNEE ARTHROPLASTY Right    • LEG SURGERY  06/13/2019   • LUMBAR DISCECTOMY Left 12/21/2018    Procedure: LEFT L4-5 MICRO DISCECTOMY;  Surgeon: Adam Coleman DO;   Location: [facility] OR;  Service: Orthopedic Spine   • LUMBAR DISCECTOMY Left 3/29/2019    Procedure: LEFT L4-5 REVISION OF MICRODISCECTOMY;  Surgeon: Adam Coleman DO;  Location: [facility] OR;  Service: Orthopedic Spine   • LUMBAR FUSION     • MASTECTOMY Bilateral 1/11/2022    Procedure: BREAST MASTECTOMY WITH SENTINEL NODE BIOPSY AND possible  AXILLARY NODE DISSECTION;  Surgeon: Lety Tena MD;  Location: [facility];  Service: General;  Laterality: Bilateral;   • TENDON RELEASE  09/2018   • TOTAL KNEE ARTHROPLASTY Left 6/13/2019    Procedure: LEFT KNEE REVISION;  Surgeon: Malick Costa II, MD;  Location: [facility] OR;  Service: Orthopedics   • TOTAL KNEE ARTHROPLASTY REVISION Left     x2   • TOTAL KNEE ARTHROPLASTY REVISION Left 2/13/2018    Procedure: LEFT TOTAL KNEE ARTHROPLASTY REVISION;  Surgeon: Jair Fajardo MD;  Location: [facility] OR;  Service:    • VERTEBROPLASTY        General Information     Row Name 01/12/22 1311          OT Time and Intention    Document Type evaluation  -AC     Mode of Treatment individual therapy; occupational therapy  -     Row Name 01/12/22 1311          General Information    Patient Profile Reviewed yes  -AC     Prior Level of Function independent:  pt. reports she was mod I with use of walker, elevated toilet and shower chair.  -AC     Existing Precautions/Restrictions --  5 lb. lifting restriction, camisole at all times except for bathing, reno drains x4  -AC     Barriers to Rehab none identified  -AC     Row Name 01/12/22 1311          Occupational Profile    Reason for Services/Referral (Occupational Profile) pt. is an 81 year old female admitte for the above diganosis. She is post op day 1 bilateral mastectomy, on 5th floor.  OT consulted for fitting of post-surgical compression camisole/ ADL assessment. no previous OT services for current condition.  -AC     Row Name 01/12/22 1311          Living Environment    Lives With other (see  comments)  family  -     Row Name 01/12/22 1311          Cognition    Orientation Status (Cognition) oriented x 4  -     Row Name 01/12/22 1311          Safety Issues, Functional Mobility    Safety Issues Affecting Function (Mobility) --  none identified  -     Impairments Affecting Function (Mobility) pain  -           User Key  (r) = Recorded By, (t) = Taken By, (c) = Cosigned By    Initials Name Provider Type    Sherry Hernandez OT Occupational Therapist                 Mobility/ADL's     Row Name 01/12/22 1317          Bed Mobility    Bed Mobility bed mobility (all) activities  -     All Activities, [place] (Bed Mobility) standby assist; 1 person assist  -     Assistive Device (Bed Mobility) head of bed elevated  -     Row Name 01/12/22 1317          Transfers    Transfers sit-stand transfer  -     Sit-Stand [place] (Transfers) standby assist  -     Row Name 01/12/22 1317          Activities of Daily Living    BADL Assessment/Intervention --  patient is independent with all basic adls at this time. Pt. instructed in donning/doffing compression camisole with cues for safety.  -           User Key  (r) = Recorded By, (t) = Taken By, (c) = Cosigned By    Initials Name Provider Type    Sherry Hernandez OT Occupational Therapist               Obj/Interventions     Row Name 01/12/22 1319          Sensory Assessment (Somatosensory)    Sensory Assessment (Somatosensory) sensation intact  -     Row Name 01/12/22 1319          Vision Assessment/Intervention    Visual Impairment/Limitations WFL  -     Row Name 01/12/22 1319          Range of Motion Comprehensive    Comment, General Range of Motion BUE WFL within surgical precautions  -     Row Name 01/12/22 1319          Strength Comprehensive (MMT)    Comment, General Manual Muscle Testing (MMT) Assessment wfl observed within surgical precautions  -     Row Name 01/12/22 1319          Motor Skills    Motor Skills coordination;  functional endurance  -AC     Coordination WFL  -AC     Functional Endurance fair +  -AC     Row Name 01/12/22 1319          Balance    Balance Assessment standing static balance  -AC     Static Standing Balance WFL  -AC           User Key  (r) = Recorded By, (t) = Taken By, (c) = Cosigned By    Initials Name Provider Type    Sherry Hernandez OT Occupational Therapist            Orthotic Management/Training:  - Location: trunk  - Type: [employer] post surgical garment  - Type Modifier: off the shelf  - Functional Improvement: reduce surgical edema (allows for increased ROM), reduce scar tissue and support sensation normalization  - Fitting/Training Provided: sizing of orthotic, instructions in use, modification of orthotic (size of fiber filled form for symmetry)    Orthotic Fabrication/Fit:  - Location: bilateral  - Kind of Orthotic Needed: postsurgical compression camisole with drain management  - Reason for prosthetic: reduce surgical edema (allows for increased ROM), reduce scar tissue and support sensation normalization  - Type of Orthotic Provided: post surgical compression camisole   - Brand Dispensed:[employer]  - Model # Dispensed: Jessica 2860  - Size Dispensed: L A/B  - Reason for Orthotic: lymphedema management/prevention  - Date to Initiate Orthotic Use: 1/12/2022  - Wearing Schedule: continuously except for bathing  - Tolerance: tolerates well  - Number of garments dispensed: 1       Goals/Plan    Limitation in Self-Care Goal 1 (OT)  Activity/Device: To prevent exacerbation of post-surgical edema, patient will utilize the [employer] camisole kit 2860 with poly-fiber breast form daily.  Natalia Level/Cues Needed: (I)  Time Frame: long term goal (LTG); 10 days  Treatment: Fitting and training of [employer] style 2860 post-surgical camisole size Large A/B, orthotic management/training, ADL retraining, and patient education                Clinical Impression     Row Name 01/12/22 1320          Pain  Assessment    Additional Documentation Pain Scale: FACES Pre/Post-Treatment (Group)  -AC     Row Name 01/12/22 1320          Pain Scale: FACES Pre/Post-Treatment    Pain: FACES Scale, Pretreatment 2-->hurts little bit  -AC     Posttreatment Pain Rating 2-->hurts little bit  -AC     Pain Location breast  -AC     Row Name 01/12/22 1320          Plan of Care Review    Plan of Care Reviewed With patient  -AC     Progress no change  -AC     Outcome Summary pt. was fitted for Rubin Lopez post surgical compression camisole style 2860. Pt. was provided 2 additional drainage pouches to accomodate all 4 reno drains. Pt. was educated and training in donning/doffing of camisole, wear schedule and post surgical precautions. pt. is pending hospital discharge today. She was provided with educational handouts ans follow up contact number for Christal Rose, lymphedema specialist for additional camisole and treatment.  -AC     Row Name 01/12/22 1320          Therapy Assessment/Plan (OT)    Patient/Family Therapy Goal Statement (OT) home today.  -AC     Rehab Potential (OT) good, to achieve stated therapy goals  -     Criteria for Skilled Therapeutic Interventions Met (OT) yes; meets criteria; skilled treatment is necessary  -AC     Therapy Frequency (OT) evaluation only  eval and 1 tx  -AC     Row Name 01/12/22 1320          Therapy Plan Review/Discharge Plan (OT)    Anticipated Discharge Disposition (OT) home with assist  outpatient lymphedema  -AC     Row Name 01/12/22 1320          Positioning and Restraints    Pre-Treatment Position in bed  -AC     Post Treatment Position bed  -AC     In Bed encouraged to call for assist; call light within reach; elie  -           User Key  (r) = Recorded By, (t) = Taken By, (c) = Cosigned By    Initials Name Provider Type    AC Sherry Porter, OT Occupational Therapist               Outcome Measures     Row Name 01/12/22 1320          How much help from another is currently needed...     Putting on and taking off regular lower body clothing? 3  -AC     Bathing (including washing, rinsing, and drying) 3  -AC     Toileting (which includes using toilet bed pan or urinal) 3  -AC     Putting on and taking off regular upper body clothing 3  -AC     Taking care of personal grooming (such as brushing teeth) 3  -AC     Eating meals 4  -AC     AM-PAC 6 Clicks Score (OT) 19  -AC     Row Name 01/12/22 1324          Functional Assessment    Outcome Measure Options AM-PAC 6 Clicks Daily Activity (OT); Optimal Instrument  -AC     Row Name 01/12/22 1324          Optimal Instrument    Optimal Instrument Optimal - 3  -AC     Bending/Stooping 2  -AC     Standing 2  -AC     Reaching 2  -AC     From the list, choose the 3 activities you would most like to be able to do without any difficulty Bending/stooping; Reaching; Standing  -AC     Total Score Optimal - 3 6  -AC           User Key  (r) = Recorded By, (t) = Taken By, (c) = Cosigned By    Initials Name Provider Type    Sherry Hernandez OT Occupational Therapist                Occupational Therapy Education                 Title: PT OT SLP Therapies (Done)     Topic: Occupational Therapy (Done)     Point: ADL training (Done)     Description:   Instruct learner(s) on proper safety adaptation and remediation techniques during self care or transfers.   Instruct in proper use of assistive devices.              Learning Progress Summary           Patient Acceptance, E,TB,D,H, VU by  at 1/12/2022 1325                   Point: Home exercise program (Done)     Description:   Instruct learner(s) on appropriate technique for monitoring, assisting and/or progressing therapeutic exercises/activities.              Learning Progress Summary           Patient Acceptance, E,TB,D,H, VU by  at 1/12/2022 1325                   Point: Precautions (Done)     Description:   Instruct learner(s) on prescribed precautions during self-care and functional transfers.              Learning  Progress Summary           Patient Acceptance, E,TB,D,H, VU by  at 1/12/2022 1325                   Point: Body mechanics (Done)     Description:   Instruct learner(s) on proper positioning and spine alignment during self-care, functional mobility activities and/or exercises.              Learning Progress Summary           Patient Acceptance, E,TB,D,H, VU by  at 1/12/2022 1325                               User Key     Initials Effective Dates Name Provider Type Discipline     06/16/21 -  Sherry Porter OT Occupational Therapist OT              OT Recommendation and Plan  Therapy Frequency (OT): evaluation only (eval and 1 tx)  Plan of Care Review  Plan of Care Reviewed With: patient  Progress: no change  Outcome Summary: pt. was fitted for Rubin Lopez post surgical compression camisole style 2860. Pt. was provided 2 additional drainage pouches to accomodate all 4 reno drains. Pt. was educated and training in donning/doffing of camisole, wear schedule and post surgical precautions. pt. is pending hospital discharge today. She was provided with educational handouts ans follow up contact number for Christal Rose, lymphedema specialist for additional camisole and treatment.     Time Calculation:    Time Calculation- OT     Row Name 01/12/22 1326             Time Calculation- OT    OT Received On 01/12/22  -AC              Timed Charges    80766 - OT Self Care/Mgmt Minutes 10  -AC      87172 - OT Orthotic Management 15  -AC              Untimed Charges    OT Eval/Re-eval Minutes 32  -AC              Total Minutes    Timed Charges Total Minutes 25  -AC      Untimed Charges Total Minutes 32  -AC       Total Minutes 57  -AC            User Key  (r) = Recorded By, (t) = Taken By, (c) = Cosigned By    Initials Name Provider Type     Sherry Porter OT Occupational Therapist              Therapy Charges for Today     Code Description Service Date Service Provider Modifiers Qty    39657629280 HC CAMISOLE POST/SURG  SHANIQUA ALL SZ/COLOR AMOENA 1/12/2022 Sherry Porter, OT  1    62944961395 HC OT SELF CARE/MGMT/TRAIN EA 15 MIN 1/12/2022 Sherry Porter OT GO 1    34395286849 HC OT EVAL LOW COMPLEXITY 3 1/12/2022 Sherry Porter, OT GO 1    40626105365 HC OT ORTHOTIC MGMT/TRAIN EA 15 MIN 1/12/2022 Sherry Porter OT GO 1               Sherry Porter OT  1/12/2022

## 2022-01-13 LAB
CYTO UR: NORMAL
LAB AP CASE REPORT: NORMAL
LAB AP CLINICAL INFORMATION: NORMAL
LAB AP SYNOPTIC CHECKLIST: NORMAL
Lab: NORMAL
PATH REPORT.FINAL DX SPEC: NORMAL
PATH REPORT.GROSS SPEC: NORMAL

## 2022-01-14 NOTE — PROGRESS NOTES
"Chief Complaint  Post-op Follow-up (Mastectomy)    Subjective          History of Present Illness  The patient is here to follow up on bilateral mastectomy with nodes.  They are doing well and have no complaints.  Pathology is shown below:    1/13/2022 at 1411   Synoptic Checklist     INVASIVE CARCINOMA OF THE BREAST: Resection  8th Edition - Protocol posted: 6/30/2021  INVASIVE CARCINOMA OF THE BREAST: COMPLETE EXCISION - 2, 3, 4, 5  SPECIMEN   Procedure  Total mastectomy    Specimen Laterality  Right    TUMOR   Histologic Type  Invasive carcinoma of no special type (ductal)    Histologic Grade (Imperial Histologic Score)     Glandular (Acinar) / Tubular Differentiation  Score 3    Nuclear Pleomorphism  Score 2    Mitotic Rate  Score 1    Overall Grade  Grade 2 (scores of 6 or 7)    Tumor Size  Greatest dimension of largest invasive focus (Millimeters): 18 mm   Tumor Focality  Multiple foci of invasive carcinoma    Number of Foci  At least: 2    Ductal Carcinoma In Situ (DCIS)  Present    Size (Extent) of DCIS  Cannot be determined    Number of Blocks with DCIS  5    Number of Blocks Examined  13    Architectural Patterns  [place]      Cribriform    Nuclear Grade  Grade III (high)    Necrosis  Present, central (expansive \"comedo\" necrosis)    Tumor Extent     Treatment Effect in the Breast  No known presurgical therapy    MARGINS   Margin Status for Invasive Carcinoma  All margins negative for invasive carcinoma    Distance from Invasive Carcinoma to Closest Margin  5 mm   Closest Margin(s) to Invasive Carcinoma  Posterior    Margin Status for DCIS  All margins negative for DCIS    Distance from DCIS to Closest Margin  7 mm   Closest Margin(s) to DCIS  Posterior    REGIONAL LYMPH NODES   Regional Lymph Node Status  All regional lymph nodes negative for tumor    Total Number of Lymph Nodes Examined (sentinel and non-sentinel)  18    Number of [place] Nodes Examined  1    PATHOLOGIC STAGE CLASSIFICATION (pTNM, AJCC " "8th Edition)   Reporting of pT, pN, and (when applicable) pM categories is based on information available to the pathologist at the time the report is issued. As per the AJCC (Chapter 1, 8th Ed.) it is the managing physician’s responsibility to establish the final pathologic stage based upon all pertinent information, including but potentially not limited to this pathology report.   TNM Descriptors  m (multiple foci of invasive carcinoma)    pT Category  pT1c    pN Category  pN0    Breast Biomarker Testing Performed on Previous Biopsy     Estrogen Receptor (ER) Status  Positive (greater than 10% of cells demonstrate nuclear positivity)    Percentage of Cells with Nuclear Positivity  100 %   Breast Biomarker Testing Performed on Previous Biopsy     Progesterone Receptor (PgR) Status  Positive    Percentage of Cells with Nuclear Positivity  100 %   Breast Biomarker Testing Performed on Previous Biopsy     HER2 (by immunohistochemistry)  Equivocal (Score 2+)    Breast Biomarker Testing Performed on Previous Biopsy     HER2 (by in situ hybridization)  Negative (not amplified)    Breast Biomarker Testing Performed on Previous Biopsy     Ki-67 Percentage of Positive Nuclei  5 %   Testing Performed on Case Number  [id number]         Her lymph nodes were grossly abnormal during operation and so they were removed... some were even matted but on final pathology they were thankfully negative for carcinoma.   Objective   Vital Signs:   Ht 165.1 cm (65\")   Wt 91.4 kg (201 lb 6.4 oz)   BMI 33.51 kg/m²     Physical Exam  Vitals and nursing note reviewed.   Constitutional:       General: She is not in acute distress.     Appearance: Normal appearance. She is well-developed.   HENT:      Head: Normocephalic and atraumatic.   Eyes:      Extraocular Movements: Extraocular movements intact.      Pupils: Pupils are equal, round, and reactive to light.   Cardiovascular:      Pulses: Normal pulses.   Pulmonary:      Effort: Pulmonary " effort is normal. No retractions.      Breath sounds: Normal air entry. No wheezing.   Abdominal:      General: There is no distension.      Palpations: Abdomen is soft.      Tenderness: There is no abdominal tenderness.      Hernia: No hernia is present.   Musculoskeletal:         General: No swelling or deformity.      Cervical back: Neck supple.   Skin:     General: Skin is warm and dry.      Findings: No erythema.      Comments: Surgical Incision Healing Well   Neurological:      General: No focal deficit present.      Mental Status: She is alert and oriented to person, place, and time.      Motor: Motor function is intact.   Psychiatric:         Mood and Affect: Mood normal.         Thought Content: Thought content normal.            Result Review :                 Assessment and Plan    Diagnoses and all orders for this visit:    1. Malignant neoplasm of overlapping sites of right breast in female, estrogen receptor positive (HCC) (Primary)    Other orders  -     clindamycin (Cleocin) 150 MG capsule; Take 2 capsules by mouth 3 (Three) Times a Day for 7 days.  Dispense: 42 capsule; Refill: 0    Keep apt with oncology    Follow Up   Return in about 9 days (around 1/26/2022).  Patient was given instructions and counseling regarding her condition or for health maintenance advice. Please see specific information pulled into the AVS if appropriate.

## 2022-01-17 ENCOUNTER — OFFICE VISIT (OUTPATIENT)
Dept: SURGERY | Facility: CLINIC | Age: 82
End: 2022-01-17

## 2022-01-17 ENCOUNTER — TELEPHONE (OUTPATIENT)
Dept: SURGERY | Facility: CLINIC | Age: 82
End: 2022-01-17

## 2022-01-17 VITALS — BODY MASS INDEX: 33.55 KG/M2 | WEIGHT: 201.4 LBS | HEIGHT: 65 IN

## 2022-01-17 DIAGNOSIS — C50.811 MALIGNANT NEOPLASM OF OVERLAPPING SITES OF RIGHT BREAST IN FEMALE, ESTROGEN RECEPTOR POSITIVE: Primary | ICD-10-CM

## 2022-01-17 DIAGNOSIS — Z17.0 MALIGNANT NEOPLASM OF OVERLAPPING SITES OF RIGHT BREAST IN FEMALE, ESTROGEN RECEPTOR POSITIVE: Primary | ICD-10-CM

## 2022-01-17 PROCEDURE — 99024 POSTOP FOLLOW-UP VISIT: CPT | Performed by: SURGERY

## 2022-01-17 RX ORDER — CLINDAMYCIN HYDROCHLORIDE 150 MG/1
300 CAPSULE ORAL 3 TIMES DAILY
Qty: 42 CAPSULE | Refills: 0 | Status: SHIPPED | OUTPATIENT
Start: 2022-01-17 | End: 2022-01-24

## 2022-01-17 NOTE — TELEPHONE ENCOUNTER
Provider: DR. REEDER  Caller: ROSANNE BECERRA  Relationship to Patient: SELF  Pharmacy:   Phone Number: [phone]  Reason for Call: PT HAD QUESTIONS FOLLOWING VISIT WITH DOCTOR TODAY AND IS REQUESTING A CALL BACK.   When was the patient last seen: TODAY  When did it start:   Where is it located:   Characteristics of symptom/severity:   Timing- Is it constant or intermittent:   What makes it worse:   What makes it better:   What therapies/medications have you tried:

## 2022-01-18 ENCOUNTER — DOCUMENTATION (OUTPATIENT)
Dept: MAMMOGRAPHY | Facility: HOSPITAL | Age: 82
End: 2022-01-18

## 2022-01-18 NOTE — PROGRESS NOTES
SENT JUAQUIN A MESSAGE INFORMING HER THAT THIS PATIENT IS A MEDICARE PATIENT WITH AN OVER NIGHT STAY IN HOSPITAL AND HER ONCOTYPE WILL BE ORDERED 2 WEEKS FROM  DISCHARGE AND INFORMED HER OF THE FOLLOW UP APPT DATE WITH DR. LOCKETT ON 2/3/22.

## 2022-01-25 DIAGNOSIS — R60.0 PEDAL EDEMA: ICD-10-CM

## 2022-01-25 NOTE — PROGRESS NOTES
Chief Complaint  Follow-up    Subjective          History of Present Illness  The patient is here to follow up on bilateral mastectomy with SLN bx.  They are doing well and have no complaints.  Pathology is shown below:    Clinical Information    Comment:    Malignant neoplasm of overlapping sites of right breast in female, estrogen receptor positive (HCC)      Final Diagnosis   1. Left breast, mastectomy:               - Sclerosing adenosis               - Intraductal papilloma               - Radial scar               - Florid usual ductal hyperplasia               - Fibroadenomatoid change               - Duct ectasia with calcification               - Fibrosis               - Calcified blood vessels     2. 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 Right sentinel lymph node #1, excision:               - One lymph node negative for carcinoma (0/1)               - See synoptic checklist     4. Right breast, new margin, excision:               - Negative for carcinoma               - See synoptic checklist     5. Right axillary contents, dissection:               - Seventeen lymph nodes negative for carcinoma (0/17)               - See synoptic checklist      Electronically signed by Monty Joyner MD on 1/13/2022 at 1411   Synoptic Checklist     INVASIVE CARCINOMA OF THE BREAST: Resection  8th Edition - Protocol posted: 6/30/2021  INVASIVE CARCINOMA OF THE BREAST: COMPLETE EXCISION - 2, 3, 4, 5  SPECIMEN   Procedure  Total mastectomy    Specimen Laterality  Right    TUMOR   Histologic Type  Invasive carcinoma of no special type (ductal)    Histologic Grade (Ramon Histologic Score)     Glandular (Acinar) / Tubular Differentiation  Score 3    Nuclear Pleomorphism  Score 2    Mitotic Rate  Score 1    Overall Grade  Grade 2 (scores of 6 or 7)    Tumor Size   "Greatest dimension of largest invasive focus (Millimeters): 18 mm   Tumor Focality  Multiple foci of invasive carcinoma    Number of Foci  At least: 2    Ductal Carcinoma In Situ (DCIS)  Present    Size (Extent) of DCIS  Cannot be determined    Number of Blocks with DCIS  5    Number of Blocks Examined  13    Architectural Patterns  [place]      Cribriform    Nuclear Grade  Grade III (high)    Necrosis  Present, central (expansive \"comedo\" necrosis)    Tumor Extent     Treatment Effect in the Breast  No known presurgical therapy    MARGINS   Margin Status for Invasive Carcinoma  All margins negative for invasive carcinoma    Distance from Invasive Carcinoma to Closest Margin  5 mm   Closest Margin(s) to Invasive Carcinoma  Posterior    Margin Status for DCIS  All margins negative for DCIS    Distance from DCIS to Closest Margin  7 mm   Closest Margin(s) to DCIS  Posterior    REGIONAL LYMPH NODES   Regional Lymph Node Status  All regional lymph nodes negative for tumor    Total Number of Lymph Nodes Examined (sentinel and non-sentinel)  18    Number of [place] Nodes Examined  1    PATHOLOGIC STAGE CLASSIFICATION (pTNM, AJCC 8th Edition)   Reporting of pT, pN, and (when applicable) pM categories is based on information available to the pathologist at the time the report is issued. As per the AJCC (Chapter 1, 8th Ed.) it is the managing physician’s responsibility to establish the final pathologic stage based upon all pertinent information, including but potentially not limited to this pathology report.   TNM Descriptors  m (multiple foci of invasive carcinoma)    pT Category  pT1c    pN Category  pN0    Breast Biomarker Testing Performed on Previous Biopsy     Estrogen Receptor (ER) Status  Positive (greater than 10% of cells demonstrate nuclear positivity)    Percentage of Cells with Nuclear Positivity  100 %   Breast Biomarker Testing Performed on Previous Biopsy     Progesterone Receptor (PgR) Status  Positive  " "  Percentage of Cells with Nuclear Positivity  100 %   Breast Biomarker Testing Performed on Previous Biopsy     HER2 (by immunohistochemistry)  Equivocal (Score 2+)    Breast Biomarker Testing Performed on Previous Biopsy     HER2 (by in situ hybridization)  Negative (not amplified)    Breast Biomarker Testing Performed on Previous Biopsy     Ki-67 Percentage of Positive Nuclei  5 %   Testing Performed on Case Number  [id number]           She is here for remaining drain removal and wound check.     Objective   Vital Signs:   Resp 16   Ht 165.1 cm (65\")   Wt 91.4 kg (201 lb 8 oz)   BMI 33.53 kg/m²     Physical Exam  Vitals and nursing note reviewed.   Constitutional:       General: She is not in acute distress.     Appearance: Normal appearance. She is well-developed.   HENT:      Head: Normocephalic and atraumatic.   Eyes:      Extraocular Movements: Extraocular movements intact.      Pupils: Pupils are equal, round, and reactive to light.   Cardiovascular:      Pulses: Normal pulses.   Pulmonary:      Effort: Pulmonary effort is normal. No retractions.      Breath sounds: Normal air entry. No wheezing.   Abdominal:      General: There is no distension.      Palpations: Abdomen is soft.      Tenderness: There is no abdominal tenderness.      Hernia: No hernia is present.   Musculoskeletal:         General: No swelling or deformity.      Cervical back: Neck supple.   Skin:     General: Skin is warm and dry.      Findings: No erythema.      Comments: Surgical Incision Healing Well   Neurological:      General: No focal deficit present.      Mental Status: She is alert and oriented to person, place, and time.      Motor: Motor function is intact.   Psychiatric:         Mood and Affect: Mood normal.         Thought Content: Thought content normal.            Result Review :                 Assessment and Plan    Diagnoses and all orders for this visit:    1. Malignant neoplasm of overlapping sites of right breast in " female, estrogen receptor positive (HCC) (Primary)    Keep appointment with oncology  Follow-up with me in 3 months and then 1 year.    Follow Up   Return in about 3 months (around 4/26/2022).  Patient was given instructions and counseling regarding her condition or for health maintenance advice. Please see specific information pulled into the AVS if appropriate.

## 2022-01-26 ENCOUNTER — OFFICE VISIT (OUTPATIENT)
Dept: SURGERY | Facility: CLINIC | Age: 82
End: 2022-01-26

## 2022-01-26 VITALS — RESPIRATION RATE: 16 BRPM | WEIGHT: 201.5 LBS | BODY MASS INDEX: 33.57 KG/M2 | HEIGHT: 65 IN

## 2022-01-26 DIAGNOSIS — C50.811 MALIGNANT NEOPLASM OF OVERLAPPING SITES OF RIGHT BREAST IN FEMALE, ESTROGEN RECEPTOR POSITIVE: Primary | ICD-10-CM

## 2022-01-26 DIAGNOSIS — Z17.0 MALIGNANT NEOPLASM OF OVERLAPPING SITES OF RIGHT BREAST IN FEMALE, ESTROGEN RECEPTOR POSITIVE: Primary | ICD-10-CM

## 2022-01-26 PROCEDURE — 99024 POSTOP FOLLOW-UP VISIT: CPT | Performed by: SURGERY

## 2022-01-31 ENCOUNTER — DOCUMENTATION (OUTPATIENT)
Dept: MAMMOGRAPHY | Facility: HOSPITAL | Age: 82
End: 2022-01-31

## 2022-01-31 NOTE — PROGRESS NOTES
SENT MEDICAL NECESSITY TO [facility] AFTER GETTING PRIOR AUTH FROM [facility].

## 2022-02-01 ENCOUNTER — HOSPITAL ENCOUNTER (OUTPATIENT)
Dept: OCCUPATIONAL THERAPY | Facility: HOSPITAL | Age: 82
Setting detail: THERAPIES SERIES
Discharge: HOME | End: 2022-02-01

## 2022-02-01 ENCOUNTER — TELEPHONE (OUTPATIENT)
Dept: OCCUPATIONAL THERAPY | Facility: HOSPITAL | Age: 82
End: 2022-02-01

## 2022-02-01 DIAGNOSIS — Z17.0 MALIGNANT NEOPLASM OF OVERLAPPING SITES OF RIGHT BREAST IN FEMALE, ESTROGEN RECEPTOR POSITIVE: Primary | ICD-10-CM

## 2022-02-01 DIAGNOSIS — I89.0 LYMPHEDEMA: ICD-10-CM

## 2022-02-01 DIAGNOSIS — I97.2 POSTMASTECTOMY LYMPHEDEMA SYNDROME: ICD-10-CM

## 2022-02-01 DIAGNOSIS — I97.2 POST-MASTECTOMY LYMPHEDEMA SYNDROME: ICD-10-CM

## 2022-02-01 DIAGNOSIS — C50.811 MALIGNANT NEOPLASM OF OVERLAPPING SITES OF RIGHT BREAST IN FEMALE, ESTROGEN RECEPTOR POSITIVE: Primary | ICD-10-CM

## 2022-02-01 PROCEDURE — 97535 SELF CARE MNGMENT TRAINING: CPT

## 2022-02-01 PROCEDURE — 97760 ORTHOTIC MGMT&TRAING 1ST ENC: CPT

## 2022-02-01 PROCEDURE — L8015 EXT BREASTPROSTHESIS GARMENT: HCPCS

## 2022-02-01 NOTE — THERAPY RE-EVALUATION
Outpatient Occupational Therapy Lymphedema Re-Evaluation   [place]     Patient Name: Lilia Becerra  : 1940  MRN: [id number]  Today's Date: 2022      Visit Date: 2022    Patient Active Problem List   Diagnosis   • Gastroesophageal reflux disease   • Atrial fibrillation with RVR (HCC)   • Hypothyroidism   • Thyroid disease   • Sleep apnea   • HBP (high blood pressure)   • Postoperative anemia due to acute blood loss (expected)   • Stage 3 chronic kidney disease (CMS/HCC)   • Broken leg   • Nonrheumatic aortic valve stenosis   • Hyperlipidemia   • Hypertonicity of bladder   • Retention of urine   • Pedal edema   • Abnormal mammogram   • Age related osteoporosis   • Arthritis   • Bowel obstruction (HCC)   • Carpal tunnel syndrome   • Chronic bilateral low back pain with left-sided sciatica   • Chronic pain disorder   • Class 2 obesity   • Complication of surgical procedure   • Diarrhea   • Degeneration of intervertebral disc of lumbar region   • Depressive disorder   • Diabetic renal disease (HCC)   • Disequilibrium   • Fecal urgency   • Glaucoma   • Hypertensive heart failure (HCC)   • Hypomagnesemia   • Idiopathic osteoarthritis   • Kienboeck disease of adults   • Long term (current) use of oral hypoglycemic drugs   • Lumbar radiculopathy   • Peripheral venous insufficiency   • Pneumonia   • Positive PPD   • Rapid heart rate   • Recurrent major depression (HCC)   • Restless legs syndrome   • Spondylolysis of lumbar region   • Fecal incontinence   • Diabetes mellitus (HCC)   • Malignant neoplasm of overlapping sites of right breast in female, estrogen receptor positive (HCC)   • Status post bilateral mastectomy with sentinel node biopsy and right axillary node dissection        Past Medical History:   Diagnosis Date   • Acid reflux disease    • Arthritis    • Atrial fibrillation (HCC)    • Back pain    • Bowel obstruction (HCC)    • Breast cancer (HCC)    • Breast cancer (HCC)    • Carpal tunnel  syndrome    • Constipation    • Depression    • Diabetes mellitus (HCC)    • Disease of thyroid gland    • Dyslipidemia    • Fecal incontinence    • GERD (gastroesophageal reflux disease)    • Glaucoma     left eye   • HBP (high blood pressure)    • Hearing impaired     hearing aides   • Hiatal hernia    • High cholesterol    • History of transfusion    • History of tuberculosis     IN 1980'S WAS TREATED   • Hypertension    • Hypomagnesemia    • Kienbock's disease    • Kienböck's disease, right     KIENBOCK'S AVASCULAR NECROSIS OF LUNATE, ADULT RIGHT   • Nonrheumatic aortic valve stenosis 11/5/2020   • OAB (overactive bladder)    • Osteoporosis    • Pneumonia    • PONV (postoperative nausea and vomiting)    • Positive PPD    • RLS (restless legs syndrome)    • Sleep apnea     NO MACHINE   • Stage 3 chronic kidney disease (HCC)    • Status post bilateral mastectomy with sentinel node biopsy and right axillary node dissection 1/12/2022   • Tailbone injury     fracture   • Thyroid disease    • Urinary incontinence     wears pads   • Urinary retention         Past Surgical History:   Procedure Laterality Date   • ANKLE TENDON REPAIR     • BACK SURGERY  12/21/2018-3/2019    LUMBAR--BROKEN DISC, CLEANED OUT--HAS 2ND PROCEDURE TO FINISH REMOVING   • CARPAL TUNNEL RELEASE     • CATARACT EXTRACTION Bilateral    • COLONOSCOPY     • ENDOSCOPY     • HEMORRHOIDECTOMY     • HYSTERECTOMY     • INCISION AND DRAINAGE OF WOUND      on back    • JOINT REPLACEMENT     • KNEE ARTHROPLASTY Right    • LEG SURGERY  06/13/2019   • LUMBAR DISCECTOMY Left 12/21/2018    Procedure: LEFT L4-5 MICRO DISCECTOMY;  Surgeon: Adam Coleman DO;  Location: [facility] OR;  Service: Orthopedic Spine   • LUMBAR DISCECTOMY Left 3/29/2019    Procedure: LEFT L4-5 REVISION OF MICRODISCECTOMY;  Surgeon: Adam Coleman DO;  Location: [facility] MAIN OR;  Service: Orthopedic Spine   • LUMBAR FUSION     • MASTECTOMY Bilateral 1/11/2022    Procedure: BREAST MASTECTOMY  WITH SENTINEL NODE BIOPSY AND possible  AXILLARY NODE DISSECTION;  Surgeon: Lety Tena MD;  Location: [facility];  Service: General;  Laterality: Bilateral;   • TENDON RELEASE  09/2018   • TOTAL KNEE ARTHROPLASTY Left 6/13/2019    Procedure: LEFT KNEE REVISION;  Surgeon: Malick Costa II, MD;  Location: [facility];  Service: Orthopedics   • TOTAL KNEE ARTHROPLASTY REVISION Left     x2   • TOTAL KNEE ARTHROPLASTY REVISION Left 2/13/2018    Procedure: LEFT TOTAL KNEE ARTHROPLASTY REVISION;  Surgeon: Jair Fajardo MD;  Location: [facility];  Service:    • VERTEBROPLASTY           Visit Dx:     ICD-10-CM ICD-9-CM   1. Malignant neoplasm of overlapping sites of right breast in female, estrogen receptor positive (HCC)  C50.811 174.8    Z17.0 V86.0   2. 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 yes  -CH      Pre-Treatment Pain Level 0  -CH      Post-Treatment Pain Level 0  -CH      Subjective Pain Comment Pt. denies pain  -CH              Lymphedema Assessment    Lymphedema Classification RUE:; stage 1 (Spontaneously Reversible)  -CH      Lymphedema Cancer Related Sx bilateral; simple mastectomy; sentinel node biopsy; axillary dissection  -CH      Lymph Nodes Removed # 18  -CH      Positive Lymph Nodes # 0  -CH      Chemo Received no  -CH      Radiation Therapy Received no  -CH              Physical Concerns    The amount of pain associated with my lymphedema is: 0  -CH      The amount of limb heaviness associated with my lymphedema is: 0  -CH      The amount of skin tightness associated with my lymphedema is: 0  -CH      The size of my swollen limb(s) seems: 1  -CH      Lymphedema affects the movement of my swollen limb(s): 0  -CH      The strength in my swollen limb(s) is: 0  -CH              Psychosocial Concerns    Lymphedema affects my  "body image (i.e., \"how I think I look\"). 0  -CH      Lymphedema affects my socializing with others. 0  -CH      Lymphedema affects my intimate relations with spouse or partner (rate 0 if not applicable 0  -CH      Lymphedema \"gets me down\" (i.e., depression, frustration, or anger) 0  -CH      I must rely on others for help due to my lymphedema. 0  -CH      I know what to do to manage my lymphedema 4  -CH              Functional Concerns    Lymphedema affects my ability to perform self-care activities (i.e. eating, dressing, hygiene) 0  -CH      Lymphedema affects my ability to perform routine home or work-related activities. 0  -CH      Lymphedema affects my performance of preferred leisure activities. 0  -CH      Lymphedema affects proper fit of clothing/shoes 0  -CH      Lymphedema affects my sleep 0  -CH              General ROM    GENERAL ROM COMMENTS Bilateral upper extremity within functional limits  -CH              Skin Changes/Observations    Location/Assessment Upper Quadrant  -CH      Upper Quadrant Conditions bilateral:; intact  -CH      Upper Quadrant Color/Pigment bilateral:; fibrosis  -CH      Skin Observations Comment Patient is observed with significant seroma present in the right chest wall extending into the axillary region.  Patient is noted with mild scar tissue fibrosis at drain tube insertion sites that patient had thought was part of her nipple.  OT educated patient that this was scar tissue.  -CH              Lymphedema Measurements    Lymphedema Measurements Comments OT utilized circumferential measurements for total volume due to having technical difficulty with the bioimpedance spectrometry on this date.  -CH              Compression/Skin Care    Compression/Skin Care bandaging  -CH      Bandage Layers cotton elastic stocking- single layer (comment size)  -CH      Compression/Skin Care Comments Patient provided with size C Tubigrip for compression into the right upper extremity due to " demonstrating a 12% increase in overall lymph volume  -              Lymphedema Life Impact Scale Totals    A.  Total Q1 - Q17 (Do not include Q18) 5  -CH      B.  Total number of questions answered (Q1-Q17) 17  -CH      C. Divide A by B 0.29  -CH      D. Multiple C by 25 7.25  -CH            User Key  (r) = Recorded By, (t) = Taken By, (c) = Cosigned By    Initials Name Provider Type     Christal Garcia OT Occupational Therapist              OT utilized circumferential measurements on this date due to patient having a significant number of lymph nodes being removed and OT being unable to perform the bioimpedance spectrometry due to technical difficulties.             OT Ortho     Row Name 02/01/22 1600             Orthotics & Prosthetics Management    Orthosis Location upper extremity orthosis  Postsurgical compression garment, fitting for upper extremity compression sleeve  -      Additional Documentation Orthosis Location (Row)  -            User Key  (r) = Recorded By, (t) = Taken By, (c) = Cosigned By    Initials Name Provider Type     Christal Garcia OT Occupational Therapist              Patient fitted into a size 2 regular Medi comfort arm sleeve that she would like in sand.    OT Mastectomy Care:    Location: Bilateral chest wall    Date of Surgery: 1/11/2022  Date of Discharge: 1/12/2022    Garments  Type of Garment Dispensed: Post Surgical Compression Garment    Breast : Rubin    Product Name: [id number] Mad[address] cortes  high size 40/42     Number of Garments Dispensed: 2      Therapy Education  Education Details: Patient was educated that she is demonstrating a 12% increase in lymph volume in the right upper extremity compared to left which is indicative of an advancement in lymphedema staging.  OT did begin education on lymphedema stoplight to recovery/prevention with review of risk factors for lymphedema for patient and how to appropriately treat lymphedema exasperations  however OT was unable to complete this during the session due to time.  OT did educate patient on self manual lymphatic drainage and instructed her to initiate use of this technique twice a day.  OT provided patient with a Tubigrip sleeve and instructed her to wear it during the day until her compression sleeve is ordered and arrives.  OT did also instruct patient to wear her postsurgical compression garment every day taking it off at night once it arrives as well.  Given: Symptoms/condition management  Program: New  How Provided: Verbal, Demonstration, Written  Provided to: Patient  Level of Understanding: Verbalized, Demonstrated, Teach back education performed  78771 - OT Self Care/Mgmt Minutes: 54         OT Goals     Row Name 02/01/22 1715          Time Calculation    OT Goal Re-Cert Due Date 03/03/22  -           User Key  (r) = Recorded By, (t) = Taken By, (c) = Cosigned By    Initials Name Provider Type    CH Christal Garcia, OT Occupational Therapist                 1. Post Breast Surgery Care/at risk for Lymphedema  LTG 1: 90 days:  As an indicator of no exacerbation of lymphedema staging, the patient will present with an L-Dex score less than [10] points from preoperative baseline.              STATUS: TBE  STG 1a:   30 days: To prevent exacerbation of mixed edema to lymphedema, patient will utilize the 2 postsurgical compression garments daily.                 STATUS: Partially met: Ongoing  STG 1b: 30 days: Patient will be independent with self-manual lymphatic massage.               STATUS: Partially met: Ongoing  STG 1c: 30 days:  Patient will be independent with identification of signs and symptoms of lymphedema exasperation per stoplight to recovery education handout.              STATUS: Partially met: Ongoing  STG 1 d: 30 days: Patient will be independent with HEP to prevent advancement in lymphedema staging.              STATUS: Met: Ongoing  TREATMENT:  Self Care/ADL retraining, Therapeutic  Activity, Neuromuscular Re-education, Therapeutic Exercise, Bioimpedence Fluid Analysis, Post-Surgical compression garement [address] [facility] [employer] 2860K, Orthotic Management and training,  and Manual Therapy.        OT Assessment/Plan     Row Name 02/01/22 1713          OT Assessment    Functional Limitations Performance in self-care ADL  -CH     Impairments Impaired lymphatic circulation  -     Assessment Comments Patient is demonstrating a stage I exasperation in limb volume in the left upper extremity this date.  Patient will benefit from continued skilled occupational therapy services to support reduction of limb volume in the right upper extremity and ensure independence with compression garment to continue to manage lymphedema symptoms.  Patient will benefit from ongoing skilled occupational therapy services to evaluate lymphatic functioning to prevent further advancement into lymphedema staging.  -     Please refer to paper survey for additional self-reported information Yes  -CH     OT Rehab Potential Good  -CH     Patient/caregiver participated in establishment of treatment plan and goals Yes  -CH     Patient would benefit from skilled therapy intervention Yes  -CH            OT Plan    OT Frequency 1x/week  0-1 time per week  -     Planned CPT's? OT RE-EVAL: 42444; OT THER ACT EA 15 MIN: 36020WU; OT THER PROC EA 15 MIN: [id number]; OT NEUROMUSC RE EDUCATION EA 15 MIN: 84775; OT SELF CARE/MGMT/TRAIN 15 MIN: 51245; OT ULTRASOUND EA 15 MIN: 78670; OT MANUAL THERAPY EA 15 MIN: 21251; OT BIS XTRACELL FLUID ANALYSIS: 03876; OT ORTHOTIC MGMT/TRAIN EA 15 MIN: 29978; OT ORTHO/PROSTHET CHECKOUT EA 15 MIN: 28436; OT ELECTRIC STIM ATTD EA 15 MIN: 22048  -     Planned Therapy Interventions (Optional Details) home exercise program; manual therapy techniques; strengthening; patient/family education; orthotic fitting/training  -           User Key  (r) = Recorded By, (t) = Taken By, (c) =  Cosigned By    Initials Name Provider Type     Christal Garcia OT Occupational Therapist                          Time Calculation:   Timed Charges  27410 - OT Self Care/Mgmt Minutes: 54  54912 - OT Orthotic Management: 10  Total Minutes  Timed Charges Total Minutes: 64   Total Minutes: 64     Therapy Charges for Today     Code Description Service Date Service Provider Modifiers Qty    [id number]  OT ORTHOTIC MGMT/TRAIN EA 15 MIN 2/1/2022 Christal Garcia OT GO 1    [id number]  OT SELF CARE/MGMT/TRAIN EA 15 MIN 2/1/2022 Christal Garcia OT GO 3                    Christal Garcia OT  2/1/2022

## 2022-02-07 ENCOUNTER — DOCUMENTATION (OUTPATIENT)
Dept: MAMMOGRAPHY | Facility: HOSPITAL | Age: 82
End: 2022-02-07

## 2022-02-09 ENCOUNTER — HOSPITAL ENCOUNTER (OUTPATIENT)
Dept: OCCUPATIONAL THERAPY | Facility: HOSPITAL | Age: 82
Setting detail: THERAPIES SERIES
Discharge: HOME | End: 2022-02-09

## 2022-02-09 ENCOUNTER — OFFICE VISIT (OUTPATIENT)
Dept: CARDIOLOGY | Facility: CLINIC | Age: 82
End: 2022-02-09

## 2022-02-09 VITALS
HEIGHT: 65 IN | DIASTOLIC BLOOD PRESSURE: 61 MMHG | WEIGHT: 200 LBS | BODY MASS INDEX: 33.32 KG/M2 | SYSTOLIC BLOOD PRESSURE: 137 MMHG | HEART RATE: 55 BPM

## 2022-02-09 DIAGNOSIS — Z17.0 MALIGNANT NEOPLASM OF OVERLAPPING SITES OF RIGHT BREAST IN FEMALE, ESTROGEN RECEPTOR POSITIVE: ICD-10-CM

## 2022-02-09 DIAGNOSIS — I10 PRIMARY HYPERTENSION: ICD-10-CM

## 2022-02-09 DIAGNOSIS — I97.2 POSTMASTECTOMY LYMPHEDEMA SYNDROME: ICD-10-CM

## 2022-02-09 DIAGNOSIS — I35.0 NONRHEUMATIC AORTIC VALVE STENOSIS: Primary | Chronic | ICD-10-CM

## 2022-02-09 DIAGNOSIS — I50.32 CHRONIC DIASTOLIC CONGESTIVE HEART FAILURE: ICD-10-CM

## 2022-02-09 DIAGNOSIS — N18.30 STAGE 3 CHRONIC KIDNEY DISEASE, UNSPECIFIED WHETHER STAGE 3A OR 3B CKD: Chronic | ICD-10-CM

## 2022-02-09 DIAGNOSIS — I97.2 POST-MASTECTOMY LYMPHEDEMA SYNDROME: ICD-10-CM

## 2022-02-09 DIAGNOSIS — C50.811 MALIGNANT NEOPLASM OF OVERLAPPING SITES OF RIGHT BREAST IN FEMALE, ESTROGEN RECEPTOR POSITIVE: ICD-10-CM

## 2022-02-09 DIAGNOSIS — I48.0 PAROXYSMAL ATRIAL FIBRILLATION: Chronic | ICD-10-CM

## 2022-02-09 DIAGNOSIS — E78.5 HYPERLIPIDEMIA, UNSPECIFIED HYPERLIPIDEMIA TYPE: ICD-10-CM

## 2022-02-09 DIAGNOSIS — Z91.89 AT RISK FOR LYMPHEDEMA: ICD-10-CM

## 2022-02-09 DIAGNOSIS — I89.0 LYMPHEDEMA: Primary | ICD-10-CM

## 2022-02-09 PROCEDURE — 97535 SELF CARE MNGMENT TRAINING: CPT

## 2022-02-09 PROCEDURE — 99214 OFFICE O/P EST MOD 30 MIN: CPT | Performed by: INTERNAL MEDICINE

## 2022-02-09 PROCEDURE — L8015 EXT BREASTPROSTHESIS GARMENT: HCPCS

## 2022-02-09 RX ORDER — METOPROLOL SUCCINATE 25 MG/1
25 TABLET, EXTENDED RELEASE ORAL NIGHTLY
Qty: 90 TABLET | Refills: 3 | Status: SHIPPED | OUTPATIENT
Start: 2022-02-09 | End: 2022-02-09 | Stop reason: ALTCHOICE

## 2022-02-09 RX ORDER — SPIRONOLACTONE 25 MG/1
25 TABLET ORAL DAILY
Qty: 90 TABLET | Refills: 3 | Status: SHIPPED | OUTPATIENT
Start: 2022-02-09 | End: 2022-11-29 | Stop reason: SDUPTHER

## 2022-02-09 RX ORDER — METOPROLOL SUCCINATE 25 MG/1
25 TABLET, EXTENDED RELEASE ORAL NIGHTLY
Qty: 90 TABLET | Refills: 3 | Status: SHIPPED | OUTPATIENT
Start: 2022-02-09 | End: 2022-12-19 | Stop reason: SDUPTHER

## 2022-02-09 NOTE — ASSESSMENT & PLAN NOTE
She has chronic diastolic heart failure secondary to hypertensive heart disease valvular heart disease and chronic kidney disease.  She has significant fluid retention with pedal edema.  Accidentally she must have run out of her spironolactone and did not refill it.  I am going to restart her spironolactone 25 mg once a day and have her do a BMP in 1 month.   substernal/epigastric  EKG no change  trop neg x2  pain now resolved.  patient reports GERD and takes PPI at home.

## 2022-02-09 NOTE — ASSESSMENT & PLAN NOTE
Her A. fib rate is controlled.  I am going to discontinue her digoxin and increase her metoprolol XL to 25 mg whole tablet at bedtime

## 2022-02-09 NOTE — PROGRESS NOTES
Office Visit    Chief Complaint  Atrial Fibrillation, Hyperlipidemia, Hypertension, and Edema    Subjective            Lilia Becerra presents to [facility] CARDIOLOGY  Lilia Becerra is an 81 years old female with atrial fibrillation, hypertension, hyperlipidemia, chronic diastolic heart failure with pedal edema who states that she was in the hospital and since discharge her pedal edema has got worse.  It seems that her spironolactone was accidentally discontinued.  She probably forgot to get it refilled.  She denies chest pain palpitation dizziness syncope.  Or shortness of breath shortness of breath is stable      Past Medical History:   Diagnosis Date   • Acid reflux disease    • Arthritis    • Atrial fibrillation (HCC)    • Back pain    • Bowel obstruction (HCC)    • Breast cancer (HCC)    • Breast cancer (HCC)    • Carpal tunnel syndrome    • Constipation    • Depression    • Diabetes mellitus (HCC)    • Disease of thyroid gland    • Dyslipidemia    • Fecal incontinence    • GERD (gastroesophageal reflux disease)    • Glaucoma     left eye   • HBP (high blood pressure)    • Hearing impaired     hearing aides   • Hiatal hernia    • High cholesterol    • History of transfusion    • History of tuberculosis     IN 1980'S WAS TREATED   • Hypertension    • Hypomagnesemia    • Kienbock's disease    • Kienböck's disease, right     KIENBOCK'S AVASCULAR NECROSIS OF LUNATE, ADULT RIGHT   • Nonrheumatic aortic valve stenosis 11/5/2020   • OAB (overactive bladder)    • Osteoporosis    • Pneumonia    • PONV (postoperative nausea and vomiting)    • Positive PPD    • RLS (restless legs syndrome)    • Sleep apnea     NO MACHINE   • Stage 3 chronic kidney disease (HCC)    • Status post bilateral mastectomy with sentinel node biopsy and right axillary node dissection 1/12/2022   • Tailbone injury     fracture   • Thyroid disease    • Urinary incontinence     wears pads   • Urinary retention        Allergies    Allergen Reactions   • Eggs Or Egg-Derived Products Swelling   • Sertraline Unknown - High Severity     Bleeding in stomach    • Tetanus Toxoid Swelling   • Tetanus Toxoids Swelling   • Nsaids Unknown - High Severity        Past Surgical History:   Procedure Laterality Date   • ANKLE TENDON REPAIR     • BACK SURGERY  12/21/2018-3/2019    LUMBAR--BROKEN DISC, CLEANED OUT--HAS 2ND PROCEDURE TO FINISH REMOVING   • CARPAL TUNNEL RELEASE     • CATARACT EXTRACTION Bilateral    • COLONOSCOPY     • ENDOSCOPY     • HEMORRHOIDECTOMY     • HYSTERECTOMY     • INCISION AND DRAINAGE OF WOUND      on back    • JOINT REPLACEMENT     • KNEE ARTHROPLASTY Right    • LEG SURGERY  06/13/2019   • LUMBAR DISCECTOMY Left 12/21/2018    Procedure: LEFT L4-5 MICRO DISCECTOMY;  Surgeon: Adam Coleman DO;  Location: [facility] OR;  Service: Orthopedic Spine   • LUMBAR DISCECTOMY Left 3/29/2019    Procedure: LEFT L4-5 REVISION OF MICRODISCECTOMY;  Surgeon: Adam Coleman DO;  Location: [facility];  Service: Orthopedic Spine   • LUMBAR FUSION     • MASTECTOMY Bilateral 1/11/2022    Procedure: BREAST MASTECTOMY WITH SENTINEL NODE BIOPSY AND possible  AXILLARY NODE DISSECTION;  Surgeon: Lety Tena MD;  Location: [facility];  Service: General;  Laterality: Bilateral;   • TENDON RELEASE  09/2018   • TOTAL KNEE ARTHROPLASTY Left 6/13/2019    Procedure: LEFT KNEE REVISION;  Surgeon: Malick Costa II, MD;  Location: [facility];  Service: Orthopedics   • TOTAL KNEE ARTHROPLASTY REVISION Left     x2   • TOTAL KNEE ARTHROPLASTY REVISION Left 2/13/2018    Procedure: LEFT TOTAL KNEE ARTHROPLASTY REVISION;  Surgeon: Jair Fajardo MD;  Location: [facility];  Service:    • VERTEBROPLASTY          Social History     Tobacco Use   • Smoking status: Never Smoker   • Smokeless tobacco: Never Used   Vaping Use   • Vaping Use: Never used   Substance Use Topics   • Alcohol use: No   • Drug use: No       Family History    Problem Relation Age of Onset   • Breast cancer Mother    • Tuberculosis Mother    • Diabetes Father    • Diabetes Sister    • Malig Hyperthermia Neg Hx         Prior to Admission medications    Medication Sig Start Date End Date Taking? Authorizing Provider   alendronate (FOSAMAX) 35 MG tablet Take 35 mg by mouth Every 7 (Seven) Days.   Yes Hayder Nunez MD   apixaban (ELIQUIS) 5 MG tablet tablet Take 1 tablet by mouth Every 12 (Twelve) Hours. 12/3/21  Yes Denise Arguelles APRN   atorvastatin (LIPITOR) 10 MG tablet Take 1 tablet by mouth Every Night. 12/3/21  Yes Denise Arguelles APRN   brimonidine (ALPHAGAN) 0.2 % ophthalmic solution Administer 1 drop into the left eye Every Night. 10/16/21  Yes Hayder Nunez MD   Calcium Carb-Cholecalciferol (CALCIUM 600 + D PO) Take 1 tablet by mouth 2 (Two) Times a Day.   Yes Hayder Nunez MD   COMBIGAN 0.2-0.5 % ophthalmic solution Administer 2 drops to both eyes 2 (Two) Times a Day. 11/4/18  Yes Hayder Nunez MD   digoxin (LANOXIN) 125 MCG tablet Take 125 mcg by mouth Daily. 10/20/21  Yes Hayder Nunez MD   dilTIAZem CD (CARDIZEM CD) 120 MG 24 hr capsule Take 1 capsule by mouth Daily. 9/23/21  Yes Denise Arguelles APRN   DULoxetine (CYMBALTA) 60 MG capsule Take 60 mg by mouth Daily.   Yes Hayder Nunez MD   furosemide (LASIX) 40 MG tablet Take 1 1/2 tablets every morning. Can decrease to one tablet a day if swelling improves 12/16/21  Yes Denise Arguelles APRN   levothyroxine (SYNTHROID, LEVOTHROID) 25 MCG tablet Take 25 mcg by mouth Daily. Currently doesn't have .   Yes Hayder Nunez MD   losartan (COZAAR) 25 MG tablet Take 1 tablet by mouth Daily. 12/3/21  Yes Denise Arguelles APRN   metFORMIN (GLUCOPHAGE) 500 MG tablet Take 500 mg by mouth 2 (Two) Times a Day.   Yes Hayder Nunez MD   metoprolol succinate XL (TOPROL-XL) 25 MG 24 hr tablet Take half a tab at night  Patient taking differently: Take  "12.5 mg by mouth Every Night. 12/16/21  Yes Denise Arguelles APRN   Mirabegron ER (Myrbetriq) 25 MG tablet sustained-release 24 hour 24 hr tablet Take 2 tablets by mouth Every Morning for 90 days.  Patient taking differently: Take 25 mg by mouth 2 (Two) Times a Day. 12/7/21 3/7/22 Yes Juana Lenz APRN   Multiple Vitamins-Minerals (MULTIVITAMIN ADULTS PO) Take 1 capsule by mouth Daily.   Yes Hayder Nunez MD   omeprazole (priLOSEC) 20 MG capsule Take 20 mg by mouth Daily.   Yes Hayder Nunez MD   oxybutynin XL (DITROPAN-XL) 10 MG 24 hr tablet Take 1 tablet by mouth Daily. 12/7/21  Yes Juana Lenz APRN   pramipexole (MIRAPEX) 1.5 MG tablet Take 1.5 mg by mouth Every Night.   Yes Hayder Nunez MD   VENLAFAXINE HCL ER PO Take 150 mg by mouth Daily.   Yes ProviderHayder MD   vitamin C (ASCORBIC ACID) 500 MG tablet Take 500 mg by mouth Daily.   Yes ProviderHayder MD   spironolactone (ALDACTONE) 25 MG tablet Take 1 tablet by mouth Every Other Day. 11/4/21   Reagan Strong MD        Review of Systems   Constitutional: Negative for fatigue.   Respiratory: Positive for shortness of breath. Negative for cough.    Cardiovascular: Positive for leg swelling. Negative for chest pain and palpitations.   Neurological: Negative for dizziness.        Objective     /61   Pulse 55   Ht 165.1 cm (65\")   Wt 90.7 kg (200 lb)   BMI 33.28 kg/m²       Physical Exam  Constitutional:       General: She is awake.      Appearance: Normal appearance.   Neck:      Thyroid: No thyromegaly.      Vascular: No carotid bruit or JVD.   Cardiovascular:      Rate and Rhythm: Normal rate and regular rhythm.      Chest Wall: PMI is not displaced.      Pulses: Normal pulses.      Heart sounds: S1 normal and S2 normal. Murmur heard.    Systolic murmur is present.  No friction rub. No gallop. No S3 or S4 sounds.    Pulmonary:      Effort: Pulmonary effort is normal.      Breath sounds: Normal " breath sounds and air entry. No wheezing, rhonchi or rales.   Abdominal:      General: Bowel sounds are normal.      Palpations: Abdomen is soft. There is no mass.      Tenderness: There is no abdominal tenderness.   Musculoskeletal:      Cervical back: Neck supple.      Right lower le+ Pitting Edema present.      Left lower le+ Pitting Edema present.   Neurological:      Mental Status: She is alert and oriented to person, place, and time.   Psychiatric:         Mood and Affect: Mood normal.         Behavior: Behavior is cooperative.       No results found for: PROBNP, BNP  CMP    CMP 9/14/21 9/14/21 9/15/21 1/11/22    0957 2347     Glucose    167 (A)   Glucose 131 (A)  192 (A)    BUN 12  14 24 (A)   Creatinine 1.1  1.2 1.35 (A)   eGFR Non  Am    37 (A)   eGFR  Am 58  52    Sodium 141  140 140   Potassium 3.4 (A) 3.3 (A) 4.2 3.2 (A)   Chloride 109 (A)  108 (A) 98   Calcium 8.7  9.5 8.3 (A)   (A) Abnormal value       Comments are available for some flowsheets but are not being displayed.                 No results found for: TSH   No results found for: FREET4   No results found for: DDIMERQUANT  Magnesium   Date Value Ref Range Status   09/15/2021 1.7 (L) 1.9 - 2.7 mg/dL Final      No results found for: DIGOXIN        Her outside labs were checked.  Her BUN and creatinine are elevated but stable.  Her LDL is 83 and that is at goal.  She will get a BMP in 1 month and office visit with blood work in 6 months    Result Review :                           Assessment and Plan        Diagnoses and all orders for this visit:    1. Nonrheumatic aortic valve stenosis (Primary)  Assessment & Plan:  In the next month or 2, I will get a echocardiogram to assess the severity of her aortic valve stenosis    Orders:  -     Comprehensive Metabolic Panel; Future  -     T4, Free; Future  -     TSH; Future  -     Adult Transthoracic Echo Complete W/ Cont if Necessary Per Protocol; Future  -     Lipid Panel;  Future  -     Comprehensive Metabolic Panel; Future  -     Magnesium; Future    2. Primary hypertension  -     Comprehensive Metabolic Panel; Future  -     T4, Free; Future  -     TSH; Future  -     Adult Transthoracic Echo Complete W/ Cont if Necessary Per Protocol; Future  -     Lipid Panel; Future  -     Comprehensive Metabolic Panel; Future  -     Magnesium; Future    3. Hyperlipidemia, unspecified hyperlipidemia type  -     Comprehensive Metabolic Panel; Future  -     T4, Free; Future  -     TSH; Future  -     Adult Transthoracic Echo Complete W/ Cont if Necessary Per Protocol; Future  -     Lipid Panel; Future  -     Comprehensive Metabolic Panel; Future  -     Magnesium; Future    4. Stage 3 chronic kidney disease, unspecified whether stage 3a or 3b CKD (HCC)  -     Comprehensive Metabolic Panel; Future  -     T4, Free; Future  -     TSH; Future  -     Adult Transthoracic Echo Complete W/ Cont if Necessary Per Protocol; Future  -     Lipid Panel; Future  -     Comprehensive Metabolic Panel; Future  -     Magnesium; Future    5. Paroxysmal atrial fibrillation (HCC)  -     Discontinue: metoprolol succinate XL (TOPROL-XL) 25 MG 24 hr tablet; Take 1 tablet by mouth Every Night. Take half a tab at night  Dispense: 90 tablet; Refill: 3    6. Chronic diastolic congestive heart failure (HCC)  Assessment & Plan:  She has chronic diastolic heart failure secondary to hypertensive heart disease valvular heart disease and chronic kidney disease.  She has significant fluid retention with pedal edema.  Accidentally she must have run out of her spironolactone and did not refill it.  I am going to restart her spironolactone 25 mg once a day and have her do a BMP in 1 month.      Other orders  -     SCANNED PATHOLOGY  -     spironolactone (ALDACTONE) 25 MG tablet; Take 1 tablet by mouth Daily.  Dispense: 90 tablet; Refill: 3  -     metoprolol succinate XL (TOPROL-XL) 25 MG 24 hr tablet; Take 1 tablet by mouth Every Night.   Dispense: 90 tablet; Refill: 3          Follow Up     Return in about 7 months (around 9/9/2022) for . ECHO WITHIN A MONTH, EKG with next office visit.    Patient was given instructions and counseling regarding her condition or for health maintenance advice. Please see specific information pulled into the AVS if appropriate.     Reagan Strong MD  02/09/22 13:53 EST

## 2022-02-09 NOTE — ASSESSMENT & PLAN NOTE
In the next month or 2, I will get a echocardiogram to assess the severity of her aortic valve stenosis

## 2022-02-09 NOTE — THERAPY TREATMENT NOTE
Outpatient Occupational Therapy Lymphedema Treatment Note   Edu     Patient Name: Lilia Becerra  : 1940  MRN: [id number]  Today's Date: 2022      Visit Date: 2022    Patient Active Problem List   Diagnosis   • Gastroesophageal reflux disease   • Atrial fibrillation with RVR (HCC)   • Hypothyroidism   • Thyroid disease   • Sleep apnea   • HBP (high blood pressure)   • Postoperative anemia due to acute blood loss (expected)   • Stage 3 chronic kidney disease (CMS/HCC)   • Broken leg   • Nonrheumatic aortic valve stenosis   • Hyperlipidemia   • Hypertonicity of bladder   • Retention of urine   • Pedal edema   • Abnormal mammogram   • Age related osteoporosis   • Arthritis   • Bowel obstruction (HCC)   • Carpal tunnel syndrome   • Chronic bilateral low back pain with left-sided sciatica   • Chronic pain disorder   • Class 2 obesity   • Complication of surgical procedure   • Diarrhea   • Degeneration of intervertebral disc of lumbar region   • Depressive disorder   • Diabetic renal disease (HCC)   • Disequilibrium   • Fecal urgency   • Glaucoma   • Hypertensive heart failure (HCC)   • Hypomagnesemia   • Idiopathic osteoarthritis   • Kienboeck disease of adults   • Long term (current) use of oral hypoglycemic drugs   • Lumbar radiculopathy   • Peripheral venous insufficiency   • Pneumonia   • Positive PPD   • Rapid heart rate   • Recurrent major depression (HCC)   • Restless legs syndrome   • Spondylolysis of lumbar region   • Fecal incontinence   • Diabetes mellitus (HCC)   • Malignant neoplasm of overlapping sites of right breast in female, estrogen receptor positive (HCC)   • Status post bilateral mastectomy with sentinel node biopsy and right axillary node dissection   • Chronic diastolic congestive heart failure (HCC)   • Paroxysmal atrial fibrillation (HCC)        Past Medical History:   Diagnosis Date   • Acid reflux disease    • Arthritis    • Atrial fibrillation (HCC)    • Back pain    •  Bowel obstruction (HCC)    • Breast cancer (HCC)    • Breast cancer (HCC)    • Carpal tunnel syndrome    • Constipation    • Depression    • Diabetes mellitus (HCC)    • Disease of thyroid gland    • Dyslipidemia    • Fecal incontinence    • GERD (gastroesophageal reflux disease)    • Glaucoma     left eye   • HBP (high blood pressure)    • Hearing impaired     hearing aides   • Hiatal hernia    • High cholesterol    • History of transfusion    • History of tuberculosis     IN 1980'S WAS TREATED   • Hypertension    • Hypomagnesemia    • Kienbock's disease    • Kienböck's disease, right     KIENBOCK'S AVASCULAR NECROSIS OF LUNATE, ADULT RIGHT   • Nonrheumatic aortic valve stenosis 11/5/2020   • OAB (overactive bladder)    • Osteoporosis    • Pneumonia    • PONV (postoperative nausea and vomiting)    • Positive PPD    • RLS (restless legs syndrome)    • Sleep apnea     NO MACHINE   • Stage 3 chronic kidney disease (HCC)    • Status post bilateral mastectomy with sentinel node biopsy and right axillary node dissection 1/12/2022   • Tailbone injury     fracture   • Thyroid disease    • Urinary incontinence     wears pads   • Urinary retention         Past Surgical History:   Procedure Laterality Date   • ANKLE TENDON REPAIR     • BACK SURGERY  12/21/2018-3/2019    LUMBAR--BROKEN DISC, CLEANED OUT--HAS 2ND PROCEDURE TO FINISH REMOVING   • CARPAL TUNNEL RELEASE     • CATARACT EXTRACTION Bilateral    • COLONOSCOPY     • ENDOSCOPY     • HEMORRHOIDECTOMY     • HYSTERECTOMY     • INCISION AND DRAINAGE OF WOUND      on back    • JOINT REPLACEMENT     • KNEE ARTHROPLASTY Right    • LEG SURGERY  06/13/2019   • LUMBAR DISCECTOMY Left 12/21/2018    Procedure: LEFT L4-5 MICRO DISCECTOMY;  Surgeon: Adam Coleman DO;  Location: [facility] OR;  Service: Orthopedic Spine   • LUMBAR DISCECTOMY Left 3/29/2019    Procedure: LEFT L4-5 REVISION OF MICRODISCECTOMY;  Surgeon: Adam Coleman DO;  Location: [facility] OR;  Service: Orthopedic  Spine   • LUMBAR FUSION     • MASTECTOMY Bilateral 1/11/2022    Procedure: BREAST MASTECTOMY WITH SENTINEL NODE BIOPSY AND possible  AXILLARY NODE DISSECTION;  Surgeon: Lety Tena MD;  Location: [facility];  Service: General;  Laterality: Bilateral;   • TENDON RELEASE  09/2018   • TOTAL KNEE ARTHROPLASTY Left 6/13/2019    Procedure: LEFT KNEE REVISION;  Surgeon: Malick Costa II, MD;  Location: [facility] OR;  Service: Orthopedics   • TOTAL KNEE ARTHROPLASTY REVISION Left     x2   • TOTAL KNEE ARTHROPLASTY REVISION Left 2/13/2018    Procedure: LEFT TOTAL KNEE ARTHROPLASTY REVISION;  Surgeon: Jair Fajardo MD;  Location: [facility] OR;  Service:    • VERTEBROPLASTY           Visit Dx:      ICD-10-CM ICD-9-CM   1. Lymphedema  I89.0 457.1   2. 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 Post-mastectomy lymphedema syndrome  I97.2 457.0   5. At risk for lymphedema  Z91.89 V49.89            OT Ortho     Row Name 02/09/22 1700             Orthotics & Prosthetics Management    Orthosis Location upper extremity orthosis  -[facility]      Additional Documentation Orthosis Location (Row)  -[facility]            User Key  (r) = Recorded By, (t) = Taken By, (c) = Cosigned By    Initials Name Provider Type    Christal Beck OT Occupational Therapist              OT did assist patient in ordering online for her 20 to 30 mmHg of compression sleeve and size 2 long Medi comfort arm sleeve and sand.       OT Mastectomy Care:    Location: Bilateral chest wall    Date of Surgery: 1/11/2022  Date of Discharge: 1/11/2022    Garments  Type of Garment Dispensed: Post Surgical Compression Garment    Breast : Amoedanielle    Product Name: [id number] Madonna Ferris at the high size 4042     Number of Garments Dispensed: 2     OT Assessment/Plan     Row Name 02/09/22 9716           OT Assessment    Functional Limitations Performance in self-care ADL  -[facility]     Impairments Impaired lymphatic circulation  -[facility]     Assessment Comments Patient will benefit from continued skilled occupational therapy services to reevaluate overall lymphatic functioning due to patient demonstrating a stage I advancement in lymphedema staging.  Patient has ordered her compression sleeve and verbally indicates understanding of wear schedule of this garment and the need to remove prior to her reevaluation to determine if stabilization of lymphatic functioning has been achieved.  -[facility]            OT Plan    OT Plan Comments Continue POC  -[facility]           User Key  (r) = Recorded By, (t) = Taken By, (c) = Cosigned By    Initials Name Provider Type    [facility] Christal Garcia OT Occupational Therapist                          Therapy Education  Education Details: OT educated patient that her insurance plan does have coverage for the compression garment however the [employer] company is now requiring the procurement of for compression garments at a time when ordering compression garments and this would increase the cost for the patient which would cost more than if she purchased a single compression garment at an out-of-pocket price.  Given: Symptoms/condition management, Other (comment)  Program: New  How Provided: Verbal, Demonstration, Written  Provided to: Patient  Level of Understanding: Verbalized  87349 - OT Self Care/Mgmt Minutes: 25                Time Calculation:   Timed Charges  00752 - OT Self Care/Mgmt Minutes: 25  Total Minutes  Timed Charges Total Minutes: 25   Total Minutes: 25     Therapy Charges for Today     Code Description Service Date Service Provider Modifiers Qty    [id number]  OT SELF CARE/MGMT/TRAIN EA 15 MIN 2/9/2022 Christal Garcia OT GO 2    [id number] HC BRA POST/SURG LIDA ALL SZ/COLOR AMOENA 2/9/2022 Christal Garcia OT  2                      Christal Garcia OT  2/9/2022

## 2022-02-15 ENCOUNTER — APPOINTMENT (OUTPATIENT)
Dept: OCCUPATIONAL THERAPY | Facility: HOSPITAL | Age: 82
End: 2022-02-15

## 2022-02-21 ENCOUNTER — OFFICE VISIT (OUTPATIENT)
Dept: ONCOLOGY | Facility: HOSPITAL | Age: 82
End: 2022-02-21

## 2022-02-21 VITALS
DIASTOLIC BLOOD PRESSURE: 64 MMHG | SYSTOLIC BLOOD PRESSURE: 150 MMHG | BODY MASS INDEX: 33.02 KG/M2 | HEART RATE: 64 BPM | OXYGEN SATURATION: 99 % | WEIGHT: 198.41 LBS | RESPIRATION RATE: 16 BRPM | TEMPERATURE: 98 F

## 2022-02-21 DIAGNOSIS — Z17.0 MALIGNANT NEOPLASM OF OVERLAPPING SITES OF RIGHT BREAST IN FEMALE, ESTROGEN RECEPTOR POSITIVE: Primary | ICD-10-CM

## 2022-02-21 DIAGNOSIS — Z78.0 POST-MENOPAUSAL: ICD-10-CM

## 2022-02-21 DIAGNOSIS — C50.811 MALIGNANT NEOPLASM OF OVERLAPPING SITES OF RIGHT BREAST IN FEMALE, ESTROGEN RECEPTOR POSITIVE: Primary | ICD-10-CM

## 2022-02-21 PROCEDURE — G0463 HOSPITAL OUTPT CLINIC VISIT: HCPCS | Performed by: INTERNAL MEDICINE

## 2022-02-21 PROCEDURE — 99214 OFFICE O/P EST MOD 30 MIN: CPT | Performed by: INTERNAL MEDICINE

## 2022-02-21 RX ORDER — LETROZOLE 2.5 MG/1
2.5 TABLET, FILM COATED ORAL DAILY
Qty: 30 TABLET | Refills: 1 | Status: SHIPPED | OUTPATIENT
Start: 2022-02-21 | End: 2022-04-05

## 2022-02-21 NOTE — ASSESSMENT & PLAN NOTE
Patient status post bilateral mastectomies.  Oncotype DX score of 3 placing her low risk category.  She does not require adjuvant radiation or chemotherapy.  She would benefit from adjuvant hormone therapy with aromatase inhibitor-letrozole 2.5 mg daily for minimum 5 years.  Side effects discussed including vasomotor symptoms, arthralgias, myalgias, changes in bone density.  Written teaching information provided.  Patient agreeable to therapy as outlined.  Prescription sent to pharmacy.  We also discussed low-fat low-cholesterol diet and routine aerobic exercise as lifestyle modifications to decrease risk of breast cancer recurrence.  She will return in 6 weeks to assess tolerance to treatment with DEXA scan prior.

## 2022-02-21 NOTE — PROGRESS NOTES
Chief Complaint  Breast Cancer and Follow-up    Leo Lomas MD Ahmed, Syed Zulfiqar, MD    Subjective          Lilia Becerra presents to [facility] for follow-up of breast cancer.  Since her last visit, she has undergone bilateral mastectomies.  She has a seroma under the right incision but has been working with lymphedema clinic.  She is otherwise feeling well.  She denies new masses or adenopathy, no unusual aches or pains.    Oncology/Hematology History Overview Note   12/20/22 Right breast, 9 o'clock position, 6 cm from nipple, ultrasound-guided core biopsy:  - Invasive carcinoma of no special type (ductal)  - Histologic grade ([place] Histologic Score):  - Glandular (Acinar)/Tubular Differentiation: Score 3  - Nuclear Pleomorphism: Score 2  - Mitotic Rate: Score 1  - Overall Grade: Grade 2  - Size of largest focus of invasion: 9 mm  - Breast biomarker testing:  - Estrogen Receptor (ER): Positive (100%, strong)  - Progesterone Receptor (PgR): Positive (100%, strong)  - HER2 (by immunohistochemistry): Equivocal (Score 2+), see comment  - Ki-67: 5%  2. 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 Grade 2, All margins negative. 0/18 lymph nodes.   Left Mastectomy-benign.     Oncotype  score 3    2/21/22 Letrozole initiated.          Malignant neoplasm of overlapping sites of right breast in female, estrogen receptor positive (HCC)   12/23/2021 Initial Diagnosis    Malignant neoplasm of overlapping sites of right breast in female, estrogen receptor positive (HCC)         Review of Systems   Constitutional: Negative for appetite change, diaphoresis, fatigue, fever, unexpected weight gain and unexpected weight loss.   HENT: Negative for hearing loss, mouth sores, sore throat, swollen glands, trouble swallowing and voice change.    Eyes: Negative for blurred vision.   Respiratory: Negative for cough, shortness of breath and wheezing.    Cardiovascular: Negative for chest pain and palpitations.   Gastrointestinal: Negative for abdominal pain, blood in stool, constipation, diarrhea, nausea and vomiting.   Endocrine: Negative for cold intolerance and heat intolerance.   Genitourinary: Negative for difficulty urinating, dysuria, frequency, hematuria and urinary incontinence.   Musculoskeletal: Positive for back pain. Negative for arthralgias and myalgias.   Skin: Negative for rash, skin lesions and wound.   Neurological: Negative for dizziness, seizures, weakness, numbness and headache.   Hematological: Does not bruise/bleed easily.   Psychiatric/Behavioral: Negative for depressed mood. The patient is not nervous/anxious.      Current Outpatient Medications on File Prior to Visit   Medication Sig Dispense Refill   • alendronate (FOSAMAX) 35 MG tablet Take 35 mg by mouth Every 7 (Seven) Days.     • apixaban (ELIQUIS) 5 MG tablet tablet Take 1 tablet by mouth Every 12 (Twelve) Hours. 180 tablet 3   • atorvastatin (LIPITOR) 10 MG tablet Take 1 tablet by mouth Every Night. 90 tablet 3   • brimonidine (ALPHAGAN) 0.2 % ophthalmic solution Administer 1 drop into the left eye Every Night.     • Calcium Carb-Cholecalciferol (CALCIUM 600 + D PO) Take 1 tablet by mouth 2 (Two) Times a Day.     • COMBIGAN 0.2-0.5 %  ophthalmic solution Administer 2 drops to both eyes 2 (Two) Times a Day.  2   • digoxin (LANOXIN) 125 MCG tablet Take 125 mcg by mouth Daily.     • dilTIAZem CD (CARDIZEM CD) 120 MG 24 hr capsule Take 1 capsule by mouth Daily. 90 capsule 3   • DULoxetine (CYMBALTA) 60 MG capsule Take 60 mg by mouth Daily.     • furosemide (LASIX) 40 MG tablet Take 1 1/2 tablets every morning. Can decrease to one tablet a day if swelling improves 145 tablet 3   • levothyroxine (SYNTHROID, LEVOTHROID) 25 MCG tablet Take 25 mcg by mouth Daily. Currently doesn't have .     • losartan (COZAAR) 25 MG tablet Take 1 tablet by mouth Daily. 90 tablet 3   • metFORMIN (GLUCOPHAGE) 500 MG tablet Take 500 mg by mouth 2 (Two) Times a Day.     • metoprolol succinate XL (TOPROL-XL) 25 MG 24 hr tablet Take 1 tablet by mouth Every Night. 90 tablet 3   • Mirabegron ER (Myrbetriq) 25 MG tablet sustained-release 24 hour 24 hr tablet Take 2 tablets by mouth Every Morning for 90 days. (Patient taking differently: Take 25 mg by mouth 2 (Two) Times a Day.) 90 tablet 1   • Multiple Vitamins-Minerals (MULTIVITAMIN ADULTS PO) Take 1 capsule by mouth Daily.     • omeprazole (priLOSEC) 20 MG capsule Take 20 mg by mouth Daily.     • oxybutynin XL (DITROPAN-XL) 10 MG 24 hr tablet Take 1 tablet by mouth Daily. 90 tablet 1   • pramipexole (MIRAPEX) 1.5 MG tablet Take 1.5 mg by mouth Every Night.     • spironolactone (ALDACTONE) 25 MG tablet Take 1 tablet by mouth Daily. 90 tablet 3   • VENLAFAXINE HCL ER PO Take 150 mg by mouth Daily.     • vitamin C (ASCORBIC ACID) 500 MG tablet Take 500 mg by mouth Daily.       No current facility-administered medications on file prior to visit.       Allergies   Allergen Reactions   • Eggs Or Egg-Derived Products Swelling   • Sertraline Unknown - High Severity     Bleeding in stomach    • Tetanus Toxoid Swelling   • Tetanus Toxoids Swelling   • Nsaids Unknown - High Severity     Past Medical History:   Diagnosis Date   • Acid  reflux disease    • Arthritis    • Atrial fibrillation (HCC)    • Back pain    • Bowel obstruction (HCC)    • Breast cancer (HCC)    • Breast cancer (HCC)    • Carpal tunnel syndrome    • Constipation    • Depression    • Diabetes mellitus (HCC)    • Disease of thyroid gland    • Dyslipidemia    • Fecal incontinence    • GERD (gastroesophageal reflux disease)    • Glaucoma     left eye   • HBP (high blood pressure)    • Hearing impaired     hearing aides   • Hiatal hernia    • High cholesterol    • History of transfusion    • History of tuberculosis     IN 1980'S WAS TREATED   • Hypertension    • Hypomagnesemia    • Kienbock's disease    • Kienböck's disease, right     KIENBOCK'S AVASCULAR NECROSIS OF LUNATE, ADULT RIGHT   • Nonrheumatic aortic valve stenosis 11/5/2020   • OAB (overactive bladder)    • Osteoporosis    • Pneumonia    • PONV (postoperative nausea and vomiting)    • Positive PPD    • RLS (restless legs syndrome)    • Sleep apnea     NO MACHINE   • Stage 3 chronic kidney disease (HCC)    • Status post bilateral mastectomy with sentinel node biopsy and right axillary node dissection 1/12/2022   • Tailbone injury     fracture   • Thyroid disease    • Urinary incontinence     wears pads   • Urinary retention      Past Surgical History:   Procedure Laterality Date   • ANKLE TENDON REPAIR     • BACK SURGERY  12/21/2018-3/2019    LUMBAR--BROKEN DISC, CLEANED OUT--HAS 2ND PROCEDURE TO FINISH REMOVING   • CARPAL TUNNEL RELEASE     • CATARACT EXTRACTION Bilateral    • COLONOSCOPY     • ENDOSCOPY     • HEMORRHOIDECTOMY     • HYSTERECTOMY     • INCISION AND DRAINAGE OF WOUND      on back    • JOINT REPLACEMENT     • KNEE ARTHROPLASTY Right    • LEG SURGERY  06/13/2019   • LUMBAR DISCECTOMY Left 12/21/2018    Procedure: LEFT L4-5 MICRO DISCECTOMY;  Surgeon: Adam Coleman DO;  Location: [facility] OR;  Service: Orthopedic Spine   • LUMBAR DISCECTOMY Left 3/29/2019    Procedure: LEFT L4-5 REVISION OF  MICRODISCECTOMY;  Surgeon: Adam Coleman DO;  Location: [facility] OR;  Service: Orthopedic Spine   • LUMBAR FUSION     • MASTECTOMY Bilateral 1/11/2022    Procedure: BREAST MASTECTOMY WITH SENTINEL NODE BIOPSY AND possible  AXILLARY NODE DISSECTION;  Surgeon: Lety Tena MD;  Location: [facility];  Service: General;  Laterality: Bilateral;   • TENDON RELEASE  09/2018   • TOTAL KNEE ARTHROPLASTY Left 6/13/2019    Procedure: LEFT KNEE REVISION;  Surgeon: Malick Costa II, MD;  Location: [facility] OR;  Service: Orthopedics   • TOTAL KNEE ARTHROPLASTY REVISION Left     x2   • TOTAL KNEE ARTHROPLASTY REVISION Left 2/13/2018    Procedure: LEFT TOTAL KNEE ARTHROPLASTY REVISION;  Surgeon: Jair Fajardo MD;  Location: [facility] OR;  Service:    • VERTEBROPLASTY       Social History     Socioeconomic History   • Marital status:    Tobacco Use   • Smoking status: Never Smoker   • Smokeless tobacco: Never Used   Vaping Use   • Vaping Use: Never used   Substance and Sexual Activity   • Alcohol use: No   • Drug use: No   • Sexual activity: Defer     Family History   Problem Relation Age of Onset   • Breast cancer Mother    • Tuberculosis Mother    • Diabetes Father    • Diabetes Sister    • Malig Hyperthermia Neg Hx        Objective   Physical Exam  Vitals reviewed. Exam conducted with a chaperone present.   Constitutional:       General: She is not in acute distress.     Appearance: Normal appearance.   Cardiovascular:      Rate and Rhythm: Normal rate and regular rhythm.      Heart sounds: Normal heart sounds. No murmur heard.  No gallop.    Pulmonary:      Effort: Pulmonary effort is normal.      Breath sounds: Normal breath sounds.   Chest:   Breasts:      Right: Absent. No axillary adenopathy or supraclavicular adenopathy.      Left: Absent. No axillary adenopathy or supraclavicular adenopathy.         Abdominal:      General: Abdomen is flat. Bowel sounds are normal.       Palpations: Abdomen is soft.   Musculoskeletal:      Cervical back: Neck supple.      Right lower leg: No edema.      Left lower leg: No edema.   Lymphadenopathy:      Cervical: No cervical adenopathy.      Upper Body:      Right upper body: No supraclavicular or axillary adenopathy.      Left upper body: No supraclavicular or axillary adenopathy.   Neurological:      Mental Status: She is alert and oriented to person, place, and time.   Psychiatric:         Mood and Affect: Mood normal.         Behavior: Behavior normal.         Vitals:    02/21/22 0929   BP: 150/64   Pulse: 64   Resp: 16   Temp: 98 °F (36.7 °C)   SpO2: 99%   Weight: 90 kg (198 lb 6.6 oz)   PainSc: 0-No pain     ECOG score: 1         PHQ-9 Total Score:                    Result Review :   The following data was reviewed by: Sean Ward MD on 02/21/2022:  Lab Results   Component Value Date    HGB 12.4 10/13/2020    HCT 39.1 10/13/2020    MCV 93.5 10/13/2020     10/13/2020    WBC 11.34 (H) 10/13/2020    NEUTROABS 8.09 10/13/2020    LYMPHSABS 2.41 10/13/2020    MONOSABS 0.56 10/13/2020    EOSABS 0.19 10/13/2020    BASOSABS 0.05 10/13/2020     Lab Results   Component Value Date    GLUCOSE 167 (H) 01/11/2022    BUN 24 (H) 01/11/2022    CREATININE 1.35 (H) 01/11/2022     01/11/2022    K 3.2 (L) 01/11/2022    CL 98 01/11/2022    CO2 28.5 01/11/2022    CALCIUM 8.3 (L) 01/11/2022    PROTEINTOT 6.5 06/13/2019    ALBUMIN 2.80 (L) 06/17/2019    BILITOT 0.4 06/13/2019    ALKPHOS 93 06/13/2019    AST 16 06/13/2019    ALT 10 06/13/2019     Lab Results   Component Value Date    MG 1.7 (L) 09/15/2021    PHOS 2.7 06/17/2019             Assessment and Plan    Diagnoses and all orders for this visit:    1. Malignant neoplasm of overlapping sites of right breast in female, estrogen receptor positive (HCC) (Primary)  Assessment & Plan:  Patient status post bilateral mastectomies.  Oncotype DX score of 3 placing her low risk category.  She does not  require adjuvant radiation or chemotherapy.  She would benefit from adjuvant hormone therapy with aromatase inhibitor-letrozole 2.5 mg daily for minimum 5 years.  Side effects discussed including vasomotor symptoms, arthralgias, myalgias, changes in bone density.  Written teaching information provided.  Patient agreeable to therapy as outlined.  Prescription sent to pharmacy.  We also discussed low-fat low-cholesterol diet and routine aerobic exercise as lifestyle modifications to decrease risk of breast cancer recurrence.  She will return in 6 weeks to assess tolerance to treatment with DEXA scan prior.    Orders:  -     letrozole (FEMARA) 2.5 MG tablet; Take 1 tablet by mouth Daily.  Dispense: 30 tablet; Refill: 1    2. Post-menopausal  -     DEXA Bone Density Axial; Future    Other orders  -     SCANNED - LABS          Patient Follow Up: 6 weeks    Patient was given instructions and counseling regarding her condition or for health maintenance advice. Please see specific information pulled into the AVS if appropriate.     Sean Ward MD    2/21/2022

## 2022-02-24 ENCOUNTER — TELEPHONE (OUTPATIENT)
Dept: ONCOLOGY | Facility: HOSPITAL | Age: 82
End: 2022-02-24

## 2022-02-24 NOTE — TELEPHONE ENCOUNTER
Caller: Lilia Becerra    Relationship: Self    Best call back number: [phone]    What is the best time to reach you: ANYTIME    Who are you requesting to speak with (clinical staff, provider,  specific staff member): BILLING    What was the call regarding: PATIENT CLARIFYING IF SHE WILL NEED TO COMPLETE ONCOTYPE DIAGNOSTIC TESTING AND ADDITIONAL QUESTIONS REGARDING TEST     Do you require a callback: YES

## 2022-02-24 NOTE — TELEPHONE ENCOUNTER
Patient had questions about the cost of the oncotype. She has paperwork to complete for financial assistance. Instructed to let us know if she needs any help filling out the papers. verbalized understanding

## 2022-03-04 DIAGNOSIS — N32.81 OAB (OVERACTIVE BLADDER): ICD-10-CM

## 2022-03-04 NOTE — TELEPHONE ENCOUNTER
Caller: ROSANNE BECERRA    Relationship: SELF    Best call back number: [phone]    Requested Prescriptions:   Requested Prescriptions     Pending Prescriptions Disp Refills   • Mirabegron ER (Myrbetriq) 25 MG tablet sustained-release 24 hour 24 hr tablet 90 tablet 1     Sig: Take 2 tablets by mouth Every Morning for 90 days.        Pharmacy where request should be sent:  [facility] - [address] - [phone]  - [phone] FX     Additional details provided by patient: PT STATES SHE IS OUT OF THIS MEDICATION.    Does the patient have less than a 3 day supply:  [x] Yes  [] No    Tyrone Ricardo Rep   03/04/22 13:19 EST

## 2022-03-07 NOTE — TELEPHONE ENCOUNTER
Patient should be on myrbetriq 50mg tablet once daily. When runs out of the 25mg tablets.  New refill request changed to the 50mg tablets. Medication to be taken once only once daily. Thank you.

## 2022-03-09 ENCOUNTER — TELEPHONE (OUTPATIENT)
Dept: SURGERY | Facility: CLINIC | Age: 82
End: 2022-03-09

## 2022-03-10 ENCOUNTER — OFFICE VISIT (OUTPATIENT)
Dept: SURGERY | Facility: CLINIC | Age: 82
End: 2022-03-10

## 2022-03-10 VITALS — RESPIRATION RATE: 18 BRPM | WEIGHT: 198 LBS | BODY MASS INDEX: 32.99 KG/M2 | HEART RATE: 84 BPM | HEIGHT: 65 IN

## 2022-03-10 DIAGNOSIS — N64.89 SEROMA OF BREAST: Primary | ICD-10-CM

## 2022-03-10 PROCEDURE — 99024 POSTOP FOLLOW-UP VISIT: CPT | Performed by: NURSE PRACTITIONER

## 2022-03-10 NOTE — PROGRESS NOTES
Chief Complaint: Mass and Follow-up    Subjective      Post op concerns          History of Present Illness  Lilia Becerra is a 81 y.o. female presents to [facility] GENERAL SURGERY for post operative concerns.    Patient presents today with complaints of swelling around her mastectomy site.    Patient underwent a bilateral mastectomy on 1/11/2022 performed by Dr. Lety Tena.    Patient reports that she noticed swelling back in the end of January and was unsure why with swelling.    Denies any fever or chills.    Pathology:  Clinical Information    Comment:    Malignant neoplasm of overlapping sites of right breast in female, estrogen receptor positive (HCC)      Final Diagnosis   1. Left breast, mastectomy:               - Sclerosing adenosis               - Intraductal papilloma               - Radial scar               - Florid usual ductal hyperplasia               - Fibroadenomatoid change               - Duct ectasia with calcification               - Fibrosis               - Calcified blood vessels     2. 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 Right sentinel lymph node #1, excision:               - One lymph node negative for carcinoma (0/1)               - See synoptic checklist     4. Right breast, new margin, excision:               - Negative for carcinoma               - See synoptic checklist     5. Right axillary contents, dissection:               - Seventeen lymph nodes negative for carcinoma (0/17)               - See synoptic checklist      Electronically signed by Monty Joyner MD on 1       Objective     Past Medical History:   Diagnosis Date   • Acid reflux disease    • Arthritis    • Atrial fibrillation (HCC)    • Back pain    • Bowel obstruction (HCC)    • Breast cancer (HCC)    • Breast cancer (HCC)    • Carpal  tunnel syndrome    • Constipation    • Depression    • Diabetes mellitus (HCC)    • Disease of thyroid gland    • Dyslipidemia    • Fecal incontinence    • GERD (gastroesophageal reflux disease)    • Glaucoma     left eye   • HBP (high blood pressure)    • Hearing impaired     hearing aides   • Hiatal hernia    • High cholesterol    • History of transfusion    • History of tuberculosis     IN 1980'S WAS TREATED   • Hypertension    • Hypomagnesemia    • Kienbock's disease    • Kienböck's disease, right     KIENBOCK'S AVASCULAR NECROSIS OF LUNATE, ADULT RIGHT   • Nonrheumatic aortic valve stenosis 11/5/2020   • OAB (overactive bladder)    • Osteoporosis    • Pneumonia    • PONV (postoperative nausea and vomiting)    • Positive PPD    • RLS (restless legs syndrome)    • Sleep apnea     NO MACHINE   • Stage 3 chronic kidney disease (HCC)    • Status post bilateral mastectomy with sentinel node biopsy and right axillary node dissection 1/12/2022   • Tailbone injury     fracture   • Thyroid disease    • Urinary incontinence     wears pads   • Urinary retention        Past Surgical History:   Procedure Laterality Date   • ANKLE TENDON REPAIR     • BACK SURGERY  12/21/2018-3/2019    LUMBAR--BROKEN DISC, CLEANED OUT--HAS 2ND PROCEDURE TO FINISH REMOVING   • CARPAL TUNNEL RELEASE     • CATARACT EXTRACTION Bilateral    • COLONOSCOPY     • ENDOSCOPY     • HEMORRHOIDECTOMY     • HYSTERECTOMY     • INCISION AND DRAINAGE OF WOUND      on back    • JOINT REPLACEMENT     • KNEE ARTHROPLASTY Right    • LEG SURGERY  06/13/2019   • LUMBAR DISCECTOMY Left 12/21/2018    Procedure: LEFT L4-5 MICRO DISCECTOMY;  Surgeon: Adam Coleman DO;  Location: [facility] OR;  Service: Orthopedic Spine   • LUMBAR DISCECTOMY Left 3/29/2019    Procedure: LEFT L4-5 REVISION OF MICRODISCECTOMY;  Surgeon: Adam Coleman DO;  Location: [facility] MAIN OR;  Service: Orthopedic Spine   • LUMBAR FUSION     • MASTECTOMY Bilateral 1/11/2022    Procedure: BREAST  MASTECTOMY WITH SENTINEL NODE BIOPSY AND possible  AXILLARY NODE DISSECTION;  Surgeon: Lety Tena MD;  Location: [facility];  Service: General;  Laterality: Bilateral;   • TENDON RELEASE  09/2018   • TOTAL KNEE ARTHROPLASTY Left 6/13/2019    Procedure: LEFT KNEE REVISION;  Surgeon: Malick Costa II, MD;  Location: [facility];  Service: Orthopedics   • TOTAL KNEE ARTHROPLASTY REVISION Left     x2   • TOTAL KNEE ARTHROPLASTY REVISION Left 2/13/2018    Procedure: LEFT TOTAL KNEE ARTHROPLASTY REVISION;  Surgeon: Jair Fajardo MD;  Location: [facility];  Service:    • VERTEBROPLASTY         Outpatient Medications Marked as Taking for the 3/10/22 encounter (Office Visit) with Hugo April, APRN   Medication Sig Dispense Refill   • alendronate (FOSAMAX) 35 MG tablet Take 35 mg by mouth Every 7 (Seven) Days.     • apixaban (ELIQUIS) 5 MG tablet tablet Take 1 tablet by mouth Every 12 (Twelve) Hours. 180 tablet 3   • atorvastatin (LIPITOR) 10 MG tablet Take 1 tablet by mouth Every Night. 90 tablet 3   • brimonidine (ALPHAGAN) 0.2 % ophthalmic solution Administer 1 drop into the left eye Every Night.     • Calcium Carb-Cholecalciferol (CALCIUM 600 + D PO) Take 1 tablet by mouth 2 (Two) Times a Day.     • COMBIGAN 0.2-0.5 % ophthalmic solution Administer 2 drops to both eyes 2 (Two) Times a Day.  2   • digoxin (LANOXIN) 125 MCG tablet Take 125 mcg by mouth Daily.     • dilTIAZem CD (CARDIZEM CD) 120 MG 24 hr capsule Take 1 capsule by mouth Daily. 90 capsule 3   • DULoxetine (CYMBALTA) 60 MG capsule Take 60 mg by mouth Daily.     • furosemide (LASIX) 40 MG tablet Take 1 1/2 tablets every morning. Can decrease to one tablet a day if swelling improves 145 tablet 3   • letrozole (FEMARA) 2.5 MG tablet Take 1 tablet by mouth Daily. 30 tablet 1   • levothyroxine (SYNTHROID, LEVOTHROID) 25 MCG tablet Take 25 mcg by mouth Daily. Currently doesn't have .     • losartan (COZAAR) 25 MG tablet Take  "1 tablet by mouth Daily. 90 tablet 3   • metFORMIN (GLUCOPHAGE) 500 MG tablet Take 500 mg by mouth 2 (Two) Times a Day.     • metoprolol succinate XL (TOPROL-XL) 25 MG 24 hr tablet Take 1 tablet by mouth Every Night. 90 tablet 3   • Multiple Vitamins-Minerals (MULTIVITAMIN ADULTS PO) Take 1 capsule by mouth Daily.     • omeprazole (priLOSEC) 20 MG capsule Take 20 mg by mouth Daily.     • oxybutynin XL (DITROPAN-XL) 10 MG 24 hr tablet Take 1 tablet by mouth Daily. 90 tablet 1   • pramipexole (MIRAPEX) 1.5 MG tablet Take 1.5 mg by mouth Every Night.     • VENLAFAXINE HCL ER PO Take 150 mg by mouth Daily.     • vitamin C (ASCORBIC ACID) 500 MG tablet Take 500 mg by mouth Daily.         Allergies   Allergen Reactions   • Eggs Or Egg-Derived Products Swelling   • Sertraline Unknown - High Severity     Bleeding in stomach    • Tetanus Toxoid Swelling   • Tetanus Toxoids Swelling   • Nsaids Unknown - High Severity        Family History   Problem Relation Age of Onset   • Breast cancer Mother    • Tuberculosis Mother    • Diabetes Father    • Diabetes Sister    • Malig Hyperthermia Neg Hx        Social History     Socioeconomic History   • Marital status:    Tobacco Use   • Smoking status: Never Smoker   • Smokeless tobacco: Never Used   Vaping Use   • Vaping Use: Never used   Substance and Sexual Activity   • Alcohol use: No   • Drug use: No   • Sexual activity: Defer       Review of Systems   Constitutional: Negative for chills and fever.   Genitourinary: Positive for breast pain. Negative for breast discharge and breast lump.        Vital Signs:   Pulse 84   Resp 18   Ht 165.1 cm (65\")   Wt 89.8 kg (198 lb)   BMI 32.95 kg/m²      Physical Exam  Constitutional:       Appearance: Normal appearance.   HENT:      Head: Normocephalic.   Cardiovascular:      Rate and Rhythm: Normal rate.   Pulmonary:      Effort: Pulmonary effort is normal.   Skin:     General: Skin is warm and dry.      Comments: Left chest: " Surgical incision is clean dry and intact without erythema.    Right chest: Surgical incision is clean dry and intact without erythema.  There is a moderate amount of swelling consistent with a seroma.    After clean and surgical incision with alcohol for 60 seconds used an 18-gauge syringe I was able to aspirate 430 mL of bloody drainage, consistent with a seroma.   Neurological:      General: No focal deficit present.      Mental Status: She is alert and oriented to person, place, and time.   Psychiatric:         Mood and Affect: Mood normal.         Thought Content: Thought content normal.          Result Review :          []  Laboratory  []  Radiology  []  Pathology  []  Microbiology  []  EKG/Telemetry   []  Cardiology/Vascular   []  Old records       Assessment and Plan    There are no diagnoses linked to this encounter.    Follow Up   Return for Follow-up with me in 1 week.     Keep surgical incision clean and dry.    Patient was given instructions and counseling regarding her condition or for health maintenance advice. Please see specific information pulled into the AVS if appropriate.

## 2022-03-11 ENCOUNTER — TELEPHONE (OUTPATIENT)
Dept: SURGERY | Facility: CLINIC | Age: 82
End: 2022-03-11

## 2022-03-11 NOTE — TELEPHONE ENCOUNTER
Bilateral mastectomy in January.  Patient said she had seroma drained yesterday in office.  She has gauze, with Ace bandage, and tight bra covering.  She wants to know when and if she can remove these, and shower.  Can she swim?

## 2022-03-14 ENCOUNTER — TELEPHONE (OUTPATIENT)
Dept: CARDIOLOGY | Facility: CLINIC | Age: 82
End: 2022-03-14

## 2022-03-14 NOTE — TELEPHONE ENCOUNTER
Received return call from patient requesting echo results.    Advised there were not any significant changes from previous echo. Advised to keep f/u as scheduled.

## 2022-03-14 NOTE — TELEPHONE ENCOUNTER
Received VM from patient requesting echo results.    Attempted to return call. No answer. No VM option.

## 2022-03-17 ENCOUNTER — TELEPHONE (OUTPATIENT)
Dept: SURGERY | Facility: CLINIC | Age: 82
End: 2022-03-17

## 2022-03-17 ENCOUNTER — OFFICE VISIT (OUTPATIENT)
Dept: SURGERY | Facility: CLINIC | Age: 82
End: 2022-03-17

## 2022-03-17 VITALS — BODY MASS INDEX: 33.32 KG/M2 | HEART RATE: 54 BPM | RESPIRATION RATE: 18 BRPM | HEIGHT: 65 IN | WEIGHT: 200 LBS

## 2022-03-17 DIAGNOSIS — N64.89 SEROMA OF BREAST: Primary | ICD-10-CM

## 2022-03-17 PROCEDURE — 99024 POSTOP FOLLOW-UP VISIT: CPT | Performed by: NURSE PRACTITIONER

## 2022-03-17 NOTE — PROGRESS NOTES
Chief Complaint: Follow-up    Subjective      Post op concerns          History of Present Illness  Lilia Becerra is a 81 y.o. female presents to [facility] for post operative concerns.    Patient presents today with complaints of swelling around her mastectomy site.    Patient underwent a bilateral mastectomy on 1/11/2022 performed by Dr. Lety Tena.    Patient reports that she noticed swelling back in the end of January and was unsure why with swelling.    Denies any fever or chills.    Pathology:  Clinical Information    Comment:    Malignant neoplasm of overlapping sites of right breast in female, estrogen receptor positive (HCC)      Final Diagnosis   1. Left breast, mastectomy:               - Sclerosing adenosis               - Intraductal papilloma               - Radial scar               - Florid usual ductal hyperplasia               - Fibroadenomatoid change               - Duct ectasia with calcification               - Fibrosis               - Calcified blood vessels     2. 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 Right sentinel lymph node #1, excision:               - One lymph node negative for carcinoma (0/1)               - See synoptic checklist     4. Right breast, new margin, excision:               - Negative for carcinoma               - See synoptic checklist     5. Right axillary contents, dissection:               - Seventeen lymph nodes negative for carcinoma (0/17)               - See synoptic checklist      Electronically signed by Monty Joyner MD on 1     Previous visit I aspirated 430mL's.    Objective     Past Medical History:   Diagnosis Date   • Acid reflux disease    • Arthritis    • Atrial fibrillation (HCC)    • Back pain    • Bowel obstruction (HCC)    • Breast cancer (HCC)    • Breast  cancer (HCC)    • Carpal tunnel syndrome    • Constipation    • Depression    • Diabetes mellitus (HCC)    • Disease of thyroid gland    • Dyslipidemia    • Fecal incontinence    • GERD (gastroesophageal reflux disease)    • Glaucoma     left eye   • HBP (high blood pressure)    • Hearing impaired     hearing aides   • Hiatal hernia    • High cholesterol    • History of transfusion    • History of tuberculosis     IN 1980'S WAS TREATED   • Hypertension    • Hypomagnesemia    • Kienbock's disease    • Kienböck's disease, right     KIENBOCK'S AVASCULAR NECROSIS OF LUNATE, ADULT RIGHT   • Nonrheumatic aortic valve stenosis 11/5/2020   • OAB (overactive bladder)    • Osteoporosis    • Pneumonia    • PONV (postoperative nausea and vomiting)    • Positive PPD    • RLS (restless legs syndrome)    • Sleep apnea     NO MACHINE   • Stage 3 chronic kidney disease (HCC)    • Status post bilateral mastectomy with sentinel node biopsy and right axillary node dissection 1/12/2022   • Tailbone injury     fracture   • Thyroid disease    • Urinary incontinence     wears pads   • Urinary retention        Past Surgical History:   Procedure Laterality Date   • ANKLE TENDON REPAIR     • BACK SURGERY  12/21/2018-3/2019    LUMBAR--BROKEN DISC, CLEANED OUT--HAS 2ND PROCEDURE TO FINISH REMOVING   • CARPAL TUNNEL RELEASE     • CATARACT EXTRACTION Bilateral    • COLONOSCOPY     • ENDOSCOPY     • HEMORRHOIDECTOMY     • HYSTERECTOMY     • INCISION AND DRAINAGE OF WOUND      on back    • JOINT REPLACEMENT     • KNEE ARTHROPLASTY Right    • LEG SURGERY  06/13/2019   • LUMBAR DISCECTOMY Left 12/21/2018    Procedure: LEFT L4-5 MICRO DISCECTOMY;  Surgeon: Adam Coleman DO;  Location: [facility] OR;  Service: Orthopedic Spine   • LUMBAR DISCECTOMY Left 3/29/2019    Procedure: LEFT L4-5 REVISION OF MICRODISCECTOMY;  Surgeon: Adam Coleman DO;  Location: [facility] MAIN OR;  Service: Orthopedic Spine   • LUMBAR FUSION     • MASTECTOMY Bilateral 1/11/2022     Procedure: BREAST MASTECTOMY WITH SENTINEL NODE BIOPSY AND possible  AXILLARY NODE DISSECTION;  Surgeon: Lety Tena MD;  Location: [facility];  Service: General;  Laterality: Bilateral;   • TENDON RELEASE  09/2018   • TOTAL KNEE ARTHROPLASTY Left 6/13/2019    Procedure: LEFT KNEE REVISION;  Surgeon: Malick Costa II, MD;  Location: [facility];  Service: Orthopedics   • TOTAL KNEE ARTHROPLASTY REVISION Left     x2   • TOTAL KNEE ARTHROPLASTY REVISION Left 2/13/2018    Procedure: LEFT TOTAL KNEE ARTHROPLASTY REVISION;  Surgeon: Jair Fajardo MD;  Location: [facility];  Service:    • VERTEBROPLASTY         Outpatient Medications Marked as Taking for the 3/17/22 encounter (Office Visit) with Hugo April, APRN   Medication Sig Dispense Refill   • alendronate (FOSAMAX) 35 MG tablet Take 35 mg by mouth Every 7 (Seven) Days.     • apixaban (ELIQUIS) 5 MG tablet tablet Take 1 tablet by mouth Every 12 (Twelve) Hours. 180 tablet 3   • atorvastatin (LIPITOR) 10 MG tablet Take 1 tablet by mouth Every Night. 90 tablet 3   • brimonidine (ALPHAGAN) 0.2 % ophthalmic solution Administer 1 drop into the left eye Every Night.     • Calcium Carb-Cholecalciferol (CALCIUM 600 + D PO) Take 1 tablet by mouth 2 (Two) Times a Day.     • COMBIGAN 0.2-0.5 % ophthalmic solution Administer 2 drops to both eyes 2 (Two) Times a Day.  2   • digoxin (LANOXIN) 125 MCG tablet Take 125 mcg by mouth Daily.     • dilTIAZem CD (CARDIZEM CD) 120 MG 24 hr capsule Take 1 capsule by mouth Daily. 90 capsule 3   • DULoxetine (CYMBALTA) 60 MG capsule Take 60 mg by mouth Daily.     • furosemide (LASIX) 40 MG tablet Take 1 1/2 tablets every morning. Can decrease to one tablet a day if swelling improves 145 tablet 3   • letrozole (FEMARA) 2.5 MG tablet Take 1 tablet by mouth Daily. 30 tablet 1   • levothyroxine (SYNTHROID, LEVOTHROID) 25 MCG tablet Take 25 mcg by mouth Daily. Currently doesn't have .     • losartan  "(COZAAR) 25 MG tablet Take 1 tablet by mouth Daily. 90 tablet 3   • metFORMIN (GLUCOPHAGE) 500 MG tablet Take 500 mg by mouth 2 (Two) Times a Day.     • metoprolol succinate XL (TOPROL-XL) 25 MG 24 hr tablet Take 1 tablet by mouth Every Night. 90 tablet 3   • Mirabegron ER (Myrbetriq) 50 MG tablet sustained-release 24 hour 24 hr tablet Take 50 mg by mouth Every Morning for 90 days. 90 tablet 3   • Multiple Vitamins-Minerals (MULTIVITAMIN ADULTS PO) Take 1 capsule by mouth Daily.     • omeprazole (priLOSEC) 20 MG capsule Take 20 mg by mouth Daily.     • oxybutynin XL (DITROPAN-XL) 10 MG 24 hr tablet Take 1 tablet by mouth Daily. 90 tablet 1   • pramipexole (MIRAPEX) 1.5 MG tablet Take 1.5 mg by mouth Every Night.     • spironolactone (ALDACTONE) 25 MG tablet Take 1 tablet by mouth Daily. 90 tablet 3   • VENLAFAXINE HCL ER PO Take 150 mg by mouth Daily.     • vitamin C (ASCORBIC ACID) 500 MG tablet Take 500 mg by mouth Daily.         Allergies   Allergen Reactions   • Eggs Or Egg-Derived Products Swelling   • Sertraline Unknown - High Severity     Bleeding in stomach    • Tetanus Toxoid Swelling   • Tetanus Toxoids Swelling   • Nsaids Unknown - High Severity        Family History   Problem Relation Age of Onset   • Breast cancer Mother    • Tuberculosis Mother    • Diabetes Father    • Diabetes Sister    • Malig Hyperthermia Neg Hx        Social History     Socioeconomic History   • Marital status:    Tobacco Use   • Smoking status: Never Smoker   • Smokeless tobacco: Never Used   Vaping Use   • Vaping Use: Never used   Substance and Sexual Activity   • Alcohol use: No   • Drug use: No   • Sexual activity: Defer       Review of Systems   Constitutional: Negative for chills and fever.   Genitourinary: Positive for breast pain. Negative for breast discharge and breast lump.        Vital Signs:   Pulse 54   Resp 18   Ht 165.1 cm (65\")   Wt 90.7 kg (200 lb)   BMI 33.28 kg/m²      Physical Exam  Constitutional:  "      Appearance: Normal appearance.   HENT:      Head: Normocephalic.   Cardiovascular:      Rate and Rhythm: Normal rate.   Pulmonary:      Effort: Pulmonary effort is normal.   Skin:     General: Skin is warm and dry.      Comments: Left chest: Surgical incision is clean dry and intact without erythema.    Right chest: Surgical incision is clean dry and intact without erythema.  There is a moderate amount of swelling consistent with a seroma.    After clean and surgical incision with alcohol for 60 seconds used an 18-gauge syringe I was able to aspirate 360 mL of bloody drainage, consistent with a seroma.   Neurological:      General: No focal deficit present.      Mental Status: She is alert and oriented to person, place, and time.   Psychiatric:         Mood and Affect: Mood normal.         Thought Content: Thought content normal.          Result Review :    []  Laboratory  []  Radiology  []  Pathology  []  Microbiology  []  EKG/Telemetry   []  Cardiology/Vascular   []  Old records       Assessment and Plan    Diagnoses and all orders for this visit:    1. Seroma of breast (Primary)        Follow Up   Return for Follow-up with Dr. Tnea in 1 week or with me sooner if needed .     Keep surgical incision clean and dry.    Patient was given instructions and counseling regarding her condition or for health maintenance advice. Please see specific information pulled into the AVS if appropriate.

## 2022-03-17 NOTE — TELEPHONE ENCOUNTER
Pt was seen in the office today for a 1 week follow up to remove fluid for where her breast was. Can we hold the Eliquis for 5 days to help slow down the hematoma from forming?

## 2022-03-21 ENCOUNTER — HOSPITAL ENCOUNTER (OUTPATIENT)
Dept: OCCUPATIONAL THERAPY | Facility: HOSPITAL | Age: 82
Setting detail: THERAPIES SERIES
Discharge: HOME | End: 2022-03-21

## 2022-03-21 DIAGNOSIS — L90.5 SCAR CONDITION AND FIBROSIS OF SKIN: ICD-10-CM

## 2022-03-21 DIAGNOSIS — Z91.89 AT RISK FOR LYMPHEDEMA: ICD-10-CM

## 2022-03-21 DIAGNOSIS — Z17.0 MALIGNANT NEOPLASM OF OVERLAPPING SITES OF RIGHT BREAST IN FEMALE, ESTROGEN RECEPTOR POSITIVE: Primary | ICD-10-CM

## 2022-03-21 DIAGNOSIS — Z90.13 STATUS POST BILATERAL MASTECTOMY: ICD-10-CM

## 2022-03-21 DIAGNOSIS — C50.811 MALIGNANT NEOPLASM OF OVERLAPPING SITES OF RIGHT BREAST IN FEMALE, ESTROGEN RECEPTOR POSITIVE: Primary | ICD-10-CM

## 2022-03-21 PROCEDURE — 97535 SELF CARE MNGMENT TRAINING: CPT

## 2022-03-21 PROCEDURE — 93702 BIS XTRACELL FLUID ANALYSIS: CPT

## 2022-03-21 NOTE — THERAPY RE-EVALUATION
Outpatient Occupational Therapy Lymphedema Re-Evaluation   Edu     Patient Name: Lilia Becerra  : 1940  MRN: [id number]  Today's Date: 3/21/2022      Visit Date: 2022    Patient Active Problem List   Diagnosis   • Gastroesophageal reflux disease   • Atrial fibrillation with RVR (HCC)   • Hypothyroidism   • Thyroid disease   • Sleep apnea   • HBP (high blood pressure)   • Postoperative anemia due to acute blood loss (expected)   • Stage 3 chronic kidney disease (CMS/HCC)   • Broken leg   • Nonrheumatic aortic valve stenosis   • Hyperlipidemia   • Hypertonicity of bladder   • Retention of urine   • Pedal edema   • Abnormal mammogram   • Age related osteoporosis   • Arthritis   • Bowel obstruction (HCC)   • Carpal tunnel syndrome   • Chronic bilateral low back pain with left-sided sciatica   • Chronic pain disorder   • Class 2 obesity   • Complication of surgical procedure   • Diarrhea   • Degeneration of intervertebral disc of lumbar region   • Depressive disorder   • Diabetic renal disease (HCC)   • Disequilibrium   • Fecal urgency   • Glaucoma   • Hypertensive heart failure (HCC)   • Hypomagnesemia   • Idiopathic osteoarthritis   • Kienboeck disease of adults   • Long term (current) use of oral hypoglycemic drugs   • Lumbar radiculopathy   • Peripheral venous insufficiency   • Pneumonia   • Positive PPD   • Rapid heart rate   • Recurrent major depression (HCC)   • Restless legs syndrome   • Spondylolysis of lumbar region   • Fecal incontinence   • Diabetes mellitus (HCC)   • Malignant neoplasm of overlapping sites of right breast in female, estrogen receptor positive (HCC)   • Status post bilateral mastectomy with sentinel node biopsy and right axillary node dissection   • Chronic diastolic congestive heart failure (HCC)   • Paroxysmal atrial fibrillation (HCC)        Past Medical History:   Diagnosis Date   • Acid reflux disease    • Arthritis    • Atrial fibrillation (HCC)    • Back pain    •  Bowel obstruction (HCC)    • Breast cancer (HCC)    • Breast cancer (HCC)    • Carpal tunnel syndrome    • Constipation    • Depression    • Diabetes mellitus (HCC)    • Disease of thyroid gland    • Dyslipidemia    • Fecal incontinence    • GERD (gastroesophageal reflux disease)    • Glaucoma     left eye   • HBP (high blood pressure)    • Hearing impaired     hearing aides   • Hiatal hernia    • High cholesterol    • History of transfusion    • History of tuberculosis     IN 1980'S WAS TREATED   • Hypertension    • Hypomagnesemia    • Kienbock's disease    • Kienböck's disease, right     KIENBOCK'S AVASCULAR NECROSIS OF LUNATE, ADULT RIGHT   • Nonrheumatic aortic valve stenosis 11/5/2020   • OAB (overactive bladder)    • Osteoporosis    • Pneumonia    • PONV (postoperative nausea and vomiting)    • Positive PPD    • RLS (restless legs syndrome)    • Sleep apnea     NO MACHINE   • Stage 3 chronic kidney disease (HCC)    • Status post bilateral mastectomy with sentinel node biopsy and right axillary node dissection 1/12/2022   • Tailbone injury     fracture   • Thyroid disease    • Urinary incontinence     wears pads   • Urinary retention         Past Surgical History:   Procedure Laterality Date   • ANKLE TENDON REPAIR     • BACK SURGERY  12/21/2018-3/2019    LUMBAR--BROKEN DISC, CLEANED OUT--HAS 2ND PROCEDURE TO FINISH REMOVING   • CARPAL TUNNEL RELEASE     • CATARACT EXTRACTION Bilateral    • COLONOSCOPY     • ENDOSCOPY     • HEMORRHOIDECTOMY     • HYSTERECTOMY     • INCISION AND DRAINAGE OF WOUND      on back    • JOINT REPLACEMENT     • KNEE ARTHROPLASTY Right    • LEG SURGERY  06/13/2019   • LUMBAR DISCECTOMY Left 12/21/2018    Procedure: LEFT L4-5 MICRO DISCECTOMY;  Surgeon: Adam Coleman DO;  Location: [facility] MAIN OR;  Service: Orthopedic Spine   • LUMBAR DISCECTOMY Left 3/29/2019    Procedure: LEFT L4-5 REVISION OF MICRODISCECTOMY;  Surgeon: Adam Coleman DO;  Location: [facility] MAIN OR;  Service: Orthopedic  Spine   • LUMBAR FUSION     • MASTECTOMY Bilateral 1/11/2022    Procedure: BREAST MASTECTOMY WITH SENTINEL NODE BIOPSY AND possible  AXILLARY NODE DISSECTION;  Surgeon: Lety Tena MD;  Location: [facility];  Service: General;  Laterality: Bilateral;   • TENDON RELEASE  09/2018   • TOTAL KNEE ARTHROPLASTY Left 6/13/2019    Procedure: LEFT KNEE REVISION;  Surgeon: Malick Costa II, MD;  Location: [facility];  Service: Orthopedics   • TOTAL KNEE ARTHROPLASTY REVISION Left     x2   • TOTAL KNEE ARTHROPLASTY REVISION Left 2/13/2018    Procedure: LEFT TOTAL KNEE ARTHROPLASTY REVISION;  Surgeon: Jair Fajardo MD;  Location: [facility];  Service:    • VERTEBROPLASTY           Visit Dx:   No diagnosis found.         Lymphedema     Row Name 03/21/22 1200             Subjective Pain    Able to rate subjective pain? yes  -CH      Pre-Treatment Pain Level 0  -CH      Post-Treatment Pain Level 0  -CH      Subjective Pain Comment Patient denies pain  -CH              Subjective Comments    Subjective Comments Patient states that she got a lump on the side of her right chest that look like she regrew her breast and got even is big is a basketball.  Patient states she has had the area drained twice.  Patient states the nurse practitioner did indicate that she may have to have a drain tube placed again but no definitive decision has been made on that point at this time.  Patient did state that she has a follow-up with the surgeon again this week.  -CH              Lymphedema Assessment    Lymphedema Classification RUE:;stage 1 (Spontaneously Reversible)  -CH      Lymphedema Cancer Related Sx bilateral;simple mastectomy;sentinel node biopsy;axillary dissection  -CH      Lymph Nodes Removed # 18  -CH      Positive Lymph Nodes # 0  -CH      Chemo Received no  -CH      Radiation Therapy Received no  -CH              LLIS - Physical Concerns    The amount of pain associated with my lymphedema is:  "0  -CH      The amount of limb heaviness associated with my lymphedema is: 0  -CH      The amount of skin tightness associated with my lymphedema is: 0  -CH      The size of my swollen limb(s) seems: 0  -CH      Lymphedema affects the movement of my swollen limb(s): 0  -CH      The strength in my swollen limb(s) is: 0  -CH              LLIS - Psychosocial Concerns    Lymphedema affects my body image (i.e., \"how I think I look\"). 0  -CH      Lymphedema affects my socializing with others. 0  -CH      Lymphedema affects my intimate relations with spouse or partner (rate 0 if not applicable 0  -CH      Lymphedema \"gets me down\" (i.e., depression, frustration, or anger) 0  -CH      I must rely on others for help due to my lymphedema. 0  -CH      I know what to do to manage my lymphedema 3  -CH              LLIS - Functional Concerns    Lymphedema affects my ability to perform self-care activities (i.e. eating, dressing, hygiene) 0  -CH      Lymphedema affects my ability to perform routine home or work-related activities. 0  -CH      Lymphedema affects my performance of preferred leisure activities. 0  -CH      Lymphedema affects proper fit of clothing/shoes 0  -CH      Lymphedema affects my sleep 0  -CH              Posture/Observations    Alignment Options Thoracic kyphosis  -CH      Thoracic Kyphosis Mild  -CH              General ROM    GENERAL ROM COMMENTS B UE WFL  -CH              MMT (Manual Muscle Testing)    General MMT Comments B UE WFL  -CH              Skin Changes/Observations    Location/Assessment Upper Quadrant  -CH      Upper Quadrant Conditions bilateral:;intact  -CH      Upper Quadrant Color/Pigment bilateral:;fibrosis  -CH      Skin Observations Comment Patient is noted with a baseball sized area of fluid collection on the right chest wall.  Patient is noted with mild scar tissue fibrosis bilaterally.  -CH              L-Dex Bioimpedence Screening    L-Dex Measurement Extremity RUE  -CH      L-Dex " Patient Position Standing  -CH      L-Dex UE Dominate Side Right  -CH      L-Dex UE At Risk Side Right  -CH      L-Dex UE Pre Surgical Value Yes  -CH      L-Dex UE Score 3.2  -CH      L-Dex UE Baseline Score 4.5  -CH      L-Dex UE Value Change -1.3  -CH      $ L-Dex Charge yes  -CH              Lymphedema Life Impact Scale Totals    A.  Total Q1 - Q17 (Do not include Q18) 3  -CH      B.  Total number of questions answered (Q1-Q17) 17  -CH      C. Divide A by B 0.18  -CH      D. Multiple C by 25 4.5  -CH            User Key  (r) = Recorded By, (t) = Taken By, (c) = Cosigned By    Initials Name Provider Type    Christal Beck OT Occupational Therapist              Therapy Education  Education Details: Patient noted not wearing her sleeve today but she reports that she did not put it on this morning as she had several appointments that she had to leave and did not have time to put it on.  She does state that she has been wearing it every day since she received it in the mail.  OT did educate patient that since her L-Dex scoring is indicating normalize lymphatic functioning this date.  She can now discontinue use of her sleeve on a daily basis and we will reevaluate ongoing lymphatic functioning in 3 months.  Given: Symptoms/condition management, Other (comment)  Program: Reinforced, New  How Provided: Verbal  Provided to: Patient  Level of Understanding: Verbalized  80368 - OT Self Care/Mgmt Minutes: 15         OT Goals     Row Name 03/21/22 1259          Time Calculation    OT Goal Re-Cert Due Date 04/20/22  -           User Key  (r) = Recorded By, (t) = Taken By, (c) = Cosigned By    Initials Name Provider Type    Christal Beck OT Occupational Therapist              GOALS:      1. Post Breast Surgery Care/at risk for Lymphedema  LTG 1: 90 days:  As an indicator of no exacerbation of lymphedema staging, the patient will present with an L-Dex score less than [10] points from preoperative  baseline.              STATUS: Met: ongoing  STG 1a:   30 days: To prevent exacerbation of mixed edema to lymphedema, patient will utilize the 2 postsurgical compression garments daily.                 STATUS: Met: Ongoing  STG 1b: 30 days: Patient will be independent with self-manual lymphatic massage.               STATUS: Met: Ongoing  STG 1c: 30 days:  Patient will be independent with identification of signs and symptoms of lymphedema exasperation per stoplight to recovery education handout.              STATUS: Met: Ongoing  STG 1 d: 30 days: Patient will be independent with HEP to prevent advancement in lymphedema staging.              STATUS: Met: Ongoing  TREATMENT:  Self Care/ADL retraining, Therapeutic Activity, Neuromuscular Re-education, Therapeutic Exercise, Bioimpedence Fluid Analysis, Post-Surgical compression garement 7[address]88 [employer]onna Zip-ST-High/ Jessica Camisole Kit 2860K, Orthotic Management and training,  and Manual Therapy.     OT Assessment/Plan     Row Name 03/21/22 1258          OT Assessment    Functional Limitations Performance in self-care ADL  -CH     Impairments Impaired lymphatic circulation  -CH     Assessment Comments Patient continues to present with significant seroma on the right chest wall that does increase risk for development of lymphedema.  However, patient is demonstrating normalized lymphatic functioning this date with an L Dex score of 3.2.  Patient will benefit from ongoing evaluation of lymphatic functioning to prevent advancement in lymphedema staging.  -CH     OT Rehab Potential Excellent  -CH     Patient/caregiver participated in establishment of treatment plan and goals Yes  -CH     Patient would benefit from skilled therapy intervention Yes  -CH            OT Plan    OT Frequency Other (comment)  See duration  -CH     Predicted Duration of Therapy Intervention (OT) Pt. to re-evaluated 3 weeks post-surgery, 3 weeks post XRT, every 3 months from baseline for years 1-3 and  every 6 months years 4 and 5  -     Planned CPT's? OT RE-EVAL: 58509;OT THER ACT EA 15 MIN: 99335GI;OT THER PROC EA 15 MIN: 10448NN;OT NEUROMUSC RE EDUCATION EA 15 MIN: 31696;OT SELF CARE/MGMT/TRAIN 15 MIN: 78765;OT ULTRASOUND EA 15 MIN: 14922;OT MANUAL THERAPY EA 15 MIN: 06642;OT BIS XTRACELL FLUID ANALYSIS: 75269;OT ORTHOTIC MGMT/TRAIN EA 15 MIN: 19938;OT ORTHO/PROSTHET CHECKOUT EA 15 MIN: 84837;OT ELECTRIC STIM ATTD EA 15 MIN: 81434  -     Planned Therapy Interventions (Optional Details) home exercise program;manual therapy techniques;strengthening;patient/family education;orthotic fitting/training  -           User Key  (r) = Recorded By, (t) = Taken By, (c) = Cosigned By    Initials Name Provider Type     Christal Garcia OT Occupational Therapist                          Time Calculation:   Timed Charges  95466 - OT Self Care/Mgmt Minutes: 15  Total Minutes  Timed Charges Total Minutes: 15   Total Minutes: 15     Therapy Charges for Today     Code Description Service Date Service Provider Modifiers Qty    66094506485 HC PT BIS XTRACELL FLUID ANALYSIS 3/21/2022 Christal Garcia OT  1    98106424244 HC OT SELF CARE/MGMT/TRAIN EA 15 MIN 3/21/2022 Christal Garcia OT GO 1                    Christal Garcia OT  3/21/2022

## 2022-03-22 ENCOUNTER — APPOINTMENT (OUTPATIENT)
Dept: OCCUPATIONAL THERAPY | Facility: HOSPITAL | Age: 82
End: 2022-03-22

## 2022-03-24 ENCOUNTER — OFFICE VISIT (OUTPATIENT)
Dept: SURGERY | Facility: CLINIC | Age: 82
End: 2022-03-24

## 2022-03-24 ENCOUNTER — HOSPITAL ENCOUNTER (OUTPATIENT)
Dept: MAMMOGRAPHY | Facility: HOSPITAL | Age: 82
Discharge: HOME | End: 2022-03-24
Admitting: SURGERY

## 2022-03-24 VITALS — RESPIRATION RATE: 15 BRPM | BODY MASS INDEX: 33.32 KG/M2 | HEIGHT: 65 IN | WEIGHT: 200 LBS

## 2022-03-24 DIAGNOSIS — Z17.0 MALIGNANT NEOPLASM OF OVERLAPPING SITES OF RIGHT BREAST IN FEMALE, ESTROGEN RECEPTOR POSITIVE: Primary | ICD-10-CM

## 2022-03-24 DIAGNOSIS — N64.89 SEROMA OF BREAST: ICD-10-CM

## 2022-03-24 DIAGNOSIS — C50.811 MALIGNANT NEOPLASM OF OVERLAPPING SITES OF RIGHT BREAST IN FEMALE, ESTROGEN RECEPTOR POSITIVE: Primary | ICD-10-CM

## 2022-03-24 PROBLEM — R26.9 ABNORMAL GAIT: Status: ACTIVE | Noted: 2022-03-24

## 2022-03-24 PROCEDURE — 76942 ECHO GUIDE FOR BIOPSY: CPT

## 2022-03-24 PROCEDURE — 99024 POSTOP FOLLOW-UP VISIT: CPT | Performed by: SURGERY

## 2022-03-24 PROCEDURE — 0 LIDOCAINE 1 % SOLUTION: Performed by: SURGERY

## 2022-03-24 RX ORDER — LIDOCAINE HYDROCHLORIDE 10 MG/ML
10 INJECTION, SOLUTION INFILTRATION; PERINEURAL ONCE
Status: COMPLETED | OUTPATIENT
Start: 2022-03-24 | End: 2022-03-24

## 2022-03-24 RX ORDER — LETROZOLE 2.5 MG/1
TABLET, FILM COATED ORAL
COMMUNITY
End: 2022-04-05

## 2022-03-24 RX ORDER — ALENDRONATE SODIUM 35 MG/1
TABLET ORAL
COMMUNITY
End: 2022-04-05 | Stop reason: SDUPTHER

## 2022-03-24 RX ADMIN — LIDOCAINE HYDROCHLORIDE 10 ML: 10 INJECTION, SOLUTION INFILTRATION; PERINEURAL at 14:49

## 2022-03-31 ENCOUNTER — TELEPHONE (OUTPATIENT)
Dept: SURGERY | Facility: CLINIC | Age: 82
End: 2022-03-31

## 2022-03-31 NOTE — TELEPHONE ENCOUNTER
"Per Dr. Davidson: \"Put in with April next week -- want drain to stay in until putting out less than 15 mL a day\". I called pt and gave her info. She is going to see April on 4/4/22.    "

## 2022-03-31 NOTE — TELEPHONE ENCOUNTER
Patient got drain placed on 3-24-22. She wants to know when she should return to the office and how long she needs the drain to be placed? # [phone]

## 2022-04-04 ENCOUNTER — OFFICE VISIT (OUTPATIENT)
Dept: SURGERY | Facility: CLINIC | Age: 82
End: 2022-04-04

## 2022-04-04 VITALS — RESPIRATION RATE: 18 BRPM | BODY MASS INDEX: 33.32 KG/M2 | HEIGHT: 65 IN | WEIGHT: 200 LBS | HEART RATE: 76 BPM

## 2022-04-04 DIAGNOSIS — Z90.13 STATUS POST BILATERAL MASTECTOMY: ICD-10-CM

## 2022-04-04 DIAGNOSIS — N64.89 SEROMA OF BREAST: Primary | ICD-10-CM

## 2022-04-04 PROCEDURE — 99024 POSTOP FOLLOW-UP VISIT: CPT | Performed by: NURSE PRACTITIONER

## 2022-04-05 ENCOUNTER — TELEPHONE (OUTPATIENT)
Dept: CARDIOLOGY | Facility: CLINIC | Age: 82
End: 2022-04-05

## 2022-04-05 ENCOUNTER — LAB (OUTPATIENT)
Dept: ONCOLOGY | Facility: HOSPITAL | Age: 82
End: 2022-04-05

## 2022-04-05 ENCOUNTER — OFFICE VISIT (OUTPATIENT)
Dept: ONCOLOGY | Facility: HOSPITAL | Age: 82
End: 2022-04-05

## 2022-04-05 VITALS
WEIGHT: 197.31 LBS | OXYGEN SATURATION: 100 % | HEART RATE: 78 BPM | BODY MASS INDEX: 32.83 KG/M2 | RESPIRATION RATE: 18 BRPM | SYSTOLIC BLOOD PRESSURE: 130 MMHG | TEMPERATURE: 97.5 F | DIASTOLIC BLOOD PRESSURE: 90 MMHG

## 2022-04-05 DIAGNOSIS — Z17.0 MALIGNANT NEOPLASM OF OVERLAPPING SITES OF RIGHT BREAST IN FEMALE, ESTROGEN RECEPTOR POSITIVE: ICD-10-CM

## 2022-04-05 DIAGNOSIS — C50.811 MALIGNANT NEOPLASM OF OVERLAPPING SITES OF RIGHT BREAST IN FEMALE, ESTROGEN RECEPTOR POSITIVE: ICD-10-CM

## 2022-04-05 DIAGNOSIS — C50.811 MALIGNANT NEOPLASM OF OVERLAPPING SITES OF RIGHT BREAST IN FEMALE, ESTROGEN RECEPTOR POSITIVE: Primary | ICD-10-CM

## 2022-04-05 DIAGNOSIS — D72.829 LEUKOCYTOSIS, UNSPECIFIED TYPE: ICD-10-CM

## 2022-04-05 DIAGNOSIS — Z17.0 MALIGNANT NEOPLASM OF OVERLAPPING SITES OF RIGHT BREAST IN FEMALE, ESTROGEN RECEPTOR POSITIVE: Primary | ICD-10-CM

## 2022-04-05 DIAGNOSIS — N64.89 SEROMA OF BREAST: ICD-10-CM

## 2022-04-05 LAB
ALBUMIN SERPL-MCNC: 4 G/DL (ref 3.5–5.2)
ALBUMIN/GLOB SERPL: 1.3 G/DL
ALP SERPL-CCNC: 134 U/L (ref 39–117)
ALT SERPL W P-5'-P-CCNC: 9 U/L (ref 1–33)
ANION GAP SERPL CALCULATED.3IONS-SCNC: 10.9 MMOL/L (ref 5–15)
AST SERPL-CCNC: 11 U/L (ref 1–32)
BASOPHILS # BLD AUTO: 0.04 10*3/MM3 (ref 0–0.2)
BASOPHILS NFR BLD AUTO: 0.3 % (ref 0–1.5)
BILIRUB SERPL-MCNC: 0.6 MG/DL (ref 0–1.2)
BUN SERPL-MCNC: 22 MG/DL (ref 8–23)
BUN/CREAT SERPL: 15.9 (ref 7–25)
CALCIUM SPEC-SCNC: 10 MG/DL (ref 8.6–10.5)
CHLORIDE SERPL-SCNC: 94 MMOL/L (ref 98–107)
CO2 SERPL-SCNC: 27.1 MMOL/L (ref 22–29)
CREAT SERPL-MCNC: 1.38 MG/DL (ref 0.57–1)
DEPRECATED RDW RBC AUTO: 42.8 FL (ref 37–54)
EGFRCR SERPLBLD CKD-EPI 2021: 38.1 ML/MIN/1.73
EOSINOPHIL # BLD AUTO: 0.17 10*3/MM3 (ref 0–0.4)
EOSINOPHIL NFR BLD AUTO: 1.3 % (ref 0.3–6.2)
ERYTHROCYTE [DISTWIDTH] IN BLOOD BY AUTOMATED COUNT: 12.4 % (ref 12.3–15.4)
GLOBULIN UR ELPH-MCNC: 3.1 GM/DL
GLUCOSE SERPL-MCNC: 234 MG/DL (ref 65–99)
HCT VFR BLD AUTO: 35.5 % (ref 34–46.6)
HGB BLD-MCNC: 11.7 G/DL (ref 12–15.9)
IMM GRANULOCYTES # BLD AUTO: 0.03 10*3/MM3 (ref 0–0.05)
IMM GRANULOCYTES NFR BLD AUTO: 0.2 % (ref 0–0.5)
LYMPHOCYTES # BLD AUTO: 2.36 10*3/MM3 (ref 0.7–3.1)
LYMPHOCYTES NFR BLD AUTO: 17.6 % (ref 19.6–45.3)
MCH RBC QN AUTO: 30.4 PG (ref 26.6–33)
MCHC RBC AUTO-ENTMCNC: 33 G/DL (ref 31.5–35.7)
MCV RBC AUTO: 92.2 FL (ref 79–97)
MONOCYTES # BLD AUTO: 0.66 10*3/MM3 (ref 0.1–0.9)
MONOCYTES NFR BLD AUTO: 4.9 % (ref 5–12)
NEUTROPHILS NFR BLD AUTO: 10.16 10*3/MM3 (ref 1.7–7)
NEUTROPHILS NFR BLD AUTO: 75.7 % (ref 42.7–76)
PLATELET # BLD AUTO: 296 10*3/MM3 (ref 140–450)
PMV BLD AUTO: 10.5 FL (ref 6–12)
POTASSIUM SERPL-SCNC: 4.2 MMOL/L (ref 3.5–5.2)
PROT SERPL-MCNC: 7.1 G/DL (ref 6–8.5)
RBC # BLD AUTO: 3.85 10*6/MM3 (ref 3.77–5.28)
SODIUM SERPL-SCNC: 132 MMOL/L (ref 136–145)
WBC NRBC COR # BLD: 13.42 10*3/MM3 (ref 3.4–10.8)

## 2022-04-05 PROCEDURE — G0463 HOSPITAL OUTPT CLINIC VISIT: HCPCS | Performed by: NURSE PRACTITIONER

## 2022-04-05 PROCEDURE — 85025 COMPLETE CBC W/AUTO DIFF WBC: CPT

## 2022-04-05 PROCEDURE — 80053 COMPREHEN METABOLIC PANEL: CPT

## 2022-04-05 PROCEDURE — 99214 OFFICE O/P EST MOD 30 MIN: CPT | Performed by: NURSE PRACTITIONER

## 2022-04-05 PROCEDURE — 36415 COLL VENOUS BLD VENIPUNCTURE: CPT

## 2022-04-05 RX ORDER — LETROZOLE 2.5 MG/1
2.5 TABLET, FILM COATED ORAL DAILY
Qty: 90 TABLET | Refills: 1 | Status: SHIPPED | OUTPATIENT
Start: 2022-04-05 | End: 2022-11-15

## 2022-04-05 NOTE — PROGRESS NOTES
Chief Complaint  Breast Cancer    Leo Lomas MD Ahmed, Syed Zulfiqar, MD      Subjective          Lilia Becerra presents to [facility] for breast cancer.     History of Present Illness   Ms. Lilia Becerra presents for follow up for right breast cancer. Completed bilateral mastectomies. The right breast mastectomy site has a drainage tube with dark bloody drainage in it. Patient reports she was seen by Dr. Tena NP yesterday and will see Dr. Tena next week to evaluate again. She has a seroma to the right breast area.     Reports she is taking the  Letrozole and not having any difficulties with side effects. She does reports she is Eliquis by cardiology. We will check lab work today to evaluate for anemia.     She has not had her bone density yet and is scheduled at the end of the month.     Cancer Staging  Malignant neoplasm of overlapping sites of right breast in female, estrogen receptor positive (HCC)  Staging form: Breast, AJCC 8th Edition  - Clinical: Stage IA (cT1b(2), cN0, cM0, G2, ER+, KY+, HER2-) - Signed by Sean Ward MD on 2/21/2022       Treatment intent: curative    Oncology/Hematology History Overview Note   12/20/22 Right breast, 9 o'clock position, 6 cm from nipple, ultrasound-guided core biopsy:  - Invasive carcinoma of no special type (ductal)  - Histologic grade (Ramon Histologic Score):  - Glandular (Acinar)/Tubular Differentiation: Score 3  - Nuclear Pleomorphism: Score 2  - Mitotic Rate: Score 1  - Overall Grade: Grade 2  - Size of largest focus of invasion: 9 mm  - Breast biomarker testing:  - Estrogen Receptor (ER): Positive (100%, strong)  - Progesterone Receptor (PgR): Positive (100%, strong)  - HER2 (by immunohistochemistry): Equivocal (Score 2+), see comment  - Ki-67: 5%  2. 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 Grade 2, All margins negative. 0/18 lymph nodes.   Left Mastectomy-benign.     Oncotype score 3    2/21/22 Letrozole initiated.          Malignant neoplasm of overlapping sites of right breast in female, estrogen receptor positive (HCC)   12/23/2021 Initial Diagnosis    Malignant neoplasm of overlapping sites of right breast in female, estrogen receptor positive (HCC)     2/21/2022 Cancer Staged    Staging form: Breast, AJCC 8th Edition  - Clinical: Stage IA (cT1b(2), cN0, cM0, G2, ER+, IL+, HER2-) - Signed by Sean Ward MD on 2/21/2022         Review of Systems   Constitutional: Negative for appetite change, diaphoresis, fatigue, fever, unexpected weight gain and unexpected weight loss.   HENT: Negative for hearing loss, mouth sores, sore throat, swollen glands, trouble swallowing and voice change.    Eyes: Negative for blurred vision.   Respiratory: Negative for cough, shortness of breath and wheezing.    Cardiovascular: Negative for chest pain and palpitations.   Gastrointestinal: Negative for abdominal pain, blood in stool, constipation, diarrhea, nausea and vomiting.   Endocrine: Negative for cold intolerance and heat intolerance.   Genitourinary: Positive for breast pain (right side). Negative for difficulty urinating, dysuria, frequency, hematuria and urinary incontinence.   Musculoskeletal: Negative for arthralgias,  back pain and myalgias.   Skin: Negative for rash, skin lesions and wound.   Neurological: Negative for dizziness, seizures, weakness, numbness and headache.   Hematological: Positive for adenopathy (right under arm). Does not bruise/bleed easily.   Psychiatric/Behavioral: Negative for depressed mood. The patient is not nervous/anxious.    All other systems reviewed and are negative.      Current Outpatient Medications on File Prior to Visit   Medication Sig Dispense Refill   • alendronate (FOSAMAX) 35 MG tablet Take 35 mg by mouth Every 7 (Seven) Days.     • apixaban (ELIQUIS) 5 MG tablet tablet Take 1 tablet by mouth Every 12 (Twelve) Hours. 180 tablet 3   • atorvastatin (LIPITOR) 10 MG tablet Take 1 tablet by mouth Every Night. 90 tablet 3   • brimonidine (ALPHAGAN) 0.2 % ophthalmic solution Administer 1 drop into the left eye Every Night.     • Calcium Carb-Cholecalciferol (CALCIUM 600 + D PO) Take 1 tablet by mouth 2 (Two) Times a Day.     • COMBIGAN 0.2-0.5 % ophthalmic solution Administer 2 drops to both eyes 2 (Two) Times a Day.  2   • digoxin (LANOXIN) 125 MCG tablet Take 125 mcg by mouth Daily.     • dilTIAZem CD (CARDIZEM CD) 120 MG 24 hr capsule Take 1 capsule by mouth Daily. 90 capsule 3   • DULoxetine (CYMBALTA) 60 MG capsule Take 60 mg by mouth Daily.     • furosemide (LASIX) 40 MG tablet Take 1 1/2 tablets every morning. Can decrease to one tablet a day if swelling improves 145 tablet 3   • letrozole (FEMARA) 2.5 MG tablet Take 1 tablet by mouth Daily. 30 tablet 1   • letrozole (FEMARA) 2.5 MG tablet      • levothyroxine (SYNTHROID, LEVOTHROID) 25 MCG tablet Take 25 mcg by mouth Daily. Currently doesn't have .     • losartan (COZAAR) 25 MG tablet Take 1 tablet by mouth Daily. 90 tablet 3   • metFORMIN (GLUCOPHAGE) 500 MG tablet Take 500 mg by mouth 2 (Two) Times a Day.     • metoprolol succinate XL (TOPROL-XL) 25 MG 24 hr tablet Take 1 tablet by mouth Every Night. 90 tablet 3   • Mirabegron ER  (Myrbetriq) 50 MG tablet sustained-release 24 hour 24 hr tablet Take 50 mg by mouth Every Morning for 90 days. 90 tablet 3   • Multiple Vitamins-Minerals (MULTIVITAMIN ADULTS PO) Take 1 capsule by mouth Daily.     • mupirocin (BACTROBAN) 2 % ointment      • omeprazole (priLOSEC) 20 MG capsule Take 20 mg by mouth Daily.     • oxybutynin XL (DITROPAN-XL) 10 MG 24 hr tablet Take 1 tablet by mouth Daily. 90 tablet 1   • pramipexole (MIRAPEX) 1.5 MG tablet Take 1.5 mg by mouth Every Night.     • spironolactone (ALDACTONE) 25 MG tablet Take 1 tablet by mouth Daily. 90 tablet 3   • VENLAFAXINE HCL ER PO Take 150 mg by mouth Daily.     • vitamin C (ASCORBIC ACID) 500 MG tablet Take 500 mg by mouth Daily.     • [DISCONTINUED] alendronate (FOSAMAX) 35 MG tablet 1 tablet       No current facility-administered medications on file prior to visit.       Allergies   Allergen Reactions   • Eggs Or Egg-Derived Products Swelling   • Sertraline Unknown - High Severity     Bleeding in stomach    • Tetanus Toxoid Swelling   • Tetanus Toxoids Swelling   • Nsaids Unknown - High Severity   • Sertraline Hcl Unknown - High Severity     Past Medical History:   Diagnosis Date   • Acid reflux disease    • Arthritis    • Atrial fibrillation (HCC)    • Back pain    • Bowel obstruction (HCC)    • Breast cancer (HCC)    • Breast cancer (HCC)    • Carpal tunnel syndrome    • Constipation    • Depression    • Diabetes mellitus (HCC)    • Disease of thyroid gland    • Dyslipidemia    • Fecal incontinence    • GERD (gastroesophageal reflux disease)    • Glaucoma     left eye   • HBP (high blood pressure)    • Hearing impaired     hearing aides   • Hiatal hernia    • High cholesterol    • History of transfusion    • History of tuberculosis     IN 1980'S WAS TREATED   • Hypertension    • Hypomagnesemia    • Kienbock's disease    • Kienböck's disease, right     KIENBOCK'S AVASCULAR NECROSIS OF LUNATE, ADULT RIGHT   • Nonrheumatic aortic valve stenosis  11/5/2020   • OAB (overactive bladder)    • Osteoporosis    • Pneumonia    • PONV (postoperative nausea and vomiting)    • Positive PPD    • RLS (restless legs syndrome)    • Sleep apnea     NO MACHINE   • Stage 3 chronic kidney disease (HCC)    • Status post bilateral mastectomy with sentinel node biopsy and right axillary node dissection 1/12/2022   • Tailbone injury     fracture   • Thyroid disease    • Urinary incontinence     wears pads   • Urinary retention      Past Surgical History:   Procedure Laterality Date   • ANKLE TENDON REPAIR     • BACK SURGERY  12/21/2018-3/2019    LUMBAR--BROKEN DISC, CLEANED OUT--HAS 2ND PROCEDURE TO FINISH REMOVING   • CARPAL TUNNEL RELEASE     • CATARACT EXTRACTION Bilateral    • COLONOSCOPY     • ENDOSCOPY     • HEMORRHOIDECTOMY     • HYSTERECTOMY     • INCISION AND DRAINAGE OF WOUND      on back    • JOINT REPLACEMENT     • KNEE ARTHROPLASTY Right    • LEG SURGERY  06/13/2019   • LUMBAR DISCECTOMY Left 12/21/2018    Procedure: LEFT L4-5 MICRO DISCECTOMY;  Surgeon: Adam Coleman DO;  Location: [facility];  Service: Orthopedic Spine   • LUMBAR DISCECTOMY Left 3/29/2019    Procedure: LEFT L4-5 REVISION OF MICRODISCECTOMY;  Surgeon: Adam Coleman DO;  Location: [facility] OR;  Service: Orthopedic Spine   • LUMBAR FUSION     • MASTECTOMY Bilateral 1/11/2022    Procedure: BREAST MASTECTOMY WITH SENTINEL NODE BIOPSY AND possible  AXILLARY NODE DISSECTION;  Surgeon: Lety Tena MD;  Location: [facility];  Service: General;  Laterality: Bilateral;   • TENDON RELEASE  09/2018   • TOTAL KNEE ARTHROPLASTY Left 6/13/2019    Procedure: LEFT KNEE REVISION;  Surgeon: Malick Costa II, MD;  Location: [facility] OR;  Service: Orthopedics   • TOTAL KNEE ARTHROPLASTY REVISION Left     x2   • TOTAL KNEE ARTHROPLASTY REVISION Left 2/13/2018    Procedure: LEFT TOTAL KNEE ARTHROPLASTY REVISION;  Surgeon: Jair Fajardo MD;  Location: [facility] OR;  Service:     • US GUIDED CYST ASPIRATION BREAST N/A 3/24/2022   • VERTEBROPLASTY       Social History     Socioeconomic History   • Marital status:    Tobacco Use   • Smoking status: Never Smoker   • Smokeless tobacco: Never Used   Vaping Use   • Vaping Use: Never used   Substance and Sexual Activity   • Alcohol use: No   • Drug use: No   • Sexual activity: Defer     Family History   Problem Relation Age of Onset   • Breast cancer Mother    • Tuberculosis Mother    • Diabetes Father    • Diabetes Sister    • Malig Hyperthermia Neg Hx      Immunization History   Administered Date(s) Administered   • COVID-19 (MODERNA) 1st, 2nd, 3rd Dose Only 03/11/2021, 04/08/2021   • Flu Vaccine Quad PF >36MO 10/30/2019, 09/14/2020, 12/14/2020, 09/23/2021   • Hepatitis A 05/15/2018   • Influenza, Unspecified 09/20/2017, 10/02/2018   • Pneumococcal Polysaccharide (PPSV23) 11/06/2018       Objective   Physical Exam  Vitals and nursing note reviewed.   Constitutional:       Appearance: Normal appearance. She is normal weight.   HENT:      Head: Normocephalic.      Nose: Nose normal.      Mouth/Throat:      Mouth: Mucous membranes are moist.      Pharynx: Oropharynx is clear.   Eyes:      Pupils: Pupils are equal, round, and reactive to light.   Cardiovascular:      Rate and Rhythm: Normal rate and regular rhythm.      Pulses: Normal pulses.      Heart sounds: Normal heart sounds. No murmur heard.  Pulmonary:      Effort: Pulmonary effort is normal. No respiratory distress.      Breath sounds: Normal breath sounds.   Abdominal:      Palpations: Abdomen is soft.   Musculoskeletal:         General: Normal range of motion.   Skin:     General: Skin is dry.      Capillary Refill: Capillary refill takes less than 2 seconds.   Neurological:      General: No focal deficit present.      Mental Status: She is alert and oriented to person, place, and time.   Psychiatric:         Mood and Affect: Mood normal.         Behavior: Behavior normal.          Thought Content: Thought content normal.         Judgment: Judgment normal.         Vitals:    04/05/22 1051   BP: 130/90   Pulse: 78   Resp: 18   Temp: 97.5 °F (36.4 °C)   SpO2: 100%   Weight: 89.5 kg (197 lb 5 oz)   PainSc: 0-No pain     ECOG score: 1         ECOG: (0) Fully Active - Able to Carry On All Pre-disease Performance Without Restriction  Fall Risk Assessment was completed, and patient is at low risk for falls.  PHQ-9 Total Score: 0       The patient is  experiencing fatigue. Fatigue score: 3    PT/OT Functional Screening: PT fx screen: No needs identified  Speech Functional Screening: Speech fx screen: No needs identified  Rehab to be ordered: Rehab to be ordered: No needs identified        Result Review :   The following data was reviewed by: DANYA Carmichael on 04/05/2022:  Lab Results   Component Value Date    HGB 11.7 (L) 04/05/2022    HCT 35.5 04/05/2022    MCV 92.2 04/05/2022     04/05/2022    WBC 13.42 (H) 04/05/2022    NEUTROABS 10.16 (H) 04/05/2022    LYMPHSABS 2.36 04/05/2022    MONOSABS 0.66 04/05/2022    EOSABS 0.17 04/05/2022    BASOSABS 0.04 04/05/2022     Lab Results   Component Value Date    GLUCOSE 234 (H) 04/05/2022    BUN 22 04/05/2022    CREATININE 1.38 (H) 04/05/2022     (L) 04/05/2022    K 4.2 04/05/2022    CL 94 (L) 04/05/2022    CO2 27.1 04/05/2022    CALCIUM 10.0 04/05/2022    PROTEINTOT 7.1 04/05/2022    ALBUMIN 4.00 04/05/2022    BILITOT 0.6 04/05/2022    ALKPHOS 134 (H) 04/05/2022    AST 11 04/05/2022    ALT 9 04/05/2022          Assessment and Plan    Diagnoses and all orders for this visit:    1. Malignant neoplasm of overlapping sites of right breast in female, estrogen receptor positive (HCC) (Primary)  -     Cancel: CBC & Differential; Future  -     Cancel: Comprehensive Metabolic Panel; Future  -     Cancel: Extra Tubes  -     CBC & Differential; Future  -     Comprehensive Metabolic Panel; Future  -     Extra Tubes    2. Seroma of breast    3.  Leukocytosis, unspecified type    right breast seroma with JAYSON drain in place with dark blood in JAYSON tubing.     She will continue close follow up with Dr. Tena for the right breast seroma.     Continue Letrozole. Refills will be sent. Continue Calcium and Vitamin D supplementation.   Bone density is scheduled at the end of month.     Labwork with mild anemia noted of 11.7 and mild CKD noted with creatinine of 1.38. WBC 13.42.     Call if any questions or concerns.       Follow up with Dr. Ward at the next visit.       Patient Follow Up: 3 months or sooner if needed.   Patient was given instructions and counseling regarding her condition or for health maintenance advice. Please see specific information pulled into the AVS if appropriate.     Kristina Christopher, APRN    4/5/2022

## 2022-04-05 NOTE — PROGRESS NOTES
Chief Complaint: Follow-up    Subjective      Post op concerns          History of Present Illness  Lilia Becerra is a 81 y.o. female presents to [facility] for post operative concerns.    Patient presents today with complaints of swelling around her mastectomy site.    Patient underwent a bilateral mastectomy on 1/11/2022 performed by Dr. Lety Tena.    Patient reports that she noticed swelling back in the end of January and was unsure why with swelling.    Denies any fever or chills.    Pathology:  Clinical Information    Comment:    Malignant neoplasm of overlapping sites of right breast in female, estrogen receptor positive (HCC)      Final Diagnosis   1. Left breast, mastectomy:               - Sclerosing adenosis               - Intraductal papilloma               - Radial scar               - Florid usual ductal hyperplasia               - Fibroadenomatoid change               - Duct ectasia with calcification               - Fibrosis               - Calcified blood vessels     2. 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 Right sentinel lymph node #1, excision:               - One lymph node negative for carcinoma (0/1)               - See synoptic checklist     4. Right breast, new margin, excision:               - Negative for carcinoma               - See synoptic checklist     5. Right axillary contents, dissection:               - Seventeen lymph nodes negative for carcinoma (0/17)               - See synoptic checklist      Electronically signed by Monty Joyner MD on 1     Previous visit I aspirated 430mL's.    Objective     Past Medical History:   Diagnosis Date   • Acid reflux disease    • Arthritis    • Atrial fibrillation (HCC)    • Back pain    • Bowel obstruction (HCC)    • Breast cancer (HCC)    • Breast  cancer (HCC)    • Carpal tunnel syndrome    • Constipation    • Depression    • Diabetes mellitus (HCC)    • Disease of thyroid gland    • Dyslipidemia    • Fecal incontinence    • GERD (gastroesophageal reflux disease)    • Glaucoma     left eye   • HBP (high blood pressure)    • Hearing impaired     hearing aides   • Hiatal hernia    • High cholesterol    • History of transfusion    • History of tuberculosis     IN 1980'S WAS TREATED   • Hypertension    • Hypomagnesemia    • Kienbock's disease    • Kienböck's disease, right     KIENBOCK'S AVASCULAR NECROSIS OF LUNATE, ADULT RIGHT   • Nonrheumatic aortic valve stenosis 11/5/2020   • OAB (overactive bladder)    • Osteoporosis    • Pneumonia    • PONV (postoperative nausea and vomiting)    • Positive PPD    • RLS (restless legs syndrome)    • Sleep apnea     NO MACHINE   • Stage 3 chronic kidney disease (HCC)    • Status post bilateral mastectomy with sentinel node biopsy and right axillary node dissection 1/12/2022   • Tailbone injury     fracture   • Thyroid disease    • Urinary incontinence     wears pads   • Urinary retention        Past Surgical History:   Procedure Laterality Date   • ANKLE TENDON REPAIR     • BACK SURGERY  12/21/2018-3/2019    LUMBAR--BROKEN DISC, CLEANED OUT--HAS 2ND PROCEDURE TO FINISH REMOVING   • CARPAL TUNNEL RELEASE     • CATARACT EXTRACTION Bilateral    • COLONOSCOPY     • ENDOSCOPY     • HEMORRHOIDECTOMY     • HYSTERECTOMY     • INCISION AND DRAINAGE OF WOUND      on back    • JOINT REPLACEMENT     • KNEE ARTHROPLASTY Right    • LEG SURGERY  06/13/2019   • LUMBAR DISCECTOMY Left 12/21/2018    Procedure: LEFT L4-5 MICRO DISCECTOMY;  Surgeon: Adam Coleman DO;  Location: [facility] OR;  Service: Orthopedic Spine   • LUMBAR DISCECTOMY Left 3/29/2019    Procedure: LEFT L4-5 REVISION OF MICRODISCECTOMY;  Surgeon: Adam Coleman DO;  Location: [facility] OR;  Service: Orthopedic Spine   • LUMBAR FUSION     • MASTECTOMY Bilateral 1/11/2022     Procedure: BREAST MASTECTOMY WITH SENTINEL NODE BIOPSY AND possible  AXILLARY NODE DISSECTION;  Surgeon: Lety Tena MD;  Location: [facility];  Service: General;  Laterality: Bilateral;   • TENDON RELEASE  09/2018   • TOTAL KNEE ARTHROPLASTY Left 6/13/2019    Procedure: LEFT KNEE REVISION;  Surgeon: Malick Costa II, MD;  Location: [facility];  Service: Orthopedics   • TOTAL KNEE ARTHROPLASTY REVISION Left     x2   • TOTAL KNEE ARTHROPLASTY REVISION Left 2/13/2018    Procedure: LEFT TOTAL KNEE ARTHROPLASTY REVISION;  Surgeon: Jair Fajardo MD;  Location: [facility];  Service:    • US GUIDED CYST ASPIRATION BREAST N/A 3/24/2022   • VERTEBROPLASTY         Outpatient Medications Marked as Taking for the 4/4/22 encounter (Office Visit) with Hugo April, APRN   Medication Sig Dispense Refill   • alendronate (FOSAMAX) 35 MG tablet 1 tablet     • apixaban (ELIQUIS) 5 MG tablet tablet Take 1 tablet by mouth Every 12 (Twelve) Hours. 180 tablet 3   • atorvastatin (LIPITOR) 10 MG tablet Take 1 tablet by mouth Every Night. 90 tablet 3   • brimonidine (ALPHAGAN) 0.2 % ophthalmic solution Administer 1 drop into the left eye Every Night.     • Calcium Carb-Cholecalciferol (CALCIUM 600 + D PO) Take 1 tablet by mouth 2 (Two) Times a Day.     • COMBIGAN 0.2-0.5 % ophthalmic solution Administer 2 drops to both eyes 2 (Two) Times a Day.  2   • digoxin (LANOXIN) 125 MCG tablet Take 125 mcg by mouth Daily.     • dilTIAZem CD (CARDIZEM CD) 120 MG 24 hr capsule Take 1 capsule by mouth Daily. 90 capsule 3   • DULoxetine (CYMBALTA) 60 MG capsule Take 60 mg by mouth Daily.     • furosemide (LASIX) 40 MG tablet Take 1 1/2 tablets every morning. Can decrease to one tablet a day if swelling improves 145 tablet 3   • letrozole (FEMARA) 2.5 MG tablet Take 1 tablet by mouth Daily. 30 tablet 1   • letrozole (FEMARA) 2.5 MG tablet      • levothyroxine (SYNTHROID, LEVOTHROID) 25 MCG tablet Take 25 mcg by  mouth Daily. Currently doesn't have .     • losartan (COZAAR) 25 MG tablet Take 1 tablet by mouth Daily. 90 tablet 3   • metFORMIN (GLUCOPHAGE) 500 MG tablet Take 500 mg by mouth 2 (Two) Times a Day.     • metoprolol succinate XL (TOPROL-XL) 25 MG 24 hr tablet Take 1 tablet by mouth Every Night. 90 tablet 3   • Mirabegron ER (Myrbetriq) 50 MG tablet sustained-release 24 hour 24 hr tablet Take 50 mg by mouth Every Morning for 90 days. 90 tablet 3   • Multiple Vitamins-Minerals (MULTIVITAMIN ADULTS PO) Take 1 capsule by mouth Daily.     • mupirocin (BACTROBAN) 2 % ointment      • omeprazole (priLOSEC) 20 MG capsule Take 20 mg by mouth Daily.     • oxybutynin XL (DITROPAN-XL) 10 MG 24 hr tablet Take 1 tablet by mouth Daily. 90 tablet 1   • pramipexole (MIRAPEX) 1.5 MG tablet Take 1.5 mg by mouth Every Night.     • spironolactone (ALDACTONE) 25 MG tablet Take 1 tablet by mouth Daily. 90 tablet 3   • VENLAFAXINE HCL ER PO Take 150 mg by mouth Daily.     • vitamin C (ASCORBIC ACID) 500 MG tablet Take 500 mg by mouth Daily.         Allergies   Allergen Reactions   • Eggs Or Egg-Derived Products Swelling   • Sertraline Unknown - High Severity     Bleeding in stomach    • Tetanus Toxoid Swelling   • Tetanus Toxoids Swelling   • Nsaids Unknown - High Severity   • Sertraline Hcl Unknown - High Severity        Family History   Problem Relation Age of Onset   • Breast cancer Mother    • Tuberculosis Mother    • Diabetes Father    • Diabetes Sister    • Malig Hyperthermia Neg Hx        Social History     Socioeconomic History   • Marital status:    Tobacco Use   • Smoking status: Never Smoker   • Smokeless tobacco: Never Used   Vaping Use   • Vaping Use: Never used   Substance and Sexual Activity   • Alcohol use: No   • Drug use: No   • Sexual activity: Defer       Review of Systems   Constitutional: Negative for chills and fever.   Genitourinary: Positive for breast pain. Negative for breast discharge and breast lump.     "    Vital Signs:   Pulse 76   Resp 18   Ht 165.1 cm (65\")   Wt 90.7 kg (200 lb)   BMI 33.28 kg/m²      Physical Exam  Constitutional:       Appearance: Normal appearance.   HENT:      Head: Normocephalic.   Cardiovascular:      Rate and Rhythm: Normal rate.   Pulmonary:      Effort: Pulmonary effort is normal.   Skin:     General: Skin is warm and dry.      Comments: Left chest: Surgical incision is clean dry and intact without erythema.    Right chest: Surgical incision is clean dry and intact without erythema.  IR drain present with 25mL of bloody drainage consistent with a hematoma secondary to eliquis.    Neurological:      General: No focal deficit present.      Mental Status: She is alert and oriented to person, place, and time.   Psychiatric:         Mood and Affect: Mood normal.         Thought Content: Thought content normal.          Result Review :    []  Laboratory  []  Radiology  [x]  Pathology  []  Microbiology  []  EKG/Telemetry   []  Cardiology/Vascular   [x]  Old records  Today I reviewed Dr. Tena's previous operative and pathology report.     Assessment and Plan    Diagnoses and all orders for this visit:    1. Seroma of breast (Primary)    2. Status post bilateral mastectomy        Follow Up   Return for Follow-up with Dr. Tena.     Keep surgical incision clean and dry.    IR drain was left in place. I have educated the patient to continue to document drainage daily.     Patient was given instructions and counseling regarding her condition or for health maintenance advice. Please see specific information pulled into the AVS if appropriate.           "

## 2022-04-05 NOTE — TELEPHONE ENCOUNTER
Received Vm from patient    SW patient. Patient states her BP been running on 100-110s systolic since surgery and her HR up and down 50-80s. Advised BP good. Patient states Dr Tena has checked her blood as she thinks she may be anemic. Advised patient to stay hydrated and keep checking BP.

## 2022-04-06 ENCOUNTER — TELEPHONE (OUTPATIENT)
Dept: ONCOLOGY | Facility: HOSPITAL | Age: 82
End: 2022-04-06

## 2022-04-06 NOTE — TELEPHONE ENCOUNTER
Caller: Lilia Becerra    Relationship: Self    Best call back number: [phone]    Caller requesting test results: YES    What test was performed: LABS    When was the test performed: 4-6    Where was the test performed:     Additional notes: PLEASE ADVISE

## 2022-04-11 ENCOUNTER — OFFICE VISIT (OUTPATIENT)
Dept: SURGERY | Facility: CLINIC | Age: 82
End: 2022-04-11

## 2022-04-11 ENCOUNTER — TELEPHONE (OUTPATIENT)
Dept: ONCOLOGY | Facility: HOSPITAL | Age: 82
End: 2022-04-11

## 2022-04-11 VITALS — HEIGHT: 65 IN | BODY MASS INDEX: 32.82 KG/M2 | WEIGHT: 197 LBS | RESPIRATION RATE: 14 BRPM

## 2022-04-11 DIAGNOSIS — Z17.0 MALIGNANT NEOPLASM OF OVERLAPPING SITES OF RIGHT BREAST IN FEMALE, ESTROGEN RECEPTOR POSITIVE: Primary | ICD-10-CM

## 2022-04-11 DIAGNOSIS — C50.811 MALIGNANT NEOPLASM OF OVERLAPPING SITES OF RIGHT BREAST IN FEMALE, ESTROGEN RECEPTOR POSITIVE: Primary | ICD-10-CM

## 2022-04-11 PROCEDURE — 99024 POSTOP FOLLOW-UP VISIT: CPT | Performed by: SURGERY

## 2022-04-11 NOTE — PROGRESS NOTES
Chief Complaint  Follow-up    Subjective          History of Present Illness  The patient is here to follow up on bilateral mastectomy with SLN bx.  They are doing well and have no complaints.  Pathology is shown below:    Clinical Information    Comment:    Malignant neoplasm of overlapping sites of right breast in female, estrogen receptor positive (HCC)      Final Diagnosis   1. Left breast, mastectomy:               - Sclerosing adenosis               - Intraductal papilloma               - Radial scar               - Florid usual ductal hyperplasia               - Fibroadenomatoid change               - Duct ectasia with calcification               - Fibrosis               - Calcified blood vessels     2. 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 Right sentinel lymph node #1, excision:               - One lymph node negative for carcinoma (0/1)               - See synoptic checklist     4. Right breast, new margin, excision:               - Negative for carcinoma               - See synoptic checklist     5. Right axillary contents, dissection:               - Seventeen lymph nodes negative for carcinoma (0/17)               - See synoptic checklist      Electronically signed by Monty Joyner MD on 1/13/2022 at 1411   Synoptic Checklist     INVASIVE CARCINOMA OF THE BREAST: Resection  8th Edition - Protocol posted: 6/30/2021  INVASIVE CARCINOMA OF THE BREAST: COMPLETE EXCISION - 2, 3, 4, 5  SPECIMEN   Procedure  Total mastectomy    Specimen Laterality  Right    TUMOR   Histologic Type  Invasive carcinoma of no special type (ductal)    Histologic Grade (Ramon Histologic Score)     Glandular (Acinar) / Tubular Differentiation  Score 3    Nuclear Pleomorphism  Score 2    Mitotic Rate  Score 1    Overall Grade  Grade 2 (scores of 6 or 7)    Tumor Size   "Greatest dimension of largest invasive focus (Millimeters): 18 mm   Tumor Focality  Multiple foci of invasive carcinoma    Number of Foci  At least: 2    Ductal Carcinoma In Situ (DCIS)  Present    Size (Extent) of DCIS  Cannot be determined    Number of Blocks with DCIS  5    Number of Blocks Examined  13    Architectural Patterns  [place]      Cribriform    Nuclear Grade  Grade III (high)    Necrosis  Present, central (expansive \"comedo\" necrosis)    Tumor Extent     Treatment Effect in the Breast  No known presurgical therapy    MARGINS   Margin Status for Invasive Carcinoma  All margins negative for invasive carcinoma    Distance from Invasive Carcinoma to Closest Margin  5 mm   Closest Margin(s) to Invasive Carcinoma  Posterior    Margin Status for DCIS  All margins negative for DCIS    Distance from DCIS to Closest Margin  7 mm   Closest Margin(s) to DCIS  Posterior    REGIONAL LYMPH NODES   Regional Lymph Node Status  All regional lymph nodes negative for tumor    Total Number of Lymph Nodes Examined (sentinel and non-sentinel)  18    Number of [place] Nodes Examined  1    PATHOLOGIC STAGE CLASSIFICATION (pTNM, AJCC 8th Edition)   Reporting of pT, pN, and (when applicable) pM categories is based on information available to the pathologist at the time the report is issued. As per the AJCC (Chapter 1, 8th Ed.) it is the managing physician’s responsibility to establish the final pathologic stage based upon all pertinent information, including but potentially not limited to this pathology report.   TNM Descriptors  m (multiple foci of invasive carcinoma)    pT Category  pT1c    pN Category  pN0    Breast Biomarker Testing Performed on Previous Biopsy     Estrogen Receptor (ER) Status  Positive (greater than 10% of cells demonstrate nuclear positivity)    Percentage of Cells with Nuclear Positivity  100 %   Breast Biomarker Testing Performed on Previous Biopsy     Progesterone Receptor (PgR) Status  Positive  " "  Percentage of Cells with Nuclear Positivity  100 %   Breast Biomarker Testing Performed on Previous Biopsy     HER2 (by immunohistochemistry)  Equivocal (Score 2+)    Breast Biomarker Testing Performed on Previous Biopsy     HER2 (by in situ hybridization)  Negative (not amplified)    Breast Biomarker Testing Performed on Previous Biopsy     Ki-67 Percentage of Positive Nuclei  5 %   Testing Performed on Case Number  [id number]           Has hematoma from eliquis and was seen by Mavis HAGAN las week.  She has had this drained twice with the recurring seroma times two-point time I feel she would best be served by drain with continuous suction so we had radiology place a drain.  It is still putting out over 25 cc a day so will leave in place for one additional week.  Objective   Vital Signs:   Resp 14   Ht 165.1 cm (65\")   Wt 89.4 kg (197 lb)   BMI 32.78 kg/m²     Physical Exam  Vitals and nursing note reviewed.   Constitutional:       General: She is not in acute distress.     Appearance: Normal appearance. She is well-developed.   HENT:      Head: Normocephalic and atraumatic.   Eyes:      Extraocular Movements: Extraocular movements intact.      Pupils: Pupils are equal, round, and reactive to light.   Cardiovascular:      Pulses: Normal pulses.   Pulmonary:      Effort: Pulmonary effort is normal. No retractions.      Breath sounds: Normal air entry. No wheezing.   Abdominal:      General: There is no distension.      Palpations: Abdomen is soft.      Tenderness: There is no abdominal tenderness.      Hernia: No hernia is present.   Musculoskeletal:         General: No swelling or deformity.      Cervical back: Neck supple.   Skin:     General: Skin is warm and dry.      Findings: No erythema.      Comments: [facility] Inc[facility], drain in place   Neurological:      General: No focal deficit present.      Mental Status: She is alert and oriented to person, place, and time.      Motor: Motor " function is intact.   Psychiatric:         Mood and Affect: Mood normal.         Thought Content: Thought content normal.                 Assessment and Plan    Diagnoses and all orders for this visit:    1. Malignant neoplasm of overlapping sites of right breast in female, estrogen receptor positive (HCC) (Primary)    Keep appointment with oncology  Follow-up with me 1 weeks  Keep drain    Follow Up   Return in about 1 week (around 4/18/2022).  Patient was given instructions and counseling regarding her condition or for health maintenance advice. Please see specific information pulled into the AVS if appropriate.

## 2022-04-15 NOTE — PROGRESS NOTES
Chief Complaint  Follow-up    Subjective          History of Present Illness  The patient is here to follow up on bilateral mastectomy with SLN bx.  They are doing well and have no complaints.  Pathology is shown below:    Clinical Information    Comment:    Malignant neoplasm of overlapping sites of right breast in female, estrogen receptor positive (HCC)      Final Diagnosis   1. Left breast, mastectomy:               - Sclerosing adenosis               - Intraductal papilloma               - Radial scar               - Florid usual ductal hyperplasia               - Fibroadenomatoid change               - Duct ectasia with calcification               - Fibrosis               - Calcified blood vessels     2. 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 Right sentinel lymph node #1, excision:               - One lymph node negative for carcinoma (0/1)               - See synoptic checklist     4. Right breast, new margin, excision:               - Negative for carcinoma               - See synoptic checklist     5. Right axillary contents, dissection:               - Seventeen lymph nodes negative for carcinoma (0/17)               - See synoptic checklist      Electronically signed by Monty Joyner MD on 1/13/2022 at 1411   Synoptic Checklist     INVASIVE CARCINOMA OF THE BREAST: Resection  8th Edition - Protocol posted: 6/30/2021  INVASIVE CARCINOMA OF THE BREAST: COMPLETE EXCISION - 2, 3, 4, 5  SPECIMEN   Procedure  Total mastectomy    Specimen Laterality  Right    TUMOR   Histologic Type  Invasive carcinoma of no special type (ductal)    Histologic Grade (Ramon Histologic Score)     Glandular (Acinar) / Tubular Differentiation  Score 3    Nuclear Pleomorphism  Score 2    Mitotic Rate  Score 1    Overall Grade  Grade 2 (scores of 6 or 7)    Tumor Size   "Greatest dimension of largest invasive focus (Millimeters): 18 mm   Tumor Focality  Multiple foci of invasive carcinoma    Number of Foci  At least: 2    Ductal Carcinoma In Situ (DCIS)  Present    Size (Extent) of DCIS  Cannot be determined    Number of Blocks with DCIS  5    Number of Blocks Examined  13    Architectural Patterns  [place]      Cribriform    Nuclear Grade  Grade III (high)    Necrosis  Present, central (expansive \"comedo\" necrosis)    Tumor Extent     Treatment Effect in the Breast  No known presurgical therapy    MARGINS   Margin Status for Invasive Carcinoma  All margins negative for invasive carcinoma    Distance from Invasive Carcinoma to Closest Margin  5 mm   Closest Margin(s) to Invasive Carcinoma  Posterior    Margin Status for DCIS  All margins negative for DCIS    Distance from DCIS to Closest Margin  7 mm   Closest Margin(s) to DCIS  Posterior    REGIONAL LYMPH NODES   Regional Lymph Node Status  All regional lymph nodes negative for tumor    Total Number of Lymph Nodes Examined (sentinel and non-sentinel)  18    Number of [place] Nodes Examined  1    PATHOLOGIC STAGE CLASSIFICATION (pTNM, AJCC 8th Edition)   Reporting of pT, pN, and (when applicable) pM categories is based on information available to the pathologist at the time the report is issued. As per the AJCC (Chapter 1, 8th Ed.) it is the managing physician’s responsibility to establish the final pathologic stage based upon all pertinent information, including but potentially not limited to this pathology report.   TNM Descriptors  m (multiple foci of invasive carcinoma)    pT Category  pT1c    pN Category  pN0    Breast Biomarker Testing Performed on Previous Biopsy     Estrogen Receptor (ER) Status  Positive (greater than 10% of cells demonstrate nuclear positivity)    Percentage of Cells with Nuclear Positivity  100 %   Breast Biomarker Testing Performed on Previous Biopsy     Progesterone Receptor (PgR) Status  Positive  " "  Percentage of Cells with Nuclear Positivity  100 %   Breast Biomarker Testing Performed on Previous Biopsy     HER2 (by immunohistochemistry)  Equivocal (Score 2+)    Breast Biomarker Testing Performed on Previous Biopsy     HER2 (by in situ hybridization)  Negative (not amplified)    Breast Biomarker Testing Performed on Previous Biopsy     Ki-67 Percentage of Positive Nuclei  5 %   Testing Performed on Case Number  [id number]           Has hematoma from eliquis and was seen by Mavis HAGAN las week.  Drain removed today and will follow up in one week.      Objective   Vital Signs:   Resp 15   Ht 165.1 cm (65\")   Wt 89.4 kg (197 lb)   BMI 32.78 kg/m²     Physical Exam  Vitals and nursing note reviewed.   Constitutional:       General: She is not in acute distress.     Appearance: Normal appearance. She is well-developed.   HENT:      Head: Normocephalic and atraumatic.   Eyes:      Extraocular Movements: Extraocular movements intact.      Pupils: Pupils are equal, round, and reactive to light.   Cardiovascular:      Pulses: Normal pulses.   Pulmonary:      Effort: Pulmonary effort is normal. No retractions.      Breath sounds: Normal air entry. No wheezing.   Abdominal:      General: There is no distension.      Palpations: Abdomen is soft.      Tenderness: There is no abdominal tenderness.      Hernia: No hernia is present.   Musculoskeletal:         General: No swelling or deformity.      Cervical back: Neck supple.   Skin:     General: Skin is warm and dry.      Findings: No erythema.      Comments: Surgical Inc[facility], drain in place   Neurological:      General: No focal deficit present.      Mental Status: She is alert and oriented to person, place, and time.      Motor: Motor function is intact.   Psychiatric:         Mood and Affect: Mood normal.         Thought Content: Thought content normal.                 Assessment and Plan    Diagnoses and all orders for this visit:    1. Malignant " neoplasm of overlapping sites of right breast in female, estrogen receptor positive (HCC) (Primary)    Keep appointment with oncology  Follow-up with me 1 weeks      Follow Up   Return in about 1 week (around 4/25/2022).  Patient was given instructions and counseling regarding her condition or for health maintenance advice. Please see specific information pulled into the AVS if appropriate.

## 2022-04-18 ENCOUNTER — OFFICE VISIT (OUTPATIENT)
Dept: SURGERY | Facility: CLINIC | Age: 82
End: 2022-04-18

## 2022-04-18 VITALS — RESPIRATION RATE: 15 BRPM | BODY MASS INDEX: 32.82 KG/M2 | HEIGHT: 65 IN | WEIGHT: 197 LBS

## 2022-04-18 DIAGNOSIS — C50.811 MALIGNANT NEOPLASM OF OVERLAPPING SITES OF RIGHT BREAST IN FEMALE, ESTROGEN RECEPTOR POSITIVE: Primary | ICD-10-CM

## 2022-04-18 DIAGNOSIS — Z17.0 MALIGNANT NEOPLASM OF OVERLAPPING SITES OF RIGHT BREAST IN FEMALE, ESTROGEN RECEPTOR POSITIVE: Primary | ICD-10-CM

## 2022-04-18 PROCEDURE — 99212 OFFICE O/P EST SF 10 MIN: CPT | Performed by: SURGERY

## 2022-04-18 RX ORDER — DORZOLAMIDE HYDROCHLORIDE AND TIMOLOL MALEATE 20; 5 MG/ML; MG/ML
1 SOLUTION/ DROPS OPHTHALMIC 2 TIMES DAILY
COMMUNITY
Start: 2022-04-12

## 2022-04-26 ENCOUNTER — HOSPITAL ENCOUNTER (OUTPATIENT)
Dept: BONE DENSITY | Facility: HOSPITAL | Age: 82
Discharge: HOME | End: 2022-04-26
Admitting: INTERNAL MEDICINE

## 2022-04-26 DIAGNOSIS — Z78.0 POST-MENOPAUSAL: ICD-10-CM

## 2022-04-26 PROCEDURE — 77080 DXA BONE DENSITY AXIAL: CPT

## 2022-04-26 NOTE — PROGRESS NOTES
Chief Complaint  Breast Cancer    Subjective          History of Present Illness  The patient is here to follow up on bilateral mastectomy with SLN bx.  They are doing well and have no complaints.  Pathology is shown below:    Clinical Information    Comment:    Malignant neoplasm of overlapping sites of right breast in female, estrogen receptor positive (HCC)      Final Diagnosis   1. Left breast, mastectomy:               - Sclerosing adenosis               - Intraductal papilloma               - Radial scar               - Florid usual ductal hyperplasia               - Fibroadenomatoid change               - Duct ectasia with calcification               - Fibrosis               - Calcified blood vessels     2. 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 Right sentinel lymph node #1, excision:               - One lymph node negative for carcinoma (0/1)               - See synoptic checklist     4. Right breast, new margin, excision:               - Negative for carcinoma               - See synoptic checklist     5. Right axillary contents, dissection:               - Seventeen lymph nodes negative for carcinoma (0/17)               - See synoptic checklist      Electronically signed by Monty Joyner MD on 1/13/2022 at 1411   Synoptic Checklist     INVASIVE CARCINOMA OF THE BREAST: Resection  8th Edition - Protocol posted: 6/30/2021  INVASIVE CARCINOMA OF THE BREAST: COMPLETE EXCISION - 2, 3, 4, 5  SPECIMEN   Procedure  Total mastectomy    Specimen Laterality  Right    TUMOR   Histologic Type  Invasive carcinoma of no special type (ductal)    Histologic Grade (Ramon Histologic Score)     Glandular (Acinar) / Tubular Differentiation  Score 3    Nuclear Pleomorphism  Score 2    Mitotic Rate  Score 1    Overall Grade  Grade 2 (scores of 6 or 7)    Tumor Size   "Greatest dimension of largest invasive focus (Millimeters): 18 mm   Tumor Focality  Multiple foci of invasive carcinoma    Number of Foci  At least: 2    Ductal Carcinoma In Situ (DCIS)  Present    Size (Extent) of DCIS  Cannot be determined    Number of Blocks with DCIS  5    Number of Blocks Examined  13    Architectural Patterns  [place]      Cribriform    Nuclear Grade  Grade III (high)    Necrosis  Present, central (expansive \"comedo\" necrosis)    Tumor Extent     Treatment Effect in the Breast  No known presurgical therapy    MARGINS   Margin Status for Invasive Carcinoma  All margins negative for invasive carcinoma    Distance from Invasive Carcinoma to Closest Margin  5 mm   Closest Margin(s) to Invasive Carcinoma  Posterior    Margin Status for DCIS  All margins negative for DCIS    Distance from DCIS to Closest Margin  7 mm   Closest Margin(s) to DCIS  Posterior    REGIONAL LYMPH NODES   Regional Lymph Node Status  All regional lymph nodes negative for tumor    Total Number of Lymph Nodes Examined (sentinel and non-sentinel)  18    Number of [place] Nodes Examined  1    PATHOLOGIC STAGE CLASSIFICATION (pTNM, AJCC 8th Edition)   Reporting of pT, pN, and (when applicable) pM categories is based on information available to the pathologist at the time the report is issued. As per the AJCC (Chapter 1, 8th Ed.) it is the managing physician’s responsibility to establish the final pathologic stage based upon all pertinent information, including but potentially not limited to this pathology report.   TNM Descriptors  m (multiple foci of invasive carcinoma)    pT Category  pT1c    pN Category  pN0    Breast Biomarker Testing Performed on Previous Biopsy     Estrogen Receptor (ER) Status  Positive (greater than 10% of cells demonstrate nuclear positivity)    Percentage of Cells with Nuclear Positivity  100 %   Breast Biomarker Testing Performed on Previous Biopsy     Progesterone Receptor (PgR) Status  Positive  " "  Percentage of Cells with Nuclear Positivity  100 %   Breast Biomarker Testing Performed on Previous Biopsy     HER2 (by immunohistochemistry)  Equivocal (Score 2+)    Breast Biomarker Testing Performed on Previous Biopsy     HER2 (by in situ hybridization)  Negative (not amplified)    Breast Biomarker Testing Performed on Previous Biopsy     Ki-67 Percentage of Positive Nuclei  5 %   Testing Performed on Case Number  [id number]           Has hematoma from eliquis and was seen by Mavis HAGAN las week.  Drain removed last visit and very tiny site of granulation tissue silver nitrated in clinic.      Objective   Vital Signs:   Resp 16   Ht 165.1 cm (65\")   Wt 90 kg (198 lb 6.6 oz)   BMI 33.02 kg/m²     Physical Exam  Vitals and nursing note reviewed.   Constitutional:       General: She is not in acute distress.     Appearance: Normal appearance. She is well-developed.   HENT:      Head: Normocephalic and atraumatic.   Eyes:      Extraocular Movements: Extraocular movements intact.      Pupils: Pupils are equal, round, and reactive to light.   Cardiovascular:      Pulses: Normal pulses.   Pulmonary:      Effort: Pulmonary effort is normal. No retractions.      Breath sounds: Normal air entry. No wheezing.   Abdominal:      General: There is no distension.      Palpations: Abdomen is soft.      Tenderness: There is no abdominal tenderness.      Hernia: No hernia is present.   Musculoskeletal:         General: No swelling or deformity.      Cervical back: Neck supple.   Skin:     General: Skin is warm and dry.      Findings: No erythema.      Comments: Surgical [facility]   Neurological:      General: No focal deficit present.      Mental Status: She is alert and oriented to person, place, and time.      Motor: Motor function is intact.   Psychiatric:         Mood and Affect: Mood normal.         Thought Content: Thought content normal.                 Assessment and Plan    Diagnoses and all orders " for this visit:    1. Malignant neoplasm of overlapping sites of right breast in female, estrogen receptor positive (HCC) (Primary)    Keep appointment with oncology  Follow-up with me 2 weeks      Follow Up   Return in about 2 weeks (around 5/11/2022).  Patient was given instructions and counseling regarding her condition or for health maintenance advice. Please see specific information pulled into the AVS if appropriate.

## 2022-04-27 ENCOUNTER — TELEPHONE (OUTPATIENT)
Dept: SURGERY | Facility: CLINIC | Age: 82
End: 2022-04-27

## 2022-04-27 ENCOUNTER — OFFICE VISIT (OUTPATIENT)
Dept: SURGERY | Facility: CLINIC | Age: 82
End: 2022-04-27

## 2022-04-27 VITALS — RESPIRATION RATE: 16 BRPM | BODY MASS INDEX: 33.06 KG/M2 | HEIGHT: 65 IN | WEIGHT: 198.41 LBS

## 2022-04-27 DIAGNOSIS — C50.811 MALIGNANT NEOPLASM OF OVERLAPPING SITES OF RIGHT BREAST IN FEMALE, ESTROGEN RECEPTOR POSITIVE: Primary | ICD-10-CM

## 2022-04-27 DIAGNOSIS — Z17.0 MALIGNANT NEOPLASM OF OVERLAPPING SITES OF RIGHT BREAST IN FEMALE, ESTROGEN RECEPTOR POSITIVE: Primary | ICD-10-CM

## 2022-04-27 PROCEDURE — 99212 OFFICE O/P EST SF 10 MIN: CPT | Performed by: SURGERY

## 2022-04-27 NOTE — TELEPHONE ENCOUNTER
Patient said Dr. Tena covered where her drain was with a bandage.  She is home, and there is a steady bloody drainage coming from it.

## 2022-05-02 ENCOUNTER — OFFICE VISIT (OUTPATIENT)
Dept: NEUROLOGY | Facility: CLINIC | Age: 82
End: 2022-05-02

## 2022-05-02 VITALS
HEIGHT: 65 IN | SYSTOLIC BLOOD PRESSURE: 142 MMHG | BODY MASS INDEX: 30.26 KG/M2 | DIASTOLIC BLOOD PRESSURE: 54 MMHG | WEIGHT: 181.6 LBS | HEART RATE: 67 BPM

## 2022-05-02 DIAGNOSIS — G25.0 BENIGN ESSENTIAL TREMOR: Primary | ICD-10-CM

## 2022-05-02 PROCEDURE — 99213 OFFICE O/P EST LOW 20 MIN: CPT | Performed by: NURSE PRACTITIONER

## 2022-05-02 NOTE — PROGRESS NOTES
"Chief Complaint  Neurologic Problem    Subjective          Lilia Becerra presents to [facility]  States that her younger sister recently was diagnosed with Parkinson's. She states that she would like to make sure that she also does not have Parkinson's.  She states that sometimes her hands are a \"little shaky\".  Denies interference with ADLs.  Ambulates with a walker due to degenerative disc disease of lumbar spine.       Objective   Vital Signs:   /54   Pulse 67   Ht 165.1 cm (65\")   Wt 82.4 kg (181 lb 9.6 oz)   BMI 30.22 kg/m²     Physical Exam  Neurological:      Mental Status: She is oriented to person, place, and time.      Deep Tendon Reflexes: Strength normal.        Neurologic Exam     Mental Status   Oriented to person, place, and time.     Cranial Nerves   Cranial nerves II through XII intact.     Motor Exam   Muscle bulk: normal    Strength   Strength 5/5 throughout.     Gait, Coordination, and Reflexes     Tremor   Resting tremor: absent  Intention tremor: present (mild intention tremor bilaterally )No bradykinesia or rigidity noted.  Antalgic gait.  Normal stride length.  Normal base.         Result Review :               Assessment and Plan    Diagnoses and all orders for this visit:    1. Benign essential tremor (Primary)  Assessment & Plan:  No sign of parkinsonism.  Discussed that she has a mild essential tremor.  Does not interfere with ADLs.  She does not wish to pursue medication management at this time.  She was looking for peace of mind that she does not have Parkinson's.  Reassured  her that she does not appear parkinsonian today.         Follow Up   Return if symptoms worsen or fail to improve.  Patient was given instructions and counseling regarding her condition or for health maintenance advice. Please see specific information pulled into the AVS if appropriate.       "

## 2022-05-03 PROBLEM — G25.0 BENIGN ESSENTIAL TREMOR: Status: ACTIVE | Noted: 2022-05-03

## 2022-05-03 NOTE — ASSESSMENT & PLAN NOTE
No sign of parkinsonism.  Discussed that she has a mild essential tremor.  Does not interfere with ADLs.  She does not wish to pursue medication management at this time.  She was looking for peace of mind that she does not have Parkinson's.  Reassured  her that she does not appear parkinsonian today.

## 2022-05-10 NOTE — PROGRESS NOTES
Chief Complaint  Follow-up    Subjective          History of Present Illness  The patient is here to follow up on bilateral mastectomy with SLN bx.  They are doing well and have no complaints.  Pathology is shown below:    Clinical Information    Comment:    Malignant neoplasm of overlapping sites of right breast in female, estrogen receptor positive (HCC)      Final Diagnosis   1. Left breast, mastectomy:               - Sclerosing adenosis               - Intraductal papilloma               - Radial scar               - Florid usual ductal hyperplasia               - Fibroadenomatoid change               - Duct ectasia with calcification               - Fibrosis               - Calcified blood vessels     2. 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 Right sentinel lymph node #1, excision:               - One lymph node negative for carcinoma (0/1)               - See synoptic checklist     4. Right breast, new margin, excision:               - Negative for carcinoma               - See synoptic checklist     5. Right axillary contents, dissection:               - Seventeen lymph nodes negative for carcinoma (0/17)               - See synoptic checklist      Electronically signed by Monty Joyner MD on 1/13/2022 at 1411   Synoptic Checklist     INVASIVE CARCINOMA OF THE BREAST: Resection  8th Edition - Protocol posted: 6/30/2021  INVASIVE CARCINOMA OF THE BREAST: COMPLETE EXCISION - 2, 3, 4, 5  SPECIMEN   Procedure  Total mastectomy    Specimen Laterality  Right    TUMOR   Histologic Type  Invasive carcinoma of no special type (ductal)    Histologic Grade (Ramon Histologic Score)     Glandular (Acinar) / Tubular Differentiation  Score 3    Nuclear Pleomorphism  Score 2    Mitotic Rate  Score 1    Overall Grade  Grade 2 (scores of 6 or 7)    Tumor Size   "Greatest dimension of largest invasive focus (Millimeters): 18 mm   Tumor Focality  Multiple foci of invasive carcinoma    Number of Foci  At least: 2    Ductal Carcinoma In Situ (DCIS)  Present    Size (Extent) of DCIS  Cannot be determined    Number of Blocks with DCIS  5    Number of Blocks Examined  13    Architectural Patterns  [place]      Cribriform    Nuclear Grade  Grade III (high)    Necrosis  Present, central (expansive \"comedo\" necrosis)    Tumor Extent     Treatment Effect in the Breast  No known presurgical therapy    MARGINS   Margin Status for Invasive Carcinoma  All margins negative for invasive carcinoma    Distance from Invasive Carcinoma to Closest Margin  5 mm   Closest Margin(s) to Invasive Carcinoma  Posterior    Margin Status for DCIS  All margins negative for DCIS    Distance from DCIS to Closest Margin  7 mm   Closest Margin(s) to DCIS  Posterior    REGIONAL LYMPH NODES   Regional Lymph Node Status  All regional lymph nodes negative for tumor    Total Number of Lymph Nodes Examined (sentinel and non-sentinel)  18    Number of [place] Nodes Examined  1    PATHOLOGIC STAGE CLASSIFICATION (pTNM, AJCC 8th Edition)   Reporting of pT, pN, and (when applicable) pM categories is based on information available to the pathologist at the time the report is issued. As per the AJCC (Chapter 1, 8th Ed.) it is the managing physician’s responsibility to establish the final pathologic stage based upon all pertinent information, including but potentially not limited to this pathology report.   TNM Descriptors  m (multiple foci of invasive carcinoma)    pT Category  pT1c    pN Category  pN0    Breast Biomarker Testing Performed on Previous Biopsy     Estrogen Receptor (ER) Status  Positive (greater than 10% of cells demonstrate nuclear positivity)    Percentage of Cells with Nuclear Positivity  100 %   Breast Biomarker Testing Performed on Previous Biopsy     Progesterone Receptor (PgR) Status  Positive  " "  Percentage of Cells with Nuclear Positivity  100 %   Breast Biomarker Testing Performed on Previous Biopsy     HER2 (by immunohistochemistry)  Equivocal (Score 2+)    Breast Biomarker Testing Performed on Previous Biopsy     HER2 (by in situ hybridization)  Negative (not amplified)    Breast Biomarker Testing Performed on Previous Biopsy     Ki-67 Percentage of Positive Nuclei  5 %   Testing Performed on Case Number  [id number]                 Objective   Vital Signs:   Resp 15   Ht 165.1 cm (65\")   Wt 82.1 kg (181 lb)   BMI 30.12 kg/m²     Physical Exam  Vitals and nursing note reviewed.   Constitutional:       General: She is not in acute distress.     Appearance: Normal appearance. She is well-developed.   HENT:      Head: Normocephalic and atraumatic.   Eyes:      Extraocular Movements: Extraocular movements intact.      Pupils: Pupils are equal, round, and reactive to light.   Cardiovascular:      Pulses: Normal pulses.   Pulmonary:      Effort: Pulmonary effort is normal. No retractions.      Breath sounds: Normal air entry. No wheezing.   Abdominal:      General: There is no distension.      Palpations: Abdomen is soft.      Tenderness: There is no abdominal tenderness.      Hernia: No hernia is present.   Musculoskeletal:         General: No swelling or deformity.      Cervical back: Neck supple.   Skin:     General: Skin is warm and dry.      Findings: No erythema.      Comments: Surgical Incision Healing Well, drain in place   Neurological:      General: No focal deficit present.      Mental Status: She is alert and oriented to person, place, and time.      Motor: Motor function is intact.   Psychiatric:         Mood and Affect: Mood normal.         Thought Content: Thought content normal.                 Assessment and Plan    Diagnoses and all orders for this visit:    1. Malignant neoplasm of overlapping sites of right breast in female, estrogen receptor positive (HCC) (Primary)    Keep appointment " with oncology  Follow-up with me 1 month      Follow Up   Return in about 5 weeks (around 6/15/2022).  Patient was given instructions and counseling regarding her condition or for health maintenance advice. Please see specific information pulled into the AVS if appropriate.

## 2022-05-11 ENCOUNTER — OFFICE VISIT (OUTPATIENT)
Dept: SURGERY | Facility: CLINIC | Age: 82
End: 2022-05-11

## 2022-05-11 VITALS — RESPIRATION RATE: 15 BRPM | BODY MASS INDEX: 30.16 KG/M2 | WEIGHT: 181 LBS | HEIGHT: 65 IN

## 2022-05-11 DIAGNOSIS — C50.811 MALIGNANT NEOPLASM OF OVERLAPPING SITES OF RIGHT BREAST IN FEMALE, ESTROGEN RECEPTOR POSITIVE: Primary | ICD-10-CM

## 2022-05-11 DIAGNOSIS — Z17.0 MALIGNANT NEOPLASM OF OVERLAPPING SITES OF RIGHT BREAST IN FEMALE, ESTROGEN RECEPTOR POSITIVE: Primary | ICD-10-CM

## 2022-05-11 PROCEDURE — 99212 OFFICE O/P EST SF 10 MIN: CPT | Performed by: SURGERY

## 2022-05-21 DIAGNOSIS — N32.81 OAB (OVERACTIVE BLADDER): ICD-10-CM

## 2022-05-23 RX ORDER — OXYBUTYNIN CHLORIDE 10 MG/1
TABLET, EXTENDED RELEASE ORAL
Qty: 90 TABLET | Refills: 1 | Status: SHIPPED | OUTPATIENT
Start: 2022-05-23 | End: 2022-11-21

## 2022-05-27 ENCOUNTER — TELEPHONE (OUTPATIENT)
Dept: SURGERY | Facility: CLINIC | Age: 82
End: 2022-05-27

## 2022-05-27 NOTE — TELEPHONE ENCOUNTER
Per Dr. Davidson this is normal, should be ok. I will reach out to April (when she's back) next week and see if she can work pt in and take a look (if pt would like).

## 2022-05-27 NOTE — TELEPHONE ENCOUNTER
Pt is having brown drainage from incisions. At the center of the incision is white. No fever/chills. Is this normal? # [phone]

## 2022-06-07 ENCOUNTER — TELEPHONE (OUTPATIENT)
Dept: ONCOLOGY | Facility: HOSPITAL | Age: 82
End: 2022-06-07

## 2022-06-07 PROBLEM — I35.9 AORTIC VALVE DISORDER: Status: ACTIVE | Noted: 2022-06-07

## 2022-06-07 NOTE — TELEPHONE ENCOUNTER
Caller: Lilia Becerra     Relationship: SELF    Best call back number: [phone]    What is your medical concern? PATIENT HAS HAD UNEXPLAINED WEIGHT LOSS OF 10 LBS OR MORE    How long has this issue been going on? JANUARY    Is your provider already aware of this issue? NO, PATIENT DISCUSSED WITH PCP WHO DIRECTED HER TO SPEAK WITH MD LOCKETT

## 2022-06-07 NOTE — PROGRESS NOTES
Chief Complaint  Follow-up    Subjective          History of Present Illness  The patient is here to follow up on bilateral mastectomy with SLN bx.  They are doing well and have no complaints.  Pathology is shown below:    Clinical Information    Comment:    Malignant neoplasm of overlapping sites of right breast in female, estrogen receptor positive (HCC)      Final Diagnosis   1. Left breast, mastectomy:               - Sclerosing adenosis               - Intraductal papilloma               - Radial scar               - Florid usual ductal hyperplasia               - Fibroadenomatoid change               - Duct ectasia with calcification               - Fibrosis               - Calcified blood vessels     2. 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 Right sentinel lymph node #1, excision:               - One lymph node negative for carcinoma (0/1)               - See synoptic checklist     4. Right breast, new margin, excision:               - Negative for carcinoma               - See synoptic checklist     5. Right axillary contents, dissection:               - Seventeen lymph nodes negative for carcinoma (0/17)               - See synoptic checklist      Electronically signed by Monty Joyner MD on 1/13/2022 at 1411   Synoptic Checklist     INVASIVE CARCINOMA OF THE BREAST: Resection  8th Edition - Protocol posted: 6/30/2021  INVASIVE CARCINOMA OF THE BREAST: COMPLETE EXCISION - 2, 3, 4, 5  SPECIMEN   Procedure  Total mastectomy    Specimen Laterality  Right    TUMOR   Histologic Type  Invasive carcinoma of no special type (ductal)    Histologic Grade (Ramon Histologic Score)     Glandular (Acinar) / Tubular Differentiation  Score 3    Nuclear Pleomorphism  Score 2    Mitotic Rate  Score 1    Overall Grade  Grade 2 (scores of 6 or 7)    Tumor Size   "Greatest dimension of largest invasive focus (Millimeters): 18 mm   Tumor Focality  Multiple foci of invasive carcinoma    Number of Foci  At least: 2    Ductal Carcinoma In Situ (DCIS)  Present    Size (Extent) of DCIS  Cannot be determined    Number of Blocks with DCIS  5    Number of Blocks Examined  13    Architectural Patterns  Comedo      Cribriform    Nuclear Grade  Grade III (high)    Necrosis  Present, central (expansive \"comedo\" necrosis)    Tumor Extent     Treatment Effect in the Breast  No known presurgical therapy    MARGINS   Margin Status for Invasive Carcinoma  All margins negative for invasive carcinoma    Distance from Invasive Carcinoma to Closest Margin  5 mm   Closest Margin(s) to Invasive Carcinoma  Posterior    Margin Status for DCIS  All margins negative for DCIS    Distance from DCIS to Closest Margin  7 mm   Closest Margin(s) to DCIS  Posterior    REGIONAL LYMPH NODES   Regional Lymph Node Status  All regional lymph nodes negative for tumor    Total Number of Lymph Nodes Examined (sentinel and non-sentinel)  18    Number of [place] Nodes Examined  1    PATHOLOGIC STAGE CLASSIFICATION (pTNM, AJCC 8th Edition)   Reporting of pT, pN, and (when applicable) pM categories is based on information available to the pathologist at the time the report is issued. As per the AJCC (Chapter 1, 8th Ed.) it is the managing physician’s responsibility to establish the final pathologic stage based upon all pertinent information, including but potentially not limited to this pathology report.   TNM Descriptors  m (multiple foci of invasive carcinoma)    pT Category  pT1c    pN Category  pN0    Breast Biomarker Testing Performed on Previous Biopsy     Estrogen Receptor (ER) Status  Positive (greater than 10% of cells demonstrate nuclear positivity)    Percentage of Cells with Nuclear Positivity  100 %   Breast Biomarker Testing Performed on Previous Biopsy     Progesterone Receptor (PgR) Status  Positive  " "  Percentage of Cells with Nuclear Positivity  100 %   Breast Biomarker Testing Performed on Previous Biopsy     HER2 (by immunohistochemistry)  Equivocal (Score 2+)    Breast Biomarker Testing Performed on Previous Biopsy     HER2 (by in situ hybridization)  Negative (not amplified)    Breast Biomarker Testing Performed on Previous Biopsy     Ki-67 Percentage of Positive Nuclei  5 %   Testing Performed on Case Number  [id number]         She is still having issues with hematoma and intermittent oozing of old blood. She has been having some weight loss and is trying to get into the cancer [facility] center.     Objective   Vital Signs:   Resp 15   Ht 165.1 cm (65\")   Wt 82.1 kg (181 lb)   BMI 30.12 kg/m²     Physical Exam  Vitals and nursing note reviewed.   Constitutional:       General: She is not in acute distress.     Appearance: Normal appearance. She is well-developed.   HENT:      Head: Normocephalic and atraumatic.   Eyes:      Extraocular Movements: Extraocular movements intact.      Pupils: Pupils are equal, round, and reactive to light.   Cardiovascular:      Pulses: Normal pulses.   Pulmonary:      Effort: Pulmonary effort is normal. No retractions.      Breath sounds: Normal air entry. No wheezing.   Abdominal:      General: There is no distension.      Palpations: Abdomen is soft.      Tenderness: There is no abdominal tenderness.      Hernia: No hernia is present.   Musculoskeletal:         General: No swelling or deformity.      Cervical back: Neck supple.   Skin:     General: Skin is warm and dry.      Findings: No erythema.      Comments: Surgical Incision Healing Well, greatly improved   Neurological:      General: No focal deficit present.      Mental Status: She is alert and oriented to person, place, and time.      Motor: Motor function is intact.   Psychiatric:         Mood and Affect: Mood normal.         Thought Content: Thought content normal.              Assessment and Plan    Diagnoses and " all orders for this visit:    1. Malignant neoplasm of overlapping sites of right breast in female, estrogen receptor positive (HCC) (Primary)    Keep appointment with oncology  Follow-up with me 6 weeks      Follow Up   Return in about 6 weeks (around 7/20/2022).  Patient was given instructions and counseling regarding her condition or for health maintenance advice. Please see specific information pulled into the AVS if appropriate.

## 2022-06-08 ENCOUNTER — OFFICE VISIT (OUTPATIENT)
Dept: SURGERY | Facility: CLINIC | Age: 82
End: 2022-06-08

## 2022-06-08 ENCOUNTER — OFFICE VISIT (OUTPATIENT)
Dept: UROLOGY | Facility: CLINIC | Age: 82
End: 2022-06-08

## 2022-06-08 VITALS — RESPIRATION RATE: 15 BRPM | HEIGHT: 65 IN | WEIGHT: 181 LBS | BODY MASS INDEX: 30.16 KG/M2

## 2022-06-08 VITALS
BODY MASS INDEX: 30.16 KG/M2 | TEMPERATURE: 97.5 F | WEIGHT: 181 LBS | HEART RATE: 67 BPM | HEIGHT: 65 IN | SYSTOLIC BLOOD PRESSURE: 118 MMHG | DIASTOLIC BLOOD PRESSURE: 60 MMHG

## 2022-06-08 DIAGNOSIS — C50.811 MALIGNANT NEOPLASM OF OVERLAPPING SITES OF RIGHT BREAST IN FEMALE, ESTROGEN RECEPTOR POSITIVE: Primary | ICD-10-CM

## 2022-06-08 DIAGNOSIS — Z90.13 S/P MASTECTOMY, BILATERAL: ICD-10-CM

## 2022-06-08 DIAGNOSIS — N32.81 OAB (OVERACTIVE BLADDER): Primary | ICD-10-CM

## 2022-06-08 DIAGNOSIS — Z98.890 HISTORY OF BLADDER REPAIR SURGERY: ICD-10-CM

## 2022-06-08 DIAGNOSIS — N39.0 URINARY TRACT INFECTION WITHOUT HEMATURIA, SITE UNSPECIFIED: ICD-10-CM

## 2022-06-08 DIAGNOSIS — Z17.0 MALIGNANT NEOPLASM OF OVERLAPPING SITES OF RIGHT BREAST IN FEMALE, ESTROGEN RECEPTOR POSITIVE: Primary | ICD-10-CM

## 2022-06-08 DIAGNOSIS — N18.30 STAGE 3 CHRONIC KIDNEY DISEASE, UNSPECIFIED WHETHER STAGE 3A OR 3B CKD: Chronic | ICD-10-CM

## 2022-06-08 LAB
BILIRUB BLD-MCNC: NEGATIVE MG/DL
CLARITY, POC: ABNORMAL
COLOR UR: YELLOW
GLUCOSE UR STRIP-MCNC: NEGATIVE MG/DL
KETONES UR QL: NEGATIVE
LEUKOCYTE EST, POC: ABNORMAL
NITRITE UR-MCNC: POSITIVE MG/ML
PH UR: 5 [PH] (ref 5–8)
PROT UR STRIP-MCNC: NEGATIVE MG/DL
RBC # UR STRIP: ABNORMAL /UL
SP GR UR: 1.01 (ref 1–1.03)
UROBILINOGEN UR QL: NORMAL

## 2022-06-08 PROCEDURE — 87077 CULTURE AEROBIC IDENTIFY: CPT | Performed by: NURSE PRACTITIONER

## 2022-06-08 PROCEDURE — 87186 SC STD MICRODIL/AGAR DIL: CPT | Performed by: NURSE PRACTITIONER

## 2022-06-08 PROCEDURE — 99212 OFFICE O/P EST SF 10 MIN: CPT | Performed by: SURGERY

## 2022-06-08 PROCEDURE — 99213 OFFICE O/P EST LOW 20 MIN: CPT | Performed by: NURSE PRACTITIONER

## 2022-06-08 PROCEDURE — 87086 URINE CULTURE/COLONY COUNT: CPT | Performed by: NURSE PRACTITIONER

## 2022-06-08 PROCEDURE — 81002 URINALYSIS NONAUTO W/O SCOPE: CPT | Performed by: NURSE PRACTITIONER

## 2022-06-08 RX ORDER — LATANOPROST 50 UG/ML
1 SOLUTION/ DROPS OPHTHALMIC NIGHTLY
COMMUNITY

## 2022-06-08 NOTE — PROGRESS NOTES
"Chief Complaint  Follow-up oab    Subjective          Lilia Becerra 81 y.o. female presents to [facility] UROLOG[facility]  Patient here for routine follow up visit for oab. She has been on oxybutynin and myrbetriq and controls symptoms of frequency/urgency.  Reports bilateral mastectomy in January for breast cancer few weeks after diagnosis. She still having some seepage of clear fluid from right lateral chest old reno site. She is doing relatively well. Noticed for a couple of weeks that her urine is very thick and cloudy. Denies dysuria, gross hematuria, or flank pain.  No fever or chills. She does feel general weakness and fatigue. Voiding less often and associates it to not drinking enough water and poor appetite. Reports difficulty chewing with new dentures. States \"nothing tastes good.unsure if related to having had covid x 2.Patient reports that she is not currently experiencing any symptoms of urinary incontinence.    Review of Systems   Constitutional: Negative for chills and fever.   Respiratory: Negative for shortness of breath.    Cardiovascular: Negative for chest pain.   Gastrointestinal: Negative for abdominal distention and abdominal pain.   Genitourinary: Negative for dysuria, hematuria and vaginal pain.   Musculoskeletal: Positive for arthralgias and gait problem (knee problems).      Objective   Vital Signs:   /60   Pulse 67   Temp 97.5 °F (36.4 °C)   Ht 165.1 cm (65\")   Wt 82.1 kg (181 lb)   BMI 30.12 kg/m²      Past Surgical History:   Procedure Laterality Date   • ANKLE TENDON REPAIR     • BACK SURGERY  12/21/2018-3/2019    LUMBAR--BROKEN DISC, CLEANED OUT--HAS 2ND PROCEDURE TO FINISH REMOVING   • CARPAL TUNNEL RELEASE     • CATARACT EXTRACTION Bilateral    • COLONOSCOPY     • ENDOSCOPY     • HEMORRHOIDECTOMY     • HYSTERECTOMY     • INCISION AND DRAINAGE OF WOUND      on back    • JOINT REPLACEMENT     • KNEE ARTHROPLASTY Right    • LEG SURGERY  06/13/2019   • LUMBAR " DISCECTOMY Left 12/21/2018    Procedure: LEFT L4-5 MICRO DISCECTOMY;  Surgeon: Adam Coleman DO;  Location: [facility] OR;  Service: Orthopedic Spine   • LUMBAR DISCECTOMY Left 3/29/2019    Procedure: LEFT L4-5 REVISION OF MICRODISCECTOMY;  Surgeon: Adam Coleman DO;  Location: [facility] OR;  Service: Orthopedic Spine   • LUMBAR FUSION     • MASTECTOMY Bilateral 1/11/2022    Procedure: BREAST MASTECTOMY WITH SENTINEL NODE BIOPSY AND possible  AXILLARY NODE DISSECTION;  Surgeon: Lety Tena MD;  Location: [facility];  Service: General;  Laterality: Bilateral;   • TENDON RELEASE  09/2018   • TOTAL KNEE ARTHROPLASTY Left 6/13/2019    Procedure: LEFT KNEE REVISION;  Surgeon: Malick Costa II, MD;  Location: [facility];  Service: Orthopedics   • TOTAL KNEE ARTHROPLASTY REVISION Left     x2   • TOTAL KNEE ARTHROPLASTY REVISION Left 2/13/2018    Procedure: LEFT TOTAL KNEE ARTHROPLASTY REVISION;  Surgeon: Jair Fajardo MD;  Location: [facility];  Service:    • US GUIDED CYST ASPIRATION BREAST N/A 3/24/2022   • VERTEBROPLASTY          Physical Exam  Vitals and nursing note reviewed.   Constitutional:       General: She is not in acute distress.     Appearance: She is well-developed and well-groomed. She is not ill-appearing.      Comments: Ambulates with mild difficulty chair walker   Cardiovascular:      Rate and Rhythm: Normal rate.   Pulmonary:      Effort: Pulmonary effort is normal.   Skin:     General: Skin is warm and dry.   Neurological:      Mental Status: She is alert and oriented to person, place, and time.      Gait: Gait abnormal.   Psychiatric:         Mood and Affect: Mood normal.         Behavior: Behavior normal. Behavior is cooperative.         Thought Content: Thought content normal.         Judgment: Judgment normal.        Result Review :   The following data was reviewed by: DANYA Irizarry on 06/08/2022:  CMP    CMP 9/15/21 1/11/22 4/5/22   Glucose   167 (A) 234 (A)   Glucose 192 (A)     BUN 14 24 (A) 22   Creatinine 1.2 1.35 (A) 1.38 (A)   eGFR Non  Am  37 (A)    eGFR African Am 52     Sodium 140 140 132 (A)   Potassium 4.2 3.2 (A) 4.2   Chloride 108 (A) 98 94 (A)   Calcium 9.5 8.3 (A) 10.0   Albumin   4.00   Total Bilirubin   0.6   Alkaline Phosphatase   134 (A)   AST (SGOT)   11   ALT (SGPT)   9   (A) Abnormal value       Comments are available for some flowsheets but are not being displayed.           CBC    CBC 4/5/22   WBC 13.42 (A)   RBC 3.85   Hemoglobin 11.7 (A)   Hematocrit 35.5   MCV 92.2   MCH 30.4   MCHC 33.0   RDW 12.4   Platelets 296   (A) Abnormal value             POC Urinalysis Dipstick (06/08/2022 11:54)         CTA Chest Pulmonary Embolism w/Contrast per Contrast Protocol (09/12/2021 17:59)       Assessment and Plan    Diagnoses and all orders for this visit:    1. OAB (overactive bladder) (Primary)  -     POC Urinalysis Dipstick    2. Urinary tract infection without hematuria, site unspecified  -     Urine Culture - Urine, Urine, Clean Catch    3. Stage 3 chronic kidney disease, unspecified whether stage 3a or 3b CKD (HCC)    4. History of bladder repair surgery-bladder sling 2014    5. S/P mastectomy, bilateral    Since patient not having any f/c dysuria, will wait for culture sensitivity for appropriate antibiotic and adjust for renal dosing. 2 different bacteria visible HPF. Patient instructed to call office if becomes symptomatic since uncertain if results final by upcoming weekend. Encouraged adequate fluid intake and follow up in 1 month recheck. May continue oxybutynin and myrbetriq. Patient to keep plans to discuss continuing serous drainage from previous right chest reno site. Small area of fluid collection palpable.     Follow Up   Return in about 4 weeks (around 7/6/2022) for uti recheck.  Patient was given instructions and counseling regarding her condition or for health maintenance advice. Please see specific information  pulled into the AVS if appropriate.     Juana Lenz, APRN

## 2022-06-10 LAB — BACTERIA SPEC AEROBE CULT: ABNORMAL

## 2022-06-14 ENCOUNTER — TELEPHONE (OUTPATIENT)
Dept: UROLOGY | Facility: CLINIC | Age: 82
End: 2022-06-14

## 2022-06-14 DIAGNOSIS — N39.0 URINARY TRACT INFECTION WITHOUT HEMATURIA, SITE UNSPECIFIED: Primary | ICD-10-CM

## 2022-06-14 RX ORDER — CEPHALEXIN 500 MG/1
500 CAPSULE ORAL 2 TIMES DAILY
Qty: 14 CAPSULE | Refills: 0 | Status: SHIPPED | OUTPATIENT
Start: 2022-06-14 | End: 2022-06-21

## 2022-06-14 NOTE — TELEPHONE ENCOUNTER
Caller: PT  Relationship: SELF    Best call back number: [phone]    What is the best time to reach you: ANYTIME    Who are you requesting to speak with (clinical staff, provider,  specific staff member): CLINICAL         What was the call regarding: RECEIVED A CALL FROM PT. SHE CALLED TO DISCUSS LAB RESULTS DONE ON 6/8/22.

## 2022-06-14 NOTE — PROGRESS NOTES
Please notify patient the her urine culture results final and keflex sent to her [facility] pharmacy. Thank you

## 2022-07-15 ENCOUNTER — OFFICE VISIT (OUTPATIENT)
Dept: UROLOGY | Facility: CLINIC | Age: 82
End: 2022-07-15

## 2022-07-15 VITALS
BODY MASS INDEX: 28.32 KG/M2 | TEMPERATURE: 98.4 F | WEIGHT: 170 LBS | HEIGHT: 65 IN | SYSTOLIC BLOOD PRESSURE: 119 MMHG | DIASTOLIC BLOOD PRESSURE: 49 MMHG | HEART RATE: 60 BPM

## 2022-07-15 DIAGNOSIS — Z87.898 HISTORY OF URINARY RETENTION: ICD-10-CM

## 2022-07-15 DIAGNOSIS — N39.0 RECURRENT UTI: Primary | ICD-10-CM

## 2022-07-15 DIAGNOSIS — Z98.890 HISTORY OF BLADDER REPAIR SURGERY: ICD-10-CM

## 2022-07-15 DIAGNOSIS — N32.81 OAB (OVERACTIVE BLADDER): ICD-10-CM

## 2022-07-15 LAB
BILIRUB BLD-MCNC: NEGATIVE MG/DL
CLARITY, POC: ABNORMAL
COLOR UR: YELLOW
GLUCOSE UR STRIP-MCNC: NEGATIVE MG/DL
KETONES UR QL: NEGATIVE
LEUKOCYTE EST, POC: ABNORMAL
NITRITE UR-MCNC: NEGATIVE MG/ML
PH UR: 6 [PH] (ref 5–8)
PROT UR STRIP-MCNC: NEGATIVE MG/DL
RBC # UR STRIP: NEGATIVE /UL
SP GR UR: 1.01 (ref 1–1.03)
UROBILINOGEN UR QL: NORMAL

## 2022-07-15 PROCEDURE — 99214 OFFICE O/P EST MOD 30 MIN: CPT | Performed by: NURSE PRACTITIONER

## 2022-07-15 PROCEDURE — 81002 URINALYSIS NONAUTO W/O SCOPE: CPT | Performed by: NURSE PRACTITIONER

## 2022-07-15 RX ORDER — CEPHALEXIN 250 MG/1
CAPSULE ORAL
Qty: 88 CAPSULE | Refills: 0 | Status: SHIPPED | OUTPATIENT
Start: 2022-07-15 | End: 2022-09-20

## 2022-07-15 NOTE — PROGRESS NOTES
"Chief Complaint  OAB, Follow-up, and Urinary Tract Infection    Subjective          Lilia Becerra 81 y.o. female presents to [facility] UROLOGY  Follow up on recent uti and oab. Urine culture last visit indicated klebsiella.Patient did not fill keflex prescribed. Family physician had written for nitrofurantoin on 6/10/2022. Patient has history of incomplete bladder emptying with chronic uti. Not treated for uti in past unless symptomatic. She is having episodes of urge incontinence and complaints of fatigue and weakness. Unsure if related to uti or her medication for breast cancer. Denies dysuria or gross hematuria. Still taking oxybutynin and myrbetriq    Review of Systems   Constitutional: Negative for chills.   Genitourinary: Negative for dysuria, hematuria and pelvic pain.      Objective   Vital Signs:   /49   Pulse 60   Temp 98.4 °F (36.9 °C)   Ht 165.1 cm (65\")   Wt 77.1 kg (170 lb)   BMI 28.29 kg/m²      Past Surgical History:   Procedure Laterality Date   • ANKLE TENDON REPAIR     • BACK SURGERY  12/21/2018-3/2019    LUMBAR--BROKEN DISC, CLEANED OUT--HAS 2ND PROCEDURE TO FINISH REMOVING   • CARPAL TUNNEL RELEASE     • CATARACT EXTRACTION Bilateral    • COLONOSCOPY     • ENDOSCOPY     • HEMORRHOIDECTOMY     • HYSTERECTOMY     • INCISION AND DRAINAGE OF WOUND      on back    • JOINT REPLACEMENT     • KNEE ARTHROPLASTY Right    • LEG SURGERY  06/13/2019   • LUMBAR DISCECTOMY Left 12/21/2018    Procedure: LEFT L4-5 MICRO DISCECTOMY;  Surgeon: Adam Coleman DO;  Location: [facility] OR;  Service: Orthopedic Spine   • LUMBAR DISCECTOMY Left 3/29/2019    Procedure: LEFT L4-5 REVISION OF MICRODISCECTOMY;  Surgeon: Adam Coleman DO;  Location: [facility] OR;  Service: Orthopedic Spine   • LUMBAR FUSION     • MASTECTOMY Bilateral 1/11/2022    Procedure: BREAST MASTECTOMY WITH SENTINEL NODE BIOPSY AND possible  AXILLARY NODE DISSECTION;  Surgeon: Lety Tena MD;  Location: [facility]" [facility];  Service: General;  Laterality: Bilateral;   • TENDON RELEASE  09/2018   • TOTAL KNEE ARTHROPLASTY Left 6/13/2019    Procedure: LEFT KNEE REVISION;  Surgeon: Malick Costa II, MD;  Location: [facility];  Service: Orthopedics   • TOTAL KNEE ARTHROPLASTY REVISION Left     x2   • TOTAL KNEE ARTHROPLASTY REVISION Left 2/13/2018    Procedure: LEFT TOTAL KNEE ARTHROPLASTY REVISION;  Surgeon: Jair Fajardo MD;  Location: [facility];  Service:    • US GUIDED CYST ASPIRATION BREAST N/A 3/24/2022   • VERTEBROPLASTY          Physical Exam  Vitals reviewed.   Constitutional:       General: She is not in acute distress.     Appearance: She is well-developed. She is not ill-appearing.   Cardiovascular:      Rate and Rhythm: Normal rate and regular rhythm.   Pulmonary:      Effort: Pulmonary effort is normal.   Abdominal:      Palpations: Abdomen is soft.      Tenderness: There is no abdominal tenderness. There is no guarding or rebound.   Musculoskeletal:      Right lower leg: Edema present.      Left lower leg: Edema present.   Skin:     General: Skin is warm and dry.   Neurological:      Mental Status: She is alert and oriented to person, place, and time.   Psychiatric:         Mood and Affect: Mood normal.         Behavior: Behavior is cooperative.         Thought Content: Thought content normal.         Judgment: Judgment normal.        Result Review :   The following data was reviewed by: DANYA Irizarry on 07/15/2022:  UA    Urinalysis 12/8/21 12/8/21 6/8/22 7/15/22    1301 1415     Ketones, UA Negative  Negative Negative   Leukocytes, UA Negative  Large (3+) (A) Moderate (2+) (A)   RBC, UA  None Seen     WBC, UA  None Seen     Bacteria, UA  None Seen     (A) Abnormal value            Urine Culture    Urine Culture 6/8/22   Urine Culture >100,000 CFU/mL Klebsiella pneumoniae ssp pneumoniae (A)   (A) Abnormal value                      Assessment and Plan    Diagnoses and all  orders for this visit:    1. Recurrent UTI (Primary)  -     POC Urinalysis Dipstick  -     cephalexin (KEFLEX) 250 MG capsule; Take 1 capsule by mouth 4 (Four) Times a Day for 7 days, THEN 1 capsule every night at bedtime for 60 days.  Dispense: 88 capsule; Refill: 0    2. OAB (overactive bladder)    3. History of urinary retention    4. History of bladder repair surgery    Patient did not fill keflex written after  last urine culture resulted. Discussed and will prescribe keflex 7 days then to take 250 capsule 1 q hs. Positive uti today,examine HPF no rbc large wbc 4+ bacteria. Symptoms feeling of weakness and fatigue. Having some urge incontinence. Denies fever or chills. Patient will bring by urine sample in approximately 1 month when in town for another appointment. Financial burden with cost of gas. Follow up 6 months.      Follow Up   Return in about 6 months (around 1/15/2023) for uti and oab.  Patient was given instructions and counseling regarding her condition or for health maintenance advice. Please see specific information pulled into the AVS if appropriate.     DANYA Irizarry

## 2022-07-19 ENCOUNTER — TELEPHONE (OUTPATIENT)
Dept: ONCOLOGY | Facility: HOSPITAL | Age: 82
End: 2022-07-19

## 2022-07-19 ENCOUNTER — TELEPHONE (OUTPATIENT)
Dept: ONCOLOGY | Facility: OTHER | Age: 82
End: 2022-07-19

## 2022-07-19 NOTE — TELEPHONE ENCOUNTER
PT CALLED TO [facility] APPT FOR TODAY. APPT IS ACTUALLY FOR TOMORROW 7/20. SHE SAID SHE DIDN'T HAVE GAS MONEY. CALLED OFFICE AND SPOKE WITH STEPHANI. SHE SENT MSG TO  TO SEE IF THERE WAS ANYTHING  COULD HELP WITH. INFORMED PT WHO V/U. SHE WILL ALSO SEE IF SHE CAN FIND A RIDE FOR TOMORROW, DIDN'T WANT TO [facility] YET.

## 2022-07-19 NOTE — TELEPHONE ENCOUNTER
Diagnosis: Breast cancer    Reason for referral: Transportation    Comments: OSW assistance requested by registrar, Irais AGUILA, reporting pt called the HUB to cancel her 3 month f/u appointment tomorrow due to not having the gas money to get here. OSW contacted pt via telephone this afternoon and offered to provide her with a $15 gas card tomorrow if this would be beneficial in getting her here. Pt reports she is unsure and will have to contact us back once she knows whether or not she needs to reschedule tomorrow's appt. Relayed this to registrar and inquired if telehealth is an option, however, was informed that is not an option at this time. OSW support remains available.

## 2022-07-19 NOTE — TELEPHONE ENCOUNTER
Caller: ROSANNE    Relationship to patient: SELF    Best call back number: [phone]    Patient is needing: TO R/S 7- F/U APPT. SHE DOES NOT HAVE ENOUGH GAS TO COME IN. HUB TRIED TO R/S AND COULD NOT FIND ANY OPEN DATES.

## 2022-07-20 ENCOUNTER — APPOINTMENT (OUTPATIENT)
Dept: ONCOLOGY | Facility: HOSPITAL | Age: 82
End: 2022-07-20

## 2022-08-03 ENCOUNTER — OFFICE VISIT (OUTPATIENT)
Dept: GASTROENTEROLOGY | Facility: CLINIC | Age: 82
End: 2022-08-03

## 2022-08-03 ENCOUNTER — PREP FOR SURGERY (OUTPATIENT)
Dept: OTHER | Facility: HOSPITAL | Age: 82
End: 2022-08-03

## 2022-08-03 ENCOUNTER — HOSPITAL ENCOUNTER (OUTPATIENT)
Dept: OCCUPATIONAL THERAPY | Facility: HOSPITAL | Age: 82
Setting detail: THERAPIES SERIES
Discharge: HOME | End: 2022-08-03

## 2022-08-03 VITALS
BODY MASS INDEX: 27.32 KG/M2 | WEIGHT: 164 LBS | HEIGHT: 65 IN | HEART RATE: 60 BPM | DIASTOLIC BLOOD PRESSURE: 50 MMHG | SYSTOLIC BLOOD PRESSURE: 124 MMHG

## 2022-08-03 DIAGNOSIS — R63.4 WEIGHT LOSS: Primary | ICD-10-CM

## 2022-08-03 DIAGNOSIS — R11.10 DRY HEAVES: ICD-10-CM

## 2022-08-03 DIAGNOSIS — Z91.89 AT RISK FOR LYMPHEDEMA: ICD-10-CM

## 2022-08-03 DIAGNOSIS — C50.811 MALIGNANT NEOPLASM OF OVERLAPPING SITES OF RIGHT BREAST IN FEMALE, ESTROGEN RECEPTOR POSITIVE: Primary | ICD-10-CM

## 2022-08-03 DIAGNOSIS — Z17.0 MALIGNANT NEOPLASM OF OVERLAPPING SITES OF RIGHT BREAST IN FEMALE, ESTROGEN RECEPTOR POSITIVE: Primary | ICD-10-CM

## 2022-08-03 DIAGNOSIS — R19.7 DIARRHEA, UNSPECIFIED TYPE: ICD-10-CM

## 2022-08-03 DIAGNOSIS — Z90.13 STATUS POST BILATERAL MASTECTOMY: ICD-10-CM

## 2022-08-03 DIAGNOSIS — L90.5 SCAR CONDITION AND FIBROSIS OF SKIN: ICD-10-CM

## 2022-08-03 DIAGNOSIS — D64.9 ANEMIA, UNSPECIFIED TYPE: ICD-10-CM

## 2022-08-03 PROCEDURE — 99214 OFFICE O/P EST MOD 30 MIN: CPT | Performed by: NURSE PRACTITIONER

## 2022-08-03 PROCEDURE — 93702 BIS XTRACELL FLUID ANALYSIS: CPT

## 2022-08-03 PROCEDURE — 97535 SELF CARE MNGMENT TRAINING: CPT

## 2022-08-03 RX ORDER — POLYETHYLENE GLYCOL 3350, SODIUM SULFATE ANHYDROUS, SODIUM BICARBONATE, SODIUM CHLORIDE, POTASSIUM CHLORIDE 227.1; 21.5; 6.36; 5.53; .754 G/L; G/L; G/L; G/L; G/L
4 POWDER, FOR SOLUTION ORAL DAILY
Qty: 1 EACH | Refills: 0 | Status: SHIPPED | OUTPATIENT
Start: 2022-08-03 | End: 2022-08-04

## 2022-08-03 NOTE — THERAPY RE-EVALUATION
Outpatient Occupational Therapy Lymphedema Re-Evaluation   Edu     Patient Name: Lilia Becerra  : 1940  MRN: [id number]  Today's Date: 8/3/2022      Visit Date: 2022    Patient Active Problem List   Diagnosis   • Gastroesophageal reflux disease   • Atrial fibrillation with RVR ([facility])   • Hypothyroidism   • Thyroid disease   • Sleep apnea   • HBP (high blood pressure)   • Postoperative anemia due to acute blood loss (expected)   • Stage 3 chronic kidney disease (CMS/HCC)   • Broken leg   • Nonrheumatic aortic valve stenosis   • Hyperlipidemia   • OAB (overactive bladder)   • Retention of urine   • Pedal edema   • Abnormal mammogram   • Age related osteoporosis   • Arthritis   • Bowel obstruction ([facility])   • Carpal tunnel syndrome   • Chronic bilateral low back pain with left-sided sciatica   • Chronic pain disorder   • Class 2 obesity   • Complication of surgical procedure   • Diarrhea   • Degeneration of intervertebral disc of lumbar region   • Depressive disorder   • Diabetic renal disease ([facility])   • Disequilibrium   • Fecal urgency   • Glaucoma   • Hypertensive heart failure ([facility])   • Hypomagnesemia   • Idiopathic osteoarthritis   • Kienboeck disease of adults   • Long term (current) use of oral hypoglycemic drugs   • Lumbar radiculopathy   • Peripheral venous insufficiency   • Pneumonia   • Positive PPD   • Rapid heart rate   • Recurrent major depression ([facility])   • Restless legs syndrome   • Spondylolysis of lumbar region   • Fecal incontinence   • Diabetes mellitus ([facility])   • Malignant neoplasm of overlapping sites of right breast in female, estrogen receptor positive ([facility])   • S/P mastectomy, bilateral   • Chronic diastolic congestive heart failure ([facility])   • Paroxysmal atrial fibrillation ([facility])   • Abnormal gait   • Benign essential tremor   • Aortic valve disorder   • Urinary tract infection without hematuria   • History of bladder repair surgery   • Weight loss   • Anemia   • Dry heaves         Past Medical History:   Diagnosis Date   • Acid reflux disease    • Arthritis    • Atrial fibrillation (HCC)    • Back pain    • Bowel obstruction (HCC)    • Breast cancer (HCC)    • Breast cancer (HCC)    • Carpal tunnel syndrome    • Constipation    • Depression    • Diabetes mellitus (HCC)    • Disease of thyroid gland    • Dyslipidemia    • Fecal incontinence    • GERD (gastroesophageal reflux disease)    • Glaucoma     left eye   • HBP (high blood pressure)    • Hearing impaired     hearing aides   • Hiatal hernia    • High cholesterol    • History of transfusion    • History of tuberculosis     IN 1980'S WAS TREATED   • Hypertension    • Hypomagnesemia    • Kienbock's disease    • Kienböck's disease, right     KIENBOCK'S AVASCULAR NECROSIS OF LUNATE, ADULT RIGHT   • Nonrheumatic aortic valve stenosis 11/5/2020   • OAB (overactive bladder)    • Osteoporosis    • Pneumonia    • PONV (postoperative nausea and vomiting)    • Positive PPD    • RLS (restless legs syndrome)    • Sleep apnea     NO MACHINE   • Stage 3 chronic kidney disease (HCC)    • Status post bilateral mastectomy with sentinel node biopsy and right axillary node dissection 1/12/2022   • Tailbone injury     fracture   • Thyroid disease    • Urinary incontinence     wears pads   • Urinary retention         Past Surgical History:   Procedure Laterality Date   • ANKLE TENDON REPAIR     • BACK SURGERY  12/21/2018-3/2019    LUMBAR--BROKEN DISC, CLEANED OUT--HAS 2ND PROCEDURE TO FINISH REMOVING   • CARPAL TUNNEL RELEASE     • CATARACT EXTRACTION Bilateral    • COLONOSCOPY     • ENDOSCOPY     • HEMORRHOIDECTOMY     • HYSTERECTOMY     • INCISION AND DRAINAGE OF WOUND      on back    • JOINT REPLACEMENT     • KNEE ARTHROPLASTY Right    • LEG SURGERY  06/13/2019   • LUMBAR DISCECTOMY Left 12/21/2018    Procedure: LEFT L4-5 MICRO DISCECTOMY;  Surgeon: Adam Coleman DO;  Location: [facility] MAIN OR;  Service: Orthopedic Spine   • LUMBAR DISCECTOMY Left  3/29/2019    Procedure: LEFT L4-5 REVISION OF MICRODISCECTOMY;  Surgeon: Adam Coleman DO;  Location: [facility] OR;  Service: Orthopedic Spine   • LUMBAR FUSION     • MASTECTOMY Bilateral 1/11/2022    Procedure: BREAST MASTECTOMY WITH SENTINEL NODE BIOPSY AND possible  AXILLARY NODE DISSECTION;  Surgeon: Lety Tena MD;  Location: [facility];  Service: General;  Laterality: Bilateral;   • TENDON RELEASE  09/2018   • TOTAL KNEE ARTHROPLASTY Left 6/13/2019    Procedure: LEFT KNEE REVISION;  Surgeon: Malick Costa II, MD;  Location: [facility] OR;  Service: Orthopedics   • TOTAL KNEE ARTHROPLASTY REVISION Left     x2   • TOTAL KNEE ARTHROPLASTY REVISION Left 2/13/2018    Procedure: LEFT TOTAL KNEE ARTHROPLASTY REVISION;  Surgeon: Jair Fajardo MD;  Location: [facility];  Service:    • US GUIDED CYST ASPIRATION BREAST N/A 3/24/2022   • VERTEBROPLASTY           Visit Dx:     ICD-10-CM ICD-9-CM   1. Malignant neoplasm of overlapping sites of right breast in female, estrogen receptor positive (HCC)  C50.811 174.8    Z17.0 V86.0   2. At risk for lymphedema  Z91.89 V49.89   3. Scar condition and fibrosis of skin  L90.5 709.2   4. Status post bilateral mastectomy with sentinel node biopsy and right axillary node dissection  Z90.13 V45.71            Lymphedema     Row Name 08/03/22 1400             Subjective Pain    Able to rate subjective pain? yes  -CH      Pre-Treatment Pain Level 0  -CH      Post-Treatment Pain Level 0  -CH              Subjective Comments    Subjective Comments Patient states that she has not noted any swelling since her last visit.  Patient did state that she has had a significant weight loss approximately 60 pounds in the last few months.  Patient is discussing this with her caregivers.  -CH              Lymphedema Assessment    Lymphedema Classification RUE:;at risk/stage 0  -CH      Lymphedema Cancer Related Sx bilateral;simple mastectomy;sentinel node  "biopsy;axillary dissection  -CH      Lymph Nodes Removed # 18  -CH      Positive Lymph Nodes # 0  -CH      Chemo Received no  -CH      Radiation Therapy Received no  -CH              LLIS - Physical Concerns    The amount of pain associated with my lymphedema is: 0  -CH      The amount of limb heaviness associated with my lymphedema is: 0  -CH      The amount of skin tightness associated with my lymphedema is: 0  -CH      The size of my swollen limb(s) seems: 0  -CH      Lymphedema affects the movement of my swollen limb(s): 0  -CH      The strength in my swollen limb(s) is: 0  -CH              LLIS - Psychosocial Concerns    Lymphedema affects my body image (i.e., \"how I think I look\"). 0  -CH      Lymphedema affects my socializing with others. 0  -CH      Lymphedema affects my intimate relations with spouse or partner (rate 0 if not applicable 0  -CH      Lymphedema \"gets me down\" (i.e., depression, frustration, or anger) 0  -CH      I must rely on others for help due to my lymphedema. 0  -CH      I know what to do to manage my lymphedema 3  -CH              LLIS - Functional Concerns    Lymphedema affects my ability to perform self-care activities (i.e. eating, dressing, hygiene) 0  -CH      Lymphedema affects my ability to perform routine home or work-related activities. 0  -CH      Lymphedema affects my performance of preferred leisure activities. 0  -CH      Lymphedema affects proper fit of clothing/shoes 0  -CH      Lymphedema affects my sleep 0  -CH              Posture/Observations    Alignment Options Thoracic kyphosis  -CH      Thoracic Kyphosis Mild  -CH              General ROM    GENERAL ROM COMMENTS B UE WFL  -CH              L-Dex Bioimpedence Screening    L-Dex Measurement Extremity RUE  -CH      L-Dex Patient Position Standing  -CH      L-Dex UE Dominate Side Right  -CH      L-Dex UE At Risk Side Right  -CH      L-Dex UE Pre Surgical Value Yes  -CH      L-Dex UE Score -0.6  -CH      L-Dex UE " Baseline Score 4.5  -CH      L-Dex UE Value Change -5.1  -CH      $ L-Dex Charge yes  -CH              Lymphedema Life Impact Scale Totals    A.  Total Q1 - Q17 (Do not include Q18) 3  -CH      B.  Total number of questions answered (Q1-Q17) 17  -CH      C. Divide A by B 0.18  -CH      D. Multiple C by 25 4.5  -CH            User Key  (r) = Recorded By, (t) = Taken By, (c) = Cosigned By    Initials Name Provider Type    Christal Beck OT Occupational Therapist                        Therapy Education  Education Details: OT reviewed stoplight to recovery lymphedema prevention with patient focusing on appropriate action on how to prevent lymphedema exasperation in the summer.  Given: Symptoms/condition management  Program: Reinforced  How Provided: Verbal  Provided to: Patient  Level of Understanding: Verbalized         OT Goals     Row Name 08/03/22 1440          Time Calculation    OT Goal Re-Cert Due Date 09/02/22  -           User Key  (r) = Recorded By, (t) = Taken By, (c) = Cosigned By    Initials Name Provider Type    Christal Beck, OT Occupational Therapist              GOALS:      1. Post Breast Surgery Care/at risk for Lymphedema  LTG 1: 90 days:  As an indicator of no exacerbation of lymphedema staging, the patient will present with an L-Dex score less than [10] points from preoperative baseline.              STATUS: Met: ongoing  STG 1a:   30 days: To prevent exacerbation of mixed edema to lymphedema, patient will utilize the 2 postsurgical compression garments daily.                 STATUS: Met: Ongoing  STG 1b: 30 days: Patient will be independent with self-manual lymphatic massage.               STATUS: Met: Ongoing  STG 1c: 30 days:  Patient will be independent with identification of signs and symptoms of lymphedema exasperation per stoplight to recovery education handout.              STATUS: Met: Ongoing  STG 1 d: 30 days: Patient will be independent with HEP to prevent advancement in  lymphedema staging.              STATUS: Met: Ongoing  TREATMENT:  Self Care/ADL retraining, Therapeutic Activity, Neuromuscular Re-education, Therapeutic Exercise, Bioimpedence Fluid Analysis, Post-Surgical compression garement [address] [facility] [employer] Cam[employer] 2860K, Orthotic Management and training,  and Manual Therapy.      OT Assessment/Plan     Row Name 08/03/22 1439          OT Assessment    Functional Limitations Performance in self-care ADL  -     Impairments Impaired lymphatic circulation  -     Assessment Comments Patient is demonstrating normalized lymphatic functioning this date.  Patient has had resolution of seroma in the chest wall.  Patient will benefit from continued skilled occupational therapy services to evaluate ongoing lymphatic functioning to prevent advancement in lymphedema staging.  -     OT Rehab Potential Excellent  -     Patient/caregiver participated in establishment of treatment plan and goals Yes  -     Patient would benefit from skilled therapy intervention Yes  -CH            OT Plan    OT Frequency --  See duration  -     Predicted Duration of Therapy Intervention (OT) Pt. to re-evaluated 3 weeks post-surgery, 3 weeks post XRT, every 3 months from baseline for years 1-3 and every 6 months years 4 and 5  -     Planned CPT's? OT RE-EVAL: 01650;OT THER ACT EA 15 MIN: [id number];OT THER PROC EA 15 MIN: 07214LX;OT NEUROMUSC RE EDUCATION EA 15 MIN: 77151;OT SELF CARE/MGMT/TRAIN 15 MIN: 54006;OT ULTRASOUND EA 15 MIN: 92192;OT MANUAL THERAPY EA 15 MIN: 88033;OT BIS XTRACELL FLUID ANALYSIS: 36775;OT ORTHOTIC MGMT/TRAIN EA 15 MIN: 33981;OT ORTHO/PROSTHET CHECKOUT EA 15 MIN: 47802;OT ELECTRIC STIM ATTD EA 15 MIN: 82364  -     Planned Therapy Interventions (Optional Details) home exercise program;manual therapy techniques;strengthening;patient/family education;orthotic fitting/training  -           User Key  (r) = Recorded By, (t) = Taken By, (c) = Cosigned By     Initials Name Provider Type     Christal Garcia OT Occupational Therapist                          Time Calculation:         Therapy Charges for Today     Code Description Service Date Service Provider Modifiers Qty    95329532478 HC PT BIS XTRACELL FLUID ANALYSIS 8/3/2022 Christal Garcia OT  1    [id number]  OT SELF CARE/MGMT/TRAIN EA 15 MIN 8/3/2022 Christal Garcia OT GO 1                    Christal Garcia OT  8/3/2022

## 2022-08-11 ENCOUNTER — OFFICE VISIT (OUTPATIENT)
Dept: SURGERY | Facility: CLINIC | Age: 82
End: 2022-08-11

## 2022-08-11 VITALS — HEIGHT: 65 IN | WEIGHT: 164 LBS | BODY MASS INDEX: 27.32 KG/M2 | RESPIRATION RATE: 16 BRPM

## 2022-08-11 DIAGNOSIS — Z17.0 MALIGNANT NEOPLASM OF OVERLAPPING SITES OF RIGHT BREAST IN FEMALE, ESTROGEN RECEPTOR POSITIVE: Primary | ICD-10-CM

## 2022-08-11 DIAGNOSIS — C50.811 MALIGNANT NEOPLASM OF OVERLAPPING SITES OF RIGHT BREAST IN FEMALE, ESTROGEN RECEPTOR POSITIVE: Primary | ICD-10-CM

## 2022-08-11 PROCEDURE — 99212 OFFICE O/P EST SF 10 MIN: CPT | Performed by: SURGERY

## 2022-08-11 NOTE — PROGRESS NOTES
Chief Complaint  Follow-up    Subjective          History of Present Illness  The patient is here to follow up on bilateral mastectomy with SLN bx.  They are doing well and have no complaints.  Pathology is shown below:    Clinical Information    Comment:    Malignant neoplasm of overlapping sites of right breast in female, estrogen receptor positive (HCC)      Final Diagnosis   1. Left breast, mastectomy:               - Sclerosing adenosis               - Intraductal papilloma               - Radial scar               - [place] usual ductal hyperplasia               - Fibroadenomatoid change               - Duct ectasia with calcification               - Fibrosis               - Calcified blood vessels     2. 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 Right sentinel lymph node #1, excision:               - One lymph node negative for carcinoma (0/1)               - See synoptic checklist     4. Right breast, new margin, excision:               - Negative for carcinoma               - See synoptic checklist     5. Right axillary contents, dissection:               - Seventeen lymph nodes negative for carcinoma (0/17)               - See synoptic checklist      Electronically signed by Monty Joyner MD on 1/13/2022 at 1411   Synoptic Checklist     INVASIVE CARCINOMA OF THE BREAST: Resection  8th Edition - Protocol posted: 6/30/2021  INVASIVE CARCINOMA OF THE BREAST: COMPLETE EXCISION - 2, 3, 4, 5  SPECIMEN   Procedure  Total mastectomy    Specimen Laterality  Right    TUMOR   Histologic Type  Invasive carcinoma of no special type (ductal)    Histologic Grade (Ramon Histologic Score)     Glandular (Acinar) / Tubular Differentiation  Score 3    Nuclear Pleomorphism  Score 2    Mitotic Rate  Score 1    Overall Grade  Grade 2 (scores of 6 or 7)    Tumor Size   "Greatest dimension of largest invasive focus (Millimeters): 18 mm   Tumor Focality  Multiple foci of invasive carcinoma    Number of Foci  At least: 2    Ductal Carcinoma In Situ (DCIS)  Present    Size (Extent) of DCIS  Cannot be determined    Number of Blocks with DCIS  5    Number of Blocks Examined  13    Architectural Patterns  [place]      Cribriform    Nuclear Grade  Grade III (high)    Necrosis  Present, central (expansive \"comedo\" necrosis)    Tumor Extent     Treatment Effect in the Breast  No known presurgical therapy    MARGINS   Margin Status for Invasive Carcinoma  All margins negative for invasive carcinoma    Distance from Invasive Carcinoma to Closest Margin  5 mm   Closest Margin(s) to Invasive Carcinoma  Posterior    Margin Status for DCIS  All margins negative for DCIS    Distance from DCIS to Closest Margin  7 mm   Closest Margin(s) to DCIS  Posterior    REGIONAL LYMPH NODES   Regional Lymph Node Status  All regional lymph nodes negative for tumor    Total Number of Lymph Nodes Examined (sentinel and non-sentinel)  18    Number of [place] Nodes Examined  1    PATHOLOGIC STAGE CLASSIFICATION (pTNM, AJCC 8th Edition)   Reporting of pT, pN, and (when applicable) pM categories is based on information available to the pathologist at the time the report is issued. As per the AJCC (Chapter 1, 8th Ed.) it is the managing physician’s responsibility to establish the final pathologic stage based upon all pertinent information, including but potentially not limited to this pathology report.   TNM Descriptors  m (multiple foci of invasive carcinoma)    pT Category  pT1c    pN Category  pN0    Breast Biomarker Testing Performed on Previous Biopsy     Estrogen Receptor (ER) Status  Positive (greater than 10% of cells demonstrate nuclear positivity)    Percentage of Cells with Nuclear Positivity  100 %   Breast Biomarker Testing Performed on Previous Biopsy     Progesterone Receptor (PgR) Status  Positive  " "  Percentage of Cells with Nuclear Positivity  100 %   Breast Biomarker Testing Performed on Previous Biopsy     HER2 (by immunohistochemistry)  Equivocal (Score 2+)    Breast Biomarker Testing Performed on Previous Biopsy     HER2 (by in situ hybridization)  Negative (not amplified)    Breast Biomarker Testing Performed on Previous Biopsy     Ki-67 Percentage of Positive Nuclei  5 %   Testing Performed on Case Number  [id number]         Bilateral breast flaps healed with no issues.    Objective   Vital Signs:   Resp 16   Ht 165.1 cm (65\")   Wt 74.4 kg (164 lb)   BMI 27.29 kg/m²     Physical Exam  Vitals and nursing note reviewed.   Constitutional:       General: She is not in acute distress.     Appearance: Normal appearance. She is well-developed.   HENT:      Head: Normocephalic and atraumatic.   Eyes:      Extraocular Movements: Extraocular movements intact.      Pupils: Pupils are equal, round, and reactive to light.   Cardiovascular:      Pulses: Normal pulses.   Pulmonary:      Effort: Pulmonary effort is normal. No retractions.      Breath sounds: Normal air entry. No wheezing.   Abdominal:      General: There is no distension.      Palpations: Abdomen is soft.      Tenderness: There is no abdominal tenderness.      Hernia: No hernia is present.   Musculoskeletal:         General: No swelling or deformity.      Cervical back: Neck supple.   Skin:     General: Skin is warm and dry.      Findings: No erythema.      Comments: Surgical Incision Healing Well, greatly improved   Neurological:      General: No focal deficit present.      Mental Status: She is alert and oriented to person, place, and time.      Motor: Motor function is intact.   Psychiatric:         Mood and Affect: Mood normal.         Thought Content: Thought content normal.              Assessment and Plan    Diagnoses and all orders for this visit:    1. Malignant neoplasm of overlapping sites of right breast in female, estrogen receptor " positive (HCC) (Primary)    Keep appointment with oncology  Follow-up with me 9 months      Follow Up   Return in about 9 months (around 5/11/2023).  Patient was given instructions and counseling regarding her condition or for health maintenance advice. Please see specific information pulled into the AVS if appropriate.

## 2022-08-18 ENCOUNTER — OFFICE VISIT (OUTPATIENT)
Dept: ONCOLOGY | Facility: HOSPITAL | Age: 82
End: 2022-08-18

## 2022-08-18 VITALS
DIASTOLIC BLOOD PRESSURE: 59 MMHG | TEMPERATURE: 97.9 F | RESPIRATION RATE: 16 BRPM | SYSTOLIC BLOOD PRESSURE: 114 MMHG | OXYGEN SATURATION: 97 % | WEIGHT: 164.02 LBS | BODY MASS INDEX: 27.29 KG/M2 | HEART RATE: 71 BPM

## 2022-08-18 DIAGNOSIS — M85.89 OSTEOPENIA OF MULTIPLE SITES: ICD-10-CM

## 2022-08-18 DIAGNOSIS — Z17.0 MALIGNANT NEOPLASM OF OVERLAPPING SITES OF RIGHT BREAST IN FEMALE, ESTROGEN RECEPTOR POSITIVE: Primary | ICD-10-CM

## 2022-08-18 DIAGNOSIS — C50.811 MALIGNANT NEOPLASM OF OVERLAPPING SITES OF RIGHT BREAST IN FEMALE, ESTROGEN RECEPTOR POSITIVE: Primary | ICD-10-CM

## 2022-08-18 PROBLEM — Z86.010 HISTORY OF COLONIC POLYPS: Status: ACTIVE | Noted: 2022-08-18

## 2022-08-18 PROBLEM — Z86.0100 HISTORY OF COLONIC POLYPS: Status: ACTIVE | Noted: 2022-08-18

## 2022-08-18 PROCEDURE — 99214 OFFICE O/P EST MOD 30 MIN: CPT | Performed by: INTERNAL MEDICINE

## 2022-08-18 PROCEDURE — G0463 HOSPITAL OUTPT CLINIC VISIT: HCPCS

## 2022-08-18 PROCEDURE — G0463 HOSPITAL OUTPT CLINIC VISIT: HCPCS | Performed by: INTERNAL MEDICINE

## 2022-08-18 RX ORDER — COLCHICINE 0.6 MG/1
0.6 TABLET ORAL DAILY
COMMUNITY
Start: 2022-08-15 | End: 2022-11-29

## 2022-08-18 RX ORDER — POLYETHYLENE GLYCOL 3350, SODIUM SULFATE ANHYDROUS, SODIUM BICARBONATE, SODIUM CHLORIDE, POTASSIUM CHLORIDE 236; 22.74; 6.74; 5.86; 2.97 G/4L; G/4L; G/4L; G/4L; G/4L
POWDER, FOR SOLUTION ORAL
COMMUNITY
Start: 2022-08-03 | End: 2022-09-14 | Stop reason: ALTCHOICE

## 2022-08-18 RX ORDER — METHYLPREDNISOLONE 4 MG/1
TABLET ORAL
COMMUNITY
Start: 2022-08-15 | End: 2022-09-14 | Stop reason: ALTCHOICE

## 2022-08-18 NOTE — PROGRESS NOTES
Chief Complaint  Breast Cancer    Leo Lomas MD Ahmed, Syed Zulfiqar, MD    Subjective          Lilia Becerra presents to [facility] for follow-up of her breast cancer.  She is on adjuvant letrozole started 2/22.  She is taking her medication daily as prescribed.  She denies any issues or side effects.  No new masses or adenopathy, no unusual aches or pains.  She also has osteopenia as evidenced on her DEXA scan.  She is taking calcium and vitamin D daily along with weekly Fosamax.  She denies any dental or jaw pain.    Oncology/Hematology History Overview Note   12/20/22 Right breast, 9 o'clock position, 6 cm from nipple, ultrasound-guided core biopsy:  - Invasive carcinoma of no special type (ductal)  - Histologic grade (Ramon Histologic Score):  - Glandular (Acinar)/Tubular Differentiation: Score 3  - Nuclear Pleomorphism: Score 2  - Mitotic Rate: Score 1  - Overall Grade: Grade 2  - Size of largest focus of invasion: 9 mm  - Breast biomarker testing:  - Estrogen Receptor (ER): Positive (100%, strong)  - Progesterone Receptor (PgR): Positive (100%, strong)  - HER2 (by immunohistochemistry): Equivocal (Score 2+), see comment  - Ki-67: 5%  2. Right breast, 4 o'clock position, 4 cm from nipple, ultrasound-guided core biopsy:  - Invasive carcinoma of no special type (ductal)  - Histologic grade (Ramon Histologic Score):  - Glandular (Acinar)/Tubular Differentiation: Score 3  - Nuclear Pleomorphism: Score 2  - Mitotic Rate: Score 2  - Overall Grade: Grade 2  - Size of largest focus of invasion: 9 mm  - Breast biomarker testing: Estrogen Receptor (ER): Positive (100%, strong)  - Progesterone Receptor (PgR): Positive (95%, strong)  - HER2 (by immunohistochemistry): Negative (Score 1+)  - Ki-67: 10%  - Other findings:  - Intermediate grade ductal carcinoma in situ (DCIS)    1/11/2022 Right Mastectomy revealed Invasive carcinoma & DCIS   ER+, IA+, HER2  Negative. Grade 2, All margins negative. 0/18 lymph nodes.   Left Mastectomy-benign.     Oncotype score 3    2/21/22 Letrozole initiated.          Malignant neoplasm of overlapping sites of right breast in female, estrogen receptor positive (HCC)   12/23/2021 Initial Diagnosis    Malignant neoplasm of overlapping sites of right breast in female, estrogen receptor positive (HCC)     2/21/2022 Cancer Staged    Staging form: Breast, AJCC 8th Edition  - Clinical: Stage IA (cT1b(2), cN0, cM0, G2, ER+, NC+, HER2-) - Signed by Sean Ward MD on 2/21/2022 8/17/2022 -  Chemotherapy    OP BREAST Letrozole         Review of Systems   Constitutional: Negative for appetite change, diaphoresis, fatigue, fever, unexpected weight gain and unexpected weight loss.   HENT: Positive for hearing loss. Negative for mouth sores, sore throat, swollen glands, trouble swallowing and voice change.    Eyes: Negative for blurred vision.   Respiratory: Negative for cough, shortness of breath and wheezing.    Cardiovascular: Negative for chest pain and palpitations.   Gastrointestinal: Negative for abdominal pain, blood in stool, constipation, diarrhea, nausea and vomiting.   Endocrine: Negative for cold intolerance and heat intolerance.   Genitourinary: Negative for difficulty urinating, dysuria, frequency, hematuria and urinary incontinence.   Musculoskeletal: Negative for arthralgias, back pain and myalgias.   Skin: Negative for rash, skin lesions and wound.   Neurological: Negative for dizziness, seizures, weakness, numbness and headache.   Hematological: Does not bruise/bleed easily.   Psychiatric/Behavioral: Negative for depressed mood. The patient is not nervous/anxious.      Current Outpatient Medications on File Prior to Visit   Medication Sig Dispense Refill   • alendronate (FOSAMAX) 35 MG tablet Take 35 mg by mouth Every 7 (Seven) Days.     • apixaban (ELIQUIS) 5 MG tablet tablet Take 1 tablet by mouth Every 12 (Twelve)  Hours. 180 tablet 3   • atorvastatin (LIPITOR) 10 MG tablet Take 1 tablet by mouth Every Night. 90 tablet 3   • Calcium Carb-Cholecalciferol (CALCIUM 600 + D PO) Take 1 tablet by mouth 2 (Two) Times a Day.     • cephalexin (KEFLEX) 250 MG capsule Take 1 capsule by mouth 4 (Four) Times a Day for 7 days, THEN 1 capsule every night at bedtime for 60 days. 88 capsule 0   • colchicine 0.6 MG tablet      • digoxin (LANOXIN) 125 MCG tablet Take 125 mcg by mouth Daily.     • dilTIAZem (CARDIZEM) 30 MG tablet      • dilTIAZem CD (CARDIZEM CD) 120 MG 24 hr capsule Take 1 capsule by mouth Daily. 90 capsule 3   • dorzolamide-timolol (COSOPT) 22.3-6.8 MG/ML ophthalmic solution      • furosemide (LASIX) 40 MG tablet Take 1 1/2 tablets every morning. Can decrease to one tablet a day if swelling improves 145 tablet 3   • latanoprost (XALATAN) 0.005 % ophthalmic solution 1 drop Every Night.     • letrozole (FEMARA) 2.5 MG tablet Take 1 tablet by mouth Daily for 180 days. 90 tablet 1   • levothyroxine (SYNTHROID, LEVOTHROID) 25 MCG tablet Take 25 mcg by mouth Daily. Currently doesn't have .     • losartan (COZAAR) 25 MG tablet Take 1 tablet by mouth Daily. 90 tablet 3   • Magnesium 400 MG capsule Take  by mouth.     • metFORMIN (GLUCOPHAGE) 500 MG tablet Take 500 mg by mouth 2 (Two) Times a Day.     • methylPREDNISolone (MEDROL) 4 MG dose pack      • metoprolol succinate XL (TOPROL-XL) 25 MG 24 hr tablet Take 1 tablet by mouth Every Night. 90 tablet 3   • Mirabegron ER (MYRBETRIQ) 50 MG tablet sustained-release 24 hour 24 hr tablet Take 50 mg by mouth Daily.     • Multiple Vitamins-Minerals (MULTIVITAMIN ADULTS PO) Take 1 capsule by mouth Daily.     • omeprazole (priLOSEC) 20 MG capsule Take 20 mg by mouth Daily.     • oxybutynin XL (DITROPAN-XL) 10 MG 24 hr tablet TAKE 1 TABLET DAILY 90 tablet 1   • PEG 3350-KCl-NaBcb-NaCl-NaSulf (PEG-3350/Electrolytes) 236 g reconstituted solution      • pramipexole (MIRAPEX) 1.5 MG tablet Take 1.5  mg by mouth Every Night.     • spironolactone (ALDACTONE) 25 MG tablet Take 1 tablet by mouth Daily. 90 tablet 3   • VENLAFAXINE HCL ER PO Take 150 mg by mouth Daily.     • vitamin C (ASCORBIC ACID) 500 MG tablet Take 500 mg by mouth Daily.       No current facility-administered medications on file prior to visit.       Allergies   Allergen Reactions   • Eggs Or Egg-Derived Products Swelling   • Sertraline Unknown - High Severity     Bleeding in stomach    • Tetanus Toxoid Swelling   • Tetanus Toxoids Swelling   • Metformin Diarrhea and Unknown - Low Severity   • Nsaids Unknown - High Severity   • Sertraline Hcl Unknown - High Severity   • Tetanus-Diphtheria Toxoids Td Unknown - Low Severity   • Tuberculin Ppd Unknown - Low Severity     Past Medical History:   Diagnosis Date   • Acid reflux disease    • Arthritis    • Atrial fibrillation (HCC)    • Back pain    • Bowel obstruction (HCC)    • Breast cancer (HCC)    • Carpal tunnel syndrome    • Constipation    • Depression    • Diabetes mellitus (HCC)    • Disease of thyroid gland    • Dyslipidemia    • Fecal incontinence    • GERD (gastroesophageal reflux disease)    • Glaucoma     left eye   • HBP (high blood pressure)    • Hearing impaired     hearing aides   • Hiatal hernia    • High cholesterol    • History of transfusion    • History of tuberculosis     IN 1980'S WAS TREATED   • Hypertension    • Hypomagnesemia    • Kienbock's disease    • Kienböck's disease, right     KIENBOCK'S AVASCULAR NECROSIS OF LUNATE, ADULT RIGHT   • Nonrheumatic aortic valve stenosis 11/05/2020   • OAB (overactive bladder)    • Osteoporosis    • Pneumonia    • PONV (postoperative nausea and vomiting)    • Positive PPD    • RLS (restless legs syndrome)    • Sleep apnea     NO MACHINE   • Stage 3 chronic kidney disease (HCC)    • Status post bilateral mastectomy with sentinel node biopsy and right axillary node dissection 01/12/2022   • Tailbone injury     fracture   • Thyroid disease     • Urinary incontinence     wears pads   • Urinary retention      Past Surgical History:   Procedure Laterality Date   • ANKLE TENDON REPAIR     • BACK SURGERY  12/21/2018-3/2019    LUMBAR--BROKEN DISC, CLEANED OUT--HAS 2ND PROCEDURE TO FINISH REMOVING   • CARPAL TUNNEL RELEASE     • CATARACT EXTRACTION Bilateral    • COLONOSCOPY     • ENDOSCOPY     • HEMORRHOIDECTOMY     • HYSTERECTOMY     • INCISION AND DRAINAGE OF WOUND      on back    • JOINT REPLACEMENT     • KNEE ARTHROPLASTY Right    • LEG SURGERY  06/13/2019   • LUMBAR DISCECTOMY Left 12/21/2018    Procedure: LEFT L4-5 MICRO DISCECTOMY;  Surgeon: Adam Coleman DO;  Location: [facility] OR;  Service: Orthopedic Spine   • LUMBAR DISCECTOMY Left 3/29/2019    Procedure: LEFT L4-5 REVISION OF MICRODISCECTOMY;  Surgeon: Adam Coleman DO;  Location: [facility] OR;  Service: Orthopedic Spine   • LUMBAR FUSION     • MASTECTOMY Bilateral 1/11/2022    Procedure: BREAST MASTECTOMY WITH SENTINEL NODE BIOPSY AND possible  AXILLARY NODE DISSECTION;  Surgeon: Lety Tena MD;  Location: [facility];  Service: General;  Laterality: Bilateral;   • TENDON RELEASE  09/2018   • TOTAL KNEE ARTHROPLASTY Left 6/13/2019    Procedure: LEFT KNEE REVISION;  Surgeon: Malick Costa II, MD;  Location: [facility];  Service: Orthopedics   • TOTAL KNEE ARTHROPLASTY REVISION Left     x2   • TOTAL KNEE ARTHROPLASTY REVISION Left 2/13/2018    Procedure: LEFT TOTAL KNEE ARTHROPLASTY REVISION;  Surgeon: Jair Fajardo MD;  Location: [facility];  Service:    • US GUIDED CYST ASPIRATION BREAST N/A 3/24/2022   • VERTEBROPLASTY       Social History     Socioeconomic History   • Marital status:    Tobacco Use   • Smoking status: Never Smoker   • Smokeless tobacco: Never Used   Vaping Use   • Vaping Use: Never used   Substance and Sexual Activity   • Alcohol use: No   • Drug use: No   • Sexual activity: Defer     Family History   Problem Relation Age of  Onset   • Breast cancer Mother    • Tuberculosis Mother    • Diabetes Father    • Diabetes Sister    • Malig Hyperthermia Neg Hx    • Colon cancer Neg Hx        Objective   Physical Exam  Vitals reviewed. Exam conducted with a chaperone present.   Constitutional:       General: She is not in acute distress.     Appearance: Normal appearance.   Cardiovascular:      Rate and Rhythm: Normal rate and regular rhythm.      Heart sounds: Normal heart sounds. No murmur heard.    No gallop.   Pulmonary:      Effort: Pulmonary effort is normal.      Breath sounds: Normal breath sounds.   Chest:   Breasts:      Right: Absent. No axillary adenopathy or supraclavicular adenopathy.      Left: Absent. No axillary adenopathy or supraclavicular adenopathy.         Abdominal:      General: Abdomen is flat. Bowel sounds are normal.      Palpations: Abdomen is soft.   Musculoskeletal:      Cervical back: Neck supple.      Right lower leg: No edema.      Left lower leg: No edema.   Lymphadenopathy:      Cervical: No cervical adenopathy.      Upper Body:      Right upper body: No supraclavicular or axillary adenopathy.      Left upper body: No supraclavicular or axillary adenopathy.   Neurological:      Mental Status: She is alert and oriented to person, place, and time.   Psychiatric:         Mood and Affect: Mood normal.         Behavior: Behavior normal.         Vitals:    08/18/22 0924   BP: 114/59   Pulse: 71   Resp: 16   Temp: 97.9 °F (36.6 °C)   SpO2: 97%   Weight: 74.4 kg (164 lb 0.4 oz)   PainSc: 0-No pain     ECOG score: 1         PHQ-9 Total Score:                    Result Review :   The following data was reviewed by: Sean Ward MD on 08/18/2022:  Lab Results   Component Value Date    HGB 11.7 (L) 04/05/2022    HCT 35.5 04/05/2022    MCV 92.2 04/05/2022     04/05/2022    WBC 13.42 (H) 04/05/2022    NEUTROABS 10.16 (H) 04/05/2022    LYMPHSABS 2.36 04/05/2022    MONOSABS 0.66 04/05/2022    EOSABS 0.17 04/05/2022     BASOSABS 0.04 04/05/2022     Lab Results   Component Value Date    GLUCOSE 234 (H) 04/05/2022    BUN 22 04/05/2022    CREATININE 1.38 (H) 04/05/2022     (L) 04/05/2022    K 4.2 04/05/2022    CL 94 (L) 04/05/2022    CO2 27.1 04/05/2022    CALCIUM 10.0 04/05/2022    PROTEINTOT 7.1 04/05/2022    ALBUMIN 4.00 04/05/2022    BILITOT 0.6 04/05/2022    ALKPHOS 134 (H) 04/05/2022    AST 11 04/05/2022    ALT 9 04/05/2022     Lab Results   Component Value Date    MG 1.7 (L) 09/15/2021    PHOS 2.7 06/17/2019             Assessment and Plan    Diagnoses and all orders for this visit:    1. Malignant neoplasm of overlapping sites of right breast in female, estrogen receptor positive (HCC) (Primary)  Assessment & Plan:  Patient is on adjuvant letrozole.  She is doing well.  I see no evidence of disease recurrence per history or physical examination.  Continue letrozole for minimum 5 years.  I will see her back in 3 months for continued surveillance.      2. Osteopenia of multiple sites  Assessment & Plan:  Patient is taking calcium and vitamin D daily.  She is also on weekly Fosamax.  Tolerating her medications well.  She denies any dental or jaw pain.  Continue same.            Patient Follow Up: 3 months    Patient was given instructions and counseling regarding her condition or for health maintenance advice. Please see specific information pulled into the AVS if appropriate.     Sean Ward MD    8/18/2022

## 2022-08-18 NOTE — ASSESSMENT & PLAN NOTE
Patient is on adjuvant letrozole.  She is doing well.  I see no evidence of disease recurrence per history or physical examination.  Continue letrozole for minimum 5 years.  I will see her back in 3 months for continued surveillance.

## 2022-08-18 NOTE — ASSESSMENT & PLAN NOTE
Patient is taking calcium and vitamin D daily.  She is also on weekly Fosamax.  Tolerating her medications well.  She denies any dental or jaw pain.  Continue same.

## 2022-08-23 ENCOUNTER — TELEPHONE (OUTPATIENT)
Dept: CARDIOLOGY | Facility: CLINIC | Age: 82
End: 2022-08-23

## 2022-08-23 NOTE — TELEPHONE ENCOUNTER
Procedure: Colonoscopy and/or EGD    Med Directive: Eliquis     PMH: AFIB, CHF, HLD, HTN,     Last Seen: 02/09/2022 06/03/2022    Creatinine: 1.5    GFR: 34

## 2022-08-23 NOTE — TELEPHONE ENCOUNTER
Patient may proceed with the procedure.   Low   Ok to stop   Eliquis    for 3   days.    Let patient know that her kidney function is deteriorated a little bit and she should be on Eliquis 2.5 mg twice daily

## 2022-08-26 ENCOUNTER — TELEPHONE (OUTPATIENT)
Dept: CARDIOLOGY | Facility: CLINIC | Age: 82
End: 2022-08-26

## 2022-08-26 NOTE — TELEPHONE ENCOUNTER
Received VM from patient needing medication clarification on Eliquis    SW patient. Advised to take 0.5 tab twice daily per Dr Strong. Voiced understanding.

## 2022-09-14 ENCOUNTER — OFFICE VISIT (OUTPATIENT)
Dept: CARDIOLOGY | Facility: CLINIC | Age: 82
End: 2022-09-14

## 2022-09-14 VITALS
DIASTOLIC BLOOD PRESSURE: 62 MMHG | BODY MASS INDEX: 26.16 KG/M2 | HEART RATE: 53 BPM | WEIGHT: 157 LBS | HEIGHT: 65 IN | SYSTOLIC BLOOD PRESSURE: 118 MMHG

## 2022-09-14 DIAGNOSIS — I10 PRIMARY HYPERTENSION: ICD-10-CM

## 2022-09-14 DIAGNOSIS — E78.2 MIXED HYPERLIPIDEMIA: ICD-10-CM

## 2022-09-14 DIAGNOSIS — I35.0 NONRHEUMATIC AORTIC VALVE STENOSIS: Chronic | ICD-10-CM

## 2022-09-14 DIAGNOSIS — I48.0 AF (PAROXYSMAL ATRIAL FIBRILLATION): Primary | ICD-10-CM

## 2022-09-14 DIAGNOSIS — I50.32 CHRONIC DIASTOLIC CONGESTIVE HEART FAILURE: ICD-10-CM

## 2022-09-14 PROCEDURE — 99214 OFFICE O/P EST MOD 30 MIN: CPT | Performed by: INTERNAL MEDICINE

## 2022-09-14 PROCEDURE — 93000 ELECTROCARDIOGRAM COMPLETE: CPT | Performed by: INTERNAL MEDICINE

## 2022-09-14 NOTE — ASSESSMENT & PLAN NOTE
Patient has nonreumatich moderate aortic valve stenosis.  She will get a follow-up echo at the time of her next visit with Dr. PETERS

## 2022-09-14 NOTE — ASSESSMENT & PLAN NOTE
She is in sinus rhythm today.  She has had paroxysmal atrial fibrillation with rapid ventricular response in the past.  We will stop her digoxin and have her continue the diltiazem and metoprolol XL.  She will continue the apixaban for stroke prevention

## 2022-09-14 NOTE — PROGRESS NOTES
Office Visit    Chief Complaint  Atrial Fibrillation, Hypertension, and Hyperlipidemia    Subjective            Lilia Becerra presents to [facility] CARDIOLOGY  Ms. Becerra is an 81 years old female with hypertension hyperlipidemia chronic diastolic heart failure with moderate aortic valve stenosis and paroxysmal atrial fibrillation who is doing reasonably well.  Her blood pressures are well controlled and her pedal edema is resolved once we increased her dose of furosemide.  She denies chest pain palpitations dizziness syncope      Past Medical History:   Diagnosis Date   • Acid reflux disease    • Arthritis    • Atrial fibrillation (HCC)    • Back pain    • Bowel obstruction (HCC)    • Breast cancer (HCC)    • Carpal tunnel syndrome    • Constipation    • Depression    • Diabetes mellitus (HCC)    • Disease of thyroid gland    • Dyslipidemia    • Fecal incontinence    • GERD (gastroesophageal reflux disease)    • Glaucoma     left eye   • HBP (high blood pressure)    • Hearing impaired     hearing aides   • Hiatal hernia    • High cholesterol    • History of transfusion    • History of tuberculosis     IN 1980'S WAS TREATED   • Hypertension    • Hypomagnesemia    • Kienbock's disease    • Kienböck's disease, right     KIENBOCK'S AVASCULAR NECROSIS OF LUNATE, ADULT RIGHT   • Nonrheumatic aortic valve stenosis 11/05/2020   • OAB (overactive bladder)    • Osteoporosis    • Pneumonia    • PONV (postoperative nausea and vomiting)    • Positive PPD    • RLS (restless legs syndrome)    • Sleep apnea     NO MACHINE   • Stage 3 chronic kidney disease (HCC)    • Status post bilateral mastectomy with sentinel node biopsy and right axillary node dissection 01/12/2022   • Tailbone injury     fracture   • Thyroid disease    • Urinary incontinence     wears pads   • Urinary retention        Allergies   Allergen Reactions   • Eggs Or Egg-Derived Products Swelling   • Sertraline Unknown - High Severity      Bleeding in stomach    • Tetanus Toxoid Swelling   • Tetanus Toxoids Swelling   • Metformin Diarrhea and Unknown - Low Severity   • Nsaids Unknown - High Severity   • Sertraline Hcl Unknown - High Severity   • Tetanus-Diphtheria Toxoids Td Unknown - Low Severity   • Tuberculin Ppd Unknown - Low Severity        Past Surgical History:   Procedure Laterality Date   • ANKLE TENDON REPAIR     • BACK SURGERY  12/21/2018-3/2019    LUMBAR--BROKEN DISC, CLEANED OUT--HAS 2ND PROCEDURE TO FINISH REMOVING   • CARPAL TUNNEL RELEASE     • CATARACT EXTRACTION Bilateral    • COLONOSCOPY     • ENDOSCOPY     • HEMORRHOIDECTOMY     • HYSTERECTOMY     • INCISION AND DRAINAGE OF WOUND      on back    • JOINT REPLACEMENT     • KNEE ARTHROPLASTY Right    • LEG SURGERY  06/13/2019   • LUMBAR DISCECTOMY Left 12/21/2018    Procedure: LEFT L4-5 MICRO DISCECTOMY;  Surgeon: Adam Coleman DO;  Location: [facility];  Service: Orthopedic Spine   • LUMBAR DISCECTOMY Left 3/29/2019    Procedure: LEFT L4-5 REVISION OF MICRODISCECTOMY;  Surgeon: Adam Coleman DO;  Location: [facility];  Service: Orthopedic Spine   • LUMBAR FUSION     • MASTECTOMY Bilateral 1/11/2022    Procedure: BREAST MASTECTOMY WITH SENTINEL NODE BIOPSY AND possible  AXILLARY NODE DISSECTION;  Surgeon: Lety Tena MD;  Location: [facility];  Service: General;  Laterality: Bilateral;   • TENDON RELEASE  09/2018   • TOTAL KNEE ARTHROPLASTY Left 6/13/2019    Procedure: LEFT KNEE REVISION;  Surgeon: Malick Costa II, MD;  Location: [facility] OR;  Service: Orthopedics   • TOTAL KNEE ARTHROPLASTY REVISION Left     x2   • TOTAL KNEE ARTHROPLASTY REVISION Left 2/13/2018    Procedure: LEFT TOTAL KNEE ARTHROPLASTY REVISION;  Surgeon: Jair Fajardo MD;  Location: [facility] OR;  Service:    • US GUIDED CYST ASPIRATION BREAST N/A 3/24/2022   • VERTEBROPLASTY          Social History     Tobacco Use   • Smoking status: Never Smoker   • Smokeless  tobacco: Never Used   Vaping Use   • Vaping Use: Never used   Substance Use Topics   • Alcohol use: No   • Drug use: No       Family History   Problem Relation Age of Onset   • Breast cancer Mother    • Tuberculosis Mother    • Diabetes Father    • Diabetes Sister    • Malig Hyperthermia Neg Hx    • Colon cancer Neg Hx         Prior to Admission medications    Medication Sig Start Date End Date Taking? Authorizing Provider   alendronate (FOSAMAX) 35 MG tablet Take 35 mg by mouth Every 7 (Seven) Days.   Yes ProviderHayder MD   apixaban (ELIQUIS) 5 MG tablet tablet Take 1 tablet by mouth Every 12 (Twelve) Hours. 12/3/21  Yes Denise Arguelles APRN   atorvastatin (LIPITOR) 10 MG tablet Take 1 tablet by mouth Every Night. 12/3/21  Yes Denise Arguelles APRN   Calcium Carb-Cholecalciferol (CALCIUM 600 + D PO) Take 1 tablet by mouth 2 (Two) Times a Day.   Yes ProviderHayder MD   cephalexin (KEFLEX) 250 MG capsule Take 1 capsule by mouth 4 (Four) Times a Day for 7 days, THEN 1 capsule every night at bedtime for 60 days. 7/15/22 9/20/22 Yes Juana Lenz APRN   colchicine 0.6 MG tablet  8/15/22  Yes ProviderHayder MD   digoxin (LANOXIN) 125 MCG tablet Take 125 mcg by mouth Daily. 10/20/21  Yes ProviderHayder MD   dilTIAZem CD (CARDIZEM CD) 120 MG 24 hr capsule Take 1 capsule by mouth Daily. 9/23/21  Yes Denise Arguelles APRN   dorzolamide-timolol (COSOPT) 22.3-6.8 MG/ML ophthalmic solution  4/12/22  Yes ProviderHayder MD   furosemide (LASIX) 40 MG tablet Take 1 1/2 tablets every morning. Can decrease to one tablet a day if swelling improves 12/16/21  Yes Denise Arguelles APRN   latanoprost (XALATAN) 0.005 % ophthalmic solution 1 drop Every Night.   Yes ProviderHayder MD   letrozole (FEMARA) 2.5 MG tablet Take 1 tablet by mouth Daily for 180 days. 4/5/22 10/2/22 Yes Kristina Christopher APRN   levothyroxine (SYNTHROID, LEVOTHROID) 25 MCG tablet Take 25 mcg by mouth  "Daily. Currently doesn't have .   Yes Hayder Nunez MD   losartan (COZAAR) 25 MG tablet Take 1 tablet by mouth Daily. 12/3/21  Yes Denise Arguelles APRN   Magnesium 400 MG capsule Take  by mouth.   Yes Hayder Nunez MD   metFORMIN (GLUCOPHAGE) 500 MG tablet Take 500 mg by mouth 2 (Two) Times a Day.   Yes Hayder Nunez MD   metoprolol succinate XL (TOPROL-XL) 25 MG 24 hr tablet Take 1 tablet by mouth Every Night. 2/9/22  Yes Reagan Strong MD   Mirabegron ER (MYRBETRIQ) 50 MG tablet sustained-release 24 hour 24 hr tablet Take 50 mg by mouth Daily.   Yes Hayder Nunez MD   Multiple Vitamins-Minerals (MULTIVITAMIN ADULTS PO) Take 1 capsule by mouth Daily.   Yes Hayder Nunez MD   omeprazole (priLOSEC) 20 MG capsule Take 20 mg by mouth Daily.   Yes Hayder Nunez MD   oxybutynin XL (DITROPAN-XL) 10 MG 24 hr tablet TAKE 1 TABLET DAILY 5/23/22  Yes Juana Lenz APRN   pramipexole (MIRAPEX) 1.5 MG tablet Take 1.5 mg by mouth Every Night.   Yes Hayder Nunez MD   spironolactone (ALDACTONE) 25 MG tablet Take 1 tablet by mouth Daily. 2/9/22  Yes Reagan Strong MD   VENLAFAXINE HCL ER PO Take 150 mg by mouth Daily.   Yes Hayder Nunez MD   vitamin C (ASCORBIC ACID) 500 MG tablet Take 500 mg by mouth Daily.   Yes Hayder Nunez MD   dilTIAZem (CARDIZEM) 30 MG tablet  8/15/22   Hayder Nunez MD   methylPREDNISolone (MEDROL) 4 MG dose pack  8/15/22   Hayder Nunez MD   PEG 3350-KCl-NaBcb-NaCl-NaSulf (PEG-3350/Electrolytes) 236 g reconstituted solution  8/3/22   Hayder Nunez MD        Review of Systems   Constitutional: Negative for fatigue.   Respiratory: Positive for shortness of breath. Negative for cough.    Cardiovascular: Positive for leg swelling. Negative for chest pain and palpitations.   Neurological: Negative for dizziness.        Objective     /62   Pulse 53   Ht 165.1 cm (65\")   Wt 71.2 kg (157 lb)   BMI " 26.13 kg/m²       Physical Exam  Constitutional:       General: She is awake.      Appearance: Normal appearance.   Neck:      Thyroid: No thyromegaly.      Vascular: No carotid bruit or JVD.   Cardiovascular:      Rate and Rhythm: Normal rate and regular rhythm.      Chest Wall: PMI is not displaced.      Pulses: Normal pulses.      Heart sounds: S1 normal and S2 normal. Murmur heard.    Systolic murmur is present.    No friction rub. No gallop. No S3 or S4 sounds.   Pulmonary:      Effort: Pulmonary effort is normal.      Breath sounds: Normal breath sounds and air entry. No wheezing, rhonchi or rales.   Abdominal:      General: Bowel sounds are normal.      Palpations: Abdomen is soft. There is no mass.      Tenderness: There is no abdominal tenderness.   Musculoskeletal:      Cervical back: Neck supple.      Right lower leg: No edema.      Left lower leg: No edema.   Neurological:      Mental Status: She is alert and oriented to person, place, and time.   Psychiatric:         Mood and Affect: Mood normal.         Behavior: Behavior is cooperative.       No results found for: PROBNP, BNP  CMP    CMP 1/11/22 4/5/22   Glucose 167 (A) 234 (A)   BUN 24 (A) 22   Creatinine 1.35 (A) 1.38 (A)   eGFR Non  Am 37 (A)    Sodium 140 132 (A)   Potassium 3.2 (A) 4.2   Chloride 98 94 (A)   Calcium 8.3 (A) 10.0   Albumin  4.00   Total Bilirubin  0.6   Alkaline Phosphatase  134 (A)   AST (SGOT)  11   ALT (SGPT)  9   (A) Abnormal value            CBC w/diff    CBC w/Diff 4/5/22   WBC 13.42 (A)   RBC 3.85   Hemoglobin 11.7 (A)   Hematocrit 35.5   MCV 92.2   MCH 30.4   MCHC 33.0   RDW 12.4   Platelets 296   Neutrophil Rel % 75.7   Immature Granulocyte Rel % 0.2   Lymphocyte Rel % 17.6 (A)   Monocyte Rel % 4.9 (A)   Eosinophil Rel % 1.3   Basophil Rel % 0.3   (A) Abnormal value                No results found for: TSH   No results found for: FREET4   No results found for: DDIMERQUANT  Magnesium   Date Value Ref Range Status    09/15/2021 1.7 (L) 1.9 - 2.7 mg/dL Final      No results found for: DIGOXIN                Result Review :                    ECG 12 Lead    Date/Time: 9/14/2022 11:51 AM  Performed by: Reagan Strong MD  Authorized by: Reagan Strong MD   Comments: Sinus bradycardia borderline low voltage extremity leads nondiagnostic T wave abnormality.  No change compared previous EKG 6/21/2021                Assessment and Plan        Diagnoses and all orders for this visit:    1. Chronic diastolic congestive heart failure (HCC) (Primary)  Assessment & Plan:  She has had a intermittent pedal edema which is improved since that time her furosemide dosage was increased to 1-1/2 tablet a day.  She will continue the same for now    Orders:  -     Lipid Panel; Future  -     Comprehensive Metabolic Panel; Future  -     Magnesium; Future  -     TSH; Future    2. Primary hypertension  Assessment & Plan:  Her blood pressure is well regulated on losartan and diltiazem furosemide and spironolactone and metoprolol.  She will continue all of these in the current dosage    Orders:  -     Lipid Panel; Future  -     Comprehensive Metabolic Panel; Future  -     Magnesium; Future  -     TSH; Future    3. Mixed hyperlipidemia  Assessment & Plan:  Her lipids are at goal on atorvastatin 10 mg a day.  She will continue the same    Orders:  -     Lipid Panel; Future  -     Comprehensive Metabolic Panel; Future  -     Magnesium; Future  -     TSH; Future    4. Nonrheumatic aortic valve stenosis  Assessment & Plan:  Patient has nonreumatich moderate aortic valve stenosis.  She will get a follow-up echo at the time of her next visit with Dr. PETERS    Orders:  -     Lipid Panel; Future  -     Comprehensive Metabolic Panel; Future  -     Magnesium; Future  -     TSH; Future    5. AF (paroxysmal atrial fibrillation) ([facility])  Assessment & Plan:  She is in sinus rhythm today.  She has had paroxysmal atrial fibrillation with rapid ventricular response in the  past.  We will stop her digoxin and have her continue the diltiazem and metoprolol XL.  She will continue the apixaban for stroke prevention      Other orders  -     ECG 12 Lead          Follow Up     Return for fu in 6 mos with Dr HARRINGTON, EKG with next office visit.    Patient was given instructions and counseling regarding her condition or for health maintenance advice. Please see specific information pulled into the AVS if appropriate.     Reagan Strong MD  09/14/22 10:33 EDT

## 2022-09-14 NOTE — ASSESSMENT & PLAN NOTE
She has had a intermittent pedal edema which is improved since that time her furosemide dosage was increased to 1-1/2 tablet a day.  She will continue the same for now

## 2022-09-14 NOTE — ASSESSMENT & PLAN NOTE
Her blood pressure is well regulated on losartan and diltiazem furosemide and spironolactone and metoprolol.  She will continue all of these in the current dosage

## 2022-11-03 ENCOUNTER — HOSPITAL ENCOUNTER (OUTPATIENT)
Dept: OCCUPATIONAL THERAPY | Facility: HOSPITAL | Age: 82
Setting detail: THERAPIES SERIES
Discharge: HOME | End: 2022-11-03

## 2022-11-03 DIAGNOSIS — Z91.89 AT RISK FOR LYMPHEDEMA: Primary | ICD-10-CM

## 2022-11-03 DIAGNOSIS — L90.5 SCAR CONDITION AND FIBROSIS OF SKIN: ICD-10-CM

## 2022-11-03 DIAGNOSIS — R52 PAIN: ICD-10-CM

## 2022-11-03 DIAGNOSIS — M25.60 JOINT STIFFNESS: ICD-10-CM

## 2022-11-03 PROCEDURE — 93702 BIS XTRACELL FLUID ANALYSIS: CPT | Performed by: OCCUPATIONAL THERAPIST

## 2022-11-03 PROCEDURE — 97535 SELF CARE MNGMENT TRAINING: CPT | Performed by: OCCUPATIONAL THERAPIST

## 2022-11-03 NOTE — THERAPY RE-EVALUATION
Outpatient Occupational Therapy Lymphedema Re-Evaluation   Edu     Patient Name: Lilia Becerra  : 1940  MRN: [id number]  Today's Date: 11/3/2022      Visit Date: 2022    Patient Active Problem List   Diagnosis   • Gastroesophageal reflux disease   • AF (paroxysmal atrial fibrillation) ([facility])   • Hypothyroidism   • Thyroid disease   • Sleep apnea   • HBP (high blood pressure)   • Postoperative anemia due to acute blood loss (expected)   • Stage 3 chronic kidney disease (CMS/HCC)   • Broken leg   • Nonrheumatic aortic valve stenosis   • Hyperlipidemia   • OAB (overactive bladder)   • Retention of urine   • Pedal edema   • Abnormal mammogram   • Age related osteoporosis   • Arthritis   • Bowel obstruction ([facility])   • Carpal tunnel syndrome   • Chronic bilateral low back pain with left-sided sciatica   • Chronic pain disorder   • Class 2 obesity   • Complication of surgical procedure   • Diarrhea   • Degeneration of intervertebral disc of lumbar region   • Depressive disorder   • Diabetic renal disease ([facility])   • Disequilibrium   • Fecal urgency   • Glaucoma   • Hypertensive heart failure ([facility])   • Hypomagnesemia   • Idiopathic osteoarthritis   • Kienboeck disease of adults   • Long term (current) use of oral hypoglycemic drugs   • Lumbar radiculopathy   • Peripheral venous insufficiency   • Pneumonia   • Positive PPD   • Rapid heart rate   • Recurrent major depression ([facility])   • Restless legs syndrome   • Spondylolysis of lumbar region   • Fecal incontinence   • Diabetes mellitus ([facility])   • Malignant neoplasm of overlapping sites of right breast in female, estrogen receptor positive ([facility])   • S/P mastectomy, bilateral   • Chronic diastolic congestive heart failure ([facility])   • Paroxysmal atrial fibrillation ([facility])   • Abnormal gait   • Benign essential tremor   • Aortic valve disorder   • Urinary tract infection without hematuria   • History of bladder repair surgery   • Weight loss   • Anemia   • Dry heaves    • History of colonic polyps   • Osteopenia of multiple sites        Past Medical History:   Diagnosis Date   • Acid reflux disease    • Arthritis    • Atrial fibrillation (HCC)    • Back pain    • Bowel obstruction (HCC)    • Breast cancer (HCC)    • Carpal tunnel syndrome    • Constipation    • Depression    • Diabetes mellitus (HCC)    • Disease of thyroid gland    • Dyslipidemia    • Fecal incontinence    • GERD (gastroesophageal reflux disease)    • Glaucoma     left eye   • HBP (high blood pressure)    • Hearing impaired     hearing aides   • Hiatal hernia    • High cholesterol    • History of transfusion    • History of tuberculosis     IN 1980'S WAS TREATED   • Hypertension    • Hypomagnesemia    • Kienbock's disease    • Kienböck's disease, right     KIENBOCK'S AVASCULAR NECROSIS OF LUNATE, ADULT RIGHT   • Nonrheumatic aortic valve stenosis 11/05/2020   • OAB (overactive bladder)    • Osteoporosis    • Pneumonia    • PONV (postoperative nausea and vomiting)    • Positive PPD    • RLS (restless legs syndrome)    • Sleep apnea     NO MACHINE   • Stage 3 chronic kidney disease (HCC)    • Status post bilateral mastectomy with sentinel node biopsy and right axillary node dissection 01/12/2022   • Tailbone injury     fracture   • Thyroid disease    • Urinary incontinence     wears pads   • Urinary retention         Past Surgical History:   Procedure Laterality Date   • ANKLE TENDON REPAIR     • BACK SURGERY  12/21/2018-3/2019    LUMBAR--BROKEN DISC, CLEANED OUT--HAS 2ND PROCEDURE TO FINISH REMOVING   • CARPAL TUNNEL RELEASE     • CATARACT EXTRACTION Bilateral    • COLONOSCOPY     • ENDOSCOPY     • HEMORRHOIDECTOMY     • HYSTERECTOMY     • INCISION AND DRAINAGE OF WOUND      on back    • JOINT REPLACEMENT     • KNEE ARTHROPLASTY Right    • LEG SURGERY  06/13/2019   • LUMBAR DISCECTOMY Left 12/21/2018    Procedure: LEFT L4-5 MICRO DISCECTOMY;  Surgeon: Adam Coleman DO;  Location: [facility] MAIN OR;  Service:  Orthopedic Spine   • LUMBAR DISCECTOMY Left 3/29/2019    Procedure: LEFT L4-5 REVISION OF MICRODISCECTOMY;  Surgeon: Adam Coleman DO;  Location: [facility];  Service: Orthopedic Spine   • LUMBAR FUSION     • MASTECTOMY Bilateral 1/11/2022    Procedure: BREAST MASTECTOMY WITH SENTINEL NODE BIOPSY AND possible  AXILLARY NODE DISSECTION;  Surgeon: Lety Tena MD;  Location: [place] [facility];  Service: General;  Laterality: Bilateral;   • TENDON RELEASE  09/2018   • TOTAL KNEE ARTHROPLASTY Left 6/13/2019    Procedure: LEFT KNEE REVISION;  Surgeon: Malick Costa II, MD;  Location: [facility] OR;  Service: Orthopedics   • TOTAL KNEE ARTHROPLASTY REVISION Left     x2   • TOTAL KNEE ARTHROPLASTY REVISION Left 2/13/2018    Procedure: LEFT TOTAL KNEE ARTHROPLASTY REVISION;  Surgeon: Jair Fajardo MD;  Location: [facility];  Service:    • US GUIDED CYST ASPIRATION BREAST N/A 3/24/2022   • VERTEBROPLASTY           Visit Dx:     ICD-10-CM ICD-9-CM   1. At risk for lymphedema  Z91.89 V49.89   2. Scar condition and fibrosis of skin  L90.5 709.2   3. Joint stiffness  M25.60 719.50   4. 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-  The patient underwent a bilateral mastectomy with SNLB possible axilary dissection on 1/11/22  -TD         Fall Risk Assessment    Any falls in the past year: No  -TD      Does patient have a fear of falling No  -TD      Previous Functional Level --  does use a forarm supported 4 WW  -TD         Services    Are you currently receiving Home Health services No  -TD      Do you plan to receive Home Health services in the near future No  -TD         Daily Activities    Primary Language English  -TD      Are you able to read Yes  -TD      Are you  "able to write Yes  -TD      How does patient learn best? Demonstration  -TD      Barriers to learning Hearing  -TD         Safety    Are you being hurt, hit, or frightened by anyone at home or in your life? No  -TD      Are you being neglected by a caregiver No  -TD      Have you had any of the following issues with Depression  on medication  -TD            User Key  (r) = Recorded By, (t) = Taken By, (c) = Cosigned By    Initials Name Provider Type    TD Shelley Calderon OT Occupational Therapist                 Lymphedema     Row Name 11/03/22 1100             Subjective Pain    Able to rate subjective pain? yes  -TD      Pre-Treatment Pain Level 0  -TD      Post-Treatment Pain Level 0  -TD         Lymphedema Assessment    Lymphedema Classification RUE:;at risk/stage 0  -TD      Lymphedema Cancer Related Sx bilateral;simple mastectomy;sentinel node biopsy;axillary dissection  -TD      Lymph Nodes Removed # 18  -TD      Positive Lymph Nodes # 0  -TD      Chemo Received no  -TD      Radiation Therapy Received no  -TD         LLIS - Physical Concerns    The amount of pain associated with my lymphedema is: 0  -TD      The amount of limb heaviness associated with my lymphedema is: 0  -TD      The amount of skin tightness associated with my lymphedema is: 0  -TD      The size of my swollen limb(s) seems: 0  -TD      Lymphedema affects the movement of my swollen limb(s): 0  -TD      The strength in my swollen limb(s) is: 0  -TD         LLIS - Psychosocial Concerns    Lymphedema affects my body image (i.e., \"how I think I look\"). 0  -TD      Lymphedema affects my socializing with others. 0  -TD      Lymphedema affects my intimate relations with spouse or partner (rate 0 if not applicable 0  -TD      Lymphedema \"gets me down\" (i.e., depression, frustration, or anger) 0  -TD      I must rely on others for help due to my lymphedema. 0  -TD      I know what to do to manage my lymphedema 3  -TD         LLIS - Functional Concerns "    Lymphedema affects my ability to perform self-care activities (i.e. eating, dressing, hygiene) 0  -TD      Lymphedema affects my ability to perform routine home or work-related activities. 0  -TD      Lymphedema affects my performance of preferred leisure activities. 0  -TD      Lymphedema affects proper fit of clothing/shoes 0  -TD      Lymphedema affects my sleep 0  -TD         Posture/Observations    Alignment Options Thoracic kyphosis  -TD      Thoracic Kyphosis Mild  -TD         MMT (Manual Muscle Testing)    General MMT Comments BUE WFL  -TD         Skin Changes/Observations    Location/Assessment Upper Quadrant  -TD      Upper Quadrant Conditions bilateral:;intact  -TD      Upper Quadrant Color/Pigment bilateral:;fibrosis  -TD         L-Dex Bioimpedence Screening    L-Dex Measurement Extremity RUE  -TD      L-Dex Patient Position Standing  -TD      L-Dex UE Dominate Side Right  -TD      L-Dex UE At Risk Side Right  -TD      L-Dex UE Pre Surgical Value Yes  -TD      L-Dex UE Score 4.1  -TD      L-Dex UE Baseline Score 4.5  -TD      L-Dex UE Value Change -0.4  -TD      $ L-Dex Charge yes  -TD         Lymphedema Life Impact Scale Totals    A.  Total Q1 - Q17 (Do not include Q18) 3  -TD      B.  Total number of questions answered (Q1-Q17) 17  -TD      C. Divide A by B 0.18  -TD      D. Multiple C by 25 4.5  -TD            User Key  (r) = Recorded By, (t) = Taken By, (c) = Cosigned By    Initials Name Provider Type    Shelley Bey OT Occupational Therapist                        Therapy Education  Education Details: OT reviwed stoplight to recovery lymphedema prevention  Given: Symptoms/condition management  Program: Reinforced  How Provided: Verbal  Provided to: Patient  Level of Understanding: Verbalized  33545 - OT Self Care/Mgmt Minutes: 25         OT Goals     Row Name 11/03/22 1150          Time Calculation    OT Goal Re-Cert Due Date 12/03/22  -TD           User Key  (r) = Recorded By, (t) = Taken  By, (c) = Cosigned By    Initials Name Provider Type    Shelley Bey, OT Occupational Therapist            GOALS:      1. Post Breast Surgery Care/at risk for Lymphedema  LTG 1: 90 days:  As an indicator of no exacerbation of lymphedema staging, the patient will present with an L-Dex score less than [10] points from preoperative baseline.              STATUS: Met: ongoing  STG 1a:   30 days: To prevent exacerbation of mixed edema to lymphedema, patient will utilize the 2 postsurgical compression garments daily.                 STATUS: Met: Ongoing  STG 1b: 30 days: Patient will be independent with self-manual lymphatic massage.               STATUS: Met: Ongoing  STG 1c: 30 days:  Patient will be independent with identification of signs and symptoms of lymphedema exasperation per stoplight to recovery education handout.              STATUS: Met: Ongoing  STG 1 d: 30 days: Patient will be independent with HEP to prevent advancement in lymphedema staging.              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  She is currently demonstarting a normalized lymphatic system per L-dex score. The patient would like to be fit for prosthetic and post mastectomy bras.  Pt would benefit from continued skilled OT to prevent increase staging of lymphedema.  -TD     OT Diagnosis possible  lymphedema  -TD     OT Rehab Potential Excellent  -TD     Patient/caregiver participated in establishment of treatment plan and goals Yes  -TD     Patient would benefit from skilled therapy intervention Yes  -TD        OT Plan    OT Frequency --  See duration  -TD     Predicted Duration of Therapy Intervention (OT) Pt will be seen every 3 months from baseline for years 1-3 and every 6 months years 4 and 5  -TD     Planned CPT's? OT RE-EVAL: 27194;OT THER ACT EA 15 MIN: 05181GS;OT THER PROC EA 15 MIN: 11973KG;OT NEUROMUSC RE EDUCATION EA 15 MIN: 90068;OT SELF CARE/MGMT/TRAIN 15 MIN: 31799;OT ULTRASOUND EA 15 MIN: 60947;OT MANUAL THERAPY EA 15 MIN: 15414;OT BIS XTRACELL FLUID ANALYSIS: 15329;OT ORTHOTIC MGMT/TRAIN EA 15 MIN: 98948;OT ORTHO/PROSTHET CHECKOUT EA 15 MIN: 11341;OT ELECTRIC STIM ATTD EA 15 MIN: 52192  -TD     Planned Therapy Interventions (Optional Details) home exercise program;manual therapy techniques;strengthening;patient/family education;orthotic fitting/training  -TD     OT Plan Comments Continue POC  -TD           User Key  (r) = Recorded By, (t) = Taken By, (c) = Cosigned By    Initials Name Provider Type    TD Shelley Calderon OT Occupational Therapist                          Time Calculation:   Timed Charges  92976 - OT Self Care/Mgmt Minutes: 25  Total Minutes  Timed Charges Total Minutes: 25   Total Minutes: 25     Therapy Charges for Today     Code Description Service Date Service Provider Modifiers Qty    47237192391 HC PT BIS XTRACELL FLUID ANALYSIS 11/3/2022 Shelley Calderon OT  1    29679950406 HC OT SELF CARE/MGMT/TRAIN EA 15 MIN 11/3/2022 Shelley Calderon OT GO 2                    Shelley Calderon OT  11/3/2022

## 2022-11-14 DIAGNOSIS — C50.811 MALIGNANT NEOPLASM OF OVERLAPPING SITES OF RIGHT BREAST IN FEMALE, ESTROGEN RECEPTOR POSITIVE: ICD-10-CM

## 2022-11-14 DIAGNOSIS — N32.81 OAB (OVERACTIVE BLADDER): ICD-10-CM

## 2022-11-14 DIAGNOSIS — Z17.0 MALIGNANT NEOPLASM OF OVERLAPPING SITES OF RIGHT BREAST IN FEMALE, ESTROGEN RECEPTOR POSITIVE: ICD-10-CM

## 2022-11-15 DIAGNOSIS — I48.0 PAROXYSMAL ATRIAL FIBRILLATION: Chronic | ICD-10-CM

## 2022-11-15 DIAGNOSIS — E78.5 HYPERLIPIDEMIA LDL GOAL <100: ICD-10-CM

## 2022-11-15 DIAGNOSIS — I10 ESSENTIAL HYPERTENSION: Chronic | ICD-10-CM

## 2022-11-15 RX ORDER — ATORVASTATIN CALCIUM 10 MG/1
10 TABLET, FILM COATED ORAL NIGHTLY
Qty: 90 TABLET | Refills: 3 | Status: SHIPPED | OUTPATIENT
Start: 2022-11-15

## 2022-11-15 RX ORDER — LETROZOLE 2.5 MG/1
TABLET, FILM COATED ORAL
Qty: 90 TABLET | Refills: 1 | Status: SHIPPED | OUTPATIENT
Start: 2022-11-15

## 2022-11-15 RX ORDER — LOSARTAN POTASSIUM 25 MG/1
25 TABLET ORAL DAILY
Qty: 90 TABLET | Refills: 3 | Status: SHIPPED | OUTPATIENT
Start: 2022-11-15

## 2022-11-16 NOTE — PRE-PROCEDURE INSTRUCTIONS
PT INSTRUCTED ON CLEAR LIQ DIET AND PO SPLIT PREP LAST BM SHOULD BE CLEAR  PT CAN TAKE synthroid   WITH A SIP OF WATER IN THE AM OF THE PROCEDURE ARRIVAL TIME IS 0830 am ON 11/17/22 blood sugar ordered, pt is a poor historian , doesn't really know when she takes her meds  Last dose of eliquis was Monday 11/14/22

## 2022-11-17 ENCOUNTER — HOSPITAL ENCOUNTER (OUTPATIENT)
Facility: HOSPITAL | Age: 82
Setting detail: HOSPITAL OUTPATIENT SURGERY
Discharge: HOME | End: 2022-11-17
Attending: INTERNAL MEDICINE | Admitting: INTERNAL MEDICINE

## 2022-11-17 ENCOUNTER — ANESTHESIA EVENT (OUTPATIENT)
Dept: GASTROENTEROLOGY | Facility: HOSPITAL | Age: 82
End: 2022-11-17

## 2022-11-17 ENCOUNTER — ANESTHESIA (OUTPATIENT)
Dept: GASTROENTEROLOGY | Facility: HOSPITAL | Age: 82
End: 2022-11-17

## 2022-11-17 VITALS
RESPIRATION RATE: 17 BRPM | WEIGHT: 174.16 LBS | TEMPERATURE: 97 F | HEIGHT: 65 IN | BODY MASS INDEX: 29.02 KG/M2 | DIASTOLIC BLOOD PRESSURE: 71 MMHG | SYSTOLIC BLOOD PRESSURE: 111 MMHG | HEART RATE: 71 BPM | OXYGEN SATURATION: 94 %

## 2022-11-17 DIAGNOSIS — Z17.0 MALIGNANT NEOPLASM OF OVERLAPPING SITES OF RIGHT BREAST IN FEMALE, ESTROGEN RECEPTOR POSITIVE: Primary | ICD-10-CM

## 2022-11-17 DIAGNOSIS — C50.811 MALIGNANT NEOPLASM OF OVERLAPPING SITES OF RIGHT BREAST IN FEMALE, ESTROGEN RECEPTOR POSITIVE: Primary | ICD-10-CM

## 2022-11-17 DIAGNOSIS — R11.10 DRY HEAVES: ICD-10-CM

## 2022-11-17 DIAGNOSIS — R19.7 DIARRHEA, UNSPECIFIED TYPE: ICD-10-CM

## 2022-11-17 DIAGNOSIS — R63.4 WEIGHT LOSS: ICD-10-CM

## 2022-11-17 DIAGNOSIS — D64.9 ANEMIA, UNSPECIFIED TYPE: ICD-10-CM

## 2022-11-17 LAB — GLUCOSE BLDC GLUCOMTR-MCNC: 77 MG/DL (ref 70–99)

## 2022-11-17 PROCEDURE — 43251 EGD REMOVE LESION SNARE: CPT | Performed by: INTERNAL MEDICINE

## 2022-11-17 PROCEDURE — 43239 EGD BIOPSY SINGLE/MULTIPLE: CPT | Performed by: INTERNAL MEDICINE

## 2022-11-17 PROCEDURE — 88305 TISSUE EXAM BY PATHOLOGIST: CPT | Performed by: INTERNAL MEDICINE

## 2022-11-17 PROCEDURE — 45380 COLONOSCOPY AND BIOPSY: CPT | Performed by: INTERNAL MEDICINE

## 2022-11-17 PROCEDURE — 25010000002 PROPOFOL 10 MG/ML EMULSION: Performed by: NURSE ANESTHETIST, CERTIFIED REGISTERED

## 2022-11-17 PROCEDURE — 82962 GLUCOSE BLOOD TEST: CPT

## 2022-11-17 RX ORDER — PROPOFOL 10 MG/ML
VIAL (ML) INTRAVENOUS AS NEEDED
Status: DISCONTINUED | OUTPATIENT
Start: 2022-11-17 | End: 2022-11-17 | Stop reason: SURG

## 2022-11-17 RX ORDER — SODIUM CHLORIDE, SODIUM LACTATE, POTASSIUM CHLORIDE, CALCIUM CHLORIDE 600; 310; 30; 20 MG/100ML; MG/100ML; MG/100ML; MG/100ML
30 INJECTION, SOLUTION INTRAVENOUS CONTINUOUS
Status: DISCONTINUED | OUTPATIENT
Start: 2022-11-17 | End: 2022-11-17 | Stop reason: HOSPADM

## 2022-11-17 RX ORDER — LIDOCAINE HYDROCHLORIDE 20 MG/ML
INJECTION, SOLUTION EPIDURAL; INFILTRATION; INTRACAUDAL; PERINEURAL AS NEEDED
Status: DISCONTINUED | OUTPATIENT
Start: 2022-11-17 | End: 2022-11-17 | Stop reason: SURG

## 2022-11-17 RX ADMIN — PROPOFOL 50 MG: 10 INJECTION, EMULSION INTRAVENOUS at 09:35

## 2022-11-17 RX ADMIN — PROPOFOL 100 MCG/KG/MIN: 10 INJECTION, EMULSION INTRAVENOUS at 09:35

## 2022-11-17 RX ADMIN — PROPOFOL 40 MG: 10 INJECTION, EMULSION INTRAVENOUS at 10:07

## 2022-11-17 RX ADMIN — SODIUM CHLORIDE, POTASSIUM CHLORIDE, SODIUM LACTATE AND CALCIUM CHLORIDE 30 ML/HR: 600; 310; 30; 20 INJECTION, SOLUTION INTRAVENOUS at 09:13

## 2022-11-17 RX ADMIN — LIDOCAINE HYDROCHLORIDE 40 MG: 20 INJECTION, SOLUTION EPIDURAL; INFILTRATION; INTRACAUDAL; PERINEURAL at 09:35

## 2022-11-17 NOTE — H&P
Pre Procedure History & Physical    Chief Complaint:   Diarrhea, weight loss, anemia    Subjective     HPI:   Diarrhea, weight loss, anemia    Past Medical History:   Past Medical History:   Diagnosis Date   • Acid reflux disease    • Arthritis    • Atrial fibrillation (HCC)    • Back pain    • Bowel obstruction (HCC)    • Breast cancer (HCC)    • Carpal tunnel syndrome    • Constipation    • Depression    • Diabetes mellitus (HCC)    • Disease of thyroid gland    • Dyslipidemia    • Fecal incontinence    • GERD (gastroesophageal reflux disease)    • Glaucoma     left eye   • HBP (high blood pressure)    • Hearing impaired     hearing aides   • Hiatal hernia    • High cholesterol    • History of transfusion    • History of tuberculosis     IN 1980'S WAS TREATED   • Hypertension    • Hypomagnesemia    • Kienbock's disease    • Kienböck's disease, right     KIENBOCK'S AVASCULAR NECROSIS OF LUNATE, ADULT RIGHT   • Nonrheumatic aortic valve stenosis 11/05/2020   • OAB (overactive bladder)    • Osteoporosis    • Pneumonia    • PONV (postoperative nausea and vomiting)    • Positive PPD    • RLS (restless legs syndrome)    • Sleep apnea     NO MACHINE   • Stage 3 chronic kidney disease (HCC)    • Status post bilateral mastectomy with sentinel node biopsy and right axillary node dissection 01/12/2022   • Tailbone injury     fracture   • Thyroid disease    • Urinary incontinence     wears pads   • Urinary retention        Past Surgical History:  Past Surgical History:   Procedure Laterality Date   • ANKLE TENDON REPAIR     • BACK SURGERY  12/21/2018-3/2019    LUMBAR--BROKEN DISC, CLEANED OUT--HAS 2ND PROCEDURE TO FINISH REMOVING   • CARPAL TUNNEL RELEASE     • CATARACT EXTRACTION Bilateral    • COLONOSCOPY     • ENDOSCOPY     • HEMORRHOIDECTOMY     • HYSTERECTOMY     • INCISION AND DRAINAGE OF WOUND      on back    • JOINT REPLACEMENT     • KNEE ARTHROPLASTY Right    • LEG SURGERY  06/13/2019   • LUMBAR DISCECTOMY Left  12/21/2018    Procedure: LEFT L4-5 MICRO DISCECTOMY;  Surgeon: Adam Coleman DO;  Location: [facility] OR;  Service: Orthopedic Spine   • LUMBAR DISCECTOMY Left 3/29/2019    Procedure: LEFT L4-5 REVISION OF MICRODISCECTOMY;  Surgeon: Adam Coleman DO;  Location: [facility] OR;  Service: Orthopedic Spine   • LUMBAR FUSION     • MASTECTOMY Bilateral 1/11/2022    Procedure: BREAST MASTECTOMY WITH SENTINEL NODE BIOPSY AND possible  AXILLARY NODE DISSECTION;  Surgeon: Lety Tena MD;  Location: [facility];  Service: General;  Laterality: Bilateral;   • TENDON RELEASE  09/2018   • TOTAL KNEE ARTHROPLASTY Left 6/13/2019    Procedure: LEFT KNEE REVISION;  Surgeon: Malick Costa II, MD;  Location: [facility];  Service: Orthopedics   • TOTAL KNEE ARTHROPLASTY REVISION Left     x2   • TOTAL KNEE ARTHROPLASTY REVISION Left 2/13/2018    Procedure: LEFT TOTAL KNEE ARTHROPLASTY REVISION;  Surgeon: Jair Fajardo MD;  Location: [facility];  Service:    • US GUIDED CYST ASPIRATION BREAST N/A 3/24/2022   • VERTEBROPLASTY         Family History:  Family History   Problem Relation Age of Onset   • Breast cancer Mother    • Tuberculosis Mother    • Diabetes Father    • Diabetes Sister    • Malig Hyperthermia Neg Hx    • Colon cancer Neg Hx        Social History:   reports that she has never smoked. She has never used smokeless tobacco. She reports that she does not drink alcohol and does not use drugs.    Medications:   Medications Prior to Admission   Medication Sig Dispense Refill Last Dose   • alendronate (FOSAMAX) 35 MG tablet Take 35 mg by mouth Every 7 (Seven) Days. sunday   Past Week   • apixaban (ELIQUIS) 5 MG tablet tablet Take 1 tablet by mouth Every 12 (Twelve) Hours. (Patient taking differently: Take 2.5 mg by mouth Every 12 (Twelve) Hours. Last dose Monday 11/14) 180 tablet 3 Past Week   • atorvastatin (LIPITOR) 10 MG tablet Take 1 tablet by mouth Every Night. 90 tablet 3 11/16/2022    • brimonidine (ALPHAGAN) 0.2 % ophthalmic solution Administer 1 drop to both eyes 2 (Two) Times a Day.   11/17/2022 at 0500   • Calcium Carb-Cholecalciferol (CALCIUM 600 + D PO) Take 1 tablet by mouth 2 (Two) Times a Day.   11/16/2022   • colchicine 0.6 MG tablet Take 0.6 mg by mouth Daily. Not taking   11/16/2022   • dilTIAZem CD (CARDIZEM CD) 120 MG 24 hr capsule Take 1 capsule by mouth Daily. (Patient taking differently: Take 120 mg by mouth Every Night.) 90 capsule 3 11/16/2022   • dorzolamide-timolol (COSOPT) 22.3-6.8 MG/ML ophthalmic solution Administer 1 drop to both eyes 2 (Two) Times a Day.   11/17/2022   • DULoxetine (CYMBALTA) 60 MG capsule 60 mg Daily.   11/16/2022   • furosemide (LASIX) 40 MG tablet Take 1 1/2 tablets every morning. Can decrease to one tablet a day if swelling improves 145 tablet 3 11/16/2022   • letrozole (FEMARA) 2.5 MG tablet TAKE 1 TABLET DAILY 90 tablet 1 11/16/2022   • levothyroxine (SYNTHROID, LEVOTHROID) 25 MCG tablet Take 25 mcg by mouth Daily. Currently doesn't have .   11/17/2022 at 0500   • losartan (COZAAR) 25 MG tablet Take 1 tablet by mouth Daily. 90 tablet 3 11/16/2022   • meclizine (ANTIVERT) 25 MG tablet 25 mg 3 (Three) Times a Day As Needed.   11/16/2022   • metFORMIN (GLUCOPHAGE) 500 MG tablet Take 500 mg by mouth 2 (Two) Times a Day. Hold 11/16/and 11/17 11/16/2022   • metoprolol succinate XL (TOPROL-XL) 25 MG 24 hr tablet Take 1 tablet by mouth Every Night. 90 tablet 3 11/16/2022   • Mirabegron ER (MYRBETRIQ) 50 MG tablet sustained-release 24 hour 24 hr tablet Take 50 mg by mouth Daily.   11/16/2022   • Multiple Vitamins-Minerals (MULTIVITAMIN ADULTS PO) Take 1 capsule by mouth Daily.   11/16/2022   • omeprazole (priLOSEC) 20 MG capsule Take 20 mg by mouth Daily.   11/16/2022   • oxybutynin XL (DITROPAN-XL) 10 MG 24 hr tablet TAKE 1 TABLET DAILY (Patient taking differently: 10 mg Daily.) 90 tablet 1 11/16/2022   • pramipexole (MIRAPEX) 1.5 MG tablet Take 1.5 mg  "by mouth Every Night.   11/16/2022   • spironolactone (ALDACTONE) 25 MG tablet Take 1 tablet by mouth Daily. 90 tablet 3 11/16/2022   • VENLAFAXINE HCL ER PO Take 150 mg by mouth Daily.   11/16/2022   • vitamin C (ASCORBIC ACID) 500 MG tablet Take 500 mg by mouth Daily.   11/16/2022   • latanoprost (XALATAN) 0.005 % ophthalmic solution 1 drop Every Night.      • Magnesium 400 MG capsule Take 400 mg by mouth Daily.          Allergies:  Eggs or egg-derived products, Sertraline, Tetanus toxoid, Tetanus toxoids, Metformin, Nsaids, Sertraline hcl, Tetanus-diphtheria toxoids td, and Tuberculin ppd        Objective     Blood pressure 148/68, pulse 62, temperature 98.5 °F (36.9 °C), temperature source Temporal, resp. rate 18, height 165.1 cm (65\"), weight 79 kg (174 lb 2.6 oz), SpO2 96 %.    Physical Exam   Constitutional: Pt is oriented to person, place, and time and well-developed, well-nourished, and in no distress.   Mouth/Throat: Oropharynx is clear and moist.   Neck: Normal range of motion.   Cardiovascular: Normal rate, regular rhythm and normal heart sounds.    Pulmonary/Chest: Effort normal and breath sounds normal.   Abdominal: Soft. Nontender  Skin: Skin is warm and dry.   Psychiatric: Mood, memory, affect and judgment normal.     Assessment & Plan     Diagnosis:  Diarrhea, weight loss, anemia    Anticipated Surgical Procedure:  EGD and colonoscopy    The risks, benefits, and alternatives of this procedure have been discussed with the patient or the responsible party- the patient understands and agrees to proceed.            "

## 2022-11-17 NOTE — ANESTHESIA POSTPROCEDURE EVALUATION
Patient: Lilia Becerra    Procedure Summary     Date: 11/17/22 Room / Location: [facility] ENDOSCOPY 2 / [facility] ENDOSCOPY    Anesthesia Start: 0931 Anesthesia Stop: 1020    Procedures:       ESOPHAGOGASTRODUODENOSCOPY with biopsy and snare      COLONOSCOPY with biopsy Diagnosis:       Weight loss      Diarrhea, unspecified type      Anemia, unspecified type      Dry heaves      (Weight loss [R63.4])      (Diarrhea, unspecified type [R19.7])      (Anemia, unspecified type [D64.9])      (Dry heaves [R11.10])    Surgeons: Leo Alvares MD Provider: Robb Novoa MD    Anesthesia Type: general ASA Status: 3          Anesthesia Type: general    Vitals  Vitals Value Taken Time   BP 94/72 11/17/22 1039   Temp 36.1 °C (97 °F) 11/17/22 1033   Pulse 62 11/17/22 1041   Resp 17 11/17/22 1033   SpO2 94 % 11/17/22 1033   Vitals shown include unvalidated device data.        Post Anesthesia Care and Evaluation    Patient location during evaluation: bedside  Patient participation: complete - patient participated  Level of consciousness: awake  Pain management: adequate    Airway patency: patent  Anesthetic complications: No anesthetic complications  PONV Status: none  Cardiovascular status: acceptable and stable  Respiratory status: acceptable  Hydration status: acceptable    Comments: An Anesthesiologist personally participated in the most demanding procedures (including induction and emergence if applicable) in the anesthesia plan, monitored the course of anesthesia administration at frequent intervals and remained physically present and available for immediate diagnosis and treatment of emergencies.

## 2022-11-17 NOTE — ANESTHESIA PREPROCEDURE EVALUATION
Anesthesia Evaluation     Patient summary reviewed and Nursing notes reviewed   history of anesthetic complications:  NPO Solid Status: > 8 hours  NPO Liquid Status: > 2 hours           Airway   Mallampati: II  TM distance: >3 FB  Neck ROM: full  No difficulty expected  Dental      Pulmonary - normal exam    breath sounds clear to auscultation  (+) pneumonia , sleep apnea,   Cardiovascular - normal exam  Exercise tolerance: poor (<4 METS)    Rhythm: regular  Rate: normal    (+) hypertension, valvular problems/murmurs AS, dysrhythmias Atrial Fib, CHF , hyperlipidemia,       Neuro/Psych  (+) numbness, psychiatric history,    GI/Hepatic/Renal/Endo    (+)  hiatal hernia, GERD,  renal disease, diabetes mellitus type 2, thyroid problem hypothyroidism    Musculoskeletal     (+) back pain,   Abdominal    Substance History - negative use     OB/GYN negative ob/gyn ROS         Other   arthritis,    history of cancer ()    Chronic steroid use:     ROS/Med Hx Other: PAT Nursing Notes unavailable.     Interpretation Summary    ? Estimated left ventricular EF was in agreement with the calculated left ventricular EF. Left ventricular ejection fraction appears to be 51 - 55%. Left ventricular systolic function is low normal.  ? Left ventricular diastolic function is consistent with (grade I) impaired relaxation.  ? Moderate aortic valve stenosis is present. Aortic valve area is 1.2 cm2.  ? Estimated right ventricular systolic pressure from tricuspid regurgitation is normal (<35 mmHg).       ekg nsr low voltage    <4METS, SOAE, ECHO 11/21 EF 60-65%, MOD. AORTIC VALVE STENOSIS.         Anesthesia Plan    ASA 3     general     (Total IV Anesthesia    Patient understands anesthesia not responsible for dental damage.        )  intravenous induction     Anesthetic plan, risks, benefits, and alternatives have been provided, discussed and informed consent has been obtained with: patient.    Plan discussed with CRNA.        CODE STATUS:

## 2022-11-18 ENCOUNTER — TELEPHONE (OUTPATIENT)
Dept: CARDIOLOGY | Facility: CLINIC | Age: 82
End: 2022-11-18

## 2022-11-18 NOTE — TELEPHONE ENCOUNTER
She needs to be scheduled for follow-up.  I can see her the week after Thanksgiving.  In the meantime I am okay with her taking 80 mg every other day to see if that helps with the swelling.

## 2022-11-18 NOTE — TELEPHONE ENCOUNTER
Received VM regarding bilateral lower extremities swelling.     SW patient regarding edema. Patient states she is currently taking 60mg Lasix daily and was requesting to take 80mg to assist with getting her swelling down. Patient denies SOA.     Patient has f/u with Dr Pan in March.

## 2022-11-21 LAB
CYTO UR: NORMAL
LAB AP CASE REPORT: NORMAL
LAB AP CLINICAL INFORMATION: NORMAL
PATH REPORT.FINAL DX SPEC: NORMAL
PATH REPORT.GROSS SPEC: NORMAL

## 2022-11-21 RX ORDER — OXYBUTYNIN CHLORIDE 10 MG/1
10 TABLET, EXTENDED RELEASE ORAL DAILY
Qty: 90 TABLET | Refills: 1 | Status: SHIPPED | OUTPATIENT
Start: 2022-11-21

## 2022-11-21 NOTE — TELEPHONE ENCOUNTER
BERLIN patient. Patient is agreeable to taking 80mg lasix every other day. F/U appointment has been scheduled.

## 2022-11-29 ENCOUNTER — OFFICE VISIT (OUTPATIENT)
Dept: CARDIOLOGY | Facility: CLINIC | Age: 82
End: 2022-11-29

## 2022-11-29 VITALS
HEIGHT: 65 IN | BODY MASS INDEX: 28.32 KG/M2 | WEIGHT: 170 LBS | HEART RATE: 65 BPM | DIASTOLIC BLOOD PRESSURE: 65 MMHG | SYSTOLIC BLOOD PRESSURE: 115 MMHG

## 2022-11-29 DIAGNOSIS — I35.0 NONRHEUMATIC AORTIC VALVE STENOSIS: Primary | Chronic | ICD-10-CM

## 2022-11-29 DIAGNOSIS — I50.32 CHRONIC DIASTOLIC CONGESTIVE HEART FAILURE: ICD-10-CM

## 2022-11-29 DIAGNOSIS — I10 PRIMARY HYPERTENSION: ICD-10-CM

## 2022-11-29 DIAGNOSIS — I48.0 PAROXYSMAL ATRIAL FIBRILLATION: Chronic | ICD-10-CM

## 2022-11-29 DIAGNOSIS — E78.2 MIXED HYPERLIPIDEMIA: ICD-10-CM

## 2022-11-29 PROCEDURE — 99214 OFFICE O/P EST MOD 30 MIN: CPT

## 2022-11-29 RX ORDER — DILTIAZEM HYDROCHLORIDE 120 MG/1
120 CAPSULE, COATED, EXTENDED RELEASE ORAL DAILY
Qty: 90 CAPSULE | Refills: 3 | Status: SHIPPED | OUTPATIENT
Start: 2022-11-29

## 2022-11-29 RX ORDER — SPIRONOLACTONE 25 MG/1
25 TABLET ORAL DAILY
Qty: 90 TABLET | Refills: 3 | Status: SHIPPED | OUTPATIENT
Start: 2022-11-29

## 2022-11-29 NOTE — ASSESSMENT & PLAN NOTE
Continue Eliquis for stroke prevention and Cardizem for rate control.  Currently in sinus rhythm by EKG today.

## 2022-11-29 NOTE — PROGRESS NOTES
Chief Complaint  Atrial Fibrillation, Hypertension, and Hyperlipidemia    Subjective        History of Present Illness  Lilia Becerra presents to [facility] CARDIOLOGY   Lilia is an 81-year-old female with hypertension, hyperlipidemia, chronic diastolic heart failure with moderate aortic valve stenosis and paroxysmal atrial fibrillation who presents for follow-up.  She is doing well she has no complaints.  Her blood pressures are well controlled and she has no edema.  She denies any chest pain, palpitations, dizziness, syncope.  PMH  Past Medical History:   Diagnosis Date   • Acid reflux disease    • Arthritis    • Atrial fibrillation (HCC)    • Back pain    • Bowel obstruction (HCC)    • Breast cancer (HCC)    • Carpal tunnel syndrome    • Constipation    • Depression    • Diabetes mellitus (HCC)    • Disease of thyroid gland    • Dyslipidemia    • Fecal incontinence    • GERD (gastroesophageal reflux disease)    • Glaucoma     left eye   • HBP (high blood pressure)    • Hearing impaired     hearing aides   • Hiatal hernia    • High cholesterol    • History of transfusion    • History of tuberculosis     IN 1980'S WAS TREATED   • Hypertension    • Hypomagnesemia    • Kienbock's disease    • Kienböck's disease, right     KIENBOCK'S AVASCULAR NECROSIS OF LUNATE, ADULT RIGHT   • Nonrheumatic aortic valve stenosis 11/05/2020   • OAB (overactive bladder)    • Osteoporosis    • Pneumonia    • PONV (postoperative nausea and vomiting)    • Positive PPD    • RLS (restless legs syndrome)    • Sleep apnea     NO MACHINE   • Stage 3 chronic kidney disease (HCC)    • Status post bilateral mastectomy with sentinel node biopsy and right axillary node dissection 01/12/2022   • Tailbone injury     fracture   • Thyroid disease    • Urinary incontinence     wears pads   • Urinary retention          ALLERGY  Allergies   Allergen Reactions   • Eggs Or Egg-Derived Products Swelling   • Sertraline Unknown - High  Severity     Bleeding in stomach    • Tetanus Toxoid Swelling   • Tetanus Toxoids Swelling   • Metformin Diarrhea and Unknown - Low Severity   • Nsaids Unknown - High Severity   • Sertraline Hcl Unknown - High Severity   • Tetanus-Diphtheria Toxoids Td Unknown - Low Severity   • Tuberculin Ppd Unknown - Low Severity          SURGICALHX  Past Surgical History:   Procedure Laterality Date   • ANKLE TENDON REPAIR     • BACK SURGERY  12/21/2018-3/2019    LUMBAR--BROKEN DISC, CLEANED OUT--HAS 2ND PROCEDURE TO FINISH REMOVING   • CARPAL TUNNEL RELEASE     • CATARACT EXTRACTION Bilateral    • COLONOSCOPY     • COLONOSCOPY N/A 11/17/2022    Procedure: COLONOSCOPY with biopsy;  Surgeon: Leo Alvares MD;  Location: [facility] ENDOSCOPY;  Service: Gastroenterology;  Laterality: N/A;  diverticulosis   • ENDOSCOPY     • ENDOSCOPY N/A 11/17/2022    Procedure: ESOPHAGOGASTRODUODENOSCOPY with biopsy and snare;  Surgeon: Leo Alvares MD;  Location: [facility] ENDOSCOPY;  Service: Gastroenterology;  Laterality: N/A;  gastric fundus polyp   • HEMORRHOIDECTOMY     • HYSTERECTOMY     • INCISION AND DRAINAGE OF WOUND      on back    • JOINT REPLACEMENT     • KNEE ARTHROPLASTY Right    • LEG SURGERY  06/13/2019   • LUMBAR DISCECTOMY Left 12/21/2018    Procedure: LEFT L4-5 MICRO DISCECTOMY;  Surgeon: Adam Coleman DO;  Location: [facility] OR;  Service: Orthopedic Spine   • LUMBAR DISCECTOMY Left 03/29/2019    Procedure: LEFT L4-5 REVISION OF MICRODISCECTOMY;  Surgeon: Adam Coleman DO;  Location: [facility] OR;  Service: Orthopedic Spine   • LUMBAR FUSION     • MASTECTOMY Bilateral 01/11/2022    Procedure: BREAST MASTECTOMY WITH SENTINEL NODE BIOPSY AND possible  AXILLARY NODE DISSECTION;  Surgeon: Lety Tena MD;  Location: [facility] OR [facility];  Service: General;  Laterality: Bilateral;   • MASTECTOMY Bilateral    • TENDON RELEASE  09/2018   • TOTAL KNEE ARTHROPLASTY Left 06/13/2019    Procedure: LEFT KNEE REVISION;   Surgeon: Malick Costa II, MD;  Location: [facility] MAIN OR;  Service: Orthopedics   • TOTAL KNEE ARTHROPLASTY REVISION Left     x2   • TOTAL KNEE ARTHROPLASTY REVISION Left 02/13/2018    Procedure: LEFT TOTAL KNEE ARTHROPLASTY REVISION;  Surgeon: Jair Fajardo MD;  Location: [facility] MAIN OR;  Service:    • US GUIDED CYST ASPIRATION BREAST N/A 03/24/2022   • VERTEBROPLASTY            SOC  Social History     Socioeconomic History   • Marital status:    Tobacco Use   • Smoking status: Never   • Smokeless tobacco: Never   Vaping Use   • Vaping Use: Never used   Substance and Sexual Activity   • Alcohol use: No   • Drug use: No   • Sexual activity: Defer         FAMHX  Family History   Problem Relation Age of Onset   • Breast cancer Mother    • Tuberculosis Mother    • Diabetes Father    • Diabetes Sister    • Malig Hyperthermia Neg Hx    • Colon cancer Neg Hx           MEDSIGONLY  Current Outpatient Medications on File Prior to Visit   Medication Sig   • alendronate (FOSAMAX) 35 MG tablet Take 35 mg by mouth Every 7 (Seven) Days. sunday   • apixaban (ELIQUIS) 5 MG tablet tablet Take 1 tablet by mouth Every 12 (Twelve) Hours. (Patient taking differently: Take 5 mg by mouth. 1/2 bid)   • atorvastatin (LIPITOR) 10 MG tablet Take 1 tablet by mouth Every Night.   • brimonidine (ALPHAGAN) 0.2 % ophthalmic solution Administer 1 drop to both eyes 2 (Two) Times a Day.   • Calcium Carb-Cholecalciferol (CALCIUM 600 + D PO) Take 1 tablet by mouth 2 (Two) Times a Day.   • dorzolamide-timolol (COSOPT) 22.3-6.8 MG/ML ophthalmic solution Administer 1 drop to both eyes 2 (Two) Times a Day.   • DULoxetine (CYMBALTA) 60 MG capsule 60 mg Daily.   • furosemide (LASIX) 40 MG tablet Take 1 1/2 tablets every morning. Can decrease to one tablet a day if swelling improves   • latanoprost (XALATAN) 0.005 % ophthalmic solution 1 drop Every Night.   • letrozole (FEMARA) 2.5 MG tablet TAKE 1 TABLET DAILY   • levothyroxine  "(SYNTHROID, LEVOTHROID) 25 MCG tablet Take 25 mcg by mouth Daily. Currently doesn't have .   • losartan (COZAAR) 25 MG tablet Take 1 tablet by mouth Daily.   • meclizine (ANTIVERT) 25 MG tablet 25 mg 3 (Three) Times a Day As Needed.   • metFORMIN (GLUCOPHAGE) 500 MG tablet Take 500 mg by mouth 2 (Two) Times a Day. Hold 11/16/and 11/17   • metoprolol succinate XL (TOPROL-XL) 25 MG 24 hr tablet Take 1 tablet by mouth Every Night.   • Mirabegron ER (MYRBETRIQ) 50 MG tablet sustained-release 24 hour 24 hr tablet Take 50 mg by mouth Daily.   • Multiple Vitamins-Minerals (MULTIVITAMIN ADULTS PO) Take 1 capsule by mouth Daily.   • omeprazole (priLOSEC) 20 MG capsule Take 20 mg by mouth Daily.   • oxybutynin XL (DITROPAN-XL) 10 MG 24 hr tablet Take 1 tablet by mouth Daily.   • pramipexole (MIRAPEX) 1.5 MG tablet Take 1.5 mg by mouth Every Night.   • VENLAFAXINE HCL ER PO Take 150 mg by mouth Daily.   • vitamin C (ASCORBIC ACID) 500 MG tablet Take 500 mg by mouth Daily.   • [DISCONTINUED] dilTIAZem CD (CARDIZEM CD) 120 MG 24 hr capsule Take 1 capsule by mouth Daily. (Patient taking differently: Take 120 mg by mouth Every Night.)   • [DISCONTINUED] spironolactone (ALDACTONE) 25 MG tablet Take 1 tablet by mouth Daily.   • colchicine 0.6 MG tablet Take 0.6 mg by mouth Daily. Not taking   • Magnesium 400 MG capsule Take 400 mg by mouth Daily.     No current facility-administered medications on file prior to visit.         Objective   Vitals:    11/29/22 1035   BP: 115/65   Pulse: 65   Weight: 77.1 kg (170 lb)   Height: 165.1 cm (65\")         Physical Exam  Constitutional:       General: She is awake. She is not in acute distress.     Appearance: Normal appearance.   HENT:      Head: Normocephalic.      Nose: Nose normal. No congestion.   Eyes:      Extraocular Movements: Extraocular movements intact.      Conjunctiva/sclera: Conjunctivae normal.      Pupils: Pupils are equal, round, and reactive to light.   Neck:      Thyroid: " No thyromegaly.      Vascular: No JVD.   Cardiovascular:      Rate and Rhythm: Normal rate and regular rhythm.      Chest Wall: PMI is not displaced.      Pulses: Normal pulses.      Heart sounds: Normal heart sounds, S1 normal and S2 normal. No murmur heard.    No friction rub. No gallop. No S3 or S4 sounds.   Pulmonary:      Effort: Pulmonary effort is normal.      Breath sounds: Normal breath sounds. No wheezing, rhonchi or rales.   Abdominal:      General: Bowel sounds are normal.      Palpations: Abdomen is soft.      Tenderness: There is no abdominal tenderness.   Musculoskeletal:      Cervical back: No tenderness.      Right lower leg: No edema.      Left lower leg: No edema.   Lymphadenopathy:      Cervical: No cervical adenopathy.   Skin:     General: Skin is warm and dry.      Capillary Refill: Capillary refill takes less than 2 seconds.      Coloration: Skin is not cyanotic.      Findings: No petechiae or rash.      Nails: There is no clubbing.   Neurological:      Mental Status: She is alert.   Psychiatric:         Mood and Affect: Mood normal.         Behavior: Behavior is cooperative.           Result Review     The following data was reviewed by DANYA Lopez on 11/29/22.  EKG in the office today demonstrates sinus rhythm at a rate of 59.  No ST or T wave abnormalities.  Unchanged from previous EKG.    CMP    CMP 1/11/22 4/5/22   Glucose 167 (A) 234 (A)   BUN 24 (A) 22   Creatinine 1.35 (A) 1.38 (A)   eGFR Non  Am 37 (A)    Sodium 140 132 (A)   Potassium 3.2 (A) 4.2   Chloride 98 94 (A)   Calcium 8.3 (A) 10.0   Albumin  4.00   Total Bilirubin  0.6   Alkaline Phosphatase  134 (A)   AST (SGOT)  11   ALT (SGPT)  9   (A) Abnormal value            CBC w/diff    CBC w/Diff 4/5/22   WBC 13.42 (A)   RBC 3.85   Hemoglobin 11.7 (A)   Hematocrit 35.5   MCV 92.2   MCH 30.4   MCHC 33.0   RDW 12.4   Platelets 296   Neutrophil Rel % 75.7   Immature Granulocyte Rel % 0.2   Lymphocyte Rel % 17.6 (A)    Monocyte Rel % 4.9 (A)   Eosinophil Rel % 1.3   Basophil Rel % 0.3   (A) Abnormal value                   Results for orders placed in visit on 03/08/22    Adult Transthoracic Echo Complete W/ Cont if Necessary Per Protocol    Interpretation Summary  · Estimated left ventricular EF was in agreement with the calculated left ventricular EF. Left ventricular ejection fraction appears to be 51 - 55%. Left ventricular systolic function is low normal.  · Left ventricular diastolic function is consistent with (grade I) impaired relaxation.  · Moderate aortic valve stenosis is present. Aortic valve area is 1.2 cm2.  · Estimated right ventricular systolic pressure from tricuspid regurgitation is normal (<35 mmHg).             Assessment and Plan   Diagnoses and all orders for this visit:    1. Nonrheumatic aortic valve stenosis (Primary)  Assessment & Plan:  Moderate aortic valve stenosis by previous echo.  Will repeat echo for routine surveillance.    Orders:  -     Adult Transthoracic Echo Complete w/ Color, Spectral and Contrast if necessary per protocol; Future    2. Paroxysmal atrial fibrillation (HCC)  Assessment & Plan:  Continue Eliquis for stroke prevention and Cardizem for rate control.  Currently in sinus rhythm by EKG today.    Orders:  -     dilTIAZem CD (CARDIZEM CD) 120 MG 24 hr capsule; Take 1 capsule by mouth Daily.  Dispense: 90 capsule; Refill: 3    3. Chronic diastolic congestive heart failure (HCC)  Assessment & Plan:  Her heart failure is stable.  New York Heart Association class II.  She is euvolemic on exam without any lower extremity edema.  Continue current dose of furosemide.  She will have a BMP done at her primary care office.  Next month.    Orders:  -     spironolactone (ALDACTONE) 25 MG tablet; Take 1 tablet by mouth Daily.  Dispense: 90 tablet; Refill: 3    4. Mixed hyperlipidemia  Assessment & Plan:  Lipids are at goal.  Continue atorvastatin 10 mg a day.      5. Primary  hypertension  Assessment & Plan:  Blood pressure is under good control.  Continue losartan, diltiazem, furosemide, spironolactone, metoprolol at current doses.            Follow Up   Return in about 6 months (around 5/29/2023) for With  for routine follow-up.    Patient was given instructions and counseling regarding her condition or for health maintenance advice. Please see specific information pulled into the AVS if appropriate.     Amanda Ace, DANYA  11/29/22  11:30 EST    Dictated Utilizing Dragon Dictation

## 2022-11-29 NOTE — ASSESSMENT & PLAN NOTE
Her heart failure is stable.  New York Heart Association class II.  She is euvolemic on exam without any lower extremity edema.  Continue current dose of furosemide.  She will have a BMP done at her primary care office.  Next month.

## 2022-11-29 NOTE — ASSESSMENT & PLAN NOTE
Blood pressure is under good control.  Continue losartan, diltiazem, furosemide, spironolactone, metoprolol at current doses.

## 2022-12-05 ENCOUNTER — TELEPHONE (OUTPATIENT)
Dept: GASTROENTEROLOGY | Facility: CLINIC | Age: 82
End: 2022-12-05

## 2022-12-05 NOTE — TELEPHONE ENCOUNTER
----- Message from DANYA Paris sent at 12/5/2022 11:07 AM EST -----  EGD 11/17/2022: Normal esophagus, small polyp removed from the stomach-hyperplastic/benign, normal duodenum-biopsy negative, small hiatal hernia   Colonoscopy 11/17/2022: Diverticulosis in the sigmoid, otherwise normal exam, random colon biopsies are negative.  No colon recall due to age    Dr. Alvares ordered CT of the abdomen and pelvis-this is scheduled for December 16, encourage patient to complete this

## 2022-12-16 ENCOUNTER — HOSPITAL ENCOUNTER (OUTPATIENT)
Dept: CT IMAGING | Facility: HOSPITAL | Age: 82
Discharge: HOME | End: 2022-12-16
Admitting: INTERNAL MEDICINE

## 2022-12-16 DIAGNOSIS — Z17.0 MALIGNANT NEOPLASM OF OVERLAPPING SITES OF RIGHT BREAST IN FEMALE, ESTROGEN RECEPTOR POSITIVE: ICD-10-CM

## 2022-12-16 DIAGNOSIS — C50.811 MALIGNANT NEOPLASM OF OVERLAPPING SITES OF RIGHT BREAST IN FEMALE, ESTROGEN RECEPTOR POSITIVE: ICD-10-CM

## 2022-12-16 PROCEDURE — 74176 CT ABD & PELVIS W/O CONTRAST: CPT

## 2022-12-19 RX ORDER — METOPROLOL SUCCINATE 25 MG/1
25 TABLET, EXTENDED RELEASE ORAL NIGHTLY
Qty: 90 TABLET | Refills: 3 | Status: SHIPPED | OUTPATIENT
Start: 2022-12-19

## 2022-12-19 NOTE — TELEPHONE ENCOUNTER
LOV 11-29-22  [facility] 3-27-23  Request from [facility]omas for Metoprolol Succ tab 25 mg says 1/2 tab qd. However last 2 office visits says 1 tab qd. Verified with pt that it says Metoprolol Succ 25 mg 1 tab qd on her bottle.

## 2023-01-11 DIAGNOSIS — R60.0 PEDAL EDEMA: ICD-10-CM

## 2023-01-11 RX ORDER — FUROSEMIDE 40 MG/1
TABLET ORAL
Qty: 145 TABLET | Refills: 3 | Status: SHIPPED | OUTPATIENT
Start: 2023-01-11

## 2023-01-16 ENCOUNTER — TELEPHONE (OUTPATIENT)
Dept: GASTROENTEROLOGY | Facility: CLINIC | Age: 83
End: 2023-01-16
Payer: MEDICARE

## 2023-01-16 ENCOUNTER — HOSPITAL ENCOUNTER (OUTPATIENT)
Dept: OCCUPATIONAL THERAPY | Facility: HOSPITAL | Age: 83
Setting detail: THERAPIES SERIES
Discharge: HOME | End: 2023-01-16
Payer: MEDICARE

## 2023-01-16 ENCOUNTER — OFFICE VISIT (OUTPATIENT)
Dept: UROLOGY | Facility: CLINIC | Age: 83
End: 2023-01-16
Payer: MEDICARE

## 2023-01-16 VITALS
DIASTOLIC BLOOD PRESSURE: 49 MMHG | TEMPERATURE: 97.8 F | SYSTOLIC BLOOD PRESSURE: 119 MMHG | WEIGHT: 170 LBS | BODY MASS INDEX: 28.32 KG/M2 | HEART RATE: 64 BPM | HEIGHT: 65 IN

## 2023-01-16 DIAGNOSIS — Z44.9 FITTING AND ADJUSTMENT OF UNSPECIFIED PROSTHETIC DEVICE: ICD-10-CM

## 2023-01-16 DIAGNOSIS — Z91.89 AT RISK FOR LYMPHEDEMA: ICD-10-CM

## 2023-01-16 DIAGNOSIS — Z17.0 MALIGNANT NEOPLASM OF OVERLAPPING SITES OF RIGHT BREAST IN FEMALE, ESTROGEN RECEPTOR POSITIVE: ICD-10-CM

## 2023-01-16 DIAGNOSIS — N64.89 DEFORMITY DUE TO MASTECTOMY: ICD-10-CM

## 2023-01-16 DIAGNOSIS — C50.811 MALIGNANT NEOPLASM OF OVERLAPPING SITES OF RIGHT BREAST IN FEMALE, ESTROGEN RECEPTOR POSITIVE: ICD-10-CM

## 2023-01-16 DIAGNOSIS — Z17.0 MALIGNANT NEOPLASM OF UPPER-OUTER QUADRANT OF RIGHT BREAST IN FEMALE, ESTROGEN RECEPTOR POSITIVE: Primary | ICD-10-CM

## 2023-01-16 DIAGNOSIS — Z98.890 HISTORY OF BLADDER REPAIR SURGERY: ICD-10-CM

## 2023-01-16 DIAGNOSIS — C50.411 MALIGNANT NEOPLASM OF UPPER-OUTER QUADRANT OF RIGHT BREAST IN FEMALE, ESTROGEN RECEPTOR POSITIVE: Primary | ICD-10-CM

## 2023-01-16 DIAGNOSIS — Z90.13 HISTORY OF MASTECTOMY, BILATERAL: ICD-10-CM

## 2023-01-16 DIAGNOSIS — Z90.10 DEFORMITY DUE TO MASTECTOMY: ICD-10-CM

## 2023-01-16 DIAGNOSIS — Z87.440 HISTORY OF RECURRENT UTIS: ICD-10-CM

## 2023-01-16 DIAGNOSIS — N32.81 OAB (OVERACTIVE BLADDER): Primary | ICD-10-CM

## 2023-01-16 LAB
BILIRUB BLD-MCNC: NEGATIVE MG/DL
CLARITY, POC: CLEAR
COLOR UR: YELLOW
EXPIRATION DATE: NORMAL
GLUCOSE UR STRIP-MCNC: NEGATIVE MG/DL
KETONES UR QL: NEGATIVE
LEUKOCYTE EST, POC: NEGATIVE
Lab: NORMAL
NITRITE UR-MCNC: NEGATIVE MG/ML
PH UR: 6 [PH] (ref 5–8)
PROT UR STRIP-MCNC: NEGATIVE MG/DL
RBC # UR STRIP: NEGATIVE /UL
SP GR UR: 1.01 (ref 1–1.03)
UROBILINOGEN UR QL: NORMAL

## 2023-01-16 PROCEDURE — L8000 MASTECTOMY BRA: HCPCS | Performed by: OCCUPATIONAL THERAPIST

## 2023-01-16 PROCEDURE — 81003 URINALYSIS AUTO W/O SCOPE: CPT | Performed by: NURSE PRACTITIONER

## 2023-01-16 PROCEDURE — 99212 OFFICE O/P EST SF 10 MIN: CPT | Performed by: NURSE PRACTITIONER

## 2023-01-16 PROCEDURE — L8030 BREAST PROSTHES W/O ADHESIVE: HCPCS | Performed by: OCCUPATIONAL THERAPIST

## 2023-01-16 PROCEDURE — 97165 OT EVAL LOW COMPLEX 30 MIN: CPT | Performed by: OCCUPATIONAL THERAPIST

## 2023-01-16 NOTE — PROGRESS NOTES
"Chief Complaint  recurrent uti and overactive bladder    Subjective          Lilia Becerra presents to [facility] UROLOGY  History of Present Illness  Routine follow up for oab and history of recurrent uti. Past bladder repair. Taking Ditropan xl and myrbetriq 50mg. Not having any problems with urinary incontinence or frequency since refilled  Her ditropan.She had ran out and realized difference without it.  Continues to have some urgency. Reports free from breast cancer 1 year. She is feeling well. Denies any dysuria or gross hematuria.      CT Abdomen Pelvis Without Contrast (12/16/2022 13:04)    Objective   Vital Signs:   /49   Pulse 64   Temp 97.8 °F (36.6 °C)   Ht 165.1 cm (65\")   Wt 77.1 kg (170 lb)   BMI 28.29 kg/m²     Allergies   Allergen Reactions   • Eggs Or Egg-Derived Products Swelling   • Sertraline Unknown - High Severity     Bleeding in stomach    • Tetanus Toxoid Swelling   • Tetanus Toxoids Swelling   • Nsaids Unknown - High Severity   • Tetanus-Diphtheria Toxoids Td Unknown - Low Severity   • Tuberculin Ppd Unknown - Low Severity   • Metformin Diarrhea and Unknown - Low Severity      Past medical history:  has a past medical history of Acid reflux disease, Arthritis, Atrial fibrillation (HCC), Back pain, Bowel obstruction (HCC), Breast cancer (HCC), Carpal tunnel syndrome, Constipation, Depression, Diabetes mellitus (HCC), Disease of thyroid gland, Dyslipidemia, Fecal incontinence, GERD (gastroesophageal reflux disease), Glaucoma, HBP (high blood pressure), Hearing impaired, Hiatal hernia, High cholesterol, History of transfusion, History of tuberculosis, Hypertension, Hypomagnesemia, Kienbock's disease, Kienböck's disease, right, Nonrheumatic aortic valve stenosis (11/05/2020), OAB (overactive bladder), Osteoporosis, Pneumonia, PONV (postoperative nausea and vomiting), Positive PPD, RLS (restless legs syndrome), Sleep apnea, Stage 3 chronic kidney disease (HCC), " Status post bilateral mastectomy with sentinel node biopsy and right axillary node dissection (01/12/2022), Tailbone injury, Thyroid disease, Urinary incontinence, and Urinary retention.   Past surgical history:  has a past surgical history that includes Hemorrhoid surgery; Hysterectomy; Cataract extraction (Bilateral); Lumbar fusion; Incision and drainage of wound; Vertebroplasty; Knee Arthroplasty (Right); Total Knee Arthroplasty Revision (Left); Total Knee Arthroplasty Revision (Left, 02/13/2018); Colonoscopy; Lumbar discectomy (Left, 12/21/2018); Lumbar discectomy (Left, 03/29/2019); Total knee arthroplasty (Left, 06/13/2019); Ankle Tendon Repair; Back surgery (12/21/2018-3/2019); Carpal tunnel release; Esophagogastroduodenoscopy; Leg Surgery (06/13/2019); Tendon release (09/2018); Joint replacement; Mastectomy (Bilateral, 01/11/2022); US Guided Cyst Aspiration Breast (N/A, 03/24/2022); Esophagogastroduodenoscopy (N/A, 11/17/2022); Colonoscopy (N/A, 11/17/2022); and Mastectomy (Bilateral).  Personal history: family history includes Breast cancer in her mother; Diabetes in her father and sister; Tuberculosis in her mother.  Social history:  reports that she has never smoked. She has never used smokeless tobacco. She reports that she does not drink alcohol and does not use drugs.    Review of Systems   Constitutional: Negative for chills and fever.   Genitourinary: Negative for difficulty urinating and hematuria.        Physical Exam  Vitals and nursing note reviewed.   Constitutional:       General: She is not in acute distress.     Appearance: Normal appearance. She is well-developed and well-groomed. She is not ill-appearing.      Comments: Ambulates with slight  difficulty rolling seated walker.   Cardiovascular:      Rate and Rhythm: Normal rate.   Pulmonary:      Effort: Pulmonary effort is normal.      Breath sounds: Normal air entry.   Musculoskeletal:         General: Normal range of motion.   Skin:      General: Skin is warm and dry.   Neurological:      Mental Status: She is alert and oriented to person, place, and time.      Motor: Motor function is intact.   Psychiatric:         Mood and Affect: Mood normal.         Behavior: Behavior normal. Behavior is cooperative.         Thought Content: Thought content normal.         Judgment: Judgment normal.        Result Review :                 Assessment and Plan    Diagnoses and all orders for this visit:    1. OAB (overactive bladder) (Primary)  -     POC Urinalysis Dipstick, Automated    2. History of recurrent UTIs  -     POC Urinalysis Dipstick, Automated    3. History of bladder repair surgery    4. Malignant neoplasm of overlapping sites of right breast in female, estrogen receptor positive (HCC)      Results for orders placed or performed in visit on 01/16/23   POC Urinalysis Dipstick, Automated    Specimen: Urine   Result Value Ref Range    Color Yellow Yellow, Straw, Dark Yellow, Deanna    Clarity, UA Clear Clear    Specific Gravity  1.015 1.005 - 1.030    pH, Urine 6.0 5.0 - 8.0    Leukocytes Negative Negative    Nitrite, UA Negative Negative    Protein, POC Negative Negative mg/dL    Glucose, UA Negative Negative mg/dL    Ketones, UA Negative Negative    Urobilinogen, UA 0.2 E.U./dL Normal, 0.2 E.U./dL    Bilirubin Negative Negative    Blood, UA Negative Negative    Lot Number [id number]     Expiration Date 4/30/2024     Continue myrbetriq and ditropan xl. Follow up 6 months. Urine is clear today.    Follow Up   Return for oab.  Patient was given instructions and counseling regarding her condition or for health maintenance advice. Please see specific information pulled into the AVS if appropriate.     DANYA Irizarry

## 2023-01-16 NOTE — THERAPY EVALUATION
Outpatient Occupational Therapy Lymphedema Initial Evaluation   [place]     Patient Name: Lilia Becerra  : 1940  MRN: [id number]  Today's Date: 2023      Visit Date: 2023    Patient Active Problem List   Diagnosis   • Gastroesophageal reflux disease   • AF (paroxysmal atrial fibrillation) (Formerly [facility])   • Hypothyroidism   • Thyroid disease   • Sleep apnea   • HBP (high blood pressure)   • Postoperative anemia due to acute blood loss (expected)   • Stage 3 chronic kidney disease (CMS/HCC)   • Broken leg   • Nonrheumatic aortic valve stenosis   • Hyperlipidemia   • OAB (overactive bladder)   • Retention of urine   • Pedal edema   • Abnormal mammogram   • Age related osteoporosis   • Arthritis   • Bowel obstruction (Formerly [facility])   • Carpal tunnel syndrome   • Chronic bilateral low back pain with left-sided sciatica   • Chronic pain disorder   • Class 2 obesity   • Complication of surgical procedure   • Diarrhea   • Degeneration of intervertebral disc of lumbar region   • Depressive disorder   • Diabetic renal disease (Formerly [facility])   • Disequilibrium   • Fecal urgency   • Glaucoma   • Hypertensive heart failure (Former[facility])   • Hypomagnesemia   • Idiopathic osteoarthritis   • Kienboeck disease of adults   • Long term (current) use of oral hypoglycemic drugs   • Lumbar radiculopathy   • Peripheral venous insufficiency   • Pneumonia   • Positive PPD   • Rapid heart rate   • Recurrent major depression ([facility])   • Restless legs syndrome   • Spondylolysis of lumbar region   • Fecal incontinence   • Diabetes mellitus ([facility])   • Malignant neoplasm of overlapping sites of right breast in female, estrogen receptor positive (Formerly [facility])   • S/P mastectomy, bilateral   • Chronic diastolic congestive heart failure (Former[facility])   • Paroxysmal atrial fibrillation (Formerly [facility])   • Abnormal gait   • Benign essential tremor   • Aortic valve disorder   • Urinary tract infection without hematuria   • History of bladder repair surgery   • Weight loss   • Anemia   • Dry  heaves   • History of colonic polyps   • Osteopenia of multiple sites   • History of recurrent UTIs        Past Medical History:   Diagnosis Date   • Acid reflux disease    • Arthritis    • Atrial fibrillation (HCC)    • Back pain    • Bowel obstruction (HCC)    • Breast cancer (HCC)    • Carpal tunnel syndrome    • Constipation    • Depression    • Diabetes mellitus (HCC)    • Disease of thyroid gland    • Dyslipidemia    • Fecal incontinence    • GERD (gastroesophageal reflux disease)    • Glaucoma     left eye   • HBP (high blood pressure)    • Hearing impaired     hearing aides   • Hiatal hernia    • High cholesterol    • History of transfusion    • History of tuberculosis     IN 1980'S WAS TREATED   • Hypertension    • Hypomagnesemia    • Kienbock's disease    • Kienböck's disease, right     KIENBOCK'S AVASCULAR NECROSIS OF LUNATE, ADULT RIGHT   • Nonrheumatic aortic valve stenosis 11/05/2020   • OAB (overactive bladder)    • Osteoporosis    • Pneumonia    • PONV (postoperative nausea and vomiting)    • Positive PPD    • RLS (restless legs syndrome)    • Sleep apnea     NO MACHINE   • Stage 3 chronic kidney disease (HCC)    • Status post bilateral mastectomy with sentinel node biopsy and right axillary node dissection 01/12/2022   • Tailbone injury     fracture   • Thyroid disease    • Urinary incontinence     wears pads   • Urinary retention         Past Surgical History:   Procedure Laterality Date   • ANKLE TENDON REPAIR     • BACK SURGERY  12/21/2018-3/2019    LUMBAR--BROKEN DISC, CLEANED OUT--HAS 2ND PROCEDURE TO FINISH REMOVING   • CARPAL TUNNEL RELEASE     • CATARACT EXTRACTION Bilateral    • COLONOSCOPY     • COLONOSCOPY N/A 11/17/2022    Procedure: COLONOSCOPY with biopsy;  Surgeon: Leo Alvares MD;  Location: [facility] ENDOSCOPY;  Service: Gastroenterology;  Laterality: N/A;  diverticulosis   • ENDOSCOPY     • ENDOSCOPY N/A 11/17/2022    Procedure: ESOPHAGOGASTRODUODENOSCOPY with biopsy and  snare;  Surgeon: Leo Alvares MD;  Location: [facility] END[facility];  Service: Gastroenterology;  Laterality: N/A;  gastric fundus polyp   • HEMORRHOIDECTOMY     • HYSTERECTOMY     • INCISION AND DRAINAGE OF WOUND      on back    • JOINT REPLACEMENT     • KNEE ARTHROPLASTY Right    • LEG SURGERY  06/13/2019   • LUMBAR DISCECTOMY Left 12/21/2018    Procedure: LEFT L4-5 MICRO DISCECTOMY;  Surgeon: Adam Coleman DO;  Location: [facility] OR;  Service: Orthopedic Spine   • LUMBAR DISCECTOMY Left 03/29/2019    Procedure: LEFT L4-5 REVISION OF MICRODISCECTOMY;  Surgeon: Adam Coleman DO;  Location: [facility] OR;  Service: Orthopedic Spine   • LUMBAR FUSION     • MASTECTOMY Bilateral 01/11/2022    Procedure: BREAST MASTECTOMY WITH SENTINEL NODE BIOPSY AND possible  AXILLARY NODE DISSECTION;  Surgeon: Lety Tena MD;  Location: [facility]SC;  Service: General;  Laterality: Bilateral;   • MASTECTOMY Bilateral    • TENDON RELEASE  09/2018   • TOTAL KNEE ARTHROPLASTY Left 06/13/2019    Procedure: LEFT KNEE REVISION;  Surgeon: Malick Costa II, MD;  Location: [facility] OR;  Service: Orthopedics   • TOTAL KNEE ARTHROPLASTY REVISION Left     x2   • TOTAL KNEE ARTHROPLASTY REVISION Left 02/13/2018    Procedure: LEFT TOTAL KNEE ARTHROPLASTY REVISION;  Surgeon: Jair Fajardo MD;  Location: [facility] OR;  Service:    • US GUIDED CYST ASPIRATION BREAST N/A 03/24/2022   • VERTEBROPLASTY           Visit Dx:     ICD-10-CM ICD-9-CM   1. Malignant neoplasm of upper-outer quadrant of right breast in female, estrogen receptor positive (HCC)  C50.411 174.4    Z17.0 V86.0   2. History of mastectomy, bilateral  Z90.13 V45.71   3. Deformity due to mastectomy  N64.89 611.89    Z90.10    4. Fitting and adjustment of unspecified prosthetic device  Z44.9 V52.9   5. 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 Listening;Reading;Demonstration;Pictures/Video  -TD         Safety    Are you being hurt, hit, or frightened by anyone at home or in your life? No  -TD      Are you being neglected by a caregiver No  -TD      Have you had any of the following issues with Depression  medicated  -TD            User Key  (r) = Recorded By, (t) = Taken By, (c) = Cosigned By    Initials Name Provider Type    TD Shelley Calderon OT Occupational Therapist                 Lymphedema     Row Name 01/16/23 0600             Lymphedema Assessment    Lymphedema Classification RUE:;at risk/stage 0  -TD      Lymphedema Cancer Related Sx bilateral;simple mastectomy;sentinel node biopsy;axillary dissection  -TD      Lymph Nodes Removed # 18  -TD      Positive Lymph Nodes # 0  -TD      Chemo Received no  -TD      Radiation Therapy Received no  -TD         LLIS - Physical Concerns    The amount of pain associated with my lymphedema is: 0  -TD      The amount of limb heaviness associated with my lymphedema is: 0  -TD      The amount of skin tightness associated with my lymphedema is: 0  -TD      The size of my swollen limb(s) seems: 0  -TD      Lymphedema affects the movement of my swollen limb(s): 0  -TD      The strength in my swollen  "limb(s) is: 0  -TD         LLIS - Psychosocial Concerns    Lymphedema affects my body image (i.e., \"how I think I look\"). 0  -TD      Lymphedema affects my socializing with others. 0  -TD      Lymphedema affects my intimate relations with spouse or partner (rate 0 if not applicable 0  -TD      Lymphedema \"gets me down\" (i.e., depression, frustration, or anger) 0  -TD      I must rely on others for help due to my lymphedema. 0  -TD      I know what to do to manage my lymphedema 3  -TD         LLIS - Functional Concerns    Lymphedema affects my ability to perform self-care activities (i.e. eating, dressing, hygiene) 0  -TD      Lymphedema affects my ability to perform routine home or work-related activities. 0  -TD      Lymphedema affects my performance of preferred leisure activities. 0  -TD      Lymphedema affects proper fit of clothing/shoes 0  -TD      Lymphedema affects my sleep 0  -TD         Posture/Observations    Alignment Options Thoracic kyphosis  -TD      Thoracic Kyphosis Mild  -TD         L-Dex Bioimpedence Screening    L-Dex Measurement Extremity --  -TD      L-Dex Patient Position --  -TD      L-Dex UE Dominate Side --  -TD      L-Dex UE At Risk Side --  -TD      L-Dex UE Pre Surgical Value --  -TD      L-Dex UE Baseline Score --  -TD         Lymphedema Life Impact Scale Totals    A.  Total Q1 - Q17 (Do not include Q18) 3  -TD      B.  Total number of questions answered (Q1-Q17) 17  -TD      C. Divide A by B 0.18  -TD      D. Multiple C by 25 4.5  -TD            User Key  (r) = Recorded By, (t) = Taken By, (c) = Cosigned By    Initials Name Provider Type    Shelley Bey OT Occupational Therapist               OT Mastectomy Care:   Location: Bilateral chest wall    Type of Prosthetic:  Silicone breast form    Reason for Visit/Customer Goal:  Patient would like to achieve symmetry and balance in appearance to improve orthopedic balance as well as improve psychological impact when engaging in " community and social activities.    Reason for Prosthetic: Prevent Deformities    Date of Surgery: 1/11/22    Date of Discharge: 1/12/22    Wearing Schedule: As Tolerated    Prosthetic Site Condition: Intact    Tolerance: Tolerates Well    Product :  Rubin    Product Name and Model Number: Adapt light 3A size 5R/5L (issued 1/16/23)    Garments  Type of Garment Dispensed: Mast Bra w/o Breast Form    Breast : Rubin    Product Name:    1- Lucrecia 38B nude  1- Lucrecia 38 B white  2- Magdelvito 38B nude    Number of Garments Dispensed: 4            Therapy Education  Education Details: Pt. was fitted with a Adapt light 3A size 5R/5L silicone breast prosthesis. See below.  Fit and size are appropriate with no gapping, pinching, or puckering observed.  Pt. states comfort and satisfaction with both bra's selected and with prosthesis.  All devices were checked for quality and safety.  Pt. was educated on the benefits, precautions warranty, care and maintenance for her prosthesis and bras.  Educational handout was provided in the prosthesis box.  Given: HEP, Symptoms/condition management  Program: New  How Provided: Verbal  Provided to: Patient  Level of Understanding: Verbalized         OT Goals     Row Name 01/16/23 1205          Time Calculation    OT Goal Re-Cert Due Date 02/15/23  -TD           User Key  (r) = Recorded By, (t) = Taken By, (c) = Cosigned By    Initials Name Provider Type    Shelley Bey OT Occupational Therapist               GOALS:      1. Post Breast Surgery Care/at risk for Lymphedema  LTG 1: 90 days:  As an indicator of no exacerbation of lymphedema staging, the patient will present with an L-Dex score less than [10] points from preoperative baseline.              STATUS: Met: ongoing  STG 1a:   30 days: To prevent exacerbation of mixed edema to lymphedema, patient will utilize the 2 postsurgical compression garments daily.                 STATUS: Met:  Ongoing  STG 1b: 30 days: Patient will be independent with self-manual lymphatic massage.               STATUS: Met: Ongoing  STG 1c: 30 days:  Patient will be independent with identification of signs and symptoms of lymphedema exasperation per stoplight to recovery education handout.              STATUS: Met: Ongoing  STG 1 d: 30 days: Patient will be independent with HEP to prevent advancement in lymphedema staging.              STATUS: Met: Ongoing  TREATMENT:  Self Care/ADL retraining, Therapeutic Activity, Neuromuscular Re-education, Therapeutic Exercise, Bioimpedence Fluid Analysis, Post-Surgical compression garement 84061 Madonna Zip-ST-High/ Jessica Camisole Kit 2860K, Orthotic Management and training,  and Manual Therapy.    1. Encounter for Breast Prosthetic and mastectomy bra fitting:  LTG 1:  90 days: To provide balance and symmetry for performance of daily activity, the patient will participate in breast prosthesis and mastectomy bra fitting procedure.   STATUS: New  STG 1a: 30 days:  Patient will participate in mastectomy bra fit re-evaluation to ensure proper fit for balance and symmetry for improved postural alignment/lymph fluid dynamics to prevent impairment in activities of daily living.   STATUS: New  STG 1b: 30 days:  Patient will participate in breast prosthesis fit re-evaluation 2 years after initial distribution to ensure proper fit for balance and symmetry for improved postural alignment/lymph fluid dynamics to prevent impairment in activities of daily living.  STATUS: New  TREATMENT: Orthotic/Prosthetic Management and Training, AmoenaSilicone Breast Prosthetic, Mastectomy Bra initial fitting and 3 month re-fitting, ADL/Self Care, Therapeutic Activity, Therapeutic Exercise, and Manual Therapy.         OT Assessment/Plan     Row Name 01/16/23 1159 01/16/23 0643       OT Assessment    Functional Limitations Performance in self-care ADL  -TD --    Impairments Impaired lymphatic circulation  -TD  --    Assessment Comments -- Pt presents with decreased balance and symmetry from breast resection that impacts orthopedic balance and symmetry.  Patient will benefit from continued evaluation and of integrity of the silicone breast form as well as the mastectomy bras to ensure proper fit for symmetry and balance of breast prosthesis to reduce orthopedic and lymphatic impairment. The skills of a therapist will be required to safely and effectively implement the following treatment plan to restore maximal level of function.  -TD    OT Diagnosis -- History of breast cancer, history of mastectomy, at risk for lymphedema  -TD    OT Rehab Potential -- Excellent  -TD    Patient/caregiver participated in establishment of treatment plan and goals -- Yes  -TD    Patient would benefit from skilled therapy intervention -- Yes  -TD       OT Plan    OT Frequency -- --  see duration  -TD    Predicted Duration of Therapy Intervention (OT) -- Pt will be seen every 3 months from baseline for years 1-3 and every 6 months years 4 and 5 for lymphedema survillance.  Pt will be seen every 3-4 months for post mastectomy bra refill and every 2 years for breast prosthesis refill  -TD    Planned CPT's? -- OT RE-EVAL: 35733;OT THER ACT EA 15 MIN: 27797BH;OT THER PROC EA 15 MIN: 43937MY;OT NEUROMUSC RE EDUCATION EA 15 MIN: 64473;OT SELF CARE/MGMT/TRAIN 15 MIN: 90061;OT ULTRASOUND EA 15 MIN: 44776;OT MANUAL THERAPY EA 15 MIN: 60253;OT BIS XTRACELL FLUID ANALYSIS: 01751;OT ORTHOTIC MGMT/TRAIN EA 15 MIN: 94764;OT ORTHO/PROSTHET CHECKOUT EA 15 MIN: 62571;OT ELECTRIC STIM ATTD EA 15 MIN: 88041  -TD    Planned Therapy Interventions (Optional Details) -- home exercise program;manual therapy techniques;strengthening;patient/family education;orthotic fitting/training  -TD    OT Plan Comments -- continue to POC  -TD          User Key  (r) = Recorded By, (t) = Taken By, (c) = Cosigned By    Initials Name Provider Type    Shelley Bey OT Occupational  Therapist                          Time Calculation:   Untimed Charges  OT Eval/Re-eval Minutes: 90  Total Minutes  Untimed Charges Total Minutes: 90   Total Minutes: 90     Therapy Charges for Today     Code Description Service Date Service Provider Modifiers Qty    38778176850 HC OT EVAL LOW COMPLEXITY 4 1/16/2023 Shelley Calderon OT GO 1    [id number]  BRA POST/SURG NO/FORM RAYMOND/TYESHA/ANDIE/POWER/MIAN 1/16/2023 Shelley Calderon OT  4    [id number]  BREAST PROSTHESIS WO ADHS ENERGY/ADAPT AMOENA 1/16/2023 Shelley Calderon OT  2                    Shelley Calderon OT  1/16/2023

## 2023-01-16 NOTE — TELEPHONE ENCOUNTER
Called patient, no answer. Left vm and call back number. Faxed to Dr. Strong ----- Message from DANYA Paris sent at 12/29/2022  3:59 PM EST -----  Okay also, please let patient know of findings that a follow-up CT of the chest is recommended and that we have forwarded this to her primary care, would recommend she be seen before March 1  Please also fax this to Dr. Strong

## 2023-02-09 NOTE — TELEPHONE ENCOUNTER
Pt returned call, verbalized understanding. States she will call PCP and make follow up sooner than 3.1.23

## 2023-02-20 ENCOUNTER — TELEPHONE (OUTPATIENT)
Dept: CARDIOLOGY | Facility: CLINIC | Age: 83
End: 2023-02-20
Payer: MEDICARE

## 2023-02-20 DIAGNOSIS — I50.9 CONGESTIVE HEART FAILURE, UNSPECIFIED HF CHRONICITY, UNSPECIFIED HEART FAILURE TYPE: Primary | ICD-10-CM

## 2023-02-20 NOTE — TELEPHONE ENCOUNTER
Patient called requesting guidance. Patient takes lasix 1 1/2 tab daily, has attempted to cut back to one pill but her swelling comes right back. Patient states she is having terrible leg cramps, so bad she is having difficulty with sleep.     Please advise. If labs are needed she would like to have her labs drawn in [place] at [facility].

## 2023-03-01 ENCOUNTER — TELEPHONE (OUTPATIENT)
Dept: CARDIOLOGY | Facility: CLINIC | Age: 83
End: 2023-03-01
Payer: MEDICARE

## 2023-03-01 DIAGNOSIS — I50.9 CONGESTIVE HEART FAILURE, UNSPECIFIED HF CHRONICITY, UNSPECIFIED HEART FAILURE TYPE: Primary | ICD-10-CM

## 2023-03-01 NOTE — TELEPHONE ENCOUNTER
Patient called inquiring of her lab results and if there are any changes to her medications. Patient request her labs be sent to her PCP as well. RN will fax most recent labs to PCP as requested.    Please advise.

## 2023-03-02 ENCOUNTER — TELEPHONE (OUTPATIENT)
Dept: CARDIOLOGY | Facility: CLINIC | Age: 83
End: 2023-03-02
Payer: MEDICARE

## 2023-03-02 NOTE — TELEPHONE ENCOUNTER
Informed pt. Pt will be having blood work done at [place]. Im going to mail her lab order to patient and re-sent medication to [facility] pharmacy so she will be able to get it sooner.

## 2023-03-02 NOTE — TELEPHONE ENCOUNTER
----- Message from DANYA Grajeda sent at 3/1/2023  4:33 PM EST -----  Please notify the patient that her magnesium level is low. I am going to send in some magnesium supplements for her. I want her to take them for one week and repeat bmp in one week.   ----- Message -----  From: Fatimah Mcmahan RegSched Rep  Sent: 2/28/2023   8:49 AM EST  To: DANYA Grajeda

## 2023-04-04 RX ORDER — MIRABEGRON 50 MG/1
TABLET, FILM COATED, EXTENDED RELEASE ORAL
Qty: 90 TABLET | Refills: 3 | OUTPATIENT
Start: 2023-04-04

## 2023-04-15 DIAGNOSIS — C50.811 MALIGNANT NEOPLASM OF OVERLAPPING SITES OF RIGHT BREAST IN FEMALE, ESTROGEN RECEPTOR POSITIVE: ICD-10-CM

## 2023-04-15 DIAGNOSIS — Z17.0 MALIGNANT NEOPLASM OF OVERLAPPING SITES OF RIGHT BREAST IN FEMALE, ESTROGEN RECEPTOR POSITIVE: ICD-10-CM

## 2023-04-17 RX ORDER — LETROZOLE 2.5 MG/1
TABLET, FILM COATED ORAL
Refills: 1 | OUTPATIENT
Start: 2023-04-17

## 2023-04-28 ENCOUNTER — TELEPHONE (OUTPATIENT)
Dept: ONCOLOGY | Facility: HOSPITAL | Age: 83
End: 2023-04-28
Payer: MEDICARE

## 2023-04-28 NOTE — TELEPHONE ENCOUNTER
- TRIED CALLING PATIENT SEVERAL TIMES TO GET HER APPT WITH DR. LOCKETT RESCHEDULED AS WE CANNOT PRESCRIBE HER MEDICATION WITHOUT HER BEING SEEN HERE AGAIN FOR A FOLLOW UP. PT CAN CALL ME BACK TO SCHEDULE [phone].

## 2023-05-03 ENCOUNTER — OFFICE VISIT (OUTPATIENT)
Dept: ONCOLOGY | Facility: HOSPITAL | Age: 83
End: 2023-05-03
Payer: MEDICARE

## 2023-05-03 ENCOUNTER — TELEPHONE (OUTPATIENT)
Dept: CARDIOLOGY | Facility: CLINIC | Age: 83
End: 2023-05-03
Payer: MEDICARE

## 2023-05-03 ENCOUNTER — HOSPITAL ENCOUNTER (OUTPATIENT)
Dept: OCCUPATIONAL THERAPY | Facility: HOSPITAL | Age: 83
Setting detail: THERAPIES SERIES
Discharge: HOME | End: 2023-05-03
Payer: MEDICARE

## 2023-05-03 VITALS
DIASTOLIC BLOOD PRESSURE: 70 MMHG | SYSTOLIC BLOOD PRESSURE: 123 MMHG | TEMPERATURE: 98.3 F | RESPIRATION RATE: 18 BRPM | HEART RATE: 81 BPM | WEIGHT: 181.22 LBS | OXYGEN SATURATION: 99 % | BODY MASS INDEX: 30.16 KG/M2

## 2023-05-03 DIAGNOSIS — N64.89 DEFORMITY DUE TO MASTECTOMY: ICD-10-CM

## 2023-05-03 DIAGNOSIS — Z17.0 MALIGNANT NEOPLASM OF UPPER-OUTER QUADRANT OF RIGHT BREAST IN FEMALE, ESTROGEN RECEPTOR POSITIVE: Primary | ICD-10-CM

## 2023-05-03 DIAGNOSIS — Z17.0 MALIGNANT NEOPLASM OF OVERLAPPING SITES OF RIGHT BREAST IN FEMALE, ESTROGEN RECEPTOR POSITIVE: ICD-10-CM

## 2023-05-03 DIAGNOSIS — Z90.13 HISTORY OF MASTECTOMY, BILATERAL: ICD-10-CM

## 2023-05-03 DIAGNOSIS — C50.811 MALIGNANT NEOPLASM OF OVERLAPPING SITES OF RIGHT BREAST IN FEMALE, ESTROGEN RECEPTOR POSITIVE: ICD-10-CM

## 2023-05-03 DIAGNOSIS — Z90.10 DEFORMITY DUE TO MASTECTOMY: ICD-10-CM

## 2023-05-03 DIAGNOSIS — Z44.9 FITTING AND ADJUSTMENT OF UNSPECIFIED PROSTHETIC DEVICE: ICD-10-CM

## 2023-05-03 DIAGNOSIS — C50.411 MALIGNANT NEOPLASM OF UPPER-OUTER QUADRANT OF RIGHT BREAST IN FEMALE, ESTROGEN RECEPTOR POSITIVE: Primary | ICD-10-CM

## 2023-05-03 PROBLEM — K22.5 ACQUIRED DIVERTICULUM OF ESOPHAGUS: Status: ACTIVE | Noted: 2023-05-03

## 2023-05-03 PROCEDURE — G0463 HOSPITAL OUTPT CLINIC VISIT: HCPCS | Performed by: INTERNAL MEDICINE

## 2023-05-03 PROCEDURE — L8000 MASTECTOMY BRA: HCPCS | Performed by: OCCUPATIONAL THERAPIST

## 2023-05-03 RX ORDER — LETROZOLE 2.5 MG/1
2.5 TABLET, FILM COATED ORAL DAILY
Qty: 90 TABLET | Refills: 1 | Status: SHIPPED | OUTPATIENT
Start: 2023-05-03

## 2023-05-03 RX ORDER — TIZANIDINE 4 MG/1
TABLET ORAL
COMMUNITY
Start: 2023-03-31

## 2023-05-03 RX ORDER — AZELASTINE HYDROCHLORIDE 0.5 MG/ML
SOLUTION/ DROPS OPHTHALMIC
COMMUNITY
Start: 2023-02-14

## 2023-05-03 RX ORDER — TRIAMCINOLONE ACETONIDE 1 MG/G
CREAM TOPICAL
COMMUNITY
Start: 2023-02-15

## 2023-05-03 RX ORDER — CYCLOBENZAPRINE HCL 10 MG
10 TABLET ORAL
COMMUNITY
Start: 2023-03-20

## 2023-05-03 RX ORDER — LETROZOLE 2.5 MG/1
TABLET, FILM COATED ORAL
Refills: 1 | OUTPATIENT
Start: 2023-05-03

## 2023-05-03 NOTE — ASSESSMENT & PLAN NOTE
Patient is on adjuvant letrozole.  Doing well.  I see no evidence of disease progression or recurrence by history, physical examination.  Continue letrozole for minimum of 5 years.  Refills provided today.  I will see her back in 6 months for continued surveillance.

## 2023-05-03 NOTE — THERAPY RE-EVALUATION
Outpatient Occupational Therapy Lymphedema Re-Evaluation  SUE Sorensen     Patient Name: Lilia Becerra  : 1940  MRN: [id number]  Today's Date: 5/3/2023      Visit Date: 2023    Patient Active Problem List   Diagnosis   • Gastroesophageal reflux disease   • AF (paroxysmal atrial fibrillation) (Formerly [facility])   • Hypothyroidism   • Thyroid disease   • Sleep apnea   • HBP (high blood pressure)   • Postoperative anemia due to acute blood loss (expected)   • Stage 3 chronic kidney disease (CMS/HCC)   • Broken leg   • Nonrheumatic aortic valve stenosis   • Hyperlipidemia   • OAB (overactive bladder)   • Retention of urine   • Pedal edema   • Abnormal mammogram   • Age related osteoporosis   • Arthritis   • Bowel obstruction   • Carpal tunnel syndrome   • Chronic bilateral low back pain with left-sided sciatica   • Chronic pain disorder   • Class 2 obesity   • Complication of surgical procedure   • Diarrhea   • Degeneration of intervertebral disc of lumbar region   • Depressive disorder   • Diabetic renal disease   • Disequilibrium   • Fecal urgency   • Glaucoma   • Hypertensive heart failure   • Hypomagnesemia   • Idiopathic osteoarthritis   • Kienboeck disease of adults   • Long term (current) use of oral hypoglycemic drugs   • Lumbar radiculopathy   • Peripheral venous insufficiency   • Pneumonia   • Positive PPD   • Rapid heart rate   • Recurrent major depression   • Restless legs syndrome   • Spondylolysis of lumbar region   • Fecal incontinence   • Diabetes mellitus   • Malignant neoplasm of overlapping sites of right breast in female, estrogen receptor positive   • S/P mastectomy, bilateral   • Chronic diastolic congestive heart failure   • Paroxysmal atrial fibrillation   • Abnormal gait   • Benign essential tremor   • Aortic valve disorder   • Urinary tract infection without hematuria   • History of bladder repair surgery   • Weight loss   • Anemia   • Dry heaves   • History of colonic polyps   • Osteopenia of  multiple sites   • History of recurrent UTIs   • Acquired diverticulum of esophagus        Past Medical History:   Diagnosis Date   • Acid reflux disease    • Arthritis    • Atrial fibrillation    • Back pain    • Bowel obstruction    • Breast cancer    • Carpal tunnel syndrome    • Constipation    • Depression    • Diabetes mellitus    • Disease of thyroid gland    • Dyslipidemia    • Fecal incontinence    • GERD (gastroesophageal reflux disease)    • Glaucoma     left eye   • HBP (high blood pressure)    • Hearing impaired     hearing aides   • Hiatal hernia    • High cholesterol    • History of transfusion    • History of tuberculosis     IN 1980'S WAS TREATED   • Hypertension    • Hypomagnesemia    • Kienbock's disease    • Kienböck's disease, right     KIENBOCK'S AVASCULAR NECROSIS OF LUNATE, ADULT RIGHT   • Nonrheumatic aortic valve stenosis 11/05/2020   • OAB (overactive bladder)    • Osteoporosis    • Pneumonia    • PONV (postoperative nausea and vomiting)    • Positive PPD    • RLS (restless legs syndrome)    • Sleep apnea     NO MACHINE   • Stage 3 chronic kidney disease    • Status post bilateral mastectomy with sentinel node biopsy and right axillary node dissection 01/12/2022   • Tailbone injury     fracture   • Thyroid disease    • Urinary incontinence     wears pads   • Urinary retention         Past Surgical History:   Procedure Laterality Date   • ANKLE TENDON REPAIR     • BACK SURGERY  12/21/2018-3/2019    LUMBAR--BROKEN DISC, CLEANED OUT--HAS 2ND PROCEDURE TO FINISH REMOVING   • CARPAL TUNNEL RELEASE     • CATARACT EXTRACTION Bilateral    • COLONOSCOPY     • COLONOSCOPY N/A 11/17/2022    Procedure: COLONOSCOPY with biopsy;  Surgeon: Leo Alvares MD;  [facility];  Service: Gastroenterology;  Laterality: N/A;  diverticulosis   • ENDOSCOPY     • ENDOSCOPY N/A 11/17/2022    Procedure: ESOPHAGOGASTRODUODENOSCOPY with biopsy and snare;  Surgeon: Leo Alvares MD;   Location: [facility] ENDOSCOPY;  Service: Gastroenterology;  Laterality: N/A;  gastric fundus polyp   • HEMORRHOIDECTOMY     • HYSTERECTOMY     • INCISION AND DRAINAGE OF WOUND      on back    • JOINT REPLACEMENT     • KNEE ARTHROPLASTY Right    • LEG SURGERY  06/13/2019   • LUMBAR DISCECTOMY Left 12/21/2018    Procedure: LEFT L4-5 MICRO DISCECTOMY;  Surgeon: Adam Coleman DO;  Location: [facility] OR;  Service: Orthopedic Spine   • LUMBAR DISCECTOMY Left 03/29/2019    Procedure: LEFT L4-5 REVISION OF MICRODISCECTOMY;  Surgeon: Adam Coleman DO;  Location: [facility] OR;  Service: Orthopedic Spine   • LUMBAR FUSION     • MASTECTOMY Bilateral 01/11/2022    Procedure: BREAST MASTECTOMY WITH SENTINEL NODE BIOPSY AND possible  AXILLARY NODE DISSECTION;  Surgeon: Lety Tena MD;  Location: [facility] OR OSC;  Service: General;  Laterality: Bilateral;   • MASTECTOMY Bilateral    • TENDON RELEASE  09/2018   • TOTAL KNEE ARTHROPLASTY Left 06/13/2019    Procedure: LEFT KNEE REVISION;  Surgeon: Malick Costa II, MD;  Location: [facility];  Service: Orthopedics   • TOTAL KNEE ARTHROPLASTY REVISION Left     x2   • TOTAL KNEE ARTHROPLASTY REVISION Left 02/13/2018    Procedure: LEFT TOTAL KNEE ARTHROPLASTY REVISION;  Surgeon: Jair Fajardo MD;  Location: [facility] OR;  Service:    • US GUIDED CYST ASPIRATION BREAST N/A 03/24/2022   • VERTEBROPLASTY           Visit Dx:     ICD-10-CM ICD-9-CM   1. Malignant neoplasm of upper-outer quadrant of right breast in female, estrogen receptor positive  C50.411 174.4    Z17.0 V86.0   2. History of mastectomy, bilateral  Z90.13 V45.71   3. Deformity due to mastectomy  N64.89 611.89    Z90.10    4. 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 Listening;Reading;Demonstration;Pictures/Video  -TD         Safety    Are you being hurt, hit, or frightened by anyone at home or in your life? No  -TD      Are you being neglected by a caregiver No  -TD      Have you had any of the following issues with Depression  medicated  -TD            User Key  (r) = Recorded By, (t) = Taken By, (c) = Cosigned By    Initials Name Provider Type    TD Shelley Calderon OT Occupational Therapist                 Lymphedema     Row Name 05/03/23 0600             Lymphedema Assessment    Lymphedema Classification RUE:;at risk/stage 0  -TD      Lymphedema Cancer Related Sx bilateral;simple mastectomy;sentinel node biopsy;axillary dissection  -TD      Lymph Nodes Removed # 18  -TD      Positive Lymph Nodes # 0  -TD      Chemo Received no  -TD      Radiation Therapy Received no  -TD         LLIS - Physical Concerns    The amount of pain associated with my lymphedema is: 0  -TD      The amount of limb heaviness associated with my lymphedema is: 0  -TD      The amount of skin tightness associated with my lymphedema is: 0  -TD      The size of my swollen limb(s) seems: 0  -TD      Lymphedema affects the movement of my swollen limb(s): 0  -TD      The strength in my swollen limb(s) is: 0  -TD         LLIS - Psychosocial Concerns    Lymphedema affects my body image  "(i.e., \"how I think I look\"). 0  -TD      Lymphedema affects my socializing with others. 0  -TD      Lymphedema affects my intimate relations with spouse or partner (rate 0 if not applicable 0  -TD      Lymphedema \"gets me down\" (i.e., depression, frustration, or anger) 0  -TD      I must rely on others for help due to my lymphedema. 0  -TD      I know what to do to manage my lymphedema 3  -TD         LLIS - Functional Concerns    Lymphedema affects my ability to perform self-care activities (i.e. eating, dressing, hygiene) 0  -TD      Lymphedema affects my ability to perform routine home or work-related activities. 0  -TD      Lymphedema affects my performance of preferred leisure activities. 0  -TD      Lymphedema affects proper fit of clothing/shoes 0  -TD      Lymphedema affects my sleep 0  -TD         Posture/Observations    Alignment Options Thoracic kyphosis  -TD      Thoracic Kyphosis Mild  -TD         General ROM    GENERAL ROM COMMENTS BUE are WFL  -TD         MMT (Manual Muscle Testing)    General MMT Comments BUE are WFL  -TD         L-Dex Bioimpedence Screening    L-Dex Measurement Extremity RUE  -TD      L-Dex Patient Position Standing  -TD      L-Dex UE Dominate Side Right  -TD      L-Dex UE At Risk Side Right  -TD      L-Dex UE Pre Surgical Value Yes  -TD      L-Dex UE Baseline Score 4.5  -TD         Lymphedema Life Impact Scale Totals    A.  Total Q1 - Q17 (Do not include Q18) 3  -TD      B.  Total number of questions answered (Q1-Q17) 17  -TD      C. Divide A by B 0.18  -TD      D. Multiple C by 25 4.5  -TD            User Key  (r) = Recorded By, (t) = Taken By, (c) = Cosigned By    Initials Name Provider Type    TD Shelley Calderon OT Occupational Therapist                 OT Ortho     Row Name 05/03/23 0600             Orthotics & Prosthetics Management    Orthosis Location other (see comments)  Post mastectomy orthosis  -TD      Additional Documentation Orthosis Location (Row)  -TD            User " Key  (r) = Recorded By, (t) = Taken By, (c) = Cosigned By    Initials Name Provider Type    Shelley Bey OT Occupational Therapist               OT Mastectomy Care:   Location:        Bilateral chest wall     Type of Prosthetic:  Silicone breast form     Reason for Visit/Customer Goal:  Patient would like to achieve symmetry and balance in appearance to improve orthopedic balance as well as improve psychological impact when engaging in community and social activities.     Reason for Prosthetic: Prevent Deformities     Date of Surgery: 1/11/22     Date of Discharge: 1/12/22     Wearing Schedule:   As Tolerated     Prosthetic Site Condition:   Intact     Tolerance:      Tolerates Well     Product :  Rubin     Product Name and Model Number: Adapt light 3A size 5R/5L (issued 1/16/23)     Garments  Type of Garment Dispensed:          Mast Bra w/o Breast Form     Breast : Rubin     Product Name:            1- Lucrecia 38B nude  1- Lucrecia 38 B white       Number of Garments Dispensed:   2      Therapy Education  Education Details: Fit and size are appropriate with no gapping, pinching, or puckering observed. Pt. states comfort and satisfaction with both bra's selected and with prosthesis. All devices were checked for quality and safety. Pt. was educated on the benefits, precautions warranty, care and maintenance for her prosthesis and bras. Educational handout was provided in the prosthesis box.  Given: HEP, Symptoms/condition management  Program: New  How Provided: Verbal  Provided to: Patient  Level of Understanding: Verbalized  63851 - OT Self Care/Mgmt Minutes: 15         OT Goals     Row Name 05/03/23 1219          Time Calculation    OT Goal Re-Cert Due Date 06/02/23  -TD           User Key  (r) = Recorded By, (t) = Taken By, (c) = Cosigned By    Initials Name Provider Type    Shelley eBy OT Occupational Therapist            GOALS:      1. Post Breast Surgery Care/at risk  for Lymphedema  LTG 1: 90 days:  As an indicator of no exacerbation of lymphedema staging, the patient will present with an L-Dex score less than [10] points from preoperative baseline.              STATUS: Met: ongoing  STG 1a:   30 days: To prevent exacerbation of mixed edema to lymphedema, patient will utilize the 2 postsurgical compression garments daily.                 STATUS: Met: Ongoing  STG 1b: 30 days: Patient will be independent with self-manual lymphatic massage.               STATUS: Met: Ongoing  STG 1c: 30 days:  Patient will be independent with identification of signs and symptoms of lymphedema exasperation per stoplight to recovery education handout.              STATUS: Met: Ongoing  STG 1 d: 30 days: Patient will be independent with HEP to prevent advancement in lymphedema staging.              STATUS: Met: Ongoing  TREATMENT:  Self Care/ADL retraining, Therapeutic Activity, Neuromuscular Re-education, Therapeutic Exercise, Bioimpedence Fluid Analysis, Post-Surgical compression garement [address]250 [address]onna Zip-ST-High/ Jessica Camisole Kit 2860K, Orthotic Management and training,  and Manual Therapy.     1. Encounter for Breast Prosthetic and mastectomy bra fitting:  LTG 1:  90 days: To provide balance and symmetry for performance of daily activity, the patient will participate in breast prosthesis and mastectomy bra fitting procedure.              STATUS: New  STG 1a: 30 days:  Patient will participate in mastectomy bra fit re-evaluation to ensure proper fit for balance and symmetry for improved postural alignment/lymph fluid dynamics to prevent impairment in activities of daily living.              STATUS: New  STG 1b: 30 days:  Patient will participate in breast prosthesis fit re-evaluation 2 years after initial distribution to ensure proper fit for balance and symmetry for improved postural alignment/lymph fluid dynamics to prevent impairment in activities of daily living.  STATUS: New  TREATMENT:  Orthotic/Prosthetic Management and Training, AmoenaSilicone Breast Prosthetic, Mastectomy Bra initial fitting and 3 month re-fitting, ADL/Self Care, Therapeutic Activity, Therapeutic Exercise, and Manual Therapy.          OT Assessment/Plan     Row Name 05/03/23 0653          OT Assessment    Functional Limitations Performance in self-care ADL  -TD     Impairments Impaired lymphatic circulation  -TD     Assessment Comments Pt presents with decreased balance and symmetry from breast resection that impacts orthopedic balance and symmetry. Patient will benefit from continued evaluation and of integrity of the silicone breast form as well as the mastectomy bras to ensure proper fit for symmetry and balance of breast prosthesis to reduce orthopedic and lymphatic impairment. The skills of a therapist will be required to safely and effectively implement the following treatment plan to restore maximal level of function.  -TD     OT Diagnosis History of breast cancer, history of mastectomy, at risk for lymphedema  -TD     OT Rehab Potential Good  -TD     Patient/caregiver participated in establishment of treatment plan and goals Yes  -TD     Patient would benefit from skilled therapy intervention Yes  -TD        OT Plan    OT Frequency --  see duration  -TD     Predicted Duration of Therapy Intervention (OT) Pt will be seen every 3 months from baseline for years 1-3 and every 6 months years 4 and 5 for lymphedema survillance. Pt will be seen every 3-4 months for post mastectomy bra refill and every 2 years for breast prosthesis refill  -TD     Planned CPT's? OT RE-EVAL: 98731;OT THER ACT EA 15 MIN: [id number];OT THER PROC EA 15 MIN: [id number];OT NEUROMUSC RE EDUCATION EA 15 MIN: 26343;OT SELF CARE/MGMT/TRAIN 15 MIN: 95278;OT ULTRASOUND EA 15 MIN: 78805;OT MANUAL THERAPY EA 15 MIN: 33377;OT BIS XTRACELL FLUID ANALYSIS: 91925;OT ORTHOTIC MGMT/TRAIN EA 15 MIN: 71781;OT ORTHO/PROSTHET CHECKOUT EA 15 MIN: 81377;OT ELECTRIC STIM ATTD  EA 15 MIN: 01579  -TD     Planned Therapy Interventions (Optional Details) home exercise program;manual therapy techniques;strengthening;patient/family education;orthotic fitting/training  -TD     OT Plan Comments continue POC  -TD           User Key  (r) = Recorded By, (t) = Taken By, (c) = Cosigned By    Initials Name Provider Type    TD Shelley Calderon OT Occupational Therapist                          Time Calculation:   Timed Charges  35877 - OT Self Care/Mgmt Minutes: 15  Total Minutes  Timed Charges Total Minutes: 15   Total Minutes: 15     Therapy Charges for Today     Code Description Service Date Service Provider Modifiers Qty    60207317463 HC BRA POST/SURG NO/FORM RAYMOND/TYESHA/ANDIE/POWER/MIAN 5/3/2023 Shelley Calderon OT  2                    Shelley Calderon OT  5/3/2023

## 2023-05-03 NOTE — TELEPHONE ENCOUNTER
Procedure: Left Carpal Tunnel Release    Medication Directive: Eliquis    PMH: Nonrheumatic aortica valve stenosis, PAF, CHF, HLD, HTN    Last Seen: 11/29/22    ECHO: 03/31/23  CONCLUSION:  Mild left atrial enlargement.  Normal LV systolic function,   EF 55%   with evidence of grade 2 diastolic dysfunction.  Aortic stenosis, probably   moderate to more severe.  No obvious insufficiency.     Mitral valve is nonspecifically thickened.  There is mild to more moderate   mitral regurgitation.

## 2023-05-03 NOTE — PROGRESS NOTES
Chief Complaint  Breast Cancer    Leo Lomas MD    Subjective          Lilia Becerra presents to [facility] for follow-up of breast cancer.  She is on adjuvant letrozole.  She is taking medication as required.  She has new masses or adenopathy.  No unusual aches or pains.  Denies any issues from either mastectomy site.  She is trying exercise as tolerated.  She does take calcium and vitamin D daily.    Oncology/Hematology History Overview Note   12/20/22 Right breast, 9 o'clock position, 6 cm from nipple, ultrasound-guided core biopsy:  - Invasive carcinoma of no special type (ductal)  - Histologic grade (Ramon Histologic Score):  - Glandular (Acinar)/Tubular Differentiation: Score 3  - Nuclear Pleomorphism: Score 2  - Mitotic Rate: Score 1  - Overall Grade: Grade 2  - Size of largest focus of invasion: 9 mm  - Breast biomarker testing:  - Estrogen Receptor (ER): Positive (100%, strong)  - Progesterone Receptor (PgR): Positive (100%, strong)  - HER2 (by immunohistochemistry): Equivocal (Score 2+), see comment  - Ki-67: 5%  2. 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 Grade 2, All margins negative. 0/18 lymph nodes.   Left Mastectomy-benign.     Oncotype score 3    2/21/22 Letrozole  initiated.          Malignant neoplasm of overlapping sites of right breast in female, estrogen receptor positive   12/23/2021 Initial Diagnosis    Malignant neoplasm of overlapping sites of right breast in female, estrogen receptor positive (HCC)     2/21/2022 Cancer Staged    Staging form: Breast, AJCC 8th Edition  - Clinical: Stage IA (cT1b(2), cN0, cM0, G2, ER+, HI+, HER2-) - Signed by Sean Ward MD on 2/21/2022 8/17/2022 -  Chemotherapy    OP BREAST Letrozole         Review of Systems   Constitutional: Negative for appetite change, diaphoresis, fatigue, fever, unexpected weight gain and unexpected weight loss.   HENT: Negative for hearing loss, mouth sores, sore throat, swollen glands, trouble swallowing and voice change.    Eyes: Negative for blurred vision.   Respiratory: Negative for cough, shortness of breath and wheezing.    Cardiovascular: Negative for chest pain and palpitations.   Gastrointestinal: Positive for diarrhea. Negative for abdominal pain, blood in stool, constipation, nausea and vomiting.   Endocrine: Negative for cold intolerance and heat intolerance.   Genitourinary: Negative for difficulty urinating, dysuria, frequency, hematuria and urinary incontinence.   Musculoskeletal: Negative for arthralgias, back pain and myalgias.   Skin: Negative for rash, skin lesions and wound.   Neurological: Negative for dizziness, seizures, weakness, numbness and headache.   Hematological: Does not bruise/bleed easily.   Psychiatric/Behavioral: Negative for depressed mood. The patient is not nervous/anxious.      Current Outpatient Medications on File Prior to Visit   Medication Sig Dispense Refill   • alendronate (FOSAMAX) 35 MG tablet Take 1 tablet by mouth Every 7 (Seven) Days. sunday     • apixaban (ELIQUIS) 5 MG tablet tablet Take 1 tablet by mouth Every 12 (Twelve) Hours. (Patient taking differently: Take 1 tablet by mouth. 1/2 bid) 180 tablet 3   • atorvastatin (LIPITOR) 10 MG tablet Take 1  tablet by mouth Every Night. 90 tablet 3   • azelastine (OPTIVAR) 0.05 % ophthalmic solution      • brimonidine (ALPHAGAN) 0.2 % ophthalmic solution Administer 1 drop to both eyes 2 (Two) Times a Day.     • Calcium Carb-Cholecalciferol (CALCIUM 600 + D PO) Take 1 tablet by mouth 2 (Two) Times a Day.     • cyclobenzaprine (FLEXERIL) 10 MG tablet Take 1 tablet by mouth.     • dilTIAZem CD (CARDIZEM CD) 120 MG 24 hr capsule Take 1 capsule by mouth Daily. 90 capsule 3   • dorzolamide-timolol (COSOPT) 22.3-6.8 MG/ML ophthalmic solution Administer 1 drop to both eyes 2 (Two) Times a Day.     • DULoxetine (CYMBALTA) 60 MG capsule 1 capsule Daily.     • furosemide (LASIX) 40 MG tablet Take 1 1/2 tablets every morning. Can decrease to one tablet a day if swelling improves 145 tablet 3   • latanoprost (XALATAN) 0.005 % ophthalmic solution 1 drop Every Night.     • levothyroxine (SYNTHROID, LEVOTHROID) 25 MCG tablet Take 1 tablet by mouth Daily. Currently doesn't have .     • losartan (COZAAR) 25 MG tablet Take 1 tablet by mouth Daily. 90 tablet 3   • magnesium oxide 250 MG tablet Take 1 tablet by mouth Daily. 7 tablet 0   • meclizine (ANTIVERT) 25 MG tablet 1 tablet 3 (Three) Times a Day As Needed.     • metFORMIN (GLUCOPHAGE) 500 MG tablet Take 1 tablet by mouth 2 (Two) Times a Day. Hold 11/16/and 11/17     • metoprolol succinate XL (TOPROL-XL) 25 MG 24 hr tablet Take 1 tablet by mouth Every Night. 90 tablet 3   • Mirabegron ER (MYRBETRIQ) 50 MG tablet sustained-release 24 hour 24 hr tablet Take 50 mg by mouth Daily.     • Multiple Vitamins-Minerals (MULTIVITAMIN ADULTS PO) Take 1 tablet by mouth Daily.     • omeprazole (priLOSEC) 20 MG capsule Take 1 capsule by mouth Daily.     • oxybutynin XL (DITROPAN-XL) 10 MG 24 hr tablet Take 1 tablet by mouth Daily. 90 tablet 1   • pramipexole (MIRAPEX) 1.5 MG tablet Take 1 tablet by mouth Every Night.     • spironolactone (ALDACTONE) 25 MG tablet Take 1 tablet by mouth Daily. 90  tablet 3   • tiZANidine (ZANAFLEX) 4 MG tablet      • triamcinolone (KENALOG) 0.1 % cream      • VENLAFAXINE HCL ER PO Take 150 mg by mouth Daily.     • vitamin C (ASCORBIC ACID) 500 MG tablet Take 1 tablet by mouth Daily.     • [DISCONTINUED] letrozole (FEMARA) 2.5 MG tablet TAKE 1 TABLET DAILY 90 tablet 1     No current facility-administered medications on file prior to visit.       Allergies   Allergen Reactions   • Eggs Or Egg-Derived Products Swelling   • Sertraline Unknown - High Severity     Bleeding in stomach    • Tetanus Toxoid Swelling   • Tetanus Toxoids Swelling   • Nsaids Unknown - High Severity   • Tetanus-Diphtheria Toxoids Td Unknown - Low Severity   • Tuberculin Ppd Unknown - Low Severity   • Metformin Diarrhea and Unknown - Low Severity     Past Medical History:   Diagnosis Date   • Acid reflux disease    • Arthritis    • Atrial fibrillation    • Back pain    • Bowel obstruction    • Breast cancer    • Carpal tunnel syndrome    • Constipation    • Depression    • Diabetes mellitus    • Disease of thyroid gland    • Dyslipidemia    • Fecal incontinence    • GERD (gastroesophageal reflux disease)    • Glaucoma     left eye   • HBP (high blood pressure)    • Hearing impaired     hearing aides   • Hiatal hernia    • High cholesterol    • History of transfusion    • History of tuberculosis     IN 1980'S WAS TREATED   • Hypertension    • Hypomagnesemia    • Kienbock's disease    • Kienböck's disease, right     KIENBOCK'S AVASCULAR NECROSIS OF LUNATE, ADULT RIGHT   • Nonrheumatic aortic valve stenosis 11/05/2020   • OAB (overactive bladder)    • Osteoporosis    • Pneumonia    • PONV (postoperative nausea and vomiting)    • Positive PPD    • RLS (restless legs syndrome)    • Sleep apnea     NO MACHINE   • Stage 3 chronic kidney disease    • Status post bilateral mastectomy with sentinel node biopsy and right axillary node dissection 01/12/2022   • Tailbone injury     fracture   • Thyroid disease    •  Urinary incontinence     wears pads   • Urinary retention      Past Surgical History:   Procedure Laterality Date   • ANKLE TENDON REPAIR     • BACK SURGERY  12/21/2018-3/2019    LUMBAR--BROKEN DISC, CLEANED OUT--HAS 2ND PROCEDURE TO FINISH REMOVING   • CARPAL TUNNEL RELEASE     • CATARACT EXTRACTION Bilateral    • COLONOSCOPY     • COLONOSCOPY N/A 11/17/2022    Procedure: COLONOSCOPY with biopsy;  Surgeon: Leo Alvares MD;  Location: [facility] ENDOSCOPY;  Service: Gastroenterology;  Laterality: N/A;  diverticulosis   • ENDOSCOPY     • ENDOSCOPY N/A 11/17/2022    Procedure: ESOPHAGOGASTRODUODENOSCOPY with biopsy and snare;  Surgeon: Leo Alvares MD;  Location: [facility] ENDOSCOPY;  Service: Gastroenterology;  Laterality: N/A;  gastric fundus polyp   • HEMORRHOIDECTOMY     • HYSTERECTOMY     • INCISION AND DRAINAGE OF WOUND      on back    • JOINT REPLACEMENT     • KNEE ARTHROPLASTY Right    • LEG SURGERY  06/13/2019   • LUMBAR DISCECTOMY Left 12/21/2018    Procedure: LEFT L4-5 MICRO DISCECTOMY;  Surgeon: Adam Coleman DO;  Location: [facility] OR;  Service: Orthopedic Spine   • LUMBAR DISCECTOMY Left 03/29/2019    Procedure: LEFT L4-5 REVISION OF MICRODISCECTOMY;  Surgeon: Adam Coleman DO;  Location: [facility] OR;  Service: Orthopedic Spine   • LUMBAR FUSION     • MASTECTOMY Bilateral 01/11/2022    Procedure: BREAST MASTECTOMY WITH SENTINEL NODE BIOPSY AND possible  AXILLARY NODE DISSECTION;  Surgeon: Lety Tena MD;  Location: [facility];  Service: General;  Laterality: Bilateral;   • MASTECTOMY Bilateral    • TENDON RELEASE  09/2018   • TOTAL KNEE ARTHROPLASTY Left 06/13/2019    Procedure: LEFT KNEE REVISION;  Surgeon: Malick Costa II, MD;  Location: [facility] OR;  Service: Orthopedics   • TOTAL KNEE ARTHROPLASTY REVISION Left     x2   • TOTAL KNEE ARTHROPLASTY REVISION Left 02/13/2018    Procedure: LEFT TOTAL KNEE ARTHROPLASTY REVISION;  Surgeon: Jair Fajardo MD;   Location: [facility] MAIN OR;  Service:    • US GUIDED CYST ASPIRATION BREAST N/A 03/24/2022   • VERTEBROPLASTY       Social History     Socioeconomic History   • Marital status:    Tobacco Use   • Smoking status: Never   • Smokeless tobacco: Never   Vaping Use   • Vaping Use: Never used   Substance and Sexual Activity   • Alcohol use: No   • Drug use: No   • Sexual activity: Defer     Family History   Problem Relation Age of Onset   • Breast cancer Mother    • Tuberculosis Mother    • Diabetes Father    • Diabetes Sister    • Malig Hyperthermia Neg Hx    • Colon cancer Neg Hx        Objective   Physical Exam  Vitals reviewed. Exam conducted with a chaperone present.   Constitutional:       General: She is not in acute distress.     Appearance: Normal appearance.   Cardiovascular:      Rate and Rhythm: Normal rate and regular rhythm.      Heart sounds: Normal heart sounds. No murmur heard.    No gallop.   Pulmonary:      Effort: Pulmonary effort is normal.      Breath sounds: Normal breath sounds.   Abdominal:      General: Abdomen is flat. Bowel sounds are normal.      Palpations: Abdomen is soft.   Musculoskeletal:      Cervical back: Neck supple.      Right lower leg: No edema.      Left lower leg: No edema.   Lymphadenopathy:      Cervical: No cervical adenopathy.   Neurological:      Mental Status: She is alert and oriented to person, place, and time.   Psychiatric:         Behavior: Behavior normal.         Vitals:    05/03/23 1034   BP: 123/70   Pulse: 81   Resp: 18   Temp: 98.3 °F (36.8 °C)   TempSrc: Temporal   SpO2: 99%   Weight: 82.2 kg (181 lb 3.5 oz)   PainSc: 10-Worst pain ever   PainLoc: Foot  Comment: feet     ECOG score: 1         PHQ-9 Total Score:                    Result Review :   The following data was reviewed by: Sean Ward MD on 05/03/2023:  Lab Results   Component Value Date    HGB 11.7 (L) 04/05/2022    HCT 35.5 04/05/2022    MCV 92.2 04/05/2022     04/05/2022    WBC  13.42 (H) 04/05/2022    NEUTROABS 10.16 (H) 04/05/2022    LYMPHSABS 2.36 04/05/2022    MONOSABS 0.66 04/05/2022    EOSABS 0.17 04/05/2022    BASOSABS 0.04 04/05/2022     Lab Results   Component Value Date    GLUCOSE 234 (H) 04/05/2022    BUN 22 04/05/2022    CREATININE 1.38 (H) 04/05/2022     (L) 04/05/2022    K 4.2 04/05/2022    CL 94 (L) 04/05/2022    CO2 27.1 04/05/2022    CALCIUM 10.0 04/05/2022    PROTEINTOT 7.1 04/05/2022    ALBUMIN 4.00 04/05/2022    BILITOT 0.6 04/05/2022    ALKPHOS 134 (H) 04/05/2022    AST 11 04/05/2022    ALT 9 04/05/2022     Lab Results   Component Value Date    MG 1.7 (L) 09/15/2021    PHOS 2.7 06/17/2019     No results found for: IRON, LABIRON, TRANSFERRIN, TIBC  No results found for: LDH, FERRITIN, [id number], FOLATE  No results found for: PSA, CEA, AFP, ,           Assessment and Plan    Diagnoses and all orders for this visit:    1. Malignant neoplasm of overlapping sites of right breast in female, estrogen receptor positive  Assessment & Plan:  Patient is on adjuvant letrozole.  Doing well.  I see no evidence of disease progression or recurrence by history, physical examination.  Continue letrozole for minimum of 5 years.  Refills provided today.  I will see her back in 6 months for continued surveillance.    Orders:  -     letrozole (FEMARA) 2.5 MG tablet; Take 1 tablet by mouth Daily.  Dispense: 90 tablet; Refill: 1          Patient Follow Up: 6 months    Patient was given instructions and counseling regarding her condition or for health maintenance advice. Please see specific information pulled into the AVS if appropriate.     Sean Ward MD    5/3/2023

## 2023-05-04 NOTE — TELEPHONE ENCOUNTER
Patient is low risk for perioperative cardiac complications based on review of medical records. She may hold Eliquis for 2 days as requested.

## 2023-05-11 ENCOUNTER — OFFICE VISIT (OUTPATIENT)
Dept: SURGERY | Facility: CLINIC | Age: 83
End: 2023-05-11
Payer: MEDICARE

## 2023-05-11 VITALS — WEIGHT: 181 LBS | RESPIRATION RATE: 16 BRPM | BODY MASS INDEX: 30.16 KG/M2 | HEIGHT: 65 IN

## 2023-05-11 DIAGNOSIS — C50.811 MALIGNANT NEOPLASM OF OVERLAPPING SITES OF RIGHT BREAST IN FEMALE, ESTROGEN RECEPTOR POSITIVE: Primary | ICD-10-CM

## 2023-05-11 DIAGNOSIS — Z17.0 MALIGNANT NEOPLASM OF OVERLAPPING SITES OF RIGHT BREAST IN FEMALE, ESTROGEN RECEPTOR POSITIVE: Primary | ICD-10-CM

## 2023-05-11 NOTE — PROGRESS NOTES
Chief Complaint  Follow-up    Subjective          History of Present Illness  The patient is here to follow up on bilateral mastectomy with SLN bx.  They are doing well and have no complaints.  Pathology is shown below:    Clinical Information    Comment:    Malignant neoplasm of overlapping sites of right breast in female, estrogen receptor positive (HCC)      Final Diagnosis   1. Left breast, mastectomy:               - Sclerosing adenosis               - Intraductal papilloma               - Radial scar               - Florid usual ductal hyperplasia               - Fibroadenomatoid change               - Duct ectasia with calcification               - Fibrosis               - Calcified blood vessels     2. 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 Right sentinel lymph node #1, excision:               - One lymph node negative for carcinoma (0/1)               - See synoptic checklist     4. Right breast, new margin, excision:               - Negative for carcinoma               - See synoptic checklist     5. Right axillary contents, dissection:               - Seventeen lymph nodes negative for carcinoma (0/17)               - See synoptic checklist      Electronically signed by Monty Joyner MD on 1/13/2022 at 1411   Synoptic Checklist     INVASIVE CARCINOMA OF THE BREAST: Resection  8th Edition - Protocol posted: 6/30/2021  INVASIVE CARCINOMA OF THE BREAST: COMPLETE EXCISION - 2, 3, 4, 5  SPECIMEN   Procedure  Total mastectomy    Specimen Laterality  Right    TUMOR   Histologic Type  Invasive carcinoma of no special type (ductal)    Histologic Grade (Ramon Histologic Score)     Glandular (Acinar) / Tubular Differentiation  Score 3    Nuclear Pleomorphism  Score 2    Mitotic Rate  Score 1    Overall Grade  Grade 2 (scores of 6 or 7)    Tumor Size   "Greatest dimension of largest invasive focus (Millimeters): 18 mm   Tumor Focality  Multiple foci of invasive carcinoma    Number of Foci  At least: 2    Ductal Carcinoma In Situ (DCIS)  Present    Size (Extent) of DCIS  Cannot be determined    Number of Blocks with DCIS  5    Number of Blocks Examined  13    Architectural Patterns  [place]      Cribriform    Nuclear Grade  Grade III (high)    Necrosis  Present, central (expansive \"comedo\" necrosis)    Tumor Extent     Treatment Effect in the Breast  No known presurgical therapy    MARGINS   Margin Status for Invasive Carcinoma  All margins negative for invasive carcinoma    Distance from Invasive Carcinoma to Closest Margin  5 mm   Closest Margin(s) to Invasive Carcinoma  Posterior    Margin Status for DCIS  All margins negative for DCIS    Distance from DCIS to Closest Margin  7 mm   Closest Margin(s) to DCIS  Posterior    REGIONAL LYMPH NODES   Regional Lymph Node Status  All regional lymph nodes negative for tumor    Total Number of Lymph Nodes Examined (sentinel and non-sentinel)  18    Number of [place] Nodes Examined  1    PATHOLOGIC STAGE CLASSIFICATION (pTNM, AJCC 8th Edition)   Reporting of pT, pN, and (when applicable) pM categories is based on information available to the pathologist at the time the report is issued. As per the AJCC (Chapter 1, 8th Ed.) it is the managing physician’s responsibility to establish the final pathologic stage based upon all pertinent information, including but potentially not limited to this pathology report.   TNM Descriptors  m (multiple foci of invasive carcinoma)    pT Category  pT1c    pN Category  pN0    Breast Biomarker Testing Performed on Previous Biopsy     Estrogen Receptor (ER) Status  Positive (greater than 10% of cells demonstrate nuclear positivity)    Percentage of Cells with Nuclear Positivity  100 %   Breast Biomarker Testing Performed on Previous Biopsy     Progesterone Receptor (PgR) Status  Positive  " "  Percentage of Cells with Nuclear Positivity  100 %   Breast Biomarker Testing Performed on Previous Biopsy     HER2 (by immunohistochemistry)  Equivocal (Score 2+)    Breast Biomarker Testing Performed on Previous Biopsy     HER2 (by in situ hybridization)  Negative (not amplified)    Breast Biomarker Testing Performed on Previous Biopsy     Ki-67 Percentage of Positive Nuclei  5 %   Testing Performed on Case Number  [id number]         Bilateral breast flaps healed with no issues.    Objective   Vital Signs:   Resp 16   Ht 165.1 cm (65\")   Wt 82.1 kg (181 lb)   BMI 30.12 kg/m²     Physical Exam  Vitals and nursing note reviewed.   Constitutional:       General: She is not in acute distress.     Appearance: Normal appearance. She is well-developed.   HENT:      Head: Normocephalic and atraumatic.   Eyes:      Extraocular Movements: Extraocular movements intact.      Pupils: Pupils are equal, round, and reactive to light.   Cardiovascular:      Pulses: Normal pulses.   Pulmonary:      Effort: Pulmonary effort is normal. No retractions.      Breath sounds: Normal air entry. No wheezing.   Abdominal:      General: There is no distension.      Palpations: Abdomen is soft.      Tenderness: There is no abdominal tenderness.      Hernia: No hernia is present.   Musculoskeletal:         General: No swelling or deformity.      Cervical back: Neck supple.   Skin:     General: Skin is warm and dry.      Findings: No erythema.      Comments: Surgical Incision Healing Well, greatly improved   Neurological:      General: No focal deficit present.      Mental Status: She is alert and oriented to person, place, and time.      Motor: Motor function is intact.   Psychiatric:         Mood and Affect: Mood normal.         Thought Content: Thought content normal.              Assessment and Plan    Diagnoses and all orders for this visit:    1. Malignant neoplasm of overlapping sites of right breast in female, estrogen receptor " positive (Primary)    Keep appointment with oncology  Follow-up with me 1 year      Follow Up   Return in about 1 year (around 5/11/2024).  Patient was given instructions and counseling regarding her condition or for health maintenance advice. Please see specific information pulled into the AVS if appropriate.

## 2023-06-05 ENCOUNTER — TELEPHONE (OUTPATIENT)
Dept: UROLOGY | Facility: CLINIC | Age: 83
End: 2023-06-05
Payer: MEDICARE

## 2023-06-05 DIAGNOSIS — N32.81 OAB (OVERACTIVE BLADDER): ICD-10-CM

## 2023-06-05 DIAGNOSIS — I48.0 PAROXYSMAL ATRIAL FIBRILLATION: Chronic | ICD-10-CM

## 2023-06-05 RX ORDER — DILTIAZEM HYDROCHLORIDE 120 MG/1
120 CAPSULE, COATED, EXTENDED RELEASE ORAL DAILY
Qty: 90 CAPSULE | Refills: 3 | Status: SHIPPED | OUTPATIENT
Start: 2023-06-05

## 2023-06-05 RX ORDER — OXYBUTYNIN CHLORIDE 10 MG/1
10 TABLET, EXTENDED RELEASE ORAL DAILY
Qty: 90 TABLET | Refills: 1 | Status: SHIPPED | OUTPATIENT
Start: 2023-06-05

## 2023-06-06 ENCOUNTER — TELEPHONE (OUTPATIENT)
Dept: CARDIOLOGY | Facility: CLINIC | Age: 83
End: 2023-06-06
Payer: MEDICARE

## 2023-06-06 DIAGNOSIS — I10 ESSENTIAL HYPERTENSION: Chronic | ICD-10-CM

## 2023-06-06 DIAGNOSIS — E78.5 HYPERLIPIDEMIA LDL GOAL <100: ICD-10-CM

## 2023-06-06 DIAGNOSIS — R60.0 PEDAL EDEMA: ICD-10-CM

## 2023-06-06 RX ORDER — ATORVASTATIN CALCIUM 10 MG/1
10 TABLET, FILM COATED ORAL NIGHTLY
Qty: 90 TABLET | Refills: 3 | Status: SHIPPED | OUTPATIENT
Start: 2023-06-06

## 2023-06-06 RX ORDER — LOSARTAN POTASSIUM 25 MG/1
25 TABLET ORAL DAILY
Qty: 90 TABLET | Refills: 3 | Status: SHIPPED | OUTPATIENT
Start: 2023-06-06

## 2023-06-06 RX ORDER — METOPROLOL SUCCINATE 25 MG/1
25 TABLET, EXTENDED RELEASE ORAL NIGHTLY
Qty: 90 TABLET | Refills: 3 | Status: SHIPPED | OUTPATIENT
Start: 2023-06-06

## 2023-06-06 RX ORDER — FUROSEMIDE 40 MG/1
TABLET ORAL
Qty: 145 TABLET | Refills: 3 | Status: SHIPPED | OUTPATIENT
Start: 2023-06-06

## 2023-06-06 NOTE — TELEPHONE ENCOUNTER
The [facility] received a fax that requires your attention. The document has been indexed to the patient’s chart for your review.      Reason for sending: NEW PRESCRIPTIONS     Documents Description:NEW PRESCRIPTION REQUEST    Name of Sender: [employer]    Date Indexed: 6.6.23    Notes (if needed): URGENT ONE SENT FOR FUROSEMIDE AND ONE FOR LOSARTAN, METOPROLOL, AND ATORVASTATIN

## 2023-06-07 ENCOUNTER — TELEPHONE (OUTPATIENT)
Dept: ONCOLOGY | Facility: HOSPITAL | Age: 83
End: 2023-06-07
Payer: MEDICARE

## 2023-06-07 NOTE — TELEPHONE ENCOUNTER
The pt called and said that she was instructed by Dr Lomas to call Dr Ward and report her abnormal WBC. This nurse informed the pt that someone will call her back about her WBC.     This nurse called Dr Lomas's office. The pt's WBC is 14.0. Dr Lomas's office is faxing over a copy of the pt's labs.

## 2023-06-08 NOTE — TELEPHONE ENCOUNTER
Caller: Lilia Becerra    Relationship: Self    Best call back number: [phone]     Who are you requesting to speak with (clinical staff, provider,  specific staff member): CLINICAL    What was the call regarding: PATIENT CALLING FOR UPDATE ON DISCUSSING WBC  PLEASE RETURN CALL

## 2023-06-12 NOTE — TELEPHONE ENCOUNTER
Advised patient that Dr. Ward reviewed lab results from her PCP. Elevated WBC is likely related to infection. Patient confirms that she has an eye infection at this time and is on antibiotics for it. Advised to have PCP recheck her CBC in a few weeks. Patient voices understanding.

## 2023-06-23 ENCOUNTER — TELEPHONE (OUTPATIENT)
Dept: CARDIOLOGY | Facility: CLINIC | Age: 83
End: 2023-06-23

## 2023-06-23 NOTE — TELEPHONE ENCOUNTER
Caller: Lilia Becerra    Relationship: Self    Best call back number: [phone]    What orders are you requesting (i.e. lab or imaging): STRESS TEST ORDERS    In what timeframe would the patient need to come in: ASAP    Where will you receive your lab/imaging services: [facility] IN [place], [place].    Additional notes: PATIENT WOULD LIKE TO HAVE HER STRESS TEST DONE AT [facility] IN [place] INSTEAD OF AT [facility].

## 2023-07-19 ENCOUNTER — TELEPHONE (OUTPATIENT)
Dept: CARDIOLOGY | Facility: CLINIC | Age: 83
End: 2023-07-19

## 2023-07-19 NOTE — TELEPHONE ENCOUNTER
Caller: Lilia Becerra    Relationship: Self    Best call back number: [phone]    What test was performed: STRESS TEST    When was the test performed: 7.17.23    Where was the test performed: [facility]    Additional notes: PATIENT WAS CALLING TO GET HER STRESS TEST RESULTS. PLEASE CALL HER WHEN THEY ARE AVAILABLE, THANK YOU

## 2023-07-27 ENCOUNTER — TELEPHONE (OUTPATIENT)
Dept: CARDIOLOGY | Facility: CLINIC | Age: 83
End: 2023-07-27
Payer: MEDICARE

## 2023-07-27 ENCOUNTER — OFFICE VISIT (OUTPATIENT)
Dept: UROLOGY | Facility: CLINIC | Age: 83
End: 2023-07-27
Payer: MEDICARE

## 2023-07-27 VITALS
DIASTOLIC BLOOD PRESSURE: 60 MMHG | WEIGHT: 183 LBS | SYSTOLIC BLOOD PRESSURE: 131 MMHG | HEIGHT: 65 IN | TEMPERATURE: 98.6 F | HEART RATE: 57 BPM | BODY MASS INDEX: 30.49 KG/M2

## 2023-07-27 DIAGNOSIS — N32.81 OAB (OVERACTIVE BLADDER): Primary | ICD-10-CM

## 2023-07-27 DIAGNOSIS — N39.0 URINARY TRACT INFECTION WITHOUT HEMATURIA, SITE UNSPECIFIED: ICD-10-CM

## 2023-07-27 DIAGNOSIS — I35.9 AORTIC VALVE DISORDER: ICD-10-CM

## 2023-07-27 DIAGNOSIS — Z90.13 S/P MASTECTOMY, BILATERAL: ICD-10-CM

## 2023-07-27 DIAGNOSIS — R68.2 DRY MOUTH: ICD-10-CM

## 2023-07-27 DIAGNOSIS — Z98.890 HISTORY OF BLADDER REPAIR SURGERY: ICD-10-CM

## 2023-07-27 LAB
BILIRUB BLD-MCNC: NEGATIVE MG/DL
CLARITY, POC: ABNORMAL
COLOR UR: YELLOW
GLUCOSE UR STRIP-MCNC: ABNORMAL MG/DL
KETONES UR QL: NEGATIVE
LEUKOCYTE EST, POC: ABNORMAL
NITRITE UR-MCNC: NEGATIVE MG/ML
PH UR: 6 [PH] (ref 5–8)
PROT UR STRIP-MCNC: NEGATIVE MG/DL
RBC # UR STRIP: ABNORMAL /UL
SP GR UR: 1.02 (ref 1–1.03)
UROBILINOGEN UR QL: NORMAL

## 2023-07-27 PROCEDURE — 87086 URINE CULTURE/COLONY COUNT: CPT | Performed by: NURSE PRACTITIONER

## 2023-07-27 PROCEDURE — 87186 SC STD MICRODIL/AGAR DIL: CPT | Performed by: NURSE PRACTITIONER

## 2023-07-27 RX ORDER — BRIMONIDINE TARTRATE AND TIMOLOL MALEATE 2; 5 MG/ML; MG/ML
SOLUTION OPHTHALMIC
COMMUNITY
Start: 2023-07-10

## 2023-07-27 RX ORDER — BLOOD SUGAR DIAGNOSTIC
STRIP MISCELLANEOUS
COMMUNITY
Start: 2023-07-10

## 2023-07-27 RX ORDER — DORZOLAMIDE HYDROCHLORIDE AND TIMOLOL MALEATE 20; 5 MG/ML; MG/ML
SOLUTION/ DROPS OPHTHALMIC
COMMUNITY
End: 2023-07-27 | Stop reason: SDUPTHER

## 2023-07-27 NOTE — PROGRESS NOTES
"Chief Complaint  oab, recurrent uti, and Follow-up    Subjective          Lilia Becerra is a 82 y.o. female who presents to [facility] UROLOGY  History of Present Illness  Routine follow up  oab and history of urinary retention with recurrent uti. Medications for oab had been controlling frequency and urinary incontinence.  Patient not normally treated for unless symptomatic. Patient usual uti symptoms noted as dysuria, foul urine, urinary incontinence and will say not feeling well. Today her urine does show infection. Patient denies any symptoms. No fever or chills. She continues to have urgency and has to \"run to the bathroom to urinate\". Patient reports that she is not currently experiencing any symptoms of urinary incontinence.  Patient reports she no longer has to take metformin and her problems with diarrhea have stopped.  She is taking lasix and spirolactone daily. Ditropan xl 10mg and myrbetriq 50mg  for urinary frequency/ urgency. Patient with severe dry mouth and having difficulty speaking due to side effects. Recent testing per Cardiology for complaints of chest discomfort. Labs indicating worsening of renal function.     Objective   Vital Signs:   /60   Pulse 57   Temp 98.6 °F (37 °C)   Ht 165.1 cm (65\")   Wt 83 kg (183 lb)   BMI 30.45 kg/m²     Allergies   Allergen Reactions    Eggs Or Egg-Derived Products Swelling    Nsaids Unknown - High Severity    Sertraline Unknown - High Severity     Bleeding in stomach    Tetanus Toxoid Swelling    Tetanus Toxoids Swelling    Metformin Diarrhea and Unknown - Low Severity    Tetanus-Diphtheria Toxoids Td Unknown - Low Severity    Tuberculin Ppd Unknown - Low Severity      Past medical history:  has a past medical history of Acid reflux disease, Arthritis, Atrial fibrillation, Back pain, Bowel obstruction, Breast cancer, Carpal tunnel syndrome, Constipation, Depression, Diabetes mellitus, Disease of thyroid gland, Dyslipidemia, Fecal " incontinence, GERD (gastroesophageal reflux disease), Glaucoma, HBP (high blood pressure), Hearing impaired, Hiatal hernia, High cholesterol, History of transfusion, History of tuberculosis, Hypertension, Hypomagnesemia, Kienbock's disease, Kienböck's disease, right, Nonrheumatic aortic valve stenosis (11/05/2020), OAB (overactive bladder), Osteoporosis, Pneumonia, PONV (postoperative nausea and vomiting), Positive PPD, RLS (restless legs syndrome), Sleep apnea, Stage 3 chronic kidney disease, Status post bilateral mastectomy with sentinel node biopsy and right axillary node dissection (01/12/2022), Tailbone injury, Thyroid disease, Urinary incontinence, and Urinary retention.   Past surgical history:  has a past surgical history that includes Hemorrhoid surgery; Hysterectomy; Cataract extraction (Bilateral); Lumbar fusion; Incision and drainage of wound; Vertebroplasty; Knee Arthroplasty (Right); Total Knee Arthroplasty Revision (Left); Total Knee Arthroplasty Revision (Left, 02/13/2018); Colonoscopy; Lumbar discectomy (Left, 12/21/2018); Lumbar discectomy (Left, 03/29/2019); Total knee arthroplasty (Left, 06/13/2019); Ankle Tendon Repair; Back surgery (12/21/2018-3/2019); Carpal tunnel release; Esophagogastroduodenoscopy; Leg Surgery (06/13/2019); Tendon release (09/2018); Joint replacement; Mastectomy (Bilateral, 01/11/2022); US Guided Cyst Aspiration Breast (N/A, 03/24/2022); Esophagogastroduodenoscopy (N/A, 11/17/2022); Colonoscopy (N/A, 11/17/2022); Mastectomy (Bilateral); Cystoscopy; and Bladder repair (09/2014).  Personal history: family history includes Breast cancer in her mother; Diabetes in her father and sister; Tuberculosis in her mother.  Social history:  reports that she has never smoked. She has never used smokeless tobacco. She reports that she does not drink alcohol and does not use drugs.    Review of Systems   Constitutional:  Negative for chills and fever.   Gastrointestinal:  Negative for  abdominal distention.   Genitourinary:  Negative for difficulty urinating, dysuria, hematuria, pelvic pain and vaginal pain.      Physical Exam  Vitals and nursing note reviewed.   Constitutional:       General: She is not in acute distress.     Appearance: Normal appearance. She is well-developed. She is not ill-appearing.      Comments: Ambulates with mild difficulty rolling walker   Cardiovascular:      Rate and Rhythm: Normal rate and regular rhythm.      Heart sounds: Murmur heard.   Pulmonary:      Effort: Pulmonary effort is normal.      Breath sounds: Normal air entry. No rhonchi or rales.   Abdominal:      General: There is no distension.      Palpations: Abdomen is soft.      Tenderness: There is no abdominal tenderness. There is no guarding or rebound.   Musculoskeletal:         General: Normal range of motion.   Skin:     General: Skin is warm and dry.   Neurological:      Mental Status: She is alert and oriented to person, place, and time.      Motor: Motor function is intact.   Psychiatric:         Mood and Affect: Mood normal.         Behavior: Behavior normal. Behavior is cooperative.         Thought Content: Thought content normal.         Judgment: Judgment normal.      Comments: Mild forgetfulness. Difficulty with hearing      Result Review :     CMP          5/30/2023    14:08   CMP   Glucose 152       BUN 31       Creatinine 1.5       Sodium 138       Potassium 3.9       Chloride 101       Calcium 9.5       Anion Gap 12          Details          This result is from an external source.                 UA          1/16/2023    10:58 7/27/2023    11:00   Urinalysis   Ketones, UA Negative  Negative    Leukocytes, UA Negative  Small (1+)    Adult Transthoracic Echo Complete W/ Cont if Necessary Per Protocol (03/29/2023 14:17)        L  Order: [id number]  Status: Final result       Next appt: 08/17/2023 at 11:45 AM in Cardiology (DANYA Lopez)    Specimen Information: Fresh Frozen Plasma    Specimen Comment: Specimen type and source: Plasma, Plasma specimen (specimen)               Component  Ref Range & Units 1 mo ago  (5/30/23) 8 mo ago  (11/17/22) 1 yr ago  (4/5/22) 1 yr ago  (1/12/22) 1 yr ago  (1/12/22) 1 yr ago  (1/11/22) 1 yr ago  (1/11/22)   Glucose  74 - 109 mg/dL 152 High  77 R,  High  R 179 High  R,  High  R,  High  R,  High  R   BUN  7 - 25 mg/dL 31 High   22 R    24 High  R   Creatinine  0.7 - 1.3 mg/dL 1.5 High   1.38 High  R    1.35 High  R   Sodium  136 - 145 mmol/L 138  132 Low     140   Potassium  3.5 - 5.1 mmol/L 3.9  4.2 R    3.2 Low  R   Chloride  98 - 107 mmol/L 101  94 Low     98   Total CO2  21 - 31 mmol/L 25         Calcium  8.6 - 10.3 mg/dL 9.5  10.0 R    8.3 Low  R   Anion Gap  4 - 12 mmol/L 12  10.9 R    13.5 R   Est GFR by Clearance  mL/min 34                CT Abdomen Pelvis Without Contrast (12/16/2022 13:04)      Assessment and Plan    Diagnoses and all orders for this visit:    1. OAB (overactive bladder) (Primary)  -     POC Urinalysis Dipstick  -     Urine Culture - Urine, Urine, Clean Catch    2. History of bladder repair surgery    3. Urinary tract infection without hematuria, site unspecified  -     POC Urinalysis Dipstick  -     Urine Culture - Urine, Urine, Clean Catch    4. S/P mastectomy, bilateral    5. Aortic valve disorder    6. Dry mouth       Will send urine for culture and treat uti. Stop ditropan xl 10mg daily for now. Severe dry mouth. May continue myrbetriq 50mg. Reviewed labs per cardiology, will forward to pcp for evaluation of ckd. Discussed with patient that diurectics and other medications may also affect labs. I will see patient back in approximately 8 months.Patient may call anytime to schedule if having problems. Voices financial hardship to afford gas and distance traveled for appointments. Patient to call me if notices worsening of frequency or incontinence with stopping ditropan.           Follow Up   Return in  about 8 months (around 3/27/2024) for oab .  Patient was given instructions and counseling regarding her condition or for health maintenance advice. Please see specific information pulled into the AVS if appropriate.     Juana Lenz, APRN

## 2023-07-27 NOTE — LETTER
"July 27, 2023       No Recipients    Patient: iLlia Becerra   YOB: 1940   Date of Visit: 7/27/2023     Dear Dr. Spears Recipients:    Thank you for referring Lilia Becerra to me for evaluation. Below are the relevant portions of my assessment and plan of care.    If you have questions, please do not hesitate to call me. I look forward to following Lilia along with you.         Sincerely,        DANYA Irizarry        CC:   No Recipients      Progress Notes:  Chief Complaint  oab, recurrent uti, and Follow-up    Subjective          Lilia Becerra is a 82 y.o. female who presents to [facility] UROLOGY  History of Present Illness  Routine follow up  oab and history of urinary retention with recurrent uti. Medications for oab had been controlling frequency and urinary incontinence.  Patient not normally treated for unless symptomatic. Patient usual uti symptoms noted as dysuria, foul urine, urinary incontinence and will say not feeling well. Today her urine does show infection. Patient denies any symptoms. No fever or chills. She continues to have urgency and has to \"run to the bathroom to urinate\". Patient reports that she is not currently experiencing any symptoms of urinary incontinence.  Patient reports she no longer has to take metformin and her problems with diarrhea have stopped.  She is taking lasix and spirolactone daily. Ditropan xl 10mg and myrbetriq 50mg  for urinary frequency/ urgency. Patient with severe dry mouth and having difficulty speaking due to side effects. Recent testing per Cardiology for complaints of chest discomfort. Labs indicating worsening of renal function.     Objective   Vital Signs:   /60   Pulse 57   Temp 98.6 °F (37 °C)   Ht 165.1 cm (65\")   Wt 83 kg (183 lb)   BMI 30.45 kg/m²     Allergies   Allergen Reactions   • Eggs Or Egg-Derived Products Swelling   • Nsaids Unknown - High Severity   • Sertraline Unknown - High Severity     " Bleeding in stomach   • Tetanus Toxoid Swelling   • Tetanus Toxoids Swelling   • Metformin Diarrhea and Unknown - Low Severity   • Tetanus-Diphtheria Toxoids Td Unknown - Low Severity   • Tuberculin Ppd Unknown - Low Severity      Past medical history:  has a past medical history of Acid reflux disease, Arthritis, Atrial fibrillation, Back pain, Bowel obstruction, Breast cancer, Carpal tunnel syndrome, Constipation, Depression, Diabetes mellitus, Disease of thyroid gland, Dyslipidemia, Fecal incontinence, GERD (gastroesophageal reflux disease), Glaucoma, HBP (high blood pressure), Hearing impaired, Hiatal hernia, High cholesterol, History of transfusion, History of tuberculosis, Hypertension, Hypomagnesemia, Kienbock's disease, Kienböck's disease, right, Nonrheumatic aortic valve stenosis (11/05/2020), OAB (overactive bladder), Osteoporosis, Pneumonia, PONV (postoperative nausea and vomiting), Positive PPD, RLS (restless legs syndrome), Sleep apnea, Stage 3 chronic kidney disease, Status post bilateral mastectomy with sentinel node biopsy and right axillary node dissection (01/12/2022), Tailbone injury, Thyroid disease, Urinary incontinence, and Urinary retention.   Past surgical history:  has a past surgical history that includes Hemorrhoid surgery; Hysterectomy; Cataract extraction (Bilateral); Lumbar fusion; Incision and drainage of wound; Vertebroplasty; Knee Arthroplasty (Right); Total Knee Arthroplasty Revision (Left); Total Knee Arthroplasty Revision (Left, 02/13/2018); Colonoscopy; Lumbar discectomy (Left, 12/21/2018); Lumbar discectomy (Left, 03/29/2019); Total knee arthroplasty (Left, 06/13/2019); Ankle Tendon Repair; Back surgery (12/21/2018-3/2019); Carpal tunnel release; Esophagogastroduodenoscopy; Leg Surgery (06/13/2019); Tendon release (09/2018); Joint replacement; Mastectomy (Bilateral, 01/11/2022); US Guided Cyst Aspiration Breast (N/A, 03/24/2022); Esophagogastroduodenoscopy (N/A, 11/17/2022);  Colonoscopy (N/A, 11/17/2022); Mastectomy (Bilateral); Cystoscopy; and Bladder repair (09/2014).  Personal history: family history includes Breast cancer in her mother; Diabetes in her father and sister; Tuberculosis in her mother.  Social history:  reports that she has never smoked. She has never used smokeless tobacco. She reports that she does not drink alcohol and does not use drugs.    Review of Systems   Constitutional:  Negative for chills and fever.   Gastrointestinal:  Negative for abdominal distention.   Genitourinary:  Negative for difficulty urinating, dysuria, hematuria, pelvic pain and vaginal pain.      Physical Exam  Vitals and nursing note reviewed.   Constitutional:       General: She is not in acute distress.     Appearance: Normal appearance. She is well-developed. She is not ill-appearing.      Comments: Ambulates with mild difficulty rolling walker   Cardiovascular:      Rate and Rhythm: Normal rate and regular rhythm.      Heart sounds: Murmur heard.   Pulmonary:      Effort: Pulmonary effort is normal.      Breath sounds: Normal air entry. No rhonchi or rales.   Abdominal:      General: There is no distension.      Palpations: Abdomen is soft.      Tenderness: There is no abdominal tenderness. There is no guarding or rebound.   Musculoskeletal:         General: Normal range of motion.   Skin:     General: Skin is warm and dry.   Neurological:      Mental Status: She is alert and oriented to person, place, and time.      Motor: Motor function is intact.   Psychiatric:         Mood and Affect: Mood normal.         Behavior: Behavior normal. Behavior is cooperative.         Thought Content: Thought content normal.         Judgment: Judgment normal.      Comments: Mild forgetfulness. Difficulty with hearing      Result Review :     CMP          5/30/2023    14:08   CMP   Glucose 152       BUN 31       Creatinine 1.5       Sodium 138       Potassium 3.9       Chloride 101       Calcium 9.5        Anion Gap 12          Details          This result is from an external source.                 UA          1/16/2023    10:58 7/27/2023    11:00   Urinalysis   Ketones, UA Negative  Negative    Leukocytes, UA Negative  Small (1+)    Adult Transthoracic Echo Complete W/ Cont if Necessary Per Protocol (03/29/2023 14:17)        L  Order: [id number]  Status: Final result       Next appt: 08/17/2023 at 11:45 AM in Cardiology (DANYA Lopez)    Specimen Information: Fresh Frozen Plasma   Specimen Comment: Specimen type and source: Plasma, Plasma specimen (specimen)               Component  Ref Range & Units 1 mo ago  (5/30/23) 8 mo ago  (11/17/22) 1 yr ago  (4/5/22) 1 yr ago  (1/12/22) 1 yr ago  (1/12/22) 1 yr ago  (1/11/22) 1 yr ago  (1/11/22)   Glucose  74 - 109 mg/dL 152 High  77 R,  High  R 179 High  R,  High  R,  High  R,  High  R   BUN  7 - 25 mg/dL 31 High   22 R    24 High  R   Creatinine  0.7 - 1.3 mg/dL 1.5 High   1.38 High  R    1.35 High  R   Sodium  136 - 145 mmol/L 138  132 Low     140   Potassium  3.5 - 5.1 mmol/L 3.9  4.2 R    3.2 Low  R   Chloride  98 - 107 mmol/L 101  94 Low     98   Total CO2  21 - 31 mmol/L 25         Calcium  8.6 - 10.3 mg/dL 9.5  10.0 R    8.3 Low  R   Anion Gap  4 - 12 mmol/L 12  10.9 R    13.5 R   Est GFR by Clearance  mL/min 34                CT Abdomen Pelvis Without Contrast (12/16/2022 13:04)      Assessment and Plan    Diagnoses and all orders for this visit:    1. OAB (overactive bladder) (Primary)  -     POC Urinalysis Dipstick  -     Urine Culture - Urine, Urine, Clean Catch    2. History of bladder repair surgery    3. Urinary tract infection without hematuria, site unspecified  -     POC Urinalysis Dipstick  -     Urine Culture - Urine, Urine, Clean Catch    4. S/P mastectomy, bilateral    5. Aortic valve disorder    6. Dry mouth       Will send urine for culture and treat uti. Stop ditropan xl 10mg daily for now. Severe dry mouth. May  continue myrbetriq 50mg. Reviewed labs per cardiology, will forward to pcp for evaluation of ckd. Discussed with patient that diurectics and other medications may also affect labs. I will see patient back in approximately 8 months.Patient may call anytime to schedule if having problems. Voices financial hardship to afford gas and distance traveled for appointments. Patient to call me if notices worsening of frequency or incontinence with stopping ditropan.           Follow Up   Return in about 8 months (around 3/27/2024).  Patient was given instructions and counseling regarding her condition or for health maintenance advice. Please see specific information pulled into the AVS if appropriate.     Juana Lenz, DANYA

## 2023-07-28 ENCOUNTER — TELEPHONE (OUTPATIENT)
Dept: CARDIOLOGY | Facility: CLINIC | Age: 83
End: 2023-07-28

## 2023-07-28 NOTE — TELEPHONE ENCOUNTER
Caller: Lilia Becerra    Relationship: Self    Best call back number: [phone]    What medications are you currently taking:   Current Outpatient Medications on File Prior to Visit   Medication Sig Dispense Refill    alendronate (FOSAMAX) 35 MG tablet Take 1 tablet by mouth Every 7 (Seven) Days. sunday      apixaban (ELIQUIS) 5 MG tablet tablet Take 1 tablet by mouth Every 12 (Twelve) Hours. 180 tablet 3    atorvastatin (LIPITOR) 10 MG tablet Take 1 tablet by mouth Every Night. 90 tablet 3    brimonidine-timolol (COMBIGAN) 0.2-0.5 % ophthalmic solution       Calcium Carb-Cholecalciferol (CALCIUM 600 + D PO) Take 1 tablet by mouth 2 (Two) Times a Day.      cyclobenzaprine (FLEXERIL) 10 MG tablet Take 1 tablet by mouth.      dilTIAZem CD (CARDIZEM CD) 120 MG 24 hr capsule Take 1 capsule by mouth Daily. 90 capsule 3    DULoxetine (CYMBALTA) 60 MG capsule 1 capsule Daily.      empagliflozin (JARDIANCE) 25 MG tablet tablet Daily.      furosemide (LASIX) 40 MG tablet Take 1 1/2 tablets every morning. Can decrease to one tablet a day if swelling improves 145 tablet 3    letrozole (FEMARA) 2.5 MG tablet Take 1 tablet by mouth Daily. 90 tablet 1    levothyroxine (SYNTHROID, LEVOTHROID) 25 MCG tablet Take 1 tablet by mouth Daily. Currently doesn't have .      losartan (COZAAR) 25 MG tablet Take 1 tablet by mouth Daily. 90 tablet 3    meclizine (ANTIVERT) 25 MG tablet 1 tablet 3 (Three) Times a Day As Needed.      metoprolol succinate XL (TOPROL-XL) 25 MG 24 hr tablet Take 1 tablet by mouth Every Night. 90 tablet 3    Mirabegron ER (MYRBETRIQ) 50 MG tablet sustained-release 24 hour 24 hr tablet Take 50 mg by mouth Daily. 90 tablet 1    Multiple Vitamins-Minerals (MULTIVITAMIN ADULTS PO) Take 1 tablet by mouth Daily.      omeprazole (priLOSEC) 20 MG capsule Take 1 capsule by mouth Daily.      OneTouch Ultra test strip       pramipexole (MIRAPEX) 1.5 MG tablet Take 1 tablet by mouth Every Night.      spironolactone  (ALDACTONE) 25 MG tablet Take 1 tablet by mouth Daily. 90 tablet 3    tiZANidine (ZANAFLEX) 4 MG tablet       triamcinolone (KENALOG) 0.1 % cream       VENLAFAXINE HCL ER PO Take 150 mg by mouth Daily.      vitamin C (ASCORBIC ACID) 500 MG tablet Take 1 tablet by mouth Daily.       No current facility-administered medications on file prior to visit.          When did you start taking these medications: SHE HAS STATED THAT SHE HAS BEEN TAKING THEM FOR A LONG TIME.     Which medication are you concerned about: DILTIAZEM  MG TABLET, SPIRONOLACTONE 25 MG TABLET, LOSARTAN 25 MG TABLET, METOPROLOL SUCCINATE XL (TOPROL-XL) 25MG     Who prescribed you this medication: DANYA FELIX     What are your concerns: PATIENT IS CONCERNED ABOUT THE STRESS TEST THAT WAS ON MONDAY 7.17.23 @ 5:50 AM    How long have you had these concerns: PATIENT HAS HAD CONCERNS OVER TAKING TOO MANY HIGH BLOOD PRESSURE MEDICATIONS.

## 2023-08-02 DIAGNOSIS — N30.01 ACUTE CYSTITIS WITH HEMATURIA: Primary | ICD-10-CM

## 2023-08-02 LAB
BACTERIA SPEC AEROBE CULT: ABNORMAL

## 2023-08-02 RX ORDER — LEVOFLOXACIN 250 MG/1
TABLET ORAL
Qty: 8 TABLET | Refills: 0 | Status: SHIPPED | OUTPATIENT
Start: 2023-08-02

## 2023-08-17 ENCOUNTER — OFFICE VISIT (OUTPATIENT)
Dept: CARDIOLOGY | Facility: CLINIC | Age: 83
End: 2023-08-17
Payer: MEDICARE

## 2023-08-17 VITALS
WEIGHT: 182.4 LBS | HEART RATE: 66 BPM | HEIGHT: 65 IN | DIASTOLIC BLOOD PRESSURE: 69 MMHG | BODY MASS INDEX: 30.39 KG/M2 | SYSTOLIC BLOOD PRESSURE: 116 MMHG

## 2023-08-17 DIAGNOSIS — I50.32 CHRONIC DIASTOLIC CONGESTIVE HEART FAILURE: ICD-10-CM

## 2023-08-17 DIAGNOSIS — I48.0 PAROXYSMAL ATRIAL FIBRILLATION: ICD-10-CM

## 2023-08-17 DIAGNOSIS — I10 PRIMARY HYPERTENSION: ICD-10-CM

## 2023-08-17 DIAGNOSIS — R07.9 CHEST PAIN, UNSPECIFIED TYPE: ICD-10-CM

## 2023-08-17 DIAGNOSIS — E78.2 MIXED HYPERLIPIDEMIA: ICD-10-CM

## 2023-08-17 DIAGNOSIS — I35.0 NONRHEUMATIC AORTIC VALVE STENOSIS: Primary | ICD-10-CM

## 2023-08-17 NOTE — PROGRESS NOTES
Chief Complaint  Nonrheumatic Aortic Valve Stenosis, Atrial Fibrillation, Hypertension, and Follow-up (8 week f/u for results of testing.)    Subjective        History of Present Illness  Lilia Becerra presents to [facility] CARDIOLOGY for follow up.  Lilia is an 81-year-old female with hypertension, hyperlipidemia, chronic diastolic heart failure with moderate aortic valve stenosis and paroxysmal atrial fibrillation who presents for follow-up on recent testing.  She has been having midsternal chest pain that occurs mostly at rest.  She denies any alleviating factors and reports that it usually resolves on its own after few minutes.  She has mild dyspnea with moderate exertion.  This is unchanged from previous.  She denies any dizziness, lightheadedness.  She has mild swelling of the bilateral lower extremities.      Past Medical History:   Diagnosis Date    Acid reflux disease     Arthritis     Atrial fibrillation     Back pain     Bowel obstruction     Breast cancer     Carpal tunnel syndrome     Constipation     Depression     Diabetes mellitus     Disease of thyroid gland     Dyslipidemia     Fecal incontinence     GERD (gastroesophageal reflux disease)     Glaucoma     left eye    HBP (high blood pressure)     Hearing impaired     hearing aides    Hiatal hernia     High cholesterol     History of transfusion     History of tuberculosis     IN 1980'S WAS TREATED    Hypertension     Hypomagnesemia     Kienbock's disease     Kienb”ck's disease, right     KIENBOCK'S AVASCULAR NECROSIS OF LUNATE, ADULT RIGHT    Nonrheumatic aortic valve stenosis 11/05/2020    OAB (overactive bladder)     Osteoporosis     Pneumonia     PONV (postoperative nausea and vomiting)     Positive PPD     RLS (restless legs syndrome)     Sleep apnea     NO MACHINE    Stage 3 chronic kidney disease     Status post bilateral mastectomy with sentinel node biopsy and right axillary node dissection 01/12/2022    Tailbone injury      fracture    Thyroid disease     Urinary incontinence     wears pads    Urinary retention        ALLERGY  Allergies   Allergen Reactions    Eggs Or Egg-Derived Products Swelling    Nsaids Unknown - High Severity    Sertraline Unknown - High Severity     Bleeding in stomach    Tetanus Toxoid Swelling    Tetanus Toxoids Swelling    Metformin Diarrhea and Unknown - Low Severity    Tetanus-Diphtheria Toxoids Td Unknown - Low Severity    Tuberculin Ppd Unknown - Low Severity        Past Surgical History:   Procedure Laterality Date    ANKLE TENDON REPAIR      BACK SURGERY  12/21/2018-3/2019    LUMBAR--BROKEN DISC, CLEANED OUT--HAS 2ND PROCEDURE TO FINISH REMOVING    BLADDER REPAIR  09/2014    bladder sling with cysto    CARPAL TUNNEL RELEASE      CATARACT EXTRACTION Bilateral     COLONOSCOPY      COLONOSCOPY N/A 11/17/2022    Procedure: COLONOSCOPY with biopsy;  Surgeon: Leo Alvares MD;  Location: [facility] END[facility]COPY;  Service: Gastroenterology;  Laterality: N/A;  diverticulosis    CYSTOSCOPY      ENDOSCOPY      ENDOSCOPY N/A 11/17/2022    Procedure: ESOPHAGOGASTRODUODENOSCOPY with biopsy and snare;  Surgeon: Leo Alvares MD;  Location: [facility] ENDOSCOPY;  Service: Gastroenterology;  Laterality: N/A;  gastric fundus polyp    HEMORRHOIDECTOMY      HYSTERECTOMY      INCISION AND DRAINAGE OF WOUND      on back     JOINT REPLACEMENT      KNEE ARTHROPLASTY Right     LEG SURGERY  06/13/2019    LUMBAR DISCECTOMY Left 12/21/2018    Procedure: LEFT L4-5 MICRO DISCECTOMY;  Surgeon: Adam Coleman DO;  Location: [facility] OR;  Service: Orthopedic Spine    LUMBAR DISCECTOMY Left 03/29/2019    Procedure: LEFT L4-5 REVISION OF MICRODISCECTOMY;  Surgeon: Adam Coleman DO;  Location: [facility] OR;  Service: Orthopedic Spine    LUMBAR FUSION      MASTECTOMY Bilateral 01/11/2022    Procedure: BREAST MASTECTOMY WITH SENTINEL NODE BIOPSY AND possible  AXILLARY NODE DISSECTION;  Surgeon: Lety Tena MD;  Location:   [facility];  Service: General;  Laterality: Bilateral;    MASTECTOMY Bilateral     TENDON RELEASE  09/2018    TOTAL KNEE ARTHROPLASTY Left 06/13/2019    Procedure: LEFT KNEE REVISION;  Surgeon: Malick Costa II, MD;  Location: [facility] MAIN OR;  Service: Orthopedics    TOTAL KNEE ARTHROPLASTY REVISION Left     x2    TOTAL KNEE ARTHROPLASTY REVISION Left 02/13/2018    Procedure: LEFT TOTAL KNEE ARTHROPLASTY REVISION;  Surgeon: Jair Fajardo MD;  Location: [facility] MAIN OR;  Service:     US GUIDED CYST ASPIRATION BREAST N/A 03/24/2022    VERTEBROPLASTY          Social History     Socioeconomic History    Marital status:    Tobacco Use    Smoking status: Never    Smokeless tobacco: Never   Vaping Use    Vaping Use: Never used   Substance and Sexual Activity    Alcohol use: No    Drug use: No    Sexual activity: Defer       Family History   Problem Relation Age of Onset    Breast cancer Mother     Tuberculosis Mother     Diabetes Father     Diabetes Sister     Malig Hyperthermia Neg Hx     Colon cancer Neg Hx         Current Outpatient Medications on File Prior to Visit   Medication Sig    alendronate (FOSAMAX) 35 MG tablet Take 1 tablet by mouth Every 7 (Seven) Days. sunday    apixaban (ELIQUIS) 5 MG tablet tablet Take 1 tablet by mouth Every 12 (Twelve) Hours.    atorvastatin (LIPITOR) 10 MG tablet Take 1 tablet by mouth Every Night.    brimonidine-timolol (COMBIGAN) 0.2-0.5 % ophthalmic solution     Calcium Carb-Cholecalciferol (CALCIUM 600 + D PO) Take 1 tablet by mouth 2 (Two) Times a Day.    cyclobenzaprine (FLEXERIL) 10 MG tablet Take 1 tablet by mouth As Needed.    dilTIAZem CD (CARDIZEM CD) 120 MG 24 hr capsule Take 1 capsule by mouth Daily.    DULoxetine (CYMBALTA) 60 MG capsule 1 capsule Daily.    empagliflozin (JARDIANCE) 25 MG tablet tablet Daily.    furosemide (LASIX) 40 MG tablet Take 1 1/2 tablets every morning. Can decrease to one tablet a day if swelling improves    letrozole  "(FEMARA) 2.5 MG tablet Take 1 tablet by mouth Daily.    levothyroxine (SYNTHROID, LEVOTHROID) 25 MCG tablet Take 1 tablet by mouth Daily. Currently doesn't have .    losartan (COZAAR) 25 MG tablet Take 1 tablet by mouth Daily.    meclizine (ANTIVERT) 25 MG tablet 1 tablet 3 (Three) Times a Day As Needed.    metoprolol succinate XL (TOPROL-XL) 25 MG 24 hr tablet Take 1 tablet by mouth Every Night.    Mirabegron ER (MYRBETRIQ) 50 MG tablet sustained-release 24 hour 24 hr tablet Take 50 mg by mouth Daily.    Multiple Vitamins-Minerals (MULTIVITAMIN ADULTS PO) Take 1 tablet by mouth Daily.    omeprazole (priLOSEC) 20 MG capsule Take 1 capsule by mouth Daily.    OneTouch Ultra test strip     pramipexole (MIRAPEX) 1.5 MG tablet Take 1 tablet by mouth Every Night.    spironolactone (ALDACTONE) 25 MG tablet Take 1 tablet by mouth Daily.    tiZANidine (ZANAFLEX) 4 MG tablet     triamcinolone (KENALOG) 0.1 % cream     VENLAFAXINE HCL ER PO Take 150 mg by mouth Daily.    vitamin C (ASCORBIC ACID) 500 MG tablet Take 1 tablet by mouth Daily.    levoFLOXacin (Levaquin) 250 MG tablet Take 2 tablets orally first day  then 1 tablet  daily for the next 6 days     total 7 days  Do not take with dairy or other medications (Patient not taking: Reported on 8/17/2023)     No current facility-administered medications on file prior to visit.       Objective   Vitals:    08/17/23 1140   BP: 116/69   Pulse: 66   Weight: 82.7 kg (182 lb 6.4 oz)   Height: 165.1 cm (65\")       Physical Exam  Constitutional:       General: She is awake. She is not in acute distress.     Appearance: Normal appearance.   HENT:      Head: Normocephalic.      Nose: Nose normal. No congestion.   Eyes:      Extraocular Movements: Extraocular movements intact.      Conjunctiva/sclera: Conjunctivae normal.      Pupils: Pupils are equal, round, and reactive to light.   Neck:      Thyroid: No thyromegaly.      Vascular: No JVD.   Cardiovascular:      Rate and Rhythm: " Normal rate and regular rhythm.      Chest Wall: PMI is not displaced.      Pulses: Normal pulses.      Heart sounds: Normal heart sounds, S1 normal and S2 normal. No murmur heard.    No friction rub. No gallop. No S3 or S4 sounds.   Pulmonary:      Effort: Pulmonary effort is normal.      Breath sounds: Normal breath sounds. No wheezing, rhonchi or rales.   Abdominal:      General: Bowel sounds are normal.      Palpations: Abdomen is soft.      Tenderness: There is no abdominal tenderness.   Musculoskeletal:      Cervical back: No tenderness.      Right lower leg: Edema present.      Left lower leg: Edema present.      Comments: Trace bilateral lower extremity edema.   Lymphadenopathy:      Cervical: No cervical adenopathy.   Skin:     General: Skin is warm and dry.      Capillary Refill: Capillary refill takes less than 2 seconds.      Coloration: Skin is not cyanotic.      Findings: No petechiae or rash.      Nails: There is no clubbing.   Neurological:      Mental Status: She is alert.   Psychiatric:         Mood and Affect: Mood normal.         Behavior: Behavior is cooperative.         Result Review     The following data was reviewed by DANYA Lopez on 08/17/23.    No results found for: PROBNP  CMP          5/30/2023    14:08   CMP   Glucose 152       BUN 31       Creatinine 1.5       Sodium 138       Potassium 3.9       Chloride 101       Calcium 9.5       Anion Gap 12          Details          This result is from an external source.                    Results for orders placed in visit on 03/08/22    Adult Transthoracic Echo Complete W/ Cont if Necessary Per Protocol    Interpretation Summary  ú Estimated left ventricular EF was in agreement with the calculated left ventricular EF. Left ventricular ejection fraction appears to be 51 - 55%. Left ventricular systolic function is low normal.  ú Left ventricular diastolic function is consistent with (grade I) impaired relaxation.  ú Moderate aortic  valve stenosis is present. Aortic valve area is 1.2 cm2.  ú Estimated right ventricular systolic pressure from tricuspid regurgitation is normal (<35 mmHg).           Procedures    Assessment & Plan  Diagnoses and all orders for this visit:    1. Nonrheumatic aortic valve stenosis (Primary)    2. Paroxysmal atrial fibrillation    3. Chronic diastolic congestive heart failure    4. Mixed hyperlipidemia    5. Primary hypertension    6. Chest pain, unspecified type    1.  Moderate to severe by most recent echocardiogram.  She is well compensated.  She does have some shortness of breath but this is stable and unchanged.  We will continue to monitor clinically.  2.  Currently in sinus rhythm.  Continue Eliquis 5 mg twice daily for stroke risk reduction and diltiazem for rate control.  3.  She is euvolemic on exam.  Continue losartan, metoprolol, spironolactone, furosemide at current doses.  4.  Continue statin therapy.  5.  Blood pressure is well controlled.  Continue current antihypertensive regimen.  6.  She does continue to have some chest discomfort mostly at rest.  Recent nuclear stress testing did not indicate any evidence of myocardial ischemia.  Continue risk factor modification.      Follow Up   Return in about 3 months (around 11/17/2023) for With .    Patient was given instructions and counseling regarding her condition or for health maintenance advice. Please see specific information pulled into the AVS if appropriate.     Amanda Ace, DANYA  08/17/23  11:54 EDT    Dictated Utilizing Dragon Dictation

## 2023-08-24 ENCOUNTER — HOSPITAL ENCOUNTER (OUTPATIENT)
Dept: OCCUPATIONAL THERAPY | Facility: HOSPITAL | Age: 83
Setting detail: THERAPIES SERIES
Discharge: HOME | End: 2023-08-24
Payer: MEDICARE

## 2023-09-07 NOTE — PROGRESS NOTES
Patient Name: Lilia Becerra   Visit Date: 08/03/2022   Patient ID: [id number]  Provider: DANYA Paris    Sex: female  Location:  Location Address:  Location Phone: [phone]  [place] [address] [phone]    YOB: 1940  Age: 81 y.o.   Primary Care Provider Leo Lomas MD      Referring Provider: Nadya Meek, *        Chief Complaint  Colon Polyps (New pt), Abdominal Pain (Pt states center ABD ), Diarrhea (Pt states some days of the week, constipation from medication ), and Vomiting (Pt states Dry Heaving some days, 2-3 times a day )    History of Present Illness  Patient has not been seen here since 2019, history of diarrhea alternating with constipation, also has history of dysphagia and heartburn.  Last EGD 2017 with hiatal hernia, diverticulum in esophagus, 6 adenomatous polyps also removed from the colon  Last colonoscopy  3/14/2019: Adequate prep, diverticulosis in the sigmoid, otherwise negative, random colon biopsies negative    Pt states she's had diarrhea on / off , worsened with corn, has abd pain prior to BM, having 1-2 stools/day, if diarrhea takes OTC med for diarrhea and works well. No blood seen in stool, states appetite is fair, states she's had trouble eating d/t denture issues, and also states issues w food stamps  No N/V usually. MA reported pt was having dry heaves when she was triaging pt.  I did not witness this and pt conversed normally with me.   Pt states she's lost a lot of weight, had katja mastectomy 1/2022 weighing #222 -she has lost 58# since Dougie  Seeing Dr Ward per pt    Pt states she's had dizzienss x 3 days. States she fell 2 x yesterday, she thinks from getting up quickly and tripped once. Pt had meclizine 25 mg bottle in her pocket, she took 1 this morning. She reports hx of vertigo     Eliquis per Dr Strong   Past Medical History:   Diagnosis Date   • Acid reflux disease    • Arthritis    • Atrial fibrillation (HCC)    •  Back pain    • Bowel obstruction (HCC)    • Breast cancer (HCC)    • Breast cancer (HCC)    • Carpal tunnel syndrome    • Constipation    • Depression    • Diabetes mellitus (HCC)    • Disease of thyroid gland    • Dyslipidemia    • Fecal incontinence    • GERD (gastroesophageal reflux disease)    • Glaucoma     left eye   • HBP (high blood pressure)    • Hearing impaired     hearing aides   • Hiatal hernia    • High cholesterol    • History of transfusion    • History of tuberculosis     IN 1980'S WAS TREATED   • Hypertension    • Hypomagnesemia    • Kienbock's disease    • Kienböck's disease, right     KIENBOCK'S AVASCULAR NECROSIS OF LUNATE, ADULT RIGHT   • Nonrheumatic aortic valve stenosis 11/5/2020   • OAB (overactive bladder)    • Osteoporosis    • Pneumonia    • PONV (postoperative nausea and vomiting)    • Positive PPD    • RLS (restless legs syndrome)    • Sleep apnea     NO MACHINE   • Stage 3 chronic kidney disease (HCC)    • Status post bilateral mastectomy with sentinel node biopsy and right axillary node dissection 1/12/2022   • Tailbone injury     fracture   • Thyroid disease    • Urinary incontinence     wears pads   • Urinary retention        Past Surgical History:   Procedure Laterality Date   • ANKLE TENDON REPAIR     • BACK SURGERY  12/21/2018-3/2019    LUMBAR--BROKEN DISC, CLEANED OUT--HAS 2ND PROCEDURE TO FINISH REMOVING   • CARPAL TUNNEL RELEASE     • CATARACT EXTRACTION Bilateral    • COLONOSCOPY     • ENDOSCOPY     • HEMORRHOIDECTOMY     • HYSTERECTOMY     • INCISION AND DRAINAGE OF WOUND      on back    • JOINT REPLACEMENT     • KNEE ARTHROPLASTY Right    • LEG SURGERY  06/13/2019   • LUMBAR DISCECTOMY Left 12/21/2018    Procedure: LEFT L4-5 MICRO DISCECTOMY;  Surgeon: Adam Coleman DO;  Location: [facility] MAIN OR;  Service: Orthopedic Spine   • LUMBAR DISCECTOMY Left 3/29/2019    Procedure: LEFT L4-5 REVISION OF MICRODISCECTOMY;  Surgeon: Adam Coleman DO;  Location: [facility] MAIN OR;   Hypertension "Service: Orthopedic Spine   • LUMBAR FUSION     • MASTECTOMY Bilateral 1/11/2022    Procedure: BREAST MASTECTOMY WITH SENTINEL NODE BIOPSY AND possible  AXILLARY NODE DISSECTION;  Surgeon: Lety Tena MD;  Location: [facility];  Service: General;  Laterality: Bilateral;   • TENDON RELEASE  09/2018   • TOTAL KNEE ARTHROPLASTY Left 6/13/2019    Procedure: LEFT KNEE REVISION;  Surgeon: Malick Costa II, MD;  Location: [facility];  Service: Orthopedics   • TOTAL KNEE ARTHROPLASTY REVISION Left     x2   • TOTAL KNEE ARTHROPLASTY REVISION Left 2/13/2018    Procedure: LEFT TOTAL KNEE ARTHROPLASTY REVISION;  Surgeon: Jair Fajardo MD;  Location: [facility];  Service:    • US GUIDED CYST ASPIRATION BREAST N/A 3/24/2022   • VERTEBROPLASTY         Allergies   Allergen Reactions   • Eggs Or Egg-Derived Products Swelling   • Sertraline Unknown - High Severity     Bleeding in stomach    • Tetanus Toxoid Swelling   • Tetanus Toxoids Swelling   • Metformin Diarrhea and Unknown - Low Severity   • Nsaids Unknown - High Severity   • Sertraline Hcl Unknown - High Severity   • Tetanus-Diphtheria Toxoids Td Unknown - Low Severity   • Tuberculin Ppd Unknown - Low Severity       Family History   Problem Relation Age of Onset   • Breast cancer Mother    • Tuberculosis Mother    • Diabetes Father    • Diabetes Sister    • Malig Hyperthermia Neg Hx    • Colon cancer Neg Hx         Social History     Tobacco Use   • Smoking status: Never Smoker   • Smokeless tobacco: Never Used   Vaping Use   • Vaping Use: Never used   Substance Use Topics   • Alcohol use: No   • Drug use: No       Objective     Vital Signs:   /50 (BP Location: Left arm, Patient Position: Sitting, Cuff Size: Adult)   Pulse 60   Ht 165.1 cm (65\")   Wt 74.4 kg (164 lb)   BMI 27.29 kg/m²       Physical Exam  Constitutional:       General: The patient is not in acute distress.     Appearance: Normal appearance.   HENT:      Head: " Normocephalic and atraumatic.      Nose: Nose normal.   Pulmonary:      Effort: Pulmonary effort is normal. No respiratory distress.   Abdominal:      General: Abdomen is flat.      Palpations: Abdomen is soft. There is no mass.      Tenderness: There is no abdominal tenderness. There is no guarding.   Musculoskeletal:      Cervical back: Neck supple.      Right lower leg: No edema.      Left lower leg: No edema.   Skin:     General: Skin is warm and dry.   Neurological:      General: No focal deficit present.      Mental Status: The patient is alert and oriented to person, place, and time.      Gait: Gait normal -- pt uses rolling walker w arm support and she did walk w/o difficulty w this and there was no sign of disorientation or unsteadiness w walker.    Psychiatric:         Mood and Affect: Mood normal.         Speech: Speech normal.         Behavior: Behavior normal.         Thought Content: Thought content normal.     Result Review :   The following data was reviewed by: DANYA Paris on 08/03/2022:    CBC w/diff    CBC w/Diff 4/5/22   WBC 13.42 (A)   RBC 3.85   Hemoglobin 11.7 (A)   Hematocrit 35.5   MCV 92.2   MCH 30.4   MCHC 33.0   RDW 12.4   Platelets 296   Neutrophil Rel % 75.7   Immature Granulocyte Rel % 0.2   Lymphocyte Rel % 17.6 (A)   Monocyte Rel % 4.9 (A)   Eosinophil Rel % 1.3   Basophil Rel % 0.3   (A) Abnormal value            CMP    CMP 9/15/21 1/11/22 4/5/22   Glucose  167 (A) 234 (A)   Glucose 192 (A)     BUN 14 24 (A) 22   Creatinine 1.2 1.35 (A) 1.38 (A)   eGFR Non  Am  37 (A)    eGFR African Am 52     Sodium 140 140 132 (A)   Potassium 4.2 3.2 (A) 4.2   Chloride 108 (A) 98 94 (A)   Calcium 9.5 8.3 (A) 10.0   Albumin   4.00   Total Bilirubin   0.6   Alkaline Phosphatase   134 (A)   AST (SGOT)   11   ALT (SGPT)   9   (A) Abnormal value       Comments are available for some flowsheets but are not being displayed.                         Assessment and Plan    Diagnoses  and all orders for this visit:    1. Weight loss (Primary)    2. Diarrhea, unspecified type  -     Enteric Bacterial Panel - Stool, Per Rectum; Future  -     Enteric Parasite Panel - Stool, Per Rectum; Future  -     Fecal Lactoferrin Qual. - Stool, Per Rectum; Future  -     Clostridioides difficile Toxin - Stool, Per Rectum; Future  -     Occult Blood, Fecal By Immunoassay - Stool, Per Rectum; Future    3. Anemia, unspecified type    4. Dry heaves    Other orders  -     PEG 3350-KCl-NaBcb-NaCl-NaSulf (Golytely) 227.1 g pack; Take 4 L by mouth Daily for 1 day. Take per office instructions  Dispense: 1 each; Refill: 0            Follow Up   Return if symptoms worsen or fail to improve.   Check stool studies  Egd/colonsocopy Surgical Risk and Benefits: Possible risks/complications, benefits, and alternatives to surgical or invasive procedure have been explained to patient and/or legal guardian; risks include bleeding, infection, and perforation. Patient has been evaluated and can tolerate anesthesia and/or sedation. Risks, benefits, and alternatives to anesthesia and sedation have been explained to patient and/or legal guardian. Clearance Dr Strong and Nydia  Recommended pt not drive home, she states she's fine. Recommended calling someone to come get her, pt stated everyone was at work. I did call and s/w her sister, she was in [place], I called her son Dick and Darnell and they did not answer. I went back in to check on pt, around 1130, pt reported she was feeling much better and that dizziness was gone. She reported taking Meclizine around 8:30 this morning for it. Pt is alert and oriented.     I advised her to also f/u with her PCP regarding these episodes. Fax note to PCP.    50 min spent reviewing chart, seeing pt, making phone calls as above.     Patient was given instructions and counseling regarding her condition or for health maintenance advice. Please see specific information pulled into the AVS if  appropriate.

## 2023-10-02 ENCOUNTER — TELEPHONE (OUTPATIENT)
Dept: UROLOGY | Facility: CLINIC | Age: 83
End: 2023-10-02

## 2023-10-02 DIAGNOSIS — I50.32 CHRONIC DIASTOLIC CONGESTIVE HEART FAILURE: ICD-10-CM

## 2023-10-02 RX ORDER — SPIRONOLACTONE 25 MG/1
25 TABLET ORAL DAILY
Qty: 90 TABLET | Refills: 3 | Status: SHIPPED | OUTPATIENT
Start: 2023-10-02

## 2023-10-02 NOTE — TELEPHONE ENCOUNTER
Patient called says she got taken off Oxyvutyniner for the bladder, and got kept on Myrbetrox, patient states medication is making her run to bathroom a lot, and she is going through pads, do you want to keep her on medication or give her something else

## 2023-10-05 ENCOUNTER — OFFICE VISIT (OUTPATIENT)
Dept: UROLOGY | Facility: CLINIC | Age: 83
End: 2023-10-05
Payer: MEDICARE

## 2023-10-05 VITALS
TEMPERATURE: 98.4 F | SYSTOLIC BLOOD PRESSURE: 136 MMHG | HEIGHT: 65 IN | BODY MASS INDEX: 31.32 KG/M2 | HEART RATE: 60 BPM | DIASTOLIC BLOOD PRESSURE: 62 MMHG | WEIGHT: 188 LBS

## 2023-10-05 DIAGNOSIS — I35.9 AORTIC VALVE DISORDER: ICD-10-CM

## 2023-10-05 DIAGNOSIS — N39.41 URGE INCONTINENCE OF URINE: ICD-10-CM

## 2023-10-05 DIAGNOSIS — Z86.69 HISTORY OF GLAUCOMA: ICD-10-CM

## 2023-10-05 DIAGNOSIS — N32.81 OAB (OVERACTIVE BLADDER): ICD-10-CM

## 2023-10-05 DIAGNOSIS — N39.0 RECURRENT UTI: Primary | ICD-10-CM

## 2023-10-05 DIAGNOSIS — Z98.890 HISTORY OF BLADDER REPAIR SURGERY: ICD-10-CM

## 2023-10-05 LAB
BILIRUB BLD-MCNC: NEGATIVE MG/DL
CLARITY, POC: ABNORMAL
COLOR UR: ABNORMAL
EXPIRATION DATE: ABNORMAL
GLUCOSE UR STRIP-MCNC: ABNORMAL MG/DL
KETONES UR QL: NEGATIVE
LEUKOCYTE EST, POC: ABNORMAL
Lab: ABNORMAL
NITRITE UR-MCNC: POSITIVE MG/ML
PH UR: 6 [PH] (ref 5–8)
PROT UR STRIP-MCNC: ABNORMAL MG/DL
RBC # UR STRIP: ABNORMAL /UL
SP GR UR: 1.03 (ref 1–1.03)
UROBILINOGEN UR QL: ABNORMAL

## 2023-10-05 PROCEDURE — 87186 SC STD MICRODIL/AGAR DIL: CPT | Performed by: NURSE PRACTITIONER

## 2023-10-05 PROCEDURE — 87088 URINE BACTERIA CULTURE: CPT | Performed by: NURSE PRACTITIONER

## 2023-10-05 PROCEDURE — 87086 URINE CULTURE/COLONY COUNT: CPT | Performed by: NURSE PRACTITIONER

## 2023-10-05 RX ORDER — LEVOFLOXACIN 250 MG/1
TABLET ORAL
Qty: 7 TABLET | Refills: 0 | Status: CANCELLED | OUTPATIENT
Start: 2023-10-05

## 2023-10-05 RX ORDER — OXYBUTYNIN CHLORIDE 10 MG/1
10 TABLET, EXTENDED RELEASE ORAL DAILY
COMMUNITY

## 2023-10-05 RX ORDER — AZELASTINE HYDROCHLORIDE 0.5 MG/ML
1 SOLUTION/ DROPS OPHTHALMIC DAILY
COMMUNITY
Start: 2023-08-18

## 2023-10-06 DIAGNOSIS — N18.9 CHRONIC KIDNEY DISEASE, UNSPECIFIED CKD STAGE: ICD-10-CM

## 2023-10-06 DIAGNOSIS — N39.0 RECURRENT UTI: Primary | ICD-10-CM

## 2023-10-06 RX ORDER — CEPHALEXIN 250 MG/1
250 CAPSULE ORAL 3 TIMES DAILY
Qty: 21 CAPSULE | Refills: 0 | Status: SHIPPED | OUTPATIENT
Start: 2023-10-06 | End: 2023-10-13

## 2023-10-06 NOTE — PROGRESS NOTES
"Chief Complaint  Follow-up ((overactive bladder)/Discuss medications)    Subjective          Lilia Becerra is a 82 y.o. female who presents to [facility]  History of Present Illness  Follow up of oab and oxybutynin prescription. Patient instructed to not take any more of the oxybutynin 10mg xl. Patient states had stopped taking as instructed and began running to bathroom again so began medication again. Confused to time frame of how long stopped and reminded that she had called regarding increased symptoms last week, she is not certain but believes that is when she started medication again.Reported stopped medication after last visit.  Visit was in July and over 2 months ago which would not explain symptoms beginning last week.  She does believe that the urinary incontinence and urgency has improved with her decision to self-start medication again last week.  Denies typical symptoms of uti. Denies dysuria or hematuria. Not having any bladder or pelvic pain.  Main concern is controlling urgency and urge incontinence due to cost of attends undergarments.  Unsure if increase incontinence may be due to her urinary tract infection or if related to discontinuation of oxybutynin.  She has had history of urinary retention in the past.    Objective   Vital Signs:   /62 (BP Location: Left arm, Patient Position: Sitting, Cuff Size: Adult)   Pulse 60   Temp 98.4 °F (36.9 °C) (Oral)   Ht 165.1 cm (65\")   Wt 85.3 kg (188 lb)   BMI 31.28 kg/m²     Allergies   Allergen Reactions    Eggs Or Egg-Derived Products Swelling    Nsaids Unknown - High Severity    Sertraline Unknown - High Severity     Bleeding in stomach    Tetanus Toxoid Swelling    Tetanus Toxoids Swelling    Metformin Diarrhea and Unknown - Low Severity    Tetanus-Diphtheria Toxoids Td Unknown - Low Severity    Tuberculin Ppd Unknown - Low Severity      Past medical history:  has a past medical history of Acid reflux disease, " Arthritis, Atrial fibrillation, Back pain, Bowel obstruction, Breast cancer, Carpal tunnel syndrome, Constipation, Depression, Diabetes mellitus, Disease of thyroid gland, Dyslipidemia, Fecal incontinence, GERD (gastroesophageal reflux disease), Glaucoma, HBP (high blood pressure), Hearing impaired, Hiatal hernia, High cholesterol, History of transfusion, History of tuberculosis, Hypertension, Hypomagnesemia, Kienbock's disease, Kienböck's disease, right, Nonrheumatic aortic valve stenosis (11/05/2020), OAB (overactive bladder), Osteoporosis, Pneumonia, PONV (postoperative nausea and vomiting), Positive PPD, RLS (restless legs syndrome), Sleep apnea, Stage 3 chronic kidney disease, Status post bilateral mastectomy with sentinel node biopsy and right axillary node dissection (01/12/2022), Tailbone injury, Thyroid disease, Urinary incontinence, and Urinary retention.   Past surgical history:  has a past surgical history that includes Hemorrhoid surgery; Hysterectomy; Cataract extraction (Bilateral); Lumbar fusion; Incision and drainage of wound; Vertebroplasty; Knee Arthroplasty (Right); Total Knee Arthroplasty Revision (Left); Total Knee Arthroplasty Revision (Left, 02/13/2018); Colonoscopy; Lumbar discectomy (Left, 12/21/2018); Lumbar discectomy (Left, 03/29/2019); Total knee arthroplasty (Left, 06/13/2019); Ankle Tendon Repair; Back surgery (12/21/2018-3/2019); Carpal tunnel release; Esophagogastroduodenoscopy; Leg Surgery (06/13/2019); Tendon release (09/2018); Joint replacement; Mastectomy (Bilateral, 01/11/2022); US Guided Cyst Aspiration Breast (N/A, 03/24/2022); Esophagogastroduodenoscopy (N/A, 11/17/2022); Colonoscopy (N/A, 11/17/2022); Mastectomy (Bilateral); Cystoscopy; and Bladder repair (09/2014).  Personal history: family history includes Breast cancer in her mother; Diabetes in her father and sister; Tuberculosis in her mother.  Social history:  reports that she has never smoked. She has never used  smokeless tobacco. She reports that she does not drink alcohol and does not use drugs.    Review of Systems   Constitutional:  Negative for chills and fever.   Respiratory:  Negative for shortness of breath.    Gastrointestinal:  Negative for abdominal pain.   Genitourinary:  Positive for frequency and urgency. Negative for dysuria, flank pain and hematuria.   Musculoskeletal:  Positive for arthralgias. Negative for gait problem.      Physical Exam  Vitals and nursing note reviewed.   Constitutional:       General: She is not in acute distress.     Appearance: Normal appearance. She is well-developed. She is not ill-appearing.      Comments: Ambulates with mild difficulty rolling walker   Cardiovascular:      Rate and Rhythm: Normal rate.   Pulmonary:      Effort: Pulmonary effort is normal.      Breath sounds: Normal air entry.   Musculoskeletal:         General: Normal range of motion.   Skin:     General: Skin is warm and dry.   Neurological:      Mental Status: She is alert and oriented to person, place, and time.      Motor: Motor function is intact.   Psychiatric:         Mood and Affect: Mood normal.         Behavior: Behavior normal. Behavior is cooperative.         Thought Content: Thought content normal.         Judgment: Judgment normal.      Comments:  Decline short term memory, increase in forgetfulness recent      Result Review :     CMP          5/30/2023    14:08 9/29/2023    12:17   CMP   Glucose 152     147       BUN 31     35       Creatinine 1.5     1.2       Sodium 138     140       Potassium 3.9     4.0       Chloride 101     105       Calcium 9.5     9.5       Anion Gap 12     10          Details          This result is from an external source.               Renal Profile          5/30/2023    14:08 9/29/2023    12:17   Renal Profile   BUN 31     35       Creatinine 1.5     1.2          Details          This result is from an external source.               Urine Culture          7/27/2023     14:41   Urine Culture   Urine Culture 50,000 CFU/mL Morganella morganii ssp morganii  C    50,000 CFU/mL Pseudomonas aeruginosa  C    50,000 CFU/mL Enterococcus faecalis  C    Treated with short course of levaquin.    Details         C Corrected result                Results for orders placed or performed in visit on 10/05/23   Urine Culture - Urine, Urine, Clean Catch    Specimen: Urine, Clean Catch   Result Value Ref Range    Urine Culture >100,000 CFU/mL Escherichia coli (A)    POC Urinalysis Dipstick, Automated    Specimen: Urine   Result Value Ref Range    Color Deanna Yellow, Straw, Dark Yellow, Deanna    Clarity, UA Cloudy (A) Clear    Specific Gravity  1.030 1.005 - 1.030    pH, Urine 6.0 5.0 - 8.0    Leukocytes Trace (A) Negative    Nitrite, UA Positive (A) Negative    Protein, POC Trace (A) Negative mg/dL    Glucose, UA 1000 mg/dL (A) Negative mg/dL    Ketones, UA Negative Negative    Urobilinogen, UA 0.2 E.U./dL Normal, 0.2 E.U./dL    Bilirubin Negative Negative    Blood, UA Trace (A) Negative    Lot Number [id number]     Expiration Date 9-             Assessment and Plan    Diagnoses and all orders for this visit:    1. Recurrent UTI (Primary)    2. OAB (overactive bladder)  -     POC Urinalysis Dipstick, Automated    3. History of bladder repair surgery    4. Aortic valve disorder- (no aortic aneurysms per imaging0    5. History of glaucoma    6. Urge incontinence of urine    We will send urine for culture and prescribe short course of levaquin renal dosing  due to limited time frame before patient planned surgery 10/18/2023.    .  Normally UTI has been asymptomatic and not treated however patient is exhibiting slight increase in memory difficulty and slight confusion. Treated with short course of levaquin with uti on 7/2023 visit. Unsure if increased urinary incontinence and urgency is due to the oxybutynin d/c  or the urinary tract infection.  Patient will return in about 10 days for uroflow and  ultrasound residual.  Hopefully at that time infection resolved and patient will remain off oxybutynin until that time.  She is in agreement.  To continue Myrbetriq 50 mg.    I have discussed with the patient concerns with anticholinergic cognitive side effect profile with use in older population and also the fact that she does have glaucoma.  Had contacted her ophthalmologist years ago and was able to use anticholinergic medications due to she did not have narrow angle glaucoma.  Recently she relays told pressure increase in left eye after cataract and surgery.                                                                                                                                                                                                                                   Follow Up   No follow-ups on file.  Patient was given instructions and counseling regarding her condition or for health maintenance advice. Please see specific information pulled into the AVS if appropriate.     DANYA Irizarry

## 2023-10-07 LAB — BACTERIA SPEC AEROBE CULT: ABNORMAL

## 2023-10-09 NOTE — PROGRESS NOTES
Urine positive for Ecoli susceptible to keflex, prescribed renal dosing Friday and to continue medication until completed. Patient scheduled for follow up uroflow/usr. Patient calling back last after office visit stating she had received her new eyeglasses and believes may have been her lasix. Reported as taking 2 different dosages.

## 2023-11-07 ENCOUNTER — OFFICE VISIT (OUTPATIENT)
Dept: ONCOLOGY | Facility: HOSPITAL | Age: 83
End: 2023-11-07
Payer: MEDICARE

## 2023-11-07 VITALS
WEIGHT: 183.64 LBS | HEART RATE: 65 BPM | SYSTOLIC BLOOD PRESSURE: 131 MMHG | BODY MASS INDEX: 30.56 KG/M2 | RESPIRATION RATE: 18 BRPM | TEMPERATURE: 98.3 F | OXYGEN SATURATION: 97 % | DIASTOLIC BLOOD PRESSURE: 60 MMHG

## 2023-11-07 DIAGNOSIS — M85.89 OSTEOPENIA OF MULTIPLE SITES: ICD-10-CM

## 2023-11-07 DIAGNOSIS — Z17.0 MALIGNANT NEOPLASM OF OVERLAPPING SITES OF RIGHT BREAST IN FEMALE, ESTROGEN RECEPTOR POSITIVE: Primary | ICD-10-CM

## 2023-11-07 DIAGNOSIS — C50.811 MALIGNANT NEOPLASM OF OVERLAPPING SITES OF RIGHT BREAST IN FEMALE, ESTROGEN RECEPTOR POSITIVE: Primary | ICD-10-CM

## 2023-11-07 PROCEDURE — G0463 HOSPITAL OUTPT CLINIC VISIT: HCPCS | Performed by: INTERNAL MEDICINE

## 2023-11-07 RX ORDER — BIMATOPROST 0.1 MG/ML
SOLUTION/ DROPS OPHTHALMIC EVERY 24 HOURS
COMMUNITY

## 2023-11-07 RX ORDER — DILTIAZEM HYDROCHLORIDE 120 MG/1
TABLET, FILM COATED ORAL EVERY 24 HOURS
COMMUNITY
End: 2023-11-10

## 2023-11-07 RX ORDER — LETROZOLE 2.5 MG/1
2.5 TABLET, FILM COATED ORAL DAILY
Qty: 90 TABLET | Refills: 1 | Status: SHIPPED | OUTPATIENT
Start: 2023-11-07

## 2023-11-07 NOTE — ASSESSMENT & PLAN NOTE
Status post treatments as outlined.  She is currently on adjuvant letrozole.  Tolerating well.  Refills provided today.  Continue for minimum of 5 years.  I will see her back in 6 months for ongoing treatment.

## 2023-11-07 NOTE — PROGRESS NOTES
Chief Complaint  Breast Cancer    Leo Lomas MD Ahmed, Syed Zulfiqar, MD    Subjective          Lilia Becerra presents to [facility] for ongoing treatment of her breast cancer.  She is on adjuvant letrozole.  She is taking medication as prescribed.  She denies any masses or adenopathy.  No unusual aches or pains.  She denies hot flashes or night sweats.  She reports adequate appetite and energy level.  She is currently recuperating from hand surgery on the left    Oncology/Hematology History Overview Note   12/20/22 Right breast, 9 o'clock position, 6 cm from nipple, ultrasound-guided core biopsy:  - Invasive carcinoma of no special type (ductal)  - Histologic grade ([place] Histologic Score):  - Glandular (Acinar)/Tubular Differentiation: Score 3  - Nuclear Pleomorphism: Score 2  - Mitotic Rate: Score 1  - Overall Grade: Grade 2  - Size of largest focus of invasion: 9 mm  - Breast biomarker testing:  - Estrogen Receptor (ER): Positive (100%, strong)  - Progesterone Receptor (PgR): Positive (100%, strong)  - HER2 (by immunohistochemistry): Equivocal (Score 2+), see comment  - Ki-67: 5%  2. 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 Grade 2, All margins negative. 0/18 lymph nodes.    Left Mastectomy-benign.     Oncotype score 3    2/21/22 Letrozole initiated.          Malignant neoplasm of overlapping sites of right breast in female, estrogen receptor positive   12/23/2021 Initial Diagnosis    Malignant neoplasm of overlapping sites of right breast in female, estrogen receptor positive (HCC)     2/21/2022 Cancer Staged    Staging form: Breast, AJCC 8th Edition  - Clinical: Stage IA (cT1b(2), cN0, cM0, G2, ER+, LA+, HER2-) - Signed by Sean Ward MD on 2/21/2022 8/17/2022 -  Chemotherapy    OP BREAST Letrozole         Review of Systems   Constitutional:  Positive for fatigue. Negative for appetite change, diaphoresis, fever, unexpected weight gain and unexpected weight loss.   HENT:  Negative for hearing loss, mouth sores, sore throat, swollen glands, trouble swallowing and voice change.    Eyes:  Negative for blurred vision.   Respiratory:  Negative for cough, shortness of breath and wheezing.    Cardiovascular:  Negative for chest pain and palpitations.   Gastrointestinal:  Positive for constipation and diarrhea. Negative for abdominal pain, blood in stool, nausea and vomiting.   Endocrine: Negative for cold intolerance and heat intolerance.   Genitourinary:  Negative for difficulty urinating, dysuria, frequency, hematuria and urinary incontinence.   Musculoskeletal:  Positive for arthralgias and myalgias. Negative for back pain.   Skin:  Negative for rash, skin lesions and wound.   Neurological:  Positive for weakness. Negative for dizziness, seizures, numbness and headache.   Hematological:  Does not bruise/bleed easily.   Psychiatric/Behavioral:  Negative for depressed mood. The patient is not nervous/anxious.      Current Outpatient Medications on File Prior to Visit   Medication Sig Dispense Refill    apixaban (ELIQUIS) 5 MG tablet tablet Take 1 tablet by mouth Every 12 (Twelve) Hours. 180 tablet 3    atorvastatin (LIPITOR) 10 MG tablet Take 1 tablet by mouth Every Night. 90  tablet 3    azelastine (OPTIVAR) 0.05 % ophthalmic solution 1 drop Daily.      bimatoprost (Lumigan) 0.01 % ophthalmic drops Daily.      Blood Glucose Monitoring Suppl (True Metrix Meter) w/Device kit       brimonidine-timolol (COMBIGAN) 0.2-0.5 % ophthalmic solution       Calcium Carb-Cholecalciferol (CALCIUM 600 + D PO) Take 1 tablet by mouth 2 (Two) Times a Day.      cyclobenzaprine (FLEXERIL) 10 MG tablet Take 1 tablet by mouth As Needed.      dilTIAZem (CARDIZEM) 120 MG tablet Daily.      dilTIAZem CD (CARDIZEM CD) 120 MG 24 hr capsule Take 1 capsule by mouth Daily. 90 capsule 3    DULoxetine (CYMBALTA) 60 MG capsule 1 capsule Daily.      empagliflozin (JARDIANCE) 25 MG tablet tablet Daily.      furosemide (LASIX) 40 MG tablet Take 1 1/2 tablets every morning. Can decrease to one tablet a day if swelling improves 145 tablet 3    levothyroxine (SYNTHROID, LEVOTHROID) 25 MCG tablet Take 1 tablet by mouth Daily. Currently doesn't have .      losartan (COZAAR) 25 MG tablet Take 1 tablet by mouth Daily. 90 tablet 3    meclizine (ANTIVERT) 25 MG tablet 1 tablet 3 (Three) Times a Day As Needed.      metoprolol succinate XL (TOPROL-XL) 25 MG 24 hr tablet Take 1 tablet by mouth Every Night. 90 tablet 3    Mirabegron ER (MYRBETRIQ) 50 MG tablet sustained-release 24 hour 24 hr tablet Take 50 mg by mouth Daily. 90 tablet 1    Multiple Vitamins-Minerals (MULTIVITAMIN ADULTS PO) Take 1 tablet by mouth Daily.      omeprazole (priLOSEC) 20 MG capsule Take 1 capsule by mouth Daily.      OneTouch Ultra test strip       oxybutynin XL (DITROPAN-XL) 10 MG 24 hr tablet Take 1 tablet by mouth Daily.      pramipexole (MIRAPEX) 1.5 MG tablet Take 1 tablet by mouth Every Night.      spironolactone (ALDACTONE) 25 MG tablet Take 1 tablet by mouth Daily. 90 tablet 3    tiZANidine (ZANAFLEX) 4 MG tablet       triamcinolone (KENALOG) 0.1 % cream       VENLAFAXINE HCL ER PO Take 150 mg by mouth Daily.      vitamin C (ASCORBIC ACID) 500 MG  tablet Take 1 tablet by mouth Daily.      [DISCONTINUED] letrozole (FEMARA) 2.5 MG tablet Take 1 tablet by mouth Daily. 90 tablet 1    alendronate (FOSAMAX) 35 MG tablet Take 1 tablet by mouth Every 7 (Seven) Days. sunday (Patient not taking: Reported on 11/7/2023)       No current facility-administered medications on file prior to visit.       Allergies   Allergen Reactions    Eggs Or Egg-Derived Products Swelling    Nsaids Unknown - High Severity    Sertraline Unknown - High Severity     Bleeding in stomach    Tetanus Toxoid Swelling    Tetanus Toxoids Swelling    Metformin Diarrhea and Unknown - Low Severity    Tetanus-Diphtheria Toxoids Td Unknown - Low Severity    Tuberculin Ppd Unknown - Low Severity     Past Medical History:   Diagnosis Date    Acid reflux disease     Arthritis     Atrial fibrillation     Back pain     Bowel obstruction     Breast cancer     Carpal tunnel syndrome     Constipation     Depression     Diabetes mellitus     Disease of thyroid gland     Dyslipidemia     Fecal incontinence     GERD (gastroesophageal reflux disease)     Glaucoma     left eye    HBP (high blood pressure)     Hearing impaired     hearing aides    Hiatal hernia     High cholesterol     History of transfusion     History of tuberculosis     IN 1980'S WAS TREATED    Hypertension     Hypomagnesemia     Kienbock's disease     Kienböck's disease, right     KIENBOCK'S AVASCULAR NECROSIS OF LUNATE, ADULT RIGHT    Nonrheumatic aortic valve stenosis 11/05/2020    OAB (overactive bladder)     Osteoporosis     Pneumonia     PONV (postoperative nausea and vomiting)     Positive PPD     RLS (restless legs syndrome)     Sleep apnea     NO MACHINE    Stage 3 chronic kidney disease     Status post bilateral mastectomy with sentinel node biopsy and right axillary node dissection 01/12/2022    Tailbone injury     fracture    Thyroid disease     Urinary incontinence     wears pads    Urinary retention      Past Surgical History:    Procedure Laterality Date    ANKLE TENDON REPAIR      BACK SURGERY  12/21/2018-3/2019    LUMBAR--BROKEN DISC, CLEANED OUT--HAS 2ND PROCEDURE TO FINISH REMOVING    BLADDER REPAIR  09/2014    bladder sling with cysto    CARPAL TUNNEL RELEASE      CATARACT EXTRACTION Bilateral     COLONOSCOPY      COLONOSCOPY N/A 11/17/2022    Procedure: COLONOSCOPY with biopsy;  Surgeon: Leo Alvares MD;  Location: [facility];  Service: Gastroenterology;  Laterality: N/A;  diverticulosis    CYSTOSCOPY      ENDOSCOPY      ENDOSCOPY N/A 11/17/2022    Procedure: ESOPHAGOGASTRODUODENOSCOPY with biopsy and snare;  Surgeon: Leo Alvares MD;  Location: [facility] ENDOSCOPY;  Service: Gastroenterology;  Laterality: N/A;  gastric fundus polyp    HEMORRHOIDECTOMY      HYSTERECTOMY      INCISION AND DRAINAGE OF WOUND      on back     JOINT REPLACEMENT      KNEE ARTHROPLASTY Right     LEG SURGERY  06/13/2019    LUMBAR DISCECTOMY Left 12/21/2018    Procedure: LEFT L4-5 MICRO DISCECTOMY;  Surgeon: Adam Coleman DO;  Location: [facility] OR;  Service: Orthopedic Spine    LUMBAR DISCECTOMY Left 03/29/2019    Procedure: LEFT L4-5 REVISION OF MICRODISCECTOMY;  Surgeon: Adam Coleman DO;  Location: [facility] MAIN OR;  Service: Orthopedic Spine    LUMBAR FUSION      MASTECTOMY Bilateral 01/11/2022    Procedure: BREAST MASTECTOMY WITH SENTINEL NODE BIOPSY AND possible  AXILLARY NODE DISSECTION;  Surgeon: Lety Tena MD;  Location: [facility] OR OSC;  Service: General;  Laterality: Bilateral;    MASTECTOMY Bilateral     TENDON RELEASE  09/2018    TOTAL KNEE ARTHROPLASTY Left 06/13/2019    Procedure: LEFT KNEE REVISION;  Surgeon: Malick Costa II, MD;  Location: [facility] MAIN OR;  Service: Orthopedics    TOTAL KNEE ARTHROPLASTY REVISION Left     x2    TOTAL KNEE ARTHROPLASTY REVISION Left 02/13/2018    Procedure: LEFT TOTAL KNEE ARTHROPLASTY REVISION;  Surgeon: Jair Fajardo MD;  Location: [facility] MAIN OR;  Service:      US GUIDED CYST ASPIRATION BREAST N/A 03/24/2022    VERTEBROPLASTY       Social History     Socioeconomic History    Marital status:    Tobacco Use    Smoking status: Never    Smokeless tobacco: Never   Vaping Use    Vaping Use: Never used   Substance and Sexual Activity    Alcohol use: No    Drug use: No    Sexual activity: Defer     Family History   Problem Relation Age of Onset    Breast cancer Mother     Tuberculosis Mother     Diabetes Father     Diabetes Sister     Malig Hyperthermia Neg Hx     Colon cancer Neg Hx        Objective   Physical Exam  Vitals reviewed. Exam conducted with a chaperone present.   Constitutional:       General: She is not in acute distress.     Appearance: Normal appearance.   Cardiovascular:      Rate and Rhythm: Normal rate and regular rhythm.      Heart sounds: Normal heart sounds. No murmur heard.     No gallop.   Pulmonary:      Effort: Pulmonary effort is normal.      Breath sounds: Normal breath sounds.   Chest:   Breasts:     Right: Absent.      Left: Absent.       Abdominal:      General: Abdomen is flat. Bowel sounds are normal.      Palpations: Abdomen is soft.   Musculoskeletal:      Right lower leg: No edema.      Left lower leg: No edema.   Lymphadenopathy:      Upper Body:      Right upper body: No supraclavicular or axillary adenopathy.      Left upper body: No supraclavicular or axillary adenopathy.   Neurological:      Mental Status: She is alert and oriented to person, place, and time.   Psychiatric:         Mood and Affect: Mood normal.         Behavior: Behavior normal.         Vitals:    11/07/23 1420   BP: 131/60   Pulse: 65   Resp: 18   Temp: 98.3 °F (36.8 °C)   TempSrc: Temporal   SpO2: 97%   Weight: 83.3 kg (183 lb 10.3 oz)   PainSc: 0-No pain     ECOG score: 1         PHQ-9 Total Score:                    Result Review :   The following data was reviewed by: Sean Ward MD on 11/07/2023:  Lab Results   Component Value Date    HGB 11.7 (L)  "04/05/2022    HCT 35.5 04/05/2022    MCV 92.2 04/05/2022     04/05/2022    WBC 13.42 (H) 04/05/2022    NEUTROABS 10.16 (H) 04/05/2022    LYMPHSABS 2.36 04/05/2022    MONOSABS 0.66 04/05/2022    EOSABS 0.17 04/05/2022    BASOSABS 0.04 04/05/2022     Lab Results   Component Value Date    GLUCOSE 234 (H) 04/05/2022    BUN 32 (H) 10/22/2023    CREATININE 1.6 (H) 10/22/2023     10/22/2023    K 3.4 (L) 10/22/2023    CL 97 (L) 10/22/2023    CO2 29 10/22/2023    CALCIUM 9.6 10/22/2023    PROTEINTOT 7.1 04/05/2022    ALBUMIN 4.2 10/22/2023    BILITOT 0.52 10/22/2023    ALKPHOS 98 10/22/2023    AST 15 10/22/2023    ALT 6 (L) 10/22/2023     Lab Results   Component Value Date    MG 1.7 (L) 09/15/2021    PHOS 2.7 06/17/2019     No results found for: \"IRON\", \"LABIRON\", \"TRANSFERRIN\", \"TIBC\"  No results found for: \"LDH\", \"FERRITIN\", \"[id number]\", \"FOLATE\"  No results found for: \"PSA\", \"CEA\", \"AFP\", \"\", \"\"          Assessment and Plan    Diagnoses and all orders for this visit:    1. Malignant neoplasm of overlapping sites of right breast in female, estrogen receptor positive (Primary)  Assessment & Plan:  Status post treatments as outlined.  She is currently on adjuvant letrozole.  Tolerating well.  Refills provided today.  Continue for minimum of 5 years.  I will see her back in 6 months for ongoing treatment.    Orders:  -     letrozole (FEMARA) 2.5 MG tablet; Take 1 tablet by mouth Daily.  Dispense: 90 tablet; Refill: 1    2. Osteopenia of multiple sites  Assessment & Plan:  Patient is on calcium vitamin D daily.  She is trying exercise.  DEXA scan before next visit.    Orders:  -     DEXA Bone Density Axial; Future            Patient Follow Up: 6 months    Patient was given instructions and counseling regarding her condition or for health maintenance advice. Please see specific information pulled into the AVS if appropriate.     Sean Ward MD    11/7/2023            "

## 2023-11-10 ENCOUNTER — OFFICE VISIT (OUTPATIENT)
Dept: CARDIOLOGY | Facility: CLINIC | Age: 83
End: 2023-11-10
Payer: MEDICARE

## 2023-11-10 VITALS
DIASTOLIC BLOOD PRESSURE: 77 MMHG | HEART RATE: 57 BPM | SYSTOLIC BLOOD PRESSURE: 118 MMHG | WEIGHT: 183 LBS | HEIGHT: 65 IN | BODY MASS INDEX: 30.49 KG/M2

## 2023-11-10 DIAGNOSIS — I50.32 CHRONIC DIASTOLIC CONGESTIVE HEART FAILURE: Primary | ICD-10-CM

## 2023-11-10 DIAGNOSIS — I35.0 NONRHEUMATIC AORTIC VALVE STENOSIS: ICD-10-CM

## 2023-11-10 DIAGNOSIS — E78.2 MIXED HYPERLIPIDEMIA: ICD-10-CM

## 2023-11-10 DIAGNOSIS — I10 ESSENTIAL HYPERTENSION: ICD-10-CM

## 2023-11-10 DIAGNOSIS — I48.0 PAROXYSMAL ATRIAL FIBRILLATION: ICD-10-CM

## 2023-11-10 NOTE — PROGRESS NOTES
Chief Complaint  Nonrheumatic aortic valve stenosis    Subjective      Patient is here for follow-up on, diastolic heart failure, atrial fibrillation and aortic valve stenosis.  She is doing well cardiac wise.  She has no complaints or concerns today.  She has no chest discomfort or dyspnea.  She has no palpitations, dizziness, presyncope or syncope.  She is compliant with her medications including Eliquis without bleeding.    Past Medical History:   Diagnosis Date    Acid reflux disease     Arthritis     Atrial fibrillation     Back pain     Bowel obstruction     Breast cancer     Carpal tunnel syndrome     Constipation     Depression     Diabetes mellitus     Disease of thyroid gland     Dyslipidemia     Fecal incontinence     GERD (gastroesophageal reflux disease)     Glaucoma     left eye    HBP (high blood pressure)     Hearing impaired     hearing aides    Hiatal hernia     High cholesterol     History of transfusion     History of tuberculosis     IN 1980'S WAS TREATED    Hypertension     Hypomagnesemia     Kienbock's disease     Kienböck's disease, right     KIENBOCK'S AVASCULAR NECROSIS OF LUNATE, ADULT RIGHT    Nonrheumatic aortic valve stenosis 11/05/2020    OAB (overactive bladder)     Osteoporosis     Pneumonia     PONV (postoperative nausea and vomiting)     Positive PPD     RLS (restless legs syndrome)     Sleep apnea     NO MACHINE    Stage 3 chronic kidney disease     Status post bilateral mastectomy with sentinel node biopsy and right axillary node dissection 01/12/2022    Tailbone injury     fracture    Thyroid disease     Urinary incontinence     wears pads    Urinary retention          Current Outpatient Medications:     apixaban (ELIQUIS) 5 MG tablet tablet, Take 1 tablet by mouth Every 12 (Twelve) Hours., Disp: 180 tablet, Rfl: 3    atorvastatin (LIPITOR) 10 MG tablet, Take 1 tablet by mouth Every Night., Disp: 90 tablet, Rfl: 3    azelastine (OPTIVAR) 0.05 % ophthalmic solution, 1 drop  Daily., Disp: , Rfl:     bimatoprost (Lumigan) 0.01 % ophthalmic drops, Daily., Disp: , Rfl:     Blood Glucose Monitoring Suppl (True Metrix Meter) w/Device kit, , Disp: , Rfl:     brimonidine-timolol (COMBIGAN) 0.2-0.5 % ophthalmic solution, , Disp: , Rfl:     Calcium Carb-Cholecalciferol (CALCIUM 600 + D PO), Take 1 tablet by mouth 2 (Two) Times a Day., Disp: , Rfl:     cyclobenzaprine (FLEXERIL) 10 MG tablet, Take 1 tablet by mouth As Needed., Disp: , Rfl:     dilTIAZem CD (CARDIZEM CD) 120 MG 24 hr capsule, Take 1 capsule by mouth Daily., Disp: 90 capsule, Rfl: 3    DULoxetine (CYMBALTA) 60 MG capsule, 1 capsule Daily., Disp: , Rfl:     empagliflozin (JARDIANCE) 25 MG tablet tablet, Daily., Disp: , Rfl:     furosemide (LASIX) 40 MG tablet, Take 1 1/2 tablets every morning. Can decrease to one tablet a day if swelling improves, Disp: 145 tablet, Rfl: 3    letrozole (FEMARA) 2.5 MG tablet, Take 1 tablet by mouth Daily., Disp: 90 tablet, Rfl: 1    levothyroxine (SYNTHROID, LEVOTHROID) 25 MCG tablet, Take 1 tablet by mouth Daily. Currently doesn't have ., Disp: , Rfl:     losartan (COZAAR) 25 MG tablet, Take 1 tablet by mouth Daily., Disp: 90 tablet, Rfl: 3    meclizine (ANTIVERT) 25 MG tablet, 1 tablet 3 (Three) Times a Day As Needed., Disp: , Rfl:     metoprolol succinate XL (TOPROL-XL) 25 MG 24 hr tablet, Take 1 tablet by mouth Every Night., Disp: 90 tablet, Rfl: 3    Mirabegron ER (MYRBETRIQ) 50 MG tablet sustained-release 24 hour 24 hr tablet, Take 50 mg by mouth Daily., Disp: 90 tablet, Rfl: 1    Multiple Vitamins-Minerals (MULTIVITAMIN ADULTS PO), Take 1 tablet by mouth Daily., Disp: , Rfl:     omeprazole (priLOSEC) 20 MG capsule, Take 1 capsule by mouth Daily., Disp: , Rfl:     OneTouch Ultra test strip, , Disp: , Rfl:     oxybutynin XL (DITROPAN-XL) 10 MG 24 hr tablet, Take 1 tablet by mouth Daily., Disp: , Rfl:     pramipexole (MIRAPEX) 1.5 MG tablet, Take 1 tablet by mouth Every Night., Disp: , Rfl:      "spironolactone (ALDACTONE) 25 MG tablet, Take 1 tablet by mouth Daily., Disp: 90 tablet, Rfl: 3    tiZANidine (ZANAFLEX) 4 MG tablet, , Disp: , Rfl:     triamcinolone (KENALOG) 0.1 % cream, , Disp: , Rfl:     VENLAFAXINE HCL ER PO, Take 150 mg by mouth Daily., Disp: , Rfl:     vitamin C (ASCORBIC ACID) 500 MG tablet, Take 1 tablet by mouth Daily., Disp: , Rfl:     Medications Discontinued During This Encounter   Medication Reason    alendronate (FOSAMAX) 35 MG tablet *Therapy completed    dilTIAZem (CARDIZEM) 120 MG tablet *Therapy completed     Allergies   Allergen Reactions    Eggs Or Egg-Derived Products Swelling    Nsaids Unknown - High Severity    Sertraline Unknown - High Severity     Bleeding in stomach    Tetanus Toxoid Swelling    Tetanus Toxoids Swelling    Metformin Diarrhea and Unknown - Low Severity    Tetanus-Diphtheria Toxoids Td Unknown - Low Severity    Tuberculin Ppd Unknown - Low Severity        Social History     Tobacco Use    Smoking status: Never    Smokeless tobacco: Never   Vaping Use    Vaping Use: Never used   Substance Use Topics    Alcohol use: No    Drug use: No       Family History   Problem Relation Age of Onset    Breast cancer Mother     Tuberculosis Mother     Diabetes Father     Diabetes Sister     Malig Hyperthermia Neg Hx     Colon cancer Neg Hx         Objective     /77   Pulse 57   Ht 165.1 cm (65\")   Wt 83 kg (183 lb)   BMI 30.45 kg/m²       Physical Exam    General Appearance:   no acute distress  Alert and oriented x3  HENT:   lips not cyanotic  Atraumatic  Neck:  No jvd   supple  Respiratory:  no respiratory distress  normal breath sounds  no rales  Cardiovascular:  no S3, no S4   Grade 4/6 systolic ration murmur at the base with radiation to the left sternal border and carotid arteries  no rub  Extremities  No cyanosis  lower extremity edema: Trace  Skin:   warm, dry  No rashes      Result Review :     No results found for: \"PROBNP\"  CMP          5/30/2023    " "14:08 9/29/2023    12:17 10/22/2023    16:35   CMP   Glucose 152     147     152       BUN 31     35     32       Creatinine 1.5     1.2     1.6       Sodium 138     140     140       Potassium 3.9     4.0     3.4       Chloride 101     105     97       Calcium 9.5     9.5     9.6       Albumin   4.2       Total Bilirubin   0.52       Alkaline Phosphatase   98       AST (SGOT)   15       ALT (SGPT)   6       Anion Gap 12     10     14          Details          This result is from an external source.                No results found for: \"TSH\"   No results found for: \"FREET4\"   No results found for: \"DDIMERQUANT\"  Magnesium   Date Value Ref Range Status   09/15/2021 1.7 (L) 1.9 - 2.7 mg/dL Final      No results found for: \"DIGOXIN\"   Lab Results   Component Value Date    TROPONINT <0.010 06/13/2019             No results found for: \"POCTROP\"    Results for orders placed in visit on 03/08/22    Adult Transthoracic Echo Complete W/ Cont if Necessary Per Protocol    Interpretation Summary  · Estimated left ventricular EF was in agreement with the calculated left ventricular EF. Left ventricular ejection fraction appears to be 51 - 55%. Left ventricular systolic function is low normal.  · Left ventricular diastolic function is consistent with (grade I) impaired relaxation.  · Moderate aortic valve stenosis is present. Aortic valve area is 1.2 cm2.  · Estimated right ventricular systolic pressure from tricuspid regurgitation is normal (<35 mmHg).                 Diagnoses and all orders for this visit:    1. Chronic diastolic congestive heart failure (Primary)    2. Nonrheumatic aortic valve stenosis    3. Paroxysmal atrial fibrillation    4. Mixed hyperlipidemia    5. Essential hypertension      Assessment:    Chronic diastolic congestive heart failure: She is euvolemic on today's exam.  She is asymptomatic.  Continue current medical therapy and clinical monitoring.    Paroxysmal atrial fibrillation: She is in normal " sinus rhythm.  Continue metoprolol and Eliquis.  Recent blood work was noted.  Creatinine is 1.6 which has increased from 1.2 in September.  Will repeat BMP in 4 to 6 weeks.  If creatinine remains higher than 1.5, Eliquis dose will be reduced to 2.5 mg twice daily.    Aortic valve stenosis: Most recent echocardiogram was reviewed.  She has moderate to severe aortic valve stenosis.  Calculated aortic valve area is 1.1.  Mean gradient is 18 mmHg.  Her numbers are leaning more toward moderate range.  LVEF is within normal limits.  She is asymptomatic.  Continue clinical monitoring.  We will repeat echocardiogram in 6 to 12 months.    Mixed dyslipidemia: On statin therapy.  Continue the same.    Essential hypertension: Stable on current regimen.  Continue the same.      Follow Up     Return in about 6 months (around 5/10/2024) for With Amanda HAGAN.        Patient was given instructions and counseling regarding her condition or for health maintenance advice. Please see specific information pulled into the AVS if appropriate.

## 2023-12-04 ENCOUNTER — HOSPITAL ENCOUNTER (OUTPATIENT)
Dept: OCCUPATIONAL THERAPY | Facility: HOSPITAL | Age: 83
Setting detail: THERAPIES SERIES
Discharge: HOME | End: 2023-12-04
Payer: MEDICARE

## 2023-12-04 DIAGNOSIS — N64.89 DEFORMITY DUE TO MASTECTOMY: ICD-10-CM

## 2023-12-04 DIAGNOSIS — Z90.10 DEFORMITY DUE TO MASTECTOMY: ICD-10-CM

## 2023-12-04 DIAGNOSIS — Z91.89 AT RISK FOR LYMPHEDEMA: Primary | ICD-10-CM

## 2023-12-04 DIAGNOSIS — C50.411 MALIGNANT NEOPLASM OF UPPER-OUTER QUADRANT OF RIGHT BREAST IN FEMALE, ESTROGEN RECEPTOR POSITIVE: ICD-10-CM

## 2023-12-04 DIAGNOSIS — Z44.9 FITTING AND ADJUSTMENT OF UNSPECIFIED PROSTHETIC DEVICE: ICD-10-CM

## 2023-12-04 DIAGNOSIS — Z17.0 MALIGNANT NEOPLASM OF UPPER-OUTER QUADRANT OF RIGHT BREAST IN FEMALE, ESTROGEN RECEPTOR POSITIVE: ICD-10-CM

## 2023-12-04 DIAGNOSIS — Z90.13 HISTORY OF MASTECTOMY, BILATERAL: ICD-10-CM

## 2023-12-04 PROCEDURE — 97535 SELF CARE MNGMENT TRAINING: CPT | Performed by: OCCUPATIONAL THERAPIST

## 2023-12-04 PROCEDURE — L8000 MASTECTOMY BRA: HCPCS | Performed by: OCCUPATIONAL THERAPIST

## 2023-12-04 PROCEDURE — 93702 BIS XTRACELL FLUID ANALYSIS: CPT | Performed by: OCCUPATIONAL THERAPIST

## 2023-12-04 NOTE — THERAPY DISCHARGE NOTE
Outpatient Occupational Therapy Lymphedema Progress Note/Discharge Summary  SUE Sorensen     Patient Name: Lilia Becerra  : 1940  MRN: [id number]  Today's Date: 2023      Visit Date: 2023    Patient Active Problem List   Diagnosis    Gastroesophageal reflux disease    AF (paroxysmal atrial fibrillation)    Hypothyroidism    Thyroid disease    Sleep apnea    HBP (high blood pressure)    Postoperative anemia due to acute blood loss (expected)    Stage 3 chronic kidney disease    Broken leg    Nonrheumatic aortic valve stenosis    Hyperlipidemia    OAB (overactive bladder)    Retention of urine    Pedal edema    Abnormal mammogram    Age related osteoporosis    Arthritis    Bowel obstruction    Carpal tunnel syndrome    Chronic bilateral low back pain with left-sided sciatica    Chronic pain disorder    Class 2 obesity    Complication of surgical procedure    Diarrhea    Degeneration of intervertebral disc of lumbar region    Depressive disorder    Diabetic renal disease    Disequilibrium    Fecal urgency    Glaucoma    Hypertensive heart failure    Hypomagnesemia    Idiopathic osteoarthritis    Kienboeck disease of adults    Long term (current) use of oral hypoglycemic drugs    Lumbar radiculopathy    Peripheral venous insufficiency    Pneumonia    Positive PPD    Rapid heart rate    Recurrent major depression    Restless legs syndrome    Spondylolysis of lumbar region    Fecal incontinence    Diabetes mellitus    Malignant neoplasm of overlapping sites of right breast in female, estrogen receptor positive    S/P mastectomy, bilateral    Chronic diastolic congestive heart failure    Paroxysmal atrial fibrillation    Abnormal gait    Benign essential tremor    Aortic valve disorder    Recurrent UTI    History of bladder repair surgery    Weight loss    Anemia    Dry heaves    History of colonic polyps    Osteopenia of multiple sites    History of recurrent UTIs    Acquired diverticulum of esophagus     Dry mouth    History of glaucoma    Urge incontinence of urine        Past Medical History:   Diagnosis Date    Acid reflux disease     Arthritis     Atrial fibrillation     Back pain     Bowel obstruction     Breast cancer     Carpal tunnel syndrome     Constipation     Depression     Diabetes mellitus     Disease of thyroid gland     Dyslipidemia     Fecal incontinence     GERD (gastroesophageal reflux disease)     Glaucoma     left eye    HBP (high blood pressure)     Hearing impaired     hearing aides    Hiatal hernia     High cholesterol     History of transfusion     History of tuberculosis     IN 1980'S WAS TREATED    Hypertension     Hypomagnesemia     Kienbock's disease     Kienböck's disease, right     KIENBOCK'S AVASCULAR NECROSIS OF LUNATE, ADULT RIGHT    Nonrheumatic aortic valve stenosis 11/05/2020    OAB (overactive bladder)     Osteoporosis     Pneumonia     PONV (postoperative nausea and vomiting)     Positive PPD     RLS (restless legs syndrome)     Sleep apnea     NO MACHINE    Stage 3 chronic kidney disease     Status post bilateral mastectomy with sentinel node biopsy and right axillary node dissection 01/12/2022    Tailbone injury     fracture    Thyroid disease     Urinary incontinence     wears pads    Urinary retention         Past Surgical History:   Procedure Laterality Date    ANKLE TENDON REPAIR      BACK SURGERY  12/21/2018-3/2019    LUMBAR--BROKEN DISC, CLEANED OUT--HAS 2ND PROCEDURE TO FINISH REMOVING    BLADDER REPAIR  09/2014    bladder sling with cysto    CARPAL TUNNEL RELEASE      CATARACT EXTRACTION Bilateral     COLONOSCOPY      COLONOSCOPY N/A 11/17/2022    Procedure: COLONOSCOPY with biopsy;  Surgeon: Leo Alvares MD;  Location: [facility] ENDOSCOPY;  Service: Gastroenterology;  Laterality: N/A;  diverticulosis    CYSTOSCOPY      ENDOSCOPY      ENDOSCOPY N/A 11/17/2022    Procedure: ESOPHAGOGASTRODUODENOSCOPY with biopsy and snare;  Surgeon: Leo Alvares MD;   Location: [facility] ENDOSCOPY;  Service: Gastroenterology;  Laterality: N/A;  gastric fundus polyp    HEMORRHOIDECTOMY      HYSTERECTOMY      INCISION AND DRAINAGE OF WOUND      on back     JOINT REPLACEMENT      KNEE ARTHROPLASTY Right     LEG SURGERY  06/13/2019    LUMBAR DISCECTOMY Left 12/21/2018    Procedure: LEFT L4-5 MICRO DISCECTOMY;  Surgeon: Adam Coleman DO;  Location: [facility] OR;  Service: Orthopedic Spine    LUMBAR DISCECTOMY Left 03/29/2019    Procedure: LEFT L4-5 REVISION OF MICRODISCECTOMY;  Surgeon: Adam Coleman DO;  Location: [facility] OR;  Service: Orthopedic Spine    LUMBAR FUSION      MASTECTOMY Bilateral 01/11/2022    Procedure: BREAST MASTECTOMY WITH SENTINEL NODE BIOPSY AND possible  AXILLARY NODE DISSECTION;  Surgeon: Lety Tena MD;  Location: [facility] OR OSC;  Service: General;  Laterality: Bilateral;    MASTECTOMY Bilateral     TENDON RELEASE  09/2018    TOTAL KNEE ARTHROPLASTY Left 06/13/2019    Procedure: LEFT KNEE REVISION;  Surgeon: Malick Costa II, MD;  Location: [facility] OR;  Service: Orthopedics    TOTAL KNEE ARTHROPLASTY REVISION Left     x2    TOTAL KNEE ARTHROPLASTY REVISION Left 02/13/2018    Procedure: LEFT TOTAL KNEE ARTHROPLASTY REVISION;  Surgeon: Jair Fajardo MD;  Location: [facility];  Service:     US GUIDED CYST ASPIRATION BREAST N/A 03/24/2022    VERTEBROPLASTY           Visit Dx:      ICD-10-CM ICD-9-CM   1. At risk for lymphedema  Z91.89 V49.89   2. History of mastectomy, bilateral  Z90.13 V45.71   3. Deformity due to mastectomy  N64.89 611.89    Z90.10    4. Fitting and adjustment of unspecified prosthetic device  Z44.9 V52.9   5. Malignant neoplasm of upper-outer quadrant of right breast in female, estrogen receptor positive  C50.411 174.4    Z17.0 V86.0        Lymphedema       Row Name 12/04/23 0800             Subjective Pain    Able to rate subjective pain? yes  -TD      Pre-Treatment Pain Level 0  -TD      Post-Treatment  "Pain Level 0  -TD         Subjective    Subjective Comments Pt reports she needs to be discharged due to her insurance coverage.  -TD         Lymphedema Assessment    Lymphedema Classification RUE:;at risk/stage 0  -TD      Lymphedema Cancer Related Sx bilateral;simple mastectomy;sentinel node biopsy;axillary dissection  -TD      Lymphedema Surgery Comments 1/11/22  -TD      Lymph Nodes Removed # 18  -TD      Positive Lymph Nodes # 0  -TD      Chemo Received no  -TD      Radiation Therapy Received no  -TD         LLIS - Physical Concerns    The amount of pain associated with my lymphedema is: 0  -TD      The amount of limb heaviness associated with my lymphedema is: 0  -TD      The amount of skin tightness associated with my lymphedema is: 0  -TD      The size of my swollen limb(s) seems: 0  -TD      Lymphedema affects the movement of my swollen limb(s): 0  -TD      The strength in my swollen limb(s) is: 0  -TD         LLIS - Psychosocial Concerns    Lymphedema affects my body image (i.e., \"how I think I look\"). 0  -TD      Lymphedema affects my socializing with others. 0  -TD      Lymphedema affects my intimate relations with spouse or partner (rate 0 if not applicable 0  -TD      Lymphedema \"gets me down\" (i.e., depression, frustration, or anger) 0  -TD      I must rely on others for help due to my lymphedema. 0  -TD      I know what to do to manage my lymphedema 2  -TD         LLIS - Functional Concerns    Lymphedema affects my ability to perform self-care activities (i.e. eating, dressing, hygiene) 0  -TD      Lymphedema affects my ability to perform routine home or work-related activities. 0  -TD      Lymphedema affects my performance of preferred leisure activities. 0  -TD      Lymphedema affects proper fit of clothing/shoes 0  -TD      Lymphedema affects my sleep 0  -TD         Posture/Observations    Alignment Options Thoracic kyphosis  -TD      Thoracic Kyphosis Moderate  -TD         General ROM    GENERAL " ROM COMMENTS BUE are WFL  -TD         MMT (Manual Muscle Testing)    General MMT Comments BUE are WFL  -TD         L-Dex Bioimpedence Screening    L-Dex Measurement Extremity RUE  -TD      L-Dex Patient Position Standing  -TD      L-Dex UE Dominate Side Right  -TD      L-Dex UE At Risk Side Right  -TD      L-Dex UE Pre Surgical Value Yes  -TD      L-Dex UE Score -5.4  -TD      L-Dex UE Baseline Score 4.5  -TD      L-Dex UE Value Change -9.9  -TD      $ L-Dex Charge yes  -TD         Lymphedema Life Impact Scale Totals    A.  Total Q1 - Q17 (Do not include Q18) 2  -TD      B.  Total number of questions answered (Q1-Q17) 17  -TD      C. Divide A by B 0.12  -TD      D. Multiple C by 25 3  -TD                User Key  (r) = Recorded By, (t) = Taken By, (c) = Cosigned By      Initials Name Provider Type    TD Shelley Calderon, OT Occupational Therapist                   OT Mastectomy Care:   Location:        Bilateral chest wall     Type of Prosthetic:  Silicone breast form     Reason for Visit/Customer Goal:  Patient would like to achieve symmetry and balance in appearance to improve orthopedic balance as well as improve psychological impact when engaging in community and social activities.     Reason for Prosthetic: Prevent Deformities     Date of Surgery: 1/11/22     Date of Discharge: 1/12/22     Wearing Schedule:   As Tolerated     Prosthetic Site Condition:   Intact     Tolerance:      Tolerates Well     Product :  Rubin     Product Name and Model Number: Adapt light 3A size 5R/5L (issued 1/16/23)     Garments  Type of Garment Dispensed:          Mast Bra w/o Breast Form     Breast : [employer]     Product Name:            4- Lucrecia 38B nude        Number of Garments Dispensed:   4    GOALS:      1. Post Breast Surgery Care/at risk for Lymphedema  LTG 1: 90 days:  As an indicator of no exacerbation of lymphedema staging, the patient will present with an L-Dex score less than [10] points  from preoperative baseline.              STATUS: Met  STG 1a:   30 days: To prevent exacerbation of mixed edema to lymphedema, patient will utilize the 2 postsurgical compression garments daily.                 STATUS: Met  STG 1b: 30 days: Patient will be independent with self-manual lymphatic massage.               STATUS: Met  STG 1c: 30 days:  Patient will be independent with identification of signs and symptoms of lymphedema exasperation per stoplight to recovery education handout.              STATUS: Met  STG 1 d: 30 days: Patient will be independent with HEP to prevent advancement in lymphedema staging.              STATUS: Met  TREATMENT:  Self Care/ADL retraining, Therapeutic Activity, Neuromuscular Re-education, Therapeutic Exercise, Bioimpedence Fluid Analysis, Post-Surgical compression garement 29830 Madonna Zip-ST-High/ Jessica Camisole Kit 2860K, Orthotic Management and training,  and Manual Therapy.     1. Encounter for Breast Prosthetic and mastectomy bra fitting:  LTG 1:  90 days: To provide balance and symmetry for performance of daily activity, the patient will participate in breast prosthesis and mastectomy bra fitting procedure.              STATUS: NOT MET  STG 1a: 30 days:  Patient will participate in mastectomy bra fit re-evaluation to ensure proper fit for balance and symmetry for improved postural alignment/lymph fluid dynamics to prevent impairment in activities of daily living.              STATUS: NOT MET  STG 1b: 30 days:  Patient will participate in breast prosthesis fit re-evaluation 2 years after initial distribution to ensure proper fit for balance and symmetry for improved postural alignment/lymph fluid dynamics to prevent impairment in activities of daily living.  STATUS: NOT MET  TREATMENT: Orthotic/Prosthetic Management and Training, AmoenaSilicone Breast Prosthetic, Mastectomy Bra initial fitting and 3 month re-fitting, ADL/Self Care, Therapeutic Activity, Therapeutic Exercise,  and Manual Therapy.               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  Pt l-dex score is within functional limits.  Pt was issued four new bras this date after fitting with no signs of issues.  Pt will need to be discharged at this time due to her insurance coverage.  Pt was instructed to reach out when she was ready to find another therapist.  -TD     OT Diagnosis History of breast cancer, history of mastectomy, at risk for lymphedema  -TD     OT Rehab Potential Good  -TD     Patient/caregiver participated in establishment of treatment plan and goals Yes  -TD     Patient would benefit from skilled therapy intervention Yes  -TD        OT Plan    OT Frequency --  see duration  -TD     Predicted Duration of Therapy Intervention (OT) Pt will be discharged at this time due  -TD     Planned CPT's? OT RE-EVAL: 15041;OT THER ACT EA 15 MIN: 73378TA;OT THER PROC EA 15 MIN: 73999IN;OT NEUROMUSC RE EDUCATION EA 15 MIN: 67000;OT SELF CARE/MGMT/TRAIN 15 MIN: 17809;OT ULTRASOUND EA 15 MIN: 50781;OT MANUAL THERAPY EA 15 MIN: 55774;OT BIS XTRACELL FLUID ANALYSIS: 56920;OT ORTHOTIC MGMT/TRAIN EA 15 MIN: 67685;OT ORTHO/PROSTHET CHECKOUT EA 15 MIN: 13125;OT ELECTRIC STIM ATTD EA 15 MIN: 23483  -TD     Planned Therapy Interventions (Optional Details) home exercise program;manual therapy techniques;strengthening;patient/family education;orthotic fitting/training  -TD     OT Plan Comments Pt will be discharged at this time.  -TD               User Key  (r) = Recorded By, (t) = Taken By, (c) = Cosigned By      Initials Name Provider Type    Shelley Bey OT Occupational Therapist                                     Therapy Education  Education Details: Pt was instructed on stretching and signs and symptoms of lymphedema.  Fit and size are appropriate with no gapping, pinching, or puckering observed. Pt. states comfort and satisfaction with both bra's selected and with prosthesis. All devices were checked for quality and safety. Pt. was educated on the benefits, precautions warranty, care and maintenance for her prosthesis and bras. Educational handout was provided in the prosthesis box.  Given: HEP, Symptoms/condition management  Program: New, Reinforced  How Provided: Verbal  Provided to: Patient  Level of Understanding: Verbalized  85946 - OT Self Care/Mgmt Minutes: 25                Time Calculation:   Timed Charges  92468 - OT Self Care/Mgmt Minutes: 25  Total Minutes  Timed Charges Total Minutes: 25   Total Minutes: 25     Therapy Charges for Today       Code Description Service Date Service Provider Modifiers Qty    39901295696 HC PT BIS XTRACELL FLUID ANALYSIS 12/4/2023 Shelley Calderon OT  1    51031792017 HC OT SELF CARE/MGMT/TRAIN EA 15 MIN 12/4/2023 Shelley Calderon OT GO 2    83779844775 HC BRA POST/SURG NO/FORM RAYMOND/TYESHA/ANDIE/POWER/MIAN 12/4/2023 Shelley Calderon OT  4                    OP OT Discharge Summary  Date of Discharge: 12/04/23  Reason for Discharge: other (comment) (Pt was discharged due to insurance issues)  Outcomes Achieved: Patient able to partially acheive established goals  Discharge Destination: Home without follow-up  Discharge Instructions: Pt was instruced to call therapist for referral to another program when she is ready to resume servcies.      Shelley Calderon OT  12/4/2023

## 2023-12-11 ENCOUNTER — TELEPHONE (OUTPATIENT)
Dept: UROLOGY | Facility: CLINIC | Age: 83
End: 2023-12-11

## 2023-12-11 DIAGNOSIS — N32.81 OAB (OVERACTIVE BLADDER): Primary | ICD-10-CM

## 2023-12-11 RX ORDER — OXYBUTYNIN CHLORIDE 10 MG/1
10 TABLET, EXTENDED RELEASE ORAL DAILY
Qty: 30 TABLET | Refills: 0 | Status: SHIPPED | OUTPATIENT
Start: 2023-12-11 | End: 2024-01-10

## 2023-12-11 NOTE — TELEPHONE ENCOUNTER
Refill Request, patient called asking if we got the request from [facility], and we did get it through fax, verified with patient that she does use [facility]

## 2023-12-11 NOTE — TELEPHONE ENCOUNTER
In regards to last message for refill, Patient only has 2 pills left, and wants to know if med can be sent to [facility] until she gets medicine from [facility], only enough to last until then please if you can

## 2023-12-12 ENCOUNTER — TELEPHONE (OUTPATIENT)
Dept: UROLOGY | Facility: CLINIC | Age: 83
End: 2023-12-12
Payer: MEDICARE

## 2023-12-12 RX ORDER — OXYBUTYNIN CHLORIDE 10 MG/1
10 TABLET, EXTENDED RELEASE ORAL DAILY
Qty: 90 TABLET | Refills: 1 | Status: SHIPPED | OUTPATIENT
Start: 2023-12-12

## 2023-12-12 NOTE — TELEPHONE ENCOUNTER
Patient was called and told that medcation was refilled to [facility] in [place], (Oxybutnin)

## 2024-02-15 DIAGNOSIS — E78.5 HYPERLIPIDEMIA LDL GOAL <100: ICD-10-CM

## 2024-02-15 DIAGNOSIS — I48.0 PAROXYSMAL ATRIAL FIBRILLATION: Chronic | ICD-10-CM

## 2024-02-15 DIAGNOSIS — I10 ESSENTIAL HYPERTENSION: Chronic | ICD-10-CM

## 2024-02-15 RX ORDER — ATORVASTATIN CALCIUM 10 MG/1
10 TABLET, FILM COATED ORAL
Qty: 100 TABLET | Refills: 2 | OUTPATIENT
Start: 2024-02-15

## 2024-02-15 RX ORDER — DILTIAZEM HYDROCHLORIDE 120 MG/1
120 CAPSULE, COATED, EXTENDED RELEASE ORAL DAILY
Qty: 100 CAPSULE | Refills: 2 | OUTPATIENT
Start: 2024-02-15

## 2024-02-15 RX ORDER — METOPROLOL SUCCINATE 25 MG/1
25 TABLET, EXTENDED RELEASE ORAL
Qty: 100 TABLET | Refills: 2 | OUTPATIENT
Start: 2024-02-15

## 2024-02-15 RX ORDER — LOSARTAN POTASSIUM 25 MG/1
25 TABLET ORAL DAILY
Qty: 100 TABLET | Refills: 2 | OUTPATIENT
Start: 2024-02-15

## 2024-04-11 DIAGNOSIS — I48.0 PAROXYSMAL ATRIAL FIBRILLATION: Chronic | ICD-10-CM

## 2024-04-11 DIAGNOSIS — E78.5 HYPERLIPIDEMIA LDL GOAL <100: ICD-10-CM

## 2024-04-11 DIAGNOSIS — I10 ESSENTIAL HYPERTENSION: Chronic | ICD-10-CM

## 2024-04-12 RX ORDER — METOPROLOL SUCCINATE 25 MG/1
25 TABLET, EXTENDED RELEASE ORAL
Qty: 90 TABLET | Refills: 0 | Status: SHIPPED | OUTPATIENT
Start: 2024-04-12

## 2024-04-12 RX ORDER — LOSARTAN POTASSIUM 25 MG/1
25 TABLET ORAL DAILY
Qty: 90 TABLET | Refills: 0 | Status: SHIPPED | OUTPATIENT
Start: 2024-04-12

## 2024-04-12 RX ORDER — DILTIAZEM HYDROCHLORIDE 120 MG/1
120 CAPSULE, EXTENDED RELEASE ORAL DAILY
Qty: 90 CAPSULE | Refills: 0 | Status: SHIPPED | OUTPATIENT
Start: 2024-04-12

## 2024-04-12 RX ORDER — ATORVASTATIN CALCIUM 10 MG/1
10 TABLET, FILM COATED ORAL
Qty: 90 TABLET | Refills: 0 | Status: SHIPPED | OUTPATIENT
Start: 2024-04-12

## 2024-05-06 DIAGNOSIS — R60.0 PEDAL EDEMA: ICD-10-CM

## 2024-05-07 RX ORDER — FUROSEMIDE 40 MG/1
TABLET ORAL
Qty: 130 TABLET | Refills: 3 | Status: SHIPPED | OUTPATIENT
Start: 2024-05-07

## 2024-05-19 DIAGNOSIS — I50.32 CHRONIC DIASTOLIC CONGESTIVE HEART FAILURE: ICD-10-CM

## 2024-05-20 RX ORDER — SPIRONOLACTONE 25 MG/1
25 TABLET ORAL DAILY
Qty: 100 TABLET | Refills: 2 | Status: SHIPPED | OUTPATIENT
Start: 2024-05-20

## 2024-05-23 ENCOUNTER — TELEPHONE (OUTPATIENT)
Dept: CARDIOLOGY | Facility: CLINIC | Age: 84
End: 2024-05-23
Payer: MEDICARE

## 2024-05-23 NOTE — TELEPHONE ENCOUNTER
Procedure: (L) Middle Trigger Finger Release    Medication Directive: Eliquis    PMH:  CHF, Non- Rheumatic Aortic Valve, PAF, HLD, HTN    Last Seen: 11/10/23    STRESS:07/26/23

## 2024-06-20 DIAGNOSIS — I10 ESSENTIAL HYPERTENSION: Chronic | ICD-10-CM

## 2024-06-20 DIAGNOSIS — I48.0 PAROXYSMAL ATRIAL FIBRILLATION: Chronic | ICD-10-CM

## 2024-06-20 DIAGNOSIS — E78.5 HYPERLIPIDEMIA LDL GOAL <100: ICD-10-CM

## 2024-06-21 RX ORDER — ATORVASTATIN CALCIUM 10 MG/1
10 TABLET, FILM COATED ORAL
Qty: 90 TABLET | Refills: 3 | Status: SHIPPED | OUTPATIENT
Start: 2024-06-21

## 2024-06-21 RX ORDER — DILTIAZEM HYDROCHLORIDE 120 MG/1
120 CAPSULE, COATED, EXTENDED RELEASE ORAL DAILY
Qty: 90 CAPSULE | Refills: 3 | Status: SHIPPED | OUTPATIENT
Start: 2024-06-21

## 2024-06-21 RX ORDER — LOSARTAN POTASSIUM 25 MG/1
25 TABLET ORAL DAILY
Qty: 90 TABLET | Refills: 3 | Status: SHIPPED | OUTPATIENT
Start: 2024-06-21

## 2024-06-21 RX ORDER — METOPROLOL SUCCINATE 25 MG/1
25 TABLET, EXTENDED RELEASE ORAL
Qty: 90 TABLET | Refills: 3 | Status: SHIPPED | OUTPATIENT
Start: 2024-06-21

## 2024-06-24 ENCOUNTER — TELEPHONE (OUTPATIENT)
Dept: ORTHOPEDIC SURGERY | Facility: CLINIC | Age: 84
End: 2024-06-24
Payer: MEDICAID

## 2024-06-27 NOTE — TELEPHONE ENCOUNTER
Hub staff attempted to follow warm transfer process and was unsuccessful     Caller: ROSANNE    Relationship to patient: SELF    Best call back number: [phone]- PLEASE CALL IN AFTERNOON- SHE JUST STARTED A NEW JOB AT [employer]    Patient is needing: PATIENT DID NOT RECEIVE VM AND DOESN'T REALLY KNOW HOW TO ACCESS MESSAGES IF SHE DID- PLEASE CALL AND LET HER KNOW- SHE IS STILL TRYING TO FIND OUT OF SHE CAN BE SEEN BY DR SANTANA OR DR KWONG-

## 2024-07-23 ENCOUNTER — TRANSCRIBE ORDERS (OUTPATIENT)
Dept: ADMINISTRATIVE | Facility: HOSPITAL | Age: 84
End: 2024-07-23
Payer: MEDICARE

## 2024-07-23 DIAGNOSIS — R09.89 BRUIT: Primary | ICD-10-CM

## 2024-08-06 ENCOUNTER — OFFICE VISIT (OUTPATIENT)
Dept: ORTHOPEDIC SURGERY | Facility: CLINIC | Age: 84
End: 2024-08-06
Payer: MEDICARE

## 2024-08-06 VITALS
HEIGHT: 65 IN | WEIGHT: 177 LBS | HEART RATE: 58 BPM | SYSTOLIC BLOOD PRESSURE: 120 MMHG | DIASTOLIC BLOOD PRESSURE: 65 MMHG | BODY MASS INDEX: 29.49 KG/M2 | OXYGEN SATURATION: 96 %

## 2024-08-06 DIAGNOSIS — M16.12 PRIMARY OSTEOARTHRITIS OF LEFT HIP: ICD-10-CM

## 2024-08-06 DIAGNOSIS — M25.552 LEFT HIP PAIN: Primary | ICD-10-CM

## 2024-08-06 PROCEDURE — 3074F SYST BP LT 130 MM HG: CPT | Performed by: ORTHOPAEDIC SURGERY

## 2024-08-06 PROCEDURE — 99203 OFFICE O/P NEW LOW 30 MIN: CPT | Performed by: ORTHOPAEDIC SURGERY

## 2024-08-06 PROCEDURE — 3078F DIAST BP <80 MM HG: CPT | Performed by: ORTHOPAEDIC SURGERY

## 2024-08-06 RX ORDER — HYDROXYZINE 50 MG/1
TABLET, FILM COATED ORAL
COMMUNITY
Start: 2024-07-01

## 2024-08-06 NOTE — PROGRESS NOTES
"Chief Complaint  Pain and Initial Evaluation of the Left Hip       Subjective      Lilia Becerra presents to [facility] for an evaluation of her left hip. She has had pain to her left hip for several months. She reports several falls in the past and has had prior surgery to her knee. She is ambulating today with a Rolator walker today. She reports no prior treatment options on her hip.      Allergies   Allergen Reactions    Egg-Derived Products Swelling    Nsaids Unknown - High Severity    Sertraline Unknown - High Severity     Bleeding in stomach    Tetanus Toxoid Swelling    Tetanus Toxoids Swelling    Metformin Diarrhea and Unknown - Low Severity    Tetanus-Diphtheria Toxoids Td Unknown - Low Severity    Tuberculin Ppd Unknown - Low Severity        Social History     Socioeconomic History    Marital status:    Tobacco Use    Smoking status: Never    Smokeless tobacco: Never   Vaping Use    Vaping status: Never Used   Substance and Sexual Activity    Alcohol use: No    Drug use: No    Sexual activity: Defer        I reviewed the patient's chief complaint, history of present illness, review of systems, past medical history, surgical history, family history, social history, medications, and allergy list.     Review of Systems     Constitutional: Denies fevers, chills, weight loss  Cardiovascular: Denies chest pain, shortness of breath  Skin: Denies rashes, acute skin changes  Neurologic: Denies headache, loss of consciousness  MSK: Left hip pain       Vital Signs:   /65   Pulse 58   Ht 165.1 cm (65\")   Wt 80.3 kg (177 lb)   SpO2 96%   BMI 29.45 kg/m²            Ortho Exam    Physical Exam  General:Alert. No acute distress   Left lower extremity: flexion to 80 degrees, external rotation  to 20 degrees, internal rotation to 5 degrees, non tender, mild swelling to the left leg, well healed surgical incision, neurovascularly intact, calf soft, positive EHL, FHL, GS, and " TA. Sensation intact to all 5 nerves of the foot.     Procedures    X-Ray Report:  Left hip X-Ray  Indication: Evaluation of left hip pain   AP/Lateral view(s)  Findings: partially visualize intermedullary aristides with cement to the proximal femur, no acute fracture, moderate degenerative changes to the left hip, partially visualize to the lumbar fusion, degenerative change to the lumbar region   Prior studies available for comparison: no       Imaging Results (Most Recent)       Procedure Component Value Units Date/Time    XR Hip With or Without Pelvis 2 - 3 View Left [[id number]] Resulted: 08/06/24 1028     Updated: 08/06/24 1031             Result Review :       No results found.           Assessment and Plan     Diagnoses and all orders for this visit:    1. Left hip pain (Primary)  -     XR Hip With or Without Pelvis 2 - 3 View Left    2. Primary osteoarthritis of left hip      The patient presents here today for an evaluation of her left hip. X-rays were obtained in the office today and these were reviewed today.     Order placed today for a left hip intra articular hip injection done by radiology.     Home exercises given today.     Call or return if worsening symptoms.    Follow Up     6 - 8 weeks after injection     Patient was given instructions and counseling regarding her condition or for health maintenance advice. Please see specific information pulled into the AVS if appropriate.     Scribed for Franky Rodriguez MD by Jessica Velazquez.  08/06/24   10:42 EDT    I have personally performed the services described in this document as scribed by the above individual and it is both accurate and complete. Franky Rodriguez MD 08/07/24

## 2024-08-12 ENCOUNTER — TELEPHONE (OUTPATIENT)
Dept: ORTHOPEDIC SURGERY | Facility: CLINIC | Age: 84
End: 2024-08-12
Payer: MEDICARE

## 2024-08-12 NOTE — TELEPHONE ENCOUNTER
Hub staff attempted to follow warm transfer process and was unsuccessful     Caller: Lilia Becerra    Relationship to patient: Self    Best call back number: [phone]    Patient is needing: PATIENT IS CALLING TO SEE IF WE CAN REACH OUT TO HER INSURANCE COMPANY AND SEE IF HIP INJECTIONS ARE COVERED - PLEASE REACH OUT AND ADVISE

## 2024-08-15 ENCOUNTER — TELEPHONE (OUTPATIENT)
Dept: ORTHOPEDIC SURGERY | Facility: CLINIC | Age: 84
End: 2024-08-15
Payer: MEDICARE

## 2024-08-15 NOTE — TELEPHONE ENCOUNTER
Hub staff attempted to follow warm transfer process and was unsuccessful     Caller: Lilia Becerra    Relationship to patient: Self    Best call back number: [phone]    Patient is needing: PT NEEDING TO KNOW WHAT TIME TO ARRIVE FOR PROCEDURE TOMORROW, STATING SHE HAS LOST HER PAPER THAT HAD IT WRITTEN DOWN. PT ALSO ASKING IF THIS WILL BE A 3 SERIES OF INJECTIONS.

## 2024-08-16 ENCOUNTER — HOSPITAL ENCOUNTER (OUTPATIENT)
Dept: INTERVENTIONAL RADIOLOGY/VASCULAR | Facility: HOSPITAL | Age: 84
Discharge: HOME | End: 2024-08-16
Payer: MEDICARE

## 2024-08-16 DIAGNOSIS — M25.552 LEFT HIP PAIN: ICD-10-CM

## 2024-08-16 DIAGNOSIS — M16.12 PRIMARY OSTEOARTHRITIS OF LEFT HIP: ICD-10-CM

## 2024-08-16 PROCEDURE — 77002 NEEDLE LOCALIZATION BY XRAY: CPT

## 2024-08-16 PROCEDURE — 25010000002 METHYLPREDNISOLONE PER 80 MG: Performed by: ORTHOPAEDIC SURGERY

## 2024-08-16 PROCEDURE — 25010000002 BUPIVACAINE (PF) 0.5 % SOLUTION: Performed by: ORTHOPAEDIC SURGERY

## 2024-08-16 PROCEDURE — 25510000001 IOPAMIDOL 61 % SOLUTION: Performed by: ORTHOPAEDIC SURGERY

## 2024-08-16 RX ORDER — IOPAMIDOL 612 MG/ML
15 INJECTION, SOLUTION INTRATHECAL
Status: COMPLETED | OUTPATIENT
Start: 2024-08-16 | End: 2024-08-16

## 2024-08-16 RX ORDER — LIDOCAINE HYDROCHLORIDE 20 MG/ML
20 INJECTION, SOLUTION INFILTRATION; PERINEURAL ONCE
Status: COMPLETED | OUTPATIENT
Start: 2024-08-16 | End: 2024-08-16

## 2024-08-16 RX ORDER — METHYLPREDNISOLONE ACETATE 80 MG/ML
80 INJECTION, SUSPENSION INTRA-ARTICULAR; INTRALESIONAL; INTRAMUSCULAR; SOFT TISSUE ONCE
Status: COMPLETED | OUTPATIENT
Start: 2024-08-16 | End: 2024-08-16

## 2024-08-16 RX ORDER — BUPIVACAINE HYDROCHLORIDE 5 MG/ML
10 INJECTION, SOLUTION EPIDURAL; INTRACAUDAL ONCE
Status: COMPLETED | OUTPATIENT
Start: 2024-08-16 | End: 2024-08-16

## 2024-08-16 RX ADMIN — IOPAMIDOL 15 ML: 612 INJECTION, SOLUTION INTRATHECAL at 13:20

## 2024-08-16 RX ADMIN — METHYLPREDNISOLONE ACETATE 80 MG: 80 INJECTION, SUSPENSION INTRA-ARTICULAR; INTRALESIONAL; INTRAMUSCULAR; SOFT TISSUE at 13:21

## 2024-08-16 RX ADMIN — SODIUM BICARBONATE 1 MEQ: 84 INJECTION, SOLUTION INTRAVENOUS at 13:18

## 2024-08-16 RX ADMIN — LIDOCAINE HYDROCHLORIDE 20 ML: 20 INJECTION, SOLUTION INFILTRATION; PERINEURAL at 13:18

## 2024-08-16 RX ADMIN — BUPIVACAINE HYDROCHLORIDE 1 ML: 5 INJECTION, SOLUTION EPIDURAL; INTRACAUDAL; PERINEURAL at 13:21

## 2024-08-20 ENCOUNTER — HOSPITAL ENCOUNTER (OUTPATIENT)
Dept: CARDIOLOGY | Facility: HOSPITAL | Age: 84
Discharge: HOME | End: 2024-08-20
Admitting: SPECIALIST
Payer: MEDICARE

## 2024-08-20 DIAGNOSIS — R09.89 BRUIT: ICD-10-CM

## 2024-08-20 LAB
BH CV XLRA MEAS LEFT CAROTID BULB EDV: 8.8 CM/SEC
BH CV XLRA MEAS LEFT CAROTID BULB PSV: 27.6 CM/SEC
BH CV XLRA MEAS LEFT DIST CCA EDV: 8.1 CM/SEC
BH CV XLRA MEAS LEFT DIST CCA PSV: 41.5 CM/SEC
BH CV XLRA MEAS LEFT DIST ICA EDV: 9.8 CM/SEC
BH CV XLRA MEAS LEFT DIST ICA PSV: 39.4 CM/SEC
BH CV XLRA MEAS LEFT ICA/CCA RATIO: 1
BH CV XLRA MEAS LEFT MID ICA EDV: 5.9 CM/SEC
BH CV XLRA MEAS LEFT MID ICA PSV: 37.8 CM/SEC
BH CV XLRA MEAS LEFT PROX CCA EDV: 7.5 CM/SEC
BH CV XLRA MEAS LEFT PROX CCA PSV: 45.4 CM/SEC
BH CV XLRA MEAS LEFT PROX ECA EDV: 5.7 CM/SEC
BH CV XLRA MEAS LEFT PROX ECA PSV: 28.1 CM/SEC
BH CV XLRA MEAS LEFT PROX ICA EDV: 6.8 CM/SEC
BH CV XLRA MEAS LEFT PROX ICA PSV: 41.9 CM/SEC
BH CV XLRA MEAS LEFT VERTEBRAL A EDV: 8.1 CM/SEC
BH CV XLRA MEAS LEFT VERTEBRAL A PSV: 27 CM/SEC
BH CV XLRA MEAS RIGHT CAROTID BULB EDV: 11.9 CM/SEC
BH CV XLRA MEAS RIGHT CAROTID BULB PSV: 51.2 CM/SEC
BH CV XLRA MEAS RIGHT DIST CCA EDV: 10.7 CM/SEC
BH CV XLRA MEAS RIGHT DIST CCA PSV: 44.8 CM/SEC
BH CV XLRA MEAS RIGHT DIST ICA EDV: 15.8 CM/SEC
BH CV XLRA MEAS RIGHT DIST ICA PSV: 56.1 CM/SEC
BH CV XLRA MEAS RIGHT ICA/CCA RATIO: 0.7
BH CV XLRA MEAS RIGHT MID ICA EDV: 13.7 CM/SEC
BH CV XLRA MEAS RIGHT MID ICA PSV: 47.7 CM/SEC
BH CV XLRA MEAS RIGHT PROX CCA EDV: 6 CM/SEC
BH CV XLRA MEAS RIGHT PROX CCA PSV: 46 CM/SEC
BH CV XLRA MEAS RIGHT PROX ECA EDV: 9.4 CM/SEC
BH CV XLRA MEAS RIGHT PROX ECA PSV: 73.9 CM/SEC
BH CV XLRA MEAS RIGHT PROX ICA EDV: 9.6 CM/SEC
BH CV XLRA MEAS RIGHT PROX ICA PSV: 29.6 CM/SEC
BH CV XLRA MEAS RIGHT VERTEBRAL A EDV: 7.7 CM/SEC
BH CV XLRA MEAS RIGHT VERTEBRAL A PSV: 36 CM/SEC
LEFT ARM BP: NORMAL MMHG
RIGHT ARM BP: NORMAL MMHG

## 2024-08-20 PROCEDURE — 93880 EXTRACRANIAL BILAT STUDY: CPT | Performed by: SURGERY

## 2024-08-20 PROCEDURE — 93880 EXTRACRANIAL BILAT STUDY: CPT

## 2024-08-24 ENCOUNTER — HOSPITAL ENCOUNTER (INPATIENT)
Facility: HOSPITAL | Age: 84
LOS: 2 days | Discharge: HOME | End: 2024-08-26
Attending: INTERNAL MEDICINE | Admitting: STUDENT IN AN ORGANIZED HEALTH CARE EDUCATION/TRAINING PROGRAM
Payer: MEDICARE

## 2024-08-24 DIAGNOSIS — R60.0 PEDAL EDEMA: ICD-10-CM

## 2024-08-24 PROBLEM — I48.92 ATRIAL FLUTTER WITH RAPID VENTRICULAR RESPONSE: Status: ACTIVE | Noted: 2024-08-24

## 2024-08-24 PROBLEM — I48.91 A-FIB: Status: ACTIVE | Noted: 2024-08-24

## 2024-08-24 PROBLEM — I35.0 AORTIC STENOSIS, MODERATE: Status: ACTIVE | Noted: 2020-11-05

## 2024-08-24 LAB
ANION GAP SERPL CALCULATED.3IONS-SCNC: 12.9 MMOL/L (ref 5–15)
ANION GAP SERPL CALCULATED.3IONS-SCNC: 13.3 MMOL/L (ref 5–15)
APTT PPP: 26.8 SECONDS (ref 78–95.9)
BILIRUB UR QL STRIP: NEGATIVE
BUN SERPL-MCNC: 35 MG/DL (ref 8–23)
BUN SERPL-MCNC: 35 MG/DL (ref 8–23)
BUN/CREAT SERPL: 22.9 (ref 7–25)
BUN/CREAT SERPL: 23.8 (ref 7–25)
CALCIUM SPEC-SCNC: 8.9 MG/DL (ref 8.6–10.5)
CALCIUM SPEC-SCNC: 9 MG/DL (ref 8.6–10.5)
CHLORIDE SERPL-SCNC: 101 MMOL/L (ref 98–107)
CHLORIDE SERPL-SCNC: 99 MMOL/L (ref 98–107)
CLARITY UR: CLEAR
CO2 SERPL-SCNC: 23.1 MMOL/L (ref 22–29)
CO2 SERPL-SCNC: 23.7 MMOL/L (ref 22–29)
COLOR UR: YELLOW
CREAT SERPL-MCNC: 1.47 MG/DL (ref 0.57–1)
CREAT SERPL-MCNC: 1.53 MG/DL (ref 0.57–1)
D-LACTATE SERPL-SCNC: 1.4 MMOL/L (ref 0.5–2)
DEPRECATED RDW RBC AUTO: 43.9 FL (ref 37–54)
EGFRCR SERPLBLD CKD-EPI 2021: 33.2 ML/MIN/1.73
EGFRCR SERPLBLD CKD-EPI 2021: 34.8 ML/MIN/1.73
ERYTHROCYTE [DISTWIDTH] IN BLOOD BY AUTOMATED COUNT: 12.7 % (ref 12.3–15.4)
GEN 5 2HR TROPONIN T REFLEX: 35 NG/L
GLUCOSE BLDC GLUCOMTR-MCNC: 169 MG/DL (ref 70–99)
GLUCOSE SERPL-MCNC: 182 MG/DL (ref 65–99)
GLUCOSE SERPL-MCNC: 244 MG/DL (ref 65–99)
GLUCOSE UR STRIP-MCNC: ABNORMAL MG/DL
HCT VFR BLD AUTO: 41.7 % (ref 34–46.6)
HGB BLD-MCNC: 13.8 G/DL (ref 12–15.9)
HGB UR QL STRIP.AUTO: NEGATIVE
INR PPP: 1.06 (ref 0.86–1.15)
KETONES UR QL STRIP: NEGATIVE
LEUKOCYTE ESTERASE UR QL STRIP.AUTO: NEGATIVE
MAGNESIUM SERPL-MCNC: 1.6 MG/DL (ref 1.6–2.4)
MCH RBC QN AUTO: 31.1 PG (ref 26.6–33)
MCHC RBC AUTO-ENTMCNC: 33.1 G/DL (ref 31.5–35.7)
MCV RBC AUTO: 93.9 FL (ref 79–97)
NITRITE UR QL STRIP: NEGATIVE
NT-PROBNP SERPL-MCNC: 3715 PG/ML (ref 0–1800)
PH UR STRIP.AUTO: 7 [PH] (ref 5–8)
PLATELET # BLD AUTO: 226 10*3/MM3 (ref 140–450)
PMV BLD AUTO: 11.3 FL (ref 6–12)
POTASSIUM SERPL-SCNC: 4 MMOL/L (ref 3.5–5.2)
POTASSIUM SERPL-SCNC: 4.1 MMOL/L (ref 3.5–5.2)
PROCALCITONIN SERPL-MCNC: 0.05 NG/ML (ref 0–0.25)
PROT UR QL STRIP: NEGATIVE
PROTHROMBIN TIME: 14 SECONDS (ref 11.8–14.9)
QT INTERVAL: 306 MS
QTC INTERVAL: 461 MS
RBC # BLD AUTO: 4.44 10*6/MM3 (ref 3.77–5.28)
SODIUM SERPL-SCNC: 136 MMOL/L (ref 136–145)
SODIUM SERPL-SCNC: 137 MMOL/L (ref 136–145)
SP GR UR STRIP: 1.01 (ref 1–1.03)
TROPONIN T DELTA: 0 NG/L
TROPONIN T SERPL HS-MCNC: 35 NG/L
UROBILINOGEN UR QL STRIP: ABNORMAL
WBC NRBC COR # BLD AUTO: 15.76 10*3/MM3 (ref 3.4–10.8)

## 2024-08-24 PROCEDURE — 85730 THROMBOPLASTIN TIME PARTIAL: CPT | Performed by: STUDENT IN AN ORGANIZED HEALTH CARE EDUCATION/TRAINING PROGRAM

## 2024-08-24 PROCEDURE — 99222 1ST HOSP IP/OBS MODERATE 55: CPT | Performed by: STUDENT IN AN ORGANIZED HEALTH CARE EDUCATION/TRAINING PROGRAM

## 2024-08-24 PROCEDURE — 80048 BASIC METABOLIC PNL TOTAL CA: CPT | Performed by: STUDENT IN AN ORGANIZED HEALTH CARE EDUCATION/TRAINING PROGRAM

## 2024-08-24 PROCEDURE — 83735 ASSAY OF MAGNESIUM: CPT | Performed by: INTERNAL MEDICINE

## 2024-08-24 PROCEDURE — 84145 PROCALCITONIN (PCT): CPT | Performed by: STUDENT IN AN ORGANIZED HEALTH CARE EDUCATION/TRAINING PROGRAM

## 2024-08-24 PROCEDURE — 83605 ASSAY OF LACTIC ACID: CPT | Performed by: STUDENT IN AN ORGANIZED HEALTH CARE EDUCATION/TRAINING PROGRAM

## 2024-08-24 PROCEDURE — 99222 1ST HOSP IP/OBS MODERATE 55: CPT | Performed by: INTERNAL MEDICINE

## 2024-08-24 PROCEDURE — 87040 BLOOD CULTURE FOR BACTERIA: CPT | Performed by: STUDENT IN AN ORGANIZED HEALTH CARE EDUCATION/TRAINING PROGRAM

## 2024-08-24 PROCEDURE — 85610 PROTHROMBIN TIME: CPT | Performed by: STUDENT IN AN ORGANIZED HEALTH CARE EDUCATION/TRAINING PROGRAM

## 2024-08-24 PROCEDURE — 25010000002 HEPARIN (PORCINE) 25000-0.45 UT/250ML-% SOLUTION: Performed by: STUDENT IN AN ORGANIZED HEALTH CARE EDUCATION/TRAINING PROGRAM

## 2024-08-24 PROCEDURE — 93005 ELECTROCARDIOGRAM TRACING: CPT | Performed by: INTERNAL MEDICINE

## 2024-08-24 PROCEDURE — 83880 ASSAY OF NATRIURETIC PEPTIDE: CPT | Performed by: INTERNAL MEDICINE

## 2024-08-24 PROCEDURE — 25010000002 MAGNESIUM SULFATE IN D5W 1G/100ML (PREMIX) 1-5 GM/100ML-% SOLUTION: Performed by: STUDENT IN AN ORGANIZED HEALTH CARE EDUCATION/TRAINING PROGRAM

## 2024-08-24 PROCEDURE — 85027 COMPLETE CBC AUTOMATED: CPT | Performed by: INTERNAL MEDICINE

## 2024-08-24 PROCEDURE — 83036 HEMOGLOBIN GLYCOSYLATED A1C: CPT | Performed by: STUDENT IN AN ORGANIZED HEALTH CARE EDUCATION/TRAINING PROGRAM

## 2024-08-24 PROCEDURE — 82948 REAGENT STRIP/BLOOD GLUCOSE: CPT

## 2024-08-24 PROCEDURE — 81003 URINALYSIS AUTO W/O SCOPE: CPT | Performed by: STUDENT IN AN ORGANIZED HEALTH CARE EDUCATION/TRAINING PROGRAM

## 2024-08-24 PROCEDURE — 84484 ASSAY OF TROPONIN QUANT: CPT | Performed by: INTERNAL MEDICINE

## 2024-08-24 PROCEDURE — 80048 BASIC METABOLIC PNL TOTAL CA: CPT | Performed by: INTERNAL MEDICINE

## 2024-08-24 PROCEDURE — 63710000001 INSULIN LISPRO (HUMAN) PER 5 UNITS: Performed by: PHYSICIAN ASSISTANT

## 2024-08-24 PROCEDURE — 25010000002 CEFTRIAXONE PER 250 MG: Performed by: STUDENT IN AN ORGANIZED HEALTH CARE EDUCATION/TRAINING PROGRAM

## 2024-08-24 RX ORDER — HEPARIN SODIUM 10000 [USP'U]/100ML
12 INJECTION, SOLUTION INTRAVENOUS
Status: DISCONTINUED | OUTPATIENT
Start: 2024-08-24 | End: 2024-08-25

## 2024-08-24 RX ORDER — NICOTINE POLACRILEX 4 MG
15 LOZENGE BUCCAL
Status: DISCONTINUED | OUTPATIENT
Start: 2024-08-24 | End: 2024-08-26 | Stop reason: HOSPADM

## 2024-08-24 RX ORDER — SODIUM CHLORIDE 9 MG/ML
40 INJECTION, SOLUTION INTRAVENOUS AS NEEDED
Status: DISCONTINUED | OUTPATIENT
Start: 2024-08-24 | End: 2024-08-26 | Stop reason: HOSPADM

## 2024-08-24 RX ORDER — SODIUM CHLORIDE 0.9 % (FLUSH) 0.9 %
10 SYRINGE (ML) INJECTION AS NEEDED
Status: DISCONTINUED | OUTPATIENT
Start: 2024-08-24 | End: 2024-08-26 | Stop reason: HOSPADM

## 2024-08-24 RX ORDER — IBUPROFEN 600 MG/1
1 TABLET ORAL
Status: DISCONTINUED | OUTPATIENT
Start: 2024-08-24 | End: 2024-08-26 | Stop reason: HOSPADM

## 2024-08-24 RX ORDER — INSULIN LISPRO 100 [IU]/ML
2-7 INJECTION, SOLUTION INTRAVENOUS; SUBCUTANEOUS
Status: DISCONTINUED | OUTPATIENT
Start: 2024-08-24 | End: 2024-08-26 | Stop reason: HOSPADM

## 2024-08-24 RX ORDER — AMOXICILLIN 250 MG
2 CAPSULE ORAL 2 TIMES DAILY PRN
Status: DISCONTINUED | OUTPATIENT
Start: 2024-08-24 | End: 2024-08-26 | Stop reason: HOSPADM

## 2024-08-24 RX ORDER — NITROGLYCERIN 0.4 MG/1
0.4 TABLET SUBLINGUAL
Status: DISCONTINUED | OUTPATIENT
Start: 2024-08-24 | End: 2024-08-26 | Stop reason: HOSPADM

## 2024-08-24 RX ORDER — LEVOTHYROXINE SODIUM 25 UG/1
25 TABLET ORAL
Status: DISCONTINUED | OUTPATIENT
Start: 2024-08-25 | End: 2024-08-26 | Stop reason: HOSPADM

## 2024-08-24 RX ORDER — DEXTROSE MONOHYDRATE 25 G/50ML
25 INJECTION, SOLUTION INTRAVENOUS
Status: DISCONTINUED | OUTPATIENT
Start: 2024-08-24 | End: 2024-08-26 | Stop reason: HOSPADM

## 2024-08-24 RX ORDER — BISACODYL 10 MG
10 SUPPOSITORY, RECTAL RECTAL DAILY PRN
Status: DISCONTINUED | OUTPATIENT
Start: 2024-08-24 | End: 2024-08-26 | Stop reason: HOSPADM

## 2024-08-24 RX ORDER — MAGNESIUM SULFATE 1 G/100ML
2 INJECTION INTRAVENOUS ONCE
Status: COMPLETED | OUTPATIENT
Start: 2024-08-24 | End: 2024-08-24

## 2024-08-24 RX ORDER — POLYETHYLENE GLYCOL 3350 17 G/17G
17 POWDER, FOR SOLUTION ORAL DAILY PRN
Status: DISCONTINUED | OUTPATIENT
Start: 2024-08-24 | End: 2024-08-26 | Stop reason: HOSPADM

## 2024-08-24 RX ORDER — BISACODYL 5 MG/1
5 TABLET, DELAYED RELEASE ORAL DAILY PRN
Status: DISCONTINUED | OUTPATIENT
Start: 2024-08-24 | End: 2024-08-26 | Stop reason: HOSPADM

## 2024-08-24 RX ORDER — METOPROLOL SUCCINATE 25 MG/1
25 TABLET, EXTENDED RELEASE ORAL
Status: DISCONTINUED | OUTPATIENT
Start: 2024-08-24 | End: 2024-08-25

## 2024-08-24 RX ORDER — SODIUM CHLORIDE 0.9 % (FLUSH) 0.9 %
10 SYRINGE (ML) INJECTION EVERY 12 HOURS SCHEDULED
Status: DISCONTINUED | OUTPATIENT
Start: 2024-08-24 | End: 2024-08-26 | Stop reason: HOSPADM

## 2024-08-24 RX ORDER — ATORVASTATIN CALCIUM 10 MG/1
10 TABLET, FILM COATED ORAL DAILY
Status: DISCONTINUED | OUTPATIENT
Start: 2024-08-24 | End: 2024-08-26 | Stop reason: HOSPADM

## 2024-08-24 RX ADMIN — CEFTRIAXONE SODIUM 1000 MG: 1 INJECTION, POWDER, FOR SOLUTION INTRAMUSCULAR; INTRAVENOUS at 18:47

## 2024-08-24 RX ADMIN — METOPROLOL SUCCINATE 25 MG: 25 TABLET, EXTENDED RELEASE ORAL at 17:01

## 2024-08-24 RX ADMIN — MAGNESIUM SULFATE HEPTAHYDRATE 1 G: 1 INJECTION, SOLUTION INTRAVENOUS at 18:13

## 2024-08-24 RX ADMIN — HEPARIN SODIUM 12 UNITS/KG/HR: 10000 INJECTION, SOLUTION INTRAVENOUS at 18:30

## 2024-08-24 RX ADMIN — ATORVASTATIN CALCIUM 10 MG: 10 TABLET, FILM COATED ORAL at 17:01

## 2024-08-24 RX ADMIN — INSULIN LISPRO 2 UNITS: 100 INJECTION, SOLUTION INTRAVENOUS; SUBCUTANEOUS at 22:14

## 2024-08-24 RX ADMIN — Medication 10 ML: at 22:14

## 2024-08-24 NOTE — PLAN OF CARE
Patient was admitted to [facility] on 8/23/2024.  Patient admitted for sepsis secondary to urinary tract infection and chest pain.  Discussed case with Dr. Talley, attending physician.  Patient with a past medical history of atrial fibrillation, diastolic heart failure.  Day prior to presentation at outside hospital she was in the yard, likely overworked herself resulting in dizziness and lightheadedness.  Patient went into her house and then started experiencing chest pain with radiation up to her neck along with shortness of breath.  On arrival in the emergency department patient in atrial fibrillation provided Cardizem with some improvement.  Patient provided IV hydration and started on antibiotics for concern of urinary tract infection and dehydration.  Patient noted to have appropriate electrolytes.  In the morning of 8/24/2024, patient started having repeat episodes of her symptoms she experienced before being admitted.  On telemetry, patient remained in atrial fibrillation, slowly became bradycardic and noted to have a 10-second pause.  Patient's troponins have all returned normal.  As [facility] does not have cardiology service, requesting transfer for cardiac evaluation.  Cardiology contacted, agreed to see patient on transfer.      Electronically signed by Gianfranco Barraza MD, 08/24/24, 11:20 AM EDT.

## 2024-08-24 NOTE — H&P
[facility]   HISTORY AND PHYSICAL    Patient Name: Lilia Becerra  : 1940  MRN: [id number]  Primary Care Physician:  Leo Lomas MD  Date of admission: 2024    Subjective   Subjective     Chief Complaint: Palpitations    History of Present Illness  This patient is an 83-year-old female with past medical history of atrial fibrillation that was admitted at [facility] for atrial fibrillation with RVR as well as acute cystitis.  The patient was being treated for both conditions over there.  She was noted to have very volatile heart rate and remained borderline hypotensive.  Patient also complained of some chest tightness at outside facility.  She was noted on telemetry to have episodes of pauses as high as 10-second pauses (however, on telemetry strip here, I could only see up to about 6 - 7 seconds pauses).  Her labs at outside facility were significant for creatinine of 1.53, high sensitive troponin of 35, proBNP of 3715, white blood cell count of 15.76.  Magnesium was 1.6, TSH/reflex T4 well within normal limits.  She was thus transferred here because [facility] does not have a cardiology on-call over the weekend.  On presentation here, the patient was hemodynamically stable, heart rate was in the 130s.  Rhythm was atrial fibrillation.  The patient was asymptomatic and did not complain of any chest pain or pressure.      Review of Systems   Constitutional:  Negative for activity change, appetite change, chills, diaphoresis, fatigue and fever.   HENT:  Negative for congestion.    Respiratory:  Negative for cough, choking, chest tightness, shortness of breath and wheezing.    Cardiovascular:  Positive for palpitations. Negative for chest pain and leg swelling.   Gastrointestinal:  Negative for abdominal distention and abdominal pain.   Endocrine: Negative for cold intolerance and heat intolerance.   Genitourinary:  Negative for difficulty urinating and dysuria.   Musculoskeletal:   Negative for arthralgias.   Skin:  Negative for color change and pallor.   Neurological:  Negative for dizziness, facial asymmetry, light-headedness and headaches.   Psychiatric/Behavioral:  Negative for agitation and behavioral problems.         Personal History     Past Medical History:   Diagnosis Date    Acid reflux disease     Arthritis     Atrial fibrillation     Back pain     Bowel obstruction     Breast cancer     Carpal tunnel syndrome     Constipation     Depression     Diabetes mellitus     Disease of thyroid gland     Dyslipidemia     Fecal incontinence     GERD (gastroesophageal reflux disease)     Glaucoma     left eye    HBP (high blood pressure)     Hearing impaired     hearing aides    Hiatal hernia     High cholesterol     History of transfusion     History of tuberculosis     IN 1980'S WAS TREATED    Hypertension     Hypomagnesemia     Kienbock's disease     Kienböck's disease, right     KIENBOCK'S AVASCULAR NECROSIS OF LUNATE, ADULT RIGHT    Nonrheumatic aortic valve stenosis 11/05/2020    OAB (overactive bladder)     Osteoporosis     Pneumonia     PONV (postoperative nausea and vomiting)     Positive PPD     RLS (restless legs syndrome)     Sleep apnea     NO MACHINE    Stage 3 chronic kidney disease     Status post bilateral mastectomy with sentinel node biopsy and right axillary node dissection 01/12/2022    Tailbone injury     fracture    Thyroid disease     Urinary incontinence     wears pads    Urinary retention        Past Surgical History:   Procedure Laterality Date    ANKLE TENDON REPAIR      BACK SURGERY  12/21/2018-3/2019    LUMBAR--BROKEN DISC, CLEANED OUT--HAS 2ND PROCEDURE TO FINISH REMOVING    BLADDER REPAIR  09/2014    bladder sling with cysto    CARPAL TUNNEL RELEASE      CATARACT EXTRACTION Bilateral     COLONOSCOPY      COLONOSCOPY N/A 11/17/2022    Procedure: COLONOSCOPY with biopsy;  Surgeon: Leo Alvares MD;  Location: [facility] ENDOSCOPY;  Service: Gastroenterology;   Laterality: N/A;  diverticulosis    CYSTOSCOPY      ENDOSCOPY      ENDOSCOPY N/A 11/17/2022    Procedure: ESOPHAGOGASTRODUODENOSCOPY with biopsy and snare;  Surgeon: Leo Alvares MD;  Location: [facility] ENDOSCOPY;  Service: Gastroenterology;  Laterality: N/A;  gastric fundus polyp    HEMORRHOIDECTOMY      HYSTERECTOMY      INCISION AND DRAINAGE OF WOUND      on back     JOINT REPLACEMENT      KNEE ARTHROPLASTY Right     LEG SURGERY  06/13/2019    LUMBAR DISCECTOMY Left 12/21/2018    Procedure: LEFT L4-5 MICRO DISCECTOMY;  Surgeon: Adam Coleman DO;  Location: [facility] OR;  Service: Orthopedic Spine    LUMBAR DISCECTOMY Left 03/29/2019    Procedure: LEFT L4-5 REVISION OF MICRODISCECTOMY;  Surgeon: Adam Coleman DO;  Location: [facility] OR;  Service: Orthopedic Spine    LUMBAR FUSION      MASTECTOMY Bilateral 01/11/2022    Procedure: BREAST MASTECTOMY WITH SENTINEL NODE BIOPSY AND possible  AXILLARY NODE DISSECTION;  Surgeon: Lety Tena MD;  Location: [facility] OR OSC;  Service: General;  Laterality: Bilateral;    MASTECTOMY Bilateral     TENDON RELEASE  09/2018    TOTAL KNEE ARTHROPLASTY Left 06/13/2019    Procedure: LEFT KNEE REVISION;  Surgeon: Malick Costa II, MD;  Location: [facility] OR;  Service: Orthopedics    TOTAL KNEE ARTHROPLASTY REVISION Left     x2    TOTAL KNEE ARTHROPLASTY REVISION Left 02/13/2018    Procedure: LEFT TOTAL KNEE ARTHROPLASTY REVISION;  Surgeon: Jair Fajardo MD;  Location: [facility] OR;  Service:     US GUIDED CYST ASPIRATION BREAST N/A 03/24/2022    VERTEBROPLASTY         Family History: family history includes Breast cancer in her mother; Diabetes in her father and sister; Tuberculosis in her mother. Otherwise pertinent FHx was reviewed and not pertinent to current issue.    Social History:  reports that she has never smoked. She has never used smokeless tobacco. She reports that she does not drink alcohol and does not use drugs.    Home  Medications:  Calcium Carb-Cholecalciferol, DULoxetine, Diclofenac Sodium, Mirabegron ER, True Metrix Meter, Venlafaxine HCl, apixaban, atorvastatin, azelastine, bimatoprost, brimonidine-timolol, cyclobenzaprine, dilTIAZem CD, empagliflozin, furosemide, glucose blood, hydrOXYzine, letrozole, levothyroxine, losartan, meclizine, metoprolol succinate XL, multivitamin with minerals, omeprazole, oxybutynin XL, pramipexole, spironolactone, tiZANidine, triamcinolone, and vitamin C    Allergies:  Allergies   Allergen Reactions    Egg-Derived Products Swelling    Nsaids Unknown - High Severity    Sertraline Unknown - High Severity     Bleeding in stomach    Tetanus Toxoid Swelling    Tetanus Toxoids Swelling    Metformin Diarrhea and Unknown - Low Severity    Tetanus-Diphtheria Toxoids Td Unknown - Low Severity    Tuberculin Ppd Unknown - Low Severity       Objective    Objective     Vitals:   Temp:  [98.2 °F (36.8 °C)] 98.2 °F (36.8 °C)  Heart Rate:  [136] 136  Resp:  [18] 18  BP: (93)/(69) 93/69    Physical Exam  Constitutional:       Appearance: Normal appearance. She is normal weight.   HENT:      Head: Normocephalic.      Mouth/Throat:      Mouth: Mucous membranes are moist.   Eyes:      Pupils: Pupils are equal, round, and reactive to light.   Cardiovascular:      Rate and Rhythm: Normal rate and regular rhythm. Rhythm irregular.      Pulses: Normal pulses.      Heart sounds: No murmur heard.     No friction rub. No gallop.   Abdominal:      General: Abdomen is flat.      Palpations: Abdomen is soft.   Musculoskeletal:         General: No swelling or tenderness.   Skin:     General: Skin is warm and dry.      Capillary Refill: Capillary refill takes less than 2 seconds.   Neurological:      General: No focal deficit present.      Mental Status: She is alert.   Psychiatric:         Mood and Affect: Mood normal.         Behavior: Behavior normal.         Result Review    Result Review:  I have personally reviewed the  results from the time of this admission to 8/24/2024 17:01 EDT and agree with these findings:  [x]  Laboratory list / accordion  []  Microbiology  []  Radiology  []  EKG/Telemetry   [x]  Cardiology/Vascular   []  Pathology  [x]  Old records  []  Other:  Most notable findings include: Unremarkable physical exam      Assessment & Plan   Assessment / Plan     Brief Patient Summary:  Lilia Becerra is a 83 y.o. female who has been transferred from [facility] when she presented there for management of atrial fibrillation and acute cystitis.  She was noted to have volatile heart rate including after 10 seconds pauses on telemetry.  She has been transferred here for cardiology consultation since [facility] does not have cardiology coverage over the weekend    Active Hospital Problems:  Active Hospital Problems    Diagnosis     **A-fib    Atrial fibrillation with pauses  LUAN  Elevated proBNP  Leukocytosis  History of diabetes  Urinary tract infection  Hypomagnesemia    Plan:   Admit patient to [facility] telemetry.  Continue monitoring ordered telemetry.  Telemetry strip from outside facility reviewed and confirmed pause.  I could count up to about 7-second pause   Cardiology consulted and following.  Requested patient be made n.p.o. after midnight in preparation for OMAR cardioversion, heparin GTT for anticoagulation, and TTE ordered  Continue Rocephin for acute cystitis.  Follow-up UA/urine culture, procalcitonin  Follow-up lipid profile, A1c, for risk stratification  Replete magnesium and optimize other electrolytes    VTE Prophylaxis:  Pharmacologic & mechanical VTE prophylaxis orders are present.        CODE STATUS:    Code Status (Patient has no pulse and is not breathing): CPR (Attempt to Resuscitate)  Medical Interventions (Patient has pulse or is breathing): Full Support    Admission Status:  I believe this patient meets inpatient status.    Darshan Greenwood MD

## 2024-08-24 NOTE — CONSULTS
[facility]   CARDIOLOGY CONSULT NOTE    Patient Name: Lilia Becerra  : 1940  MRN: [id number]    Primary Care Physician:  Leo Lomas MD  Date of admission: 2024    Subjective   Subjective     Chief Complaint:  Chest pain, palpitation.    HPI:  Lilia Becerra is a 83 y.o. female with past medical history of atrial fibrillation on long-term anticoagulation, hypertension, hyperlipidemia, who presented at outside hospital the day prior with lightheadedness, dizziness and chest pain along with shortness of breath.  Patient was found to have A-fib with RVR.  There is some concern of urinary tract infection and dehydration.  On telemetry of outside hospital patient remained in atrial fibrillation, slowly became bradycardic and noted to have 9-second pause.  Troponin negative, patient was transferred to [facility] for further evaluation.  Patient at the time of interview states doing well, denies chest pain but endorses dizziness and dyspnea on exertion.  EKG showed atrial flutter with 2 to 1 AV block, patient takes Eliquis twice daily however states that few weeks back she stopped it for 1 week to have carotid intervention.  Vitals stable.  Patient follows up cardiologist at [facility] (Dr. Bell)  Review of Systems  Negative except as mentioned in HPI above.    Personal History     Past Medical History:   Diagnosis Date    Acid reflux disease     Arthritis     Atrial fibrillation     Back pain     Bowel obstruction     Breast cancer     Carpal tunnel syndrome     Constipation     Depression     Diabetes mellitus     Disease of thyroid gland     Dyslipidemia     Fecal incontinence     GERD (gastroesophageal reflux disease)     Glaucoma     left eye    HBP (high blood pressure)     Hearing impaired     hearing aides    Hiatal hernia     High cholesterol     History of transfusion     History of tuberculosis     IN  WAS TREATED    Hypertension     Hypomagnesemia      Kienbock's disease     Kienböck's disease, right     KIENBOCK'S AVASCULAR NECROSIS OF LUNATE, ADULT RIGHT    Nonrheumatic aortic valve stenosis 11/05/2020    OAB (overactive bladder)     Osteoporosis     Pneumonia     PONV (postoperative nausea and vomiting)     Positive PPD     RLS (restless legs syndrome)     Sleep apnea     NO MACHINE    Stage 3 chronic kidney disease     Status post bilateral mastectomy with sentinel node biopsy and right axillary node dissection 01/12/2022    Tailbone injury     fracture    Thyroid disease     Urinary incontinence     wears pads    Urinary retention        Past Surgical History:   Procedure Laterality Date    ANKLE TENDON REPAIR      BACK SURGERY  12/21/2018-3/2019    LUMBAR--BROKEN DISC, CLEANED OUT--HAS 2ND PROCEDURE TO FINISH REMOVING    BLADDER REPAIR  09/2014    bladder sling with cysto    CARPAL TUNNEL RELEASE      CATARACT EXTRACTION Bilateral     COLONOSCOPY      COLONOSCOPY N/A 11/17/2022    Procedure: COLONOSCOPY with biopsy;  Surgeon: Leo Alvares MD;  Location: [facility] ENDOSCOPY;  Service: Gastroenterology;  Laterality: N/A;  diverticulosis    CYSTOSCOPY      ENDOSCOPY      ENDOSCOPY N/A 11/17/2022    Procedure: ESOPHAGOGASTRODUODENOSCOPY with biopsy and snare;  Surgeon: Leo Alvares MD;  Location: [facility] ENDOSCOPY;  Service: Gastroenterology;  Laterality: N/A;  gastric fundus polyp    HEMORRHOIDECTOMY      HYSTERECTOMY      INCISION AND DRAINAGE OF WOUND      on back     JOINT REPLACEMENT      KNEE ARTHROPLASTY Right     LEG SURGERY  06/13/2019    LUMBAR DISCECTOMY Left 12/21/2018    Procedure: LEFT L4-5 MICRO DISCECTOMY;  Surgeon: Adam Coleman DO;  Location: [facility] OR;  Service: Orthopedic Spine    LUMBAR DISCECTOMY Left 03/29/2019    Procedure: LEFT L4-5 REVISION OF MICRODISCECTOMY;  Surgeon: Adam Coleman DO;  Location: [facility] MAIN OR;  Service: Orthopedic Spine    LUMBAR FUSION      MASTECTOMY Bilateral 01/11/2022    Procedure: BREAST  MASTECTOMY WITH SENTINEL NODE BIOPSY AND possible  AXILLARY NODE DISSECTION;  Surgeon: Lety Tena MD;  Location: [facility];  Service: General;  Laterality: Bilateral;    MASTECTOMY Bilateral     TENDON RELEASE  09/2018    TOTAL KNEE ARTHROPLASTY Left 06/13/2019    Procedure: LEFT KNEE REVISION;  Surgeon: Malick Costa II, MD;  Location: [facility];  Service: Orthopedics    TOTAL KNEE ARTHROPLASTY REVISION Left     x2    TOTAL KNEE ARTHROPLASTY REVISION Left 02/13/2018    Procedure: LEFT TOTAL KNEE ARTHROPLASTY REVISION;  Surgeon: Jair Fajardo MD;  Location: [facility];  Service:     US GUIDED CYST ASPIRATION BREAST N/A 03/24/2022    VERTEBROPLASTY         Family History: family history includes Breast cancer in her mother; Diabetes in her father and sister; Tuberculosis in her mother. Otherwise pertinent FHx was reviewed and not pertinent to current issue.    Social History:  reports that she has never smoked. She has never used smokeless tobacco. She reports that she does not drink alcohol and does not use drugs.    Home Medications:  Calcium Carb-Cholecalciferol, DULoxetine, Diclofenac Sodium, Mirabegron ER, True Metrix Meter, Venlafaxine HCl, apixaban, atorvastatin, azelastine, bimatoprost, brimonidine-timolol, cyclobenzaprine, dilTIAZem CD, empagliflozin, furosemide, glucose blood, hydrOXYzine, letrozole, levothyroxine, losartan, meclizine, metoprolol succinate XL, multivitamin with minerals, omeprazole, oxybutynin XL, pramipexole, spironolactone, tiZANidine, triamcinolone, and vitamin C    Allergies:  Allergies   Allergen Reactions    Egg-Derived Products Swelling    Nsaids Unknown - High Severity    Sertraline Unknown - High Severity     Bleeding in stomach    Tetanus Toxoid Swelling    Tetanus Toxoids Swelling    Metformin Diarrhea and Unknown - Low Severity    Tetanus-Diphtheria Toxoids Td Unknown - Low Severity    Tuberculin Ppd Unknown - Low Severity        Objective   Objective     Vitals:   Temp:  [98.2 °F (36.8 °C)] 98.2 °F (36.8 °C)  Heart Rate:  [136] 136  Resp:  [18] 18  BP: (93)/(69) 93/69  Physical Exam    Constitutional: Awake, alert   Eyes: PERRLA, sclerae anicteric, no conjunctival injection   HENT: NCAT, mucous membranes moist   Neck: Supple, no thyromegaly, no lymphadenopathy, trachea midline, No JVD, No carotid bruit   Respiratory: Clear to auscultation bilaterally, nonlabored respirations    Cardiovascular: RRR, no murmurs, rubs, or gallops appreciated, palpable pedal pulses bilaterally   Gastrointestinal: Positive bowel sounds, soft, nontender, nondistended   Musculoskeletal: No ankle edema, no clubbing or cyanosis to extremities   Psychiatric: Appropriate affect, cooperative   Neurologic: Oriented x 3, strength symmetric in all extremities, Cranial Nerves grossly intact, speech is clear   Skin: Warm, Dry, No rashes, No palor    Result Review    Result Review:  I have personally reviewed the results from the time of this admission to 8/24/2024 17:02 EDT and agree with these findings:  [x]  Laboratory  []  Microbiology  []  Radiology  [x]  EKG/Telemetry   [x]  Cardiology/Vascular   []  Pathology  [x]  Old records  []  Other:  Most notable findings include: Atrial flutter with 2-1 AV block    ECG 12 Lead Rhythm Change   Preliminary Result   HEART LBZT=215  bpm   RR Nuhxisqf=132  ms   IL Mybswnka=970  ms   P Horizontal Axis=178  deg   P Front Axis=-90  deg   QRSD Interval=77  ms   QT Nbmjfxhj=784  ms   CRiI=655  ms   QRS Axis=-5  deg   T Wave Axis=22  deg   - ABNORMAL ECG -   Sinus or ectopic atrial tachycardia   Abnormal R-wave progression, late transition   Probable LVH with secondary repol abnrm   Date and Time of Study:2024-08-24 14:33:57        Results for orders placed in visit on 03/08/22    Adult Transthoracic Echo Complete W/ Cont if Necessary Per Protocol    Interpretation Summary  · Estimated left ventricular EF was in agreement with the  calculated left ventricular EF. Left ventricular ejection fraction appears to be 51 - 55%. Left ventricular systolic function is low normal.  · Left ventricular diastolic function is consistent with (grade I) impaired relaxation.  · Moderate aortic valve stenosis is present. Aortic valve area is 1.2 cm2.  · Estimated right ventricular systolic pressure from tricuspid regurgitation is normal (<35 mmHg).    No results found for this or any previous visit.    Lab Results   Component Value Date    TROPONINI 0.03 09/13/2021    TROPONINT 35 (H) 08/24/2024           ASCVD SCORE  The ASCVD Risk score (Ted DUPREE, et al., 2019) failed to calculate for the following reasons:    The 2019 ASCVD risk score is only valid for ages 40 to 79        Assessment & Plan   Assessment / Plan     Brief Patient Summary:  Lilia Becerra is a 83 y.o. female who presents with shortness of breath, dizziness and was found to have atrial flutter with 2-1 AV block as well as 9-second pause in outside hospital.    Active Hospital Problems:  Active Hospital Problems    Diagnosis     **A-fib     Atrial flutter with rapid ventricular response     Paroxysmal atrial fibrillation     Hyperlipidemia     Aortic stenosis, moderate     Hypothyroidism     HBP (high blood pressure)      Plan:   Patient with paroxysmal atrial fibrillation with elevated [id number] score on anticoagulation with Eliquis.  Presented with atrial flutter with 2-1 AV block, with 9-second reported pause.  Patient also has reported history of moderate aortic stenosis.  Echo from 2023 shows LVEF of 55% with aortic valve area of 1.1 cm2.  Will obtain echocardiogram.  Will schedule patient for OMAR with cardioversion tomorrow morning.  Will keep patient n.p.o. after midnight.  Continue beta-blocker.  Patient will need A-fib/flutter ablation as outpatient.  Patient will be following up with me in 4 weeks after discharge.      CODE STATUS:    Code Status (Patient has no pulse and is not  breathing): CPR (Attempt to Resuscitate)  Medical Interventions (Patient has pulse or is breathing): Full Support       Aníbal Abraham MD, FACC  Aníbal Abraham MD

## 2024-08-25 ENCOUNTER — APPOINTMENT (OUTPATIENT)
Dept: CARDIOLOGY | Facility: HOSPITAL | Age: 84
End: 2024-08-25
Payer: MEDICARE

## 2024-08-25 LAB
ANION GAP SERPL CALCULATED.3IONS-SCNC: 12.5 MMOL/L (ref 5–15)
APTT PPP: 130.8 SECONDS (ref 78–95.9)
APTT PPP: 54.5 SECONDS (ref 78–95.9)
BASOPHILS # BLD AUTO: 0.09 10*3/MM3 (ref 0–0.2)
BASOPHILS NFR BLD AUTO: 0.5 % (ref 0–1.5)
BH CV ECHO MEAS - AO MAX PG: 16.4 MMHG
BH CV ECHO MEAS - AO MEAN PG: 8.7 MMHG
BH CV ECHO MEAS - AO ROOT DIAM: 2.8 CM
BH CV ECHO MEAS - AO V2 MAX: 202.4 CM/SEC
BH CV ECHO MEAS - AO V2 VTI: 28 CM
BH CV ECHO MEAS - AVA(I,D): 1.33 CM2
BH CV ECHO MEAS - EDV(CUBED): 34.1 ML
BH CV ECHO MEAS - EDV(MOD-SP2): 19.7 ML
BH CV ECHO MEAS - EDV(MOD-SP4): 23.9 ML
BH CV ECHO MEAS - EF(MOD-BP): 55.8 %
BH CV ECHO MEAS - EF(MOD-SP2): 57.4 %
BH CV ECHO MEAS - EF(MOD-SP4): 54.8 %
BH CV ECHO MEAS - ESV(CUBED): 14.3 ML
BH CV ECHO MEAS - ESV(MOD-SP2): 8.4 ML
BH CV ECHO MEAS - ESV(MOD-SP4): 10.8 ML
BH CV ECHO MEAS - FS: 25.1 %
BH CV ECHO MEAS - IVS/LVPW: 1.24 CM
BH CV ECHO MEAS - IVSD: 1.27 CM
BH CV ECHO MEAS - LA DIMENSION: 3.8 CM
BH CV ECHO MEAS - LAT PEAK E' VEL: 4.9 CM/SEC
BH CV ECHO MEAS - LV DIASTOLIC VOL/BSA (35-75): 13 CM2
BH CV ECHO MEAS - LV MASS(C)D: 113.9 GRAMS
BH CV ECHO MEAS - LV MAX PG: 2.35 MMHG
BH CV ECHO MEAS - LV MEAN PG: 1.21 MMHG
BH CV ECHO MEAS - LV SYSTOLIC VOL/BSA (12-30): 5.9 CM2
BH CV ECHO MEAS - LV V1 MAX: 76.7 CM/SEC
BH CV ECHO MEAS - LV V1 VTI: 12 CM
BH CV ECHO MEAS - LVIDD: 3.2 CM
BH CV ECHO MEAS - LVIDS: 2.43 CM
BH CV ECHO MEAS - LVOT AREA: 3.1 CM2
BH CV ECHO MEAS - LVOT DIAM: 1.99 CM
BH CV ECHO MEAS - LVPWD: 1.03 CM
BH CV ECHO MEAS - MED PEAK E' VEL: 4 CM/SEC
BH CV ECHO MEAS - MV DEC TIME: 0.11 SEC
BH CV ECHO MEAS - MV MEAN PG: 3 MMHG
BH CV ECHO MEAS - MV V2 VTI: 14.9 CM
BH CV ECHO MEAS - MVA(VTI): 2.5 CM2
BH CV ECHO MEAS - SV(LVOT): 37.4 ML
BH CV ECHO MEAS - SV(MOD-SP2): 11.3 ML
BH CV ECHO MEAS - SV(MOD-SP4): 13.1 ML
BH CV ECHO MEAS - SVI(LVOT): 20.4 ML/M2
BH CV ECHO MEAS - SVI(MOD-SP2): 6.2 ML/M2
BH CV ECHO MEAS - SVI(MOD-SP4): 7.1 ML/M2
BH CV ECHO MEAS - TAPSE (>1.6): 1 CM
BUN SERPL-MCNC: 31 MG/DL (ref 8–23)
BUN/CREAT SERPL: 20.9 (ref 7–25)
CALCIUM SPEC-SCNC: 9.6 MG/DL (ref 8.6–10.5)
CHLORIDE SERPL-SCNC: 103 MMOL/L (ref 98–107)
CHOLEST SERPL-MCNC: 146 MG/DL (ref 0–200)
CO2 SERPL-SCNC: 23.5 MMOL/L (ref 22–29)
CREAT SERPL-MCNC: 1.48 MG/DL (ref 0.57–1)
DEPRECATED RDW RBC AUTO: 43.6 FL (ref 37–54)
EGFRCR SERPLBLD CKD-EPI 2021: 34.6 ML/MIN/1.73
EOSINOPHIL # BLD AUTO: 0.17 10*3/MM3 (ref 0–0.4)
EOSINOPHIL NFR BLD AUTO: 1 % (ref 0.3–6.2)
ERYTHROCYTE [DISTWIDTH] IN BLOOD BY AUTOMATED COUNT: 12.5 % (ref 12.3–15.4)
GLUCOSE BLDC GLUCOMTR-MCNC: 176 MG/DL (ref 70–99)
GLUCOSE BLDC GLUCOMTR-MCNC: 244 MG/DL (ref 70–99)
GLUCOSE BLDC GLUCOMTR-MCNC: 248 MG/DL (ref 70–99)
GLUCOSE SERPL-MCNC: 158 MG/DL (ref 65–99)
HBA1C MFR BLD: 8.5 % (ref 4.8–5.6)
HCT VFR BLD AUTO: 44.6 % (ref 34–46.6)
HDLC SERPL-MCNC: 45 MG/DL (ref 40–60)
HGB BLD-MCNC: 14.7 G/DL (ref 12–15.9)
IMM GRANULOCYTES # BLD AUTO: 0.28 10*3/MM3 (ref 0–0.05)
IMM GRANULOCYTES NFR BLD AUTO: 1.6 % (ref 0–0.5)
LDLC SERPL CALC-MCNC: 83 MG/DL (ref 0–100)
LDLC/HDLC SERPL: 1.82 {RATIO}
LYMPHOCYTES # BLD AUTO: 3.73 10*3/MM3 (ref 0.7–3.1)
LYMPHOCYTES NFR BLD AUTO: 21.7 % (ref 19.6–45.3)
MCH RBC QN AUTO: 31.2 PG (ref 26.6–33)
MCHC RBC AUTO-ENTMCNC: 33 G/DL (ref 31.5–35.7)
MCV RBC AUTO: 94.7 FL (ref 79–97)
MONOCYTES # BLD AUTO: 1 10*3/MM3 (ref 0.1–0.9)
MONOCYTES NFR BLD AUTO: 5.8 % (ref 5–12)
NEUTROPHILS NFR BLD AUTO: 11.92 10*3/MM3 (ref 1.7–7)
NEUTROPHILS NFR BLD AUTO: 69.4 % (ref 42.7–76)
NRBC BLD AUTO-RTO: 0 /100 WBC (ref 0–0.2)
PLATELET # BLD AUTO: 225 10*3/MM3 (ref 140–450)
PMV BLD AUTO: 11.2 FL (ref 6–12)
POTASSIUM SERPL-SCNC: 4.1 MMOL/L (ref 3.5–5.2)
QT INTERVAL: 335 MS
QT INTERVAL: 389 MS
QTC INTERVAL: 403 MS
QTC INTERVAL: 410 MS
RBC # BLD AUTO: 4.71 10*6/MM3 (ref 3.77–5.28)
SODIUM SERPL-SCNC: 139 MMOL/L (ref 136–145)
TRIGL SERPL-MCNC: 96 MG/DL (ref 0–150)
VLDLC SERPL-MCNC: 18 MG/DL (ref 5–40)
WBC NRBC COR # BLD AUTO: 17.19 10*3/MM3 (ref 3.4–10.8)

## 2024-08-25 PROCEDURE — 93306 TTE W/DOPPLER COMPLETE: CPT | Performed by: INTERNAL MEDICINE

## 2024-08-25 PROCEDURE — 92960 CARDIOVERSION ELECTRIC EXT: CPT

## 2024-08-25 PROCEDURE — 85730 THROMBOPLASTIN TIME PARTIAL: CPT | Performed by: INTERNAL MEDICINE

## 2024-08-25 PROCEDURE — 93325 DOPPLER ECHO COLOR FLOW MAPG: CPT | Performed by: INTERNAL MEDICINE

## 2024-08-25 PROCEDURE — 92960 CARDIOVERSION ELECTRIC EXT: CPT | Performed by: INTERNAL MEDICINE

## 2024-08-25 PROCEDURE — 85730 THROMBOPLASTIN TIME PARTIAL: CPT | Performed by: PHYSICIAN ASSISTANT

## 2024-08-25 PROCEDURE — 80048 BASIC METABOLIC PNL TOTAL CA: CPT | Performed by: STUDENT IN AN ORGANIZED HEALTH CARE EDUCATION/TRAINING PROGRAM

## 2024-08-25 PROCEDURE — 80061 LIPID PANEL: CPT | Performed by: STUDENT IN AN ORGANIZED HEALTH CARE EDUCATION/TRAINING PROGRAM

## 2024-08-25 PROCEDURE — 25010000002 MIDAZOLAM PER 1MG: Performed by: INTERNAL MEDICINE

## 2024-08-25 PROCEDURE — 99233 SBSQ HOSP IP/OBS HIGH 50: CPT | Performed by: INTERNAL MEDICINE

## 2024-08-25 PROCEDURE — 93312 ECHO TRANSESOPHAGEAL: CPT

## 2024-08-25 PROCEDURE — 5A2204Z RESTORATION OF CARDIAC RHYTHM, SINGLE: ICD-10-PCS | Performed by: INTERNAL MEDICINE

## 2024-08-25 PROCEDURE — 93325 DOPPLER ECHO COLOR FLOW MAPG: CPT

## 2024-08-25 PROCEDURE — 99232 SBSQ HOSP IP/OBS MODERATE 35: CPT | Performed by: INTERNAL MEDICINE

## 2024-08-25 PROCEDURE — 93321 DOPPLER ECHO F-UP/LMTD STD: CPT | Performed by: INTERNAL MEDICINE

## 2024-08-25 PROCEDURE — 93306 TTE W/DOPPLER COMPLETE: CPT

## 2024-08-25 PROCEDURE — 82948 REAGENT STRIP/BLOOD GLUCOSE: CPT | Performed by: PHYSICIAN ASSISTANT

## 2024-08-25 PROCEDURE — 93321 DOPPLER ECHO F-UP/LMTD STD: CPT

## 2024-08-25 PROCEDURE — 63710000001 INSULIN LISPRO (HUMAN) PER 5 UNITS: Performed by: PHYSICIAN ASSISTANT

## 2024-08-25 PROCEDURE — 25010000002 FENTANYL CITRATE (PF) 50 MCG/ML SOLUTION: Performed by: INTERNAL MEDICINE

## 2024-08-25 PROCEDURE — 93005 ELECTROCARDIOGRAM TRACING: CPT | Performed by: INTERNAL MEDICINE

## 2024-08-25 PROCEDURE — 85025 COMPLETE CBC W/AUTO DIFF WBC: CPT | Performed by: INTERNAL MEDICINE

## 2024-08-25 PROCEDURE — 82948 REAGENT STRIP/BLOOD GLUCOSE: CPT

## 2024-08-25 PROCEDURE — 93312 ECHO TRANSESOPHAGEAL: CPT | Performed by: INTERNAL MEDICINE

## 2024-08-25 RX ORDER — PRAMIPEXOLE DIHYDROCHLORIDE 0.5 MG/1
1.5 TABLET ORAL NIGHTLY
Status: DISCONTINUED | OUTPATIENT
Start: 2024-08-25 | End: 2024-08-26 | Stop reason: HOSPADM

## 2024-08-25 RX ORDER — FENTANYL CITRATE 50 UG/ML
INJECTION, SOLUTION INTRAMUSCULAR; INTRAVENOUS
Status: COMPLETED | OUTPATIENT
Start: 2024-08-25 | End: 2024-08-25

## 2024-08-25 RX ORDER — METOPROLOL SUCCINATE 50 MG/1
50 TABLET, EXTENDED RELEASE ORAL
Status: DISCONTINUED | OUTPATIENT
Start: 2024-08-26 | End: 2024-08-26 | Stop reason: HOSPADM

## 2024-08-25 RX ORDER — OXYBUTYNIN CHLORIDE 5 MG/1
1 TABLET ORAL DAILY
COMMUNITY
End: 2024-08-26 | Stop reason: HOSPADM

## 2024-08-25 RX ORDER — DULOXETIN HYDROCHLORIDE 30 MG/1
60 CAPSULE, DELAYED RELEASE ORAL DAILY
Status: DISCONTINUED | OUTPATIENT
Start: 2024-08-26 | End: 2024-08-26 | Stop reason: HOSPADM

## 2024-08-25 RX ORDER — AMIODARONE HYDROCHLORIDE 200 MG/1
200 TABLET ORAL EVERY 12 HOURS SCHEDULED
Status: DISCONTINUED | OUTPATIENT
Start: 2024-08-25 | End: 2024-08-26 | Stop reason: HOSPADM

## 2024-08-25 RX ORDER — PANTOPRAZOLE SODIUM 40 MG/1
40 TABLET, DELAYED RELEASE ORAL
Status: DISCONTINUED | OUTPATIENT
Start: 2024-08-26 | End: 2024-08-26 | Stop reason: HOSPADM

## 2024-08-25 RX ORDER — LETROZOLE 2.5 MG/1
2.5 TABLET, FILM COATED ORAL DAILY
Status: DISCONTINUED | OUTPATIENT
Start: 2024-08-25 | End: 2024-08-26 | Stop reason: HOSPADM

## 2024-08-25 RX ORDER — MIDAZOLAM HYDROCHLORIDE 2 MG/2ML
INJECTION, SOLUTION INTRAMUSCULAR; INTRAVENOUS
Status: COMPLETED | OUTPATIENT
Start: 2024-08-25 | End: 2024-08-25

## 2024-08-25 RX ORDER — SPIRONOLACTONE 25 MG/1
25 TABLET ORAL DAILY
Status: DISCONTINUED | OUTPATIENT
Start: 2024-08-25 | End: 2024-08-26 | Stop reason: HOSPADM

## 2024-08-25 RX ADMIN — SPIRONOLACTONE 25 MG: 25 TABLET ORAL at 16:32

## 2024-08-25 RX ADMIN — FENTANYL CITRATE 25 MCG: 50 INJECTION, SOLUTION INTRAMUSCULAR; INTRAVENOUS at 11:24

## 2024-08-25 RX ADMIN — Medication 10 ML: at 20:56

## 2024-08-25 RX ADMIN — INSULIN LISPRO 3 UNITS: 100 INJECTION, SOLUTION INTRAVENOUS; SUBCUTANEOUS at 17:46

## 2024-08-25 RX ADMIN — INSULIN LISPRO 3 UNITS: 100 INJECTION, SOLUTION INTRAVENOUS; SUBCUTANEOUS at 20:57

## 2024-08-25 RX ADMIN — LETROZOLE 2.5 MG: 2.5 TABLET ORAL at 16:32

## 2024-08-25 RX ADMIN — APIXABAN 5 MG: 5 TABLET, FILM COATED ORAL at 20:57

## 2024-08-25 RX ADMIN — LINAGLIPTIN 5 MG: 5 TABLET, FILM COATED ORAL at 16:32

## 2024-08-25 RX ADMIN — TOPICAL ANESTHETIC 1 SPRAY: 200 SPRAY DENTAL; PERIODONTAL at 11:23

## 2024-08-25 RX ADMIN — AMIODARONE HYDROCHLORIDE 200 MG: 200 TABLET ORAL at 20:57

## 2024-08-25 RX ADMIN — PRAMIPEXOLE DIHYDROCHLORIDE 1.5 MG: 0.5 TABLET ORAL at 20:57

## 2024-08-25 RX ADMIN — MIDAZOLAM HYDROCHLORIDE 2 MG: 1 INJECTION, SOLUTION INTRAMUSCULAR; INTRAVENOUS at 11:24

## 2024-08-25 NOTE — PLAN OF CARE
Goal Outcome Evaluation:  Plan of Care Reviewed With: patient        Progress: improving  Outcome Evaluation: patient alert and oriented, went to get echo, OMAR, and cardioversion today. Patient tolerated well and has converted to normal sinus. MD discontinued heparin drip, patient has been in great spirits since coming back and is feeling very well. Will continue with plan of care.

## 2024-08-25 NOTE — PAYOR COMM NOTE
"Lilia Becerra (83 y.o. Female)       Date of Birth   1940    Social Security Number       Address   [address] [address]    Home Phone   [phone]    MRN   [id number]       [facility]    Marital Status                               Admission Date   8/24/24    Admission Type   Urgent    Admitting Provider   Darshan Greenwood MD    Attending Provider   Derek Romo DO    Department, Room/Bed   [facility] 4TH FLOOR MEDICAL TELEMETRY UNIT, 401/2       Discharge Date       Discharge Disposition       Discharge Destination                                 Attending Provider: Derek Romo DO    Allergies: Egg-derived Products, Nsaids, Sertraline, Tetanus Toxoid, Tetanus Toxoids, Metformin, Tetanus-diphtheria Toxoids Td, Tuberculin Ppd    Isolation: None   Infection: None   Code Status: CPR    Ht: 165.1 cm (65\")   Wt: 76.4 kg (168 lb 6.9 oz)    Admission Cmt: None   Principal Problem: A-fib [I48.91]                   Active Insurance as of 8/24/2024       Primary Coverage       Payor Plan Insurance Group Employer/Plan Group    AETNA MEDICARE REPLACEMENT AETNA MEDICARE REPLACEMENT [id number]       Payor Plan Address Payor Plan Phone Number Payor Plan Fax Number Effective Dates    [address] [phone]  6/1/2024 - None Entered    [facility] [place] [address]         Subscriber Name Subscriber Birth Date Member ID       LILIA BECERRA 1940 [id number]               Secondary Coverage       Payor Plan Insurance Group Employer/Plan Group    [id number]        Payor Plan Address Payor Plan Phone Number Payor Plan Fax Number Effective Dates    [address] 8/1/2019 - None Entered    [place] [address]         Subscriber Name Subscriber Birth Date Member ID       LILIA BECERRA 1940 [id number]                     Emergency Contacts        (Rel.) Home Phone Work Phone Mobile Phone    Kecia Torres (Sister) [phone] " -- [phone]    JAQUELINE BECERRA (Son) [phone] -- [phone]    ABRAHAM BECERRA (Son) [phone] -- --    ANDREA BECERRA (Son) [phone] -- --             Atrial Fibrillation RRG Inpatient Care       Indications Met   Last updated by Dayana Erwin RN on 8/25/2024 1102     Review Status Created By   Primary Completed Dayana Erwin RN      Criteria Review   Atrial Fibrillation RRG Inpatient Care     Overall Determination: Indications Met     Criteria:  [×] Admission is indicated for  1 or more  of the following :      [×] Persistent symptomatic Tachycardia (eg, chest pain, dyspnea) despite observation care (eg, rate cannot be sufficiently controlled on regimen suitable for outpatient care, unsuccessful cardioversion)          8/25/2024 11:01 AM              -- 8/25/2024 11:01 AM by Dayana Erwin RN --                  female who has been transferred from [facility] when she presented there for management of atrial fibrillation and acute cystitis.  She was noted to have volatile heart rate including after 10 seconds pauses on telemetry.  She has been transfer      [  ] Initiation or adjustment of antiarrhythmic medication that requires cardiac monitoring (eg, telemetry), as indicated by  ALL  of the following :          [×] Cardiac monitoring required that extends beyond observation care [B] [C]     Notes:  -- 8/25/2024 11:02 AM by Dayana Erwin RN --      RETRO REVIEW FOR 8/24/24        -- 8/25/2024 11:01 AM by Dayana Erwin RN --      iagnosis        **A-fib       Atrial fibrillation with pauses      LUAN      Elevated proBNP      Leukocytosis      History of diabetes      Urinary tract infection      Hypomagnesemia            Plan:       Admit patient to [facility] telemetry. Continue monitoring ordered telemetry. Telemetry strip from outside facility reviewed and confirmed pause. I could count up to about 7-second pause       Cardiology consulted and following.  Requested patient be made n.p.o. after midnight in preparation for OMAR cardioversion, heparin GTT for anticoagulation, and TTE ordered      Continue Rocephin for acute cystitis. Follow-up UA/urine culture, procalcitonin      Follow-up lipid profile, A1c, for risk stratification      Replete magnesium and optimize other electrolytes              -- 8/25/2024 11:01 AM by Dayana Erwin RN --      83 y.o. female with past medical history of atrial fibrillation on long-term anticoagulation, hypertension, hyperlipidemia, who presented at outside hospital the day prior with lightheadedness, dizziness and chest pain along with shortness of breath. Patient was found to have A-fib with RVR. There is some concern of urinary tract infection and dehydration. On telemetry of outside hospital patient remained in atrial fibrillation, slowly became bradycardic and noted to have 9-second pause. Troponin negative, patient was transferred to [facility] for further evaluation.      Patient at the time of interview states doing well, denies chest pain but endorses dizziness and dyspnea on exertion. EKG showed atrial flutter with 2 to 1 AV block, patient takes Eliquis twice daily however states that few weeks back she stopped it for 1 week to have carotid intervention.      Vitals stable. Patient follows up cardiologist at [facility] (Dr. Bell)        -- 8/25/2024 11:01 AM by Dayana Erwin, WAYNE --      female who has been transferred from [facility] when she presented there for management of atrial fibrillation and acute cystitis. She was noted to have volatile heart rate including after 10 seconds pauses on telemetry. She has been transferred here for cardiology consultation since [facility] does not have cardiology coverage over the weekend                History & Physical        Darshan Greenwood MD at 08/24/24 1701          [facility]   HISTORY AND PHYSICAL    Patient Name:  Lilia Becerra  : 1940  MRN: [id number]  Primary Care Physician:  Leo Lomas MD  Date of admission: 2024    Subjective  Subjective     Chief Complaint: Palpitations    History of Present Illness  This patient is an 83-year-old female with past medical history of atrial fibrillation that was admitted at [facility] for atrial fibrillation with RVR as well as acute cystitis.  The patient was being treated for both conditions over there.  She was noted to have very volatile heart rate and remained borderline hypotensive.  Patient also complained of some chest tightness at outside facility.  She was noted on telemetry to have episodes of pauses as high as 10-second pauses (however, on telemetry strip here, I could only see up to about 6 - 7 seconds pauses).  Her labs at outside facility were significant for creatinine of 1.53, high sensitive troponin of 35, proBNP of 3715, white blood cell count of 15.76.  Magnesium was 1.6, TSH/reflex T4 well within normal limits.  She was thus transferred here because [facility] does not have a cardiology on-call over the weekend.  On presentation here, the patient was hemodynamically stable, heart rate was in the 130s.  Rhythm was atrial fibrillation.  The patient was asymptomatic and did not complain of any chest pain or pressure.      Review of Systems   Constitutional:  Negative for activity change, appetite change, chills, diaphoresis, fatigue and fever.   HENT:  Negative for congestion.    Respiratory:  Negative for cough, choking, chest tightness, shortness of breath and wheezing.    Cardiovascular:  Positive for palpitations. Negative for chest pain and leg swelling.   Gastrointestinal:  Negative for abdominal distention and abdominal pain.   Endocrine: Negative for cold intolerance and heat intolerance.   Genitourinary:  Negative for difficulty urinating and dysuria.   Musculoskeletal:  Negative for arthralgias.   Skin:  Negative for color  change and pallor.   Neurological:  Negative for dizziness, facial asymmetry, light-headedness and headaches.   Psychiatric/Behavioral:  Negative for agitation and behavioral problems.         Personal History     Past Medical History:   Diagnosis Date    Acid reflux disease     Arthritis     Atrial fibrillation     Back pain     Bowel obstruction     Breast cancer     Carpal tunnel syndrome     Constipation     Depression     Diabetes mellitus     Disease of thyroid gland     Dyslipidemia     Fecal incontinence     GERD (gastroesophageal reflux disease)     Glaucoma     left eye    HBP (high blood pressure)     Hearing impaired     hearing aides    Hiatal hernia     High cholesterol     History of transfusion     History of tuberculosis     IN 1980'S WAS TREATED    Hypertension     Hypomagnesemia     Kienbock's disease     Kienböck's disease, right     KIENBOCK'S AVASCULAR NECROSIS OF LUNATE, ADULT RIGHT    Nonrheumatic aortic valve stenosis 11/05/2020    OAB (overactive bladder)     Osteoporosis     Pneumonia     PONV (postoperative nausea and vomiting)     Positive PPD     RLS (restless legs syndrome)     Sleep apnea     NO MACHINE    Stage 3 chronic kidney disease     Status post bilateral mastectomy with sentinel node biopsy and right axillary node dissection 01/12/2022    Tailbone injury     fracture    Thyroid disease     Urinary incontinence     wears pads    Urinary retention        Past Surgical History:   Procedure Laterality Date    ANKLE TENDON REPAIR      BACK SURGERY  12/21/2018-3/2019    LUMBAR--BROKEN DISC, CLEANED OUT--HAS 2ND PROCEDURE TO FINISH REMOVING    BLADDER REPAIR  09/2014    bladder sling with cysto    CARPAL TUNNEL RELEASE      CATARACT EXTRACTION Bilateral     COLONOSCOPY      COLONOSCOPY N/A 11/17/2022    Procedure: COLONOSCOPY with biopsy;  Surgeon: Leo Alvares MD;  Location: [facility] ENDOSCOPY;  Service: Gastroenterology;  Laterality: N/A;  diverticulosis    CYSTOSCOPY       ENDOSCOPY      ENDOSCOPY N/A 11/17/2022    Procedure: ESOPHAGOGASTRODUODENOSCOPY with biopsy and snare;  Surgeon: Leo Alvares MD;  Location: [facility] ENDOSCOPY;  Service: Gastroenterology;  Laterality: N/A;  gastric fundus polyp    HEMORRHOIDECTOMY      HYSTERECTOMY      INCISION AND DRAINAGE OF WOUND      on back     JOINT REPLACEMENT      KNEE ARTHROPLASTY Right     LEG SURGERY  06/13/2019    LUMBAR DISCECTOMY Left 12/21/2018    Procedure: LEFT L4-5 MICRO DISCECTOMY;  Surgeon: Adam Coleman DO;  Location: [facility] OR;  Service: Orthopedic Spine    LUMBAR DISCECTOMY Left 03/29/2019    Procedure: LEFT L4-5 REVISION OF MICRODISCECTOMY;  Surgeon: Adam Coleman DO;  Location: [facility] OR;  Service: Orthopedic Spine    LUMBAR FUSION      MASTECTOMY Bilateral 01/11/2022    Procedure: BREAST MASTECTOMY WITH SENTINEL NODE BIOPSY AND possible  AXILLARY NODE DISSECTION;  Surgeon: Lety Tena MD;  Location: [facility] OR OSC;  Service: General;  Laterality: Bilateral;    MASTECTOMY Bilateral     TENDON RELEASE  09/2018    TOTAL KNEE ARTHROPLASTY Left 06/13/2019    Procedure: LEFT KNEE REVISION;  Surgeon: Malick Costa II, MD;  Location: [facility] OR;  Service: Orthopedics    TOTAL KNEE ARTHROPLASTY REVISION Left     x2    TOTAL KNEE ARTHROPLASTY REVISION Left 02/13/2018    Procedure: LEFT TOTAL KNEE ARTHROPLASTY REVISION;  Surgeon: Jair Fajardo MD;  Location: [facility] OR;  Service:     US GUIDED CYST ASPIRATION BREAST N/A 03/24/2022    VERTEBROPLASTY         Family History: family history includes Breast cancer in her mother; Diabetes in her father and sister; Tuberculosis in her mother. Otherwise pertinent FHx was reviewed and not pertinent to current issue.    Social History:  reports that she has never smoked. She has never used smokeless tobacco. She reports that she does not drink alcohol and does not use drugs.    Home Medications:  Calcium Carb-Cholecalciferol, DULoxetine,  Diclofenac Sodium, Mirabegron ER, True Metrix Meter, Venlafaxine HCl, apixaban, atorvastatin, azelastine, bimatoprost, brimonidine-timolol, cyclobenzaprine, dilTIAZem CD, empagliflozin, furosemide, glucose blood, hydrOXYzine, letrozole, levothyroxine, losartan, meclizine, metoprolol succinate XL, multivitamin with minerals, omeprazole, oxybutynin XL, pramipexole, spironolactone, tiZANidine, triamcinolone, and vitamin C    Allergies:  Allergies   Allergen Reactions    Egg-Derived Products Swelling    Nsaids Unknown - High Severity    Sertraline Unknown - High Severity     Bleeding in stomach    Tetanus Toxoid Swelling    Tetanus Toxoids Swelling    Metformin Diarrhea and Unknown - Low Severity    Tetanus-Diphtheria Toxoids Td Unknown - Low Severity    Tuberculin Ppd Unknown - Low Severity       Objective   Objective     Vitals:   Temp:  [98.2 °F (36.8 °C)] 98.2 °F (36.8 °C)  Heart Rate:  [136] 136  Resp:  [18] 18  BP: (93)/(69) 93/69    Physical Exam  Constitutional:       Appearance: Normal appearance. She is normal weight.   HENT:      Head: Normocephalic.      Mouth/Throat:      Mouth: Mucous membranes are moist.   Eyes:      Pupils: Pupils are equal, round, and reactive to light.   Cardiovascular:      Rate and Rhythm: Normal rate and regular rhythm. Rhythm irregular.      Pulses: Normal pulses.      Heart sounds: No murmur heard.     No friction rub. No gallop.   Abdominal:      General: Abdomen is flat.      Palpations: Abdomen is soft.   Musculoskeletal:         General: No swelling or tenderness.   Skin:     General: Skin is warm and dry.      Capillary Refill: Capillary refill takes less than 2 seconds.   Neurological:      General: No focal deficit present.      Mental Status: She is alert.   Psychiatric:         Mood and Affect: Mood normal.         Behavior: Behavior normal.         Result Review   Result Review:  I have personally reviewed the results from the time of this admission to 8/24/2024 17:01  EDT and agree with these findings:  [x]  Laboratory list / accordion  []  Microbiology  []  Radiology  []  EKG/Telemetry   [x]  Cardiology/Vascular   []  Pathology  [x]  Old records  []  Other:  Most notable findings include: Unremarkable physical exam      Assessment & Plan  Assessment / Plan     Brief Patient Summary:  Lilia Becerra is a 83 y.o. female who has been transferred from [facility] when she presented there for management of atrial fibrillation and acute cystitis.  She was noted to have volatile heart rate including after 10 seconds pauses on telemetry.  She has been transferred here for cardiology consultation since [facility] does not have cardiology coverage over the weekend    Active Hospital Problems:  Active Hospital Problems    Diagnosis     **A-fib    Atrial fibrillation with pauses  LUAN  Elevated proBNP  Leukocytosis  History of diabetes  Urinary tract infection  Hypomagnesemia    Plan:   Admit patient to [facility] telemetry.  Continue monitoring ordered telemetry.  Telemetry strip from outside facility reviewed and confirmed pause.  I could count up to about 7-second pause   Cardiology consulted and following.  Requested patient be made n.p.o. after midnight in preparation for OMAR cardioversion, heparin GTT for anticoagulation, and TTE ordered  Continue Rocephin for acute cystitis.  Follow-up UA/urine culture, procalcitonin  Follow-up lipid profile, A1c, for risk stratification  Replete magnesium and optimize other electrolytes    VTE Prophylaxis:  Pharmacologic & mechanical VTE prophylaxis orders are present.        CODE STATUS:    Code Status (Patient has no pulse and is not breathing): CPR (Attempt to Resuscitate)  Medical Interventions (Patient has pulse or is breathing): Full Support    Admission Status:  I believe this patient meets inpatient status.    Darshan Greenwood MD    Electronically signed by Darshan Greenwood MD at 08/24/24 5533       Emergency Department Notes    No  notes of this type exist for this encounter.       Physician Progress Notes (last 24 hours)  Notes from 24 1159 through 24 1159   No notes of this type exist for this encounter.          Consult Notes (last 24 hours)        Aníbal Abraham MD at 24 1654        Consult Orders    1. Inpatient Cardiology Consult [[id number]] ordered by Darshan Greenwood MD at 24 1620    2. Inpatient Cardiology Consult [[id number]] ordered by Gianfranco Barraza MD at 24 1113                  [facility]   CARDIOLOGY CONSULT NOTE    Patient Name: Lilia Becerra  : 1940  MRN: [id number]    Primary Care Physician:  Leo Lomas MD  Date of admission: 2024    Subjective   Subjective     Chief Complaint:  Chest pain, palpitation.    HPI:  Lilia Becerra is a 83 y.o. female with past medical history of atrial fibrillation on long-term anticoagulation, hypertension, hyperlipidemia, who presented at outside hospital the day prior with lightheadedness, dizziness and chest pain along with shortness of breath.  Patient was found to have A-fib with RVR.  There is some concern of urinary tract infection and dehydration.  On telemetry of outside hospital patient remained in atrial fibrillation, slowly became bradycardic and noted to have 9-second pause.  Troponin negative, patient was transferred to [facility] for further evaluation.  Patient at the time of interview states doing well, denies chest pain but endorses dizziness and dyspnea on exertion.  EKG showed atrial flutter with 2 to 1 AV block, patient takes Eliquis twice daily however states that few weeks back she stopped it for 1 week to have carotid intervention.  Vitals stable.  Patient follows up cardiologist at [facility] (Dr. Bell)  Review of Systems  Negative except as mentioned in HPI above.    Personal History     Past Medical History:   Diagnosis Date    Acid reflux disease     Arthritis     Atrial  fibrillation     Back pain     Bowel obstruction     Breast cancer     Carpal tunnel syndrome     Constipation     Depression     Diabetes mellitus     Disease of thyroid gland     Dyslipidemia     Fecal incontinence     GERD (gastroesophageal reflux disease)     Glaucoma     left eye    HBP (high blood pressure)     Hearing impaired     hearing aides    Hiatal hernia     High cholesterol     History of transfusion     History of tuberculosis     IN 1980'S WAS TREATED    Hypertension     Hypomagnesemia     Kienbock's disease     Kienböck's disease, right     KIENBOCK'S AVASCULAR NECROSIS OF LUNATE, ADULT RIGHT    Nonrheumatic aortic valve stenosis 11/05/2020    OAB (overactive bladder)     Osteoporosis     Pneumonia     PONV (postoperative nausea and vomiting)     Positive PPD     RLS (restless legs syndrome)     Sleep apnea     NO MACHINE    Stage 3 chronic kidney disease     Status post bilateral mastectomy with sentinel node biopsy and right axillary node dissection 01/12/2022    Tailbone injury     fracture    Thyroid disease     Urinary incontinence     wears pads    Urinary retention        Past Surgical History:   Procedure Laterality Date    ANKLE TENDON REPAIR      BACK SURGERY  12/21/2018-3/2019    LUMBAR--BROKEN DISC, CLEANED OUT--HAS 2ND PROCEDURE TO FINISH REMOVING    BLADDER REPAIR  09/2014    bladder sling with cysto    CARPAL TUNNEL RELEASE      CATARACT EXTRACTION Bilateral     COLONOSCOPY      COLONOSCOPY N/A 11/17/2022    Procedure: COLONOSCOPY with biopsy;  Surgeon: Leo Alvares MD;  Location: [facility] ENDOSCOPY;  Service: Gastroenterology;  Laterality: N/A;  diverticulosis    CYSTOSCOPY      ENDOSCOPY      ENDOSCOPY N/A 11/17/2022    Procedure: ESOPHAGOGASTRODUODENOSCOPY with biopsy and snare;  Surgeon: Leo Alvares MD;  Location: [facility] ENDOSCOPY;  Service: Gastroenterology;  Laterality: N/A;  gastric fundus polyp    HEMORRHOIDECTOMY      HYSTERECTOMY      INCISION AND DRAINAGE OF  WOUND      on back     JOINT REPLACEMENT      KNEE ARTHROPLASTY Right     LEG SURGERY  06/13/2019    LUMBAR DISCECTOMY Left 12/21/2018    Procedure: LEFT L4-5 MICRO DISCECTOMY;  Surgeon: Adam Coleman DO;  Location: [facility] OR;  Service: Orthopedic Spine    LUMBAR DISCECTOMY Left 03/29/2019    Procedure: LEFT L4-5 REVISION OF MICRODISCECTOMY;  Surgeon: Adam Coleman DO;  Location: [facility] OR;  Service: Orthopedic Spine    LUMBAR FUSION      MASTECTOMY Bilateral 01/11/2022    Procedure: BREAST MASTECTOMY WITH SENTINEL NODE BIOPSY AND possible  AXILLARY NODE DISSECTION;  Surgeon: Lety Tena MD;  Location: [facility];  Service: General;  Laterality: Bilateral;    MASTECTOMY Bilateral     TENDON RELEASE  09/2018    TOTAL KNEE ARTHROPLASTY Left 06/13/2019    Procedure: LEFT KNEE REVISION;  Surgeon: Malick Costa II, MD;  Location: [facility] OR;  Service: Orthopedics    TOTAL KNEE ARTHROPLASTY REVISION Left     x2    TOTAL KNEE ARTHROPLASTY REVISION Left 02/13/2018    Procedure: LEFT TOTAL KNEE ARTHROPLASTY REVISION;  Surgeon: Jair Fajardo MD;  Location: [facility];  Service:     US GUIDED CYST ASPIRATION BREAST N/A 03/24/2022    VERTEBROPLASTY         Family History: family history includes Breast cancer in her mother; Diabetes in her father and sister; Tuberculosis in her mother. Otherwise pertinent FHx was reviewed and not pertinent to current issue.    Social History:  reports that she has never smoked. She has never used smokeless tobacco. She reports that she does not drink alcohol and does not use drugs.    Home Medications:  Calcium Carb-Cholecalciferol, DULoxetine, Diclofenac Sodium, Mirabegron ER, True Metrix Meter, Venlafaxine HCl, apixaban, atorvastatin, azelastine, bimatoprost, brimonidine-timolol, cyclobenzaprine, dilTIAZem CD, empagliflozin, furosemide, glucose blood, hydrOXYzine, letrozole, levothyroxine, losartan, meclizine, metoprolol succinate XL,  multivitamin with minerals, omeprazole, oxybutynin XL, pramipexole, spironolactone, tiZANidine, triamcinolone, and vitamin C    Allergies:  Allergies   Allergen Reactions    Egg-Derived Products Swelling    Nsaids Unknown - High Severity    Sertraline Unknown - High Severity     Bleeding in stomach    Tetanus Toxoid Swelling    Tetanus Toxoids Swelling    Metformin Diarrhea and Unknown - Low Severity    Tetanus-Diphtheria Toxoids Td Unknown - Low Severity    Tuberculin Ppd Unknown - Low Severity       Objective   Objective     Vitals:   Temp:  [98.2 °F (36.8 °C)] 98.2 °F (36.8 °C)  Heart Rate:  [136] 136  Resp:  [18] 18  BP: (93)/(69) 93/69  Physical Exam    Constitutional: Awake, alert   Eyes: PERRLA, sclerae anicteric, no conjunctival injection   HENT: NCAT, mucous membranes moist   Neck: Supple, no thyromegaly, no lymphadenopathy, trachea midline, No JVD, No carotid bruit   Respiratory: Clear to auscultation bilaterally, nonlabored respirations    Cardiovascular: RRR, no murmurs, rubs, or gallops appreciated, palpable pedal pulses bilaterally   Gastrointestinal: Positive bowel sounds, soft, nontender, nondistended   Musculoskeletal: No ankle edema, no clubbing or cyanosis to extremities   Psychiatric: Appropriate affect, cooperative   Neurologic: Oriented x 3, strength symmetric in all extremities, Cranial Nerves grossly intact, speech is clear   Skin: Warm, Dry, No rashes, No palor    Result Review    Result Review:  I have personally reviewed the results from the time of this admission to 8/24/2024 17:02 EDT and agree with these findings:  [x]  Laboratory  []  Microbiology  []  Radiology  [x]  EKG/Telemetry   [x]  Cardiology/Vascular   []  Pathology  [x]  Old records  []  Other:  Most notable findings include: Atrial flutter with 2-1 AV block    ECG 12 Lead Rhythm Change   Preliminary Result   HEART CWZP=958  bpm   RR Ohltnnwb=168  ms   HI Fmuslkzv=242  ms   P Horizontal Axis=178  deg   P Front Axis=-90  deg    QRSD Interval=77  ms   QT Fhhgrjog=813  ms   GYyV=988  ms   QRS Axis=-5  deg   T Wave Axis=22  deg   - ABNORMAL ECG -   Sinus or ectopic atrial tachycardia   Abnormal R-wave progression, late transition   Probable LVH with secondary repol abnrm   Date and Time of Study:2024-08-24 14:33:57        Results for orders placed in visit on 03/08/22    Adult Transthoracic Echo Complete W/ Cont if Necessary Per Protocol    Interpretation Summary  · Estimated left ventricular EF was in agreement with the calculated left ventricular EF. Left ventricular ejection fraction appears to be 51 - 55%. Left ventricular systolic function is low normal.  · Left ventricular diastolic function is consistent with (grade I) impaired relaxation.  · Moderate aortic valve stenosis is present. Aortic valve area is 1.2 cm2.  · Estimated right ventricular systolic pressure from tricuspid regurgitation is normal (<35 mmHg).    No results found for this or any previous visit.    Lab Results   Component Value Date    TROPONINI 0.03 09/13/2021    TROPONINT 35 (H) 08/24/2024           ASCVD SCORE  The ASCVD Risk score (Ted DUPREE, et al., 2019) failed to calculate for the following reasons:    The 2019 ASCVD risk score is only valid for ages 40 to 79        Assessment & Plan   Assessment / Plan     Brief Patient Summary:  Lilia Becerra is a 83 y.o. female who presents with shortness of breath, dizziness and was found to have atrial flutter with 2-1 AV block as well as 9-second pause in outside hospital.    Active Hospital Problems:  Active Hospital Problems    Diagnosis     **A-fib     Atrial flutter with rapid ventricular response     Paroxysmal atrial fibrillation     Hyperlipidemia     Aortic stenosis, moderate     Hypothyroidism     HBP (high blood pressure)      Plan:   Patient with paroxysmal atrial fibrillation with elevated [id number] score on anticoagulation with Eliquis.  Presented with atrial flutter with 2-1 AV block, with 9-second  reported pause.  Patient also has reported history of moderate aortic stenosis.  Echo from 2023 shows LVEF of 55% with aortic valve area of 1.1 cm2.  Will obtain echocardiogram.  Will schedule patient for OMAR with cardioversion tomorrow morning.  Will keep patient n.p.o. after midnight.  Continue beta-blocker.  Patient will need A-fib/flutter ablation as outpatient.  Patient will be following up with me in 4 weeks after discharge.      CODE STATUS:    Code Status (Patient has no pulse and is not breathing): CPR (Attempt to Resuscitate)  Medical Interventions (Patient has pulse or is breathing): Full Support       Aníbal Abraham MD, FACC  Aníbal Abraham MD      Electronically signed by Aníbal Abraham MD at 08/24/24 1702         [facility] ,  Ph [phone]  F [phone]

## 2024-08-25 NOTE — PROGRESS NOTES
CARDIOLOY  INPATIENT PROGRESS NOTE        PATIENT IDENTIFICATION:  Name:  Lilia Becerra        MRN:  [id number]  83 y.o.  female              SUBJECTIVE:   Patient seen in the Cath Lab for OMAR/cardioversion.  Patient underwent successful OMAR cardioversion with conversion to sinus rhythm.  Patient feeling fine.      OBJECTIVE:  Vitals:    08/25/24 1130 08/25/24 1135 08/25/24 1138 08/25/24 1140   BP: 107/86 117/93 141/69 141/69   BP Location:       Patient Position:       Pulse: 111 (!) 122 81 89   Resp: 15 16 14 15   Temp:       TempSrc:       SpO2: 99% 100%  100%   Weight:       Height:               Body mass index is 28.03 kg/m².    Intake/Output Summary (Last 24 hours) at 8/25/2024 1607  Last data filed at 8/24/2024 2255  Gross per 24 hour   Intake --   Output 1000 ml   Net -1000 ml         Physical Exam  General : Alert, awake, no acute distress  Neck : Supple, no carotid bruit, no jugular venous distention  CVS : Irregular rate and rhythm, 3/6 systolic murmur  Lungs: Clear to auscultation bilaterally, no crackles or rhonchi  Abdomen: Soft, nontender, bowel sounds heard in all 4 quadrants  Extremities: Warm, well-perfused, no pedal edema        Allergies   Allergen Reactions    Egg-Derived Products Swelling    Nsaids Unknown - High Severity    Sertraline Unknown - High Severity     Bleeding in stomach    Tetanus Toxoid Swelling    Tetanus Toxoids Swelling    Metformin Diarrhea and Unknown - Low Severity    Tetanus-Diphtheria Toxoids Td Unknown - Low Severity    Tuberculin Ppd Unknown - Low Severity     Scheduled meds:  !Patient Home Medications Stored in Pharmacy, , Does not apply, BID  apixaban, 5 mg, Oral, Q12H  atorvastatin, 10 mg, Oral, Daily  [START ON 8/26/2024] DULoxetine, 60 mg, Oral, Daily  empagliflozin, 25 mg, Oral, Daily  insulin lispro, 2-7 Units, Subcutaneous, 4x Daily AC & at Bedtime  letrozole, 2.5 mg, Oral, Daily  levothyroxine, 25 mcg, Oral, Q AM  linagliptin, 5 mg, Oral, Daily  metoprolol  "succinate XL, 25 mg, Oral, Q24H  [START ON 8/26/2024] pantoprazole, 40 mg, Oral, Q AM  pramipexole, 1.5 mg, Oral, Nightly  sodium chloride, 10 mL, Intravenous, Q12H  spironolactone, 25 mg, Oral, Daily      IV meds:                      Pharmacy Consult - Pharmacy to dose,         Data Review:  CBC          8/24/2024    15:27 8/25/2024    03:52   CBC   WBC 15.76  17.19    RBC 4.44  4.71    Hemoglobin 13.8  14.7    Hematocrit 41.7  44.6    MCV 93.9  94.7    MCH 31.1  31.2    MCHC 33.1  33.0    RDW 12.7  12.5    Platelets 226  225      CMP          10/22/2023    16:35 8/24/2024    15:27 8/24/2024    17:17 8/25/2024    03:52   CMP   Glucose  244  182  158    Glucose 152          BUN 32     35  35  31    Creatinine 1.6     1.53  1.47  1.48    EGFR  33.2  34.8  34.6    Sodium 140     136  137  139    Potassium 3.4     4.1  4.0  4.1    Chloride 97     99  101  103    Calcium 9.6     9.0  8.9  9.6    Albumin 4.2          Total Bilirubin 0.52          Alkaline Phosphatase 98          AST (SGOT) 15          ALT (SGPT) 6          BUN/Creatinine Ratio  22.9  23.8  20.9    Anion Gap 14     13.3  12.9  12.5       Details          This result is from an external source.              CARDIAC LABS:      Lab 08/24/24  1717 08/24/24  1527   PROBNP  --  3,715.0*   HSTROP T 35* 35*   PROTIME 14.0  --    INR 1.06  --         No results found for: \"DIGOXIN\"   No results found for: \"TSH\"  Results from last 7 days   Lab Units 08/25/24  0352   CHOLESTEROL mg/dL 146   HDL CHOL mg/dL 45     No results found for: \"POCTROP\"  Lab Results   Component Value Date    TROPONINT 35 (H) 08/24/2024   (  Lab Results   Component Value Date    MG 1.6 08/24/2024     Results for orders placed in visit on 03/08/22    Adult Transthoracic Echo Complete W/ Cont if Necessary Per Protocol    Interpretation Summary  · Estimated left ventricular EF was in agreement with the calculated left ventricular EF. Left ventricular ejection fraction appears to be 51 - 55%. " Left ventricular systolic function is low normal.  · Left ventricular diastolic function is consistent with (grade I) impaired relaxation.  · Moderate aortic valve stenosis is present. Aortic valve area is 1.2 cm2.  · Estimated right ventricular systolic pressure from tricuspid regurgitation is normal (<35 mmHg).           ASSESSMENT:    A-fib    Hypothyroidism    HBP (high blood pressure)    Aortic stenosis, moderate    Hyperlipidemia    Paroxysmal atrial fibrillation    Atrial flutter with rapid ventricular response        PLAN:  1.  Underwent successful OMAR/direct-current cardioversion for A-fib/flutter.  Patient currently in sinus rhythm with rate in 80s.  Patient states doing well.  Transthoracic echo showed preserved LVEF with mild to moderate aortic stenosis.  OMAR showed no visible intracardiac or valvular thrombus or vegetations.  2.  Continue Eliquis 5 mg twice daily, atorvastatin 10 mg daily, Jardiance 25 mg daily, levothyroxine home dose, spironolactone 25 mg daily.  Will increase metoprolol succinate to 50 mg daily from 25 mg daily.  3.  Can discontinue Cardizem home dose.  4.  Will start this patient on amiodarone 200 mg twice daily for 1 week followed by 200 mg once daily to be continued.  Patient will follow-up with me as outpatient in 4 to 6 weeks.  I will discuss with plans for A-fib and flutter ablation as outpatient.    Patient okay to be discharged from cardiac standpoint if doing stable.  Cardiology will be available as needed.  Thank you for the consult.        Aníbal Abraham MD, [facility]  8/25/2024    16:07 EDT       I spent 45 minutes caring for this patient on this date of service. This time includes time spent by me in the following activities:preparing for the visit, reviewing tests, obtaining and/or reviewing a separately obtained history, performing a medically appropriate examination and/or evaluation , counseling and educating the patient/family/caregiver, ordering medications, tests, or  procedures, referring and communicating with other health care professionals , documenting information in the medical record, independently interpreting results and communicating that information with the patient/family/caregiver, and care coordination.     This is an acute or chronic illness that poses a threat to life or bodily function. The above treatment plan involves a high risk of complications and/or mortality of patient management.    The patient was seen and examined. Work by the provider also included review and/or ordering of lab tests, review and/or ordering of radiology tests, review and/or ordering of medicine tests, discussion with other physicians or providers, independent review of data, obtaining old records, review/summation of old records, and/or other review.    I have reviewed the family history, social history, and past medical history for this patient. Previous information and data has been reviewed and updated as needed. I have reviewed and verified the chief complaint, history, and other documentation. The patient was interviewed and examined in the clinic and the chart reviewed. The previous observations, recommendations, and conclusions were reviewed including those of other providers.     The plan was discussed with the patient and/or family. The patient was given time to ask questions and these questions were answered. At the conclusion of their visit they had no additional questions or concerns and all questions were answered to their satisfaction.     Patient was given instructions and counseling regarding her condition or for health maintenance advice.

## 2024-08-25 NOTE — PLAN OF CARE
Goal Outcome Evaluation:  Plan of Care Reviewed With: patient        Progress: improving  Outcome Evaluation: Pt A&Ox4, VSS and no complaints of pain. Heparin running at 9 now. Next PTT dues at 0853.    Pt remains NPO. BG checks and coverage ordered per MD. Insulin given before bed.

## 2024-08-25 NOTE — PROGRESS NOTES
[facility]   Hospitalist Progress Note  Date: 2024  Patient Name: Lilia Becerra  : 1940  MRN: [id number]  Date of admission: 2024  Room/Bed: [facility]/      Subjective   Subjective     Chief Complaint: Palpitations     Summary:Lilia Becerra is a 83 y.o. female female with past medical history of atrial fibrillation that was admitted at [facility] for atrial fibrillation with RVR as well as acute cystitis. The patient was being treated for both conditions over there. She was noted to have very volatile heart rate and remained borderline hypotensive. Patient also complained of some chest tightness at outside facility. She was noted on telemetry to have episodes of pauses as high as 10-second pauses (however, on telemetry strip here, I could only see up to about 6 - 7 seconds pauses). Her labs at outside facility were significant for creatinine of 1.53, high sensitive troponin of 35, proBNP of 3715, white blood cell count of 15.76. Magnesium was 1.6, TSH/reflex T4 well within normal limits. She was thus transferred here because [facility] does not have a cardiology on-call over the weekend. On presentation here, the patient was hemodynamically stable, heart rate was in the 130s. Rhythm was atrial fibrillation. The patient was asymptomatic and did not complain of any chest pain or pressure.     Interval Followup:     Patient states she was taken down for a OMAR with cardioversion this morning and states she is currently feeling fine  Vitals are stable     Review of Systems    All systems reviewed and negative except for what is outlined above.      Objective   Objective     Vitals:   Temp:  [97.9 °F (36.6 °C)-98.2 °F (36.8 °C)] 97.9 °F (36.6 °C)  Heart Rate:  [107-143] 143  Resp:  [18] 18  BP: ()/(61-83) 113/83    Physical Exam   General: Awake, alert, NAD  HENT: NCAT, MMM  Eyes: pupils equal, no scleral icterus  Cardiovascular: RRR, no murmurs   Pulmonary: CTA bilaterally; no  wheezes; no conversational dyspnea  Gastrointestinal: S/ND/NT, +BS  Musculoskeletal: No gross deformities  Skin: No jaundice, no rash on exposed skin appreciated  Neuro: CN II through XII grossly intact; speech clear; no tremor  Psych: Mood and affect appropriate  : No Taveras catheter; no suprapubic tenderness    Result Review    Result Review:  I have personally reviewed these results:  [x]  Laboratory      Lab 08/25/24 0352 08/25/24 0037 08/24/24 1717 08/24/24  1527 08/24/24  0505 08/23/24  1853   WBC 17.19*  --   --  15.76*  --   --    HEMOGLOBIN 14.7  --   --  13.8  --   --    HEMATOCRIT 44.6  --   --  41.7  --   --    PLATELETS 225  --   --  226  --   --    NEUTROS ABS 11.92*  --   --   --   --   --    IMMATURE GRANS (ABS) 0.28*  --   --   --   --   --    LYMPHS ABS 3.73*  --   --   --   --   --    MONOS ABS 1.00*  --   --   --   --   --    EOS ABS 0.17  --   --   --   --   --    MCV 94.7  --   --  93.9  --   --    PROCALCITONIN  --   --   --  0.05 0.062 0.082   LACTATE  --   --  1.4  --   --   --    PROTIME  --   --  14.0  --   --   --    APTT  --  130.8* 26.8*  --   --   --          Lab 08/25/24 0352 08/24/24 1717 08/24/24  1527   SODIUM 139 137 136   POTASSIUM 4.1 4.0 4.1   CHLORIDE 103 101 99   CO2 23.5 23.1 23.7   ANION GAP 12.5 12.9 13.3   BUN 31* 35* 35*   CREATININE 1.48* 1.47* 1.53*   EGFR 34.6* 34.8* 33.2*   GLUCOSE 158* 182* 244*   CALCIUM 9.6 8.9 9.0   MAGNESIUM  --   --  1.6             Lab 08/24/24 1717 08/24/24  1527   PROBNP  --  3,715.0*   HSTROP T 35* 35*   PROTIME 14.0  --    INR 1.06  --          Lab 08/25/24  0352   CHOLESTEROL 146   LDL CHOL 83   HDL CHOL 45   TRIGLYCERIDES 96             Brief Urine Lab Results  (Last result in the past 365 days)        Color   Clarity   Blood   Leuk Est   Nitrite   Protein   CREAT   Urine HCG        08/24/24 2320 Yellow   Clear   Negative   Negative   Negative   Negative                 [x]  Microbiology   Microbiology Results (last 10 days)        ** No results found for the last 240 hours. **          [x]  Radiology  XR Chest 1 View    Result Date: 8/23/2024  No acute chest disease. Electronically Signed: Rex Aggarwal MD 2024/08/23 at 18:19 CDT Reading Location ID and State: [address] / [place] Tel [phone], Service support  [phone], Fax [phone]   []  EKG/Telemetry   []  Cardiology/Vascular   []  Pathology  []  Old records  []  Other:    Assessment & Plan   Assessment / Plan     Assessment:  Atrial fibrillation with pauses  LUAN  Elevated proBNP  Leukocytosis  History of diabetes  Urinary tract infection  Hypomagnesemia  Diabetes mellitus type 2    Plan:  Patient is status post OMAR with cardioversion today by cardiology  Patient remains on Rocephin for cystitis.  Follow-up on cultures resume home medication  Resume home medications but will discuss with cardiology all of her cardiac medication  Urinalysis appears fine. Will stop Rocephin for now  Repeat laboratory data in the morning  Continue patient on sliding scale insulin for diabetes       Discussed with RN.    VTE Prophylaxis:  Pharmacologic & mechanical VTE prophylaxis orders are present.        CODE STATUS:   Code Status (Patient has no pulse and is not breathing): CPR (Attempt to Resuscitate)  Medical Interventions (Patient has pulse or is breathing): Full Support      Electronically signed by Derek Romo DO, 8/25/2024, 08:55 EDT.

## 2024-08-26 ENCOUNTER — READMISSION MANAGEMENT (OUTPATIENT)
Dept: CALL CENTER | Facility: HOSPITAL | Age: 84
End: 2024-08-26
Payer: MEDICARE

## 2024-08-26 VITALS
HEART RATE: 80 BPM | OXYGEN SATURATION: 98 % | SYSTOLIC BLOOD PRESSURE: 111 MMHG | BODY MASS INDEX: 28.06 KG/M2 | HEIGHT: 65 IN | DIASTOLIC BLOOD PRESSURE: 66 MMHG | RESPIRATION RATE: 16 BRPM | WEIGHT: 168.43 LBS | TEMPERATURE: 98.1 F

## 2024-08-26 LAB
ANION GAP SERPL CALCULATED.3IONS-SCNC: 13.6 MMOL/L (ref 5–15)
BUN SERPL-MCNC: 31 MG/DL (ref 8–23)
BUN/CREAT SERPL: 20.4 (ref 7–25)
CALCIUM SPEC-SCNC: 9.5 MG/DL (ref 8.6–10.5)
CHLORIDE SERPL-SCNC: 99 MMOL/L (ref 98–107)
CO2 SERPL-SCNC: 22.4 MMOL/L (ref 22–29)
CREAT SERPL-MCNC: 1.52 MG/DL (ref 0.57–1)
EGFRCR SERPLBLD CKD-EPI 2021: 33.5 ML/MIN/1.73
GLUCOSE BLDC GLUCOMTR-MCNC: 146 MG/DL (ref 70–99)
GLUCOSE BLDC GLUCOMTR-MCNC: 260 MG/DL (ref 70–99)
GLUCOSE SERPL-MCNC: 137 MG/DL (ref 65–99)
MAGNESIUM SERPL-MCNC: 1.9 MG/DL (ref 1.6–2.4)
POTASSIUM SERPL-SCNC: 4.4 MMOL/L (ref 3.5–5.2)
SODIUM SERPL-SCNC: 135 MMOL/L (ref 136–145)
WHOLE BLOOD HOLD SPECIMEN: NORMAL

## 2024-08-26 PROCEDURE — 82948 REAGENT STRIP/BLOOD GLUCOSE: CPT

## 2024-08-26 PROCEDURE — 99239 HOSP IP/OBS DSCHRG MGMT >30: CPT | Performed by: INTERNAL MEDICINE

## 2024-08-26 PROCEDURE — 63710000001 INSULIN LISPRO (HUMAN) PER 5 UNITS: Performed by: PHYSICIAN ASSISTANT

## 2024-08-26 PROCEDURE — 80048 BASIC METABOLIC PNL TOTAL CA: CPT | Performed by: INTERNAL MEDICINE

## 2024-08-26 PROCEDURE — 83735 ASSAY OF MAGNESIUM: CPT | Performed by: INTERNAL MEDICINE

## 2024-08-26 PROCEDURE — 82948 REAGENT STRIP/BLOOD GLUCOSE: CPT | Performed by: PHYSICIAN ASSISTANT

## 2024-08-26 RX ORDER — AMIODARONE HYDROCHLORIDE 200 MG/1
TABLET ORAL
Qty: 36 TABLET | Refills: 0 | Status: SHIPPED | OUTPATIENT
Start: 2024-08-26 | End: 2024-09-25

## 2024-08-26 RX ORDER — METOPROLOL SUCCINATE 50 MG/1
50 TABLET, EXTENDED RELEASE ORAL
Qty: 30 TABLET | Refills: 0 | Status: SHIPPED | OUTPATIENT
Start: 2024-08-27 | End: 2024-09-26

## 2024-08-26 RX ORDER — FUROSEMIDE 40 MG
40 TABLET ORAL DAILY
Start: 2024-08-26

## 2024-08-26 RX ADMIN — LINAGLIPTIN 5 MG: 5 TABLET, FILM COATED ORAL at 08:10

## 2024-08-26 RX ADMIN — INSULIN LISPRO 4 UNITS: 100 INJECTION, SOLUTION INTRAVENOUS; SUBCUTANEOUS at 11:43

## 2024-08-26 RX ADMIN — PANTOPRAZOLE SODIUM 40 MG: 40 TABLET, DELAYED RELEASE ORAL at 05:57

## 2024-08-26 RX ADMIN — SPIRONOLACTONE 25 MG: 25 TABLET ORAL at 08:08

## 2024-08-26 RX ADMIN — METOPROLOL SUCCINATE 50 MG: 50 TABLET, EXTENDED RELEASE ORAL at 08:09

## 2024-08-26 RX ADMIN — ATORVASTATIN CALCIUM 10 MG: 10 TABLET, FILM COATED ORAL at 08:08

## 2024-08-26 RX ADMIN — AMIODARONE HYDROCHLORIDE 200 MG: 200 TABLET ORAL at 08:10

## 2024-08-26 RX ADMIN — LETROZOLE 2.5 MG: 2.5 TABLET ORAL at 08:10

## 2024-08-26 RX ADMIN — Medication 10 ML: at 08:12

## 2024-08-26 RX ADMIN — APIXABAN 5 MG: 5 TABLET, FILM COATED ORAL at 08:14

## 2024-08-26 RX ADMIN — EMPAGLIFLOZIN 25 MG: 10 TABLET, FILM COATED ORAL at 08:11

## 2024-08-26 RX ADMIN — LEVOTHYROXINE SODIUM 25 MCG: 0.03 TABLET ORAL at 05:57

## 2024-08-26 RX ADMIN — DULOXETINE HYDROCHLORIDE 60 MG: 30 CAPSULE, DELAYED RELEASE ORAL at 08:09

## 2024-08-26 NOTE — DISCHARGE SUMMARY
[facility]         HOSPITALIST  DISCHARGE SUMMARY    Patient Name: Lilia Becerra  : 1940  MRN: [id number]    Date of Admission: 2024  Date of Discharge:  2024    Primary Care Physician: Leo Lomas MD    Consults       Date and Time Order Name Status Description    2024  4:20 PM Inpatient Cardiology Consult Completed     2024  2:25 PM Inpatient Cardiology Consult Completed             Active and Resolved Hospital Problems:  Active Hospital Problems    Diagnosis POA    **A-fib [I48.91] Yes    Atrial flutter with rapid ventricular response [I48.92] Unknown    Paroxysmal atrial fibrillation [I48.0] Yes    Hyperlipidemia [E78.5] Yes    Aortic stenosis, moderate [I35.0] Yes    Hypothyroidism [E03.9] Yes    HBP (high blood pressure) [I10] Yes      Resolved Hospital Problems   No resolved problems to display.       Hospital Course     Hospital Course:  Lilia Becerra is a 83 y.o. female  with past medical history of atrial fibrillation that was admitted at [facility] for atrial fibrillation with RVR as well as acute cystitis. The patient was being treated for both conditions over there. She was noted to have very volatile heart rate and remained borderline hypotensive. Patient also complained of some chest tightness at outside facility. She was noted on telemetry to have episodes of pauses as high as 10-second pauses (however, on telemetry strip here, I could only see up to about 6 - 7 seconds pauses). Her labs at outside facility were significant for creatinine of 1.53, high sensitive troponin of 35, proBNP of 3715, white blood cell count of 15.76. Magnesium was 1.6, TSH/reflex T4 well within normal limits. She was thus transferred here because [facility] does not have a cardiology on-call over the weekend. On presentation here, the patient was hemodynamically stable, heart rate was in the 130s. Rhythm was atrial fibrillation. The patient was  asymptomatic and did not complain of any chest pain or pressure.  Patient was seen by cardiology patient was scheduled for a OMAR with cardioversion.  Patient tolerated the procedure well.  Patient's beta-blocker was increased.  Patient's Cardizem was discontinued.  Patient was started on amiodarone.  Patient's Aldactone was discontinued.  Cardiology states that patient will need atrial fibrillation/flutter ablation as an outpatient.  Patient is to continue on her Eliquis.  Patient will be discharged home today in stable condition with close outpatient follow-up with cardiology    DISCHARGE Follow Up Recommendations for labs and diagnostics:   Follow-up with primary care in 1 week  Follow-up with cardiology as directed      Day of Discharge     Vital Signs:  Temp:  [97.2 °F (36.2 °C)-98.1 °F (36.7 °C)] 98.1 °F (36.7 °C)  Heart Rate:  [] 80  Resp:  [10-17] 16  BP: (104-141)/() 111/66  Physical Exam:   General: Awake, alert, NAD  HENT: NCAT, MMM  Eyes: pupils equal, no scleral icterus  Cardiovascular: RRR, no murmurs   Pulmonary: CTA bilaterally; no wheezes; no conversational dyspnea  Gastrointestinal: S/ND/NT, +BS  Musculoskeletal: No gross deformities  Skin: No jaundice, no rash on exposed skin appreciated  Neuro: CN II through XII grossly intact; speech clear; no tremor  Psych: Mood and affect appropriate  : No Taveras catheter; no suprapubic tenderness      Discharge Details        Discharge Medications        New Medications        Instructions Start Date   amiodarone 200 MG tablet  Commonly known as: PACERONE   Take 1 tablet by mouth Every 12 (Twelve) Hours for 6 days, THEN 1 tablet Daily for 24 days.   Start Date: August 26, 2024            Changes to Medications        Instructions Start Date   furosemide 40 MG tablet  Commonly known as: Lasix  What changed:   how much to take  how to take this  when to take this  additional instructions   40 mg, Oral, Daily      metoprolol succinate XL 50 MG 24 hr  tablet  Commonly known as: TOPROL-XL  What changed:   medication strength  how much to take  when to take this   50 mg, Oral, Every 24 Hours Scheduled   Start Date: August 27, 2024            Continue These Medications        Instructions Start Date   apixaban 5 MG tablet tablet  Commonly known as: ELIQUIS   5 mg, Oral, Every 12 Hours Scheduled      atorvastatin 10 MG tablet  Commonly known as: LIPITOR   10 mg, Oral, Every Night at Bedtime      DULoxetine 60 MG capsule  Commonly known as: CYMBALTA   60 mg, Oral, Daily      empagliflozin 25 MG tablet tablet  Commonly known as: JARDIANCE   25 mg, Oral, Daily      Januvia 50 MG tablet  Generic drug: SITagliptin   1 tablet, Oral, Daily      letrozole 2.5 MG tablet  Commonly known as: FEMARA   2.5 mg, Oral, Daily      levothyroxine 25 MCG tablet  Commonly known as: SYNTHROID, LEVOTHROID   25 mcg, Oral, Daily, .      losartan 25 MG tablet  Commonly known as: COZAAR   25 mg, Oral, Daily      Mirabegron ER 50 MG tablet sustained-release 24 hour 24 hr tablet  Commonly known as: MYRBETRIQ   50 mg, Oral, Daily      multivitamin with minerals tablet tablet   1 capsule, Oral, Daily      omeprazole 20 MG capsule  Commonly known as: priLOSEC   20 mg, Oral, Daily      pramipexole 1.5 MG tablet  Commonly known as: MIRAPEX   1.5 mg, Oral, Nightly             Stop These Medications      dilTIAZem  MG 24 hr capsule  Commonly known as: CARDIZEM CD     spironolactone 25 MG tablet  Commonly known as: ALDACTONE            ASK your doctor about these medications        Instructions Start Date   oxybutynin 5 MG tablet  Commonly known as: DITROPAN  Ask about: Which instructions should I use?   1 tablet, Oral, Daily               Allergies   Allergen Reactions    Egg-Derived Products Swelling    Nsaids Unknown - High Severity    Sertraline Unknown - High Severity     Bleeding in stomach    Tetanus Toxoid Swelling    Tetanus Toxoids Swelling    Metformin Diarrhea and Unknown - Low  Severity    Tetanus-Diphtheria Toxoids Td Unknown - Low Severity    Tuberculin Ppd Unknown - Low Severity       Discharge Disposition:  Home or Self Care    Diet:  Hospital:  Diet Order   Procedures    Diet: Diabetic; Consistent Carbohydrate; Fluid Consistency: Thin (IDDSI 0)       Discharge Activity: as tolerated       CODE STATUS:  Code Status and Medical Interventions: CPR (Attempt to Resuscitate); Full Support   Ordered at: 08/24/24 1620     Code Status (Patient has no pulse and is not breathing):    CPR (Attempt to Resuscitate)     Medical Interventions (Patient has pulse or is breathing):    Full Support         Future Appointments   Date Time Provider Department Center   9/27/2024 10:00 AM Ap Brooks PA [facility]       Additional Instructions for the Follow-ups that You Need to Schedule       Discharge Follow-up with PCP   As directed       Currently Documented PCP:    Leo Lomas MD    PCP Phone Number:    [phone]     Follow Up Details: in 1 week        Discharge Follow-up with Specified Provider: Dr. Abraham; 1 Week   As directed      To: Dr. Abraham   Follow Up: 1 Week                Pertinent  and/or Most Recent Results     PROCEDURES:   Cardioversion     LAB RESULTS:      Lab 08/25/24  0828 08/25/24  0352 08/25/24  0037 08/24/24  1717 08/24/24  1527 08/24/24  0505 08/23/24  1853   WBC  --  17.19*  --   --  15.76*  --   --    HEMOGLOBIN  --  14.7  --   --  13.8  --   --    HEMATOCRIT  --  44.6  --   --  41.7  --   --    PLATELETS  --  225  --   --  226  --   --    NEUTROS ABS  --  11.92*  --   --   --   --   --    IMMATURE GRANS (ABS)  --  0.28*  --   --   --   --   --    LYMPHS ABS  --  3.73*  --   --   --   --   --    MONOS ABS  --  1.00*  --   --   --   --   --    EOS ABS  --  0.17  --   --   --   --   --    MCV  --  94.7  --   --  93.9  --   --    PROCALCITONIN  --   --   --   --  0.05 0.062 0.082   LACTATE  --   --   --  1.4  --   --   --    PROTIME  --   --   --   14.0  --   --   --    APTT 54.5*  --  130.8* 26.8*  --   --   --          Lab 08/26/24  0421 08/25/24  0352 08/24/24 1717 08/24/24  1527   SODIUM 135* 139 137 136   POTASSIUM 4.4 4.1 4.0 4.1   CHLORIDE 99 103 101 99   CO2 22.4 23.5 23.1 23.7   ANION GAP 13.6 12.5 12.9 13.3   BUN 31* 31* 35* 35*   CREATININE 1.52* 1.48* 1.47* 1.53*   EGFR 33.5* 34.6* 34.8* 33.2*   GLUCOSE 137* 158* 182* 244*   CALCIUM 9.5 9.6 8.9 9.0   MAGNESIUM 1.9  --   --  1.6   HEMOGLOBIN A1C  --   --   --  8.50*             Lab 08/24/24 1717 08/24/24  1527   PROBNP  --  3,715.0*   HSTROP T 35* 35*   PROTIME 14.0  --    INR 1.06  --          Lab 08/25/24  0352   CHOLESTEROL 146   LDL CHOL 83   HDL CHOL 45   TRIGLYCERIDES 96             Brief Urine Lab Results  (Last result in the past 365 days)        Color   Clarity   Blood   Leuk Est   Nitrite   Protein   CREAT   Urine HCG        08/24/24 2320 Yellow   Clear   Negative   Negative   Negative   Negative                 Microbiology Results (last 10 days)       Procedure Component Value - Date/Time    Blood Culture - Blood, Arm, Right [[id number]]  (Normal) Collected: 08/24/24 1717    Lab Status: Preliminary result Specimen: Blood from Arm, Right Updated: 08/25/24 1730     Blood Culture No growth at 24 hours    Blood Culture - Blood, Hand, Right [[id number]]  (Normal) Collected: 08/24/24 1717    Lab Status: Preliminary result Specimen: Blood from Hand, Right Updated: 08/25/24 1730     Blood Culture No growth at 24 hours            XR Chest 1 View    Result Date: 8/23/2024  No acute chest disease. Electronically Signed: Rex Aggarwal MD 2024/08/23 at 18:19 CDT Reading Location ID and State: [address] / [place] Tel [phone], Service support  [phone], Fax [phone]      Results for orders placed during the hospital encounter of 08/20/24    Duplex Carotid Ultrasound CAR    Interpretation Summary    Proximal right internal carotid artery plaque without significant stenosis.    Proximal left  internal carotid artery plaque without significant stenosis.    Moderate nonstenotic carotid bifurcation plaque bilaterally.    Antegrade right vertebral flow.    Antegrade left vertebral flow.      Results for orders placed during the hospital encounter of 08/20/24    Duplex Carotid Ultrasound CAR    Interpretation Summary    Proximal right internal carotid artery plaque without significant stenosis.    Proximal left internal carotid artery plaque without significant stenosis.    Moderate nonstenotic carotid bifurcation plaque bilaterally.    Antegrade right vertebral flow.    Antegrade left vertebral flow.      Results for orders placed during the hospital encounter of 08/24/24    Adult Transesophageal Echo (OMAR) W/ Cont if Necessary Per Protocol (Bedside)    Interpretation Summary    Left ventricular ejection fraction appears to be 51 - 55%.    Left ventricular wall thickness is consistent with hypertrophy.    Mild to moderate aortic valve stenosis is present.    No echocardiographic intracardiac mass or thrombus present in OMAR.      Labs Pending at Discharge:  Pending Labs       Order Current Status    Blood Culture - Blood, Arm, Right Preliminary result    Blood Culture - Blood, Hand, Right Preliminary result              Time spent on Discharge including face to face service:  more than 35 minutes    Electronically signed by Derek Romo DO, 08/26/24, 10:45 AM EDT.

## 2024-08-26 NOTE — CONSULTS
Nutrition Services    Patient Name: Lilia Beecrra  YOB: 1940  MRN: [id number]  Admission date: 8/24/2024      CLINICAL NUTRITION ASSESSMENT      Reason for Assessment  Physician Consult and diabetes education     H&P:  Past Medical History:   Diagnosis Date    Acid reflux disease     Arthritis     Atrial fibrillation     Back pain     Bowel obstruction     Breast cancer     Carpal tunnel syndrome     Constipation     Depression     Diabetes mellitus     Disease of thyroid gland     Dyslipidemia     Fecal incontinence     GERD (gastroesophageal reflux disease)     Glaucoma     left eye    HBP (high blood pressure)     Hearing impaired     hearing aides    Hiatal hernia     High cholesterol     History of transfusion     History of tuberculosis     IN 1980'S WAS TREATED    Hypertension     Hypomagnesemia     Kienbock's disease     Kienböck's disease, right     KIENBOCK'S AVASCULAR NECROSIS OF LUNATE, ADULT RIGHT    Nonrheumatic aortic valve stenosis 11/05/2020    OAB (overactive bladder)     Osteoporosis     Pneumonia     PONV (postoperative nausea and vomiting)     Positive PPD     RLS (restless legs syndrome)     Sleep apnea     NO MACHINE    Stage 3 chronic kidney disease     Status post bilateral mastectomy with sentinel node biopsy and right axillary node dissection 01/12/2022    Tailbone injury     fracture    Thyroid disease     Urinary incontinence     wears pads    Urinary retention         Current Problems:   Active Hospital Problems    Diagnosis     **A-fib     Atrial flutter with rapid ventricular response     Paroxysmal atrial fibrillation     Hyperlipidemia     Aortic stenosis, moderate     Hypothyroidism     HBP (high blood pressure)         Nutrition/Diet History         Narrative   Pt alert at time of the visit. Pt reports good appetite with consuming 100% of meals since admission. NKFA. Pt denies any N/V at this time. Pt admitted that she lost her bottom denture and request diet  "texture change. RD to change to soft to chew/chopped meat texture. Pt with  and A1C of 8.5.  \" Carbohydrate Counting For People With Diabetes\", \"Plate Method For Diabetes\" and \" Using Nutrition Labels: Carbohydrate\" handouts given and discussed wit pt. Pt voiced no questions at this time. RD to continue to follow up per protocol.      Anthropometrics        Current Height, Weight Height: 165.1 cm (65\")  Weight: 76.4 kg (168 lb 6.9 oz)   Current BMI Body mass index is 28.03 kg/m².   BMI Classification Overweight   % %   Adjusted Body Weight (ABW)    Weight Hx  Wt Readings from Last 30 Encounters:   08/25/24 1127 76.4 kg (168 lb 6.9 oz)   08/24/24 1700 76.4 kg (168 lb 6.9 oz)   08/06/24 1026 80.3 kg (177 lb)   11/10/23 1115 83 kg (183 lb)   11/07/23 1420 83.3 kg (183 lb 10.3 oz)   10/05/23 1128 85.3 kg (188 lb)   08/17/23 1140 82.7 kg (182 lb 6.4 oz)   07/27/23 1101 83 kg (183 lb)   06/22/23 1526 83.4 kg (183 lb 12.8 oz)   05/11/23 1057 82.1 kg (181 lb)   05/03/23 1034 82.2 kg (181 lb 3.5 oz)   01/16/23 1103 77.1 kg (170 lb)   11/29/22 1035 77.1 kg (170 lb)   11/17/22 0807 79 kg (174 lb 2.6 oz)   11/16/22 1410 76 kg (167 lb 8.8 oz)   09/14/22 1030 71.2 kg (157 lb)   08/18/22 0924 74.4 kg (164 lb 0.4 oz)   08/11/22 1041 74.4 kg (164 lb)   08/03/22 1025 74.4 kg (164 lb)   07/15/22 1026 77.1 kg (170 lb)   06/08/22 1120 82.1 kg (181 lb)   06/08/22 1039 82.1 kg (181 lb)   05/11/22 1023 82.1 kg (181 lb)   05/02/22 1112 82.4 kg (181 lb 9.6 oz)   04/27/22 1011 90 kg (198 lb 6.6 oz)   04/18/22 1019 89.4 kg (197 lb)   04/11/22 0954 89.4 kg (197 lb)   04/05/22 1051 89.5 kg (197 lb 5 oz)   04/04/22 0952 90.7 kg (200 lb)   03/24/22 1103 90.7 kg (200 lb)   03/17/22 1027 90.7 kg (200 lb)   03/10/22 0915 89.8 kg (198 lb)          Wt Change Observation Wt is trending down per EMR.      Estimated/Assessed Needs  Estimated Needs based on: Current Body Weight       Energy Requirements 25-30 kcal/kg   EST Needs (kcal/day) " "4129-1648       Protein Requirements 1.0-1.2 g/kg   EST Daily Needs (g/day) 76-92       Fluid Requirements 1 ml/kcal    Estimated Needs (mL/day) 1910-2292     Labs/Medications         Pertinent Labs Reviewed.   Results from last 7 days   Lab Units 08/26/24  0421 08/25/24  0352 08/24/24  1717   SODIUM mmol/L 135* 139 137   POTASSIUM mmol/L 4.4 4.1 4.0   CHLORIDE mmol/L 99 103 101   CO2 mmol/L 22.4 23.5 23.1   BUN mg/dL 31* 31* 35*   CREATININE mg/dL 1.52* 1.48* 1.47*   CALCIUM mg/dL 9.5 9.6 8.9   GLUCOSE mg/dL 137* 158* 182*     Results from last 7 days   Lab Units 08/26/24  0421 08/25/24  0352 08/24/24  1527   MAGNESIUM mg/dL 1.9  --  1.6   HEMOGLOBIN g/dL  --  14.7 13.8   HEMATOCRIT %  --  44.6 41.7   TRIGLYCERIDES mg/dL  --  96  --      No results found for: \"COVID19\"  Lab Results   Component Value Date    HGBA1C 8.50 (H) 08/24/2024         Pertinent Medications Reviewed.     Malnutrition Severity Assessment              Nutrition Diagnosis         Nutrition Dx Problem 1 Altered nutrition related lab values related to limited adherence to nutrition recommendations as evidenced by  BG of 260 and A1C of 8.5.      Nutrition Intervention           Current Nutrition Orders & Evaluation of Intake       Current PO Diet Diet: Diabetic; Consistent Carbohydrate; Fluid Consistency: Thin (IDDSI 0)   Supplement No active supplement orders           Nutrition Intervention/Prescription        Recommend to modify texture to soft to chew/chopped meat per pt request.   2.  continue current diet order.         Medical Nutrition Therapy/Nutrition Education          Learner     Readiness Patient  Acceptance     Method     Response Explanation  Verbalizes understanding     Monitor/Evaluation        Monitor Per protocol, PO intake, Pertinent labs, Weight, POC/GOC     Nutrition Discharge Plan         No nutrition discharge needs identified at this time     Electronically signed by:  Shelby Lopez RD  08/26/24 13:18 EDT   "

## 2024-08-26 NOTE — PLAN OF CARE
Goal Outcome Evaluation:  Plan of Care Reviewed With: patient        Progress: improving  Outcome Evaluation: Pt AAOx4, VSS and no complaints of pain. Pt NSR this shift.

## 2024-08-27 NOTE — OUTREACH NOTE
Prep Survey      Flowsheet Row Responses   [facility] facility patient discharged from? [facility]   Is LACE score < 7 ? No   Eligibility Readm Mgmt   Discharge diagnosis A-fib   Does the patient have one of the following disease processes/diagnoses(primary or secondary)? Other   Does the patient have Home health ordered? No   Is there a DME ordered? No   Prep survey completed? Yes            Viridiana RAINES - Registered Nurse

## 2024-08-29 ENCOUNTER — READMISSION MANAGEMENT (OUTPATIENT)
Dept: CALL CENTER | Facility: HOSPITAL | Age: 84
End: 2024-08-29
Payer: MEDICARE

## 2024-08-29 LAB
BACTERIA SPEC AEROBE CULT: NORMAL
BACTERIA SPEC AEROBE CULT: NORMAL

## 2024-08-29 NOTE — OUTREACH NOTE
Medical Week 1 Survey      Flowsheet Row Responses   [facility] patient discharged from? [facility]   Does the patient have one of the following disease processes/diagnoses(primary or secondary)? Other   Week 1 attempt successful? Yes   Call start time 1054   Call end time 1059   Discharge diagnosis A-fib   Meds reviewed with patient/caregiver? Yes   Is the patient having any side effects they believe may be caused by any medication additions or changes? No   Does the patient have all medications ordered at discharge? Yes   Is the patient taking all medications as directed (includes completed medication regime)? Yes   Does the patient have a primary care provider?  Yes   Does the patient have an appointment with their PCP within 7 days of discharge? Greater than 7 days   What is preventing the patient from scheduling follow up appointments within 7 days of discharge? Earlier appointment not available   Has the patient kept scheduled appointments due by today? N/A   Psychosocial issues? No   Did the patient receive a copy of their discharge instructions? Yes   Nursing interventions Reviewed instructions with patient   What is the patient's perception of their health status since discharge? Improving   Is the patient/caregiver able to teach back signs and symptoms related to disease process for when to call PCP? Yes   Is the patient/caregiver able to teach back signs and symptoms related to disease process for when to call 911? Yes   Is the patient/caregiver able to teach back the hierarchy of who to call/visit for symptoms/problems? PCP, Specialist, Home health nurse, Urgent Care, ED, 911 Yes   If the patient is a current smoker, are they able to teach back resources for cessation? Not a smoker   Week 1 call completed? Yes   Would this patient benefit from a Referral to Amb Social Work? No   Is the patient interested in additional calls from an ambulatory ? No   Wrap up additional comments pt doing  great. Stated she had the best care ever at [facility] and is going to [facility] this weekend to tell all her friends :)   Call end time 0231            RACHEL PERALES - Registered Nurse

## 2024-09-15 LAB
QT INTERVAL: 389 MS
QTC INTERVAL: 403 MS

## 2024-09-16 LAB
QT INTERVAL: 306 MS
QT INTERVAL: 335 MS
QTC INTERVAL: 410 MS
QTC INTERVAL: 461 MS

## 2024-09-17 ENCOUNTER — TELEPHONE (OUTPATIENT)
Dept: CARDIOLOGY | Facility: CLINIC | Age: 84
End: 2024-09-17

## 2024-09-17 NOTE — TELEPHONE ENCOUNTER
Name: ROSANNE    Relationship: SELF    Best Callback Number: [phone]    Incoming call to the Hub, requesting to  Reschedule their appointment on 9.17.24.     Per Hub workflow, further review is needed before the task can be completed.    Result of Call: Transferred to [facility] at the practice

## 2024-09-24 ENCOUNTER — OFFICE VISIT (OUTPATIENT)
Dept: CARDIOLOGY | Facility: CLINIC | Age: 84
End: 2024-09-24
Payer: MEDICARE

## 2024-09-24 VITALS
HEIGHT: 65 IN | SYSTOLIC BLOOD PRESSURE: 155 MMHG | HEART RATE: 54 BPM | BODY MASS INDEX: 29.32 KG/M2 | DIASTOLIC BLOOD PRESSURE: 71 MMHG | WEIGHT: 176 LBS

## 2024-09-24 DIAGNOSIS — I48.92 ATRIAL FLUTTER WITH RAPID VENTRICULAR RESPONSE: ICD-10-CM

## 2024-09-24 DIAGNOSIS — R53.83 TIREDNESS: ICD-10-CM

## 2024-09-24 DIAGNOSIS — I10 PRIMARY HYPERTENSION: ICD-10-CM

## 2024-09-24 DIAGNOSIS — E78.2 MIXED HYPERLIPIDEMIA: Primary | ICD-10-CM

## 2024-09-24 DIAGNOSIS — I35.0 AORTIC STENOSIS, MODERATE: ICD-10-CM

## 2024-09-24 DIAGNOSIS — I48.0 AF (PAROXYSMAL ATRIAL FIBRILLATION): ICD-10-CM

## 2024-09-24 PROCEDURE — 1159F MED LIST DOCD IN RCRD: CPT | Performed by: INTERNAL MEDICINE

## 2024-09-24 PROCEDURE — 99214 OFFICE O/P EST MOD 30 MIN: CPT | Performed by: INTERNAL MEDICINE

## 2024-09-24 PROCEDURE — 3078F DIAST BP <80 MM HG: CPT | Performed by: INTERNAL MEDICINE

## 2024-09-24 PROCEDURE — G2211 COMPLEX E/M VISIT ADD ON: HCPCS | Performed by: INTERNAL MEDICINE

## 2024-09-24 PROCEDURE — 1160F RVW MEDS BY RX/DR IN RCRD: CPT | Performed by: INTERNAL MEDICINE

## 2024-09-24 PROCEDURE — 3077F SYST BP >= 140 MM HG: CPT | Performed by: INTERNAL MEDICINE

## 2024-09-27 ENCOUNTER — TELEPHONE (OUTPATIENT)
Dept: UROLOGY | Facility: CLINIC | Age: 84
End: 2024-09-27

## 2024-09-27 PROBLEM — M25.552 LEFT HIP PAIN: Status: ACTIVE | Noted: 2024-09-27

## 2024-09-27 PROBLEM — M16.12 PRIMARY OSTEOARTHRITIS OF LEFT HIP: Status: ACTIVE | Noted: 2024-09-27

## 2024-09-27 NOTE — TELEPHONE ENCOUNTER
Provider: SUNDEEP LUCAS    Caller: ROSANNE BECERRA    Relationship to Patient: [facility]    Reason for Call: PATIENT IS REQUESTING SUNDEEP LUCAS CALL HER BACK. JUST WANTS TO TOUCH BASE WITH HER.    BEST NUMBER IS HER CELL [phone]

## 2024-10-03 ENCOUNTER — OFFICE VISIT (OUTPATIENT)
Dept: UROLOGY | Facility: CLINIC | Age: 84
End: 2024-10-03
Payer: MEDICARE

## 2024-10-03 ENCOUNTER — OFFICE VISIT (OUTPATIENT)
Dept: ORTHOPEDIC SURGERY | Facility: CLINIC | Age: 84
End: 2024-10-03
Payer: MEDICARE

## 2024-10-03 VITALS
DIASTOLIC BLOOD PRESSURE: 54 MMHG | BODY MASS INDEX: 27.82 KG/M2 | HEART RATE: 58 BPM | HEIGHT: 65 IN | WEIGHT: 167 LBS | OXYGEN SATURATION: 95 % | SYSTOLIC BLOOD PRESSURE: 129 MMHG

## 2024-10-03 VITALS
HEIGHT: 65 IN | TEMPERATURE: 98.2 F | WEIGHT: 178 LBS | SYSTOLIC BLOOD PRESSURE: 127 MMHG | DIASTOLIC BLOOD PRESSURE: 56 MMHG | BODY MASS INDEX: 29.66 KG/M2 | HEART RATE: 73 BPM

## 2024-10-03 DIAGNOSIS — Z87.448 HISTORY OF CHRONIC KIDNEY DISEASE: ICD-10-CM

## 2024-10-03 DIAGNOSIS — M25.552 LEFT HIP PAIN: Primary | ICD-10-CM

## 2024-10-03 DIAGNOSIS — N39.46 MIXED STRESS AND URGE URINARY INCONTINENCE: ICD-10-CM

## 2024-10-03 DIAGNOSIS — B37.31 VAGINAL YEAST INFECTION: ICD-10-CM

## 2024-10-03 DIAGNOSIS — Z98.890 HISTORY OF BLADDER REPAIR SURGERY: Chronic | ICD-10-CM

## 2024-10-03 DIAGNOSIS — R35.0 INCREASED URINARY FREQUENCY: Primary | ICD-10-CM

## 2024-10-03 DIAGNOSIS — M16.12 PRIMARY OSTEOARTHRITIS OF LEFT HIP: ICD-10-CM

## 2024-10-03 DIAGNOSIS — N32.81 OAB (OVERACTIVE BLADDER): ICD-10-CM

## 2024-10-03 LAB
BILIRUB BLD-MCNC: NEGATIVE MG/DL
CLARITY, POC: ABNORMAL
COLOR UR: YELLOW
EXPIRATION DATE: ABNORMAL
GLUCOSE UR STRIP-MCNC: ABNORMAL MG/DL
KETONES UR QL: NEGATIVE
LEUKOCYTE EST, POC: NEGATIVE
Lab: ABNORMAL
NITRITE UR-MCNC: NEGATIVE MG/ML
PH UR: 6 [PH] (ref 5–8)
PROT UR STRIP-MCNC: NEGATIVE MG/DL
RBC # UR STRIP: ABNORMAL /UL
SP GR UR: 1.01 (ref 1–1.03)
UROBILINOGEN UR QL: ABNORMAL

## 2024-10-03 PROCEDURE — 87086 URINE CULTURE/COLONY COUNT: CPT | Performed by: NURSE PRACTITIONER

## 2024-10-03 RX ORDER — ALENDRONATE SODIUM 35 MG/1
35 TABLET ORAL
COMMUNITY

## 2024-10-03 RX ORDER — METOPROLOL TARTRATE 25 MG/1
25 TABLET, FILM COATED ORAL 2 TIMES DAILY
COMMUNITY
End: 2024-10-03 | Stop reason: DRUGHIGH

## 2024-10-03 RX ORDER — METOPROLOL TARTRATE 50 MG
50 TABLET ORAL 2 TIMES DAILY
COMMUNITY

## 2024-10-03 NOTE — PROGRESS NOTES
"-Chief Complaint  OAB (overactive bladder) (Annual f/u)    Subjective          Lilia Becerra is a 83 y.o. female who presents to [facility] UROLOGY  History of Present Illness  Follow up visit OAB and complaints of urinary frequency and urinary incontinence. Last seen Oct 2023 and due problems with being in network with  Insurance at that time, just now able to return for follow up visit. Today patient is unsure when last taking oxybutynin or myrbetriq taken. Recalls cardiologist had discontinued the oxybutynin. Among patient medication bottles, Myrbetriq with 2 pills remaining. Prescription fill date of 2/2024 per [facility] delivery. Patient reports feels frequency increased recently and now having urinary incontinence also occurs with cough and sneeze on occasion.  Will consume most of only fluids at certain intervals which is usually only when staying in the house. Will limit if having to go outside or on trips. Reports as soon as she consumes liquids, has sensation of needing to urinate within 45 minutes to 1 hour. Feels as if having to go to bathroom all the time. If working out in the yard, will hold urge as long as she can because not wanting to back inside. When decision to go back in to void, can't hold it. May use 3-4 pad per day if drinking fluids.  Problems with holding urine and frequency. Patient is currently prescribed Lasix 40 mg daily, Order per medications bottle from prior dosing indicated to take 1 1/2 pills daily equal to 60mg.  Self reports consumes large amounts of water and tea and sometimes \"Mt Dew\" soft drinks. Inquired about what consumed prior to office visit and reported Mt dew and Large sweet tea. Did not consume until arrival to [facility] because did not want to have problems with frequent urination. Patient recently seen [facility] 8/23/24 due to chest pains and diagnosed with Atrial Fib and cystitis. Patient was hypotensive and treated for " "dehydration and provided with Rocephin IV for urinary tract infection then Tushar admit 8/24/24 to [facility] due to cardiology not available [place]. Patient underwent OMAR with cardioversion. Blood cultures negative and no urine culture result available. Patient had been seen 1 month prior 7/23/24 [place] [facility] for complaints of tick bite and rash. Noted that patient reported actual removal of the tick. Abnormal IGG titre E. Chaffeensis. Was to follow up with pcp and discuss. No Medications prescribed.            Objective   Vital Signs:   /56 (BP Location: Left arm, Patient Position: Sitting, Cuff Size: Adult)   Pulse 73   Temp 98.2 °F (36.8 °C) (Temporal)   Ht 165.1 cm (65\")   Wt 80.7 kg (178 lb)   BMI 29.62 kg/m²     Past Medical History:   Diagnosis Date    Acid reflux disease     Arthritis     Atrial fibrillation     Back pain     Bowel obstruction     Breast cancer     Carpal tunnel syndrome     Constipation     Depression     Diabetes mellitus     Disease of thyroid gland     Dyslipidemia     Fecal incontinence     GERD (gastroesophageal reflux disease)     Glaucoma     left eye    HBP (high blood pressure)     Hearing impaired     hearing aides    Hiatal hernia     High cholesterol     History of transfusion     History of tuberculosis     IN 1980'S WAS TREATED    Hypertension     Hypomagnesemia     Kienbock's disease     Kienböck's disease, right     KIENBOCK'S AVASCULAR NECROSIS OF LUNATE, ADULT RIGHT    Nonrheumatic aortic valve stenosis 11/05/2020    OAB (overactive bladder)     Osteoporosis     Pneumonia     PONV (postoperative nausea and vomiting)     Positive PPD     RLS (restless legs syndrome)     Sleep apnea     NO MACHINE    Stage 3 chronic kidney disease     Status post bilateral mastectomy with sentinel node biopsy and right axillary node dissection 01/12/2022    Tailbone injury     fracture    Thyroid disease     Urinary incontinence     wears pads    Urinary retention  "     Past Surgical History:   Procedure Laterality Date    ANKLE TENDON REPAIR      BACK SURGERY  12/21/2018-3/2019    LUMBAR--BROKEN DISC, CLEANED OUT--HAS 2ND PROCEDURE TO FINISH REMOVING    BLADDER REPAIR      bladder sling with cysto    CARPAL TUNNEL RELEASE      CATARACT EXTRACTION Bilateral     COLONOSCOPY      COLONOSCOPY N/A 11/17/2022    Procedure: COLONOSCOPY with biopsy;  Surgeon: Leo Alvares MD;  Location: [facility] ENDOSCOPY;  Service: Gastroenterology;  Laterality: N/A;  diverticulosis    CYSTOSCOPY  2014    removal of mesh bladder sling    ENDOSCOPY      ENDOSCOPY N/A 11/17/2022    Procedure: ESOPHAGOGASTRODUODENOSCOPY with biopsy and snare;  Surgeon: Leo Alvares MD;  Location: [facility] ENDOSCOPY;  Service: Gastroenterology;  Laterality: N/A;  gastric fundus polyp    HEMORRHOIDECTOMY      HYSTERECTOMY      INCISION AND DRAINAGE OF WOUND      on back     JOINT REPLACEMENT      KNEE ARTHROPLASTY Right     LEG SURGERY  06/13/2019    LUMBAR DISCECTOMY Left 12/21/2018    Procedure: LEFT L4-5 MICRO DISCECTOMY;  Surgeon: Adam Coleman DO;  Location: [facility] OR;  Service: Orthopedic Spine    LUMBAR DISCECTOMY Left 03/29/2019    Procedure: LEFT L4-5 REVISION OF MICRODISCECTOMY;  Surgeon: Adam Coleman DO;  Location: [facility] OR;  Service: Orthopedic Spine    LUMBAR FUSION      MASTECTOMY Bilateral 01/11/2022    Procedure: BREAST MASTECTOMY WITH SENTINEL NODE BIOPSY AND possible  AXILLARY NODE DISSECTION;  Surgeon: Lety Tena MD;  Location: [facility] OR OSC;  Service: General;  Laterality: Bilateral;    MASTECTOMY Bilateral     TENDON RELEASE  09/2018    TOTAL KNEE ARTHROPLASTY Left 06/13/2019    Procedure: LEFT KNEE REVISION;  Surgeon: Malick Costa II, MD;  Location: [facility] OR;  Service: Orthopedics    TOTAL KNEE ARTHROPLASTY REVISION Left     x2    TOTAL KNEE ARTHROPLASTY REVISION Left 02/13/2018    Procedure: LEFT TOTAL KNEE ARTHROPLASTY REVISION;  Surgeon:  Jair Fajardo MD;  Location: [facility] MAIN OR;  Service:     US GUIDED CYST ASPIRATION BREAST N/A 03/24/2022    VERTEBROPLASTY       Family History   Problem Relation Age of Onset    Breast cancer Mother     Tuberculosis Mother     Diabetes Father     Diabetes Sister     Malig Hyperthermia Neg Hx     Colon cancer Neg Hx      Social History     Socioeconomic History    Marital status:    Tobacco Use    Smoking status: Never     Passive exposure: Never    Smokeless tobacco: Never   Vaping Use    Vaping status: Never Used   Substance and Sexual Activity    Alcohol use: No    Drug use: No    Sexual activity: Defer     Allergies   Allergen Reactions    Egg-Derived Products Swelling    Nsaids Unknown - High Severity    Sertraline Unknown - High Severity     Bleeding in stomach    Tetanus Toxoid Swelling    Tetanus Toxoids Swelling    Metformin Diarrhea and Unknown - Low Severity    Tetanus-Diphtheria Toxoids Td Unknown - Low Severity    Tuberculin Ppd Unknown - Low Severity     Current Outpatient Medications   Medication Sig Dispense Refill    alendronate (FOSAMAX) 35 MG tablet Take 1 tablet by mouth Every 7 (Seven) Days.      apixaban (ELIQUIS) 5 MG tablet tablet Take 1 tablet by mouth Every 12 (Twelve) Hours. 180 tablet 3    atorvastatin (LIPITOR) 10 MG tablet TAKE 1 TABLET BY MOUTH AT NIGHT 90 tablet 3    DULoxetine (CYMBALTA) 60 MG capsule Take 1 capsule by mouth Daily.      empagliflozin (JARDIANCE) 25 MG tablet tablet Take 1 tablet by mouth Daily.      furosemide (Lasix) 40 MG tablet Take 1 tablet by mouth Daily.      letrozole (FEMARA) 2.5 MG tablet Take 1 tablet by mouth Daily. 90 tablet 1    levothyroxine (SYNTHROID, LEVOTHROID) 25 MCG tablet Take 1 tablet by mouth Daily. .      losartan (COZAAR) 25 MG tablet TAKE 1 TABLET BY MOUTH DAILY 90 tablet 3    metoprolol tartrate (LOPRESSOR) 50 MG tablet Take 1 tablet by mouth 2 (Two) Times a Day.      Multiple Vitamins-Minerals (MULTIVITAMIN ADULTS PO)  Take 1 tablet by mouth Daily.      omeprazole (priLOSEC) 20 MG capsule Take 1 capsule by mouth Daily.      pramipexole (MIRAPEX) 1.5 MG tablet Take 1 tablet by mouth Every Night.      SITagliptin (Januvia) 50 MG tablet Take 1 tablet by mouth Daily.      miconazole (MICOTIN) 100 MG vaginal suppository Insert 1 suppository into the vagina Every Night for 7 days. 7 suppository 0     No current facility-administered medications for this visit.         Review of Systems   Constitutional:  Negative for chills and fever.   Respiratory:  Negative for chest tightness.    Cardiovascular:  Negative for chest pain.   Gastrointestinal:  Negative for abdominal pain and nausea.   Endocrine: Positive for polyuria.   Genitourinary:  Positive for frequency. Negative for dysuria, hematuria, pelvic pain, vaginal bleeding and vaginal pain.   Musculoskeletal:  Positive for arthralgias and gait problem.   Psychiatric/Behavioral:  Positive for decreased concentration.         Physical Exam  Vitals and nursing note reviewed.   Constitutional:       General: She is not in acute distress.     Appearance: Normal appearance. She is well-developed and well-groomed. She is not ill-appearing.      Comments: Ambulates with mild difficulty rolling walker   Cardiovascular:      Rate and Rhythm: Normal rate.   Pulmonary:      Effort: Pulmonary effort is normal.      Breath sounds: Normal air entry.   Abdominal:      General: There is no distension.      Tenderness: There is no abdominal tenderness. There is no guarding or rebound.   Musculoskeletal:         General: Normal range of motion.      Right lower leg: Edema present.      Left lower leg: Edema present.   Skin:     General: Skin is warm and dry.   Neurological:      Mental Status: She is alert and oriented to person, place, and time.      Motor: Motor function is intact.      Comments: Forgetful and problems with memory at baseline   Psychiatric:         Mood and Affect: Mood normal.          Behavior: Behavior normal. Behavior is cooperative.         Judgment: Judgment normal.        Result Review :     CMP          8/24/2024    15:27 8/24/2024    17:17 8/25/2024    03:52 8/26/2024    04:21   CMP   Glucose 244  182  158  137    BUN 35  35  31  31    Creatinine 1.53  1.47  1.48  1.52    EGFR 33.2  34.8  34.6  33.5    Sodium 136  137  139  135    Potassium 4.1  4.0  4.1  4.4    Chloride 99  101  103  99    Calcium 9.0  8.9  9.6  9.5    BUN/Creatinine Ratio 22.9  23.8  20.9  20.4    Anion Gap 13.3  12.9  12.5  13.6      CBC          8/24/2024    15:27 8/25/2024    03:52   CBC   WBC 15.76  17.19    RBC 4.44  4.71    Hemoglobin 13.8  14.7    Hematocrit 41.7  44.6    MCV 93.9  94.7    MCH 31.1  31.2    MCHC 33.1  33.0    RDW 12.7  12.5    Platelets 226  225      Lipid Panel          8/25/2024    03:52   Lipid Panel   Total Cholesterol 146    Triglycerides 96    HDL Cholesterol 45    VLDL Cholesterol 18    LDL Cholesterol  83    LDL/HDL Ratio 1.82      A1C Last 3 Results          8/24/2024    15:27   HGBA1C Last 3 Results   Hemoglobin A1C 8.50      UA          8/24/2024    23:20 10/3/2024    15:19   Urinalysis   Specific Sage, UA 1.012     Ketones, UA Negative  Negative    Blood, UA Negative     Leukocytes, UA Negative  Negative    Nitrite, UA Negative       CONV LYME DISEASE IGG/IGM ANTIBODIES (07/23/2024 21:33)   EHRLICHIA ANTIBODY PANEL (07/23/2024 21:33) positive IGG    Specimen Comment: Specimen type and source: Serum, Serum specimen (specimen)       Component  Ref Range & Units 2 mo ago   E. chaffeensis (HME) IgG Titer  <1:64 1:256 Abnormal                  Results for orders placed or performed in visit on 10/03/24   POC Urinalysis Dipstick, Automated    Specimen: Urine   Result Value Ref Range    Color Yellow Yellow, Straw, Dark Yellow, Deanna    Clarity, UA Cloudy (A) Clear    Specific Gravity  1.015 1.005 - 1.030    pH, Urine 6.0 5.0 - 8.0    Leukocytes Negative Negative    Nitrite, UA Negative  Negative    Protein, POC Negative Negative mg/dL    Glucose,  mg/dL (A) Negative mg/dL    Ketones, UA Negative Negative    Urobilinogen, UA 0.2 E.U./dL Normal, 0.2 E.U./dL    Bilirubin Negative Negative    Blood, UA Trace in tact (A) Negative    Lot Number [id number]     Expiration Date 7/31/25        No RBC, No WBC  Yeast buds multiple present vaginal butch present.Per [facility] Microscopy  Bladder Scan - bladder empty less and 10cc     Admission (Discharged) with Derek Romo DO (08/24/2024)   Office Visit with Aníbal Abraham MD (09/24/2024) Interventional Cardiology  Office Visit Converted with Juana Lenz (01/07/2019)      Assessment and Plan    Diagnoses and all orders for this visit:    1. Increased urinary frequency (Primary)  -     Urine Culture - Urine, Urine, Clean Catch    2. OAB (overactive bladder)  -     POC Urinalysis Dipstick, Automated        Discontinued myrbetriq due to worsening of renal function gfr 29 and contraindicated         Anticholinergics contraindicated- oxybutynin discontinued cardiology and urology   3. Mixed stress and urge urinary incontinence  -     Urine Culture - Urine, Urine, Clean Catch    4. Vaginal yeast infection  -     miconazole (MICOTIN) 100 MG vaginal suppository; Insert 1 suppository into the vagina Every Night for 7 days.  Dispense: 7 suppository; Refill: 0    5. Chronic kidney disease    6. History of bladder repair surgery  Comments:  cysto with removal of mesh 2014    7.  History of tick bite  7/23/24    Patient to stop myrbetriq based on most recent GFR 29, Patient unsure when last dose taken. Patient had older bottle with 2 tablets remaining. Refill date on bottle  2/2023. Discussed with decline in renal function GFR 29 and problems with hypertension , Medication no longer advised. If in fact urinary symptoms of urinary frequency and incontinence due to mostly oab, other treatment options to consider may be botox in the bladder or Sacral Interstim implant  device. Vibegron only medication option and due to poor coverage and cost concern along with ckd, not option at this time    Bladder scan performed post void in office and bladder is emptying completely. Uroflow  253ml voided volume. Average flow rate 5.5 ml/s Peak flow rate 19.4ml/s flow time 46.4s Intervals 13.  Obstructive flow pattern     Plan for cystoscopy in approximately 10 days and urine will be sent for culture. Suspect cloudiness of urine secondary to vaginal yeast. Yeast visible per microscopy not clean void specimen.    Recommend patient undergo retesting and evaluation of prior tick bite 7/23/24.see  er visit [facility] Recommend to include Alpha-gal IGE    Discussion:      Medication reconciliation lengthy and discussed with patient. New medication dosage changes and medication listed updated with patient. Recent hospitalization along with cardioversion. Patient following with cardiology. Oxybutynin order discontinued per cardiology several months ago and also agree need to discontinue permanent basis .Adverse side effects,  risk factors, and anticholinergic medications contraindicated with narrow angle glaucoma. Distant past was cleared to take due to having narrow angle glaucoma but patient reports told worsening of vision and pressure left eye. Patient instructed not to ever restart or take other anticholinergic type bladder medication        Patient to discuss with pcp and cardiology new order for metoprolol 50mg daily 30 day supply with no refills per [facility] pharmacy. Need clarification to continue dose. Prior order for 25mg daily per prior bottles and medication remaining from mail order delivery. Recommend refill order with  appropriate dosing and directions. Patient has difficulty with maintaining appropriate dosing information as bottle remaining from prior mail order delivery with significant amount remaining. Medication dosage increase and decrease historically not reflected per  prescription bottles and creates confusion for patient as what she should and should not be taking.     Patient no longer using mail order pharmacy. New orders now to be sent only to [place].            Follow Up   Return for scheduled procedure.  Patient was given instructions and counseling regarding her condition or for health maintenance advice. Please see specific information pulled into the AVS if appropriate.     Juana Lenz, DANYA

## 2024-10-03 NOTE — PROGRESS NOTES
Chief Complaint  Follow-up of the Left Hip    Subjective          History of Present Illness      Lilia Becerra is a 83 y.o. female  presents to [facility] for     Patient presents for follow-up evaluation of left hip pain and left hip osteoarthritis she was seen by Dr. Rodriguez on 8/6/2024 for evaluation he ordered her a left hip intra-articular steroid injection she had this mid August she states that the pain resolved up until about 2 weeks ago.  She states she is happy with her progress she has done physical therapy in the past due to prior surgeries in that leg she states that she is feeling some better even though the injection has worn off.  She would like to continue conservative treatment with injections.      Allergies   Allergen Reactions    Egg-Derived Products Swelling    Nsaids Unknown - High Severity    Sertraline Unknown - High Severity     Bleeding in stomach    Tetanus Toxoid Swelling    Tetanus Toxoids Swelling    Metformin Diarrhea and Unknown - Low Severity    Tetanus-Diphtheria Toxoids Td Unknown - Low Severity    Tuberculin Ppd Unknown - Low Severity        Social History     Socioeconomic History    Marital status:    Tobacco Use    Smoking status: Never    Smokeless tobacco: Never   Vaping Use    Vaping status: Never Used   Substance and Sexual Activity    Alcohol use: No    Drug use: No    Sexual activity: Defer        REVIEW OF SYSTEMS    Constitutional: Awake alert and oriented x3, no acute distress, denies fevers, chills, weight loss  Respiratory: No respiratory distress  Vascular: Brisk cap refill, Intact distal pulses, No cyanosis, compartments soft with no signs or symptoms of compartment syndrome or DVT.   Cardiovascular: Denies chest pain, shortness of breath  Skin: Denies rashes, acute skin changes  Neurologic: Denies headache, loss of consciousness  MSK: Left hip pain      Objective   Vital Signs:   /54   Pulse 58   Ht 165.1 cm  "(65\")   Wt 75.8 kg (167 lb)   SpO2 95%   BMI 27.79 kg/m²     Body mass index is 27.79 kg/m².    Physical Exam       Left lower extremity: flexion to 80 degrees, external rotation  to 20 degrees, internal rotation to 5 degrees, non tender, mild swelling to the left leg, well healed surgical incision, neurovascularly intact, calf soft, positive EHL, FHL, GS, and TA. Sensation intact to all 5 nerves of the foot.          Procedures    Imaging Results (Most Recent)       None             Result Review :   The following data was reviewed by: RASHID Shahid on 10/03/2024:               Assessment and Plan    Diagnoses and all orders for this visit:    1. Left hip pain (Primary)  -     FL Guide For Pain Meds Injection Joint; Future    2. Primary osteoarthritis of left hip  -     FL Guide For Pain Meds Injection Joint; Future        Discussed diagnosis and treatment options with the patient she was advised we can order updated left hip intra-articular fluoroscopically guided steroid injection which she agreed to have scheduled follow-up 6 weeks after injection    Call or return if worsening symptoms.    Follow Up   Return for 6 WEEKS AFTER INJECTION.  Patient was given instructions and counseling regarding her condition or for health maintenance advice. Please see specific information pulled into the AVS if appropriate.       EMR Dragon/Transcription disclaimer:  Part of this note may be an electronic transcription/translation of spoken language to printed text using the Dragon Dictation System  "

## 2024-10-04 DIAGNOSIS — B37.31 VAGINAL YEAST INFECTION: Primary | ICD-10-CM

## 2024-10-05 LAB — BACTERIA SPEC AEROBE CULT: NO GROWTH

## 2024-10-07 ENCOUNTER — TELEPHONE (OUTPATIENT)
Dept: UROLOGY | Facility: CLINIC | Age: 84
End: 2024-10-07
Payer: MEDICARE

## 2024-10-07 NOTE — PROGRESS NOTES
Please notify patient the urine culture showed no growth. Use yeast vaginal suppositories as ordered.Thank you

## 2024-10-07 NOTE — TELEPHONE ENCOUNTER
Called patient and had to leave a message for her to call the office back about her urine culture results.

## 2024-10-14 ENCOUNTER — PROCEDURE VISIT (OUTPATIENT)
Dept: UROLOGY | Facility: CLINIC | Age: 84
End: 2024-10-14
Payer: MEDICARE

## 2024-10-14 DIAGNOSIS — N39.3 SUI (STRESS URINARY INCONTINENCE, FEMALE): ICD-10-CM

## 2024-10-14 DIAGNOSIS — N39.41 URGE INCONTINENCE OF URINE: Primary | ICD-10-CM

## 2024-10-14 LAB
BILIRUB BLD-MCNC: NEGATIVE MG/DL
CLARITY, POC: CLEAR
COLOR UR: YELLOW
EXPIRATION DATE: ABNORMAL
GLUCOSE UR STRIP-MCNC: ABNORMAL MG/DL
KETONES UR QL: NEGATIVE
LEUKOCYTE EST, POC: NEGATIVE
Lab: ABNORMAL
NITRITE UR-MCNC: NEGATIVE MG/ML
PH UR: 5.5 [PH] (ref 5–8)
PROT UR STRIP-MCNC: NEGATIVE MG/DL
RBC # UR STRIP: NEGATIVE /UL
SP GR UR: 1.01 (ref 1–1.03)
UROBILINOGEN UR QL: ABNORMAL

## 2024-10-14 PROCEDURE — 52000 CYSTOURETHROSCOPY: CPT | Performed by: UROLOGY

## 2024-10-14 PROCEDURE — 81003 URINALYSIS AUTO W/O SCOPE: CPT | Performed by: UROLOGY

## 2024-10-14 NOTE — PROGRESS NOTES
Cystoscopy    Date/Time: 10/14/2024 11:01 AM    Performed by: Jazmin Zapata MD  Authorized by: Jazmin Zapata MD  Preparation: Patient was prepped and draped in the usual sterile fashion.  Local anesthesia used: yes    Anesthesia:  Local anesthesia used: yes  Local Anesthetic: lidocaine 2% without epinephrine  Anesthetic total: 12 mL    Sedation:  Patient sedated: no        Indication.  Mixed urinary incontinence.      Patient was placed in lithotomy position.  Thorough scrubbing of lower abdomen and external genitalia was performed with Hibiclens.  Flexible Olympus cystoscope was inserted into the urethra.  Urethra looks fine and bladder neck is normal.  Both ureteral orifices are normal.  Bladder was checked very carefully and there is no tumor present.  Bladder is slightly trabeculated and there is a tiny diverticulum at the dome.  Patient has no obvious cystourethrocele.  After filling her up with fluid, we removed the cystoscope.  We made her cough and she did not leak any urine.    She tolerated her procedure very well.  Patient has history of breast cancer and so I am reluctant to give her estrogen cream.  Patient did not have any overactivity of urinary bladder with bladder filling and no incontinence when she coughed even when she stood up.  I would recommend continuing conservative treatment and Ms. Juana Lenz can check her in December.

## 2024-10-16 ENCOUNTER — HOSPITAL ENCOUNTER (OUTPATIENT)
Dept: INTERVENTIONAL RADIOLOGY/VASCULAR | Facility: HOSPITAL | Age: 84
Discharge: HOME | End: 2024-10-16
Payer: MEDICARE

## 2024-10-16 DIAGNOSIS — M25.552 LEFT HIP PAIN: ICD-10-CM

## 2024-10-16 DIAGNOSIS — M16.12 PRIMARY OSTEOARTHRITIS OF LEFT HIP: ICD-10-CM

## 2024-10-16 PROCEDURE — 25510000001 IOPAMIDOL 61 % SOLUTION: Performed by: PHYSICIAN ASSISTANT

## 2024-10-16 PROCEDURE — 25010000002 LIDOCAINE 2% SOLUTION: Performed by: PHYSICIAN ASSISTANT

## 2024-10-16 PROCEDURE — 25010000002 BUPIVACAINE (PF) 0.5 % SOLUTION: Performed by: PHYSICIAN ASSISTANT

## 2024-10-16 PROCEDURE — 77002 NEEDLE LOCALIZATION BY XRAY: CPT

## 2024-10-16 PROCEDURE — 25010000002 METHYLPREDNISOLONE PER 80 MG: Performed by: PHYSICIAN ASSISTANT

## 2024-10-16 RX ORDER — LIDOCAINE HYDROCHLORIDE 20 MG/ML
20 INJECTION, SOLUTION INFILTRATION; PERINEURAL ONCE
Status: COMPLETED | OUTPATIENT
Start: 2024-10-16 | End: 2024-10-16

## 2024-10-16 RX ORDER — IOPAMIDOL 612 MG/ML
15 INJECTION, SOLUTION INTRATHECAL
Status: COMPLETED | OUTPATIENT
Start: 2024-10-16 | End: 2024-10-16

## 2024-10-16 RX ORDER — BUPIVACAINE HYDROCHLORIDE 5 MG/ML
5 INJECTION, SOLUTION EPIDURAL; INTRACAUDAL ONCE
Status: COMPLETED | OUTPATIENT
Start: 2024-10-16 | End: 2024-10-16

## 2024-10-16 RX ORDER — METHYLPREDNISOLONE ACETATE 80 MG/ML
80 INJECTION, SUSPENSION INTRA-ARTICULAR; INTRALESIONAL; INTRAMUSCULAR; SOFT TISSUE ONCE
Status: COMPLETED | OUTPATIENT
Start: 2024-10-16 | End: 2024-10-16

## 2024-10-16 RX ADMIN — LIDOCAINE HYDROCHLORIDE 3 ML: 20 INJECTION, SOLUTION INFILTRATION; PERINEURAL at 12:45

## 2024-10-16 RX ADMIN — BUPIVACAINE HYDROCHLORIDE 4 ML: 5 INJECTION, SOLUTION EPIDURAL; INTRACAUDAL; PERINEURAL at 12:46

## 2024-10-16 RX ADMIN — IOPAMIDOL 1 ML: 612 INJECTION, SOLUTION INTRATHECAL at 12:46

## 2024-10-16 RX ADMIN — METHYLPREDNISOLONE ACETATE 80 MG: 80 INJECTION, SUSPENSION INTRA-ARTICULAR; INTRALESIONAL; INTRAMUSCULAR; SOFT TISSUE at 12:46

## 2024-10-16 RX ADMIN — SODIUM BICARBONATE 1 ML: 84 INJECTION, SOLUTION INTRAVENOUS at 12:45

## 2024-10-17 RX ORDER — METOPROLOL SUCCINATE 25 MG/1
25 TABLET, EXTENDED RELEASE ORAL
Qty: 90 TABLET | Refills: 1 | Status: SHIPPED | OUTPATIENT
Start: 2024-10-17 | End: 2025-01-15

## 2024-10-17 NOTE — TELEPHONE ENCOUNTER
Will start on 25 mg toprol XL instead of 50 mg daily. Metoprolol indicated for Afib.   Please notify patient to monitor BP and HR at home, Hold metoprolol if HR < 55 and SBP < 90.

## 2024-10-21 ENCOUNTER — TELEPHONE (OUTPATIENT)
Dept: UROLOGY | Facility: CLINIC | Age: 84
End: 2024-10-21

## 2024-10-23 ENCOUNTER — TELEPHONE (OUTPATIENT)
Dept: CARDIOLOGY | Facility: CLINIC | Age: 84
End: 2024-10-23
Payer: MEDICARE

## 2024-10-29 ENCOUNTER — TELEPHONE (OUTPATIENT)
Dept: CARDIOLOGY | Facility: CLINIC | Age: 84
End: 2024-10-29
Payer: MEDICARE

## 2024-10-29 DIAGNOSIS — E78.5 HYPERLIPIDEMIA LDL GOAL <100: ICD-10-CM

## 2024-10-29 DIAGNOSIS — I10 ESSENTIAL HYPERTENSION: Chronic | ICD-10-CM

## 2024-10-29 RX ORDER — ATORVASTATIN CALCIUM 10 MG/1
10 TABLET, FILM COATED ORAL
Qty: 90 TABLET | Refills: 0 | Status: SHIPPED | OUTPATIENT
Start: 2024-10-29

## 2024-10-29 RX ORDER — LOSARTAN POTASSIUM 25 MG/1
25 TABLET ORAL DAILY
Qty: 90 TABLET | Refills: 0 | Status: SHIPPED | OUTPATIENT
Start: 2024-10-29

## 2024-10-29 NOTE — TELEPHONE ENCOUNTER
[facility] received a fax that requires your attention. The document has been indexed to the patient’s chart for your review.      Reason for sending: EXTERNAL MEDICAL RECORD NOTIFICATION     Documents Description: MEDICATION NON ADHERENCE-AETNA-10.28.24    Name of Sender: MINI     Date Indexed: 10.28.24

## 2024-10-29 NOTE — TELEPHONE ENCOUNTER
Caller: Lilia Becerra    Relationship: Self    Best call back number: [phone]     What was the call regarding: PATIENT MISSED A CALL. UNABLE TO LOCATE A NOTE IN THE CHART.

## 2024-12-09 ENCOUNTER — OFFICE VISIT (OUTPATIENT)
Dept: ORTHOPEDIC SURGERY | Facility: CLINIC | Age: 84
End: 2024-12-09
Payer: MEDICARE

## 2024-12-09 DIAGNOSIS — M25.552 LEFT HIP PAIN: Primary | ICD-10-CM

## 2024-12-09 DIAGNOSIS — M16.12 PRIMARY OSTEOARTHRITIS OF LEFT HIP: ICD-10-CM

## 2024-12-09 PROCEDURE — 99213 OFFICE O/P EST LOW 20 MIN: CPT | Performed by: PHYSICIAN ASSISTANT

## 2024-12-09 PROCEDURE — 1159F MED LIST DOCD IN RCRD: CPT | Performed by: PHYSICIAN ASSISTANT

## 2024-12-09 PROCEDURE — 1160F RVW MEDS BY RX/DR IN RCRD: CPT | Performed by: PHYSICIAN ASSISTANT

## 2024-12-09 NOTE — PROGRESS NOTES
Chief Complaint  Follow-up of the Left Hip    Subjective          History of Present Illness      Lilia Becerra is a 83 y.o. female  presents to [facility] for     Patient presents for follow-up evaluation of left hip pain left hip osteoarthritis.  She had an intra-articular injection to her hip which she states the second 1 gave her no relief and it also increased her blood sugars which she states made it not worth it and she would like to finish these treatments.  She would like to also avoid surgical intervention.  She has done physical therapy in the past she states that she would like to live with it as it is and the cyst treatment options at this time.      Allergies   Allergen Reactions    Egg-Derived Products Swelling    Nsaids Unknown - High Severity    Sertraline Unknown - High Severity     Bleeding in stomach    Tetanus Toxoid Swelling    Tetanus Toxoids Swelling    Metformin Diarrhea and Unknown - Low Severity    Tetanus-Diphtheria Toxoids Td Unknown - Low Severity    Tuberculin Ppd Unknown - Low Severity        Social History     Socioeconomic History    Marital status:    Tobacco Use    Smoking status: Never     Passive exposure: Never    Smokeless tobacco: Never   Vaping Use    Vaping status: Never Used   Substance and Sexual Activity    Alcohol use: No    Drug use: No    Sexual activity: Defer        REVIEW OF SYSTEMS    Constitutional: Awake alert and oriented x3, no acute distress, denies fevers, chills, weight loss  Respiratory: No respiratory distress  Vascular: Brisk cap refill, Intact distal pulses, No cyanosis, compartments soft with no signs or symptoms of compartment syndrome or DVT.   Cardiovascular: Denies chest pain, shortness of breath  Skin: Denies rashes, acute skin changes  Neurologic: Denies headache, loss of consciousness  MSK: Left hip pain      Objective   Vital Signs:   There were no vitals taken for this visit.    There is no height or  weight on file to calculate BMI.    Physical Exam         Left lower extremity: flexion to 80 degrees, external rotation  to 20 degrees, internal rotation to 5 degrees, non tender, mild swelling to the left leg, well healed surgical incision, neurovascularly intact, calf soft, positive EHL, FHL, GS, and TA. Sensation intact to all 5 nerves of the foot.       Procedures    Imaging Results (Most Recent)       None             Result Review :   The following data was reviewed by: RASHID Shahid on 12/09/2024:               Assessment and Plan    Diagnoses and all orders for this visit:    1. Left hip pain (Primary)    2. Primary osteoarthritis of left hip        Discussed diagnosis and treatment options including future injections therapy or surgery she states she would like to avoid further treatment at this time, follow-up as needed per patient request  Call or return if worsening symptoms.    Follow Up   Return if symptoms worsen or fail to improve.  Patient was given instructions and counseling regarding her condition or for health maintenance advice. Please see specific information pulled into the AVS if appropriate.       EMR Dragon/Transcription disclaimer:  Part of this note may be an electronic transcription/translation of spoken language to printed text using the Dragon Dictation System

## 2024-12-11 ENCOUNTER — TELEPHONE (OUTPATIENT)
Dept: CARDIOLOGY | Facility: CLINIC | Age: 84
End: 2024-12-11
Payer: MEDICARE

## 2024-12-11 NOTE — TELEPHONE ENCOUNTER
The [facility] received a fax that requires your attention. The document has been indexed to the patient’s chart for your review.      Reason for sending: EXTERNAL MEDICAL RECORD NOTIFICATION     Documents Description: METOPROL [id number]    Name of Sender: MINI     Date Indexed: 12.11.24

## 2024-12-12 ENCOUNTER — OFFICE VISIT (OUTPATIENT)
Dept: UROLOGY | Facility: CLINIC | Age: 84
End: 2024-12-12
Payer: MEDICARE

## 2024-12-12 ENCOUNTER — TELEPHONE (OUTPATIENT)
Dept: CARDIOLOGY | Facility: CLINIC | Age: 84
End: 2024-12-12

## 2024-12-12 VITALS
SYSTOLIC BLOOD PRESSURE: 115 MMHG | DIASTOLIC BLOOD PRESSURE: 62 MMHG | TEMPERATURE: 98.6 F | WEIGHT: 181 LBS | HEART RATE: 75 BPM | HEIGHT: 65 IN | BODY MASS INDEX: 30.16 KG/M2

## 2024-12-12 DIAGNOSIS — Z87.440 HISTORY OF RECURRENT UTIS: ICD-10-CM

## 2024-12-12 DIAGNOSIS — N32.3 ACQUIRED BLADDER DIVERTICULUM: ICD-10-CM

## 2024-12-12 DIAGNOSIS — Z85.3 HISTORY OF BREAST CANCER: ICD-10-CM

## 2024-12-12 DIAGNOSIS — N32.81 OAB (OVERACTIVE BLADDER): ICD-10-CM

## 2024-12-12 DIAGNOSIS — N39.41 URGE INCONTINENCE OF URINE: Primary | ICD-10-CM

## 2024-12-12 DIAGNOSIS — Z87.448 HISTORY OF CHRONIC KIDNEY DISEASE: ICD-10-CM

## 2024-12-12 DIAGNOSIS — Z98.890 HISTORY OF BLADDER REPAIR SURGERY: ICD-10-CM

## 2024-12-12 LAB
BILIRUB BLD-MCNC: NEGATIVE MG/DL
CLARITY, POC: CLEAR
COLOR UR: YELLOW
EXPIRATION DATE: ABNORMAL
GLUCOSE UR STRIP-MCNC: ABNORMAL MG/DL
KETONES UR QL: NEGATIVE
LEUKOCYTE EST, POC: NEGATIVE
Lab: ABNORMAL
NITRITE UR-MCNC: NEGATIVE MG/ML
PH UR: 6.5 [PH] (ref 5–8)
PROT UR STRIP-MCNC: NEGATIVE MG/DL
RBC # UR STRIP: NEGATIVE /UL
SP GR UR: 1.01 (ref 1–1.03)
UROBILINOGEN UR QL: ABNORMAL

## 2024-12-12 NOTE — PROGRESS NOTES
"Chief Complaint  urinary frequency (Some improvement since last time but varies.)  Urge incontinence  Subjective          Lilia Becerra is a 83 y.o. female who presents to [facility]  History of Present Illness  Follow up visit after cystoscopy. No evidence of cristian with cough during cystoscopy procedure.  Tiny diverticulum dome of bladder. Uroflow/bladder scan post cystoscopy indicated intermittent obstructive flow pattern. PVR Bladder scan 0 cc. Bladder completely emptied.  Urinary frequency/urgency with urge incontinence. Denies dysuria or gross hematuria. Continues to have difficulty with holding urine with when bladder is full. Limits fluids when out in public and wears attends undergarment in case of urge incontinence. Patient reports no longer taking oxybutynin, no medications remaining at home and not taking myrbetriq. Following with Nephrologist, Mirta Bee MD [facility] and seen in November.    Office Visit with Juana Lenz APRN (10/03/2024)       Objective   Vital Signs:   /62 (BP Location: Left arm, Patient Position: Sitting, Cuff Size: Adult)   Pulse 75   Temp 98.6 °F (37 °C) (Temporal)   Ht 165.1 cm (65\")   Wt 82.1 kg (181 lb)   BMI 30.12 kg/m²     Past Medical History:   Diagnosis Date    Acid reflux disease     Arthritis     Atrial fibrillation     Back pain     Bowel obstruction     Breast cancer     Carpal tunnel syndrome     Constipation     Depression     Diabetes mellitus     Disease of thyroid gland     Dyslipidemia     Fecal incontinence     GERD (gastroesophageal reflux disease)     Glaucoma     left eye    HBP (high blood pressure)     Hearing impaired     hearing aides    Hiatal hernia     High cholesterol     History of transfusion     History of tuberculosis     IN 1980'S WAS TREATED    Hypertension     Hypomagnesemia     Kienbock's disease     Kienböck's disease, right     KIENBOCK'S AVASCULAR NECROSIS OF LUNATE, ADULT RIGHT    " Nonrheumatic aortic valve stenosis 11/05/2020    OAB (overactive bladder)     Osteoporosis     Pneumonia     PONV (postoperative nausea and vomiting)     Positive PPD     RLS (restless legs syndrome)     Sleep apnea     NO MACHINE    Stage 3 chronic kidney disease     Status post bilateral mastectomy with sentinel node biopsy and right axillary node dissection 01/12/2022    Tailbone injury     fracture    Thyroid disease     Urinary incontinence     wears pads    Urinary retention      Past Surgical History:   Procedure Laterality Date    ANKLE TENDON REPAIR      BACK SURGERY  12/21/2018-3/2019    LUMBAR--BROKEN DISC, CLEANED OUT--HAS 2ND PROCEDURE TO FINISH REMOVING    BLADDER REPAIR      bladder sling with cysto    CARPAL TUNNEL RELEASE      CATARACT EXTRACTION Bilateral     COLONOSCOPY      COLONOSCOPY N/A 11/17/2022    Procedure: COLONOSCOPY with biopsy;  Surgeon: Leo Alvares MD;  Location: [facility] ENDOSCOPY;  Service: Gastroenterology;  Laterality: N/A;  diverticulosis    CYSTOSCOPY  2014    removal of mesh bladder sling    CYSTOSCOPY  2024    ENDOSCOPY      ENDOSCOPY N/A 11/17/2022    Procedure: ESOPHAGOGASTRODUODENOSCOPY with biopsy and snare;  Surgeon: Leo Alvares MD;  Location: [facility] ENDOSCOPY;  Service: Gastroenterology;  Laterality: N/A;  gastric fundus polyp    HEMORRHOIDECTOMY      HYSTERECTOMY      INCISION AND DRAINAGE OF WOUND      on back     JOINT REPLACEMENT      KNEE ARTHROPLASTY Right     LEG SURGERY  06/13/2019    LUMBAR DISCECTOMY Left 12/21/2018    Procedure: LEFT L4-5 MICRO DISCECTOMY;  Surgeon: Adam Coleman DO;  Location: [facility] OR;  Service: Orthopedic Spine    LUMBAR DISCECTOMY Left 03/29/2019    Procedure: LEFT L4-5 REVISION OF MICRODISCECTOMY;  Surgeon: Adam Coleman DO;  Location: [facility] MAIN OR;  Service: Orthopedic Spine    LUMBAR FUSION      MASTECTOMY Bilateral 01/11/2022    Procedure: BREAST MASTECTOMY WITH SENTINEL NODE BIOPSY AND possible  AXILLARY NODE  DISSECTION;  Surgeon: Lety Tena MD;  [facility]: [facility];  Service: General;  Laterality: Bilateral;    MASTECTOMY Bilateral     TENDON RELEASE  09/2018    TOTAL KNEE ARTHROPLASTY Left 06/13/2019    Procedure: LEFT KNEE REVISION;  Surgeon: Malick Costa II, MD;  Location: [facility] OR;  Service: Orthopedics    TOTAL KNEE ARTHROPLASTY REVISION Left     x2    TOTAL KNEE ARTHROPLASTY REVISION Left 02/13/2018    Procedure: LEFT TOTAL KNEE ARTHROPLASTY REVISION;  Surgeon: Jair Fajardo MD;  Location: [facility] OR;  Service:     US GUIDED CYST ASPIRATION BREAST N/A 03/24/2022    VERTEBROPLASTY       Family History   Problem Relation Age of Onset    Breast cancer Mother     Tuberculosis Mother     Diabetes Father     Diabetes Sister     Malig Hyperthermia Neg Hx     Colon cancer Neg Hx      Social History     Socioeconomic History    Marital status:    Tobacco Use    Smoking status: Never     Passive exposure: Never    Smokeless tobacco: Never   Vaping Use    Vaping status: Never Used   Substance and Sexual Activity    Alcohol use: No    Drug use: No    Sexual activity: Defer     Allergies   Allergen Reactions    Egg-Derived Products Swelling    Nsaids Unknown - High Severity    Sertraline Unknown - High Severity     Bleeding in stomach    Tetanus Toxoid Swelling    Tetanus Toxoids Swelling    Metformin Diarrhea and Unknown - Low Severity    Tetanus-Diphtheria Toxoids Td Unknown - Low Severity    Tuberculin Ppd Unknown - Low Severity     Current Outpatient Medications   Medication Sig Dispense Refill    alendronate (FOSAMAX) 35 MG tablet Take 1 tablet by mouth Every 7 (Seven) Days.      apixaban (ELIQUIS) 5 MG tablet tablet Take 1 tablet by mouth Every 12 (Twelve) Hours. 180 tablet 3    atorvastatin (LIPITOR) 10 MG tablet Take 1 tablet by mouth every night at bedtime. 90 tablet 0    DULoxetine (CYMBALTA) 60 MG capsule Take 1 capsule by mouth Daily.      empagliflozin  (JARDIANCE) 25 MG tablet tablet Take 1 tablet by mouth Daily.      furosemide (Lasix) 40 MG tablet Take 1 tablet by mouth Daily.      letrozole (FEMARA) 2.5 MG tablet Take 1 tablet by mouth Daily. 90 tablet 1    levothyroxine (SYNTHROID, LEVOTHROID) 25 MCG tablet Take 1 tablet by mouth Daily. .      losartan (COZAAR) 25 MG tablet Take 1 tablet by mouth Daily. 90 tablet 0    metoprolol succinate XL (TOPROL-XL) 25 MG 24 hr tablet Take 1 tablet by mouth Daily for 90 days. 90 tablet 1    Multiple Vitamins-Minerals (MULTIVITAMIN ADULTS PO) Take 1 tablet by mouth Daily.      omeprazole (priLOSEC) 20 MG capsule Take 1 capsule by mouth Daily.      pramipexole (MIRAPEX) 1.5 MG tablet Take 1 tablet by mouth Every Night.      SITagliptin (Januvia) 50 MG tablet Take 1 tablet by mouth Daily.       No current facility-administered medications for this visit.         Review of Systems   Constitutional:  Negative for chills and fever.   Genitourinary:  Positive for urgency. Negative for difficulty urinating, dysuria and hematuria.   Musculoskeletal:  Positive for arthralgias.        Physical Exam  Vitals and nursing note reviewed.   Constitutional:       General: She is not in acute distress.     Appearance: Normal appearance. She is well-developed. She is not ill-appearing.      Comments: Ambulates slight  with difficulty walker   Cardiovascular:      Rate and Rhythm: Normal rate.      Heart sounds: Murmur heard.   Pulmonary:      Effort: Pulmonary effort is normal.      Breath sounds: Normal air entry.   Musculoskeletal:         General: Normal range of motion.   Skin:     General: Skin is warm and dry.   Neurological:      Mental Status: She is alert and oriented to person, place, and time.      Motor: Motor function is intact.      Comments: Difficulty with memory and forgetful   Psychiatric:         Mood and Affect: Mood normal.         Behavior: Behavior normal. Behavior is cooperative.         Thought Content: Thought  content normal.         Judgment: Judgment normal.        Result Review :     CMP          8/24/2024    15:27 8/24/2024    17:17 8/25/2024    03:52 8/26/2024    04:21   CMP   Glucose 244  182  158  137    BUN 35  35  31  31    Creatinine 1.53  1.47  1.48  1.52    EGFR 33.2  34.8  34.6  33.5    Sodium 136  137  139  135    Potassium 4.1  4.0  4.1  4.4    Chloride 99  101  103  99    Calcium 9.0  8.9  9.6  9.5    BUN/Creatinine Ratio 22.9  23.8  20.9  20.4    Anion Gap 13.3  12.9  12.5  13.6      CBC          8/24/2024    15:27 8/25/2024    03:52   CBC   WBC 15.76  17.19    RBC 4.44  4.71    Hemoglobin 13.8  14.7    Hematocrit 41.7  44.6    MCV 93.9  94.7    MCH 31.1  31.2    MCHC 33.1  33.0    RDW 12.7  12.5    Platelets 226  225         Results for orders placed or performed in visit on 12/12/24   POC Urinalysis Dipstick, Automated    Collection Time: 12/12/24 12:05 PM    Specimen: Urine   Result Value Ref Range    Color Yellow Yellow, Straw, Dark Yellow, Deanna    Clarity, UA Clear Clear    Specific Gravity  1.015 1.005 - 1.030    pH, Urine 6.5 5.0 - 8.0    Leukocytes Negative Negative    Nitrite, UA Negative Negative    Protein, POC Negative Negative mg/dL    Glucose,  mg/dL (A) Negative mg/dL    Ketones, UA Negative Negative    Urobilinogen, UA 0.2 E.U./dL Normal, 0.2 E.U./dL    Bilirubin Negative Negative    Blood, UA Negative Negative    Lot Number [id number]     Expiration Date 10,071,835    US RENAL BILATERAL (06/03/2024 13:13)     Procedure visit with Jazmin Zapata MD (10/14/2024) cystoscopy   UROLOGY - SCAN - UROFLOW, UROLOGY [place], 10/15/24 (10/15/2024) uroflowmetry/bladder scan      Assessment and Plan    Diagnoses and all orders for this visit:    1. Urge incontinence of urine (Primary)  -     POC Urinalysis Dipstick, Automated  -     Ambulatory Referral to Urology    2. OAB (overactive bladder)  -     Ambulatory Referral to Urology    3. History of chronic kidney disease    4. History of  bladder repair surgery  -     Ambulatory Referral to Urology    5. History of breast cancer    6. Acquired bladder diverticulum    7. History of recurrent UTIs    Patient to follow up with Nephrology in 2 months after repeating labs per  ov note 11/05/24. Medications listed per their med list not updated. Oxybutynin discontinued months ago and anticholinergic alternatives all contraindicated. Currently not taking Myrbetriq. Not continued last visit due to concerns with renal function. Not significantly advantages in the past. Vibegron not an option as not covered per insurance carrier.  Urine does not show any infection today. Discussed importance of having better control of diabetes which may voiding pattern. lft    Past history of post operative urinary retention see note 1-. Bladder now completely empties uroflow/bladder scan 10/15/24    Will transfer referral to [facility] for follow up in 5-6 months, Recommend urodynamic testing when procedures again being performed in order to evaluate detrusor activity. Cystoscopy recent indicating presence of tiny bladder diverticulum. No incontinence with cough.     Dependent on findings, may evaluate if  candidate for botox to bladder. Patient does not want to consider any surgical intervention or interstim. Advised to take medications bottle with her to every doctors appointment as she has problems with memory and is forgetful at times.  See office note 10/23/24       Follow Up   Return in about 6 months (around 6/12/2025) for referral visit transfer of care [facility].  Patient was given instructions and counseling regarding her condition or for health maintenance advice. Please see specific information pulled into the AVS if appropriate.     DANYA Irizarry

## 2024-12-12 NOTE — TELEPHONE ENCOUNTER
The [facility] received a fax that requires your attention. The document has been indexed to the patient’s chart for your review.      Reason for sending: EXTERNAL MEDICAL RECORD NOTIFICATION     Documents Description: DILTIAZEM [id number] & ANGELA [id number]    Name of Sender: [employer]     Date Indexed: 12.11.24

## 2024-12-17 PROBLEM — B37.31 VAGINAL YEAST INFECTION: Status: RESOLVED | Noted: 2024-10-03 | Resolved: 2024-12-17

## 2025-01-28 ENCOUNTER — OFFICE VISIT (OUTPATIENT)
Dept: CARDIOLOGY | Facility: CLINIC | Age: 85
End: 2025-01-28
Payer: MEDICARE

## 2025-01-28 VITALS
DIASTOLIC BLOOD PRESSURE: 53 MMHG | HEIGHT: 65 IN | BODY MASS INDEX: 30.64 KG/M2 | WEIGHT: 183.9 LBS | HEART RATE: 73 BPM | OXYGEN SATURATION: 97 % | SYSTOLIC BLOOD PRESSURE: 131 MMHG

## 2025-01-28 DIAGNOSIS — E78.2 MIXED HYPERLIPIDEMIA: ICD-10-CM

## 2025-01-28 DIAGNOSIS — I10 PRIMARY HYPERTENSION: ICD-10-CM

## 2025-01-28 DIAGNOSIS — I48.0 AF (PAROXYSMAL ATRIAL FIBRILLATION): ICD-10-CM

## 2025-01-28 DIAGNOSIS — I35.0 AORTIC STENOSIS, MODERATE: Primary | ICD-10-CM

## 2025-01-28 DIAGNOSIS — I51.89 DIASTOLIC DYSFUNCTION: ICD-10-CM

## 2025-01-28 PROCEDURE — 1160F RVW MEDS BY RX/DR IN RCRD: CPT | Performed by: INTERNAL MEDICINE

## 2025-01-28 PROCEDURE — 1159F MED LIST DOCD IN RCRD: CPT | Performed by: INTERNAL MEDICINE

## 2025-01-28 PROCEDURE — G2211 COMPLEX E/M VISIT ADD ON: HCPCS | Performed by: INTERNAL MEDICINE

## 2025-01-28 PROCEDURE — 99214 OFFICE O/P EST MOD 30 MIN: CPT | Performed by: INTERNAL MEDICINE

## 2025-01-28 RX ORDER — METFORMIN HYDROCHLORIDE 750 MG/1
TABLET, EXTENDED RELEASE ORAL
COMMUNITY
Start: 2024-12-23

## 2025-01-28 RX ORDER — BRIMONIDINE TARTRATE AND TIMOLOL MALEATE 2; 5 MG/ML; MG/ML
SOLUTION OPHTHALMIC
COMMUNITY
Start: 2024-12-11

## 2025-01-28 RX ORDER — OXYBUTYNIN CHLORIDE 5 MG/1
5 TABLET ORAL 3 TIMES DAILY
COMMUNITY

## 2025-01-28 NOTE — PROGRESS NOTES
[facility]  INTERVENTIONAL CARDIOLOGY FOLLOW-UP PROGRESS NOTE        Chief Complaint  Mixed hyperlipidemia (4month follow up/)    Subjective            History of Present Illness  Lilia Becerra is a 84 y.o. female who presents to [facility] CARDIOLOGY    History of Present Illness  The patient is an 84-year-old female who presents for follow-up visit.  Patient has history of atrial fibrillation/flutter and recently underwent OMAR guided cardioversion in the hospital and was started on amiodarone 20 mg daily.  Patient also had Holter monitor that did not show A-fib/flutter.  Patient has been doing better and denies chest pain.  She has baseline shortness of breath and uses a walker for ambulation.  Patient is on Eliquis 5 mg twice daily.     She reports bilateral leg edema which is chronic.. She has been managing this with diuretics, specifically Lasix, but notes that the medication has not been effective in reducing the swelling. She was advised by her primary care physician to monitor her weight over the weekend, with a follow-up call to assess potential fluid retention. She does have a weighing scale at home. She also mentions that her legs have previously swollen, but this is the first instance where the swelling has been accompanied by pain. She does not utilize compression stockings. She is on Lasix 40 mg 1.5 tablets daily.    Patient has been having urinary incontinence and wants to resume oxybutynin.    She also reports poor sleep quality, often waking up after two hours of sleep. She feels fatigued throughout the day. She has been diagnosed with sleep apnea and owns a CPAP machine, but she does not use it as she does not find it beneficial.    She reports that her breathing is okay. She uses a walker and a wheelchair in the house. She has not been feeling dizzy, short of breath, or passing out. She feels about the same after the cardioversion of her heart. She is still taking  her blood thinner, 5 mg twice daily. She has history of moderate aortic stenosis.      MEDICATIONS  Current: Lasix, Eliquis.         Past History:  Past Medical History:   Diagnosis Date    Acid reflux disease     Arthritis     Atrial fibrillation     Back pain     Bowel obstruction     Breast cancer     Carpal tunnel syndrome     Constipation     Depression     Diabetes mellitus     Disease of thyroid gland     Dyslipidemia     Fecal incontinence     GERD (gastroesophageal reflux disease)     Glaucoma     left eye    HBP (high blood pressure)     Hearing impaired     hearing aides    Hiatal hernia     High cholesterol     History of transfusion     History of tuberculosis     IN 1980'S WAS TREATED    Hypertension     Hypomagnesemia     Kienbock's disease     Kienböck's disease, right     KIENBOCK'S AVASCULAR NECROSIS OF LUNATE, ADULT RIGHT    Nonrheumatic aortic valve stenosis 11/05/2020    OAB (overactive bladder)     Osteoporosis     Pneumonia     PONV (postoperative nausea and vomiting)     Positive PPD     RLS (restless legs syndrome)     Sleep apnea     NO MACHINE    Stage 3 chronic kidney disease     Status post bilateral mastectomy with sentinel node biopsy and right axillary node dissection 01/12/2022    Tailbone injury     fracture    Thyroid disease     Urinary incontinence     wears pads    Urinary retention        Medical History:  Past Medical History:   Diagnosis Date    Acid reflux disease     Arthritis     Atrial fibrillation     Back pain     Bowel obstruction     Breast cancer     Carpal tunnel syndrome     Constipation     Depression     Diabetes mellitus     Disease of thyroid gland     Dyslipidemia     Fecal incontinence     GERD (gastroesophageal reflux disease)     Glaucoma     left eye    HBP (high blood pressure)     Hearing impaired     hearing aides    Hiatal hernia     High cholesterol     History of transfusion     History of tuberculosis     IN 1980'S WAS TREATED    Hypertension      Hypomagnesemia     Kienbock's disease     Kienböck's disease, right     KIENBOCK'S AVASCULAR NECROSIS OF LUNATE, ADULT RIGHT    Nonrheumatic aortic valve stenosis 11/05/2020    OAB (overactive bladder)     Osteoporosis     Pneumonia     PONV (postoperative nausea and vomiting)     Positive PPD     RLS (restless legs syndrome)     Sleep apnea     NO MACHINE    Stage 3 chronic kidney disease     Status post bilateral mastectomy with sentinel node biopsy and right axillary node dissection 01/12/2022    Tailbone injury     fracture    Thyroid disease     Urinary incontinence     wears pads    Urinary retention        Surgical History:   has a past surgical history that includes Hemorrhoid surgery; Hysterectomy; Cataract extraction (Bilateral); Lumbar fusion; Incision and drainage of wound; Vertebroplasty; Knee Arthroplasty (Right); Total Knee Arthroplasty Revision (Left); Total Knee Arthroplasty Revision (Left, 02/13/2018); Colonoscopy; Lumbar discectomy (Left, 12/21/2018); Lumbar discectomy (Left, 03/29/2019); Total knee arthroplasty (Left, 06/13/2019); Ankle Tendon Repair; Back surgery (12/21/2018-3/2019); Carpal tunnel release; Esophagogastroduodenoscopy; Leg Surgery (06/13/2019); Tendon release (09/2018); Joint replacement; Mastectomy (Bilateral, 01/11/2022); US Guided Cyst Aspiration Breast (N/A, 03/24/2022); Esophagogastroduodenoscopy (N/A, 11/17/2022); Colonoscopy (N/A, 11/17/2022); Mastectomy (Bilateral); Cystoscopy (2014); Bladder repair; and Cystoscopy (2024).     Family History:   family history includes Breast cancer in her mother; Diabetes in her father and sister; Tuberculosis in her mother.     Social History:   reports that she has never smoked. She has never been exposed to tobacco smoke. She has never used smokeless tobacco. She reports that she does not drink alcohol and does not use drugs.    Allergies:   Egg-derived products, Nsaids, Sertraline, Tetanus toxoid, Tetanus toxoids, Metformin,  "Tetanus-diphtheria toxoids td, and Tuberculin ppd    Current Outpatient Medications on File Prior to Visit   Medication Sig    alendronate (FOSAMAX) 35 MG tablet Take 1 tablet by mouth Every 7 (Seven) Days.    apixaban (ELIQUIS) 5 MG tablet tablet Take 1 tablet by mouth Every 12 (Twelve) Hours.    atorvastatin (LIPITOR) 10 MG tablet Take 1 tablet by mouth every night at bedtime.    brimonidine-timolol (COMBIGAN) 0.2-0.5 % ophthalmic solution     DULoxetine (CYMBALTA) 60 MG capsule Take 1 capsule by mouth Daily.    empagliflozin (JARDIANCE) 25 MG tablet tablet Take 1 tablet by mouth Daily.    furosemide (Lasix) 40 MG tablet Take 1 tablet by mouth Daily.    letrozole (FEMARA) 2.5 MG tablet Take 1 tablet by mouth Daily.    levothyroxine (SYNTHROID, LEVOTHROID) 25 MCG tablet Take 1 tablet by mouth Daily. .    losartan (COZAAR) 25 MG tablet Take 1 tablet by mouth Daily.    metFORMIN ER (GLUCOPHAGE-XR) 750 MG 24 hr tablet     metoprolol succinate XL (TOPROL-XL) 25 MG 24 hr tablet Take 1 tablet by mouth Daily for 90 days.    Multiple Vitamins-Minerals (MULTIVITAMIN ADULTS PO) Take 1 tablet by mouth Daily.    omeprazole (priLOSEC) 20 MG capsule Take 1 capsule by mouth Daily.    oxybutynin (DITROPAN) 5 MG tablet Take 1 tablet by mouth 3 (Three) Times a Day.    pramipexole (MIRAPEX) 1.5 MG tablet Take 1 tablet by mouth Every Night.    SITagliptin (Januvia) 50 MG tablet Take 1 tablet by mouth Daily.     No current facility-administered medications on file prior to visit.          Review of Systems   Negative except as mentioned in HPI above.    Objective     /53 (BP Location: Left arm, Patient Position: Sitting, Cuff Size: Adult)   Pulse 73   Ht 165.1 cm (65\")   Wt 83.4 kg (183 lb 14.4 oz)   SpO2 97%   BMI 30.60 kg/m²       Physical Exam    General : Alert, awake, no acute distress  Neck : Supple, no carotid bruit, no jugular venous distention  CVS : Systolic murmur noted  Lungs: Clear to auscultation bilaterally, " no crackles or rhonchi  Abdomen: Soft, nontender, bowel sounds heard in all 4 quadrants  Extremities: Warm, well-perfused, no pedal edema    Result Review :     The following data was reviewed by: Aníbal Abraham MD on 01/28/2025:    CMP          8/24/2024    15:27 8/24/2024    17:17 8/25/2024    03:52 8/26/2024    04:21   CMP   Glucose 244  182  158  137    BUN 35  35  31  31    Creatinine 1.53  1.47  1.48  1.52    EGFR 33.2  34.8  34.6  33.5    Sodium 136  137  139  135    Potassium 4.1  4.0  4.1  4.4    Chloride 99  101  103  99    Calcium 9.0  8.9  9.6  9.5    BUN/Creatinine Ratio 22.9  23.8  20.9  20.4    Anion Gap 13.3  12.9  12.5  13.6      CBC          8/24/2024    15:27 8/25/2024    03:52   CBC   WBC 15.76  17.19    RBC 4.44  4.71    Hemoglobin 13.8  14.7    Hematocrit 41.7  44.6    MCV 93.9  94.7    MCH 31.1  31.2    MCHC 33.1  33.0    RDW 12.7  12.5    Platelets 226  225        Lipid Panel          8/25/2024    03:52   Lipid Panel   Total Cholesterol 146    Triglycerides 96    HDL Cholesterol 45    VLDL Cholesterol 18    LDL Cholesterol  83    LDL/HDL Ratio 1.82       A1C Last 3 Results          8/24/2024    15:27   HGBA1C Last 3 Results   Hemoglobin A1C 8.50          Data reviewed: Cardiology studies    Results for orders placed during the hospital encounter of 08/24/24    Adult Transesophageal Echo (OMAR) W/ Cont if Necessary Per Protocol (Bedside)    Interpretation Summary    Left ventricular ejection fraction appears to be 51 - 55%.    Left ventricular wall thickness is consistent with hypertrophy.    Mild to moderate aortic valve stenosis is present.    No echocardiographic intracardiac mass or thrombus present in OMAR.      Procedures  No results found for this or any previous visit.        ASCVD Score  The ASCVD Risk score (Ted DK, et al., 2019) failed to calculate for the following reasons:    The 2019 ASCVD risk score is only valid for ages 40 to 79         Assessment and Plan           Diagnoses and all orders for this visit:    1. Aortic stenosis, moderate (Primary)    2. AF (paroxysmal atrial fibrillation)    3. Primary hypertension    4. Mixed hyperlipidemia    5. Diastolic dysfunction      Patient is on Eliquis 5 mg twice daily for atrial fibrillation status post OMAR guided cardioversion in 8/2024.  Her serum creatinine is 1.5, weight is 83 kg.  May repeat BMP during next visit and may need to reduce Eliquis dose to 2.5 mg twice daily from 5 mg twice daily if serum creatinine is more than 1.5.    Assessment & Plan  1. Edema.  She reports persistent leg swelling despite taking her current diuretic regimen. She is advised to monitor her weight daily. If her weight increases by more than 3 pounds within a 24-hour period, she should take an additional 40 mg of Lasix and as needed basis. She is also advised to obtain compression stockings that extend to the thigh to help with the swelling. If the swelling persists or worsens, she should contact the office for an earlier appointment.    2. Urinary frequency.  She reports increased urinary frequency and difficulty reaching the bathroom since discontinuing her previous medications. She is advised to resume taking oxybutynin to help manage her bladder issues. If she can not find her old prescription, a new one will be provided.    3. Sleep apnea.  She reports poor sleep quality and feeling tired all the time. She is advised to use her CPAP machine, which she has but has not been using, to improve her sleep quality.    4. Gout.  She reports experiencing gout in both her big toes and new symptoms in her hands, which she suspects might be due to gout or arthritis.    5. Mild to moderate valvular stenosis.  She is informed about the mild to moderate tightness in one of her heart valves, which may progress over the next 5 years. A repeat echocardiogram will be scheduled during her next visit to monitor the condition. If the tightness increases, a  referral for valve replacement will be considered to help improve her breathing.    Follow-up  The patient will follow up in 6 months.        Patient was educated on heart healthy diet, daily exercise and achieving optimal weight.     Follow Up     Return in about 6 months (around 7/28/2025) for Next scheduled follow up.      Lilia Becerra  reports that she has never smoked. She has never been exposed to tobacco smoke. She has never used smokeless tobacco.       I spent 30 minutes caring for this patient on this date of service. This time includes time spent by me in the following activities:preparing for the visit, reviewing tests, obtaining and/or reviewing a separately obtained history, performing a medically appropriate examination and/or evaluation , counseling and educating the patient/family/caregiver, ordering medications, tests, or procedures, referring and communicating with other health care professionals , documenting information in the medical record, independently interpreting results and communicating that information with the patient/family/caregiver, and care coordination.    I have reviewed the family history, social history, and past medical history for this patient. Previous information and data has been reviewed and updated as needed. I have reviewed and verified the chief complaint, history, and other documentation. The patient was interviewed and examined in the clinic and the chart reviewed. The previous observations, recommendations, and conclusions were reviewed including those of other providers.     The plan was discussed with the patient and/or family. The patient was given time to ask questions and these questions were answered. At the conclusion of their visit they had no additional questions or concerns and all questions were answered to their satisfaction.     Patient was given instructions and counseling regarding her condition or for health maintenance advice. Please see specific  information pulled into the AVS if appropriate.      Patient or patient representative verbalized consent for the use of Ambient Listening during the visit with  Aníbal Abraham MD for chart documentation. 1/28/2025  17:09 EST      Aníbal Abraham MD, [facility]  01/28/25  17:03 EST    Dictated Utilizing Dragon Dictation

## 2025-01-28 NOTE — LETTER
January 28, 2025     Leo Lomas MD  [address] [place] [address]    Patient: Lilia Becerra   YOB: 1940   Date of Visit: 1/28/2025       Dear Leo Lomas MD,    Lilia Becerra was in my office today. Below are the relevant portions of my assessment and plan of care.           If you have questions, please do not hesitate to call me. I look forward to following Lilia along with you.         Sincerely,        Aníbal Abraham MD        CC: MD Reagan Fisher MD Karen J Brunkhorst, MD April Brock, APRN

## 2025-03-06 DIAGNOSIS — I48.0 PAROXYSMAL ATRIAL FIBRILLATION: Chronic | ICD-10-CM

## 2025-03-06 NOTE — TELEPHONE ENCOUNTER
Caller: Lilia Becerra    Relationship: Self    Best call back number: [phone]    Requested Prescriptions:   Requested Prescriptions     Pending Prescriptions Disp Refills    apixaban (ELIQUIS) 5 MG tablet tablet 180 tablet 3     Sig: Take 1 tablet by mouth Every 12 (Twelve) Hours.        Pharmacy where request should be sent: [facility]) - [address] - [phone] [facility] [phone] FX     Last office visit with prescribing clinician: 1/28/2025   Last telemedicine visit with prescribing clinician: Visit date not found   Next office visit with prescribing clinician: 7/28/2025     Additional details provided by patient: PT IS COMPLETELY OUT OF MED AT THIS TIME    Does the patient have less than a 3 day supply:  [] Yes  [x] No    Would you like a call back once the refill request has been completed: [] Yes [x] No    If the office needs to give you a call back, can they leave a voicemail: [] Yes [x] No    Tyrone Mccray Rep   03/06/25 10:52 EST     SENDING AS HIGH PRIORITY AS PT IS COMPLETELY OUT OF MED AT THIS TIME

## 2025-03-07 ENCOUNTER — TRANSCRIBE ORDERS (OUTPATIENT)
Dept: ADMINISTRATIVE | Facility: HOSPITAL | Age: 85
End: 2025-03-07
Payer: MEDICARE

## 2025-03-07 DIAGNOSIS — M25.572 PAIN IN JOINT, ANKLE AND FOOT, LEFT: Primary | ICD-10-CM

## 2025-03-07 DIAGNOSIS — I87.2 VENOUS INSUFFICIENCY (CHRONIC) (PERIPHERAL): ICD-10-CM

## 2025-03-07 DIAGNOSIS — I83.811 VARICOSE VEINS OF RIGHT LOWER EXTREMITY WITH PAIN: ICD-10-CM

## 2025-03-07 DIAGNOSIS — I83.812 VARICOSE VEINS OF LEFT LOWER EXTREMITY WITH PAIN: ICD-10-CM

## 2025-03-11 ENCOUNTER — TRANSCRIBE ORDERS (OUTPATIENT)
Dept: ADMINISTRATIVE | Facility: HOSPITAL | Age: 85
End: 2025-03-11
Payer: MEDICARE

## 2025-04-24 DIAGNOSIS — E78.5 HYPERLIPIDEMIA LDL GOAL <100: ICD-10-CM

## 2025-04-24 DIAGNOSIS — I10 ESSENTIAL HYPERTENSION: Chronic | ICD-10-CM

## 2025-04-24 RX ORDER — ATORVASTATIN CALCIUM 10 MG/1
10 TABLET, FILM COATED ORAL
Qty: 90 TABLET | Refills: 3 | Status: SHIPPED | OUTPATIENT
Start: 2025-04-24

## 2025-04-24 RX ORDER — LOSARTAN POTASSIUM 25 MG/1
25 TABLET ORAL DAILY
Qty: 90 TABLET | Refills: 3 | Status: SHIPPED | OUTPATIENT
Start: 2025-04-24

## 2025-05-29 ENCOUNTER — TELEPHONE (OUTPATIENT)
Dept: CARDIOLOGY | Facility: CLINIC | Age: 85
End: 2025-05-29
Payer: MEDICARE
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
## 2025-05-29 NOTE — TELEPHONE ENCOUNTER
Agree with recommendations.  Most likely musculoskeletal however due to heart rate concerns, I am happy to see her back in the clinic if needed sooner than her scheduled follow-up.
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
## 2025-07-09 ENCOUNTER — HOSPITAL ENCOUNTER (OUTPATIENT)
Dept: OTHER | Facility: HOSPITAL | Age: 85
Discharge: HOME | End: 2025-07-09

## 2025-07-14 NOTE — PROGRESS NOTES
Chief Complaint: Urge incontinence of urine and OAB    Patient or patient representative verbalized consent for the use of Ambient Listening during the visit with  DANYA Valverde for chart documentation. 7/15/2025  12:00 EDT    Subjective         History of Present Illness    History of Present Illness  Lilia Becerra is a 84 y.o. female presents to [facility] to be seen for overactive bladder, urge incontinence, history of bladder repair.    The patient was previously seen and managed by Juana eLnz, urology APRN, with last visit on 12/12/2024.  At that time, the patient was not on any medications for OAB.  She had previously been treated with oxybutynin and Myrbetriq.  Per Ms. Lenz's notes, the patient had underwent recent cystoscopy which indicated the presence of a tiny bladder diverticulum.  They discussed the possibility of setting the patient up for urodynamic testing.  The patient was not interested in surgical intervention or InterStim at that time.  The patient is here today to establish care with our office since Ms. Lenz's office has closed.    She was previously under the care of Dr. Zapata's office, with her last visit to Juana Lenz in 12/2024. At that time, she was not on any medication for her overactive bladder. She has tried oxybutynin and Myrbetriq in the past, but they were ineffective. Gemtesa was not an option at that time due to insurance coverage issues. She reports frequent urination, often feeling the need to rush to the bathroom. She experiences urinary leakage when coughing, laughing, sneezing, or changing positions. She reports that she has no control over her bladder.  She wakes up 3 to 4 times a night to urinate, depending on her fluid intake before bed. She reports no blood in her urine or history of kidney stones.  She reports that her nephrologist is pleased with her urinary frequency as it indicates her kidneys are functioning well. She is  interested in trying Gemtesa again and is curious about the effectiveness of bladder Botox injections. She does not perform Kegel exercises at home. She reports no signs of infection or burning sensation during urination. She consumes decaffeinated coffee sparingly. She uses pull-ups for urinary incontinence, going through 3 boxes of 54 each per month. She had a mesh sling inserted in her bladder a long time ago, which was later removed as there were some issues with the mesh.    She has a history of high blood pressure, atrial fibrillation, heart failure, aortic stenosis, diabetes, and chronic kidney disease stage 3B. She also has restless legs syndrome. She takes a pill for cancer prevention and is cancer-free for 4 years. She is on Eliquis for atrial fibrillation.    SOCIAL HISTORY  She does not smoke.    FAMILY HISTORY  Her father had bladder disease but not cancer. Her mother had breast cancer.    Urinary symptoms/history:   Frequency-admits    Urgency-admits     Incontinence-admits, due to urgency and with coughing/sneezing and with position changes     Nocturia-admits, 3 -4 X per night     GH-denies     History of stones-denies      surgeries-bladder repair with mesh and then subsequent removal     Family history of  malignancy-denies     Cardiopulmonary-Afib/flutter, HTN, aortic stenosis, heart failure, DM, CKD stage 3, RIKI     Anticoagulants-Eliquis     Smoker-denies     Objective     Past Medical History:   Diagnosis Date    Acid reflux disease     Arthritis     Atrial fibrillation     Back pain     Bowel obstruction     Breast cancer     Carpal tunnel syndrome     Constipation     Depression     Diabetes mellitus     Disease of thyroid gland     Dyslipidemia     Fecal incontinence     GERD (gastroesophageal reflux disease)     Glaucoma     left eye    HBP (high blood pressure)     Hearing impaired     hearing aides    Hiatal hernia     High cholesterol     History of transfusion     History of  tuberculosis     IN 1980'S WAS TREATED    Hypertension     Hypomagnesemia     Kienbock's disease     Kienböck's disease, right     KIENBOCK'S AVASCULAR NECROSIS OF LUNATE, ADULT RIGHT    Nonrheumatic aortic valve stenosis 11/05/2020    OAB (overactive bladder)     Osteoporosis     Pneumonia     PONV (postoperative nausea and vomiting)     Positive PPD     RLS (restless legs syndrome)     Sleep apnea     NO MACHINE    Stage 3 chronic kidney disease     Status post bilateral mastectomy with sentinel node biopsy and right axillary node dissection 01/12/2022    Tailbone injury     fracture    Thyroid disease     Urinary incontinence     wears pads    Urinary retention        Past Surgical History:   Procedure Laterality Date    ANKLE TENDON REPAIR      BACK SURGERY  12/21/2018-3/2019    LUMBAR--BROKEN DISC, CLEANED OUT--HAS 2ND PROCEDURE TO FINISH REMOVING    BLADDER REPAIR      bladder sling with cysto    CARPAL TUNNEL RELEASE      CATARACT EXTRACTION Bilateral     COLONOSCOPY      COLONOSCOPY N/A 11/17/2022    Procedure: COLONOSCOPY with biopsy;  Surgeon: Leo Alvares MD;  Location: [facility] ENDOSCOPY;  Service: Gastroenterology;  Laterality: N/A;  diverticulosis    CYSTOSCOPY  2014    removal of mesh bladder sling    CYSTOSCOPY  2024    ENDOSCOPY      ENDOSCOPY N/A 11/17/2022    Procedure: ESOPHAGOGASTRODUODENOSCOPY with biopsy and snare;  Surgeon: Leo Alvares MD;  Location: [facility] ENDOSCOPY;  Service: Gastroenterology;  Laterality: N/A;  gastric fundus polyp    HEMORRHOIDECTOMY      HYSTERECTOMY      INCISION AND DRAINAGE OF WOUND      on back     JOINT REPLACEMENT      KNEE ARTHROPLASTY Right     LEG SURGERY  06/13/2019    LUMBAR DISCECTOMY Left 12/21/2018    Procedure: LEFT L4-5 MICRO DISCECTOMY;  Surgeon: Adam Coleman DO;  Location: [facility] OR;  Service: Orthopedic Spine    LUMBAR DISCECTOMY Left 03/29/2019    Procedure: LEFT L4-5 REVISION OF MICRODISCECTOMY;  Surgeon: Adam Coleman DO;   Location: [facility] MAIN OR;  Service: Orthopedic Spine    LUMBAR FUSION      MASTECTOMY Bilateral 01/11/2022    Procedure: BREAST MASTECTOMY WITH SENTINEL NODE BIOPSY AND possible  AXILLARY NODE DISSECTION;  Surgeon: Lety Tena MD;  Location: [facility];  Service: General;  Laterality: Bilateral;    MASTECTOMY Bilateral     TENDON RELEASE  09/2018    TOTAL KNEE ARTHROPLASTY Left 06/13/2019    Procedure: LEFT KNEE REVISION;  Surgeon: Malick Costa II, MD;  Location: [facility] MAIN OR;  Service: Orthopedics    TOTAL KNEE ARTHROPLASTY REVISION Left     x2    TOTAL KNEE ARTHROPLASTY REVISION Left 02/13/2018    Procedure: LEFT TOTAL KNEE ARTHROPLASTY REVISION;  Surgeon: Jair Fajardo MD;  Location: [facility] OR;  Service:     US GUIDED CYST ASPIRATION BREAST N/A 03/24/2022    VERTEBROPLASTY           Current Outpatient Medications:     acetaminophen (Tylenol 8 Hour) 650 MG 8 hr tablet, Every 8 (Eight) Hours., Disp: , Rfl:     alendronate (FOSAMAX) 35 MG tablet, Take 1 tablet by mouth Every 7 (Seven) Days., Disp: , Rfl:     apixaban (ELIQUIS) 5 MG tablet tablet, Take 1 tablet by mouth Every 12 (Twelve) Hours., Disp: 180 tablet, Rfl: 1    ascorbic acid (VITAMIN C) 1000 MG tablet, Take 1 tablet by mouth Daily., Disp: , Rfl:     atorvastatin (LIPITOR) 10 MG tablet, Take 1 tablet by mouth every night at bedtime., Disp: 90 tablet, Rfl: 3    brimonidine-timolol (COMBIGAN) 0.2-0.5 % ophthalmic solution, , Disp: , Rfl:     cyclobenzaprine (FLEXERIL) 10 MG tablet, , Disp: , Rfl:     DULoxetine (CYMBALTA) 60 MG capsule, Take 1 capsule by mouth Daily., Disp: , Rfl:     empagliflozin (JARDIANCE) 25 MG tablet tablet, Take 1 tablet by mouth Daily., Disp: , Rfl:     furosemide (Lasix) 40 MG tablet, Take 1 tablet by mouth Daily., Disp: , Rfl:     letrozole (FEMARA) 2.5 MG tablet, Take 1 tablet by mouth Daily., Disp: 90 tablet, Rfl: 1    levothyroxine (SYNTHROID, LEVOTHROID) 25 MCG tablet, Take 1 tablet by  "mouth Daily. ., Disp: , Rfl:     losartan (COZAAR) 25 MG tablet, Take 1 tablet by mouth Daily., Disp: 90 tablet, Rfl: 3    Magnesium 400 MG tablet, Take 400 mg by mouth Every 12 (Twelve) Hours., Disp: , Rfl:     Multiple Vitamins-Minerals (MULTIVITAMIN ADULTS PO), Take 1 tablet by mouth Daily., Disp: , Rfl:     omeprazole (priLOSEC) 20 MG capsule, Take 1 capsule by mouth Daily., Disp: , Rfl:     pramipexole (MIRAPEX) 1.5 MG tablet, Take 1 tablet by mouth Every Night., Disp: , Rfl:     SITagliptin (Januvia) 50 MG tablet, Take 1 tablet by mouth Daily., Disp: , Rfl:     traMADol (ULTRAM) 50 MG tablet, Every 6 (Six) Hours., Disp: , Rfl:     Vibegron 75 MG tablet, Take 1 tablet by mouth Daily for 360 days., Disp: 30 tablet, Rfl: 11    Allergies   Allergen Reactions    Egg-Derived Products Swelling    Nsaids Unknown - High Severity    Sertraline Unknown - High Severity     Bleeding in stomach    Tetanus Toxoid Swelling    Tetanus Toxoids Swelling    Metformin Diarrhea and Unknown - Low Severity    Tetanus-Diphtheria Toxoids Td Unknown - Low Severity    Tuberculin Ppd Unknown - Low Severity        Family History   Problem Relation Age of Onset    Breast cancer Mother     Tuberculosis Mother     Diabetes Father     Diabetes Sister     Malig Hyperthermia Neg Hx     Colon cancer Neg Hx        Social History     Socioeconomic History    Marital status:    Tobacco Use    Smoking status: Never     Passive exposure: Never    Smokeless tobacco: Never   Vaping Use    Vaping status: Never Used   Substance and Sexual Activity    Alcohol use: No    Drug use: No    Sexual activity: Defer       Vital Signs:   Resp 12   Ht 65 cm (25.59\")   Wt 80.3 kg (177 lb)   .02 kg/m²      Physical Exam  Vitals and nursing note reviewed.   Constitutional:       General: She is not in acute distress.     Appearance: Normal appearance. She is not toxic-appearing.   Pulmonary:      Effort: Pulmonary effort is normal. No respiratory " distress.   Neurological:      General: No focal deficit present.      Mental Status: She is alert and oriented to person, place, and time.          Result Review :   The following data was reviewed by: DANYA Valverde on 07/15/2025:  Results for orders placed or performed in visit on 07/15/25   Bladder Scan    Collection Time: 07/15/25 11:45 AM   Result Value Ref Range    Urine Volume 2      Bladder Scan interpretation 07/15/2025    Estimation of residual urine via BVI 3000 Verathon Bladder Scan  MA/nurse performing: JONAH Maria  Residual Urine: 2 ml  Indication: OAB (overactive bladder)    Mixed stress and urge urinary incontinence   Position: Supine  Examination: Incremental scanning of the suprapubic area using 2.0 MHz transducer using copious amounts of acoustic gel.   Findings: An anechoic area was demonstrated which represented the bladder, with measurement of residual urine as noted. I inspected this myself. In that the residual urine was stable or insignificant, refer to plan for treatment and plan necessary at this time.            Results  Diagnostic Testing   - Cystoscopy: Normal        Procedures        Assessment and Plan    Diagnoses and all orders for this visit:    1. OAB (overactive bladder) (Primary)  -     Bladder Scan  -     Vibegron 75 MG tablet; Take 1 tablet by mouth Daily for 360 days.  Dispense: 30 tablet; Refill: 11    2. Mixed stress and urge urinary incontinence        Assessment & Plan  1. Overactive bladder.  Significant urinary frequency and urgency are reported, with usage of 3 boxes of pull-ups per month. Leakage occurs with coughing, laughing, sneezing, and changing positions, indicating a lack of bladder control. Previous treatments with oxybutynin and Myrbetriq were ineffective, and Gemtesa was not covered by insurance previously.  The patient would like to try Gemtesa at this time if it is covered. A prescription for Gemtesa will be sent to the pharmacy. The patient is  advised to check with the pharmacy regarding the cost and inform if it is expensive so that prior authorization can be attempted. If approved, Gemtesa should be tried for 6 weeks to assess its effectiveness.  Potential side effects were discussed.  We also discussed the potential side effects of some of the older anticholinergic medications which I would not recommend for this patient due to her age and potential risk for cognitive side effects.  The possibility of Botox injections in the bladder was also mentioned, but it was decided to exhaust oral medication options first. Monitoring fluid intake, avoiding dehydration, limiting caffeine, and avoiding excessive fluid intake before bedtime are recommended.    2. Urinary incontinence.  Urinary leakage occurs with coughing, laughing, sneezing, and changing positions. Pelvic floor physical therapy and exercises were recommended as potential treatments for stress incontinence. A referral for pelvic floor physical therapy can be made if interested, but the patient politely declined at this time.    Follow-up  A follow-up appointment is scheduled for 6 weeks from now.      Follow Up   Return in about 6 weeks (around 8/26/2025).  Patient was given instructions and counseling regarding her condition or for health maintenance advice. Please see specific information pulled into the AVS if appropriate.         This document has been electronically signed by DANYA Valverde  July 15, 2025 17:17 EDT

## 2025-07-15 ENCOUNTER — OFFICE VISIT (OUTPATIENT)
Dept: UROLOGY | Age: 85
End: 2025-07-15
Payer: MEDICARE

## 2025-07-15 VITALS — RESPIRATION RATE: 12 BRPM | HEIGHT: 55 IN | BODY MASS INDEX: 40.96 KG/M2 | WEIGHT: 177 LBS

## 2025-07-15 DIAGNOSIS — N32.81 OAB (OVERACTIVE BLADDER): Primary | ICD-10-CM

## 2025-07-15 DIAGNOSIS — N39.46 MIXED STRESS AND URGE URINARY INCONTINENCE: ICD-10-CM

## 2025-07-15 LAB — URINE VOLUME: 2

## 2025-07-15 PROCEDURE — 1159F MED LIST DOCD IN RCRD: CPT | Performed by: NURSE PRACTITIONER

## 2025-07-15 PROCEDURE — 1160F RVW MEDS BY RX/DR IN RCRD: CPT | Performed by: NURSE PRACTITIONER

## 2025-07-15 PROCEDURE — 99213 OFFICE O/P EST LOW 20 MIN: CPT | Performed by: NURSE PRACTITIONER

## 2025-07-15 RX ORDER — SENNOSIDES 8.6 MG
CAPSULE ORAL EVERY 8 HOURS SCHEDULED
COMMUNITY
End: 2025-08-17

## 2025-07-15 RX ORDER — CALCIUM CARBONATE 300MG(750)
400 TABLET,CHEWABLE ORAL EVERY 12 HOURS SCHEDULED
COMMUNITY

## 2025-07-15 RX ORDER — TRAMADOL HYDROCHLORIDE 50 MG/1
TABLET ORAL EVERY 6 HOURS SCHEDULED
COMMUNITY
Start: 2025-07-09

## 2025-07-15 RX ORDER — CYCLOBENZAPRINE HCL 10 MG
TABLET ORAL
COMMUNITY
Start: 2025-04-26

## 2025-08-01 ENCOUNTER — OFFICE VISIT (OUTPATIENT)
Dept: ORTHOPEDIC SURGERY | Facility: CLINIC | Age: 85
End: 2025-08-01
Payer: MEDICARE

## 2025-08-01 VITALS
DIASTOLIC BLOOD PRESSURE: 80 MMHG | SYSTOLIC BLOOD PRESSURE: 137 MMHG | WEIGHT: 174 LBS | BODY MASS INDEX: 28.99 KG/M2 | OXYGEN SATURATION: 95 % | HEART RATE: 62 BPM | HEIGHT: 65 IN

## 2025-08-01 DIAGNOSIS — M16.12 PRIMARY OSTEOARTHRITIS OF LEFT HIP: Primary | ICD-10-CM

## 2025-08-01 DIAGNOSIS — M54.16 LUMBAR RADICULOPATHY: ICD-10-CM

## 2025-08-01 RX ADMIN — TRIAMCINOLONE ACETONIDE 40 MG: 40 INJECTION, SUSPENSION INTRA-ARTICULAR; INTRAMUSCULAR at 11:34

## 2025-08-01 RX ADMIN — LIDOCAINE HYDROCHLORIDE 5 ML: 10 INJECTION, SOLUTION EPIDURAL; INFILTRATION; INTRACAUDAL; PERINEURAL at 11:34

## 2025-08-01 NOTE — PROGRESS NOTES
"Chief Complaint  Follow-up of the Left Hip       Subjective      Lilia Becerra presents to [facility] for a follow up for her left hip. Her left hip has been bothering her for several years but has gradually gotten worse. She is ambulating today with a Rolator walker. She locates her pain to her low back region that radiates down her leg. She has pain with prolonged walking and going up and down stairs.     Allergies   Allergen Reactions    Egg-Derived Products Swelling    Nsaids Unknown - High Severity    Sertraline Unknown - High Severity     Bleeding in stomach    Tetanus Toxoid Swelling    Tetanus Toxoids Swelling    Metformin Diarrhea and Unknown - Low Severity    Tetanus-Diphtheria Toxoids Td Unknown - Low Severity    Tuberculin Ppd Unknown - Low Severity        Social History     Socioeconomic History    Marital status:    Tobacco Use    Smoking status: Never     Passive exposure: Never    Smokeless tobacco: Never   Vaping Use    Vaping status: Never Used   Substance and Sexual Activity    Alcohol use: No    Drug use: No    Sexual activity: Defer        I reviewed the patient's chief complaint, history of present illness, review of systems, past medical history, surgical history, family history, social history, medications, and allergy list.     Review of Systems     Constitutional: Denies fevers, chills, weight loss  Cardiovascular: Denies chest pain, shortness of breath  Skin: Denies rashes, acute skin changes  Neurologic: Denies headache, loss of consciousness  MSK: left thigh pain       Vital Signs:   /80   Pulse 62   Ht 165.1 cm (65\")   Wt 78.9 kg (174 lb)   SpO2 95%   BMI 28.96 kg/m²            Ortho Exam    Physical Exam  General:Alert. No acute distress   Left lower extremity: weakness with hip flexion , hip flexion  to 60 degrees, internal rotation to 10 degrees, external rotation  to 15 degrees, pain with straight leg raise, calf soft, distal " neurovascularly intact, positive  pulses, positive EHL, FHL, GS, and TA. Sensation intact to all 5 nerves of the foot.      Large Joint: L greater trochanteric bursa  Date/Time: 8/1/2025 11:34 AM  Consent given by: patient  Site marked: site marked  Timeout: Immediately prior to procedure a time out was called to verify the correct patient, procedure, equipment, support staff and site/side marked as required   Supporting Documentation  Indications: pain   Procedure Details  Location: hip - L greater trochanteric bursa  Preparation: Patient was prepped and draped in the usual sterile fashion  Needle gauge: 21 G.  Medications administered: 40 mg triamcinolone acetonide 40 MG/ML; 5 mL lidocaine PF 1% 1 %  Patient tolerance: patient tolerated the procedure well with no immediate complications    This injection documentation was Scribed for Franky Rodriguez MD by Noy Cardozo MA.  08/01/25   11:35 EDT   Imaging Results (Most Recent)       None             Result Review :       XR outside films  Result Date: 7/29/2025  Narrative: This procedure was auto-finalized with no dictation required.    XR Knee 1 or 2 View Left  Result Date: 7/16/2025  Narrative: XR KNEE LEFT 1 OR 2 VIEWS   INDICATION:  84 years Female M15.0: Primary generalized (osteo)arthritis   TECHNIQUE:  2 view(s) of the knee COMPARISON:  1/4/2024   FINDINGS: Normal visualized distal femur.  Normal visualized proximal tibia and fibula.  Normal patella. Status post knee arthroplasty.  The prosthesis appears located.  No osteolysis to suggest loosening. Normal proximal tibiofibular joint. There is no joint effusion. Normal soft tissue structures.      Impression: Normal x-ray of the knee after arthroplasty. [id number]    XR Hip With or Without Pelvis 2 - 3 View Left  Result Date: 7/10/2025  Narrative: XR HIP LEFT AP AND LATERAL 2+VIEWS W PELVIS   INDICATION:  84 years Female M25.552: Pain in left hip   TECHNIQUE:  3 views of the pelvis and hip  COMPARISON:  None   FINDINGS: Normal bilateral iliac wings and sacrum.  Normal sacroiliac joints. Normal bilateral superior and inferior pubic rami.  Normal pubic symphysis. Normal bilateral ischial tuberosities. Normal visualized proximal femur with no femoral neck fracture.  Normal hip joint. Normal bowel gas pattern.  Normal soft tissue structures.    Impression: Normal x-ray of the hip. [id number]             Assessment and Plan     Diagnoses and all orders for this visit:    1. Primary osteoarthritis of left hip (Primary)    2. Lumbar radiculopathy        The patient presents here today for a follow up for her left hip.     MRI order placed today on her lumbar spine and left hip to evaluate for internal derangement.     Discussed the risks and benefits of a left hip steroid injection today in the office. Patient expressed understanding and wishes to proceed. Patient tolerted the injection well and without any complications.     Discussed with the patient that due to the steroid injection given today in the office they may see an increase in blood sugar for a few days. Advised patient to monitor sugar after receiving the injection.      Call or return if worsening symptoms.    Follow Up     After MRI     Patient was given instructions and counseling regarding her condition or for health maintenance advice. Please see specific information pulled into the AVS if appropriate.     Scribed for Franky Rodriguez MD by Jessica Velazquez.  08/01/25   10:46 EDT    I have personally performed the services described in this document as scribed by the above individual and it is both accurate and complete. Franky Rodriguez MD 08/02/25

## 2025-08-02 RX ORDER — LIDOCAINE HYDROCHLORIDE 10 MG/ML
5 INJECTION, SOLUTION EPIDURAL; INFILTRATION; INTRACAUDAL; PERINEURAL
Status: COMPLETED | OUTPATIENT
Start: 2025-08-01 | End: 2025-08-01

## 2025-08-02 RX ORDER — TRIAMCINOLONE ACETONIDE 40 MG/ML
40 INJECTION, SUSPENSION INTRA-ARTICULAR; INTRAMUSCULAR
Status: COMPLETED | OUTPATIENT
Start: 2025-08-01 | End: 2025-08-01

## 2025-08-07 ENCOUNTER — TELEPHONE (OUTPATIENT)
Dept: ORTHOPEDIC SURGERY | Facility: CLINIC | Age: 85
End: 2025-08-07

## 2025-08-20 ENCOUNTER — TRANSCRIBE ORDERS (OUTPATIENT)
Dept: ADMINISTRATIVE | Facility: HOSPITAL | Age: 85
End: 2025-08-20
Payer: MEDICARE

## 2025-08-20 DIAGNOSIS — Z78.0 ASYMPTOMATIC MENOPAUSAL STATE: Primary | ICD-10-CM

## 2025-08-21 ENCOUNTER — OFFICE VISIT (OUTPATIENT)
Dept: CARDIOLOGY | Facility: CLINIC | Age: 85
End: 2025-08-21
Payer: MEDICARE

## 2025-08-21 VITALS
DIASTOLIC BLOOD PRESSURE: 68 MMHG | BODY MASS INDEX: 28.76 KG/M2 | WEIGHT: 172.6 LBS | HEART RATE: 73 BPM | HEIGHT: 65 IN | SYSTOLIC BLOOD PRESSURE: 135 MMHG | OXYGEN SATURATION: 98 %

## 2025-08-21 DIAGNOSIS — I35.0 AORTIC STENOSIS, MODERATE: ICD-10-CM

## 2025-08-21 DIAGNOSIS — I51.89 DIASTOLIC DYSFUNCTION: ICD-10-CM

## 2025-08-21 DIAGNOSIS — E78.5 HYPERLIPIDEMIA LDL GOAL <100: ICD-10-CM

## 2025-08-21 DIAGNOSIS — I48.0 PAROXYSMAL ATRIAL FIBRILLATION: Primary | ICD-10-CM

## 2025-08-21 DIAGNOSIS — I10 ESSENTIAL HYPERTENSION: ICD-10-CM

## 2025-08-25 ENCOUNTER — TELEPHONE (OUTPATIENT)
Dept: ORTHOPEDIC SURGERY | Facility: CLINIC | Age: 85
End: 2025-08-25
Payer: MEDICARE

## 2025-08-26 ENCOUNTER — OFFICE VISIT (OUTPATIENT)
Dept: UROLOGY | Age: 85
End: 2025-08-26
Payer: MEDICARE

## 2025-08-26 VITALS — BODY MASS INDEX: 28.66 KG/M2 | HEIGHT: 65 IN | WEIGHT: 172 LBS | RESPIRATION RATE: 12 BRPM

## 2025-08-26 DIAGNOSIS — N32.81 OAB (OVERACTIVE BLADDER): Primary | ICD-10-CM

## 2025-08-26 DIAGNOSIS — I48.0 PAROXYSMAL ATRIAL FIBRILLATION: Chronic | ICD-10-CM

## 2025-08-26 DIAGNOSIS — R82.90 CLOUDY URINE: ICD-10-CM

## 2025-08-26 DIAGNOSIS — N39.46 MIXED STRESS AND URGE URINARY INCONTINENCE: ICD-10-CM

## 2025-08-26 LAB — URINE VOLUME: 0

## 2025-08-26 PROCEDURE — 99213 OFFICE O/P EST LOW 20 MIN: CPT | Performed by: NURSE PRACTITIONER

## 2025-08-26 PROCEDURE — 1160F RVW MEDS BY RX/DR IN RCRD: CPT | Performed by: NURSE PRACTITIONER

## 2025-08-26 PROCEDURE — 87077 CULTURE AEROBIC IDENTIFY: CPT | Performed by: NURSE PRACTITIONER

## 2025-08-26 PROCEDURE — 87186 SC STD MICRODIL/AGAR DIL: CPT | Performed by: NURSE PRACTITIONER

## 2025-08-26 PROCEDURE — 1159F MED LIST DOCD IN RCRD: CPT | Performed by: NURSE PRACTITIONER

## 2025-08-26 PROCEDURE — 87086 URINE CULTURE/COLONY COUNT: CPT | Performed by: NURSE PRACTITIONER

## 2025-08-28 ENCOUNTER — TELEPHONE (OUTPATIENT)
Dept: UROLOGY | Age: 85
End: 2025-08-28
Payer: MEDICARE

## 2025-08-28 DIAGNOSIS — N39.0 ACUTE UTI: Primary | ICD-10-CM

## 2025-08-28 LAB — BACTERIA SPEC AEROBE CULT: ABNORMAL

## 2025-08-28 RX ORDER — AMOXICILLIN 875 MG/1
875 TABLET, COATED ORAL 2 TIMES DAILY
Qty: 14 TABLET | Refills: 0 | Status: SHIPPED | OUTPATIENT
Start: 2025-08-28 | End: 2025-09-04

## (undated) DEVICE — SUT VIC 3/0 SH 27IN J416H

## (undated) DEVICE — LINER SURG CANSTR SXN S/RIGD 1500CC

## (undated) DEVICE — GLV SURG BIOGEL LTX PF 8

## (undated) DEVICE — SUT VIC 2/0 CT2 27IN J269H

## (undated) DEVICE — DRSNG SURESITE WNDW 4X4.5

## (undated) DEVICE — SOL NACL 0.9PCT 1000ML

## (undated) DEVICE — BLCK/BITE BLOX WO/DENTL/RIM W/STRAP 54F

## (undated) DEVICE — SPNG GZ WOVN 4X4IN 12PLY 10/BX STRL

## (undated) DEVICE — DEV OPN LIGASURE DISSCT EXACT 40DEG 21.6MM BX/1

## (undated) DEVICE — SUT VIC 1 CT1 36IN J947H

## (undated) DEVICE — STERILE POLYISOPRENE POWDER-FREE SURGICAL GLOVES WITH EMOLLIENT COATING: Brand: PROTEXIS

## (undated) DEVICE — DUAL CUT SAGITTAL BLADE

## (undated) DEVICE — GLV SURG SENSICARE MICRO PF LF 8 STRL

## (undated) DEVICE — NDL SPINE 22G 31/2IN BLK

## (undated) DEVICE — GAUZE,SPONGE,FLUFF,6"X6.75",STRL,10/TRAY: Brand: MEDLINE

## (undated) DEVICE — DRP MICROSCOPE 4 BINOCULAR CV 54X150IN

## (undated) DEVICE — SOL ISO/ALC RUB 70PCT 4OZ

## (undated) DEVICE — SYR LUERLOK 20CC BX/50

## (undated) DEVICE — 1010 S-DRAPE TOWEL DRAPE 10/BX: Brand: STERI-DRAPE™

## (undated) DEVICE — CONTAINER,SPECIMEN,O.R.STRL,4.5OZ: Brand: MEDLINE

## (undated) DEVICE — CONN TBG Y 5 IN 1 LF STRL

## (undated) DEVICE — PETROLATUM DRESSING. FINE MESH GAUZE IMPREGNATED WITH 3% BISMUTH TRIBROMOPHENATE  IN A PETROLATUM BLEND.: Brand: XEROFORM PETROLATUM

## (undated) DEVICE — TOWEL,OR,DSP,ST,BLUE,STD,4/PK,20PK/CS: Brand: MEDLINE

## (undated) DEVICE — THIN OSTEOTOME BLADE 8MM X 3 IN: Brand: RENOVATION

## (undated) DEVICE — SNAR E/S POLYP SNAREMASTER OVL/10MM 2.8X2300MM YEL

## (undated) DEVICE — SYR CONTRL LUERLOK 10CC

## (undated) DEVICE — GLV SURG TRIUMPH CLASSIC PF LTX 8 STRL

## (undated) DEVICE — NDL HYPO ECLPS SFTY 22G 1 1/2IN

## (undated) DEVICE — INTENDED FOR TISSUE SEPARATION, AND OTHER PROCEDURES THAT REQUIRE A SHARP SURGICAL BLADE TO PUNCTURE OR CUT.: Brand: BARD-PARKER ® CARBON RIB-BACK BLADES

## (undated) DEVICE — BIOPATCH™ ANTIMICROBIAL DRESSING WITH CHLORHEXIDINE GLUCONATE IS A HYDROPHILLIC POLYURETHANE ABSORPTIVE FOAM WITH CHLORHEXIDINE GLUCONATE (CHG) WHICH INHIBITS BACTERIAL GROWTH UNDER THE DRESSING. THE DRESSING IS INTENDED TO BE USED TO ABSORB EXUDATE, COVER A WOUND CAUSED BY VASCULAR AND NONVASCULAR PERCUTANEOUS MEDICAL DEVICES DURING SURGERY, AS WELL AS REDUCE LOCAL INFECTION AND COLONIZATION OF MICROORGANISMS.: Brand: BIOPATCH

## (undated) DEVICE — PAD,ABDOMINAL,8"X10",ST,LF: Brand: MEDLINE

## (undated) DEVICE — ANTIBACTERIAL UNDYED BRAIDED (POLYGLACTIN 910), SYNTHETIC ABSORBABLE SUTURE: Brand: COATED VICRYL

## (undated) DEVICE — Device

## (undated) DEVICE — PK NEURO SPINE 40

## (undated) DEVICE — SUT VIC 0/0 UR6 27IN DYED J603H

## (undated) DEVICE — BRA COMPR SURG STL119 V/CLOSE/FRNT SZLG WHT

## (undated) DEVICE — SINGLE-USE BIOPSY FORCEPS: Brand: RADIAL JAW 4

## (undated) DEVICE — NDL SPINE 20G 3 1/2 YEL STRL 1P/U

## (undated) DEVICE — 3.0MM NEURO (MATCH HEAD) LESS AGGRESSIVE

## (undated) DEVICE — SUT MONOCRYL 4/0 PS2 27IN Y426H ETY426H

## (undated) DEVICE — GAUZE,SPONGE,4"X4",16PLY,STRL,LF,10/TRAY: Brand: MEDLINE

## (undated) DEVICE — PK KN TOTL 40

## (undated) DEVICE — SOL IRRG H2O PL/BG 1000ML STRL

## (undated) DEVICE — SMOKE EVACUATION TUBING WITH 7/8 IN TO 1/4 IN REDUCER: Brand: BUFFALO FILTER

## (undated) DEVICE — BANDAGE,GAUZE,BULKEE II,4.5"X4.1YD,STRL: Brand: MEDLINE

## (undated) DEVICE — 6.0MM PRECISION ROUND

## (undated) DEVICE — DRSNG WND BORDR/ADHS NONADHR/GZ LF 2X2IN STRL

## (undated) DEVICE — APPL CHLORAPREP W/TINT 26ML ORNG

## (undated) DEVICE — BLAKE SILICONE DRAIN, 19 FR ROUND, HUBLESS WITH 1/4" TROCAR: Brand: BLAKE

## (undated) DEVICE — 2108 SERIES SAGITTAL BLADE, NO OFFSET  (12.4 X 1.19 X 82.1MM)

## (undated) DEVICE — ADHS SKIN DERMABOND TOP ADVANCED

## (undated) DEVICE — SUT ETHILON 3/0 30IN BLK

## (undated) DEVICE — PENCL E/S SMOKEEVAC W/TELESCP CANN

## (undated) DEVICE — GLV SURG SENSICARE W/ALOE PF LF 9 STRL

## (undated) DEVICE — SLV SCD LEG COMFORT KENDALLSCD MD REPROC

## (undated) DEVICE — SUT ETHIB 0 CT1 CR8 18IN CX21D

## (undated) DEVICE — SKIN PREP TRAY W/CHG: Brand: MEDLINE INDUSTRIES, INC.

## (undated) DEVICE — SUT VIC 0 CT1 36IN J946H

## (undated) DEVICE — ADHS LIQ MASTISOL 2/3ML

## (undated) DEVICE — CODMAN® SURGICAL PATTIES 3/4" X 3/4" (1.91CM X 1.91CM): Brand: CODMAN®

## (undated) DEVICE — ELECTRD BLD EXT EDGE 1P COAT 6.5IN STRL

## (undated) DEVICE — APPL DURAPREP IODOPHOR APL 26ML

## (undated) DEVICE — DRSNG WND GZ CURAD OIL EMULSION 3X8IN LF STRL 1PK

## (undated) DEVICE — STRIP CLS WND SUTURESTRIP/PLS 0.5X4IN TP1103

## (undated) DEVICE — GLV SURG BIOGEL LTX PF 6 1/2

## (undated) DEVICE — Device: Brand: DEFENDO AIR/WATER/SUCTION AND BIOPSY VALVE

## (undated) DEVICE — THIN OFFSET (13.0 X 0.38 X 39.0MM)

## (undated) DEVICE — BNDG ELAS ELITE V/CLOSE 6IN 5YD LF STRL

## (undated) DEVICE — PROXIMATE RH ROTATING HEAD SKIN STAPLERS (35 WIDE) CONTAINS 35 STAINLESS STEEL STAPLES: Brand: PROXIMATE

## (undated) DEVICE — ENCORE® LATEX ORTHO SIZE 8, STERILE LATEX POWDER-FREE SURGICAL GLOVE: Brand: ENCORE

## (undated) DEVICE — UNDERCAST PADDING: Brand: DEROYAL

## (undated) DEVICE — OCCLUSIVE GAUZE STRIP,3% BISMUTH TRIBROMOPHENATE IN PETROLATUM BLEND: Brand: XEROFORM

## (undated) DEVICE — DRSNG SURG AQUACEL AG 9X15CM

## (undated) DEVICE — IMMOB KN 3PNL DLX CANVS 22IN BLU

## (undated) DEVICE — 3M™ IOBAN™ 2 ANTIMICROBIAL INCISE DRAPE 6650EZ: Brand: IOBAN™ 2

## (undated) DEVICE — GOWN,REINFORCE,POLY,SIRUS,BREATH SLV,XLG: Brand: MEDLINE

## (undated) DEVICE — CONN JET HYDRA H20 AUXILIARY DISP

## (undated) DEVICE — COAXIAL FEMORAL CANAL TIP

## (undated) DEVICE — PICO 7 10CM X 30CM: Brand: PICO™ 7

## (undated) DEVICE — ENCORE® LATEX ORTHO SIZE 6.5, STERILE LATEX POWDER-FREE SURGICAL GLOVE: Brand: ENCORE

## (undated) DEVICE — JACKSON-PRATT 100CC BULB RESERVOIR: Brand: CARDINAL HEALTH

## (undated) DEVICE — GLV SURG SENSICARE PI LF PF 8 GRN STRL

## (undated) DEVICE — SOLIDIFIER LIQLOC PLS 1500CC BT

## (undated) DEVICE — CVR PROB ULTRASND GLS STRL

## (undated) DEVICE — GLV SURG TRIUMPH CLASSIC PF LTX 6.5 STRL

## (undated) DEVICE — ENCORE® LATEX ORTHO SIZE 8.5, STERILE LATEX POWDER-FREE SURGICAL GLOVE: Brand: ENCORE

## (undated) DEVICE — PACK,UNIVERSAL,NO GOWNS: Brand: MEDLINE

## (undated) DEVICE — DRAPE,REIN 53X77,STERILE: Brand: MEDLINE

## (undated) DEVICE — ANTIBACTERIAL VIOLET BRAIDED (POLYGLACTIN 910), SYNTHETIC ABSORBABLE SUTURE: Brand: COATED VICRYL

## (undated) DEVICE — MINOR-LF: Brand: MEDLINE INDUSTRIES, INC.

## (undated) DEVICE — ENCORE® LATEX ORTHO SIZE 9, STERILE LATEX POWDER-FREE SURGICAL GLOVE: Brand: ENCORE

## (undated) DEVICE — ELECTRD BLD EDGE/INSUL1P 2.4X5.1MM STRL

## (undated) DEVICE — GLV SURG TRIUMPH CLASSIC PF LTX 8.5 STRL

## (undated) DEVICE — DRAPE,U/ SHT,SPLIT,PLAS,STERIL: Brand: MEDLINE

## (undated) DEVICE — CABL W/SLV DALLMILES BEAD 2MM
Type: IMPLANTABLE DEVICE | Site: KNEE | Status: NON-FUNCTIONAL
Removed: 2019-06-13

## (undated) DEVICE — NON-WOVEN ADHESIVE WOUND DRESSING: Brand: PRIMAPORE ADHESIVE DRESSING 10*8CM

## (undated) DEVICE — MINI BEAD MOLD

## (undated) DEVICE — SUT PERMAHAND SILK 0 FSL 30IN BLK